# Patient Record
Sex: MALE | Race: WHITE | NOT HISPANIC OR LATINO | Employment: FULL TIME | URBAN - METROPOLITAN AREA
[De-identification: names, ages, dates, MRNs, and addresses within clinical notes are randomized per-mention and may not be internally consistent; named-entity substitution may affect disease eponyms.]

---

## 2017-01-15 ENCOUNTER — TELEPHONE (OUTPATIENT)
Dept: FAMILY MEDICINE | Facility: CLINIC | Age: 27
End: 2017-01-15

## 2017-01-15 DIAGNOSIS — F90.2 ATTENTION DEFICIT HYPERACTIVITY DISORDER (ADHD), COMBINED TYPE: Primary | ICD-10-CM

## 2017-01-15 NOTE — TELEPHONE ENCOUNTER
Patient called today.    Patient needs medication Adderall.    Patient would like to pickup medication at [facility] in [place] on Monday.    Please contact patient.    Thank you.    Central Scheduling  Kaley MOORE

## 2017-01-16 NOTE — TELEPHONE ENCOUNTER
Patient is followed by MICHAEL OROPEZA for ongoing prescription of stimulants.  All refills should be approved by this provider, or covering partner.  Last filled 12/14/16  Last office visit 9/16/16    Medication(s): adderall 20 mg.    Maximum quantity per month: 90  Clinic visit frequency required: Q 6  months     Controlled substance agreement on file: No  Neuropsych evaluation for ADD completed:  Yes, completed 7-2008 at [facility], on file and diagnosis confirmed    Last [facility] website verification:  10/14/16, no concerns    Controlled Substance Refill Request for Xanax  Problem List Complete:  Yes   checked in past 6 months?  Yes 1/16/17, no concerns

## 2017-01-17 NOTE — TELEPHONE ENCOUNTER
Please have another provider approve and sign this prescription in my absence from clinic or I can sign in the morning.  Jamison Lora MD

## 2017-01-18 RX ORDER — DEXTROAMPHETAMINE SACCHARATE, AMPHETAMINE ASPARTATE, DEXTROAMPHETAMINE SULFATE AND AMPHETAMINE SULFATE 5; 5; 5; 5 MG/1; MG/1; MG/1; MG/1
20 TABLET ORAL 3 TIMES DAILY
Qty: 90 TABLET | Refills: 0 | Status: SHIPPED | OUTPATIENT
Start: 2017-01-18 | End: 2017-02-27

## 2017-01-18 NOTE — TELEPHONE ENCOUNTER
The requested prescription(s) has/have been approved and has/have been printed and signed. This note was forwarded to the patient care pool to contact the patient to arrange for the patient to obtain the signed prescription(s).  Jamison Lora

## 2017-01-25 ENCOUNTER — TELEPHONE (OUTPATIENT)
Dept: FAMILY MEDICINE | Facility: CLINIC | Age: 27
End: 2017-01-25

## 2017-01-25 NOTE — TELEPHONE ENCOUNTER
D-Amphetamine Salt Combo 20 mg needs a prior auth.  Ph: [phone] I.D. [phone]  Thank you.  Odalis Gabriel M.A.

## 2017-02-03 ENCOUNTER — TELEPHONE (OUTPATIENT)
Dept: FAMILY MEDICINE | Facility: CLINIC | Age: 27
End: 2017-02-03

## 2017-02-03 DIAGNOSIS — F41.9 ANXIETY: Primary | ICD-10-CM

## 2017-02-03 NOTE — TELEPHONE ENCOUNTER
Patient is followed by MICHAEL OROPEZA for ongoing prescription of benzodiazepines.  All refills should be approved by this provider, or covering partner.  Last office visit 11/7/16  Last refill 12/21/16 #30  Medication(s): alprazolam 2 mg .    Controlled substance agreement on file: No  Benzodiazepine use reviewed by psychiatry:  No  Last [facility] website verification:  10/14/16  CARLOS Cuenca RN

## 2017-02-03 NOTE — TELEPHONE ENCOUNTER
Patient is calling for refill request RX: ALPRAZolam (XANAX) 2 MG tablet, please call when ready to be picked up at . Thank you.

## 2017-02-07 RX ORDER — ALPRAZOLAM 2 MG
2 TABLET ORAL 3 TIMES DAILY PRN
Qty: 30 TABLET | Refills: 0 | Status: SHIPPED | OUTPATIENT
Start: 2017-02-07 | End: 2017-03-08

## 2017-02-07 NOTE — TELEPHONE ENCOUNTER
Left message for patient to call me back. Can fax this Rx to pharmacy or can leave at  for ./Kassie Covarrubias,

## 2017-02-09 NOTE — TELEPHONE ENCOUNTER
Per patient faxed Rx to [facility] [place]./Kassie Covarrubias,

## 2017-02-27 DIAGNOSIS — F90.2 ATTENTION DEFICIT HYPERACTIVITY DISORDER (ADHD), COMBINED TYPE: ICD-10-CM

## 2017-02-27 RX ORDER — DEXTROAMPHETAMINE SACCHARATE, AMPHETAMINE ASPARTATE, DEXTROAMPHETAMINE SULFATE AND AMPHETAMINE SULFATE 5; 5; 5; 5 MG/1; MG/1; MG/1; MG/1
20 TABLET ORAL 3 TIMES DAILY
Qty: 90 TABLET | Refills: 0 | Status: SHIPPED | OUTPATIENT
Start: 2017-02-27 | End: 2017-03-30

## 2017-02-27 NOTE — TELEPHONE ENCOUNTER
Patient is followed by MICHAEL OROPEZA for ongoing prescription of stimulants.  All refills should be approved by this provider, or covering partner.  Last filled 1/18/17  Last office visit 11/7/16     Medication(s): adderall 20 mg.    Maximum quantity per month: 90  Clinic visit frequency required: Q 6  months      Controlled substance agreement on file: No  Neuropsych evaluation for ADD completed:  Yes, completed 7-2008 at [facility], on file and diagnosis confirmed     Last [facility] website verification:  1/16/17, no concerns     Jasmin Lauren RN

## 2017-02-27 NOTE — TELEPHONE ENCOUNTER
Patient is calling for refill RX: amphetamine-dextroamphetamine (ADDERALL) 20 MG per tablet, would like a call back when script is ready to be picked up at . Thank you.

## 2017-03-08 DIAGNOSIS — F41.9 ANXIETY: ICD-10-CM

## 2017-03-08 RX ORDER — ALPRAZOLAM 2 MG
2 TABLET ORAL 3 TIMES DAILY PRN
Qty: 30 TABLET | Refills: 0 | Status: SHIPPED | OUTPATIENT
Start: 2017-03-08 | End: 2017-04-07

## 2017-03-08 NOTE — TELEPHONE ENCOUNTER
Patient is calling to request refill RX: ALPRAZolam (XANAX) 2 MG tablet, state last time RX was faxed to pharmacy instead of having him  wondering if he can request to have that done every time. Please call to advise. Thank you.

## 2017-03-08 NOTE — TELEPHONE ENCOUNTER
Patient is followed by MICHAEL OROPEZA for ongoing prescription of benzodiazepines.  All refills should be approved by this provider, or covering partner.  Last office visit 11/7/16  Last refill 2/9/17 #30  Medication(s): alprazolam 2 mg .    Controlled substance agreement on file: No  Benzodiazepine use reviewed by psychiatry:  No  Last [facility] website verification:  3/8/17  Jasmin Lauren RN

## 2017-03-30 DIAGNOSIS — F90.2 ATTENTION DEFICIT HYPERACTIVITY DISORDER (ADHD), COMBINED TYPE: ICD-10-CM

## 2017-03-30 RX ORDER — DEXTROAMPHETAMINE SACCHARATE, AMPHETAMINE ASPARTATE, DEXTROAMPHETAMINE SULFATE AND AMPHETAMINE SULFATE 5; 5; 5; 5 MG/1; MG/1; MG/1; MG/1
20 TABLET ORAL 3 TIMES DAILY
Qty: 90 TABLET | Refills: 0 | Status: SHIPPED | OUTPATIENT
Start: 2017-03-30 | End: 2017-05-01

## 2017-03-30 NOTE — TELEPHONE ENCOUNTER
Patient is followed by MICHAEL OROPEZA for ongoing prescription of stimulants.  All refills should be approved by this provider, or covering partner.  Last filled 2/27/17  Last office visit 11/7/16      Medication(s): adderall 20 mg.    Maximum quantity per month: 90  Clinic visit frequency required: Q 6  months       Controlled substance agreement on file: No  Neuropsych evaluation for ADD completed:  Yes, completed 7-2008 at [facility], on file and diagnosis confirmed      Last [facility] website verification:  1/16/17, no concerns

## 2017-03-30 NOTE — TELEPHONE ENCOUNTER
Patient is calling would like refill RX: amphetamine-dextroamphetamine (ADDERALL) 20 MG per tablet, please call when script is ready to be picked up at . Thank you.

## 2017-04-07 DIAGNOSIS — F41.9 ANXIETY: ICD-10-CM

## 2017-04-07 RX ORDER — ALPRAZOLAM 2 MG
2 TABLET ORAL 3 TIMES DAILY PRN
Qty: 30 TABLET | Refills: 0 | Status: SHIPPED | OUTPATIENT
Start: 2017-04-07 | End: 2017-05-16

## 2017-04-07 NOTE — TELEPHONE ENCOUNTER
Patient is followed by MICHAEL OROPEZA for ongoing prescription of benzodiazepines.  All refills should be approved by this provider, or covering partner.    Last office visit 11/7/16  Last refill  3/8/17 #30    Medication(s): alprazolam 2 mg .    Controlled substance agreement on file: No  Benzodiazepine use reviewed by psychiatry:  No  Last [facility] website verification:  3/8/17  Jasmin Lauren RN

## 2017-04-11 ENCOUNTER — TRANSFERRED RECORDS (OUTPATIENT)
Dept: HEALTH INFORMATION MANAGEMENT | Facility: CLINIC | Age: 27
End: 2017-04-11

## 2017-04-14 ENCOUNTER — TELEPHONE (OUTPATIENT)
Dept: FAMILY MEDICINE | Facility: CLINIC | Age: 27
End: 2017-04-14

## 2017-04-14 NOTE — TELEPHONE ENCOUNTER
RN contacted [facility] listed below regarding the 4/7/17 alprazolam Rx. Per pharmacy staff, the Rx is ready for  at the [facility] location. Pt notified and states he will go  the medication.    Controlled Substance Refill Request for   ALPRAZolam (XANAX) 2 MG tablet 30 tablet 0 4/7/2017  No   Sig: Take 1 tablet (2 mg) by mouth 3 times daily as needed for anxiety     Pharmacy Contact   Telephone Fax   [phone]   Pharmacy Address and Hours   Address Hours   [address] [id number]      Gisela Cullen RN

## 2017-04-18 ENCOUNTER — TRANSFERRED RECORDS (OUTPATIENT)
Dept: HEALTH INFORMATION MANAGEMENT | Facility: CLINIC | Age: 27
End: 2017-04-18

## 2017-05-01 DIAGNOSIS — F90.2 ATTENTION DEFICIT HYPERACTIVITY DISORDER (ADHD), COMBINED TYPE: ICD-10-CM

## 2017-05-01 RX ORDER — DEXTROAMPHETAMINE SACCHARATE, AMPHETAMINE ASPARTATE, DEXTROAMPHETAMINE SULFATE AND AMPHETAMINE SULFATE 5; 5; 5; 5 MG/1; MG/1; MG/1; MG/1
20 TABLET ORAL 3 TIMES DAILY
Qty: 90 TABLET | Refills: 0 | Status: SHIPPED | OUTPATIENT
Start: 2017-05-01 | End: 2017-06-02

## 2017-05-01 NOTE — TELEPHONE ENCOUNTER
Patient states he would like a refill on his Adderall.  Please call when ready for .    Thank you.

## 2017-05-01 NOTE — TELEPHONE ENCOUNTER
Notified patient through [employer].   Rx request is at the  for .  Victoria Rice, [facility]

## 2017-05-16 DIAGNOSIS — F41.9 ANXIETY: ICD-10-CM

## 2017-05-16 NOTE — TELEPHONE ENCOUNTER
Patient is followed by MICHAEL OROPEZA for ongoing prescription of benzodiazepines.  All refills should be approved by this provider, or covering partner.     Last office visit 11/7/16  Last refill 4/7/17 #30     Medication(s): alprazolam 2 mg .    Controlled substance agreement on file: No  Benzodiazepine use reviewed by psychiatry:  No  Last [facility] website verification:  3/8/17

## 2017-05-17 RX ORDER — ALPRAZOLAM 2 MG
2 TABLET ORAL 3 TIMES DAILY PRN
Qty: 30 TABLET | Refills: 0 | Status: SHIPPED | OUTPATIENT
Start: 2017-05-17 | End: 2017-06-27

## 2017-05-19 ENCOUNTER — TRANSFERRED RECORDS (OUTPATIENT)
Dept: HEALTH INFORMATION MANAGEMENT | Facility: CLINIC | Age: 27
End: 2017-05-19

## 2017-05-23 ENCOUNTER — TRANSFERRED RECORDS (OUTPATIENT)
Dept: HEALTH INFORMATION MANAGEMENT | Facility: CLINIC | Age: 27
End: 2017-05-23

## 2017-06-02 ENCOUNTER — TELEPHONE (OUTPATIENT)
Dept: FAMILY MEDICINE | Facility: CLINIC | Age: 27
End: 2017-06-02

## 2017-06-02 DIAGNOSIS — F90.2 ATTENTION DEFICIT HYPERACTIVITY DISORDER (ADHD), COMBINED TYPE: ICD-10-CM

## 2017-06-02 RX ORDER — DEXTROAMPHETAMINE SACCHARATE, AMPHETAMINE ASPARTATE, DEXTROAMPHETAMINE SULFATE AND AMPHETAMINE SULFATE 5; 5; 5; 5 MG/1; MG/1; MG/1; MG/1
20 TABLET ORAL 3 TIMES DAILY
Qty: 90 TABLET | Refills: 0 | Status: SHIPPED | OUTPATIENT
Start: 2017-06-02 | End: 2017-07-12

## 2017-06-02 NOTE — TELEPHONE ENCOUNTER
Controlled Substance Refill Request for Adderall  Problem List Complete:  Yes    Medication(s): adderall 20 mg.   Maximum quantity per month: 90  Clinic visit frequency required: Q 6  months     Controlled substance agreement on file: No  Neuropsych evaluation for ADD completed:  Yes, completed 7[facility], on file and diagnosis confirmed       checked in past 6 months?  Yes 6/2/17       Poonam RIDERN, RN, CPN

## 2017-06-02 NOTE — TELEPHONE ENCOUNTER
Patient is calling to have amphetamine-dextroamphetamine (ADDERALL) 20 MG per tablet refilled  Please call when this is ready at the   Thank you

## 2017-06-05 NOTE — TELEPHONE ENCOUNTER
Brought RX to the  for . Left message on patient's voicemail that this was done.Rosa Cifuentes MA/TC

## 2017-06-07 ENCOUNTER — TELEPHONE (OUTPATIENT)
Dept: FAMILY MEDICINE | Facility: CLINIC | Age: 27
End: 2017-06-07

## 2017-06-07 ENCOUNTER — TRANSFERRED RECORDS (OUTPATIENT)
Dept: HEALTH INFORMATION MANAGEMENT | Facility: CLINIC | Age: 27
End: 2017-06-07

## 2017-06-07 DIAGNOSIS — N52.2 DRUG-INDUCED ERECTILE DYSFUNCTION: ICD-10-CM

## 2017-06-07 NOTE — TELEPHONE ENCOUNTER
Reason for call:  Medication   If this is a refill request, has the caller requested the refill from the pharmacy already? No  Will the patient be using a [facility]? No  Name of the pharmacy and phone number for the current request: [facility]     Name of the medication requested: sildenafil (REVATIO/VIAGRA) 20 MG tablet    Other request:     Phone number to reach patient:  Home number on file [phone] (home)    Best Time:      Can we leave a detailed message on this number?  YES

## 2017-06-08 NOTE — TELEPHONE ENCOUNTER
Medication was prescribed for 2 mos in November.  ? Follow up needed?   Please advise on refill.  Jasmin Lauren RN

## 2017-06-09 RX ORDER — SILDENAFIL CITRATE 20 MG/1
20 TABLET ORAL DAILY PRN
Qty: 30 TABLET | Refills: 1 | Status: SHIPPED | OUTPATIENT
Start: 2017-06-09 | End: 2018-08-14

## 2017-06-09 NOTE — TELEPHONE ENCOUNTER
Spoke to patient and scheduled appointment. Please send refill to pharmacy./Kassie Covarrubias,

## 2017-06-26 ENCOUNTER — TELEPHONE (OUTPATIENT)
Dept: FAMILY MEDICINE | Facility: CLINIC | Age: 27
End: 2017-06-26

## 2017-06-26 DIAGNOSIS — F41.9 ANXIETY: ICD-10-CM

## 2017-06-26 NOTE — TELEPHONE ENCOUNTER
Please call the patient and make an appointment(s) and then you can refill the medication one time.  Jamison Lora

## 2017-06-27 ENCOUNTER — TRANSFERRED RECORDS (OUTPATIENT)
Dept: HEALTH INFORMATION MANAGEMENT | Facility: CLINIC | Age: 27
End: 2017-06-27

## 2017-06-27 RX ORDER — ALPRAZOLAM 2 MG
2 TABLET ORAL 3 TIMES DAILY PRN
Qty: 30 TABLET | Refills: 0 | Status: SHIPPED | OUTPATIENT
Start: 2017-06-27 | End: 2017-07-31

## 2017-06-27 NOTE — TELEPHONE ENCOUNTER
Notified patient Rx faxed to [facility] [place] and will need to keep 7/12/17 appointment for further refills./Kassie Covarrubias,

## 2017-07-12 ENCOUNTER — TELEPHONE (OUTPATIENT)
Dept: FAMILY MEDICINE | Facility: CLINIC | Age: 27
End: 2017-07-12

## 2017-07-12 DIAGNOSIS — F90.2 ATTENTION DEFICIT HYPERACTIVITY DISORDER (ADHD), COMBINED TYPE: ICD-10-CM

## 2017-07-12 NOTE — TELEPHONE ENCOUNTER
Pending appointment to see PCP 7/31/17.    Heather Lin, RN       Controlled Substance Refill Request for Adderall  Problem List Complete:  Yes     Medication(s): adderall 20 mg.   Maximum quantity per month: 90  Clinic visit frequency required: Q 6  months      Controlled substance agreement on file: No  Neuropsych evaluation for ADD completed:  Yes, completed 7-2008 at [facility], on file and diagnosis confirmed         checked in past 6 months?  Yes 6/2/17

## 2017-07-12 NOTE — TELEPHONE ENCOUNTER
Patient ran late for his appointment today was told he needed to reschedule. He did re-set up an appointment for 07/31 but stated he is out of his adderall and is wondering if he can get a refill prior to his appointment. He is requesting a call back on [phone]. Thank you!

## 2017-07-13 RX ORDER — DEXTROAMPHETAMINE SACCHARATE, AMPHETAMINE ASPARTATE, DEXTROAMPHETAMINE SULFATE AND AMPHETAMINE SULFATE 5; 5; 5; 5 MG/1; MG/1; MG/1; MG/1
20 TABLET ORAL 3 TIMES DAILY
Qty: 90 TABLET | Refills: 0 | Status: SHIPPED | OUTPATIENT
Start: 2017-07-13 | End: 2017-07-31

## 2017-07-25 NOTE — PROGRESS NOTES
SUBJECTIVE:                                                    Abimael Martinez is a 27 year old male who presents to clinic today for the following health issues:      ADHD follow up      Problem list and histories reviewed & adjusted, as indicated.      Reviewed and updated as needed this visit by clinical staff       Reviewed and updated as needed this visit by Provider       --------------------------------------------------------------------------------------------------------------------------------------  SUBJECTIVE: Abimael is a 27 year old male who is here for follow up of ATTENTION DEFICIT HYPERACTIVITY DISORDER.    He has been on he adderall for a few years   He has been on his dose for a while.       Current medication:  Current Outpatient Prescriptions   Medication Sig Dispense Refill     amphetamine-dextroamphetamine (ADDERALL) 20 MG per tablet Take 1 tablet (20 mg) by mouth 3 times daily 90 tablet 0     ALPRAZolam (XANAX) 2 MG tablet Take 1 tablet (2 mg) by mouth 3 times daily as needed for anxiety 30 tablet 0     sildenafil (REVATIO/VIAGRA) 20 MG tablet Take 1 tablet (20 mg) by mouth daily as needed As needed for prevention of erectile dysfunction 30 tablet 1     famotidine (PEPCID) 40 MG tablet Take 1 tablet (40 mg) by mouth At Bedtime 90 tablet 3     Hyoscyamine Sulfate 0.375 MG TBCR Take 1 capful by mouth 3 times daily as needed 90 tablet 1     albuterol (PROAIR HFA, PROVENTIL HFA, VENTOLIN HFA) 108 (90 BASE) MCG/ACT inhaler Inhale 2 puffs into the lungs every 4 hours as needed 1 Inhaler 1     venlafaxine (EFFEXOR-ER) 75 MG TB24 Take 1 tablet (75 mg) by mouth daily 90 tablet 1     The patient did not take the medication this morning.   He only takes it for work or school.     The patient was kept on the same medications at the last visit.. At the current time the patient feels that the medication is sufficiently controlling his symptoms of ADD/ADHD. The symptoms that are not well controlled  include: None.    The patient reports that he is not having any side effects from his current medication.  The patient reports that he is having the following side effects from his current medication: sometimes at first he had side effects with insomnia. He is able to fall asleep in 30 minute(s) or less.       The patient reports recent life changes or stresses including: he has moved to [facility] Active Problem List    Diagnosis Date Noted     Drug-induced erectile dysfunction 11/07/2016     Priority: Medium     OCD (obsessive compulsive disorder) 09/16/2016     Priority: Medium     AYALA (generalized anxiety disorder) 12/04/2015     Priority: Medium     Patient is followed by MICHAEL OROPEZA for ongoing prescription of benzodiazepines.  All refills should be approved by this provider, or covering partner.    Medication(s): alprazolam 2 mg .   Maximum quantity per month: 36  Clinic visit frequency required: Q 6  months     Controlled substance agreement on file: No  Benzodiazepine use reviewed by psychiatry:  No    Last [facility] website verification:  none   [email or web address]           Attention deficit hyperactivity disorder (ADHD), combined type 10/14/2015     Priority: Medium     Patient is followed by MICHAEL OROPEZA for ongoing prescription of stimulants.  All refills should be approved by this provider, or covering partner.    Medication(s): adderall 20 mg.   Maximum quantity per month: 90  Clinic visit frequency required: Q 6  months     Controlled substance agreement on file: No  Neuropsych evaluation for ADD completed:  Yes, completed 7-2008 at [facility], on file and diagnosis confirmed    Last [facility] website verification:  3/14/16, no concerns   [email or web address]           Obesity, Class I, BMI 30-34.9 02/20/2015     Priority: Medium     Internal hemorrhoids which bleed 10/02/2014     Priority: Medium     Hypertension goal BP (blood pressure) < 140/90 08/17/2014     Priority:  "Medium     Low back pain 04/07/2013     Priority: Medium     Tobacco use disorder 03/19/2012     Priority: Medium     High risk sexual behavior 11/06/2011     Priority: Medium     CARDIOVASCULAR SCREENING; LDL GOAL LESS THAN 160 05/24/2011     Priority: Medium     Intermittent asthma 05/08/2011     Priority: Medium     Anxiety      Priority: Medium       Social History:  Social History   Substance Use Topics     Smoking status: Current Some Day Smoker     Packs/day: 0.50     Years: 6.00     Types: Cigarettes     Last attempt to quit: 3/25/2013     Smokeless tobacco: Never Used      Comment: second hand smoke exposure     Alcohol use Yes      Comment: once a week       OBJECTIVE:  /86  Pulse 79  Temp 97.3  F (36.3  C) (Oral)  Ht 6' 1\" (1.854 m)  Wt 263 lb (119.3 kg)  SpO2 98%  BMI 34.7 kg/m2  General:Alert, active, cooperative. Distractibility was not noticed.  Eyes:Conjunctivae are clear. No discharge.  Face:No tics seen.  HEENT: Normal   CV: Regular rate and rhythm. No murmur appreciated.  Neuro: Appropriate for age. No noted tics or tremors.    Lab:   Results for orders placed or performed in visit on 11/28/16   NEISSERIA GONORRHOEA PCR   Result Value Ref Range    Specimen Descrip Urine     N Gonorrhea PCR  NEG     Negative   Negative for N. gonorrhoeae rRNA by transcription mediated amplification.   A negative result by transcription mediated amplification does not preclude the   presence of N. gonorrhoeae infection because results are dependent on proper   and adequate collection, absence of inhibitors, and sufficient rRNA to be   detected.     CHLAMYDIA TRACHOMATIS PCR   Result Value Ref Range    Specimen Description Urine     Chlamydia Trachomatis PCR  NEG     Negative   Negative for C. trachomatis rRNA by transcription mediated amplification.   A negative result by transcription mediated amplification does not preclude the   presence of C. trachomatis infection because results are dependent on " "proper   and adequate collection, absence of inhibitors, and sufficient rRNA to be   detected.         ASSESSMENT:  ATTENTION DEFICIT HYPERACTIVITY DISORDER    PLAN:  CSA signed  We plan to continue the current doses of medication.  Recheck in 6 month(s).  A prescription was printed through [employer] and given to the patient for a 90 day supply. He or the parent will call back when they need a refill.    The problem list was reviewed and has ADD or ADHD and controlledsubstanceproblemlistoverview:[id number] was added to the overview    --------------------------------------------------------------------------------------------------------------------------------------  SUBJECTIVE:   Abimael is a 27 year old male who presents today for follow up asthma.      The patient's current symptoms include: wheezing and chest congestion primarily when he lays down to go to sleep.   The patient's current medications include:  The patient reports that he  is compliant with medication.     Current Outpatient Prescriptions   Medication     fluticasone (FLOVENT HFA) 110 MCG/ACT Inhaler     [START ON 9/30/2017] amphetamine-dextroamphetamine (ADDERALL) 20 MG per tablet     ALPRAZolam (XANAX) 2 MG tablet     sildenafil (REVATIO/VIAGRA) 20 MG tablet     famotidine (PEPCID) 40 MG tablet     Hyoscyamine Sulfate 0.375 MG TBCR     albuterol (PROAIR HFA, PROVENTIL HFA, VENTOLIN HFA) 108 (90 BASE) MCG/ACT inhaler     venlafaxine (EFFEXOR-ER) 75 MG TB24     [DISCONTINUED] ALPRAZolam (XANAX) 2 MG tablet     No current facility-administered medications for this visit.        The patient reports that on average he uses his rescue inhaler 1-2 times a day.  He feels that his asthma usually does not limit his ability to perform daily activities or physical activity.    ACT: score is 15     OBJECTIVE:   /86  Pulse 79  Temp 97.3  F (36.3  C) (Oral)  Ht 6' 1\" (1.854 m)  Wt 263 lb (119.3 kg)  SpO2 98%  BMI 34.7 kg/m2    Exam:  GENERAL APPEARANCE: " healthy, alert and no distress  EYES: EOMI,  PERRL  HENT: ear canals and TM's normal and nose and mouth without ulcers or lesions  RESP: lungs clear to auscultation - no rales, rhonchi or wheezes  CV: regular rates and rhythm, normal S1 S2, no S3 or S4 and no murmur, click or rub -  ABDOMEN:  soft, nontender, no HSM or masses and bowel sounds normal      ASSESSMENT:   Asthma: mild persistent    PLAN:  Steroid inhaler started     Follow up in 4-6 weeks

## 2017-07-31 ENCOUNTER — OFFICE VISIT (OUTPATIENT)
Dept: FAMILY MEDICINE | Facility: CLINIC | Age: 27
End: 2017-07-31
Payer: MEDICAID

## 2017-07-31 VITALS
HEART RATE: 79 BPM | OXYGEN SATURATION: 98 % | SYSTOLIC BLOOD PRESSURE: 131 MMHG | TEMPERATURE: 97.3 F | DIASTOLIC BLOOD PRESSURE: 86 MMHG | WEIGHT: 263 LBS | HEIGHT: 73 IN | BODY MASS INDEX: 34.85 KG/M2

## 2017-07-31 DIAGNOSIS — J45.30 MILD PERSISTENT ASTHMA WITHOUT COMPLICATION: Primary | ICD-10-CM

## 2017-07-31 DIAGNOSIS — F41.9 ANXIETY: ICD-10-CM

## 2017-07-31 DIAGNOSIS — F90.2 ATTENTION DEFICIT HYPERACTIVITY DISORDER (ADHD), COMBINED TYPE: ICD-10-CM

## 2017-07-31 PROCEDURE — 99214 OFFICE O/P EST MOD 30 MIN: CPT | Performed by: FAMILY MEDICINE

## 2017-07-31 RX ORDER — FLUTICASONE PROPIONATE 110 UG/1
2 AEROSOL, METERED RESPIRATORY (INHALATION) 2 TIMES DAILY
Qty: 1 INHALER | Refills: 1 | Status: SHIPPED | OUTPATIENT
Start: 2017-07-31 | End: 2018-05-18

## 2017-07-31 RX ORDER — ALPRAZOLAM 2 MG
2 TABLET ORAL 3 TIMES DAILY PRN
Qty: 30 TABLET | Refills: 0 | Status: SHIPPED | OUTPATIENT
Start: 2017-07-31 | End: 2017-09-11

## 2017-07-31 RX ORDER — DEXTROAMPHETAMINE SACCHARATE, AMPHETAMINE ASPARTATE, DEXTROAMPHETAMINE SULFATE AND AMPHETAMINE SULFATE 5; 5; 5; 5 MG/1; MG/1; MG/1; MG/1
20 TABLET ORAL 3 TIMES DAILY
Qty: 90 TABLET | Refills: 0 | Status: SHIPPED | OUTPATIENT
Start: 2017-08-31 | End: 2017-07-31

## 2017-07-31 RX ORDER — DEXTROAMPHETAMINE SACCHARATE, AMPHETAMINE ASPARTATE, DEXTROAMPHETAMINE SULFATE AND AMPHETAMINE SULFATE 5; 5; 5; 5 MG/1; MG/1; MG/1; MG/1
20 TABLET ORAL 3 TIMES DAILY
Qty: 90 TABLET | Refills: 0 | Status: SHIPPED | OUTPATIENT
Start: 2017-09-30 | End: 2017-10-30

## 2017-07-31 RX ORDER — DEXTROAMPHETAMINE SACCHARATE, AMPHETAMINE ASPARTATE, DEXTROAMPHETAMINE SULFATE AND AMPHETAMINE SULFATE 5; 5; 5; 5 MG/1; MG/1; MG/1; MG/1
20 TABLET ORAL 3 TIMES DAILY
Qty: 90 TABLET | Refills: 0 | Status: SHIPPED | OUTPATIENT
Start: 2017-07-31 | End: 2017-07-31

## 2017-07-31 NOTE — NURSING NOTE
"Chief Complaint   Patient presents with     Recheck Medication       Initial /86  Pulse 79  Temp 97.3  F (36.3  C) (Oral)  Ht 6' 1\" (1.854 m)  Wt 263 lb (119.3 kg)  SpO2 98%  BMI 34.7 kg/m2 Estimated body mass index is 34.7 kg/(m^2) as calculated from the following:    Height as of this encounter: 6' 1\" (1.854 m).    Weight as of this encounter: 263 lb (119.3 kg).  Medication Reconciliation: complete     Victoria Rice, julienne      "

## 2017-07-31 NOTE — MR AVS SNAPSHOT
After Visit Summary   7/31/2017    Abimael Martinez    MRN: [id number]           Patient Information     Date Of Birth          1990        Visit Information        Provider Department      7/31/2017 11:00 AM Jamison Lora MD [facility]        Today's Diagnoses     Mild persistent asthma without complication    -  1    Attention deficit hyperactivity disorder (ADHD), combined type           Follow-ups after your visit        Follow-up notes from your care team     Return in about 6 weeks (around 9/11/2017) for asthma recheck.      Who to contact     If you have questions or need follow up information about today's clinic visit or your schedule please contact [facility] [place] directly at [phone].  Normal or non-critical lab and imaging results will be communicated to you by MyChart, letter or phone within 4 business days after the clinic has received the results. If you do not hear from us within 7 days, please contact the clinic through [employer] or phone. If you have a critical or abnormal lab result, we will notify you by phone as soon as possible.  Submit refill requests through [employer] or call your pharmacy and they will forward the refill request to us. Please allow 3 business days for your refill to be completed.          Additional Information About Your Visit        MyChart Information     [employer] gives you secure access to your electronic health record. If you see a primary care provider, you can also send messages to your care team and make appointments. If you have questions, please call your primary care clinic.  If you do not have a primary care provider, please call [phone] and they will assist you.        Care EveryWhere ID     This is your Care EveryWhere ID. This could be used by other organizations to access your [place] medical records  [id number]        Your Vitals Were     Pulse Temperature Height Pulse Oximetry BMI (Body Mass Index)     "   79 97.3  F (36.3  C) (Oral) 6' 1\" (1.854 m) 98% 34.7 kg/m2        Blood Pressure from Last 3 Encounters:   07/31/17 131/86   11/28/16 132/82   11/07/16 131/89    Weight from Last 3 Encounters:   07/31/17 263 lb (119.3 kg)   11/28/16 253 lb (114.8 kg)   11/07/16 249 lb 9.6 oz (113.2 kg)              Today, you had the following     No orders found for display         Today's Medication Changes          These changes are accurate as of: 7/31/17 12:01 PM.  If you have any questions, ask your nurse or doctor.               Start taking these medicines.        Dose/Directions    amphetamine-dextroamphetamine 20 MG per tablet   Commonly known as:  ADDERALL   Used for:  Attention deficit hyperactivity disorder (ADHD), combined type   Started by:  Jamison Lora MD        Dose:  20 mg   Start taking on:  9/30/2017   Take 1 tablet (20 mg) by mouth 3 times daily   Quantity:  90 tablet   Refills:  0       fluticasone 110 MCG/ACT Inhaler   Commonly known as:  FLOVENT HFA   Used for:  Mild persistent asthma without complication   Started by:  Jamison Lora MD        Dose:  2 puff   Inhale 2 puffs into the lungs 2 times daily   Quantity:  1 Inhaler   Refills:  1         Stop taking these medicines if you haven't already. Please contact your care team if you have questions.     doxycycline 100 MG capsule   Commonly known as:  VIBRAMYCIN   Stopped by:  Jamison Lora MD                Where to get your medicines      Some of these will need a paper prescription and others can be bought over the counter.  Ask your nurse if you have questions.     Bring a paper prescription for each of these medications     amphetamine-dextroamphetamine 20 MG per tablet    fluticasone 110 MCG/ACT Inhaler                Primary Care Provider Office Phone # Fax #    Jamison Lora [phone]       [facility] [address] [facility] [address]        Equal Access to Services     KELSI HULL AH: Hadii " gretchen Wall, wadeanada gianlucaadaha, qaybta kalang shell, waxclarissa idiin haylmmaris armijokbjassi luna stanley. So [facility] [phone].    ATENCIÓN: Si habla karoline, tiene a salazar disposición servicios gratuitos de asistencia lingüística. Nathalieame al [phone].    We comply with applicable federal civil rights laws and [place] laws. We do not discriminate on the basis of race, color, national origin, age, disability sex, sexual orientation or gender identity.            Thank you!     [facility] you for [facility]  [facility]. Our goal is always to provide you with excellent care. Hearing back from our patients is one way we can continue to improve our services. Please take a few minutes to complete the written survey that you may receive in the mail after your visit with us. Thank you!             Your Updated Medication List - Protect others around you: Learn how to safely use, store and throw away your medicines at www.disposemymeds.org.          This list is accurate as of: 7/31/17 12:01 PM.  Always use your most recent med list.                   Brand Name Dispense Instructions for use Diagnosis    albuterol 108 (90 BASE) MCG/ACT Inhaler    PROAIR HFA/PROVENTIL HFA/VENTOLIN HFA    1 Inhaler    Inhale 2 puffs into the lungs every 4 hours as needed    Intermittent asthma, uncomplicated       ALPRAZolam 2 MG tablet    XANAX    30 tablet    Take 1 tablet (2 mg) by mouth 3 times daily as needed for anxiety    Anxiety       amphetamine-dextroamphetamine 20 MG per tablet   Start taking on:  9/30/2017    ADDERALL    90 tablet    Take 1 tablet (20 mg) by mouth 3 times daily    Attention deficit hyperactivity disorder (ADHD), combined type       famotidine 40 MG tablet    PEPCID    90 tablet    Take 1 tablet (40 mg) by mouth At Bedtime    Upper abdominal pain       fluticasone 110 MCG/ACT Inhaler    FLOVENT HFA    1 Inhaler    Inhale 2 puffs into the lungs 2 times daily    Mild persistent asthma  without complication       Hyoscyamine Sulfate 0.375 MG Tbcr     90 tablet    Take 1 capful by mouth 3 times daily as needed    Upper abdominal pain       sildenafil 20 MG tablet    REVATIO/VIAGRA    30 tablet    Take 1 tablet (20 mg) by mouth daily as needed As needed for prevention of erectile dysfunction    Drug-induced erectile dysfunction       venlafaxine 75 MG Tb24 24 hr tablet    EFFEXOR-ER    90 tablet    Take 1 tablet (75 mg) by mouth daily    Anxiety

## 2017-07-31 NOTE — LETTER
[facility]    07/31/17    Patient: Abimael Martinez  YOB: 1990  Medical Record Number: [id number]                                                                  Controlled Substance Agreement  I understand that my care provider has prescribed controlled substances (narcotics, tranquilizers, and/or stimulants) to help manage my condition(s).  I am taking this medicine to help me function or work.  I know that this is strong medicine.  It could have serious side effects and even cause a dependency on the drug.  If I stop these medicines suddenly, I could have severe withdrawal symptoms.    The risks, benefits, and side effects of these medication(s) were explained to me.  I agree that:  1. I will take part in other treatments as advised by my provider.  This may be psychiatry or counseling, physical therapy, behavioral therapy, group treatment, or a referral to a pain clinic.  I will reduce or stop my medicine when my provider tells me to do so.   2. I will take my medicines as prescribed.  I will not change the dose or schedule unless my provider tells me to.  There will be no refills if I  run out early.   I may be contacted at any time without warning and asked to complete a drug test or pill count.   3. I will keep all my appointments at the clinic.  If I miss appointments or fail to follow instructions, my provider may stop my medicine.  4. I will not ask other providers to prescribe controlled substances. And I will not accept controlled substances from other people. If I need another prescribed controlled substance for a new reason, I will notify my provider within one business day.  5. If I enroll in the [place] Medical Marijuana program, I will tell my provider.  I will also sign an agreement to share my medical records with my provider.  6. I will use one pharmacy to fill all of my controlled substance prescriptions.  If my prescription is mailed to my pharmacy, it may take  5 to 7 days for my medicine to be ready.  7. I understand that my provider, clinic care team, and pharmacy can track controlled substance prescriptions from other providers through a central database (prescription monitoring program).  8. I will bring in my list of medications (or my medicine bottles) each time I come to the clinic.  REV- 04/2016                                                                                                                                            Page 1 of 2      [facility]    07/31/17    Patient: Abimael Martinez  YOB: 1990  Medical Record Number: [id number]    9. Refills of controlled substances will be made only during office hours.  It is up to me to make sure that I do not run out of my medicines on weekends or holidays.    10. I am responsible for my prescriptions.  If the medicine is lost or stolen, it will not be replaced.   I also agree not to share these medicines with anyone.  11. I agree to not use ANY illegal or recreational drugs.  This includes marijuana, cocaine, bath salts or other drugs.  I agree not to use alcohol unless my provider says I may.  I agree to give urine samples whenever asked.  If I fail to give a urine sample, the provider may stop my medicine.     12. I will tell my nurse or provider right away if I become pregnant or have a new medical problem treated outside of [facility].  13. I understand that this medicine can affect my thinking and judgment.  It may be unsafe for me to drive, use machinery and do dangerous tasks.  I will not do any of these things until I know how the medicine affects me.  If my dose changes, I will wait to see how it affects me.  I will contact my provider if I have concerns about medicine side effects.  I understand that if I do not follow any of the conditions above, my prescriptions or treatment may be stopped.    I agree that my provider, clinic care team, and pharmacy may work with  any city, state or federal law enforcement agency that investigates the misuse, sale, or other diversion of my controlled medicine. I will allow my provider to discuss my care with or share a copy of this agreement with any other treating provider, pharmacy or emergency room where I receive care.  I agree to give up (waive) any right of privacy or confidentiality with respect to these authorizations.   I have read this agreement and have asked questions about anything I did not understand.   ___________________________________    ___________________________  Patient Signature                                                           Date and Time  ___________________________________     ____________________________  Witness                                                                            Date and Time  ___________________________________  Jamison Lora MD  REV-  04/2016                                                                                                                                                                 Page 2 of 2

## 2017-08-01 ASSESSMENT — ASTHMA QUESTIONNAIRES: ACT_TOTALSCORE: 15

## 2017-08-07 ENCOUNTER — TELEPHONE (OUTPATIENT)
Dept: FAMILY MEDICINE | Facility: CLINIC | Age: 27
End: 2017-08-07

## 2017-08-07 NOTE — LETTER
[facility]  [address] [facility] [address]  [phone]    August 28, 2017      Abimael Martinez  [address]  [facility] [place] [address]      Dear Abimael,     Your clinic record indicates that you are due for an asthma update. We have a survey tool called an ACT (or Asthma Control Test) we use to measure the level of control of your asthma. Please complete the enclosed questionnaire and mail it back to us in the self-addressed stamped envelope.     If you have questions about this letter please contact your provider.     Sincerely,       Your [facility] Team

## 2017-08-07 NOTE — TELEPHONE ENCOUNTER
Patient was in to see Dr. Lora on 07-31-17 and failed their ACT by scoring 15. Please put in the failed ACT workflow for follow-up. Thank you.

## 2017-09-11 ENCOUNTER — TELEPHONE (OUTPATIENT)
Dept: FAMILY MEDICINE | Facility: CLINIC | Age: 27
End: 2017-09-11

## 2017-09-11 DIAGNOSIS — F41.9 ANXIETY: ICD-10-CM

## 2017-09-11 RX ORDER — ALPRAZOLAM 2 MG
2 TABLET ORAL 3 TIMES DAILY PRN
Qty: 30 TABLET | Refills: 0 | Status: SHIPPED | OUTPATIENT
Start: 2017-09-11 | End: 2017-10-11

## 2017-09-11 NOTE — TELEPHONE ENCOUNTER
Patient is calling for refill RX: ALPRAZolam (XANAX) 2 MG tablet, would like RX sent to his pharmacy if possible [facility]. Thank you.

## 2017-09-14 DIAGNOSIS — F41.9 ANXIETY: ICD-10-CM

## 2017-09-14 RX ORDER — VENLAFAXINE HYDROCHLORIDE 75 MG/1
75 TABLET, EXTENDED RELEASE ORAL DAILY
Qty: 90 TABLET | Refills: 1 | Status: SHIPPED | OUTPATIENT
Start: 2017-09-14 | End: 2018-03-09

## 2017-09-18 ENCOUNTER — TELEPHONE (OUTPATIENT)
Dept: FAMILY MEDICINE | Facility: CLINIC | Age: 27
End: 2017-09-18

## 2017-09-18 NOTE — TELEPHONE ENCOUNTER
Panel Management Review      Patient has the following on his problem list:     Asthma review     ACT Total Scores 7/31/2017   ACT TOTAL SCORE -   ASTHMA ER VISITS -   ASTHMA HOSPITALIZATIONS -   ACT TOTAL SCORE (Goal Greater than or Equal to 20) 15   In the past 12 months, how many times did you visit the emergency room for your asthma without being admitted to the hospital? 0   In the past 12 months, how many times were you hospitalized overnight because of your asthma? 0      1. Is Asthma diagnosis on the Problem List? Yes    2. Is Asthma listed on Health Maintenance? Yes    3. Patient is due for:  ACT and AAP        Composite cancer screening  Chart review shows that this patient is due/due soon for the following None  Summary:    Patient is due/failing the following:   AAP and ACT    Action needed:   Patient needs to do ACT.    Type of outreach:    Copy of ACT mailed to patient, will reach out in 5 days.    Questions for provider review:    Please complete the patients AAP. Route back to the team so they can reach out in 2 weeks if not returned                                                                                                                                    Victoria Rice cma       Chart routed to Provider .

## 2017-09-18 NOTE — LETTER
My Asthma Action Plan  Name: Abimael Martinez   YOB: 1990  Date: 9/19/2017   My doctor: Jamison Lora MD   My clinic: [facility]        My Control Medicine: Fluticasone propionate (Flovent) -   mcg twice a day  My Rescue Medicine: Albuterol (Proair/Ventolin/Proventil) inhaler every 6 hours as needed   My Asthma Severity: mild persistent  Avoid your asthma triggers: Patient is unaware of triggers               GREEN ZONE   Good Control    I feel good    No cough or wheeze    Can work, sleep and play without asthma symptoms       Take your asthma control medicine every day.     1. If exercise triggers your asthma, take your rescue medication    15 minutes before exercise or sports, and    During exercise if you have asthma symptoms  2. Spacer to use with inhaler: If you have a spacer, make sure to use it with your inhaler             YELLOW ZONE Getting Worse  I have ANY of these:    I do not feel good    Cough or wheeze    Chest feels tight    Wake up at night   1. Keep taking your Green Zone medications  2. Start taking your rescue medicine:    every 20 minutes for up to 1 hour. Then every 4 hours for 24-48 hours.  3. If you stay in the Yellow Zone for more than 12-24 hours, contact your doctor.  4. If you do not return to the Green Zone in 12-24 hours or you get worse, start taking your oral steroid medicine if prescribed by your provider.           RED ZONE Medical Alert - Get Help  I have ANY of these:    I feel awful    Medicine is not helping    Breathing getting harder    Trouble walking or talking    Nose opens wide to breathe       1. Take your rescue medicine NOW  2. If your provider has prescribed an oral steroid medicine, start taking it NOW  3. Call your doctor NOW  4. If you are still in the Red Zone after 20 minutes and you have not reached your doctor:    Take your rescue medicine again and    Call 911 or go to the emergency room right away    See your  regular doctor within 2 weeks of an Emergency Room or Urgent Care visit for follow-up treatment.        Electronically signed by: Jamison Lora, September 19, 2017    Annual Reminders:  Meet with Asthma Educator,  Flu Shot in the Fall, consider Pneumonia Vaccination for patients with asthma (aged 19 and older).    Pharmacy:    [id number] - [place], [place] - [address] [facility] [address] [facility]  WRITTEN PRESCRIPTION REQUESTED  [facility] - [place], [place] - [address] AT [facility] OF [address] [place] [address] - [address]  [facility] [address] [place], [place] - [address] AT [address] [place] [address] & [facility]  [facility] [address] - [place], [place] - [address] AT [facility] & [facility]                    Asthma Triggers  How To Control Things That Make Your Asthma Worse    Triggers are things that make your asthma worse.  Look at the list below to help you find your triggers and what you can do about them.  You can help prevent asthma flare-ups by staying away from your triggers.      Trigger                                                          What you can do   Cigarette Smoke  Tobacco smoke can make asthma worse. Do not allow smoking in your home, car or around you.  Be sure no one smokes at a child s day care or school.  If you smoke, ask your health care provider for ways to help you quit.  Ask family members to quit too.  Ask your health care provider for a referral to Quit Plan to help you quit smoking, or call 1-626-288-PLAN.     Colds, Flu, Bronchitis  These are common triggers of asthma. Wash your hands often.  Don t touch your eyes, nose or mouth.  Get a flu shot every year.     Dust Mites  These are tiny bugs that live in cloth or carpet. They are too small to see. Wash sheets and blankets in hot water every week.   Encase pillows and mattress in dust mite proof covers.  Avoid  having carpet if you can. If you have carpet, vacuum weekly.   Use a dust mask and HEPA vacuum.   Pollen and Outdoor Mold  Some people are allergic to trees, grass, or weed pollen, or molds. Try to keep your windows closed.  Limit time out doors when pollen count is high.   Ask you health care provider about taking medicine during allergy season.     Animal Dander  Some people are allergic to skin flakes, urine or saliva from pets with fur or feathers. Keep pets with fur or feathers out of your home.    If you can t keep the pet outdoors, then keep the pet out of your bedroom.  Keep the bedroom door closed.  Keep pets off cloth furniture and away from stuffed toys.     Mice, Rats, and Cockroaches  Some people are allergic to the waste from these pests.   Cover food and garbage.  Clean up spills and food crumbs.  Store grease in the refrigerator.   Keep food out of the bedroom.   Indoor Mold  This can be a trigger if your home has high moisture. Fix leaking faucets, pipes, or other sources of water.   Clean moldy surfaces.  Dehumidify basement if it is damp and smelly.   Smoke, Strong Odors, and Sprays  These can reduce air quality. Stay away from strong odors and sprays, such as perfume, powder, hair spray, paints, smoke incense, paint, cleaning products, candles and new carpet.   Exercise or Sports  Some people with asthma have this trigger. Be active!  Ask your doctor about taking medicine before sports or exercise to prevent symptoms.    Warm up for 5-10 minutes before and after sports or exercise.     Other Triggers of Asthma  Cold air:  Cover your nose and mouth with a scarf.  Sometimes laughing or crying can be a trigger.  Some medicines and food can trigger asthma.

## 2017-09-18 NOTE — LETTER
[facility]  [address] [facility] [id number] [phone]        September 18, 2017      Abimael Martinez  [address] [facility] [address]          Dear Abimael,     Your clinic record indicates that you are due for an asthma update. We have a survey tool called an ACT (or Asthma Control Test) we use to measure the level of control of your asthma. Please complete the enclosed questionnaire and mail it back to us in the self-addressed stamped envelope.     If you have questions about this letter please contact your provider.     Sincerely,       Your [facility] Team

## 2017-09-25 NOTE — TELEPHONE ENCOUNTER
Patient returned ACT with a score of 20. Per protocol will close encounter./Kassie Covarrubias,

## 2017-09-26 ASSESSMENT — ASTHMA QUESTIONNAIRES: ACT_TOTALSCORE: 20

## 2017-09-28 ENCOUNTER — TELEPHONE (OUTPATIENT)
Dept: FAMILY MEDICINE | Facility: CLINIC | Age: 27
End: 2017-09-28

## 2017-09-28 NOTE — TELEPHONE ENCOUNTER
I am not understanding why he needs that as there is a script previously printed with a start date of 9-30-17. He should have taken that to his pharmacy

## 2017-09-28 NOTE — TELEPHONE ENCOUNTER
Pharmacy calling patient is currently at pharmacy waiting is leaving to go out of town and wants an early refill on his adderall, need verbal consent please call to advise.

## 2017-09-29 NOTE — TELEPHONE ENCOUNTER
Information below is reviewed with  Dr Lora, Verbal Orders okay to fill 1 day early.  Verbalized good understanding.     Per protocol, will route encounter to be cosigned by provider for Verbal Orders.  Jasmin Lauren RN

## 2017-09-29 NOTE — TELEPHONE ENCOUNTER
I have discussed this patient's issue with the RN involved and we have come up with this plan.  Jamison Lora MD

## 2017-10-11 ENCOUNTER — TELEPHONE (OUTPATIENT)
Dept: FAMILY MEDICINE | Facility: CLINIC | Age: 27
End: 2017-10-11

## 2017-10-11 DIAGNOSIS — F41.9 ANXIETY: ICD-10-CM

## 2017-10-11 RX ORDER — ALPRAZOLAM 2 MG
2 TABLET ORAL 3 TIMES DAILY PRN
Qty: 30 TABLET | Refills: 0 | Status: SHIPPED | OUTPATIENT
Start: 2017-10-11 | End: 2017-11-13

## 2017-10-11 NOTE — TELEPHONE ENCOUNTER
Controlled Substance Refill Request for Alprazolam  Problem List Complete:  Yes      checked in past 6 months?  Yes 9/11/17 , recommend review, printed and given to Dr Lora.      Last filled 9/11/17  Last office visit 7/31/17     Patient is followed by MICHAEL LORA for ongoing prescription of benzodiazepines.  All refills should be approved by this provider, or covering partner.     Medication(s): alprazolam 2 mg .   Maximum quantity per month: 36  Clinic visit frequency required: Q 6  months      Controlled substance agreement on file: No  Benzodiazepine use reviewed by psychiatry:  No     Last [facility] website verification:  none

## 2017-10-30 ENCOUNTER — TELEPHONE (OUTPATIENT)
Dept: FAMILY MEDICINE | Facility: CLINIC | Age: 27
End: 2017-10-30

## 2017-10-30 DIAGNOSIS — F90.2 ATTENTION DEFICIT HYPERACTIVITY DISORDER (ADHD), COMBINED TYPE: ICD-10-CM

## 2017-10-30 RX ORDER — DEXTROAMPHETAMINE SACCHARATE, AMPHETAMINE ASPARTATE, DEXTROAMPHETAMINE SULFATE AND AMPHETAMINE SULFATE 5; 5; 5; 5 MG/1; MG/1; MG/1; MG/1
20 TABLET ORAL 3 TIMES DAILY
Qty: 90 TABLET | Refills: 0 | Status: SHIPPED | OUTPATIENT
Start: 2017-10-30 | End: 2017-11-29

## 2017-10-30 NOTE — TELEPHONE ENCOUNTER
Patient is running low on his adderall, has 1 pill left. Wondering if he can get multiple scripts like he has in the past so he doesn't need to request each month and as he doesn't live as close as he used to.

## 2017-10-30 NOTE — TELEPHONE ENCOUNTER
Last refill 9/30/17, # 90  Last office visit 9/30/17    Controlled Substance Refill Request for Adderall  Problem List Complete:  Yes      Medication(s): adderall 20 mg.   Maximum quantity per month: 90  Clinic visit frequency required: Q 6  months       Controlled substance agreement on file: No  Neuropsych evaluation for ADD completed:  Yes, completed 7-2008 at [facility], on file and diagnosis confirmed           checked in past 6 months?  Yes 6/2/17

## 2017-10-31 NOTE — TELEPHONE ENCOUNTER
Brought 1 RX to the  for . Left message on patient's voicemail that this was done.Rosa Cifuentes MA/VITO

## 2017-11-11 ENCOUNTER — TELEPHONE (OUTPATIENT)
Dept: FAMILY MEDICINE | Facility: CLINIC | Age: 27
End: 2017-11-11

## 2017-11-11 DIAGNOSIS — F41.9 ANXIETY: ICD-10-CM

## 2017-11-11 NOTE — TELEPHONE ENCOUNTER
Patient is calling for a refill request for ALPRAZolam (XANAX) 2 MG tablet, patient stated he is low on his medication.

## 2017-11-13 RX ORDER — ALPRAZOLAM 2 MG
2 TABLET ORAL 3 TIMES DAILY PRN
Qty: 30 TABLET | Refills: 0 | Status: SHIPPED | OUTPATIENT
Start: 2017-11-13 | End: 2017-12-12

## 2017-11-13 NOTE — TELEPHONE ENCOUNTER
Controlled Substance Refill Request for Alprazolam  Problem List Complete:  Yes       checked in past 6 months?  Yes 9/11/17, printed and given to Dr Lora.       Patient is followed by MICHAEL LORA for ongoing prescription of benzodiazepines.  All refills should be approved by this provider, or covering partner.      Medication(s): alprazolam 2 mg .   Maximum quantity per month: 36  Clinic visit frequency required: Q 6  months       Controlled substance agreement on file: No  Benzodiazepine use reviewed by psychiatry:  No    Heather Lin RN

## 2017-11-13 NOTE — TELEPHONE ENCOUNTER
The requested prescription(s) has/have been approved and has/have been printed and signed. This note was forwarded to the patient care pool to contact the patient to arrange for the patient to obtain the signed prescription(s).  Jamison Lora\

## 2017-11-14 NOTE — TELEPHONE ENCOUNTER
Left message to call me back directly. Which pharmacy would he like this faxed too?/Kassie Covarrubias,

## 2017-11-15 NOTE — TELEPHONE ENCOUNTER
Patient called back and left V/M message. Faxed Rx to [facility] [place]./Kassie Covarrubias,

## 2017-11-21 ENCOUNTER — OFFICE VISIT (OUTPATIENT)
Dept: FAMILY MEDICINE | Facility: CLINIC | Age: 27
End: 2017-11-21
Payer: COMMERCIAL

## 2017-11-21 VITALS
HEART RATE: 91 BPM | WEIGHT: 254 LBS | TEMPERATURE: 97.4 F | OXYGEN SATURATION: 98 % | BODY MASS INDEX: 33.51 KG/M2 | SYSTOLIC BLOOD PRESSURE: 132 MMHG | DIASTOLIC BLOOD PRESSURE: 93 MMHG

## 2017-11-21 DIAGNOSIS — F41.9 ANXIETY: ICD-10-CM

## 2017-11-21 PROCEDURE — 99214 OFFICE O/P EST MOD 30 MIN: CPT | Performed by: FAMILY MEDICINE

## 2017-11-21 RX ORDER — VENLAFAXINE HYDROCHLORIDE 37.5 MG/1
CAPSULE, EXTENDED RELEASE ORAL
Qty: 7 CAPSULE | Refills: 0 | Status: SHIPPED | OUTPATIENT
Start: 2017-11-21 | End: 2018-03-09

## 2017-11-21 ASSESSMENT — ANXIETY QUESTIONNAIRES
5. BEING SO RESTLESS THAT IT IS HARD TO SIT STILL: NEARLY EVERY DAY
GAD7 TOTAL SCORE: 19
1. FEELING NERVOUS, ANXIOUS, OR ON EDGE: NEARLY EVERY DAY
IF YOU CHECKED OFF ANY PROBLEMS ON THIS QUESTIONNAIRE, HOW DIFFICULT HAVE THESE PROBLEMS MADE IT FOR YOU TO DO YOUR WORK, TAKE CARE OF THINGS AT HOME, OR GET ALONG WITH OTHER PEOPLE: EXTREMELY DIFFICULT
7. FEELING AFRAID AS IF SOMETHING AWFUL MIGHT HAPPEN: SEVERAL DAYS
6. BECOMING EASILY ANNOYED OR IRRITABLE: NEARLY EVERY DAY
3. WORRYING TOO MUCH ABOUT DIFFERENT THINGS: NEARLY EVERY DAY
2. NOT BEING ABLE TO STOP OR CONTROL WORRYING: NEARLY EVERY DAY

## 2017-11-21 ASSESSMENT — PATIENT HEALTH QUESTIONNAIRE - PHQ9
5. POOR APPETITE OR OVEREATING: NEARLY EVERY DAY
SUM OF ALL RESPONSES TO PHQ QUESTIONS 1-9: 17

## 2017-11-21 NOTE — LETTER
[facility]  [address] [place] [address]  Phone: [phone]    November 21, 2017        Abimael Martinez  [address]  [facility] [address]          To whom it may concern:    RE: Abimael Martinez    Royer is under my professional care for his anxiety.  Patient was seen and treated today at our clinic and missed work.    Please contact me for questions or concerns.      Sincerely,        Jamison Lora MD

## 2017-11-21 NOTE — PROGRESS NOTES
"SUBJECTIVE:  Abimael Martinez is a 27 year old male who presents for a follow up evaluation of anxiety. The patient has been on effexor 75 mg daily.  He reports that he has beren under a lot of stress recently. His friend overdosed and . His grandfather recently  and his other grandfather is in a coma after surgery.    The patient reports that his persistant symptoms of anxiety include Excessive worry, Tension, edginess, and irritability and Panic attacks.     The patient reports that he has experienced side effects from this medication.  The patient reports the side effects include: ERECTILE DYSFUNCTION       He has been on celexa, paxil and cymbalta in the past.                  AYALA-7    Over the last 2 weeks, how often have you been bothered by the following problems?  (Use an \"x\" to indicate your answer) Not at all                (0) Several days                (1) More than half the days        (2) Nearly every day          (3)   1. Feeling nervous, anxious or on edge    x   2. Not being able to stop or control worrying    x   3. Worrying too much about different things    x   4. Trouble relaxing    x   5. Being so restless that it is hard to sit still    x   6. Becoming easily annoyed or irritable    x   7. Feeling afraid as if something awful might happen  x       Total__21_____    Cut points for:   Mild Anxiety =  5  Moderate= 10  Severe=  15    AYALA-7 SCORE 2015   Total Score - - -   Total Score 11 8 7           Last PHQ-9 score on record= 17    The patient reports that he has attended mental health counseling for the current anxiety disorder.      OBJECTIVE:  General: the patient had a calm but pensive affect during the visit today.    ASSESSMENT: Generalized Anxiety Disorder (AYALA)  Panic Disorder which has worsened recently         PLAN:  We will taper off the current medication by having the patient take 37.5 mg for the next week and hen discontinue completely. " Concomitantly, we will have the patient start on Zoloft ( sertraline) 25 mg daily for a week and then 50mg continuously after that. I asked the patient to return to clinic for appointment in 4 weeks for reevaluation.    The patient was recommended to seek individual counseling through his insurance company as an adjunct treatment to the prescribed medications.

## 2017-11-21 NOTE — NURSING NOTE
"Chief Complaint   Patient presents with     Anxiety     would like to increase medication, effexor 75 would like to go up 150       Initial BP (!) 157/94  Pulse 91  Temp 97.4  F (36.3  C) (Oral)  Wt 254 lb (115.2 kg)  SpO2 98%  BMI 33.51 kg/m2 Estimated body mass index is 33.51 kg/(m^2) as calculated from the following:    Height as of 7/31/17: 6' 1\" (1.854 m).    Weight as of this encounter: 254 lb (115.2 kg).  Medication Reconciliation: complete     Victoria Rice, juleinne    "

## 2017-11-22 ASSESSMENT — ANXIETY QUESTIONNAIRES: GAD7 TOTAL SCORE: 19

## 2017-11-27 DIAGNOSIS — R10.10 UPPER ABDOMINAL PAIN: ICD-10-CM

## 2017-11-28 ENCOUNTER — TELEPHONE (OUTPATIENT)
Dept: FAMILY MEDICINE | Facility: CLINIC | Age: 27
End: 2017-11-28

## 2017-11-28 ENCOUNTER — OFFICE VISIT (OUTPATIENT)
Dept: URGENT CARE | Facility: URGENT CARE | Age: 27
End: 2017-11-28
Payer: COMMERCIAL

## 2017-11-28 VITALS
DIASTOLIC BLOOD PRESSURE: 87 MMHG | HEART RATE: 93 BPM | OXYGEN SATURATION: 98 % | SYSTOLIC BLOOD PRESSURE: 143 MMHG | TEMPERATURE: 97.7 F

## 2017-11-28 DIAGNOSIS — M54.50 ACUTE BILATERAL LOW BACK PAIN WITHOUT SCIATICA: Primary | ICD-10-CM

## 2017-11-28 DIAGNOSIS — F90.2 ATTENTION DEFICIT HYPERACTIVITY DISORDER (ADHD), COMBINED TYPE: ICD-10-CM

## 2017-11-28 PROCEDURE — 99214 OFFICE O/P EST MOD 30 MIN: CPT | Mod: 25 | Performed by: NURSE PRACTITIONER

## 2017-11-28 PROCEDURE — 96372 THER/PROPH/DIAG INJ SC/IM: CPT | Performed by: NURSE PRACTITIONER

## 2017-11-28 RX ORDER — KETOROLAC TROMETHAMINE 30 MG/ML
60 INJECTION, SOLUTION INTRAMUSCULAR; INTRAVENOUS ONCE
Qty: 2 ML | Refills: 0 | OUTPATIENT
Start: 2017-11-28 | End: 2017-11-28

## 2017-11-28 RX ORDER — KETOROLAC TROMETHAMINE 10 MG/1
10 TABLET, FILM COATED ORAL EVERY 6 HOURS PRN
Qty: 20 TABLET | Refills: 0 | Status: SHIPPED | OUTPATIENT
Start: 2017-11-28 | End: 2017-12-03

## 2017-11-28 RX ORDER — CYCLOBENZAPRINE HCL 10 MG
5-10 TABLET ORAL 3 TIMES DAILY PRN
Qty: 30 TABLET | Refills: 0 | Status: SHIPPED | OUTPATIENT
Start: 2017-11-28 | End: 2017-12-05

## 2017-11-28 ASSESSMENT — PAIN SCALES - GENERAL: PAINLEVEL: WORST PAIN (10)

## 2017-11-28 NOTE — TELEPHONE ENCOUNTER
Patient is feeling very tired and  feels foggy since he stated the new medication pcp just put him on wondering if this will go away in time or should he be seen again. Patient has an appointment for Friday if needed  Please call to discuss  Thank you

## 2017-11-28 NOTE — MR AVS SNAPSHOT
After Visit Summary   11/28/2017    Abimael Martinez    MRN: [id number]           Patient Information     Date Of Birth          1990        Visit Information        Provider Department      11/28/2017 8:35 PM Vanna Wells NP [facility]        Today's Diagnoses     Acute bilateral low back pain without sciatica    -  1      Care Instructions      Back Care Tips    Caring for your back  These are things you can do to prevent a recurrence of acute back pain and to reduce symptoms from chronic back pain:    Maintain a healthy weight. If you are overweight, losing weight will help most types of back pain.    Exercise is an important part of recovery from most types of back pain. The muscles behind and in front of the spine support the back. This means strengthening both the back muscles and the abdominal muscles will provide better support for your spine.     Swimming and brisk walking are good overall exercises to improve your fitness level.    Practice safe lifting methods (below).    Practice good posture when sitting, standing and walking. Avoid prolonged sitting. This puts more stress on the lower back than standing or walking.    Wear quality shoes with sufficient arch support. Foot and ankle alignment can affect back symptoms. Women should avoid wearing high heels.    Therapeutic massage can help relax the back muscles without stretching them.    During the first 24 to 72 hours after an acute injury or flare-up of chronic back pain, apply an ice pack to the painful area for 20 minutes and then remove it for 20 minutes, over a period of 60 to 90 minutes, or several times a day. As a safety precaution, do not use a heating pad at bedtime. Sleeping on a heating pad can lead to skin burns or tissue damage.    You can alternate ice and heat therapies.  Medications  Talk to your healthcare provider before using medicines, especially if you have other medical problems or are  taking other medicines.    You may use acetaminophen or ibuprofen to control pain, unless your healthcare provider prescribed other pain medicine. If you have chronic conditions like diabetes, liver or kidney disease, stomach ulcers, or gastrointestinal bleeding, or are taking blood thinners, talk with your healthcare provider before taking any medicines.    Be careful if you are given prescription pain medicines, narcotics, or medicine for muscle spasm. They can cause drowsiness, affect your coordination, reflexes, and judgment. Do not drive or operate heavy machinery while taking these types of medicines. Take prescription pain medicine only as prescribed by your healthcare provider.  Lumbar stretch  Here is a simple stretching exercise that will help relax muscle spasm and keep your back more limber. If exercise makes your back pain worse, don t do it.    Lie on your back with your knees bent and both feet on the ground.    Slowly raise your left knee to your chest as you flatten your lower back against the floor. Hold for 5 seconds.    Relax and repeat the exercise with your right knee.    Do 10 of these exercises for each leg.  Safe lifting method    Don t bend over at the waist to lift an object off the floor.  Instead, bend your knees and hips in a squat.     Keep your back and head upright    Hold the object close to your body, directly in front of you.    Straighten your legs to lift the object.     Lower the object to the floor in the reverse fashion.    If you must slide something across the floor, push it.  Posture tips  Sitting  Sit in chairs with straight backs or low-back support. Keep your knees lower than your hips, with your feet flat on the floor.  When driving, sit up straight. Adjust the seat forward so you are not leaning toward the steering wheel.  A small pillow or rolled towel behind your lower back may help if you are driving long distances.   Standing  When standing for long periods, shift  most of your weight to one leg at a time. Alternate legs every few minutes.   Sleeping  The best way to sleep is on your side with your knees bent. Put a low pillow under your head to support your neck in a neutral spine position. Avoid thick pillows that bend your neck to one side. Put a pillow between your legs to further relax your lower back. If you sleep on your back, put pillows under your knees to support your legs in a slightly flexed position. Use a firm mattress. If your mattress sags, replace it, or use a 1/2-inch plywood board under the mattress to add support.  Follow-up care  Follow up with your healthcare provider, or as advised.  If X-rays, a CT scan or an MRI scan were taken, they will be reviewed by a radiologist. You will be notified of any new findings that may affect your care.  Call 911  Seek emergency medical care if any of the following occur:    Trouble breathing    Confusion    Very drowsy    Fainting or loss of consciousness    Rapid or very slow heart rate    Loss of  bowel or bladder control  When to seek medical care  Call your healthcare provider if any of the following occur:    Pain becomes worse or spreads to your arms or legs    Weakness or numbness in one or both arms or legs    Numbness in the groin area  Date Last Reviewed: 6/1/2016 2000-2017 [employer]. [address] [place] [address]. All rights reserved. This information is not intended as a substitute for professional medical care. Always follow your healthcare professional's instructions.                Follow-ups after your visit        Your next 10 appointments already scheduled     Dec 01, 2017 10:30 AM CST   [facility] with Jamison [facility] MD   [facility]    [address] [facility] [address]   [phone]            Dec 20, 2017 12:00 PM CST   SHORT with Jamison Lora MD   [facility]    [address] [facility] [address] [phone]              Who to contact     If you have questions or need follow up information about today's clinic visit or your schedule please contact [facility] directly at [phone].  Normal or non-critical lab and imaging results will be communicated to you by [employer], letter or phone within 4 business days after the clinic has received the results. If you do not hear from us within 7 days, please contact the clinic through [employer] or phone. If you have a critical or abnormal lab result, we will notify you by phone as soon as possible.  Submit refill requests through [employer] or call your pharmacy and they will forward the refill request to us. Please allow 3 business days for your refill to be completed.          Additional Information About Your Visit        [employer] Information     [employer] gives you secure access to your electronic health record. If you see a primary care provider, you can also send messages to your care team and make appointments. If you have questions, please call your primary care clinic.  If you do not have a primary care provider, please call [phone] and they will assist you.        Care EveryWhere ID     This is your Care EveryWhere ID. This could be used by other organizations to access your [facility] medical records  [id number]        Your Vitals Were     Pulse Temperature Pulse Oximetry             93 97.7  F (36.5  C) (Oral) 98%          Blood Pressure from Last 3 Encounters:   11/28/17 143/87   11/21/17 (!) 132/93   07/31/17 131/86    Weight from Last 3 Encounters:   11/21/17 254 lb (115.2 kg)   07/31/17 263 lb (119.3 kg)   11/28/16 253 lb (114.8 kg)              Today, you had the following     No orders found for display         Today's Medication Changes          These changes are accurate as of: 11/28/17  9:12 PM.  If you have any questions, ask your nurse or doctor.               Start taking these medicines.         Dose/Directions    cyclobenzaprine 10 MG tablet   Commonly known as:  FLEXERIL   Used for:  Acute bilateral low back pain without sciatica   Started by:  Vanna Wells NP        Dose:  5-10 mg   Take 0.5-1 tablets (5-10 mg) by mouth 3 times daily as needed for muscle spasms   Quantity:  30 tablet   Refills:  0       * ketorolac 60 MG/2ML Soln injection   Commonly known as:  TORADOL   Used for:  Acute bilateral low back pain without sciatica   Started by:  Vanna Wells NP        Dose:  60 mg   Inject 2 mLs (60 mg) into the muscle once for 1 dose   Quantity:  2 mL   Refills:  0       * ketorolac 10 MG tablet   Commonly known as:  TORADOL   Used for:  Acute bilateral low back pain without sciatica   Started by:  Vanna Wells NP        Dose:  10 mg   Take 1 tablet (10 mg) by mouth every 6 hours as needed for moderate pain   Quantity:  20 tablet   Refills:  0       * Notice:  This list has 2 medication(s) that are the same as other medications prescribed for you. Read the directions carefully, and ask your doctor or other care provider to review them with you.         Where to get your medicines      [facility] [address]  [address] [facility] [address]    Hours:  24-hours Phone:  [phone]     cyclobenzaprine 10 MG tablet    ketorolac 10 MG tablet         Some of these will need a paper prescription and others can be bought over the counter.  Ask your nurse if you have questions.     You don't need a prescription for these medications     ketorolac 60 MG/2ML Soln injection                Primary Care Provider Office Phone # Fax #    Jamison Lora [phone] [address] [facility] [address]        Equal Access to Services     MIC HULL AH: Kaylin barkleyo Soomaali, waaxda luqadaha, qaybta kaalmada adeegyada, evelyn angel. So  [facility] [phone].    ATENCIÓN: Si raj ramey, tiene a salazar disposición servicios gratuitos de asistencia lingüística. Tiffanie oliveira [phone].    We comply with applicable federal civil rights laws and [place] laws. We do not discriminate on the basis of race, color, national origin, age, disability, sex, sexual orientation, or gender identity.            Thank you!     Thank you for choosing [facility]  for your care. Our goal is always to provide you with excellent care. Hearing back from our patients is one way we can continue to improve our services. Please take a few minutes to complete the written survey that you may receive in the mail after your visit with us. Thank you!             Your Updated Medication List - Protect others around you: Learn how to safely use, store and throw away your medicines at www.disposemymeds.org.          This list is accurate as of: 11/28/17  9:12 PM.  Always use your most recent med list.                   Brand Name Dispense Instructions for use Diagnosis    albuterol 108 (90 BASE) MCG/ACT Inhaler    PROAIR HFA/PROVENTIL HFA/VENTOLIN HFA    1 Inhaler    Inhale 2 puffs into the lungs every 4 hours as needed    Intermittent asthma, uncomplicated       ALPRAZolam 2 MG tablet    XANAX    30 tablet    Take 1 tablet (2 mg) by mouth 3 times daily as needed for anxiety    Anxiety       amphetamine-dextroamphetamine 20 MG per tablet    ADDERALL    90 tablet    Take 1 tablet (20 mg) by mouth 3 times daily    Attention deficit hyperactivity disorder (ADHD), combined type       cyclobenzaprine 10 MG tablet    FLEXERIL    30 tablet    Take 0.5-1 tablets (5-10 mg) by mouth 3 times daily as needed for muscle spasms    Acute bilateral low back pain without sciatica       famotidine 40 MG tablet    PEPCID    90 tablet    Take 1 tablet (40 mg) by mouth At Bedtime    Upper abdominal pain       fluticasone 110 MCG/ACT Inhaler    FLOVENT HFA    1 Inhaler    Inhale 2 puffs into  the lungs 2 times daily    Mild persistent asthma without complication       Hyoscyamine Sulfate 0.375 MG Tbcr     90 tablet    Take 1 capful by mouth 3 times daily as needed    Upper abdominal pain       * ketorolac 60 MG/2ML Soln injection    TORADOL    2 mL    Inject 2 mLs (60 mg) into the muscle once for 1 dose    Acute bilateral low back pain without sciatica       * ketorolac 10 MG tablet    TORADOL    20 tablet    Take 1 tablet (10 mg) by mouth every 6 hours as needed for moderate pain    Acute bilateral low back pain without sciatica       sertraline 50 MG tablet    ZOLOFT    30 tablet    Take one half of a tablet/capsule daily in the in the morning  for 7 days and then a whole  tablets/capsules daily in the morning  there after.    Anxiety       sildenafil 20 MG tablet    REVATIO    30 tablet    Take 1 tablet (20 mg) by mouth daily as needed As needed for prevention of erectile dysfunction    Drug-induced erectile dysfunction       * venlafaxine 75 MG Tb24 24 hr tablet    EFFEXOR-ER    90 tablet    Take 1 tablet (75 mg) by mouth daily    Anxiety       * venlafaxine 37.5 MG 24 hr capsule    EFFEXOR-XR    7 capsule    Take 1 capsule daily for 7 days, then discontinue    Anxiety       * Notice:  This list has 4 medication(s) that are the same as other medications prescribed for you. Read the directions carefully, and ask your doctor or other care provider to review them with you.

## 2017-11-28 NOTE — TELEPHONE ENCOUNTER
OV plan from 11/21/17:   We will taper off the current medication by having the patient take 37.5 mg for the next week and hen discontinue completely. Concomitantly, we will have the patient start on Zoloft ( sertraline) 25 mg daily for a week and then 50mg continuously after that. I asked the patient to return to clinic for appointment in 4 weeks for reevaluation.     Patient reports he is still taking the Effexor of 37.5mg and the Zoloft 25 mg daily. When he takes the Zoloft he feels very foggy and tired, like he cant think clearly.    Patient wondering if he should continue with the plan noted above?     Routing to provider to advise.   Paola Lee RN

## 2017-11-29 RX ORDER — DEXTROAMPHETAMINE SACCHARATE, AMPHETAMINE ASPARTATE, DEXTROAMPHETAMINE SULFATE AND AMPHETAMINE SULFATE 5; 5; 5; 5 MG/1; MG/1; MG/1; MG/1
20 TABLET ORAL 3 TIMES DAILY
Qty: 90 TABLET | Refills: 0 | Status: SHIPPED | OUTPATIENT
Start: 2017-11-29 | End: 2017-12-28

## 2017-11-29 RX ORDER — FAMOTIDINE 40 MG/1
TABLET, FILM COATED ORAL
Qty: 90 TABLET | Refills: 0 | Status: SHIPPED | OUTPATIENT
Start: 2017-11-29 | End: 2018-11-08

## 2017-11-29 NOTE — TELEPHONE ENCOUNTER
That is a great plan/    The requested prescription(s) has/have been approved and has/have been printed and signed. This note was forwarded to the patient care pool to contact the patient to arrange for the patient to obtain the signed prescription(s).  Jamison Lora

## 2017-11-29 NOTE — TELEPHONE ENCOUNTER
I would like to see if he can continue(s) to take the medication and see if these symptom(s) resolve with taking the medication regularly.   Jamison Lora MD s

## 2017-11-29 NOTE — PATIENT INSTRUCTIONS
Back Care Tips    Caring for your back  These are things you can do to prevent a recurrence of acute back pain and to reduce symptoms from chronic back pain:    Maintain a healthy weight. If you are overweight, losing weight will help most types of back pain.    Exercise is an important part of recovery from most types of back pain. The muscles behind and in front of the spine support the back. This means strengthening both the back muscles and the abdominal muscles will provide better support for your spine.     Swimming and brisk walking are good overall exercises to improve your fitness level.    Practice safe lifting methods (below).    Practice good posture when sitting, standing and walking. Avoid prolonged sitting. This puts more stress on the lower back than standing or walking.    Wear quality shoes with sufficient arch support. Foot and ankle alignment can affect back symptoms. Women should avoid wearing high heels.    Therapeutic massage can help relax the back muscles without stretching them.    During the first 24 to 72 hours after an acute injury or flare-up of chronic back pain, apply an ice pack to the painful area for 20 minutes and then remove it for 20 minutes, over a period of 60 to 90 minutes, or several times a day. As a safety precaution, do not use a heating pad at bedtime. Sleeping on a heating pad can lead to skin burns or tissue damage.    You can alternate ice and heat therapies.  Medications  Talk to your healthcare provider before using medicines, especially if you have other medical problems or are taking other medicines.    You may use acetaminophen or ibuprofen to control pain, unless your healthcare provider prescribed other pain medicine. If you have chronic conditions like diabetes, liver or kidney disease, stomach ulcers, or gastrointestinal bleeding, or are taking blood thinners, talk with your healthcare provider before taking any medicines.    Be careful if you are given  prescription pain medicines, narcotics, or medicine for muscle spasm. They can cause drowsiness, affect your coordination, reflexes, and judgment. Do not drive or operate heavy machinery while taking these types of medicines. Take prescription pain medicine only as prescribed by your healthcare provider.  Lumbar stretch  Here is a simple stretching exercise that will help relax muscle spasm and keep your back more limber. If exercise makes your back pain worse, don t do it.    Lie on your back with your knees bent and both feet on the ground.    Slowly raise your left knee to your chest as you flatten your lower back against the floor. Hold for 5 seconds.    Relax and repeat the exercise with your right knee.    Do 10 of these exercises for each leg.  Safe lifting method    Don t bend over at the waist to lift an object off the floor.  Instead, bend your knees and hips in a squat.     Keep your back and head upright    Hold the object close to your body, directly in front of you.    Straighten your legs to lift the object.     Lower the object to the floor in the reverse fashion.    If you must slide something across the floor, push it.  Posture tips  Sitting  Sit in chairs with straight backs or low-back support. Keep your knees lower than your hips, with your feet flat on the floor.  When driving, sit up straight. Adjust the seat forward so you are not leaning toward the steering wheel.  A small pillow or rolled towel behind your lower back may help if you are driving long distances.   Standing  When standing for long periods, shift most of your weight to one leg at a time. Alternate legs every few minutes.   Sleeping  The best way to sleep is on your side with your knees bent. Put a low pillow under your head to support your neck in a neutral spine position. Avoid thick pillows that bend your neck to one side. Put a pillow between your legs to further relax your lower back. If you sleep on your back, put pillows  under your knees to support your legs in a slightly flexed position. Use a firm mattress. If your mattress sags, replace it, or use a 1/2-inch plywood board under the mattress to add support.  Follow-up care  Follow up with your healthcare provider, or as advised.  If X-rays, a CT scan or an MRI scan were taken, they will be reviewed by a radiologist. You will be notified of any new findings that may affect your care.  Call 911  Seek emergency medical care if any of the following occur:    Trouble breathing    Confusion    Very drowsy    Fainting or loss of consciousness    Rapid or very slow heart rate    Loss of  bowel or bladder control  When to seek medical care  Call your healthcare provider if any of the following occur:    Pain becomes worse or spreads to your arms or legs    Weakness or numbness in one or both arms or legs    Numbness in the groin area  Date Last Reviewed: 6/1/2016 2000-2017 [employer]. [address] [place] [address]. All rights reserved. This information is not intended as a substitute for professional medical care. Always follow your healthcare professional's instructions.

## 2017-11-29 NOTE — PROGRESS NOTES
SUBJECTIVE:   Abimael Martinez is a 27 year old male who presents to clinic today for the following health issues:      Back Pain       Duration: today        Specific cause: unknown    Description:   Location of pain: low back bilateral and middle of back bilateral  Character of pain: sharp, stabbing and constant  Pain radiation:radiates into both sides  New numbness or weakness in legs, not attributed to pain:  no     Intensity: Currently 10/10    History:   Pain interferes with job: No,   History of back problems: recurrent self limited episodes of low back pain in the past  Any previous MRI or X-rays: None  Sees a specialist for back pain:  No  Therapies tried without relief: NSAIDs    Alleviating factors:   Improved by: none      Precipitating factors:  Worsened by: Lifting, Bending, Standing and Sitting    Functional and Psychosocial Screen (Claudia STarT Back):            Allergies   Allergen Reactions     Cymbalta      Weight gain and fatigue     Paxil [Paroxetine Mesylate]      Weight gain and fatigue       Past Medical History:   Diagnosis Date     Acute right otitis media 1/8/2013     Anxiety      Depression      Drug abuse      Urethritis 5/20/2013     Problem list name updated by automated process. Provider to review         Current Outpatient Prescriptions on File Prior to Visit:  sertraline (ZOLOFT) 50 MG tablet Take one half of a tablet/capsule daily in the in the morning  for 7 days and then a whole  tablets/capsules daily in the morning  there after.   ALPRAZolam (XANAX) 2 MG tablet Take 1 tablet (2 mg) by mouth 3 times daily as needed for anxiety   amphetamine-dextroamphetamine (ADDERALL) 20 MG per tablet Take 1 tablet (20 mg) by mouth 3 times daily   fluticasone (FLOVENT HFA) 110 MCG/ACT Inhaler Inhale 2 puffs into the lungs 2 times daily   sildenafil (REVATIO/VIAGRA) 20 MG tablet Take 1 tablet (20 mg) by mouth daily as needed As needed for prevention of erectile dysfunction   famotidine  (PEPCID) 40 MG tablet Take 1 tablet (40 mg) by mouth At Bedtime   Hyoscyamine Sulfate 0.375 MG TBCR Take 1 capful by mouth 3 times daily as needed   albuterol (PROAIR HFA, PROVENTIL HFA, VENTOLIN HFA) 108 (90 BASE) MCG/ACT inhaler Inhale 2 puffs into the lungs every 4 hours as needed   venlafaxine (EFFEXOR-XR) 37.5 MG 24 hr capsule Take 1 capsule daily for 7 days, then discontinue (Patient not taking: Reported on 11/28/2017)   venlafaxine (EFFEXOR-ER) 75 MG TB24 24 hr tablet Take 1 tablet (75 mg) by mouth daily (Patient not taking: Reported on 11/28/2017)     No current facility-administered medications on file prior to visit.     Past Surgical History:   Procedure Laterality Date     NO HISTORY OF SURGERY         Social History     Social History     Marital status: Single     Spouse name: N/A     Number of children: N/A     Years of education: N/A     Occupational History     Not on file.     Social History Main Topics     Smoking status: Current Some Day Smoker     Packs/day: 0.50     Years: 6.00     Types: Cigarettes     Last attempt to quit: 3/25/2013     Smokeless tobacco: Never Used      Comment: second hand smoke exposure     Alcohol use Yes      Comment: once a week     Drug use: Yes      Comment: pot once a month, ectasy  As of 11/7/2016 he only smokes pot occasionally     Sexual activity: Yes     Partners: Female     Birth control/ protection: Condom     Other Topics Concern     Not on file     Social History Narrative       REVIEW OF SYSTEMS  General: negative for fever  Resp: negative for chest pain   CV: negative for chest pain  : negative for dysuria , incontinence, frequency  Musculoskeletal: as above  Neurologic: negative for ataxia, saddle anesthesia, fecal incontinence, one sided weakness,  paresthesias    Physical Exam:  Vitals: /87 (BP Location: Left arm, Patient Position: Chair, Cuff Size: Adult Large)  Pulse 93  Temp 97.7  F (36.5  C) (Oral)  SpO2 98%  BMI= There is no height or  weight on file to calculate BMI.  Constitutional: healthy, alert and no acute distress   CARDIO: RRR, no MRG  RESP: lungs CTA, NAD  NEURO: Patellar reflexes intact and equal b/l  BACK:  Straight leg raise are positive. Tender lumbar paraspinal muscle TTP, strength intact and equal b/l lower extremities  GAIT: intact  Psychiatric: mentation appears normal and affect normal/bright      Impression: Back pain, uncomplicated.     ICD-10-CM    1. Acute bilateral low back pain without sciatica M54.5 ketorolac (TORADOL) 60 MG/2ML SOLN injection     ketorolac (TORADOL) 10 MG tablet     cyclobenzaprine (FLEXERIL) 10 MG tablet       Plan:  Rest.  Apply ice for 15-20 minutes intermittently as needed and especially after any offending activity. Daily stretching.  As pain recedes, begin normal activities slowly as tolerated.  Consider Physical Therapy if symptoms not better with symptomatic care.  Vanna Wells  FNP-BC  Family Nurse Practitoner

## 2017-11-29 NOTE — NURSING NOTE
The following medication was given:     MEDICATION: Ketorolac Tromethamine 60MG/2ML (30 mg/mL) (Toradol)  ROUTE: IM  SITE: Ventrogluteal - Right  DOSE: 60 ml  LOT #: [id number]  :  [employer]  EXPIRATION DATE:  08/19  NDC#: 49981-679-27    Prior to injection verified patient identity using patient's name and date of birth.  Per orders of MARGARETTE Alfaro, injection of torodal given by Maira Lira CMA. Patient instructed to remain in clinic for 15 minutes afterwards, and to report any adverse reaction to me immediately.      Maira Lira CMA

## 2017-11-29 NOTE — TELEPHONE ENCOUNTER
Patient is currently doing finals and med is making him very cloudy.   Starting full 50mg dose on Friday.   When Zoloft starts to wear off, can notice feels more clear headed.  Patient will be done with finals in 2 weeks. Then will be on break for 4 weeks  Patient has noticed on low dose of the Effexor stomach issues have resolved.   Wondering if should just continue the 37.5mg of the Effexor and restart Zoloft after finals.  Please advise if ok.    Patient also asking for refill of Adderall.    Poonam RIDERN, RN, CPN

## 2017-11-29 NOTE — TELEPHONE ENCOUNTER
Patient informed of provider note as below and to follow plan as below     - please take prescription to  for patient to     Poonam RIDERN, RN, CPN

## 2017-11-29 NOTE — NURSING NOTE
"Chief Complaint   Patient presents with     Back Pain     today       Initial /87 (BP Location: Left arm, Patient Position: Chair, Cuff Size: Adult Large)  Pulse 93  Temp 97.7  F (36.5  C) (Oral)  SpO2 98% Estimated body mass index is 33.51 kg/(m^2) as calculated from the following:    Height as of 7/31/17: 6' 1\" (1.854 m).    Weight as of 11/21/17: 254 lb (115.2 kg).  Medication Reconciliation: complete   Maira Lira CMA          "

## 2017-12-12 ENCOUNTER — TELEPHONE (OUTPATIENT)
Dept: FAMILY MEDICINE | Facility: CLINIC | Age: 27
End: 2017-12-12

## 2017-12-12 DIAGNOSIS — F41.9 ANXIETY: ICD-10-CM

## 2017-12-12 NOTE — TELEPHONE ENCOUNTER
Controlled Substance Refill Request for Alprazolam  Problem List Complete:  Yes    Last filled 11/13/17  Last office visit 11/21/17       checked in past 6 months?  Yes 9/11/17, printed and given to Dr Lora.       Patient is followed by MICHAEL LORA for ongoing prescription of benzodiazepines.  All refills should be approved by this provider, or covering partner.      Medication(s): alprazolam 2 mg .   Maximum quantity per month: 36  Clinic visit frequency required: Q 6  months       Controlled substance agreement on file: No  Benzodiazepine use reviewed by psychiatry:  No

## 2017-12-13 RX ORDER — ALPRAZOLAM 2 MG
2 TABLET ORAL 3 TIMES DAILY PRN
Qty: 30 TABLET | Refills: 0 | Status: SHIPPED | OUTPATIENT
Start: 2017-12-13 | End: 2018-01-12

## 2017-12-15 ENCOUNTER — TRANSFERRED RECORDS (OUTPATIENT)
Dept: HEALTH INFORMATION MANAGEMENT | Facility: CLINIC | Age: 27
End: 2017-12-15

## 2017-12-20 ENCOUNTER — OFFICE VISIT (OUTPATIENT)
Dept: FAMILY MEDICINE | Facility: CLINIC | Age: 27
End: 2017-12-20
Payer: COMMERCIAL

## 2017-12-20 VITALS
WEIGHT: 258 LBS | SYSTOLIC BLOOD PRESSURE: 145 MMHG | HEART RATE: 82 BPM | BODY MASS INDEX: 34.04 KG/M2 | DIASTOLIC BLOOD PRESSURE: 93 MMHG | OXYGEN SATURATION: 98 % | TEMPERATURE: 97.5 F

## 2017-12-20 DIAGNOSIS — M54.50 ACUTE LEFT-SIDED LOW BACK PAIN WITHOUT SCIATICA: Primary | ICD-10-CM

## 2017-12-20 PROCEDURE — 99214 OFFICE O/P EST MOD 30 MIN: CPT | Performed by: FAMILY MEDICINE

## 2017-12-20 RX ORDER — DOXYCYCLINE 100 MG/1
100 TABLET ORAL 2 TIMES DAILY
Refills: 1 | COMMUNITY
Start: 2017-12-15 | End: 2018-03-19

## 2017-12-20 RX ORDER — CYCLOBENZAPRINE HCL 10 MG
10 TABLET ORAL 3 TIMES DAILY PRN
Qty: 90 TABLET | Refills: 1 | Status: SHIPPED | OUTPATIENT
Start: 2017-12-20 | End: 2020-02-06

## 2017-12-20 NOTE — NURSING NOTE
"Chief Complaint   Patient presents with     Recheck Medication     has not started zoloft. having back issues. and the different medications are messing with is stomach       Initial BP (!) 145/93  Pulse 82  Temp 97.5  F (36.4  C) (Oral)  Wt 258 lb (117 kg)  SpO2 98%  BMI 34.04 kg/m2 Estimated body mass index is 34.04 kg/(m^2) as calculated from the following:    Height as of 7/31/17: 6' 1\" (1.854 m).    Weight as of this encounter: 258 lb (117 kg).  Medication Reconciliation: complete     Victoria Rice, julienne    "

## 2017-12-20 NOTE — PROGRESS NOTES
"SUBJECTIVE:  Abimael Martinez is a 27 year old male who presents for a follow up evaluation of anxiety.     He reports that he started to take the zoft for 4-5 day(s)   He felt like he was in a cloud and felt like a zombie.  He stopped taking it after that because of the side effects.     He reports that he is now on doxycycline for a prostate infection and will be for this for a total of 6 month(s) . He reports that his stomach gurgles and he has heartburn.  He reports he has \"gut rot\".   He has to be on doxycycline for 5 and a half more months      He has been off of the effexor as well.   He feels that he is doing okay.    He has gone to therapy and really feels that it is helping        AYALA-7    Over the last 2 weeks, how often have you been bothered by the following problems?  (Use an \"x\" to indicate your answer) Not at all                (0) Several days                (1) More than half the days        (2) Nearly every day          (3)   1. Feeling nervous, anxious or on edge  x     2. Not being able to stop or control worrying  x     3. Worrying too much about different things  x     4. Trouble relaxing x      5. Being so restless that it is hard to sit still x      6. Becoming easily annoyed or irritable x      7. Feeling afraid as if something awful might happen x        Total__3_____    Cut points for:   Mild Anxiety =  5  Moderate= 10  Severe=  15    AYALA-7 SCORE 9/16/2016 11/7/2016 11/21/2017   Total Score - - -   Total Score 8 7 19           Last PHQ-9 score on record= 6    The patient reports that he has attended mental health counseling for the current anxiety disorder.      OBJECTIVE:  General: the patient had a calm affect during the visit today.    ASSESSMENT: anxiety  which has improved        PLAN:  We will continue the patient on the same dose of his antidepressant and have the patient return to clinic for appointment in 3 month(s). He was instructed to return earlier if the anxiety symptoms " return.  The patient was recommended to continue(s)  individual counseling through his insurance company as an adjunct treatment to the prescribed medications.     --------------------------------------------------------------------------------------------------------------------------------------    SUBJECTIVE:  Abimael Martinez is a 27 year old male who is seen for left back pain that started  1 month(s) ago. The onset of the pain was sudden.    The pain was first noticed while the patient was standing up after he was eating. He did go to the emergency department. He was given muscle relaxants  The patient reports that these symptoms have been waxing and waning since that time but not ever totally gone. It has been worse for a couple of day(s).     The patient reports that the pain radiates into the left foot.  It radiates to the lateral thigh.  It radiates to the anterior knee.  It radiates to the anterior shin.  It radiates to the medial foot.    The patient denies incontinence of urine or stool.    The patient relates a prior history of problems with his back. These problems were  recurrent self limited episodes of low back pain in the past.    The patients past medical, surgical, social and family histories were reviewed.  Social History     Social History     Marital status: Single     Spouse name: N/A     Number of children: N/A     Years of education: N/A     Social History Main Topics     Smoking status: Current Some Day Smoker     Packs/day: 0.50     Years: 6.00     Types: Cigarettes     Last attempt to quit: 3/25/2013     Smokeless tobacco: Never Used      Comment: second hand smoke exposure     Alcohol use Yes      Comment: once a week     Drug use: Yes      Comment: pot once a month, ectasy  As of 11/7/2016 he only smokes pot occasionally     Sexual activity: Yes     Partners: Female     Birth control/ protection: Condom     Other Topics Concern     None     Social History Narrative     Past Medical  History:   Diagnosis Date     Acute right otitis media 1/8/2013     Anxiety      Depression      Drug abuse      Urethritis 5/20/2013     Problem list name updated by automated process. Provider to review     Current Outpatient Prescriptions   Medication Sig Dispense Refill     doxycycline Monohydrate 100 MG TABS 100 mg 2 times daily  1     ALPRAZolam (XANAX) 2 MG tablet Take 1 tablet (2 mg) by mouth 3 times daily as needed for anxiety 30 tablet 0     famotidine (PEPCID) 40 MG tablet TAKE 1 TABLET(40 MG) BY MOUTH AT BEDTIME 90 tablet 0     amphetamine-dextroamphetamine (ADDERALL) 20 MG per tablet Take 1 tablet (20 mg) by mouth 3 times daily 90 tablet 0     venlafaxine (EFFEXOR-XR) 37.5 MG 24 hr capsule Take 1 capsule daily for 7 days, then discontinue 7 capsule 0     venlafaxine (EFFEXOR-ER) 75 MG TB24 24 hr tablet Take 1 tablet (75 mg) by mouth daily 90 tablet 1     fluticasone (FLOVENT HFA) 110 MCG/ACT Inhaler Inhale 2 puffs into the lungs 2 times daily 1 Inhaler 1     sildenafil (REVATIO/VIAGRA) 20 MG tablet Take 1 tablet (20 mg) by mouth daily as needed As needed for prevention of erectile dysfunction 30 tablet 1     Hyoscyamine Sulfate 0.375 MG TBCR Take 1 capful by mouth 3 times daily as needed 90 tablet 1     albuterol (PROAIR HFA, PROVENTIL HFA, VENTOLIN HFA) 108 (90 BASE) MCG/ACT inhaler Inhale 2 puffs into the lungs every 4 hours as needed 1 Inhaler 1     sertraline (ZOLOFT) 50 MG tablet Take one half of a tablet/capsule daily in the in the morning  for 7 days and then a whole  tablets/capsules daily in the morning  there after. (Patient not taking: Reported on 12/20/2017) 30 tablet 1             Allergies as of 12/20/2017 - Jamie as Reviewed 12/20/2017   Allergen Reaction Noted     Cymbalta  10/25/2012     Paxil [paroxetine mesylate]  12/04/2012     Current Outpatient Prescriptions   Medication     doxycycline Monohydrate 100 MG TABS     ALPRAZolam (XANAX) 2 MG tablet     famotidine (PEPCID) 40 MG tablet      amphetamine-dextroamphetamine (ADDERALL) 20 MG per tablet     venlafaxine (EFFEXOR-XR) 37.5 MG 24 hr capsule     venlafaxine (EFFEXOR-ER) 75 MG TB24 24 hr tablet     fluticasone (FLOVENT HFA) 110 MCG/ACT Inhaler     sildenafil (REVATIO/VIAGRA) 20 MG tablet     Hyoscyamine Sulfate 0.375 MG TBCR     albuterol (PROAIR HFA, PROVENTIL HFA, VENTOLIN HFA) 108 (90 BASE) MCG/ACT inhaler     sertraline (ZOLOFT) 50 MG tablet     No current facility-administered medications for this visit.          Examination:  BP (!) 145/93  Pulse 82  Temp 97.5  F (36.4  C) (Oral)  Wt 258 lb (117 kg)  SpO2 98%  BMI 34.04 kg/m2  General: healthy, alert and no distress.  Neuro exam of the lower extremities.   DTR's  Right knee 2+  Right ankle 1+  Left knee 2+  Left ankle1+  Strength was +5 globally in the lower extremities.  Back examination: There was tenderness to palpation of the left paraspinal muscles in the lower lumbar region and left sacroliliac joint.  There was CVA tenderness bilaterally.  Straight leg raise test was negative bilaterally.  There was decreased active range of motion in hip flexion bilaterally      [place] test: positive  (a positive test suggests piriformis syndrome, it is performed by placing the hip in extension and internal rotation, and then resisting external rotation. Pain or sciatic symptoms denotes a positive test [26].   Pace sign: positive  (Having the seated patient resist abduction and external rotation. Pain and reproduction of symptoms marks a positive test)         ASSESSMENT/IMPRESSION:  low back pain with L4 radicular symptoms  Possible(ji) piriformis syndrome    PLAN:    The patient and I discussed the absolute importance of continuing to do range of motion exercises and strengthening exercises despite the immediate discomfort that it may cause.We discussed the use of non steroidal anti-inflammatory drugs and muscle relaxants to help them in this goal. Heat therapy in the morning to  improve range of motion was emphasized. The patient was advised only to use ice at the end of the day for pain relief if needed.  The patient was referred to physical therapy for evaluation and treatment.  We will start the patient on relafen and flexeril.   We will get a lumbar MRI to better evaluate the patient's spine.  We will order a epidural steroid injection and have the patient follow up 1 month after the injection.

## 2017-12-20 NOTE — MR AVS SNAPSHOT
"              After Visit Summary   12/20/2017    Abimael Martinez    MRN: [id number]           Patient Information     Date Of Birth          1990        Visit Information        Provider Department      12/20/2017 12:00 PM Jamison Lora MD [facility]        Today's Diagnoses     Acute left-sided low back pain without sciatica    -  1       Follow-ups after your visit        Additional Services     PHYSICAL THERAPY REFERRAL       *This therapy referral is for [facility] physical therapy in [place]   Treatment: Evaluation & Treatment of low back pain and possible(ji) pyriformis syndrome  Special Instructions/Modalities: none  Special Programs: None    Please be aware that coverage of these services is subject to the terms and limitations of your health insurance plan.  Call member services at your health plan with any benefit or coverage questions.      **Note to Provider:  If you are referring outside of [place] for the therapy appointment, please list the name of the location in the \"special instructions\" above, print the referral and give to the patient to schedule the appointment.                  Who to contact     If you have questions or need follow up information about today's clinic visit or your schedule please contact [facility] directly at [phone].  Normal or non-critical lab and imaging results will be communicated to you by MyChart, letter or phone within 4 business days after the clinic has received the results. If you do not hear from us within 7 days, please contact the clinic through MyChart or phone. If you have a critical or abnormal lab result, we will notify you by phone as soon as possible.  Submit refill requests through [employer] or call your pharmacy and they will forward the refill request to us. Please allow 3 business days for your refill to be completed.          Additional Information About Your Visit        MyChart Information     MyChart " gives you secure access to your electronic health record. If you see a primary care provider, you can also send messages to your care team and make appointments. If you have questions, please call your primary care clinic.  If you do not have a primary care provider, please call [phone] and they will assist you.        Care EveryWhere ID     This is your Care EveryWhere ID. This could be used by other organizations to access your [facility] medical records  [id number]        Your Vitals Were     Pulse Temperature Pulse Oximetry BMI (Body Mass Index)          82 97.5  F (36.4  C) (Oral) 98% 34.04 kg/m2         Blood Pressure from Last 3 Encounters:   12/20/17 (!) 145/93   11/28/17 143/87   11/21/17 (!) 132/93    Weight from Last 3 Encounters:   12/20/17 258 lb (117 kg)   11/21/17 254 lb (115.2 kg)   07/31/17 263 lb (119.3 kg)              We Performed the Following     PHYSICAL THERAPY REFERRAL          Today's Medication Changes          These changes are accurate as of: 12/20/17 12:55 PM.  If you have any questions, ask your nurse or doctor.               Start taking these medicines.        Dose/Directions    cyclobenzaprine 10 MG tablet   Commonly known as:  FLEXERIL   Used for:  Acute left-sided low back pain without sciatica   Started by:  Jamison Lora MD        Dose:  10 mg   Take 1 tablet (10 mg) by mouth 3 times daily as needed for muscle spasms   Quantity:  90 tablet   Refills:  1       nabumetone 750 MG tablet   Commonly known as:  RELAFEN   Used for:  Acute left-sided low back pain without sciatica   Started by:  Jamison Lora MD        1 tablet twice a day for 10 days and then as needed   Quantity:  60 tablet   Refills:  1            Where to get your medicines      These medications were sent to [id number] - [place], [place] [address] AT [facility] & [place] [address] [place] [address]     Phone:  [phone]     cyclobenzaprine 10 MG  tablet    nabumetone 750 MG tablet                Primary Care Provider Office Phone # Fax #    Jamison Lora [phone] [address] [facility] [address]        Equal Access to Services     MIC HULL : Kaylin gretchen basurto filippoo Sonetta, waaxda luqadaha, qaybta kaalmada adeegyada, evelyn silvan anthony dickey jeremy angel. So [facility] [phone].    ATENCIÓN: Si habla español, tiene a salazar disposición servicios gratuitos de asistencia lingüística. Llame al [phone].    We comply with applicable federal civil rights laws and [place] laws. We do not discriminate on the basis of race, color, national origin, age, disability, sex, sexual orientation, or gender identity.            Thank you!     Thank you for choosing [facility]  for your care. Our goal is always to provide you with excellent care. Hearing back from our patients is one way we can continue to improve our services. Please take a few minutes to complete the written survey that you may receive in the mail after your visit with us. Thank you!             Your Updated Medication List - Protect others around you: Learn how to safely use, store and throw away your medicines at www.disposemymeds.org.          This list is accurate as of: 12/20/17 12:55 PM.  Always use your most recent med list.                   Brand Name Dispense Instructions for use Diagnosis    albuterol 108 (90 BASE) MCG/ACT Inhaler    PROAIR HFA/PROVENTIL HFA/VENTOLIN HFA    1 Inhaler    Inhale 2 puffs into the lungs every 4 hours as needed    Intermittent asthma, uncomplicated       ALPRAZolam 2 MG tablet    XANAX    30 tablet    Take 1 tablet (2 mg) by mouth 3 times daily as needed for anxiety    Anxiety       amphetamine-dextroamphetamine 20 MG per tablet    ADDERALL    90 tablet    Take 1 tablet (20 mg) by mouth 3 times daily    Attention deficit hyperactivity disorder (ADHD), combined type       cyclobenzaprine 10 MG tablet    FLEXERIL     90 tablet    Take 1 tablet (10 mg) by mouth 3 times daily as needed for muscle spasms    Acute left-sided low back pain without sciatica       doxycycline Monohydrate 100 MG Tabs      100 mg 2 times daily    Acute left-sided low back pain without sciatica       famotidine 40 MG tablet    PEPCID    90 tablet    TAKE 1 TABLET(40 MG) BY MOUTH AT BEDTIME    Upper abdominal pain       fluticasone 110 MCG/ACT Inhaler    FLOVENT HFA    1 Inhaler    Inhale 2 puffs into the lungs 2 times daily    Mild persistent asthma without complication       Hyoscyamine Sulfate 0.375 MG Tbcr     90 tablet    Take 1 capful by mouth 3 times daily as needed    Upper abdominal pain       nabumetone 750 MG tablet    RELAFEN    60 tablet    1 tablet twice a day for 10 days and then as needed    Acute left-sided low back pain without sciatica       sertraline 50 MG tablet    ZOLOFT    30 tablet    Take one half of a tablet/capsule daily in the in the morning  for 7 days and then a whole  tablets/capsules daily in the morning  there after.    Anxiety       sildenafil 20 MG tablet    REVATIO    30 tablet    Take 1 tablet (20 mg) by mouth daily as needed As needed for prevention of erectile dysfunction    Drug-induced erectile dysfunction       * venlafaxine 75 MG Tb24 24 hr tablet    EFFEXOR-ER    90 tablet    Take 1 tablet (75 mg) by mouth daily    Anxiety       * venlafaxine 37.5 MG 24 hr capsule    EFFEXOR-XR    7 capsule    Take 1 capsule daily for 7 days, then discontinue    Anxiety       * Notice:  This list has 2 medication(s) that are the same as other medications prescribed for you. Read the directions carefully, and ask your doctor or other care provider to review them with you.

## 2017-12-28 ENCOUNTER — TELEPHONE (OUTPATIENT)
Dept: FAMILY MEDICINE | Facility: CLINIC | Age: 27
End: 2017-12-28

## 2017-12-28 DIAGNOSIS — F90.2 ATTENTION DEFICIT HYPERACTIVITY DISORDER (ADHD), COMBINED TYPE: ICD-10-CM

## 2017-12-28 RX ORDER — DEXTROAMPHETAMINE SACCHARATE, AMPHETAMINE ASPARTATE, DEXTROAMPHETAMINE SULFATE AND AMPHETAMINE SULFATE 5; 5; 5; 5 MG/1; MG/1; MG/1; MG/1
20 TABLET ORAL 3 TIMES DAILY
Qty: 90 TABLET | Refills: 0 | Status: SHIPPED | OUTPATIENT
Start: 2017-12-28 | End: 2017-12-29

## 2017-12-28 NOTE — TELEPHONE ENCOUNTER
Refill request for adderall.  Need to pickup script tomorrow (early) because will be leaving town.  Call when ready.

## 2017-12-28 NOTE — TELEPHONE ENCOUNTER
Controlled Substance Refill Request for adderall  Problem List Complete:  Yes     checked in past 6 months?  Yes 12/28/17, no concerns     Patient is followed by MICHAEL OROPEZA for ongoing prescription of stimulants.  All refills should be approved by this provider, or covering partner.    Medication(s): adderall 20 mg.   Maximum quantity per month: 90  Clinic visit frequency required: Q 6  months     Controlled substance agreement on file: 07/31/17    Neuropsych evaluation for ADD completed:  Yes, completed 7-2008 at [facility], on file and diagnosis confirmed    Heather Lin RN

## 2017-12-29 RX ORDER — DEXTROAMPHETAMINE SACCHARATE, AMPHETAMINE ASPARTATE, DEXTROAMPHETAMINE SULFATE AND AMPHETAMINE SULFATE 5; 5; 5; 5 MG/1; MG/1; MG/1; MG/1
20 TABLET ORAL 3 TIMES DAILY
Qty: 90 TABLET | Refills: 0 | Status: SHIPPED | OUTPATIENT
Start: 2017-12-29 | End: 2018-01-29

## 2018-01-12 ENCOUNTER — TELEPHONE (OUTPATIENT)
Dept: FAMILY MEDICINE | Facility: CLINIC | Age: 28
End: 2018-01-12

## 2018-01-12 DIAGNOSIS — F41.9 ANXIETY: ICD-10-CM

## 2018-01-12 RX ORDER — ALPRAZOLAM 2 MG
2 TABLET ORAL 3 TIMES DAILY PRN
Qty: 30 TABLET | Refills: 0 | Status: SHIPPED | OUTPATIENT
Start: 2018-01-12 | End: 2018-02-16

## 2018-01-18 ENCOUNTER — TELEPHONE (OUTPATIENT)
Dept: FAMILY MEDICINE | Facility: CLINIC | Age: 28
End: 2018-01-18

## 2018-01-18 NOTE — TELEPHONE ENCOUNTER
Panel Management Review      Patient has the following on his problem list:     Hypertension   Last three blood pressure readings:  BP Readings from Last 3 Encounters:   12/20/17 (!) 145/93   11/28/17 143/87   11/21/17 (!) 132/93     Blood pressure: FAILED    HTN Guidelines:  Age 18-59 BP range:  Less than 140/90  Age 60-85 with Diabetes:  Less than 140/90  Age 60-85 without Diabetes:  less than 150/90        Composite cancer screening  Chart review shows that this patient is due/due soon for the following None  Summary:    Patient is due/failing the following:   BP CHECK    Action needed:   Routed to provider for review.    Type of outreach:    None, routed to provider for review.    Questions for provider review:    Patients Blood pressure was high at last office visit. Unsure of your plan. Please advise.                                                                                                                                    Victoria Rice [facility]       Chart routed to Provider .

## 2018-01-25 DIAGNOSIS — J45.20 INTERMITTENT ASTHMA, UNCOMPLICATED: ICD-10-CM

## 2018-01-25 NOTE — LETTER
January 26, 2018    Abimael Martinez  [address]  [place] [address]    Dear Abimael,       We recently received a refill request for albuterol (PROAIR HFA/PROVENTIL HFA/VENTOLIN HFA) 108 (90 BASE) MCG/ACT Inhaler.  We have refilled this for one time only because you are due for a:    Asthma office visit      Please call at your earliest convenience so that there will not be a delay with your future refills.          Thank you,   Your [facility] Team/  [phone]

## 2018-01-26 RX ORDER — ALBUTEROL SULFATE 90 UG/1
2 AEROSOL, METERED RESPIRATORY (INHALATION) EVERY 4 HOURS PRN
Qty: 1 INHALER | Refills: 0 | Status: SHIPPED | OUTPATIENT
Start: 2018-01-26 | End: 2018-03-09

## 2018-01-26 NOTE — TELEPHONE ENCOUNTER
Medication is being filled for 1 time refill only due to:  Patient needs to be seen because due for asthma recheck. .    patient is overdue for office visit for recheck on asthma. Raisa Eid RN

## 2018-01-29 ENCOUNTER — TELEPHONE (OUTPATIENT)
Dept: FAMILY MEDICINE | Facility: CLINIC | Age: 28
End: 2018-01-29

## 2018-01-29 DIAGNOSIS — F90.2 ATTENTION DEFICIT HYPERACTIVITY DISORDER (ADHD), COMBINED TYPE: ICD-10-CM

## 2018-01-29 RX ORDER — DEXTROAMPHETAMINE SACCHARATE, AMPHETAMINE ASPARTATE, DEXTROAMPHETAMINE SULFATE AND AMPHETAMINE SULFATE 5; 5; 5; 5 MG/1; MG/1; MG/1; MG/1
20 TABLET ORAL 3 TIMES DAILY
Qty: 90 TABLET | Refills: 0 | Status: SHIPPED | OUTPATIENT
Start: 2018-01-29 | End: 2018-03-01

## 2018-01-29 NOTE — TELEPHONE ENCOUNTER
Controlled Substance Refill Request for adderall  Problem List Complete:  Yes    Last refill 12/28/17  Last office visit 12/20/17 for acute need.  ADHD last addressed 7/31/17      checked in past 6 months?  Yes 12/28/17, no concerns      Patient is followed by MICHAEL OROPEZA for ongoing prescription of stimulants.  All refills should be approved by this provider, or covering partner.     Medication(s): adderall 20 mg.   Maximum quantity per month: 90  Clinic visit frequency required: Q 6  months      Controlled substance agreement on file: 07/31/17     Neuropsych evaluation for ADD completed:  Yes, completed 7-2008 at [facility], on file and diagnosis confirmed

## 2018-01-29 NOTE — TELEPHONE ENCOUNTER
Patient calling is running very low on his adderall has 1 day left. Please call to advise when script is ready to be picked up at .

## 2018-01-31 ENCOUNTER — OFFICE VISIT (OUTPATIENT)
Dept: FAMILY MEDICINE | Facility: CLINIC | Age: 28
End: 2018-01-31
Payer: COMMERCIAL

## 2018-01-31 ENCOUNTER — MEDICAL CORRESPONDENCE (OUTPATIENT)
Dept: HEALTH INFORMATION MANAGEMENT | Facility: CLINIC | Age: 28
End: 2018-01-31

## 2018-01-31 VITALS
HEART RATE: 105 BPM | BODY MASS INDEX: 32.46 KG/M2 | TEMPERATURE: 97.7 F | WEIGHT: 246 LBS | DIASTOLIC BLOOD PRESSURE: 95 MMHG | SYSTOLIC BLOOD PRESSURE: 153 MMHG | OXYGEN SATURATION: 97 %

## 2018-01-31 DIAGNOSIS — M54.42 ACUTE LEFT-SIDED LOW BACK PAIN WITH LEFT-SIDED SCIATICA: Primary | ICD-10-CM

## 2018-01-31 PROCEDURE — 99214 OFFICE O/P EST MOD 30 MIN: CPT | Performed by: INTERNAL MEDICINE

## 2018-01-31 RX ORDER — OXYCODONE AND ACETAMINOPHEN 5; 325 MG/1; MG/1
1 TABLET ORAL EVERY 6 HOURS PRN
Qty: 18 TABLET | Refills: 0 | Status: SHIPPED | OUTPATIENT
Start: 2018-01-31 | End: 2018-05-18

## 2018-01-31 NOTE — LETTER
[facility]  [address] [address] [phone]        January 31, 2018    Abimael Martinez  [address]  [place] [address]            To whom it may concern,       Moraviantere Martinez was seen and treated in clinic today, please excuse his absence.  Please call the clinic with questions and concerns.      Sincerely,         Janeth Jacques MD

## 2018-01-31 NOTE — PROGRESS NOTES
SUBJECTIVE:  Abimael Martinez is an 28 year old male who presents for back pain for a couple months.  Was seen by pcp and went to PT and told has some sciatica.  Certain positions he lies in are more comfortable than others.  Sometimes the PT flares up the pain the next day, but overall helping some.  Has been ot the ER for panic attacks from the pain.  Has been taking flexeril which helps some at night.  Also high dose ibuprofen which doesn't help much.  He's worried about pain when he has to sit on an airplane tomorrow for a work trip to [place].  Pain goes from left buttock down leg, usually to just below knee, occasionally feels it to big toe.  No bladder or bowel incontinence.       has a past medical history of Acute right otitis media (1/8/2013); Anxiety; Depression; Drug abuse; and Urethritis (5/20/2013).  Social History     Social History     Marital status: Single     Spouse name: N/A     Number of children: N/A     Years of education: N/A     Social History Main Topics     Smoking status: Current Some Day Smoker     Packs/day: 0.50     Years: 6.00     Types: Cigarettes     Last attempt to quit: 3/25/2013     Smokeless tobacco: Never Used      Comment: second hand smoke exposure     Alcohol use Yes      Comment: once a week     Drug use: Yes      Comment: pot once a month, ectasy  As of 11/7/2016 he only smokes pot occasionally     Sexual activity: Yes     Partners: Female     Birth control/ protection: Condom     Other Topics Concern     None     Social History Narrative     Family History   Problem Relation Age of Onset     Unknown/Adopted Mother      Unknown/Adopted Father      Unknown/Adopted Maternal Grandmother      Unknown/Adopted Maternal Grandfather      Unknown/Adopted Paternal Grandmother      Unknown/Adopted Paternal Grandfather      Unknown/Adopted Brother        ALLERGIES:  Cymbalta; Paxil [paroxetine mesylate]; and Vicodin [hydrocodone-acetaminophen]    Current Outpatient Prescriptions    Medication     oxyCODONE-acetaminophen (PERCOCET) 5-325 MG per tablet     amphetamine-dextroamphetamine (ADDERALL) 20 MG per tablet     albuterol (PROAIR HFA/PROVENTIL HFA/VENTOLIN HFA) 108 (90 BASE) MCG/ACT Inhaler     ALPRAZolam (XANAX) 2 MG tablet     doxycycline Monohydrate 100 MG TABS     cyclobenzaprine (FLEXERIL) 10 MG tablet     nabumetone (RELAFEN) 750 MG tablet     famotidine (PEPCID) 40 MG tablet     fluticasone (FLOVENT HFA) 110 MCG/ACT Inhaler     sildenafil (REVATIO/VIAGRA) 20 MG tablet     Hyoscyamine Sulfate 0.375 MG TBCR     sertraline (ZOLOFT) 50 MG tablet     venlafaxine (EFFEXOR-XR) 37.5 MG 24 hr capsule     venlafaxine (EFFEXOR-ER) 75 MG TB24 24 hr tablet     No current facility-administered medications for this visit.          ROS:  ROS is done and is negative for general, constitutional, eye, ENT, Respiratory, cardiovascular, GI, , Skin, musculoskeletal except as noted elsewhere.      OBJECTIVE:  BP (!) 153/95  Pulse 105  Temp 97.7  F (36.5  C) (Oral)  Wt 246 lb (111.6 kg)  SpO2 97%  BMI 32.46 kg/m2  GENERAL APPEARANCE: Alert, in no acute distress, appears to have pain with changing position  EYES: normal  NECK:No adenopathy,masses or thyromegaly  RESP: normal and clear to auscultation  CV:regular rate and rhythm and no murmurs, clicks, or gallops  ABDOMEN: Abdomen soft, non-tender. BS normal. No masses, organomegaly  SKIN: no ulcers, lesions or rash  MUSCULOSKELETAL:BACK - moderate tenderness of left low back, mild tenderness of right low back.  Tenderness into buttock on left.  Limited rom in all directions due to low back pain.  SLR positive on left, negative on right.  Strength 5/5 and symmetric in bilateral LEs, DTRs 2+ and symmetric in bilateral LEs.  Sensation to light touch grossly intact in bilateral LEs.      RESULTS  .  No results found for this or any previous visit (from the past 48 hour(s)).    ASSESSMENT/PLAN:    ASSESSMENT / PLAN:  (M54.42) Acute left-sided low back  pain with left-sided sciatica  (primary encounter diagnosis)  Comment: has had pain for at least two month.  Is not improving with PT and in some ways sxs are worsening.  Sciatica is worse and has pain into toe at times.  Has work travel tomorrow and will be sitting on a plane, so pain medication prescribed for prn use.  He has h/o itching with vicodin but has tolerated percocet in past without itching or reaction.  Also, as sxs worsening, including worsened sciatica, will order MRI as well.  sxs and exam c/w sciatica, no evidence of infection or h/o trauma at this time.    Plan: oxyCODONE-acetaminophen (PERCOCET) 5-325 MG per        tablet, MR Lumbar Spine w/o Contrast        Reviewed medication instructions and side effects. Follow up if experiences side effects..  Advised that percocet can make drowsy or altered and is not to drive, operate machinery or consume alcohol or street drugs when taking; he is not to take it with xanax.  Schedule MRI to be done right after his work trip and to f/u with pcp the day after MRI complete.        See [facility] for orders, medications, letters, patient instructions    Janeth Jacques M.D.

## 2018-01-31 NOTE — MR AVS SNAPSHOT
After Visit Summary   1/31/2018    Abimael Martinez    MRN: [id number]           Patient Information     Date Of Birth          1990        Visit Information        Provider Department      1/31/2018 11:20 AM Janeth Jacques MD [facility]        Today's Diagnoses     Acute left-sided low back pain with left-sided sciatica    -  1       Follow-ups after your visit        Follow-up notes from your care team     Return in about 1 week (around 2/7/2018) for follow up with primary doctor.      Future tests that were ordered for you today     Open Future Orders        Priority Expected Expires Ordered    MR Lumbar Spine w/o Contrast Routine  1/31/2019 1/31/2018            Who to contact     If you have questions or need follow up information about today's clinic visit or your schedule please contact [facility] directly at [phone].  Normal or non-critical lab and imaging results will be communicated to you by MyChart, letter or phone within 4 business days after the clinic has received the results. If you do not hear from us within 7 days, please contact the clinic through [employer] or phone. If you have a critical or abnormal lab result, we will notify you by phone as soon as possible.  Submit refill requests through [employer] or call your pharmacy and they will forward the refill request to us. Please allow 3 business days for your refill to be completed.          Additional Information About Your Visit        MyChart Information     [facility] gives you secure access to your electronic health record. If you see a primary care provider, you can also send messages to your care team and make appointments. If you have questions, please call your primary care clinic.  If you do not have a primary care provider, please call [phone] and they will assist you.        Care EveryWhere ID     This is your Care EveryWhere ID. This could be used by other organizations to access  your [place] medical records  [id number]         Blood Pressure from Last 3 Encounters:   12/20/17 (!) 145/93   11/28/17 143/87   11/21/17 (!) 132/93    Weight from Last 3 Encounters:   12/20/17 258 lb (117 kg)   11/21/17 254 lb (115.2 kg)   07/31/17 263 lb (119.3 kg)                 Today's Medication Changes          These changes are accurate as of 1/31/18 11:59 AM.  If you have any questions, ask your nurse or doctor.               Start taking these medicines.        Dose/Directions    oxyCODONE-acetaminophen 5-325 MG per tablet   Commonly known as:  PERCOCET   Used for:  Acute left-sided low back pain with left-sided sciatica   Started by:  Janeth Jacques MD        Dose:  1 tablet   Take 1 tablet by mouth every 6 hours as needed for pain   Quantity:  18 tablet   Refills:  0            Where to get your medicines      Some of these will need a paper prescription and others can be bought over the counter.  Ask your nurse if you have questions.     Bring a paper prescription for each of these medications     oxyCODONE-acetaminophen 5-325 MG per tablet                Primary Care Provider Office Phone # Fax #    Jamison Lora [phone] [address] [address]        Equal Access to Services     MIC HULL AH: Kaylin basurto hadasho Soomaali, waaxda luqadaha, qaybta kaalmada adeegyada, evelyn angel. So [facility] [phone].    ATENCIÓN: Si habla español, tiene a salazar disposición servicios gratuitos de asistencia lingüística. Llame al [phone].    We comply with applicable federal civil rights laws and [place] laws. We do not discriminate on the basis of race, color, national origin, age, disability, sex, sexual orientation, or gender identity.            Thank you!     Thank you for choosing [facility]  for your care. Our goal is always to provide you with excellent care. Hearing back from our patients is one way we can  continue to improve our services. Please take a few minutes to complete the written survey that you may receive in the mail after your visit with us. Thank you!             Your Updated Medication List - Protect others around you: Learn how to safely use, store and throw away your medicines at www.disposemymeds.org.          This list is accurate as of 1/31/18 11:59 AM.  Always use your most recent med list.                   Brand Name Dispense Instructions for use Diagnosis    albuterol 108 (90 BASE) MCG/ACT Inhaler    PROAIR HFA/PROVENTIL HFA/VENTOLIN HFA    1 Inhaler    Inhale 2 puffs into the lungs every 4 hours as needed    Intermittent asthma, uncomplicated       ALPRAZolam 2 MG tablet    XANAX    30 tablet    Take 1 tablet (2 mg) by mouth 3 times daily as needed for anxiety    Anxiety       amphetamine-dextroamphetamine 20 MG per tablet    ADDERALL    90 tablet    Take 1 tablet (20 mg) by mouth 3 times daily    Attention deficit hyperactivity disorder (ADHD), combined type       cyclobenzaprine 10 MG tablet    FLEXERIL    90 tablet    Take 1 tablet (10 mg) by mouth 3 times daily as needed for muscle spasms    Acute left-sided low back pain without sciatica       doxycycline Monohydrate 100 MG Tabs      100 mg 2 times daily    Acute left-sided low back pain without sciatica       famotidine 40 MG tablet    PEPCID    90 tablet    TAKE 1 TABLET(40 MG) BY MOUTH AT BEDTIME    Upper abdominal pain       fluticasone 110 MCG/ACT Inhaler    FLOVENT HFA    1 Inhaler    Inhale 2 puffs into the lungs 2 times daily    Mild persistent asthma without complication       Hyoscyamine Sulfate 0.375 MG Tbcr     90 tablet    Take 1 capful by mouth 3 times daily as needed    Upper abdominal pain       nabumetone 750 MG tablet    RELAFEN    60 tablet    1 tablet twice a day for 10 days and then as needed    Acute left-sided low back pain without sciatica       oxyCODONE-acetaminophen 5-325 MG per tablet    PERCOCET    18 tablet     Take 1 tablet by mouth every 6 hours as needed for pain    Acute left-sided low back pain with left-sided sciatica       sertraline 50 MG tablet    ZOLOFT    30 tablet    Take one half of a tablet/capsule daily in the in the morning  for 7 days and then a whole  tablets/capsules daily in the morning  there after.    Anxiety       sildenafil 20 MG tablet    REVATIO    30 tablet    Take 1 tablet (20 mg) by mouth daily as needed As needed for prevention of erectile dysfunction    Drug-induced erectile dysfunction       * venlafaxine 75 MG Tb24 24 hr tablet    EFFEXOR-ER    90 tablet    Take 1 tablet (75 mg) by mouth daily    Anxiety       * venlafaxine 37.5 MG 24 hr capsule    EFFEXOR-XR    7 capsule    Take 1 capsule daily for 7 days, then discontinue    Anxiety       * Notice:  This list has 2 medication(s) that are the same as other medications prescribed for you. Read the directions carefully, and ask your doctor or other care provider to review them with you.

## 2018-02-06 ENCOUNTER — RADIANT APPOINTMENT (OUTPATIENT)
Dept: MRI IMAGING | Facility: CLINIC | Age: 28
End: 2018-02-06
Attending: INTERNAL MEDICINE
Payer: COMMERCIAL

## 2018-02-06 DIAGNOSIS — M51.27 PROTRUSION OF INTERVERTEBRAL DISC OF LUMBOSACRAL REGION: Primary | ICD-10-CM

## 2018-02-06 DIAGNOSIS — M54.42 ACUTE LEFT-SIDED LOW BACK PAIN WITH LEFT-SIDED SCIATICA: ICD-10-CM

## 2018-02-06 PROCEDURE — 72148 MRI LUMBAR SPINE W/O DYE: CPT | Mod: TC

## 2018-02-07 ENCOUNTER — TELEPHONE (OUTPATIENT)
Dept: FAMILY MEDICINE | Facility: CLINIC | Age: 28
End: 2018-02-07

## 2018-02-07 NOTE — TELEPHONE ENCOUNTER
Called patient and gave providers message/ instructions.  Patient understands this.   Gave patient phone number to call spine specialists to make appointment.     Heather Lin RN

## 2018-02-07 NOTE — TELEPHONE ENCOUNTER
Left message on voicemail for patient to call back.   Direct number given to call back: [phone].     Heather Lin RN

## 2018-02-07 NOTE — TELEPHONE ENCOUNTER
Janeth Jacques MD [facility]                   Call pt with results please.  The MRI shows there are two discs in the spine that are bulging some and pressing on the nerves, which is what is causing the back pain and radiation of pain down the leg.  referral placed to see medical spine specialist using the ortho  referral.  He can call to schedule at [phone] and should be seen within the next week.  Be seen sooner if lose control of bladder or bowels, or symptoms worsen.

## 2018-02-16 ENCOUNTER — TELEPHONE (OUTPATIENT)
Dept: FAMILY MEDICINE | Facility: CLINIC | Age: 28
End: 2018-02-16

## 2018-02-16 DIAGNOSIS — F41.9 ANXIETY: ICD-10-CM

## 2018-02-16 RX ORDER — ALPRAZOLAM 2 MG
2 TABLET ORAL 3 TIMES DAILY PRN
Qty: 30 TABLET | Refills: 0 | Status: SHIPPED | OUTPATIENT
Start: 2018-02-16 | End: 2018-03-09

## 2018-02-16 NOTE — TELEPHONE ENCOUNTER
Controlled Substance Refill Request for Xanax  Problem List Complete:  Yes  Medication(s): alprazolam 2 mg .   Maximum quantity per month: 36  Clinic visit frequency required: Q 6  months     Controlled substance agreement on file: No  Benzodiazepine use reviewed by psychiatry:  No     checked in past 6 months?  Yes last filled on 1/15/18     Poonam GONZALEZ, RN, CPN

## 2018-03-01 ENCOUNTER — TELEPHONE (OUTPATIENT)
Dept: FAMILY MEDICINE | Facility: CLINIC | Age: 28
End: 2018-03-01

## 2018-03-01 DIAGNOSIS — F90.2 ATTENTION DEFICIT HYPERACTIVITY DISORDER (ADHD), COMBINED TYPE: ICD-10-CM

## 2018-03-01 NOTE — TELEPHONE ENCOUNTER
Adderall medication requested for refill. Patient states he is on his last pill. Please call to let him know when its ready for . Thank you

## 2018-03-01 NOTE — TELEPHONE ENCOUNTER
Controlled Substance Refill Request for adderall  Problem List Complete:  Yes     Last refill 1/29/18  Last office visit 12/20/17 for acute need.  ADHD last addressed 7/31/17       checked in past 6 months?  Yes 12/28/17, no concerns       Patient is followed by MICHAEL OROPEZA for ongoing prescription of stimulants.  All refills should be approved by this provider, or covering partner.      Medication(s): adderall 20 mg.   Maximum quantity per month: 90  Clinic visit frequency required: Q 6  months       Controlled substance agreement on file: 07/31/17      Neuropsych evaluation for ADD completed:  Yes, completed 7-2008 at [facility], on file and diagnosis confirmed

## 2018-03-02 RX ORDER — DEXTROAMPHETAMINE SACCHARATE, AMPHETAMINE ASPARTATE, DEXTROAMPHETAMINE SULFATE AND AMPHETAMINE SULFATE 5; 5; 5; 5 MG/1; MG/1; MG/1; MG/1
20 TABLET ORAL 3 TIMES DAILY
Qty: 90 TABLET | Refills: 0 | Status: SHIPPED | OUTPATIENT
Start: 2018-03-02 | End: 2018-03-09

## 2018-03-02 NOTE — TELEPHONE ENCOUNTER
RN please refill medication, patient has an appointment with provider on 03/09/18,   VITO Eldridge

## 2018-03-02 NOTE — TELEPHONE ENCOUNTER
The patient has not been seen for ADHD for about 7 month(s).   Please call the patient and make an appointment(s) and then you can send the chart back to me to  refill the medication one time.  Jamison Lora

## 2018-03-06 NOTE — PROGRESS NOTES
SUBJECTIVE:                                                    Abimael Martinez is a 28 year old male who presents to clinic today for the following health issues:    Medication Followup of Adderall     Taking Medication as prescribed: yes    Side Effects:  None    Medication Helping Symptoms:  yes   Also would like refill of albuterol. Is working on cutting back on cigs and down to 5 a day has noticed little increase cough since doing that.   Uses flovent as needed.     Problem list and histories reviewed & adjusted, as indicated.  Additional history: as documented    Patient Active Problem List   Diagnosis     Anxiety     Intermittent asthma     CARDIOVASCULAR SCREENING; LDL GOAL LESS THAN 160     High risk sexual behavior     Tobacco use disorder     Low back pain     Hypertension goal BP (blood pressure) < 140/90     Internal hemorrhoids which bleed     Obesity, Class I, BMI 30-34.9     Attention deficit hyperactivity disorder (ADHD), combined type     AYALA (generalized anxiety disorder)     OCD (obsessive compulsive disorder)     Drug-induced erectile dysfunction     Mild persistent asthma without complication     Past Surgical History:   Procedure Laterality Date     NO HISTORY OF SURGERY         Social History   Substance Use Topics     Smoking status: Current Some Day Smoker     Packs/day: 0.50     Years: 6.00     Types: Cigarettes     Last attempt to quit: 3/25/2013     Smokeless tobacco: Never Used      Comment: second hand smoke exposure     Alcohol use Yes      Comment: once a week     Family History   Problem Relation Age of Onset     Unknown/Adopted Mother      Unknown/Adopted Father      Unknown/Adopted Maternal Grandmother      Unknown/Adopted Maternal Grandfather      Unknown/Adopted Paternal Grandmother      Unknown/Adopted Paternal Grandfather      Unknown/Adopted Brother          Current Outpatient Prescriptions   Medication Sig Dispense Refill     [START ON 5/9/2018]  "amphetamine-dextroamphetamine (ADDERALL) 20 MG per tablet Take 1 tablet (20 mg) by mouth 3 times daily 90 tablet 0     albuterol (PROAIR HFA/PROVENTIL HFA/VENTOLIN HFA) 108 (90 BASE) MCG/ACT Inhaler Inhale 2 puffs into the lungs every 4 hours as needed 1 Inhaler 0     oxyCODONE-acetaminophen (PERCOCET) 5-325 MG per tablet Take 1 tablet by mouth every 6 hours as needed for pain 18 tablet 0     doxycycline Monohydrate 100 MG TABS 100 mg 2 times daily  1     cyclobenzaprine (FLEXERIL) 10 MG tablet Take 1 tablet (10 mg) by mouth 3 times daily as needed for muscle spasms 90 tablet 1     famotidine (PEPCID) 40 MG tablet TAKE 1 TABLET(40 MG) BY MOUTH AT BEDTIME 90 tablet 0     fluticasone (FLOVENT HFA) 110 MCG/ACT Inhaler Inhale 2 puffs into the lungs 2 times daily 1 Inhaler 1     sildenafil (REVATIO/VIAGRA) 20 MG tablet Take 1 tablet (20 mg) by mouth daily as needed As needed for prevention of erectile dysfunction 30 tablet 1     Hyoscyamine Sulfate 0.375 MG TBCR Take 1 capful by mouth 3 times daily as needed 90 tablet 1     [DISCONTINUED] albuterol (PROAIR HFA/PROVENTIL HFA/VENTOLIN HFA) 108 (90 BASE) MCG/ACT Inhaler Inhale 2 puffs into the lungs every 4 hours as needed 1 Inhaler 0     Allergies   Allergen Reactions     Cymbalta      Weight gain and fatigue     Paxil [Paroxetine Mesylate]      Weight gain and fatigue     Vicodin [Hydrocodone-Acetaminophen]      OBJECTIVE:     /83  Pulse 86  Temp 98.4  F (36.9  C) (Oral)  Resp 20  Ht 6' 1\" (1.854 m)  Wt 244 lb (110.7 kg)  SpO2 99%  BMI 32.19 kg/m2  Body mass index is 32.19 kg/(m^2).  GENERAL: healthy, alert and no distress  PSYCH: mentation appears normal, affect normal/bright    Diagnostic Test Results:  none     ASSESSMENT/PLAN:         ICD-10-CM    1. Attention deficit hyperactivity disorder (ADHD), combined type F90.2 amphetamine-dextroamphetamine (ADDERALL) 20 MG per tablet     DISCONTINUED: amphetamine-dextroamphetamine (ADDERALL) 20 MG per tablet     " DISCONTINUED: amphetamine-dextroamphetamine (ADDERALL) 20 MG per tablet   2. Intermittent asthma, uncomplicated J45.20 albuterol (PROAIR HFA/PROVENTIL HFA/VENTOLIN HFA) 108 (90 BASE) MCG/ACT Inhaler   1. Ok for refill but needs to see PCP in 3 months for additional refills.  2. Ok for refill. Discussed quitting smoking. Ok to increase us of flovent.     Anup Sevilla PA-C  [facility]     No

## 2018-03-09 ENCOUNTER — OFFICE VISIT (OUTPATIENT)
Dept: FAMILY MEDICINE | Facility: CLINIC | Age: 28
End: 2018-03-09
Payer: COMMERCIAL

## 2018-03-09 VITALS
HEART RATE: 86 BPM | TEMPERATURE: 98.4 F | BODY MASS INDEX: 32.34 KG/M2 | SYSTOLIC BLOOD PRESSURE: 130 MMHG | WEIGHT: 244 LBS | RESPIRATION RATE: 20 BRPM | OXYGEN SATURATION: 99 % | DIASTOLIC BLOOD PRESSURE: 83 MMHG | HEIGHT: 73 IN

## 2018-03-09 DIAGNOSIS — J45.20 INTERMITTENT ASTHMA, UNCOMPLICATED: ICD-10-CM

## 2018-03-09 DIAGNOSIS — F90.2 ATTENTION DEFICIT HYPERACTIVITY DISORDER (ADHD), COMBINED TYPE: ICD-10-CM

## 2018-03-09 PROCEDURE — 99213 OFFICE O/P EST LOW 20 MIN: CPT | Performed by: PHYSICIAN ASSISTANT

## 2018-03-09 RX ORDER — DEXTROAMPHETAMINE SACCHARATE, AMPHETAMINE ASPARTATE, DEXTROAMPHETAMINE SULFATE AND AMPHETAMINE SULFATE 5; 5; 5; 5 MG/1; MG/1; MG/1; MG/1
20 TABLET ORAL 3 TIMES DAILY
Qty: 90 TABLET | Refills: 0 | Status: SHIPPED | OUTPATIENT
Start: 2018-04-09 | End: 2018-03-09

## 2018-03-09 RX ORDER — DEXTROAMPHETAMINE SACCHARATE, AMPHETAMINE ASPARTATE, DEXTROAMPHETAMINE SULFATE AND AMPHETAMINE SULFATE 5; 5; 5; 5 MG/1; MG/1; MG/1; MG/1
20 TABLET ORAL 3 TIMES DAILY
Qty: 90 TABLET | Refills: 0 | Status: SHIPPED | OUTPATIENT
Start: 2018-05-09 | End: 2018-06-29

## 2018-03-09 RX ORDER — ALBUTEROL SULFATE 90 UG/1
2 AEROSOL, METERED RESPIRATORY (INHALATION) EVERY 4 HOURS PRN
Qty: 1 INHALER | Refills: 0 | Status: SHIPPED | OUTPATIENT
Start: 2018-03-09 | End: 2019-02-25

## 2018-03-09 RX ORDER — DEXTROAMPHETAMINE SACCHARATE, AMPHETAMINE ASPARTATE, DEXTROAMPHETAMINE SULFATE AND AMPHETAMINE SULFATE 5; 5; 5; 5 MG/1; MG/1; MG/1; MG/1
20 TABLET ORAL 3 TIMES DAILY
Qty: 90 TABLET | Refills: 0 | Status: SHIPPED | OUTPATIENT
Start: 2018-03-09 | End: 2018-03-09

## 2018-03-09 ASSESSMENT — PAIN SCALES - GENERAL: PAINLEVEL: MODERATE PAIN (5)

## 2018-03-09 NOTE — PROGRESS NOTES
[facility]  [address] [facility] [address] [phone]  Dept: [phone]    PRE-OP EVALUATION:  Today's date: 3/12/2018    Abimael Martinez (: 1990) presents for pre-operative evaluation assessment as requested by Dr. Johnson.  He requires evaluation and anesthesia risk assessment prior to undergoing surgery/procedure for treatment of left side low back pain w/ sciatica .      Primary Physician: Jamison Lora  Type of Anesthesia Anticipated: to be determined    Patient has a Health Care Directive or Living Will:  NO    Preop Questions 3/12/2018   Who is doing your surgery? suad johnson   What are you having done? microdisectomy\bilateral decompression   Date of Surgery/Procedure: 3-15-18   Facility or Hospital where procedure/surgery will be performed: [facility]   1.  Do you have a history of Heart attack, stroke, stent, coronary bypass surgery, or other heart surgery? No   2.  Do you ever have any pain or discomfort in your chest? YES - from asthma   3.  Do you have a history of  Heart Failure? No   4.   Are you troubled by shortness of breath when:  walking on a level surface, or up a slight hill, or at night? YES - from asthma   5.  Do you currently have a cold, bronchitis or other respiratory infection? No   6.  Do you have a cough, shortness of breath, or wheezing? YES - from asthma   7.  Do you sometimes get pains in the calves of your legs when you walk? No   8. Do you or anyone in your family have previous history of blood clots? UNKNOWN - adopted   9.  Do you or does anyone in your family have a serious bleeding problem such as prolonged bleeding following surgeries or cuts? UNKNOWN - adopted   10. Have you ever had problems with anemia or been told to take iron pills? No   11. Have you had any abnormal blood loss such as black, tarry or bloody stools? No   12. Have you ever had a blood transfusion? No   13. Have you or any of your relatives ever  had problems with anesthesia? UNKNOWN - adopted   14. Do you have sleep apnea, excessive snoring or daytime drowsiness? No   15. Do you have any prosthetic heart valves? No   16. Do you have prosthetic joints? No       HPI:     Lumbosacral radiculopathy - he has had longstanding low back pain with radiation to the legs.   Evaluation and treatment:    He is scheduled for lumbar microdiscectomy and bilateral decompression on 3/15/18.   His asthma is not controlled - see below. I have advised rescheduling this elective surgery.    Asthma - he has wheezing daily which he attributes to smoking (he is trying to cut down - currently at 6 cigarettes per day). He has shortness of breath that requires Albuterol inhaler about 2-3 times per month. Triggers for asthma are cold air, smoking, URI's and allergies. He has not been to ED or Hospitalized in past 12 months due to asthma.  Evaluation and treatment:   Based on the below he would be categorized as at least mild persistent.   ACT as below.   Spirometry today showed FEV1 85% and FEV1/FVC 77 % predicted. He wanted to repeat the test with slightly worse results.   I strongly advised stopping smoking all together.   Start Singulair 10 mg qd and Flovent 220 with spacer 2 puffs bid - gargle after each use.   Return to clinic in 1-2 weeks    ACT Total Scores 3/9/2018   ACT TOTAL SCORE -   ASTHMA ER VISITS -   ASTHMA HOSPITALIZATIONS -   ACT TOTAL SCORE (Goal Greater than or Equal to 20) 16   In the past 12 months, how many times did you visit the emergency room for your asthma without being admitted to the hospital? 0   In the past 12 months, how many times were you hospitalized overnight because of your asthma? 0     [facility]     Health Care Guidelines          Step 2: Mild Persistent    symptoms > 2 times a week but < 1 time a day    exacerbations may affect activity    nighttime symptoms > 2 times a month    FEV1 or PEF > 80 percent  predicted and PEF variability 20-30 percent      AYALA and OCD -    Evaluation and treatment:    He stopped taking Zoloft and Xanax due to side effects - he feels fine without them.    Dyslipidemia - we did not discuss today except to check fasting lipids.    Elevated creatinine - creat was 1.3 in 2014 and 1.41 today.   Evaluation and treatment:    This can be followed after surgery.   In the perioperative period NSAIDs and other nephrotoxic drugs should be used with caution.      MEDICAL HISTORY:     Patient Active Problem List    Diagnosis Date Noted     Mild persistent asthma without complication 07/31/2017     Priority: Medium     Drug-induced erectile dysfunction 11/07/2016     Priority: Medium     OCD (obsessive compulsive disorder) 09/16/2016     Priority: Medium     AYALA (generalized anxiety disorder) 12/04/2015     Priority: Medium     Patient is followed by MICHAEL OROPEZA for ongoing prescription of benzodiazepines.  All refills should be approved by this provider, or covering partner.    Medication(s): alprazolam 2 mg .   Maximum quantity per month: 36  Clinic visit frequency required: Q 6  months     Controlled substance agreement on file: No  Benzodiazepine use reviewed by psychiatry:  No    Last [employer] website verification:  none   [email or web address]           Attention deficit hyperactivity disorder (ADHD), combined type 10/14/2015     Priority: Medium     Patient is followed by MICHAEL OROPEZA for ongoing prescription of stimulants.  All refills should be approved by this provider, or covering partner.    Medication(s): adderall 20 mg.   Maximum quantity per month: 90  Clinic visit frequency required: Q 6  months     Controlled substance agreement on file: 07/31/17    Neuropsych evaluation for ADD completed:  Yes, completed 7-2008 at [facility], on file and diagnosis confirmed    Last [facility] website verification:  3/14/16, no concerns   [email or web address]           Obesity, Class I,  BMI 30-34.9 02/20/2015     Priority: Medium     Internal hemorrhoids which bleed 10/02/2014     Priority: Medium     Hypertension goal BP (blood pressure) < 140/90 08/17/2014     Priority: Medium     Low back pain 04/07/2013     Priority: Medium     Tobacco use disorder 03/19/2012     Priority: Medium     High risk sexual behavior 11/06/2011     Priority: Medium     CARDIOVASCULAR SCREENING; LDL GOAL LESS THAN 160 05/24/2011     Priority: Medium     Intermittent asthma 05/08/2011     Priority: Medium     Anxiety      Priority: Medium      Past Medical History:   Diagnosis Date     Acute right otitis media 1/8/2013     Anxiety      Depression      Drug abuse      Urethritis 5/20/2013     Problem list name updated by automated process. Provider to review     Past Surgical History:   Procedure Laterality Date     NO HISTORY OF SURGERY       Current Outpatient Prescriptions   Medication Sig Dispense Refill     [START ON 5/9/2018] amphetamine-dextroamphetamine (ADDERALL) 20 MG per tablet Take 1 tablet (20 mg) by mouth 3 times daily 90 tablet 0     albuterol (PROAIR HFA/PROVENTIL HFA/VENTOLIN HFA) 108 (90 BASE) MCG/ACT Inhaler Inhale 2 puffs into the lungs every 4 hours as needed 1 Inhaler 0     oxyCODONE-acetaminophen (PERCOCET) 5-325 MG per tablet Take 1 tablet by mouth every 6 hours as needed for pain 18 tablet 0     doxycycline Monohydrate 100 MG TABS 100 mg 2 times daily  1     cyclobenzaprine (FLEXERIL) 10 MG tablet Take 1 tablet (10 mg) by mouth 3 times daily as needed for muscle spasms 90 tablet 1     famotidine (PEPCID) 40 MG tablet TAKE 1 TABLET(40 MG) BY MOUTH AT BEDTIME 90 tablet 0     fluticasone (FLOVENT HFA) 110 MCG/ACT Inhaler Inhale 2 puffs into the lungs 2 times daily 1 Inhaler 1     Hyoscyamine Sulfate 0.375 MG TBCR Take 1 capful by mouth 3 times daily as needed 90 tablet 1     sildenafil (REVATIO/VIAGRA) 20 MG tablet Take 1 tablet (20 mg) by mouth daily as needed As needed for prevention of erectile  dysfunction (Patient not taking: Reported on 3/12/2018) 30 tablet 1     OTC products: None, except as noted above    Allergies   Allergen Reactions     Cymbalta      Weight gain and fatigue     Paxil [Paroxetine Mesylate]      Weight gain and fatigue     Vicodin [Hydrocodone-Acetaminophen]       Latex Allergy: NO    Social History   Substance Use Topics     Smoking status: Current Some Day Smoker     Packs/day: 0.50     Years: 6.00     Types: Cigarettes     Last attempt to quit: 3/25/2013     Smokeless tobacco: Never Used      Comment: second hand smoke exposure     Alcohol use Yes      Comment: once a week     History   Drug Use     Yes     Comment: pot once a month, ectasy  As of 11/7/2016 he only smokes pot occasionally       REVIEW OF SYSTEMS:   CONSTITUTIONAL: NEGATIVE for fever, chills, change in weight  INTEGUMENTARY/SKIN: NEGATIVE for worrisome rashes, moles or lesions  EYES: NEGATIVE for vision changes or irritation  ENT/MOUTH: NEGATIVE for ear, mouth and throat problems  RESP: per HPI  BREAST: NEGATIVE for masses, tenderness or discharge  CV: NEGATIVE for chest pain, palpitations or peripheral edema  GI: NEGATIVE for nausea, abdominal pain, heartburn, or change in bowel habits  : NEGATIVE for frequency, dysuria, or hematuria  MUSCULOSKELETAL: NEGATIVE for significant arthralgias or myalgia  NEURO: NEGATIVE for weakness, dizziness or paresthesias  ENDOCRINE: NEGATIVE for temperature intolerance, skin/hair changes  HEME: NEGATIVE for bleeding problems  PSYCHIATRIC: NEGATIVE for changes in mood or affect    EXAM:   /89  Pulse 79  Temp 96.6  F (35.9  C) (Oral)  Wt 238 lb (108 kg)  SpO2 97%  BMI 31.4 kg/m2    GENERAL APPEARANCE: healthy, alert and no distress     EYES: EOMI,  PERRL     HENT: ear canals and TM's normal and nose and mouth without ulcers or lesions     NECK: no adenopathy, no asymmetry, masses, or scars and thyroid normal to palpation     RESP: reduced air movement with both  inspiratory/expiratory wheezing mild. No crackles. Some rhonchi. O2 sat noted. No tachypnea.      CV: regular rates and rhythm, normal S1 S2, no S3 or S4 and no murmur, click or rub     ABDOMEN:  soft, nontender, no HSM or masses and bowel sounds normal     MS: extremities normal- no gross deformities noted, no evidence of inflammation in joints, FROM in all extremities.     SKIN: no suspicious lesions or rashes     NEURO: Normal strength and tone, sensory exam grossly normal, mentation intact and speech normal     PSYCH: mentation appears normal. and affect normal/bright     LYMPHATICS: No cervical adenopathy    DIAGNOSTICS:         Results for orders placed or performed in visit on 03/12/18   Spirometry, Breathing Capacity   Result Value Ref Range    FEV-1      FVC      FEV1/FVC      FEF 25/75     Spirometry, Breathing Capacity   Result Value Ref Range    FEV-1      FVC      FEV1/FVC      FEF 25/75     CBC with platelets differential   Result Value Ref Range    WBC 6.0 4.0 - 11.0 10e9/L    RBC Count 5.28 4.4 - 5.9 10e12/L    Hemoglobin 15.2 13.3 - 17.7 g/dL    Hematocrit 43.6 40.0 - 53.0 %    MCV 83 78 - 100 fl    MCH 28.8 26.5 - 33.0 pg    MCHC 34.9 31.5 - 36.5 g/dL    RDW 12.7 10.0 - 15.0 %    Platelet Count 237 150 - 450 10e9/L    Diff Method Automated Method     % Neutrophils 42.2 %    % Lymphocytes 45.2 %    % Monocytes 5.4 %    % Eosinophils 6.7 %    % Basophils 0.5 %    Absolute Neutrophil 2.5 1.6 - 8.3 10e9/L    Absolute Lymphocytes 2.7 0.8 - 5.3 10e9/L    Absolute Monocytes 0.3 0.0 - 1.3 10e9/L    Absolute Eosinophils 0.4 0.0 - 0.7 10e9/L    Absolute Basophils 0.0 0.0 - 0.2 10e9/L   Basic metabolic panel   Result Value Ref Range    Sodium 138 133 - 144 mmol/L    Potassium 4.0 3.4 - 5.3 mmol/L    Chloride 106 94 - 109 mmol/L    Carbon Dioxide 25 20 - 32 mmol/L    Anion Gap 7 3 - 14 mmol/L    Glucose 93 70 - 99 mg/dL    Urea Nitrogen 15 7 - 30 mg/dL    Creatinine 1.41 (H) 0.66 - 1.25 mg/dL    GFR Estimate  60 (L) >60 mL/min/1.7m2    GFR Estimate If Black 73 >60 mL/min/1.7m2    Calcium 9.5 8.5 - 10.1 mg/dL   Lipid panel reflex to direct LDL Fasting   Result Value Ref Range    Cholesterol 200 (H) <200 mg/dL    Triglycerides 230 (H) <150 mg/dL    HDL Cholesterol 29 (L) >39 mg/dL    LDL Cholesterol Calculated 125 (H) <100 mg/dL    Non HDL Cholesterol 171 (H) <130 mg/dL            IMPRESSION:   Reason for surgery/procedure: lumbosacral radiculopathy    The proposed surgical procedure is considered INTERMEDIATE risk.    REVISED CARDIAC RISK INDEX  The patient has the following serious cardiovascular risks for perioperative complications such as (MI, PE, VFib and 3  AV Block):  No serious cardiac risks  INTERPRETATION: 0 risks: Class I (very low risk - 0.4% complication rate)    The patient has the following additional risks for perioperative complications:    Per [facility]    RECOMMENDATIONS:     Assessment and Plan - Decision Making    1. Preop general physical exam  Per HPI  - CBC with platelets differential  - Basic metabolic panel    2. Lumbosacral radiculopathy  Per HPI  - CBC with platelets differential  - Basic metabolic panel    3. Mild persistent asthma without complication  Per HPI  - Spirometry, Breathing Capacity  - fluticasone (FLOVENT HFA) 220 MCG/ACT Inhaler; Inhale 2 puffs into the lungs 2 times daily Please give patient spacer  Dispense: 1 Inhaler; Refill: 3  - Spirometry, Breathing Capacity  - montelukast (SINGULAIR) 10 MG tablet; Take 1 tablet (10 mg) by mouth At Bedtime  Dispense: 90 tablet; Refill: 0    4. Dyslipidemia  Per HPI  - Lipid panel reflex to direct LDL Fasting    5. Elevated serum creatinine  Per HPI      Written instructions given as follows:    Patient Instructions   1. Your breathing test was abnormal. We should get your lungs better before surgery.    2. Therefore, you should contact you surgeon's office and reschedule the surgery.    3. In the meantime continue your efforts in quitting smoking  and start Singulair 10 mg daily and Flovent 2 puffs twice per day with a spacer - gargle after use.    4. Let me see you back in 1-2 weeks.        Signed Electronically by: GIUSEPPE BENITEZ MD    Copy of this evaluation report is provided to requesting physician.    [place] Preop Guidelines

## 2018-03-09 NOTE — NURSING NOTE
"Chief Complaint   Patient presents with     A.D.H.D       Initial /83  Pulse 86  Temp 98.4  F (36.9  C) (Oral)  Resp 20  Ht 6' 1\" (1.854 m)  Wt 244 lb (110.7 kg)  SpO2 99%  BMI 32.19 kg/m2 Estimated body mass index is 32.19 kg/(m^2) as calculated from the following:    Height as of this encounter: 6' 1\" (1.854 m).    Weight as of this encounter: 244 lb (110.7 kg).  Medication Reconciliation: complete  Melissa Huggins CMA    "

## 2018-03-09 NOTE — MR AVS SNAPSHOT
After Visit Summary   3/9/2018    Abimael Martinez    MRN: [id number]           Patient Information     Date Of Birth          1990        Visit Information        Provider Department      3/9/2018 2:50 PM Fiorpel, Anup Ramos PA-C [facility]        Today's Diagnoses     Attention deficit hyperactivity disorder (ADHD), combined type           Follow-ups after your visit        Your next 10 appointments already scheduled     Mar 12, 2018 11:10 AM CDT   Pre-Op physical with Rodríguez Doyle MD   [facility] ([facility])    [address] [facility] [address] [phone]              Who to contact     If you have questions or need follow up information about today's clinic visit or your schedule please contact [facility] directly at [phone].  Normal or non-critical lab and imaging results will be communicated to you by MyChart, letter or phone within 4 business days after the clinic has received the results. If you do not hear from us within 7 days, please contact the clinic through MyChart or phone. If you have a critical or abnormal lab result, we will notify you by phone as soon as possible.  Submit refill requests through [employer] or call your pharmacy and they will forward the refill request to us. Please allow 3 business days for your refill to be completed.          Additional Information About Your Visit        MyChart Information     [employer] gives you secure access to your electronic health record. If you see a primary care provider, you can also send messages to your care team and make appointments. If you have questions, please call your primary care clinic.  If you do not have a primary care provider, please call [phone] and they will assist you.        Care EveryWhere ID     This is your Care EveryWhere ID. This could be used by other organizations to access your [facility] medical records  [id number]       "  Your Vitals Were     Pulse Temperature Respirations Height Pulse Oximetry BMI (Body Mass Index)    86 98.4  F (36.9  C) (Oral) 20 6' 1\" (1.854 m) 99% 32.19 kg/m2       Blood Pressure from Last 3 Encounters:   03/09/18 130/83   01/31/18 (!) 153/95   12/20/17 (!) 145/93    Weight from Last 3 Encounters:   03/09/18 244 lb (110.7 kg)   01/31/18 246 lb (111.6 kg)   12/20/17 258 lb (117 kg)              Today, you had the following     No orders found for display         Today's Medication Changes          These changes are accurate as of 3/9/18  3:26 PM.  If you have any questions, ask your nurse or doctor.               Start taking these medicines.        Dose/Directions    amphetamine-dextroamphetamine 20 MG per tablet   Commonly known as:  ADDERALL   Used for:  Attention deficit hyperactivity disorder (ADHD), combined type   Started by:  Anup Sevilla PA-C        Dose:  20 mg   Start taking on:  5/9/2018   Take 1 tablet (20 mg) by mouth 3 times daily   Quantity:  90 tablet   Refills:  0         Stop taking these medicines if you haven't already. Please contact your care team if you have questions.     venlafaxine 37.5 MG 24 hr capsule   Commonly known as:  EFFEXOR-XR   Stopped by:  Anup Sevilla PA-C                Where to get your medicines      Some of these will need a paper prescription and others can be bought over the counter.  Ask your nurse if you have questions.     Bring a paper prescription for each of these medications     amphetamine-dextroamphetamine 20 MG per tablet                Primary Care Provider Office Phone # Fax #    Jamison Lora [phone] [address] [facility] [address]        Equal Access to Services     MIC HULL AH: Kaylin sanchez Sonetta, wadeanada luqadaha, qaybta kaalmada adeegyada, evelyn angel. So [facility] [phone].    ATENCIÓN: Si habla español, tiene a salazar disposición servicios gratuitos de " asistencia lingüística. Tiffanie al [phone].    We comply with applicable federal civil rights laws and [place] laws. We do not discriminate on the basis of race, color, national origin, age, disability, sex, sexual orientation, or gender identity.            Thank you!     Thank you for choosing [facility]  [facility]. Our goal is always to provide you with excellent care. Hearing back from our patients is one way we can continue to improve our services. Please take a few minutes to complete the written survey that you may receive in the mail after your visit with us. Thank you!             Your Updated Medication List - Protect others around you: Learn how to safely use, store and throw away your medicines at www.disposemymeds.org.          This list is accurate as of 3/9/18  3:26 PM.  Always use your most recent med list.                   Brand Name Dispense Instructions for use Diagnosis    albuterol 108 (90 BASE) MCG/ACT Inhaler    PROAIR HFA/PROVENTIL HFA/VENTOLIN HFA    1 Inhaler    Inhale 2 puffs into the lungs every 4 hours as needed    Intermittent asthma, uncomplicated       amphetamine-dextroamphetamine 20 MG per tablet   Start taking on:  5/9/2018    ADDERALL    90 tablet    Take 1 tablet (20 mg) by mouth 3 times daily    Attention deficit hyperactivity disorder (ADHD), combined type       cyclobenzaprine 10 MG tablet    FLEXERIL    90 tablet    Take 1 tablet (10 mg) by mouth 3 times daily as needed for muscle spasms    Acute left-sided low back pain without sciatica       doxycycline Monohydrate 100 MG Tabs      100 mg 2 times daily    Acute left-sided low back pain without sciatica       famotidine 40 MG tablet    PEPCID    90 tablet    TAKE 1 TABLET(40 MG) BY MOUTH AT BEDTIME    Upper abdominal pain       fluticasone 110 MCG/ACT Inhaler    FLOVENT HFA    1 Inhaler    Inhale 2 puffs into the lungs 2 times daily    Mild persistent asthma without complication       Hyoscyamine  Sulfate 0.375 MG Tbcr     90 tablet    Take 1 capful by mouth 3 times daily as needed    Upper abdominal pain       oxyCODONE-acetaminophen 5-325 MG per tablet    PERCOCET    18 tablet    Take 1 tablet by mouth every 6 hours as needed for pain    Acute left-sided low back pain with left-sided sciatica       sildenafil 20 MG tablet    REVATIO    30 tablet    Take 1 tablet (20 mg) by mouth daily as needed As needed for prevention of erectile dysfunction    Drug-induced erectile dysfunction

## 2018-03-09 NOTE — PATIENT INSTRUCTIONS
1. Your breathing test was abnormal. We should get your lungs better before surgery.    2. Therefore, you should contact you surgeon's office and reschedule the surgery.    3. In the meantime continue your efforts in quitting smoking and start Singulair 10 mg daily and Flovent 2 puffs twice per day with a spacer - gargle after use.    4. Let me see you back in 1-2 weeks.

## 2018-03-10 ASSESSMENT — ASTHMA QUESTIONNAIRES: ACT_TOTALSCORE: 16

## 2018-03-11 ENCOUNTER — TRANSFERRED RECORDS (OUTPATIENT)
Dept: HEALTH INFORMATION MANAGEMENT | Facility: CLINIC | Age: 28
End: 2018-03-11

## 2018-03-12 ENCOUNTER — OFFICE VISIT (OUTPATIENT)
Dept: FAMILY MEDICINE | Facility: CLINIC | Age: 28
End: 2018-03-12
Payer: COMMERCIAL

## 2018-03-12 VITALS
DIASTOLIC BLOOD PRESSURE: 89 MMHG | WEIGHT: 238 LBS | OXYGEN SATURATION: 97 % | BODY MASS INDEX: 31.4 KG/M2 | SYSTOLIC BLOOD PRESSURE: 134 MMHG | HEART RATE: 79 BPM | TEMPERATURE: 96.6 F

## 2018-03-12 DIAGNOSIS — Z01.818 PREOP GENERAL PHYSICAL EXAM: Primary | ICD-10-CM

## 2018-03-12 DIAGNOSIS — J45.30 MILD PERSISTENT ASTHMA WITHOUT COMPLICATION: ICD-10-CM

## 2018-03-12 DIAGNOSIS — R79.89 ELEVATED SERUM CREATININE: ICD-10-CM

## 2018-03-12 DIAGNOSIS — M54.17 LUMBOSACRAL RADICULOPATHY: ICD-10-CM

## 2018-03-12 DIAGNOSIS — E78.5 DYSLIPIDEMIA: ICD-10-CM

## 2018-03-12 LAB
ANION GAP SERPL CALCULATED.3IONS-SCNC: 7 MMOL/L (ref 3–14)
BASOPHILS # BLD AUTO: 0 10E9/L (ref 0–0.2)
BASOPHILS NFR BLD AUTO: 0.5 %
BUN SERPL-MCNC: 15 MG/DL (ref 7–30)
CALCIUM SERPL-MCNC: 9.5 MG/DL (ref 8.5–10.1)
CHLORIDE SERPL-SCNC: 106 MMOL/L (ref 94–109)
CHOLEST SERPL-MCNC: 200 MG/DL
CO2 SERPL-SCNC: 25 MMOL/L (ref 20–32)
CREAT SERPL-MCNC: 1.41 MG/DL (ref 0.66–1.25)
DIFFERENTIAL METHOD BLD: NORMAL
EOSINOPHIL # BLD AUTO: 0.4 10E9/L (ref 0–0.7)
EOSINOPHIL NFR BLD AUTO: 6.7 %
ERYTHROCYTE [DISTWIDTH] IN BLOOD BY AUTOMATED COUNT: 12.7 % (ref 10–15)
FEF 25/75: NORMAL
FEF 25/75: NORMAL
FEV-1: NORMAL
FEV-1: NORMAL
FEV1/FVC: NORMAL
FEV1/FVC: NORMAL
FVC: NORMAL
FVC: NORMAL
GFR SERPL CREATININE-BSD FRML MDRD: 60 ML/MIN/1.7M2
GLUCOSE SERPL-MCNC: 93 MG/DL (ref 70–99)
HCT VFR BLD AUTO: 43.6 % (ref 40–53)
HDLC SERPL-MCNC: 29 MG/DL
HGB BLD-MCNC: 15.2 G/DL (ref 13.3–17.7)
LDLC SERPL CALC-MCNC: 125 MG/DL
LYMPHOCYTES # BLD AUTO: 2.7 10E9/L (ref 0.8–5.3)
LYMPHOCYTES NFR BLD AUTO: 45.2 %
MCH RBC QN AUTO: 28.8 PG (ref 26.5–33)
MCHC RBC AUTO-ENTMCNC: 34.9 G/DL (ref 31.5–36.5)
MCV RBC AUTO: 83 FL (ref 78–100)
MONOCYTES # BLD AUTO: 0.3 10E9/L (ref 0–1.3)
MONOCYTES NFR BLD AUTO: 5.4 %
NEUTROPHILS # BLD AUTO: 2.5 10E9/L (ref 1.6–8.3)
NEUTROPHILS NFR BLD AUTO: 42.2 %
NONHDLC SERPL-MCNC: 171 MG/DL
PLATELET # BLD AUTO: 237 10E9/L (ref 150–450)
POTASSIUM SERPL-SCNC: 4 MMOL/L (ref 3.4–5.3)
RBC # BLD AUTO: 5.28 10E12/L (ref 4.4–5.9)
SODIUM SERPL-SCNC: 138 MMOL/L (ref 133–144)
TRIGL SERPL-MCNC: 230 MG/DL
WBC # BLD AUTO: 6 10E9/L (ref 4–11)

## 2018-03-12 PROCEDURE — 85025 COMPLETE CBC W/AUTO DIFF WBC: CPT | Performed by: FAMILY MEDICINE

## 2018-03-12 PROCEDURE — 80048 BASIC METABOLIC PNL TOTAL CA: CPT | Performed by: FAMILY MEDICINE

## 2018-03-12 PROCEDURE — 80061 LIPID PANEL: CPT | Performed by: FAMILY MEDICINE

## 2018-03-12 PROCEDURE — 36415 COLL VENOUS BLD VENIPUNCTURE: CPT | Performed by: FAMILY MEDICINE

## 2018-03-12 PROCEDURE — 94010 BREATHING CAPACITY TEST: CPT | Performed by: FAMILY MEDICINE

## 2018-03-12 PROCEDURE — 99215 OFFICE O/P EST HI 40 MIN: CPT | Mod: 25 | Performed by: FAMILY MEDICINE

## 2018-03-12 RX ORDER — FLUTICASONE PROPIONATE 220 UG/1
2 AEROSOL, METERED RESPIRATORY (INHALATION) 2 TIMES DAILY
Qty: 1 INHALER | Refills: 3 | Status: SHIPPED | OUTPATIENT
Start: 2018-03-12 | End: 2023-10-05

## 2018-03-12 RX ORDER — MONTELUKAST SODIUM 10 MG/1
10 TABLET ORAL AT BEDTIME
Qty: 90 TABLET | Refills: 0 | Status: SHIPPED | OUTPATIENT
Start: 2018-03-12 | End: 2024-09-20

## 2018-03-12 NOTE — MR AVS SNAPSHOT
After Visit Summary   3/12/2018    Abimael Martinez    MRN: [id number]           Patient Information     Date Of Birth          1990        Visit Information        Provider Department      3/12/2018 11:10 AM Rodríguez Doyle MD [facility]        Today's Diagnoses     Preop general physical exam    -  1    Lumbosacral radiculopathy        Mild persistent asthma without complication          Care Instructions    1. Your breathing test was abnormal. We should get your lungs better before surgery.    2. Therefore, you should contact you surgeon's office and reschedule the surgery.    3. In the meantime continue your efforts in quitting smoking and start Singulair 10 mg daily and Flovent 2 puffs twice per day with a spacer - gargle after use.    4. Let me see you back in 1-2 weeks.          Follow-ups after your visit        Who to contact     If you have questions or need follow up information about today's clinic visit or your schedule please contact [facility] directly at [phone].  Normal or non-critical lab and imaging results will be communicated to you by [employer], letter or phone within 4 business days after the clinic has received the results. If you do not hear from us within 7 days, please contact the clinic through [employer] or phone. If you have a critical or abnormal lab result, we will notify you by phone as soon as possible.  Submit refill requests through [employer] or call your pharmacy and they will forward the refill request to us. Please allow 3 business days for your refill to be completed.          Additional Information About Your Visit        [employer] Information     [employer] gives you secure access to your electronic health record. If you see a primary care provider, you can also send messages to your care team and make appointments. If you have questions, please call your primary care clinic.  If you do not have a primary care provider, please call  [phone] and they will assist you.        Care EveryWhere ID     This is your Care EveryWhere ID. This could be used by other organizations to access your [place] medical records  [id number]        Your Vitals Were     Pulse Temperature Pulse Oximetry BMI (Body Mass Index)          79 96.6  F (35.9  C) (Oral) 97% 31.4 kg/m2         Blood Pressure from Last 3 Encounters:   03/12/18 134/89   03/09/18 130/83   01/31/18 (!) 153/95    Weight from Last 3 Encounters:   03/12/18 238 lb (108 kg)   03/09/18 244 lb (110.7 kg)   01/31/18 246 lb (111.6 kg)              We Performed the Following     Basic metabolic panel     CBC with platelets differential     Spirometry, Breathing Capacity     Spirometry, Breathing Capacity          Today's Medication Changes          These changes are accurate as of 3/12/18 12:25 PM.  If you have any questions, ask your nurse or doctor.               Start taking these medicines.        Dose/Directions    montelukast 10 MG tablet   Commonly known as:  SINGULAIR   Used for:  Mild persistent asthma without complication   Started by:  Rodríguez Doyle MD        Dose:  10 mg   Take 1 tablet (10 mg) by mouth At Bedtime   Quantity:  90 tablet   Refills:  0         These medicines have changed or have updated prescriptions.        Dose/Directions    * fluticasone 110 MCG/ACT Inhaler   Commonly known as:  FLOVENT HFA   This may have changed:  Another medication with the same name was added. Make sure you understand how and when to take each.   Used for:  Mild persistent asthma without complication   Changed by:  Rodríguez Doyle MD        Dose:  2 puff   Inhale 2 puffs into the lungs 2 times daily   Quantity:  1 Inhaler   Refills:  1       * fluticasone 220 MCG/ACT Inhaler   Commonly known as:  FLOVENT HFA   This may have changed:  You were already taking a medication with the same name, and this prescription was added. Make sure you understand how and when to take each.   Used for:  Mild  persistent asthma without complication   Changed by:  Rodríguez Doyle MD        Dose:  2 puff   Inhale 2 puffs into the lungs 2 times daily Please give patient spacer   Quantity:  1 Inhaler   Refills:  3       * Notice:  This list has 2 medication(s) that are the same as other medications prescribed for you. Read the directions carefully, and ask your doctor or other care provider to review them with you.         Where to get your medicines      These medications were sent to [facility] [address] [place] AT [facility]  [id number] [place] [address] , [facility] [address]     Phone:  [phone]     fluticasone 220 MCG/ACT Inhaler    montelukast 10 MG tablet                Primary Care Provider Office Phone # Fax #    Jamison Lora [phone] [address] [facility] [address]        Equal Access to Services     [facility]: Hadii aad ku hadasho Soomaali, waaxda luqadaha, qaybta kaalmada adeegyada, waxay sandipin hayaan anthony luna . So [facility] [phone].    ATENCIÓN: Si habla español, tiene a salazar disposición servicios gratuitos de asistencia lingüística. Tiffanie al [phone].    We comply with applicable federal civil rights laws and [place] laws. We do not discriminate on the basis of race, color, national origin, age, disability, sex, sexual orientation, or gender identity.            Thank you!     Thank you for choosing [facility]  for your care. Our goal is always to provide you with excellent care. Hearing back from our patients is one way we can continue to improve our services. Please take a few minutes to complete the written survey that you may receive in the mail after your visit with us. Thank you!             Your Updated Medication List - Protect others around you: Learn how to safely use, store and throw away your medicines at www.disposemymeds.org.          This list is accurate as of 3/12/18 12:25  PM.  Always use your most recent med list.                   Brand Name Dispense Instructions for use Diagnosis    albuterol 108 (90 BASE) MCG/ACT Inhaler    PROAIR HFA/PROVENTIL HFA/VENTOLIN HFA    1 Inhaler    Inhale 2 puffs into the lungs every 4 hours as needed    Intermittent asthma, uncomplicated       amphetamine-dextroamphetamine 20 MG per tablet   Start taking on:  5/9/2018    ADDERALL    90 tablet    Take 1 tablet (20 mg) by mouth 3 times daily    Attention deficit hyperactivity disorder (ADHD), combined type       cyclobenzaprine 10 MG tablet    FLEXERIL    90 tablet    Take 1 tablet (10 mg) by mouth 3 times daily as needed for muscle spasms    Acute left-sided low back pain without sciatica       doxycycline Monohydrate 100 MG Tabs      100 mg 2 times daily    Acute left-sided low back pain without sciatica       famotidine 40 MG tablet    PEPCID    90 tablet    TAKE 1 TABLET(40 MG) BY MOUTH AT BEDTIME    Upper abdominal pain       * fluticasone 110 MCG/ACT Inhaler    FLOVENT HFA    1 Inhaler    Inhale 2 puffs into the lungs 2 times daily    Mild persistent asthma without complication       * fluticasone 220 MCG/ACT Inhaler    FLOVENT HFA    1 Inhaler    Inhale 2 puffs into the lungs 2 times daily Please give patient spacer    Mild persistent asthma without complication       Hyoscyamine Sulfate 0.375 MG Tbcr     90 tablet    Take 1 capful by mouth 3 times daily as needed    Upper abdominal pain       montelukast 10 MG tablet    SINGULAIR    90 tablet    Take 1 tablet (10 mg) by mouth At Bedtime    Mild persistent asthma without complication       oxyCODONE-acetaminophen 5-325 MG per tablet    PERCOCET    18 tablet    Take 1 tablet by mouth every 6 hours as needed for pain    Acute left-sided low back pain with left-sided sciatica       sildenafil 20 MG tablet    REVATIO    30 tablet    Take 1 tablet (20 mg) by mouth daily as needed As needed for prevention of erectile dysfunction    Drug-induced  erectile dysfunction       * Notice:  This list has 2 medication(s) that are the same as other medications prescribed for you. Read the directions carefully, and ask your doctor or other care provider to review them with you.

## 2018-03-14 ENCOUNTER — MYC MEDICAL ADVICE (OUTPATIENT)
Dept: FAMILY MEDICINE | Facility: CLINIC | Age: 28
End: 2018-03-14

## 2018-03-14 NOTE — PROGRESS NOTES
Jonas Varghese,    Your cholesterol was a bit high but you don't need medications. Just diet and exercise and recheck in one year.    Your kidney function is reduced which is a bit worse than 4 years ago. I don't know yet what the problem is but I would ask that you stop using any NSAIDs like Ibuprofen, Naproxen etc as these can affect your kidney function.    When you come in to see me for your asthma follow-up we can recheck the kidney function.    Regards,    Rodríguez Doyle M.D.

## 2018-03-14 NOTE — PROGRESS NOTES
HPI:    Abimael is a 28 year old male here to discuss:    This is an addendum to a pre-op that I did on 3/12/18.    Lumbosacral radiculopathy - he has had longstanding low back pain with radiation to the legs.   Evaluation and treatment:    He was scheduled for lumbar microdiscectomy and bilateral decompression on 3/15/18.   Due to uncontrolled asthma we cancelled it.   Today his asthma is fine (see below).   I asked him to schedule his surgery again.     Asthma - he had wheezing daily but not since he quit on 3/12/18. He had shortness of breath that requires Albuterol inhaler about 2-3 times per month but not since he quit smoking. Triggers for asthma are cold air, smoking, URI's and allergies. He has not been to ED or Hospitalized in past 12 months due to asthma.  Evaluation and treatment:   Based on the below he would be categorized as at least mild persistent.   ACT now normal as below.   Spirometry today is normal.    I commended him for quitting smoking.    I asked him to continue Singulair 10 mg qd.   Flovent 220 with spacer 2 puffs bid - gargle after each use.   He can probably stop the Flovent a few weeks after his surgery.    ACT Total Scores 3/19/2018   ACT TOTAL SCORE -   ASTHMA ER VISITS -   ASTHMA HOSPITALIZATIONS -   ACT TOTAL SCORE (Goal Greater than or Equal to 20) 23   In the past 12 months, how many times did you visit the emergency room for your asthma without being admitted to the hospital? 0   In the past 12 months, how many times were you hospitalized overnight because of your asthma? 0     [facility]     Health Care Guidelines          Step 2: Mild Persistent    symptoms > 2 times a week but < 1 time a day    exacerbations may affect activity    nighttime symptoms > 2 times a month    FEV1 or PEF > 80 percent predicted and PEF variability 20-30 percent      AYALA and OCD -    Evaluation and treatment:    He stopped taking Zoloft and Xanax due to side  effects - he feels fine without them.    Dyslipidemia - No history of CAD, CVA, PAD or diabetes.   Evaluation and treatment:    No need for statins.   Diet and exercise discussed.    Recent Labs   Lab Test  03/12/18   1145  03/06/14   1218  11/19/10   0951   CHOL  200*  228*  210*   HDL  29*  42  52   LDL  125*  143*  131*   TRIG  230*  215*  135   CHOLHDLRATIO   --   5.5*  4.0     Elevated creatinine -   Evaluation and treatment:    Creat was 1.3 in 2014 and 1.41 3/12/18.    Creat today 1.15, normal u/a and urine albumin.   No further evaluation is needed.    Creatinine   Result Value Ref Range    Creatinine 1.15 0.66 - 1.25 mg/dL    GFR Estimate 76 >60 mL/min/1.7m2    GFR Estimate If Black >90 >60 mL/min/1.7m2   Albumin Random Urine Quantitative with Creat Ratio   Result Value Ref Range    Creatinine Urine 4 mg/dL    Albumin Urine mg/L <5 mg/L    Albumin Urine mg/g Cr Unable to calculate due to low value 0 - 17 mg/g Cr   UA reflex to Microscopic and Culture   Result Value Ref Range    Color Urine Yellow     Appearance Urine Clear     Glucose Urine Negative NEG^Negative mg/dL    Bilirubin Urine Negative NEG^Negative    Ketones Urine Trace (A) NEG^Negative mg/dL    Specific Gravity Urine 1.015 1.003 - 1.035    Blood Urine Negative NEG^Negative    pH Urine 6.0 5.0 - 7.0 pH    Protein Albumin Urine Negative NEG^Negative mg/dL    Urobilinogen Urine 0.2 0.2 - 1.0 EU/dL    Nitrite Urine Negative NEG^Negative    Leukocyte Esterase Urine Negative NEG^Negative    Source Midstream Urine        MEDICAL HISTORY:     Patient Active Problem List    Diagnosis Date Noted     Lumbosacral radiculopathy 03/12/2018     Priority: Medium     Dyslipidemia 03/12/2018     Priority: Medium     Elevated serum creatinine 03/12/2018     Priority: Medium     Mild persistent asthma without complication 07/31/2017     Priority: Medium     Drug-induced erectile dysfunction 11/07/2016     Priority: Medium     OCD (obsessive compulsive disorder)  09/16/2016     Priority: Medium     AYALA (generalized anxiety disorder) 12/04/2015     Priority: Medium     Patient is followed by MICHAEL OROPEZA for ongoing prescription of benzodiazepines.  All refills should be approved by this provider, or covering partner.    Medication(s): alprazolam 2 mg .   Maximum quantity per month: 36  Clinic visit frequency required: Q 6  months     Controlled substance agreement on file: No  Benzodiazepine use reviewed by psychiatry:  No    Last [facility] website verification:  none   [email or web address]           Attention deficit hyperactivity disorder (ADHD), combined type 10/14/2015     Priority: Medium     Patient is followed by MICHAEL OROPEZA for ongoing prescription of stimulants.  All refills should be approved by this provider, or covering partner.    Medication(s): adderall 20 mg.   Maximum quantity per month: 90  Clinic visit frequency required: Q 6  months     Controlled substance agreement on file: 07/31/17    Neuropsych evaluation for ADD completed:  Yes, completed 7-2008 at [facility], on file and diagnosis confirmed    Last [facility] website verification:  3/14/16, no concerns   [email or web address]           Obesity, Class I, BMI 30-34.9 02/20/2015     Priority: Medium     Internal hemorrhoids which bleed 10/02/2014     Priority: Medium     Hypertension goal BP (blood pressure) < 140/90 08/17/2014     Priority: Medium     Low back pain 04/07/2013     Priority: Medium     Tobacco use disorder 03/19/2012     Priority: Medium     High risk sexual behavior 11/06/2011     Priority: Medium     CARDIOVASCULAR SCREENING; LDL GOAL LESS THAN 160 05/24/2011     Priority: Medium     Anxiety      Priority: Medium      Past Medical History:   Diagnosis Date     Acute right otitis media 1/8/2013     Anxiety      Depression      Drug abuse      Urethritis 5/20/2013     Problem list name updated by automated process. Provider to review     Past Surgical History:   Procedure  Laterality Date     NO HISTORY OF SURGERY       Current Outpatient Prescriptions   Medication Sig Dispense Refill     ALPRAZolam (XANAX) 2 MG tablet Take 1 tablet (2 mg) by mouth 3 times daily as needed for anxiety 30 tablet 0     fluticasone (FLOVENT HFA) 220 MCG/ACT Inhaler Inhale 2 puffs into the lungs 2 times daily Please give patient spacer 1 Inhaler 3     montelukast (SINGULAIR) 10 MG tablet Take 1 tablet (10 mg) by mouth At Bedtime 90 tablet 0     [START ON 5/9/2018] amphetamine-dextroamphetamine (ADDERALL) 20 MG per tablet Take 1 tablet (20 mg) by mouth 3 times daily 90 tablet 0     albuterol (PROAIR HFA/PROVENTIL HFA/VENTOLIN HFA) 108 (90 BASE) MCG/ACT Inhaler Inhale 2 puffs into the lungs every 4 hours as needed 1 Inhaler 0     cyclobenzaprine (FLEXERIL) 10 MG tablet Take 1 tablet (10 mg) by mouth 3 times daily as needed for muscle spasms 90 tablet 1     famotidine (PEPCID) 40 MG tablet TAKE 1 TABLET(40 MG) BY MOUTH AT BEDTIME 90 tablet 0     fluticasone (FLOVENT HFA) 110 MCG/ACT Inhaler Inhale 2 puffs into the lungs 2 times daily 1 Inhaler 1     sildenafil (REVATIO/VIAGRA) 20 MG tablet Take 1 tablet (20 mg) by mouth daily as needed As needed for prevention of erectile dysfunction 30 tablet 1     Hyoscyamine Sulfate 0.375 MG TBCR Take 1 capful by mouth 3 times daily as needed 90 tablet 1     oxyCODONE-acetaminophen (PERCOCET) 5-325 MG per tablet Take 1 tablet by mouth every 6 hours as needed for pain (Patient not taking: Reported on 3/19/2018) 18 tablet 0     OTC products: None, except as noted above    Allergies   Allergen Reactions     Cymbalta      Weight gain and fatigue     Paxil [Paroxetine Mesylate]      Weight gain and fatigue     Vicodin [Hydrocodone-Acetaminophen]       Latex Allergy: NO    Social History   Substance Use Topics     Smoking status: Current Some Day Smoker     Packs/day: 0.50     Years: 6.00     Types: Cigarettes     Last attempt to quit: 3/25/2013     Smokeless tobacco: Never Used       Comment: second hand smoke exposure     Alcohol use Yes      Comment: once a week     History   Drug Use     Yes     Comment: pot once a month, ectasy  As of 11/7/2016 he only smokes pot occasionally       REVIEW OF SYSTEMS:   CONSTITUTIONAL: NEGATIVE for fever, chills, change in weight  INTEGUMENTARY/SKIN: NEGATIVE for worrisome rashes, moles or lesions  EYES: NEGATIVE for vision changes or irritation  ENT/MOUTH: NEGATIVE for ear, mouth and throat problems  RESP: per HPI  BREAST: NEGATIVE for masses, tenderness or discharge  CV: NEGATIVE for chest pain, palpitations or peripheral edema  GI: NEGATIVE for nausea, abdominal pain, heartburn, or change in bowel habits  : NEGATIVE for frequency, dysuria, or hematuria  MUSCULOSKELETAL: NEGATIVE for significant arthralgias or myalgia  NEURO: NEGATIVE for weakness, dizziness or paresthesias  ENDOCRINE: NEGATIVE for temperature intolerance, skin/hair changes  HEME: NEGATIVE for bleeding problems  PSYCHIATRIC: NEGATIVE for changes in mood or affect    EXAM:   /82  Pulse 79  Temp 96.6  F (35.9  C) (Oral)  Wt 238 lb (108 kg)  SpO2 99%  BMI 31.4 kg/m2    GENERAL APPEARANCE: healthy, alert and no distress     EYES: EOMI,  PERRL     HENT: ear canals and TM's normal and nose and mouth without ulcers or lesions     NECK: no adenopathy, no asymmetry, masses, or scars and thyroid normal to palpation     RESP: good air movement. Wheezing and rhonchi resolved. No crackles. O2 sat noted. No tachypnea.      CV: regular rates and rhythm, normal S1 S2, no S3 or S4 and no murmur, click or rub     ABDOMEN:  soft, nontender, no HSM or masses and bowel sounds normal     MS: extremities normal- no gross deformities noted, no evidence of inflammation in joints, FROM in all extremities.     SKIN: no suspicious lesions or rashes     NEURO: Normal strength and tone, sensory exam grossly normal, mentation intact and speech normal     PSYCH: mentation appears normal. and affect  normal/bright     LYMPHATICS: No cervical adenopathy    DIAGNOSTICS:         Results for orders placed or performed in visit on 03/19/18   Spirometry, Breathing Capacity   Result Value Ref Range    FEV-1      FVC      FEV1/FVC      FEF 25/75     Creatinine   Result Value Ref Range    Creatinine 1.15 0.66 - 1.25 mg/dL    GFR Estimate 76 >60 mL/min/1.7m2    GFR Estimate If Black >90 >60 mL/min/1.7m2   Albumin Random Urine Quantitative with Creat Ratio   Result Value Ref Range    Creatinine Urine 4 mg/dL    Albumin Urine mg/L <5 mg/L    Albumin Urine mg/g Cr Unable to calculate due to low value 0 - 17 mg/g Cr   UA reflex to Microscopic and Culture   Result Value Ref Range    Color Urine Yellow     Appearance Urine Clear     Glucose Urine Negative NEG^Negative mg/dL    Bilirubin Urine Negative NEG^Negative    Ketones Urine Trace (A) NEG^Negative mg/dL    Specific Gravity Urine 1.015 1.003 - 1.035    Blood Urine Negative NEG^Negative    pH Urine 6.0 5.0 - 7.0 pH    Protein Albumin Urine Negative NEG^Negative mg/dL    Urobilinogen Urine 0.2 0.2 - 1.0 EU/dL    Nitrite Urine Negative NEG^Negative    Leukocyte Esterase Urine Negative NEG^Negative    Source Midstream Urine        Last Basic Metabolic Panel:  Lab Results   Component Value Date     03/12/2018      Lab Results   Component Value Date    POTASSIUM 4.0 03/12/2018     Lab Results   Component Value Date    CHLORIDE 106 03/12/2018     Lab Results   Component Value Date    LAUREN 9.5 03/12/2018     Lab Results   Component Value Date    CO2 25 03/12/2018     Lab Results   Component Value Date    BUN 15 03/12/2018     Lab Results   Component Value Date    CR 1.15 03/19/2018     Lab Results   Component Value Date    GLC 93 03/12/2018     CBC RESULTS:   Recent Labs   Lab Test  03/12/18   1145   WBC  6.0   RBC  5.28   HGB  15.2   HCT  43.6   MCV  83   MCH  28.8   MCHC  34.9   RDW  12.7   PLT  237       IMPRESSION:   Reason for surgery/procedure: lumbosacral  radiculopathy    The proposed surgical procedure is considered INTERMEDIATE risk.    REVISED CARDIAC RISK INDEX  The patient has the following serious cardiovascular risks for perioperative complications such as (MI, PE, VFib and 3  AV Block):  No serious cardiac risks  INTERPRETATION: 0 risks: Class I (very low risk - 0.4% complication rate)    The patient has the following additional risks for perioperative complications:    Per [facility]    RECOMMENDATIONS:       Assessment and Plan - Decision Making    1. Mild persistent asthma without complication  Per HPI  - Spirometry, Breathing Capacity    2. Elevated serum creatinine  Per HPI  - Creatinine  - Albumin Random Urine Quantitative with Creat Ratio  - UA reflex to Microscopic and Culture      Written instructions given as follows:    Patient Instructions   1. Your breathing test was normal. I am proud of you for quitting smoking! Don't go back to it!    2. Don't take Ibuprofen, Naproxen, Aspirin and Fish Oil one week before surgery.    3. Don't take any non-essential medications on the day of surgery.    4. Keep taking your other medications.    5. I will contact you with the results of your tests.    6. Otherwise follow-up with Dr. Lora for your routine refills.

## 2018-03-15 ENCOUNTER — TELEPHONE (OUTPATIENT)
Dept: FAMILY MEDICINE | Facility: CLINIC | Age: 28
End: 2018-03-15

## 2018-03-15 DIAGNOSIS — F41.9 ANXIETY: ICD-10-CM

## 2018-03-15 NOTE — TELEPHONE ENCOUNTER
Controlled Substance Refill Request for Alprazolam  Problem List Complete:  Yes  Medication(s): alprazolam 2 mg .   Maximum quantity per month: 36  Clinic visit frequency required: Q 6  months     Controlled substance agreement on file: No  Benzodiazepine use reviewed by psychiatry:  No     checked in past 6 months?  Yes last dispensed 2/19/18     Poonam GONZALEZ, RN, CPN

## 2018-03-16 RX ORDER — ALPRAZOLAM 2 MG
2 TABLET ORAL 3 TIMES DAILY PRN
Qty: 30 TABLET | Refills: 0 | Status: SHIPPED | OUTPATIENT
Start: 2018-03-16 | End: 2018-04-16

## 2018-03-19 ENCOUNTER — TRANSFERRED RECORDS (OUTPATIENT)
Dept: HEALTH INFORMATION MANAGEMENT | Facility: CLINIC | Age: 28
End: 2018-03-19

## 2018-03-19 ENCOUNTER — OFFICE VISIT (OUTPATIENT)
Dept: FAMILY MEDICINE | Facility: CLINIC | Age: 28
End: 2018-03-19
Payer: COMMERCIAL

## 2018-03-19 DIAGNOSIS — J45.30 MILD PERSISTENT ASTHMA WITHOUT COMPLICATION: Primary | ICD-10-CM

## 2018-03-19 DIAGNOSIS — R79.89 ELEVATED SERUM CREATININE: ICD-10-CM

## 2018-03-19 LAB
ALBUMIN UR-MCNC: NEGATIVE MG/DL
APPEARANCE UR: CLEAR
BILIRUB UR QL STRIP: NEGATIVE
COLOR UR AUTO: YELLOW
CREAT SERPL-MCNC: 1.15 MG/DL (ref 0.66–1.25)
CREAT UR-MCNC: 4 MG/DL
FEF 25/75: NORMAL
FEV-1: NORMAL
FEV1/FVC: NORMAL
FVC: NORMAL
GFR SERPL CREATININE-BSD FRML MDRD: 76 ML/MIN/1.7M2
GLUCOSE UR STRIP-MCNC: NEGATIVE MG/DL
HGB UR QL STRIP: NEGATIVE
KETONES UR STRIP-MCNC: ABNORMAL MG/DL
LEUKOCYTE ESTERASE UR QL STRIP: NEGATIVE
MICROALBUMIN UR-MCNC: <5 MG/L
MICROALBUMIN/CREAT UR: NORMAL MG/G CR (ref 0–17)
NITRATE UR QL: NEGATIVE
PH UR STRIP: 6 PH (ref 5–7)
SOURCE: ABNORMAL
SP GR UR STRIP: 1.01 (ref 1–1.03)
UROBILINOGEN UR STRIP-ACNC: 0.2 EU/DL (ref 0.2–1)

## 2018-03-19 PROCEDURE — 81003 URINALYSIS AUTO W/O SCOPE: CPT | Performed by: FAMILY MEDICINE

## 2018-03-19 PROCEDURE — 82565 ASSAY OF CREATININE: CPT | Performed by: FAMILY MEDICINE

## 2018-03-19 PROCEDURE — 99214 OFFICE O/P EST MOD 30 MIN: CPT | Mod: 25 | Performed by: FAMILY MEDICINE

## 2018-03-19 PROCEDURE — 36415 COLL VENOUS BLD VENIPUNCTURE: CPT | Performed by: FAMILY MEDICINE

## 2018-03-19 PROCEDURE — 82043 UR ALBUMIN QUANTITATIVE: CPT | Performed by: FAMILY MEDICINE

## 2018-03-19 PROCEDURE — 94010 BREATHING CAPACITY TEST: CPT | Performed by: FAMILY MEDICINE

## 2018-03-19 ASSESSMENT — PAIN SCALES - GENERAL: PAINLEVEL: EXTREME PAIN (8)

## 2018-03-19 NOTE — PATIENT INSTRUCTIONS
1. Your breathing test was normal. I am proud of you for quitting smoking! Don't go back to it!    2. Don't take Ibuprofen, Naproxen, Aspirin and Fish Oil one week before surgery.    3. Don't take any non-essential medications on the day of surgery.    4. Keep taking your other medications.    5. I will contact you with the results of your tests.    6. Otherwise follow-up with Dr. Lora for your routine refills.

## 2018-03-19 NOTE — MR AVS SNAPSHOT
After Visit Summary   3/19/2018    Abimael Martinez    MRN: [id number]           Patient Information     Date Of Birth          1990        Visit Information        Provider Department      3/19/2018 2:50 PM Rodríguez Doyle MD [facility]        Today's Diagnoses     Mild persistent asthma without complication    -  1    Elevated serum creatinine          Care Instructions    1. Your breathing test was normal. I am proud of you for quitting smoking! Don't go back to it!    2. Don't take Ibuprofen, Naproxen, Aspirin and Fish Oil one week before surgery.    3. Don't take any non-essential medications on the day of surgery.    4. Keep taking your other medications.    5. I will contact you with the results of your tests.    6. Otherwise follow-up with Dr. Lora for your routine refills.          Follow-ups after your visit        Who to contact     If you have questions or need follow up information about today's clinic visit or your schedule please contact [facility] directly at [phone].  Normal or non-critical lab and imaging results will be communicated to you by DropThoughthart, letter or phone within 4 business days after the clinic has received the results. If you do not hear from us within 7 days, please contact the clinic through kWhOURSt or phone. If you have a critical or abnormal lab result, we will notify you by phone as soon as possible.  Submit refill requests through GPMESS or call your pharmacy and they will forward the refill request to us. Please allow 3 business days for your refill to be completed.          Additional Information About Your Visit        DropThoughthart Information     GPMESS gives you secure access to your electronic health record. If you see a primary care provider, you can also send messages to your care team and make appointments. If you have questions, please call your primary care clinic.  If you do not have a primary care provider,  please call [phone] and they will assist you.        Care EveryWhere ID     This is your Care EveryWhere ID. This could be used by other organizations to access your [facility] medical records  [id number]        Your Vitals Were     Pulse Temperature Pulse Oximetry BMI (Body Mass Index)          79 96.6  F (35.9  C) (Oral) 99% 31.4 kg/m2         Blood Pressure from Last 3 Encounters:   03/19/18 (!) 140/91   03/12/18 134/89   03/09/18 130/83    Weight from Last 3 Encounters:   03/19/18 238 lb (108 kg)   03/12/18 238 lb (108 kg)   03/09/18 244 lb (110.7 kg)              We Performed the Following     Albumin Random Urine Quantitative with Creat Ratio     Creatinine     Spirometry, Breathing Capacity     UA reflex to Microscopic and Culture        Primary Care Provider Office Phone # Fax #    Jamison Lora [phone] [address] [facility] [address]        Equal Access to Services     MIC HULL : Hadii aad ku hadasho Soomaali, waaxda luqadaha, qaybta kaalmada adeegyada, waxay idiin hayaan garyeg aleksanderarajassi luna . So [facility] [phone].    ATENCIÓN: Si habla español, tiene a salazar disposición servicios gratuitos de asistencia lingüística. Llame al [phone].    We comply with applicable federal civil rights laws and [place] laws. We do not discriminate on the basis of race, color, national origin, age, disability, sex, sexual orientation, or gender identity.            Thank you!     Thank you for choosing [facility]  for your care. Our goal is always to provide you with excellent care. Hearing back from our patients is one way we can continue to improve our services. Please take a few minutes to complete the written survey that you may receive in the mail after your visit with us. Thank you!             Your Updated Medication List - Protect others around you: Learn how to safely use, store and throw away your medicines at www.disposemymeds.org.          This list  is accurate as of 3/19/18  3:32 PM.  Always use your most recent med list.                   Brand Name Dispense Instructions for use Diagnosis    albuterol 108 (90 BASE) MCG/ACT Inhaler    PROAIR HFA/PROVENTIL HFA/VENTOLIN HFA    1 Inhaler    Inhale 2 puffs into the lungs every 4 hours as needed    Intermittent asthma, uncomplicated       ALPRAZolam 2 MG tablet    XANAX    30 tablet    Take 1 tablet (2 mg) by mouth 3 times daily as needed for anxiety    Anxiety       amphetamine-dextroamphetamine 20 MG per tablet   Start taking on:  5/9/2018    ADDERALL    90 tablet    Take 1 tablet (20 mg) by mouth 3 times daily    Attention deficit hyperactivity disorder (ADHD), combined type       cyclobenzaprine 10 MG tablet    FLEXERIL    90 tablet    Take 1 tablet (10 mg) by mouth 3 times daily as needed for muscle spasms    Acute left-sided low back pain without sciatica       famotidine 40 MG tablet    PEPCID    90 tablet    TAKE 1 TABLET(40 MG) BY MOUTH AT BEDTIME    Upper abdominal pain       * fluticasone 110 MCG/ACT Inhaler    FLOVENT HFA    1 Inhaler    Inhale 2 puffs into the lungs 2 times daily    Mild persistent asthma without complication       * fluticasone 220 MCG/ACT Inhaler    FLOVENT HFA    1 Inhaler    Inhale 2 puffs into the lungs 2 times daily Please give patient spacer    Mild persistent asthma without complication       Hyoscyamine Sulfate 0.375 MG Tbcr     90 tablet    Take 1 capful by mouth 3 times daily as needed    Upper abdominal pain       montelukast 10 MG tablet    SINGULAIR    90 tablet    Take 1 tablet (10 mg) by mouth At Bedtime    Mild persistent asthma without complication       oxyCODONE-acetaminophen 5-325 MG per tablet    PERCOCET    18 tablet    Take 1 tablet by mouth every 6 hours as needed for pain    Acute left-sided low back pain with left-sided sciatica       sildenafil 20 MG tablet    REVATIO    30 tablet    Take 1 tablet (20 mg) by mouth daily as needed As needed for prevention of  erectile dysfunction    Drug-induced erectile dysfunction       * Notice:  This list has 2 medication(s) that are the same as other medications prescribed for you. Read the directions carefully, and ask your doctor or other care provider to review them with you.

## 2018-03-19 NOTE — NURSING NOTE
"Chief Complaint   Patient presents with     Asthma     Other     recheck kidney function       Initial BP (!) 140/91  Pulse 79  Temp 96.6  F (35.9  C) (Oral)  Wt 238 lb (108 kg)  SpO2 99%  BMI 31.4 kg/m2 Estimated body mass index is 31.4 kg/(m^2) as calculated from the following:    Height as of 3/9/18: 6' 1\" (1.854 m).    Weight as of this encounter: 238 lb (108 kg).  Medication Reconciliation: complete  Maya Kramer M.A.    "

## 2018-03-20 VITALS
SYSTOLIC BLOOD PRESSURE: 134 MMHG | WEIGHT: 238 LBS | OXYGEN SATURATION: 99 % | DIASTOLIC BLOOD PRESSURE: 82 MMHG | HEART RATE: 79 BPM | BODY MASS INDEX: 31.4 KG/M2 | TEMPERATURE: 96.6 F

## 2018-03-20 ASSESSMENT — ASTHMA QUESTIONNAIRES: ACT_TOTALSCORE: 23

## 2018-04-02 ENCOUNTER — TRANSFERRED RECORDS (OUTPATIENT)
Dept: HEALTH INFORMATION MANAGEMENT | Facility: CLINIC | Age: 28
End: 2018-04-02

## 2018-04-05 ENCOUNTER — TRANSFERRED RECORDS (OUTPATIENT)
Dept: HEALTH INFORMATION MANAGEMENT | Facility: CLINIC | Age: 28
End: 2018-04-05

## 2018-04-16 ENCOUNTER — TELEPHONE (OUTPATIENT)
Dept: FAMILY MEDICINE | Facility: CLINIC | Age: 28
End: 2018-04-16

## 2018-04-16 DIAGNOSIS — F41.9 ANXIETY: ICD-10-CM

## 2018-04-16 RX ORDER — ALPRAZOLAM 2 MG
2 TABLET ORAL 3 TIMES DAILY PRN
Qty: 30 TABLET | Refills: 0 | Status: SHIPPED | OUTPATIENT
Start: 2018-04-16 | End: 2018-05-18

## 2018-04-16 NOTE — TELEPHONE ENCOUNTER
Patient is requesting a refill for amphetamine-dextroamphetamine   Please call when this is ready at the   Thank you

## 2018-04-16 NOTE — TELEPHONE ENCOUNTER
Last office visit 3/9/18 with Anup Sevilla PA-C  Last refilled 3/16/18    Controlled Substance Refill Request for Alprazolam  Problem List Complete:  Yes  Medication(s): alprazolam 2 mg .   Maximum quantity per month: 36  Clinic visit frequency required: Q 6  months      Controlled substance agreement on file: No  Benzodiazepine use reviewed by psychiatry:  No      checked in past 6 months?  Yes last dispensed 2/19/18   Jasmin Lauren RN

## 2018-04-19 ENCOUNTER — TRANSFERRED RECORDS (OUTPATIENT)
Dept: HEALTH INFORMATION MANAGEMENT | Facility: CLINIC | Age: 28
End: 2018-04-19

## 2018-05-18 ENCOUNTER — OFFICE VISIT (OUTPATIENT)
Dept: FAMILY MEDICINE | Facility: CLINIC | Age: 28
End: 2018-05-18
Payer: COMMERCIAL

## 2018-05-18 VITALS
OXYGEN SATURATION: 96 % | SYSTOLIC BLOOD PRESSURE: 138 MMHG | DIASTOLIC BLOOD PRESSURE: 92 MMHG | WEIGHT: 223 LBS | RESPIRATION RATE: 18 BRPM | HEART RATE: 81 BPM | TEMPERATURE: 97.4 F | BODY MASS INDEX: 29.42 KG/M2

## 2018-05-18 DIAGNOSIS — F41.0 PANIC DISORDER WITHOUT AGORAPHOBIA: ICD-10-CM

## 2018-05-18 DIAGNOSIS — F41.9 ANXIETY: Primary | ICD-10-CM

## 2018-05-18 PROCEDURE — 99213 OFFICE O/P EST LOW 20 MIN: CPT | Performed by: FAMILY MEDICINE

## 2018-05-18 RX ORDER — ALPRAZOLAM 2 MG
2 TABLET ORAL 3 TIMES DAILY PRN
Qty: 30 TABLET | Refills: 0 | Status: SHIPPED | OUTPATIENT
Start: 2018-05-18 | End: 2018-06-13

## 2018-05-18 RX ORDER — VENLAFAXINE HYDROCHLORIDE 37.5 MG/1
CAPSULE, EXTENDED RELEASE ORAL
Qty: 7 CAPSULE | Refills: 0 | Status: SHIPPED | OUTPATIENT
Start: 2018-05-18 | End: 2018-05-18

## 2018-05-18 RX ORDER — VENLAFAXINE HYDROCHLORIDE 37.5 MG/1
CAPSULE, EXTENDED RELEASE ORAL
Qty: 7 CAPSULE | Refills: 0 | Status: SHIPPED | OUTPATIENT
Start: 2018-05-18 | End: 2018-06-13 | Stop reason: ALTCHOICE

## 2018-05-18 ASSESSMENT — ANXIETY QUESTIONNAIRES
GAD7 TOTAL SCORE: 17
7. FEELING AFRAID AS IF SOMETHING AWFUL MIGHT HAPPEN: MORE THAN HALF THE DAYS
3. WORRYING TOO MUCH ABOUT DIFFERENT THINGS: NEARLY EVERY DAY
IF YOU CHECKED OFF ANY PROBLEMS ON THIS QUESTIONNAIRE, HOW DIFFICULT HAVE THESE PROBLEMS MADE IT FOR YOU TO DO YOUR WORK, TAKE CARE OF THINGS AT HOME, OR GET ALONG WITH OTHER PEOPLE: EXTREMELY DIFFICULT
2. NOT BEING ABLE TO STOP OR CONTROL WORRYING: NEARLY EVERY DAY
6. BECOMING EASILY ANNOYED OR IRRITABLE: MORE THAN HALF THE DAYS
5. BEING SO RESTLESS THAT IT IS HARD TO SIT STILL: SEVERAL DAYS
1. FEELING NERVOUS, ANXIOUS, OR ON EDGE: NEARLY EVERY DAY

## 2018-05-18 ASSESSMENT — PATIENT HEALTH QUESTIONNAIRE - PHQ9: 5. POOR APPETITE OR OVEREATING: NEARLY EVERY DAY

## 2018-05-18 ASSESSMENT — PAIN SCALES - GENERAL: PAINLEVEL: MODERATE PAIN (5)

## 2018-05-18 NOTE — PROGRESS NOTES
Patient requested medication be sent to alternate pharmacy.  Medication refilled per RN protocol.  Jasmin Lauren RN

## 2018-05-18 NOTE — PROGRESS NOTES
"SUBJECTIVE:  Abimael Martinez is a 28 year old male who presents for a follow up evaluation of anxiety.   He had a n L4-L5 microdicectomy on April 5th,   He has been miserable since.  He also has had a lot of pain after surgery.  He has been having a lot of panic attcks.   He gets symptom(s) shortness of breath, pressure in his neck and chest and he feels tense all over.  He has had some tingling in both hands.  At first they happened 3-5 times a day(s).   They are less now and occur 1-2 times a day(s).     He has a follow up appointment(s) with his surgeon in 2-3 weeks   He is in physical therapy now.   He is getting better slowly.       At the last appointment(s) we planned to switch to setraline    Unfortunately he stopped taking it and he was off of everything for a few months.  He restarted the effexor on his own 2 weeks ago.     He has had side effects from the effexor in the past including acid reflux and had elevated blood pressure.    He was on percocet from the surgeon but that caused anxiety as it was wearing off. He has not been taking that any longer since he ran out.         AYALA-7    Over the last 2 weeks, how often have you been bothered by the following problems?  (Use an \"x\" to indicate your answer) Not at all                (0) Several days                (1) More than half the days        (2) Nearly every day          (3)   1. Feeling nervous, anxious or on edge    x   2. Not being able to stop or control worrying    x   3. Worrying too much about different things    x   4. Trouble relaxing    x   5. Being so restless that it is hard to sit still  x     6. Becoming easily annoyed or irritable   x    7. Feeling afraid as if something awful might happen   x      Total__17`1_____    Cut points for:   Mild Anxiety =  5  Moderate= 10  Severe=  15    AAYLA-7 SCORE 9/16/2016 11/7/2016 11/21/2017   Total Score - - -   Total Score 8 7 19           Last PHQ-9 score on record= " 17          OBJECTIVE:  General: the patient had a tearful affect during the visit today.    ASSESSMENT: Generalized Anxiety Disorder (AYALA)  Panic Disorder which has worsened      PLAN:  We will taper off the current medication by having the patient take 37.5mg for the next week and then discontinue completely. Concurrently, we will have the patient start on Zoloft ( sertraline) 25 mg daily for a week and then 50mg continuously after that. I asked the patient to return to clinic for appointment in 4 weeks for reevaluation.        He was given a refill on the xanax  for a total of 30 pills. We discussed the importance of using this medication only on a as needed basis as tolerance and dependency can develop with regular use.        The patient was recommended to seek individual counseling through his insurance company as an adjunct treatment to the prescribed medications.

## 2018-05-18 NOTE — NURSING NOTE
"Chief Complaint   Patient presents with     Anxiety     horrible anxiety after back surgery     Health Maintenance     up to date       Initial BP (!) 138/92  Pulse 81  Temp 97.4  F (36.3  C) (Oral)  Resp 18  Wt 223 lb (101.2 kg)  SpO2 96%  BMI 29.42 kg/m2 Estimated body mass index is 29.42 kg/(m^2) as calculated from the following:    Height as of 3/9/18: 6' 1\" (1.854 m).    Weight as of this encounter: 223 lb (101.2 kg).  Medication Reconciliation: complete  Victoria Rice CMA  "

## 2018-05-18 NOTE — MR AVS SNAPSHOT
After Visit Summary   5/18/2018    Abimael Martinez    MRN: [id number]           Patient Information     Date Of Birth          1990        Visit Information        Provider Department      5/18/2018 11:45 AM Jamison Lora MD [facility]        Today's Diagnoses     Anxiety    -  1    Panic disorder without agoraphobia           Follow-ups after your visit        Follow-up notes from your care team     Return in about 4 weeks (around 6/15/2018).      Who to contact     If you have questions or need follow up information about today's clinic visit or your schedule please contact [facility] directly at [phone].  Normal or non-critical lab and imaging results will be communicated to you by MyChart, letter or phone within 4 business days after the clinic has received the results. If you do not hear from us within 7 days, please contact the clinic through BrightNesthart or phone. If you have a critical or abnormal lab result, we will notify you by phone as soon as possible.  Submit refill requests through MetaStat or call your pharmacy and they will forward the refill request to us. Please allow 3 business days for your refill to be completed.          Additional Information About Your Visit        MyChart Information     MetaStat gives you secure access to your electronic health record. If you see a primary care provider, you can also send messages to your care team and make appointments. If you have questions, please call your primary care clinic.  If you do not have a primary care provider, please call [phone] and they will assist you.        Care EveryWhere ID     This is your Care EveryWhere ID. This could be used by other organizations to access your [place] medical records  [id number]        Your Vitals Were     Pulse Temperature Respirations Pulse Oximetry BMI (Body Mass Index)       81 97.4  F (36.3  C) (Oral) 18 96% 29.42 kg/m2        Blood Pressure from  Last 3 Encounters:   05/18/18 (!) 138/92   03/19/18 134/82   03/12/18 134/89    Weight from Last 3 Encounters:   05/18/18 223 lb (101.2 kg)   03/19/18 238 lb (108 kg)   03/12/18 238 lb (108 kg)              Today, you had the following     No orders found for display         Today's Medication Changes          These changes are accurate as of 5/18/18 12:25 PM.  If you have any questions, ask your nurse or doctor.               Start taking these medicines.        Dose/Directions    sertraline 50 MG tablet   Commonly known as:  ZOLOFT   Used for:  Anxiety, Panic disorder without agoraphobia   Started by:  Jamison Lora MD        Take one half of a tablet/capsule daily in the in the morning  for 7 days and then a whole  tablets/capsules daily in the morning  there after.   Quantity:  30 tablet   Refills:  1       venlafaxine 37.5 MG 24 hr capsule   Commonly known as:  EFFEXOR-XR   Used for:  Anxiety, Panic disorder without agoraphobia   Started by:  Jamison Lora MD        Take 1 capsule daily for 7 days then discontinue   Quantity:  7 capsule   Refills:  0         These medicines have changed or have updated prescriptions.        Dose/Directions    fluticasone 220 MCG/ACT Inhaler   Commonly known as:  FLOVENT HFA   This may have changed:  Another medication with the same name was removed. Continue taking this medication, and follow the directions you see here.   Used for:  Mild persistent asthma without complication   Changed by:  Jamison Lora MD        Dose:  2 puff   Inhale 2 puffs into the lungs 2 times daily Please give patient spacer   Quantity:  1 Inhaler   Refills:  3         Stop taking these medicines if you haven't already. Please contact your care team if you have questions.     oxyCODONE-acetaminophen 5-325 MG per tablet   Commonly known as:  PERCOCET   Stopped by:  Jamison Lora MD                Where to get your medicines      These medications were sent to [facility]  [place], [place] - [address] [facility], [address]  [address], [address], [facility] [address]     Phone:  [phone]     venlafaxine 37.5 MG 24 hr capsule         Some of these will need a paper prescription and others can be bought over the counter.  Ask your nurse if you have questions.     Bring a paper prescription for each of these medications     ALPRAZolam 2 MG tablet    sertraline 50 MG tablet                Primary Care Provider Office Phone # Fax #    Jamison Lora [phone]       [id number] [place] [address]        Equal Access to Services     [facility]: Hadii aad ku hadasho Soomaali, waaxda luqadaha, qaybta kaalmada adeegyada, evelyn luna . So [facility] [phone].    ATENCIÓN: Si habla español, tiene a salazar disposición servicios gratuitos de asistencia lingüística. Llame al [phone].    We comply with applicable federal civil rights laws and [place] laws. We do not discriminate on the basis of race, color, national origin, age, disability, sex, sexual orientation, or gender identity.            Thank you!     Thank you for choosing [facility]  for your care. Our goal is always to provide you with excellent care. Hearing back from our patients is one way we can continue to improve our services. Please take a few minutes to complete the written survey that you may receive in the mail after your visit with us. Thank you!             Your Updated Medication List - Protect others around you: Learn how to safely use, store and throw away your medicines at www.disposemymeds.org.          This list is accurate as of 5/18/18 12:25 PM.  Always use your most recent med list.                   Brand Name Dispense Instructions for use Diagnosis    albuterol 108 (90 Base) MCG/ACT Inhaler    PROAIR HFA/PROVENTIL HFA/VENTOLIN HFA    1 Inhaler    Inhale 2 puffs into the lungs every 4 hours as needed    Intermittent asthma,  uncomplicated       ALPRAZolam 2 MG tablet    XANAX    30 tablet    Take 1 tablet (2 mg) by mouth 3 times daily as needed for anxiety    Anxiety       amphetamine-dextroamphetamine 20 MG per tablet    ADDERALL    90 tablet    Take 1 tablet (20 mg) by mouth 3 times daily    Attention deficit hyperactivity disorder (ADHD), combined type       cyclobenzaprine 10 MG tablet    FLEXERIL    90 tablet    Take 1 tablet (10 mg) by mouth 3 times daily as needed for muscle spasms    Acute left-sided low back pain without sciatica       famotidine 40 MG tablet    PEPCID    90 tablet    TAKE 1 TABLET(40 MG) BY MOUTH AT BEDTIME    Upper abdominal pain       fluticasone 220 MCG/ACT Inhaler    FLOVENT HFA    1 Inhaler    Inhale 2 puffs into the lungs 2 times daily Please give patient spacer    Mild persistent asthma without complication       Hyoscyamine Sulfate 0.375 MG Tbcr     90 tablet    Take 1 capful by mouth 3 times daily as needed    Upper abdominal pain       montelukast 10 MG tablet    SINGULAIR    90 tablet    Take 1 tablet (10 mg) by mouth At Bedtime    Mild persistent asthma without complication       sertraline 50 MG tablet    ZOLOFT    30 tablet    Take one half of a tablet/capsule daily in the in the morning  for 7 days and then a whole  tablets/capsules daily in the morning  there after.    Anxiety, Panic disorder without agoraphobia       sildenafil 20 MG tablet    REVATIO    30 tablet    Take 1 tablet (20 mg) by mouth daily as needed As needed for prevention of erectile dysfunction    Drug-induced erectile dysfunction       venlafaxine 37.5 MG 24 hr capsule    EFFEXOR-XR    7 capsule    Take 1 capsule daily for 7 days then discontinue    Anxiety, Panic disorder without agoraphobia

## 2018-05-19 ASSESSMENT — ANXIETY QUESTIONNAIRES: GAD7 TOTAL SCORE: 17

## 2018-05-19 ASSESSMENT — PATIENT HEALTH QUESTIONNAIRE - PHQ9: SUM OF ALL RESPONSES TO PHQ QUESTIONS 1-9: 17

## 2018-06-04 ENCOUNTER — OFFICE VISIT (OUTPATIENT)
Dept: FAMILY MEDICINE | Facility: CLINIC | Age: 28
End: 2018-06-04
Payer: COMMERCIAL

## 2018-06-04 VITALS
WEIGHT: 232.6 LBS | OXYGEN SATURATION: 98 % | TEMPERATURE: 97.6 F | HEIGHT: 73 IN | BODY MASS INDEX: 30.83 KG/M2 | HEART RATE: 73 BPM | DIASTOLIC BLOOD PRESSURE: 88 MMHG | SYSTOLIC BLOOD PRESSURE: 138 MMHG | RESPIRATION RATE: 16 BRPM

## 2018-06-04 DIAGNOSIS — K64.4 EXTERNAL HEMORRHOIDS: Primary | ICD-10-CM

## 2018-06-04 PROCEDURE — 99213 OFFICE O/P EST LOW 20 MIN: CPT | Performed by: PHYSICIAN ASSISTANT

## 2018-06-04 ASSESSMENT — PAIN SCALES - GENERAL: PAINLEVEL: MODERATE PAIN (5)

## 2018-06-04 NOTE — MR AVS SNAPSHOT
After Visit Summary   6/4/2018    Abimael Martinez    MRN: [id number]           Patient Information     Date Of Birth          1990        Visit Information        Provider Department      6/4/2018 2:00 PM Carly Sam PA-C [facility]        Today's Diagnoses     External hemorrhoids    -  1      Care Instructions      Hemorrhoids    Hemorrhoids are swollen and inflamed veins inside the rectum and near the anus. The rectum is the last several inches of the colon. The anus is the passage between the rectum and the outside of the body.  Causes  The veins can become swollen due to increased pressure in them. This is most often caused by:    Chronic constipation or diarrhea    Straining when having a bowel movement    Sitting too long on the toilet    A low-fiber diet    Pregnancy  Symptoms    Bleeding from the rectum (this may be noticeable after bowel movements)    Lump near the anus    Itching around the anus    Pain around the anus  There are different types of hemorrhoids. Depending on the type you have and the severity, you may be able to treat yourself at home. In some cases, a procedure may be the best treatment option. Your healthcare provider can tell you more about this, if needed.  Home care  General care    To get relief from pain or itching, try:  ? Medicines. Your healthcare provider may recommend stool softeners, suppositories, or laxatives to help manage constipation. Use these exactly as directed.  ? Sitz baths. A sitz bath involves sitting in a few inches of warm bath water. Be careful not to make the water so hot that you burn yourself--test it before sitting in it. Soak for about 10 to 15 minutes a few times a day. This may help relieve pain.  ? Topical products. Your healthcare provider may prescribe or recommend creams, ointments, or pads that can be applied to the hemorrhoid. Use these exactly as directed.  Tips to help prevent  hemorrhoids    Eat more fiber. Fiber adds bulk to stool and absorbs water as it moves through your colon. This makes stool softer and easier to pass.  ? Increase the fiber in your diet with more fiber-rich foods. These include fresh fruit, vegetables, and whole grains.  ? Take a fiber supplement or bulking agent, if advised by your healthcare provider. These include products such as psyllium or methylcellulose.    Drink more water. Your healthcare provider may direct you to drink plenty of water. This can help keep stool soft.    Be more active. Frequent exercise aids digestion and helps prevent constipation. It may also help make bowel movements more regular.    Don t strain during bowel movements. This can make hemorrhoids more likely. Also, don t sit on the toilet for long periods of time.  Follow-up care  Follow up with your healthcare provider as advised. If a culture or imaging tests were done, someone will let you know the results when they are ready. This may take a few days or longer. If your healthcare provider recommends a procedure for your hemorrhoids, these options can be discussed. Options may include surgery and outpatient office treatments.  When to seek medical advice  Call your healthcare provider right away if any of these occur:    Increased bleeding from the rectum    Increased pain around the rectum or anus    Weakness or dizziness  Call 911  Call 911 if any of these occur:    Trouble breathing or swallowing    Fainting or loss of consciousness    Unusually fast heart rate    Vomiting blood    Large amounts of blood in stool or black, tarry stools  Date Last Reviewed: 9/1/2017 2000-2017 [employer]. [address] [place] [address], [place] [address]. All rights reserved. This information is not intended as a substitute for professional medical care. Always follow your healthcare professional's instructions.                Follow-ups after your visit        Who to contact     If you  "have questions or need follow up information about today's clinic visit or your schedule please contact [facility] directly at [phone].  Normal or non-critical lab and imaging results will be communicated to you by [employer], letter or phone within 4 business days after the clinic has received the results. If you do not hear from us within 7 days, please contact the clinic through [employer] or phone. If you have a critical or abnormal lab result, we will notify you by phone as soon as possible.  Submit refill requests through [employer] or call your pharmacy and they will forward the refill request to us. Please allow 3 business days for your refill to be completed.          Additional Information About Your Visit        [employer] Information     [employer] gives you secure access to your electronic health record. If you see a primary care provider, you can also send messages to your care team and make appointments. If you have questions, please call your primary care clinic.  If you do not have a primary care provider, please call [phone] and they will assist you.        Care EveryWhere ID     This is your Care EveryWhere ID. This could be used by other organizations to access your [place] medical records  [id number]        Your Vitals Were     Pulse Temperature Respirations Height Pulse Oximetry BMI (Body Mass Index)    73 97.6  F (36.4  C) (Oral) 16 6' 1\" (1.854 m) 98% 30.69 kg/m2       Blood Pressure from Last 3 Encounters:   06/04/18 138/88   05/18/18 (!) 138/92   03/19/18 134/82    Weight from Last 3 Encounters:   06/04/18 232 lb 9.6 oz (105.5 kg)   05/18/18 223 lb (101.2 kg)   03/19/18 238 lb (108 kg)              Today, you had the following     No orders found for display         Today's Medication Changes          These changes are accurate as of 6/4/18  2:23 PM.  If you have any questions, ask your nurse or doctor.               Start taking these medicines.        Dose/Directions    " hydrocortisone 2.5 % cream   Commonly known as:  ANUSOL-HC   Used for:  External hemorrhoids   Started by:  Carly Sam PA-C        Place rectally 2 times daily as needed for hemorrhoids external   Quantity:  30 g   Refills:  1            Where to get your medicines      These medications were [place] [facility] [place] - [address]  [address], [place] [address]     Phone:  [phone]     hydrocortisone 2.5 % cream                Primary Care Provider Office Phone # Fax #    Jamison Lora [phone]       [address] [facility] [address]        Equal Access to Services     MIC HULL : Hadii gretchen basurto hadasho Sozaydaali, waaxda luqadaha, qaybta kaalmada adeegyada, evelyn luna . So [facility] [phone].    ATENCIÓN: Si habla español, tiene a salazar disposición servicios gratuitos de asistencia lingüística. [facility] [phone].    We comply with applicable federal civil rights laws and [place] laws. We do not discriminate on the basis of race, color, national origin, age, disability, sex, sexual orientation, or gender identity.            Thank you!     Thank you for choosing [facility]  for your care. Our goal is always to provide you with excellent care. Hearing back from our patients is one way we can continue to improve our services. Please take a few minutes to complete the written survey that you may receive in the mail after your visit with us. Thank you!             Your Updated Medication List - Protect others around you: Learn how to safely use, store and throw away your medicines at www.disposemymeds.org.          This list is accurate as of 6/4/18  2:23 PM.  Always use your most recent med list.                   Brand Name Dispense Instructions for use Diagnosis    albuterol 108 (90 Base) MCG/ACT Inhaler    PROAIR HFA/PROVENTIL HFA/VENTOLIN HFA    1 Inhaler     Inhale 2 puffs into the lungs every 4 hours as needed    Intermittent asthma, uncomplicated       ALPRAZolam 2 MG tablet    XANAX    30 tablet    Take 1 tablet (2 mg) by mouth 3 times daily as needed for anxiety    Anxiety       amphetamine-dextroamphetamine 20 MG per tablet    ADDERALL    90 tablet    Take 1 tablet (20 mg) by mouth 3 times daily    Attention deficit hyperactivity disorder (ADHD), combined type       cyclobenzaprine 10 MG tablet    FLEXERIL    90 tablet    Take 1 tablet (10 mg) by mouth 3 times daily as needed for muscle spasms    Acute left-sided low back pain without sciatica       famotidine 40 MG tablet    PEPCID    90 tablet    TAKE 1 TABLET(40 MG) BY MOUTH AT BEDTIME    Upper abdominal pain       fluticasone 220 MCG/ACT Inhaler    FLOVENT HFA    1 Inhaler    Inhale 2 puffs into the lungs 2 times daily Please give patient spacer    Mild persistent asthma without complication       hydrocortisone 2.5 % cream    ANUSOL-HC    30 g    Place rectally 2 times daily as needed for hemorrhoids external    External hemorrhoids       Hyoscyamine Sulfate 0.375 MG Tbcr     90 tablet    Take 1 capful by mouth 3 times daily as needed    Upper abdominal pain       montelukast 10 MG tablet    SINGULAIR    90 tablet    Take 1 tablet (10 mg) by mouth At Bedtime    Mild persistent asthma without complication       sertraline 50 MG tablet    ZOLOFT    30 tablet    Take one half of a tablet/capsule daily in the in the morning  for 7 days and then a whole  tablets/capsules daily in the morning  there after.    Anxiety, Panic disorder without agoraphobia       sildenafil 20 MG tablet    REVATIO    30 tablet    Take 1 tablet (20 mg) by mouth daily as needed As needed for prevention of erectile dysfunction    Drug-induced erectile dysfunction       venlafaxine 37.5 MG 24 hr capsule    EFFEXOR-XR    7 capsule    Take 1 capsule daily for 7 days then discontinue    Anxiety, Panic disorder without agoraphobia

## 2018-06-04 NOTE — PROGRESS NOTES
SUBJECTIVE:   Abimael Martinez is a 28 year old male who presents to clinic today for the following health issues:    Concern - Hemorrhoids    Onset: 4 weeks since surgery. Pt got constipated while being on opioids and that exacerbated existing hemorrhoids    Description:   Painful while passing BM pain radiating to the trunk, Patient can feel 2-3 hemorrhoids     Intensity: severe    Progression of Symptoms:  worsening    Accompanying Signs & Symptoms:    Surgery 9 weeks ago painful BMs     Previous history of similar problem:   Yes internal hemorrhoids that were banded in the past     Therapies Tried and outcome: OTC creams and baths; Some relief But always there         Problem list and histories reviewed & adjusted, as indicated.  Additional history: as documented    Patient Active Problem List   Diagnosis     Anxiety     CARDIOVASCULAR SCREENING; LDL GOAL LESS THAN 160     High risk sexual behavior     Tobacco use disorder     Low back pain     Hypertension goal BP (blood pressure) < 140/90     Internal hemorrhoids which bleed     Obesity, Class I, BMI 30-34.9     Attention deficit hyperactivity disorder (ADHD), combined type     AYALA (generalized anxiety disorder)     OCD (obsessive compulsive disorder)     Drug-induced erectile dysfunction     Mild persistent asthma without complication     Lumbosacral radiculopathy     Dyslipidemia     Elevated serum creatinine     Past Surgical History:   Procedure Laterality Date     NO HISTORY OF SURGERY         Social History   Substance Use Topics     Smoking status: Former Smoker     Packs/day: 0.50     Years: 6.00     Types: Cigarettes     Quit date: 3/12/2018     Smokeless tobacco: Never Used      Comment: second hand smoke exposure     Alcohol use Yes      Comment: once a week     Family History   Problem Relation Age of Onset     Unknown/Adopted Mother      Unknown/Adopted Father      Unknown/Adopted Maternal Grandmother      Unknown/Adopted Maternal Grandfather       Unknown/Adopted Paternal Grandmother      Unknown/Adopted Paternal Grandfather      Unknown/Adopted Brother          Current Outpatient Prescriptions   Medication Sig Dispense Refill     albuterol (PROAIR HFA/PROVENTIL HFA/VENTOLIN HFA) 108 (90 BASE) MCG/ACT Inhaler Inhale 2 puffs into the lungs every 4 hours as needed 1 Inhaler 0     ALPRAZolam (XANAX) 2 MG tablet Take 1 tablet (2 mg) by mouth 3 times daily as needed for anxiety 30 tablet 0     amphetamine-dextroamphetamine (ADDERALL) 20 MG per tablet Take 1 tablet (20 mg) by mouth 3 times daily 90 tablet 0     cyclobenzaprine (FLEXERIL) 10 MG tablet Take 1 tablet (10 mg) by mouth 3 times daily as needed for muscle spasms 90 tablet 1     famotidine (PEPCID) 40 MG tablet TAKE 1 TABLET(40 MG) BY MOUTH AT BEDTIME 90 tablet 0     fluticasone (FLOVENT HFA) 220 MCG/ACT Inhaler Inhale 2 puffs into the lungs 2 times daily Please give patient spacer 1 Inhaler 3     hydrocortisone (ANUSOL-HC) 2.5 % cream Place rectally 2 times daily as needed for hemorrhoids external 30 g 1     Hyoscyamine Sulfate 0.375 MG TBCR Take 1 capful by mouth 3 times daily as needed 90 tablet 1     montelukast (SINGULAIR) 10 MG tablet Take 1 tablet (10 mg) by mouth At Bedtime 90 tablet 0     sertraline (ZOLOFT) 50 MG tablet Take one half of a tablet/capsule daily in the in the morning  for 7 days and then a whole  tablets/capsules daily in the morning  there after. 30 tablet 1     sildenafil (REVATIO/VIAGRA) 20 MG tablet Take 1 tablet (20 mg) by mouth daily as needed As needed for prevention of erectile dysfunction 30 tablet 1     venlafaxine (EFFEXOR-XR) 37.5 MG 24 hr capsule Take 1 capsule daily for 7 days then discontinue 7 capsule 0     Allergies   Allergen Reactions     Cymbalta      Weight gain and fatigue     Duloxetine Other (See Comments)     Weight gain, fatigue  Enlarged spleen and weight gain     No Clinical Screening - See Comments GI Disturbance     Weight gain and fatigue      "Paroxetine Other (See Comments), GI Disturbance and Unknown     Weight gain, fatigue  Weight gain  Spleen issues  Weight gain  Spleen issues     Paxil [Paroxetine Mesylate]      Weight gain and fatigue     Vicodin [Hydrocodone-Acetaminophen]        Reviewed and updated as needed this visit by clinical staff  Tobacco  Allergies  Meds  Problems  Med Hx  Surg Hx  Fam Hx  Soc Hx        Reviewed and updated as needed this visit by Provider  Allergies  Meds  Problems         ROS:  Constitutional, HEENT, cardiovascular, pulmonary, GI, , musculoskeletal, neuro, skin, endocrine and psych systems are negative, except as otherwise noted.    OBJECTIVE:     /88 (BP Location: Right arm, Patient Position: Sitting, Cuff Size: Adult Large)  Pulse 73  Temp 97.6  F (36.4  C) (Oral)  Resp 16  Ht 6' 1\" (1.854 m)  Wt 232 lb 9.6 oz (105.5 kg)  SpO2 98%  BMI 30.69 kg/m2  Body mass index is 30.69 kg/(m^2).  GENERAL: healthy, alert and no distress  RECTAL (male): 3 small mildly inflammed hemorrhoids    Diagnostic Test Results:  none     ASSESSMENT/PLAN:       ICD-10-CM    1. External hemorrhoids K64.4 hydrocortisone (ANUSOL-HC) 2.5 % cream     Keep area clean  Sitz baths  HC 2.5 % cream apply bid until better   F/U prn         Carly Sam PA-C  [facility]  "

## 2018-06-04 NOTE — PATIENT INSTRUCTIONS
Hemorrhoids     Hemorrhoids are swollen and inflamed veins inside the rectum and near the anus. The rectum is the last several inches of the colon. The anus is the passage between the rectum and the outside of the body.  Causes  The veins can become swollen due to increased pressure in them. This is most often caused by:    Chronic constipation or diarrhea    Straining when having a bowel movement    Sitting too long on the toilet    A low-fiber diet    Pregnancy  Symptoms    Bleeding from the rectum (this may be noticeable after bowel movements)    Lump near the anus    Itching around the anus    Pain around the anus  There are different types of hemorrhoids. Depending on the type you have and the severity, you may be able to treat yourself at home. In some cases, a procedure may be the best treatment option. Your healthcare provider can tell you more about this, if needed.  Home care  General care    To get relief from pain or itching, try:  ? Medicines. Your healthcare provider may recommend stool softeners, suppositories, or laxatives to help manage constipation. Use these exactly as directed.  ? Sitz baths. A sitz bath involves sitting in a few inches of warm bath water. Be careful not to make the water so hot that you burn yourself--test it before sitting in it. Soak for about 10 to 15 minutes a few times a day. This may help relieve pain.  ? Topical products. Your healthcare provider may prescribe or recommend creams, ointments, or pads that can be applied to the hemorrhoid. Use these exactly as directed.  Tips to help prevent hemorrhoids    Eat more fiber. Fiber adds bulk to stool and absorbs water as it moves through your colon. This makes stool softer and easier to pass.  ? Increase the fiber in your diet with more fiber-rich foods. These include fresh fruit, vegetables, and whole grains.  ? Take a fiber supplement or bulking agent, if advised by your healthcare provider. These include products such as  psyllium or methylcellulose.    Drink more water. Your healthcare provider may direct you to drink plenty of water. This can help keep stool soft.    Be more active. Frequent exercise aids digestion and helps prevent constipation. It may also help make bowel movements more regular.    Don t strain during bowel movements. This can make hemorrhoids more likely. Also, don t sit on the toilet for long periods of time.  Follow-up care  Follow up with your healthcare provider as advised. If a culture or imaging tests were done, someone will let you know the results when they are ready. This may take a few days or longer. If your healthcare provider recommends a procedure for your hemorrhoids, these options can be discussed. Options may include surgery and outpatient office treatments.  When to seek medical advice  Call your healthcare provider right away if any of these occur:    Increased bleeding from the rectum    Increased pain around the rectum or anus    Weakness or dizziness  Call 911  Call 911 if any of these occur:    Trouble breathing or swallowing    Fainting or loss of consciousness    Unusually fast heart rate    Vomiting blood    Large amounts of blood in stool or black, tarry stools  Date Last Reviewed: 9/1/2017 2000-2017 [employer]. [address] [place] [address], [place] [address]. All rights reserved. This information is not intended as a substitute for professional medical care. Always follow your healthcare professional's instructions.

## 2018-06-05 ENCOUNTER — TELEPHONE (OUTPATIENT)
Dept: FAMILY MEDICINE | Facility: CLINIC | Age: 28
End: 2018-06-05

## 2018-06-05 DIAGNOSIS — K64.4 EXTERNAL HEMORRHOIDS: Primary | ICD-10-CM

## 2018-06-05 NOTE — TELEPHONE ENCOUNTER
Pt Procto-med 2.5 cream is not cover by insurance. Pt call insurance and was told that proctozone hc 2.5 is cover by insurance. Pt has try other medication over the couter previous and it didn't work. Please send new rx for proctozone 2.5 its cover by insurance.                       Thank You,  Leighton Damon, [facility]  On behalf of [facility]

## 2018-06-12 ENCOUNTER — TRANSFERRED RECORDS (OUTPATIENT)
Dept: HEALTH INFORMATION MANAGEMENT | Facility: CLINIC | Age: 28
End: 2018-06-12

## 2018-06-13 ENCOUNTER — OFFICE VISIT (OUTPATIENT)
Dept: FAMILY MEDICINE | Facility: CLINIC | Age: 28
End: 2018-06-13
Payer: COMMERCIAL

## 2018-06-13 VITALS
RESPIRATION RATE: 16 BRPM | OXYGEN SATURATION: 96 % | WEIGHT: 230.6 LBS | TEMPERATURE: 96.2 F | DIASTOLIC BLOOD PRESSURE: 85 MMHG | SYSTOLIC BLOOD PRESSURE: 136 MMHG | HEART RATE: 69 BPM | BODY MASS INDEX: 30.42 KG/M2

## 2018-06-13 DIAGNOSIS — F41.9 ANXIETY: ICD-10-CM

## 2018-06-13 DIAGNOSIS — F41.0 PANIC DISORDER WITHOUT AGORAPHOBIA: ICD-10-CM

## 2018-06-13 PROCEDURE — 99213 OFFICE O/P EST LOW 20 MIN: CPT | Performed by: FAMILY MEDICINE

## 2018-06-13 RX ORDER — ALPRAZOLAM 2 MG
2 TABLET ORAL 3 TIMES DAILY PRN
Qty: 30 TABLET | Refills: 0 | Status: SHIPPED | OUTPATIENT
Start: 2018-06-13 | End: 2018-07-10

## 2018-06-13 RX ORDER — SERTRALINE HYDROCHLORIDE 100 MG/1
100 TABLET, FILM COATED ORAL DAILY
Qty: 30 TABLET | Refills: 1 | Status: SHIPPED | OUTPATIENT
Start: 2018-06-13 | End: 2018-08-14

## 2018-06-13 ASSESSMENT — ANXIETY QUESTIONNAIRES
2. NOT BEING ABLE TO STOP OR CONTROL WORRYING: MORE THAN HALF THE DAYS
6. BECOMING EASILY ANNOYED OR IRRITABLE: SEVERAL DAYS
7. FEELING AFRAID AS IF SOMETHING AWFUL MIGHT HAPPEN: SEVERAL DAYS
5. BEING SO RESTLESS THAT IT IS HARD TO SIT STILL: SEVERAL DAYS
GAD7 TOTAL SCORE: 12
3. WORRYING TOO MUCH ABOUT DIFFERENT THINGS: NEARLY EVERY DAY
1. FEELING NERVOUS, ANXIOUS, OR ON EDGE: NEARLY EVERY DAY
IF YOU CHECKED OFF ANY PROBLEMS ON THIS QUESTIONNAIRE, HOW DIFFICULT HAVE THESE PROBLEMS MADE IT FOR YOU TO DO YOUR WORK, TAKE CARE OF THINGS AT HOME, OR GET ALONG WITH OTHER PEOPLE: SOMEWHAT DIFFICULT

## 2018-06-13 ASSESSMENT — PATIENT HEALTH QUESTIONNAIRE - PHQ9: 5. POOR APPETITE OR OVEREATING: SEVERAL DAYS

## 2018-06-13 NOTE — PROGRESS NOTES
"SUBJECTIVE:  Abimael Martinez is a 28 year old male who presents for a follow up evaluation of anxiety. The patient was started on Zoloft ( sertraline) 50 mg daily at the last visit which was 3.5  weeks ago. The patient reports that his persistant symptoms of anxiety include Excessive worry. He still gets some chest symptom(s) that he reports is unusual but can not better define them.   The panic attacks are gone.     He has started physical therapy about 4-5 weeks ago for his back. He still has some pain but it is getting better.       The patient reports the side effects include: low libido.     AYALA-7    Over the last 2 weeks, how often have you been bothered by the following problems?  (Use an \"x\" to indicate your answer) Not at all                (0) Several days                (1) More than half the days        (2) Nearly every day          (3)   1. Feeling nervous, anxious or on edge    x   2. Not being able to stop or control worrying   x    3. Worrying too much about different things    x   4. Trouble relaxing  x     5. Being so restless that it is hard to sit still  x     6. Becoming easily annoyed or irritable  x     7. Feeling afraid as if something awful might happen  x       Total_12______    Cut points for:   Mild Anxiety =  5  Moderate= 10  Severe=  15    AYALA-7 SCORE 11/7/2016 11/21/2017 5/18/2018   Total Score - - -   Total Score 7 19 17           Last PHQ-9 score on record= 4    PHQ-9 SCORE 11/7/2016 11/21/2017 5/18/2018   Total Score - - -   Total Score 5 17 17         The patient reports that he has not attended mental health counseling for the current anxiety disorder.      OBJECTIVE:  General: the patient had a calm affect during the visit today.    ASSESSMENT: AYALA and Panic Disorder which has improved         PLAN:  We will increase the dose of his medication to 100 mg . I instructed the patient return to clinic for appointment in 1 month(s).     He was given a refill on the xanax . We " discussed the importance of using this medication only on a as needed basis as tolerance and dependency can develop with regular use.    The patient was recommended to seek individual counseling through his insurance company as an adjunct treatment to the prescribed medications.       Patient Instructions   I would definitely recommend(ed) that you continue(s)  individual therapy so please make contact with the clinic that you were recently seen at.     Call me if there are persistant or significant(ly) side effects from the higher dose zoloft otherwise we will see you in 4 weeks

## 2018-06-13 NOTE — PATIENT INSTRUCTIONS
I would definitely recommend(ed) that you continue(s)  individual therapy so please make contact with the clinic that you were recently seen at.     Call me if there are persistant or significant(ly) side effects from the higher dose zoloft otherwise we will see you in 4 weeks

## 2018-06-13 NOTE — MR AVS SNAPSHOT
After Visit Summary   6/13/2018    Abimael Martinez    MRN: [id number]           Patient Information     Date Of Birth          1990        Visit Information        Provider Department      6/13/2018 11:15 AM Jamison Lora MD [facility]        Today's Diagnoses     Anxiety        Panic disorder without agoraphobia          Care Instructions    I would definitely recommend(ed) that you continue(s)  individual therapy so please make contact with the clinic that you were recently seen at.     Call me if there are persistant or significant(ly) side effects from the higher dose zoloft otherwise we will see you in 4 weeks           Follow-ups after your visit        Follow-up notes from your care team     Return in about 4 weeks (around 7/11/2018) for recheck on anxiety.      Who to contact     If you have questions or need follow up information about today's clinic visit or your schedule please contact [facility] directly at [phone].  Normal or non-critical lab and imaging results will be communicated to you by [employer], letter or phone within 4 business days after the clinic has received the results. If you do not hear from us within 7 days, please contact the clinic through [employer] or phone. If you have a critical or abnormal lab result, we will notify you by phone as soon as possible.  Submit refill requests through [employer] or call your pharmacy and they will forward the refill request to us. Please allow 3 business days for your refill to be completed.          Additional Information About Your Visit        [employer] Information     [employer] gives you secure access to your electronic health record. If you see a primary care provider, you can also send messages to your care team and make appointments. If you have questions, please call your primary care clinic.  If you do not have a primary care provider, please call [phone] and they will assist you.         Care EveryWhere ID     This is your Care EveryWhere ID. This could be used by other organizations to access your [place] medical records  [id number]        Your Vitals Were     Pulse Temperature Respirations Pulse Oximetry BMI (Body Mass Index)       69 96.2  F (35.7  C) (Oral) 16 96% 30.42 kg/m2        Blood Pressure from Last 3 Encounters:   06/13/18 136/85   06/04/18 138/88   05/18/18 (!) 138/92    Weight from Last 3 Encounters:   06/13/18 230 lb 9.6 oz (104.6 kg)   06/04/18 232 lb 9.6 oz (105.5 kg)   05/18/18 223 lb (101.2 kg)              Today, you had the following     No orders found for display         Today's Medication Changes          These changes are accurate as of 6/13/18 12:07 PM.  If you have any questions, ask your nurse or doctor.               These medicines have changed or have updated prescriptions.        Dose/Directions    sertraline 100 MG tablet   Commonly known as:  ZOLOFT   This may have changed:    - medication strength  - how much to take  - how to take this  - when to take this  - additional instructions   Used for:  Anxiety, Panic disorder without agoraphobia   Changed by:  Jamison Lora MD        Dose:  100 mg   Take 1 tablet (100 mg) by mouth daily   Quantity:  30 tablet   Refills:  1         Stop taking these medicines if you haven't already. Please contact your care team if you have questions.     venlafaxine 37.5 MG 24 hr capsule   Commonly known as:  EFFEXOR-XR   Stopped by:  Jamison Lora MD                Where to get your medicines      These medications were sent to [facility] [address] [place] [address] [address] AT [facility] [place] [address], [facility] [address]     Phone:  [phone]     sertraline 100 MG tablet         Some of these will need a paper prescription and others can be bought over the counter.  Ask your nurse if you have questions.     Bring a paper prescription for each of these medications      ALPRAZolam 2 MG tablet                Primary Care Provider Office Phone # Fax #    Jamison Lora [phone] [address] [facility] [address]        Equal Access to Services     MIC HULL : Hadcamille gretchen basurto filippoo Sozaydaali, waaxda luqadaha, qaybta kaalmada adeegyada, evelyn dickey jeremy angel. So [facility] [phone].    ATENCIÓN: Si habla español, tiene a salazar disposición servicios gratuitos de asistencia lingüística. [facility] [phone].    We comply with applicable federal civil rights laws and [place] laws. We do not discriminate on the basis of race, color, national origin, age, disability, sex, sexual orientation, or gender identity.            Thank you!     Thank you for choosing [facility]  for your care. Our goal is always to provide you with excellent care. Hearing back from our patients is one way we can continue to improve our services. Please take a few minutes to complete the written survey that you may receive in the mail after your visit with us. Thank you!             Your Updated Medication List - Protect others around you: Learn how to safely use, store and throw away your medicines at www.disposemymeds.org.          This list is accurate as of 6/13/18 12:07 PM.  Always use your most recent med list.                   Brand Name Dispense Instructions for use Diagnosis    albuterol 108 (90 Base) MCG/ACT Inhaler    PROAIR HFA/PROVENTIL HFA/VENTOLIN HFA    1 Inhaler    Inhale 2 puffs into the lungs every 4 hours as needed    Intermittent asthma, uncomplicated       ALPRAZolam 2 MG tablet    XANAX    30 tablet    Take 1 tablet (2 mg) by mouth 3 times daily as needed for anxiety    Anxiety       amphetamine-dextroamphetamine 20 MG per tablet    ADDERALL    90 tablet    Take 1 tablet (20 mg) by mouth 3 times daily    Attention deficit hyperactivity disorder (ADHD), combined type       cyclobenzaprine 10 MG tablet    FLEXERIL    90 tablet     Take 1 tablet (10 mg) by mouth 3 times daily as needed for muscle spasms    Acute left-sided low back pain without sciatica       famotidine 40 MG tablet    PEPCID    90 tablet    TAKE 1 TABLET(40 MG) BY MOUTH AT BEDTIME    Upper abdominal pain       fluticasone 220 MCG/ACT Inhaler    FLOVENT HFA    1 Inhaler    Inhale 2 puffs into the lungs 2 times daily Please give patient spacer    Mild persistent asthma without complication       * hydrocortisone 2.5 % cream    ANUSOL-HC    30 g    Place rectally 2 times daily as needed for hemorrhoids external    External hemorrhoids       * hydrocortisone 2.5 % cream    ANUSOL-HC    30 g    Place rectally 2 times daily as needed for hemorrhoids    External hemorrhoids       Hyoscyamine Sulfate 0.375 MG Tbcr     90 tablet    Take 1 capful by mouth 3 times daily as needed    Upper abdominal pain       montelukast 10 MG tablet    SINGULAIR    90 tablet    Take 1 tablet (10 mg) by mouth At Bedtime    Mild persistent asthma without complication       sertraline 100 MG tablet    ZOLOFT    30 tablet    Take 1 tablet (100 mg) by mouth daily    Anxiety, Panic disorder without agoraphobia       sildenafil 20 MG tablet    REVATIO    30 tablet    Take 1 tablet (20 mg) by mouth daily as needed As needed for prevention of erectile dysfunction    Drug-induced erectile dysfunction       * Notice:  This list has 2 medication(s) that are the same as other medications prescribed for you. Read the directions carefully, and ask your doctor or other care provider to review them with you.

## 2018-06-14 ASSESSMENT — PATIENT HEALTH QUESTIONNAIRE - PHQ9: SUM OF ALL RESPONSES TO PHQ QUESTIONS 1-9: 4

## 2018-06-14 ASSESSMENT — ANXIETY QUESTIONNAIRES: GAD7 TOTAL SCORE: 12

## 2018-06-29 ENCOUNTER — TELEPHONE (OUTPATIENT)
Dept: FAMILY MEDICINE | Facility: CLINIC | Age: 28
End: 2018-06-29

## 2018-06-29 DIAGNOSIS — F90.2 ATTENTION DEFICIT HYPERACTIVITY DISORDER (ADHD), COMBINED TYPE: ICD-10-CM

## 2018-06-29 RX ORDER — DEXTROAMPHETAMINE SACCHARATE, AMPHETAMINE ASPARTATE, DEXTROAMPHETAMINE SULFATE AND AMPHETAMINE SULFATE 5; 5; 5; 5 MG/1; MG/1; MG/1; MG/1
20 TABLET ORAL 3 TIMES DAILY
Qty: 90 TABLET | Refills: 0 | Status: SHIPPED | OUTPATIENT
Start: 2018-06-29 | End: 2018-07-30

## 2018-06-29 NOTE — TELEPHONE ENCOUNTER
Last office visit 6/13/18 with Anup Sevilla PA-C  Last refilled 5/9/18     Controlled Substance Refill Request for Alprazolam  Problem List Complete:  Yes  Medication(s): alprazolam 2 mg .   Maximum quantity per month: 36  Clinic visit frequency required: Q 6  months       Controlled substance agreement on file: No  Benzodiazepine use reviewed by psychiatry:  No       checked in past 6 months?  Yes, 6/29/18, printed and given to Dr Lora.  Patient had surgery 4/5/18.   Jasmin Lauren RN

## 2018-07-10 ENCOUNTER — TELEPHONE (OUTPATIENT)
Dept: FAMILY MEDICINE | Facility: CLINIC | Age: 28
End: 2018-07-10

## 2018-07-10 DIAGNOSIS — F41.9 ANXIETY: ICD-10-CM

## 2018-07-10 RX ORDER — ALPRAZOLAM 2 MG
2 TABLET ORAL 3 TIMES DAILY PRN
Qty: 30 TABLET | Refills: 0 | Status: SHIPPED | OUTPATIENT
Start: 2018-07-10 | End: 2018-08-14

## 2018-07-10 NOTE — TELEPHONE ENCOUNTER
Reason for Call:  Medication or medication refill:    Do you use a Only Pharmacy?  Name of the pharmacy and phone number for the current request:  written prescription    Name of the medication requested: Alprazolam    Other request: NA    Can we leave a detailed message on this number? YES    Phone number patient can be reached at: Home number on file [phone] (home)    Best Time: any     Call taken on 7/10/2018 at 2:31 PM by Gemma Eid

## 2018-07-10 NOTE — TELEPHONE ENCOUNTER
Controlled Substance Refill Request for Alprazolam  Problem List Complete:  Yes  Medication(s): alprazolam 2 mg .   Maximum quantity per month: 36  Clinic visit frequency required: Q 6  months      Controlled substance agreement on file: No  Benzodiazepine use reviewed by psychiatry:  No     checked in past 3 months?  Yes 7/10/18, last filled 6/13/18       Poonam RIDERN, RN, CPN

## 2018-07-30 ENCOUNTER — TELEPHONE (OUTPATIENT)
Dept: FAMILY MEDICINE | Facility: CLINIC | Age: 28
End: 2018-07-30

## 2018-07-30 DIAGNOSIS — F90.2 ATTENTION DEFICIT HYPERACTIVITY DISORDER (ADHD), COMBINED TYPE: ICD-10-CM

## 2018-07-30 RX ORDER — DEXTROAMPHETAMINE SACCHARATE, AMPHETAMINE ASPARTATE, DEXTROAMPHETAMINE SULFATE AND AMPHETAMINE SULFATE 5; 5; 5; 5 MG/1; MG/1; MG/1; MG/1
20 TABLET ORAL 3 TIMES DAILY
Qty: 90 TABLET | Refills: 0 | Status: SHIPPED | OUTPATIENT
Start: 2018-07-30 | End: 2018-09-04

## 2018-07-30 NOTE — TELEPHONE ENCOUNTER
Please have another provider approve and sign this prescription in my absence from clinic.  Jamison Lora MD

## 2018-07-30 NOTE — TELEPHONE ENCOUNTER
Patient requesting 90 day supply.     Last office visit 6/13/18 with Anup Sevilla PA-C  Last refilled 7/2/18      Controlled Substance Refill Request for Alprazolam  Problem List Complete:  Yes  Medication(s): alprazolam 2 mg .   Maximum quantity per month: 36  Clinic visit frequency required: Q 6  months       Controlled substance agreement on file: No  Benzodiazepine use reviewed by psychiatry:  No       checked in past 6 months?  Yes, 6/29/18, printed and given to Dr Lora.  Patient had surgery 4/5/18.

## 2018-07-30 NOTE — TELEPHONE ENCOUNTER
Reason for Call:  Medication or medication refill:    Do you use a [facility]?  Name of the pharmacy and phone number for the current request:  written prescription    Name of the medication requested: Adderall    Other request: He is wondering if he can get 3 months of prescriptions printed out. He only has a couple days of medication left.    Can we leave a detailed message on this number? YES    Phone number patient can be reached at: Home number on file [phone] (home)    Best Time: any    Call taken on 7/30/2018 at 12:05 PM by Gemma Eid

## 2018-08-14 ENCOUNTER — OFFICE VISIT (OUTPATIENT)
Dept: FAMILY MEDICINE | Facility: CLINIC | Age: 28
End: 2018-08-14
Payer: COMMERCIAL

## 2018-08-14 VITALS
TEMPERATURE: 97.4 F | WEIGHT: 242 LBS | OXYGEN SATURATION: 98 % | SYSTOLIC BLOOD PRESSURE: 128 MMHG | HEART RATE: 55 BPM | RESPIRATION RATE: 20 BRPM | BODY MASS INDEX: 31.93 KG/M2 | DIASTOLIC BLOOD PRESSURE: 83 MMHG

## 2018-08-14 DIAGNOSIS — F41.9 ANXIETY: ICD-10-CM

## 2018-08-14 DIAGNOSIS — F41.0 PANIC DISORDER WITHOUT AGORAPHOBIA: ICD-10-CM

## 2018-08-14 DIAGNOSIS — N52.2 DRUG-INDUCED ERECTILE DYSFUNCTION: ICD-10-CM

## 2018-08-14 PROCEDURE — 99214 OFFICE O/P EST MOD 30 MIN: CPT | Performed by: FAMILY MEDICINE

## 2018-08-14 RX ORDER — ALPRAZOLAM 2 MG
2 TABLET ORAL 3 TIMES DAILY PRN
Qty: 30 TABLET | Refills: 0 | Status: SHIPPED | OUTPATIENT
Start: 2018-08-14 | End: 2018-09-13

## 2018-08-14 RX ORDER — SILDENAFIL CITRATE 20 MG/1
20-40 TABLET ORAL DAILY PRN
Qty: 30 TABLET | Refills: 2 | Status: SHIPPED | OUTPATIENT
Start: 2018-08-14 | End: 2020-08-20

## 2018-08-14 RX ORDER — SERTRALINE HYDROCHLORIDE 100 MG/1
100 TABLET, FILM COATED ORAL DAILY
Qty: 90 TABLET | Refills: 0 | Status: SHIPPED | OUTPATIENT
Start: 2018-08-14 | End: 2018-12-17

## 2018-08-14 RX ORDER — SILDENAFIL CITRATE 20 MG/1
20-40 TABLET ORAL DAILY PRN
Qty: 30 TABLET | Refills: 2 | Status: SHIPPED | OUTPATIENT
Start: 2018-08-14 | End: 2018-08-14

## 2018-08-14 ASSESSMENT — ANXIETY QUESTIONNAIRES
1. FEELING NERVOUS, ANXIOUS, OR ON EDGE: MORE THAN HALF THE DAYS
IF YOU CHECKED OFF ANY PROBLEMS ON THIS QUESTIONNAIRE, HOW DIFFICULT HAVE THESE PROBLEMS MADE IT FOR YOU TO DO YOUR WORK, TAKE CARE OF THINGS AT HOME, OR GET ALONG WITH OTHER PEOPLE: SOMEWHAT DIFFICULT
7. FEELING AFRAID AS IF SOMETHING AWFUL MIGHT HAPPEN: SEVERAL DAYS
GAD7 TOTAL SCORE: 12
5. BEING SO RESTLESS THAT IT IS HARD TO SIT STILL: MORE THAN HALF THE DAYS
3. WORRYING TOO MUCH ABOUT DIFFERENT THINGS: MORE THAN HALF THE DAYS
2. NOT BEING ABLE TO STOP OR CONTROL WORRYING: MORE THAN HALF THE DAYS
6. BECOMING EASILY ANNOYED OR IRRITABLE: SEVERAL DAYS

## 2018-08-14 ASSESSMENT — PAIN SCALES - GENERAL: PAINLEVEL: MODERATE PAIN (5)

## 2018-08-14 ASSESSMENT — PATIENT HEALTH QUESTIONNAIRE - PHQ9: 5. POOR APPETITE OR OVEREATING: MORE THAN HALF THE DAYS

## 2018-08-14 NOTE — NURSING NOTE
"Chief Complaint   Patient presents with     Anxiety     Health Maintenance     up to date       Initial /83  Pulse 55  Temp 97.4  F (36.3  C) (Oral)  Resp 20  Wt 242 lb (109.8 kg)  SpO2 98%  BMI 31.93 kg/m2 Estimated body mass index is 31.93 kg/(m^2) as calculated from the following:    Height as of 6/4/18: 6' 1\" (1.854 m).    Weight as of this encounter: 242 lb (109.8 kg).  Medication Reconciliation: complete  Victoria Rice CMA  "

## 2018-08-14 NOTE — MR AVS SNAPSHOT
After Visit Summary   8/14/2018    Abimael Martinez    MRN: [id number]           Patient Information     Date Of Birth          1990        Visit Information        Provider Department      8/14/2018 11:45 AM Jamison Lora MD [facility]        Today's Diagnoses     Drug-induced erectile dysfunction        Anxiety        Panic disorder without agoraphobia           Follow-ups after your visit        Follow-up notes from your care team     Return in about 3 months (around 11/14/2018) for anxiety recheck.      Who to contact     If you have questions or need follow up information about today's clinic visit or your schedule please contact [facility] directly at [phone].  Normal or non-critical lab and imaging results will be communicated to you by MyChart, letter or phone within 4 business days after the clinic has received the results. If you do not hear from us within 7 days, please contact the clinic through [employer] or phone. If you have a critical or abnormal lab result, we will notify you by phone as soon as possible.  Submit refill requests through [employer] or call your pharmacy and they will forward the refill request to us. Please allow 3 business days for your refill to be completed.          Additional Information About Your Visit        MyChart Information     [employer] gives you secure access to your electronic health record. If you see a primary care provider, you can also send messages to your care team and make appointments. If you have questions, please call your primary care clinic.  If you do not have a primary care provider, please call [phone] and they will assist you.        Care EveryWhere ID     This is your Care EveryWhere ID. This could be used by other organizations to access your [facility] medical records  [id number]        Your Vitals Were     Pulse Temperature Respirations Pulse Oximetry BMI (Body Mass Index)       55 97.4  F  (36.3  C) (Oral) 20 98% 31.93 kg/m2        Blood Pressure from Last 3 Encounters:   08/14/18 128/83   06/13/18 136/85   06/04/18 138/88    Weight from Last 3 Encounters:   08/14/18 242 lb (109.8 kg)   06/13/18 230 lb 9.6 oz (104.6 kg)   06/04/18 232 lb 9.6 oz (105.5 kg)              Today, you had the following     No orders found for display         Today's Medication Changes          These changes are accurate as of 8/14/18 12:33 PM.  If you have any questions, ask your nurse or doctor.               These medicines have changed or have updated prescriptions.        Dose/Directions    hydrocortisone 2.5 % cream   Commonly known as:  ANUSOL-HC   This may have changed:  Another medication with the same name was removed. Continue taking this medication, and follow the directions you see here.   Used for:  External hemorrhoids   Changed by:  Jamison Lora MD        Place rectally 2 times daily as needed for hemorrhoids external   Quantity:  30 g   Refills:  1       sildenafil 20 MG tablet   Commonly known as:  REVATIO   This may have changed:  how much to take   Used for:  Drug-induced erectile dysfunction   Changed by:  Jamison Lora MD        Dose:  20-40 mg   Take 1-2 tablets (20-40 mg) by mouth daily as needed As needed for prevention of erectile dysfunction   Quantity:  30 tablet   Refills:  2            Where to get your medicines      These medications were sent to [facility] [address] [place] [address] [address] AT [facility] & [place] [address] [address], [facility] [address]     Phone:  [phone]     sertraline 100 MG tablet         Some of these will need a paper prescription and others can be bought over the counter.  Ask your nurse if you have questions.     Bring a paper prescription for each of these medications     ALPRAZolam 2 MG tablet         Call your pharmacy to confirm that your medication is ready for pickup. It may take up to 24 hours for them to receive the  prescription. If the prescription is not ready within 3 business days, please contact your clinic or your provider.     We will let you know when these medications are ready. If you don't hear back within 3 business days, please contact us.     sildenafil 20 MG tablet                Primary Care Provider Office Phone # Fax #    Jamison Lora [phone] [address] [facility] [address]        Equal Access to Services     KELSI HULL : Hadii aad ku hadasho Soomaali, waaxda luqadaha, qaybta kaalmada adeegyada, waxay idiin hayaan adeeg khkbsh lashiloh . So [facility] [phone].    ATENCIÓN: Si jorgela espfrantz, tiene a salazar disposición servicios gratuitos de asistencia lingüística. Llame al [phone].    We comply with applicable federal civil rights laws and [place] laws. We do not discriminate on the basis of race, color, national origin, age, disability, sex, sexual orientation, or gender identity.            Thank you!     Thank you for choosing [facility] [place]  for your care. Our goal is always to provide you with excellent care. Hearing back from our patients is one way we can continue to improve our services. Please take a few minutes to complete the written survey that you may receive in the mail after your visit with us. Thank you!             Your Updated Medication List - Protect others around you: Learn how to safely use, store and throw away your medicines at www.disposemymeds.org.          This list is accurate as of 8/14/18 12:33 PM.  Always use your most recent med list.                   Brand Name Dispense Instructions for use Diagnosis    albuterol 108 (90 Base) MCG/ACT inhaler    PROAIR HFA/PROVENTIL HFA/VENTOLIN HFA    1 Inhaler    Inhale 2 puffs into the lungs every 4 hours as needed    Intermittent asthma, uncomplicated       ALPRAZolam 2 MG tablet    XANAX    30 tablet    Take 1 tablet (2 mg) by mouth 3 times daily as needed for anxiety    Anxiety        amphetamine-dextroamphetamine 20 MG per tablet    ADDERALL    90 tablet    Take 1 tablet (20 mg) by mouth 3 times daily    Attention deficit hyperactivity disorder (ADHD), combined type       cyclobenzaprine 10 MG tablet    FLEXERIL    90 tablet    Take 1 tablet (10 mg) by mouth 3 times daily as needed for muscle spasms    Acute left-sided low back pain without sciatica       famotidine 40 MG tablet    PEPCID    90 tablet    TAKE 1 TABLET(40 MG) BY MOUTH AT BEDTIME    Upper abdominal pain       fluticasone 220 MCG/ACT Inhaler    FLOVENT HFA    1 Inhaler    Inhale 2 puffs into the lungs 2 times daily Please give patient spacer    Mild persistent asthma without complication       hydrocortisone 2.5 % cream    ANUSOL-HC    30 g    Place rectally 2 times daily as needed for hemorrhoids external    External hemorrhoids       Hyoscyamine Sulfate 0.375 MG Tbcr     90 tablet    Take 1 capful by mouth 3 times daily as needed    Upper abdominal pain       montelukast 10 MG tablet    SINGULAIR    90 tablet    Take 1 tablet (10 mg) by mouth At Bedtime    Mild persistent asthma without complication       sertraline 100 MG tablet    ZOLOFT    90 tablet    Take 1 tablet (100 mg) by mouth daily    Anxiety, Panic disorder without agoraphobia       sildenafil 20 MG tablet    REVATIO    30 tablet    Take 1-2 tablets (20-40 mg) by mouth daily as needed As needed for prevention of erectile dysfunction    Drug-induced erectile dysfunction

## 2018-08-14 NOTE — PROGRESS NOTES
"SUBJECTIVE:  Abimael Martinez is a 28 year old male who presents for a follow up evaluation of anxiety. The patient was started on Zoloft ( sertraline) 100 mg daily at the last visit which was 8 weeks ago. The patient reports that his persistant symptoms of anxiety include Excessive worry.   The patient reports that his symptoms have improved since starting this medication. .     The patient reports that he has experienced side effects from this medication.  The patient reports the side effects include: low libido, an upset stomach and some rectal irritation. Occasionally he has to strain to pass his bowel movements and is having them a little less often.    He has not been having the panic attacks.   He has low level anxiety daily    He is going to his couselor ever other week. He is getting more active.   He still has some back pain but it is getting better.          AYALA-7    Over the last 2 weeks, how often have you been bothered by the following problems?  (Use an \"x\" to indicate your answer) Not at all                (0) Several days                (1) More than half the days        (2) Nearly every day          (3)   1. Feeling nervous, anxious or on edge   x    2. Not being able to stop or control worrying   x    3. Worrying too much about different things   x    4. Trouble relaxing   x    5. Being so restless that it is hard to sit still   x    6. Becoming easily annoyed or irritable  x     7. Feeling afraid as if something awful might happen  x       Total_12______    Cut points for:   Mild Anxiety =  5  Moderate= 10  Severe=  15    AYALA-7 SCORE 11/21/2017 5/18/2018 6/13/2018   Total Score - - -   Total Score 19 17 12           Last PHQ-9 score on record= 10    The patient reports that he has attended mental health counseling for the current anxiety disorder.      OBJECTIVE:  General: the patient had a calm affect during the visit today.    ASSESSMENT: Generalized Anxiety Disorder (AYALA)  Panic Disorder " which has improved        PLAN:  We will continue the patient on the same dose of his antidepressant and have the patient return to clinic for appointment in 3 month(s). He was instructed to return earlier if the anxiety symptoms return.    The patient was recommended to seek individual counseling through his insurance company as an adjunct treatment to the prescribed medications.    Start taking a daily fiber supplement  Like metamucil

## 2018-08-15 ASSESSMENT — ANXIETY QUESTIONNAIRES: GAD7 TOTAL SCORE: 12

## 2018-08-15 ASSESSMENT — PATIENT HEALTH QUESTIONNAIRE - PHQ9: SUM OF ALL RESPONSES TO PHQ QUESTIONS 1-9: 10

## 2018-08-29 ENCOUNTER — OFFICE VISIT (OUTPATIENT)
Dept: FAMILY MEDICINE | Facility: CLINIC | Age: 28
End: 2018-08-29
Payer: COMMERCIAL

## 2018-08-29 VITALS
OXYGEN SATURATION: 99 % | HEART RATE: 99 BPM | TEMPERATURE: 97.6 F | WEIGHT: 243.4 LBS | BODY MASS INDEX: 32.26 KG/M2 | HEIGHT: 73 IN | SYSTOLIC BLOOD PRESSURE: 130 MMHG | RESPIRATION RATE: 16 BRPM | DIASTOLIC BLOOD PRESSURE: 88 MMHG

## 2018-08-29 DIAGNOSIS — M54.17 LUMBOSACRAL RADICULOPATHY: Primary | ICD-10-CM

## 2018-08-29 DIAGNOSIS — M54.42 ACUTE LEFT-SIDED LOW BACK PAIN WITH LEFT-SIDED SCIATICA: ICD-10-CM

## 2018-08-29 PROCEDURE — 99213 OFFICE O/P EST LOW 20 MIN: CPT | Performed by: FAMILY MEDICINE

## 2018-08-29 RX ORDER — OXYCODONE AND ACETAMINOPHEN 5; 325 MG/1; MG/1
1 TABLET ORAL EVERY 6 HOURS PRN
Qty: 12 TABLET | Refills: 0 | Status: SHIPPED | OUTPATIENT
Start: 2018-08-29 | End: 2021-03-12

## 2018-08-29 ASSESSMENT — PAIN SCALES - GENERAL: PAINLEVEL: WORST PAIN (10)

## 2018-08-29 NOTE — MR AVS SNAPSHOT
After Visit Summary   8/29/2018    Abimael Martinez    MRN: [id number]           Patient Information     Date Of Birth          1990        Visit Information        Provider Department      8/29/2018 3:00 PM Berta Veliz MD [facility]        Today's Diagnoses     Lumbosacral radiculopathy    -  1    Acute left-sided low back pain with left-sided sciatica          Care Instructions    At [facility], we strive to deliver an exceptional experience to you, every time we see you.  If you receive a survey in the mail, please send us back your thoughts. We really do value your feedback.    Based on your medical history, these are the current health maintenance/preventive care services that you are due for (some may have been done at this visit.)  Health Maintenance Due   Topic Date Due     ASTHMA ACTION PLAN Q1 YR  09/18/2018     ASTHMA CONTROL TEST Q6 MOS  09/19/2018       Suggested websites for health information:  Www.Mark Forged : Up to date and easily searchable information on multiple topics.  Www.medlineplus.gov : medication info, interactive tutorials, watch real surgeries online  Www.familydoctor.org : good info from the Academy of Family Physicians  Www.cdc.gov : public health info, travel advisories, epidemics (H1N1)  Www.aap.org : children's health info, normal development, vaccinations  Www.health.[place] : [place] dept of health, public health issues in [place], N1N1    Your care team:                            Family Medicine Internal Medicine   MD Sanjay García MD Shantel Branch-Fleming, MD Katya Georgiev PA-C Megan Hill, APRN CNP Nam Ho, MD Pediatrics   MIGUEL Herrera, MD Susie Ye APRN CNP   MD Ninoska Stevens MD Deborah Mielke, MD Kim Thein, APRN LUZ MARIA      Clinic hours: Monday - Thursday 7 am-7 pm; Fridays 7 am-5 pm.   Urgent care: Monday -  "Friday 11 am-9 pm; Saturday and Sunday 9 am-5 pm.  Pharmacy : Monday -Thursday 8 am-8 pm; Friday 8 am-6 pm; Saturday and Sunday 9 am-5 pm.     Clinic: [phone]   Pharmacy: [phone]             Follow-ups after your visit        Follow-up notes from your care team     Return in about 2 weeks (around 9/12/2018), or if symptoms worsen or fail to improve.      Who to contact     If you have questions or need follow up information about today's clinic visit or your schedule please contact [facility] directly at [phone].  Normal or non-critical lab and imaging results will be communicated to you by [employer], letter or phone within 4 business days after the clinic has received the results. If you do not hear from us within 7 days, please contact the clinic through [employer] or phone. If you have a critical or abnormal lab result, we will notify you by phone as soon as possible.  Submit refill requests through [employer] or call your pharmacy and they will forward the refill request to us. Please allow 3 business days for your refill to be completed.          Additional Information About Your Visit        [employer] Information     [employer] gives you secure access to your electronic health record. If you see a primary care provider, you can also send messages to your care team and make appointments. If you have questions, please call your primary care clinic.  If you do not have a primary care provider, please call [phone] and they will assist you.        Care EveryWhere ID     This is your Care EveryWhere ID. This could be used by other organizations to access your [facility] medical records  [id number]        Your Vitals Were     Pulse Temperature Respirations Height Pulse Oximetry BMI (Body Mass Index)    99 97.6  F (36.4  C) (Oral) 16 6' 1\" (1.854 m) 99% 32.11 kg/m2       Blood Pressure from Last 3 Encounters:   08/29/18 130/88   08/14/18 128/83   06/13/18 136/85    Weight from Last 3 " Encounters:   08/29/18 243 lb 6.4 oz (110.4 kg)   08/14/18 242 lb (109.8 kg)   06/13/18 230 lb 9.6 oz (104.6 kg)              Today, you had the following     No orders found for display         Today's Medication Changes          These changes are accurate as of 8/29/18  3:28 PM.  If you have any questions, ask your nurse or doctor.               Start taking these medicines.        Dose/Directions    oxyCODONE-acetaminophen 5-325 MG per tablet   Commonly known as:  PERCOCET   Used for:  Lumbosacral radiculopathy, Acute left-sided low back pain with left-sided sciatica   Started by:  Berta Veliz MD        Dose:  1 tablet   Take 1 tablet by mouth every 6 hours as needed for pain   Quantity:  12 tablet   Refills:  0            Where to get your medicines      Some of these will need a paper prescription and others can be bought over the counter.  Ask your nurse if you have questions.     Bring a paper prescription for each of these medications     oxyCODONE-acetaminophen 5-325 MG per tablet               Information about OPIOIDS     PRESCRIPTION OPIOIDS: WHAT YOU NEED TO KNOW   We gave you an opioid (narcotic) pain medicine. It is important to manage your pain, but opioids are not always the best choice. You should first try all the other options your care team gave you. Take this medicine for as short a time (and as few doses) as possible.    Some activities can increase your pain, such as bandage changes or therapy sessions. It may help to take your pain medicine 30 to 60 minutes before these activities. Reduce your stress by getting enough sleep, working on hobbies you enjoy and practicing relaxation or meditation. Talk to your care team about ways to manage your pain beyond prescription opioids.    These medicines have risks:    DO NOT drive when on new or higher doses of pain medicine. These medicines can affect your alertness and reaction times, and you could be arrested for driving under  the influence (DUI). If you need to use opioids long-term, talk to your care team about driving.    DO NOT operate heavy machinery    DO NOT do any other dangerous activities while taking these medicines.    DO NOT drink any alcohol while taking these medicines.     If the opioid prescribed includes acetaminophen, DO NOT take with any other medicines that contain acetaminophen. Read all labels carefully. Look for the word  acetaminophen  or  Tylenol.  Ask your pharmacist if you have questions or are unsure.    You can get addicted to pain medicines, especially if you have a history of addiction (chemical, alcohol or substance dependence). Talk to your care team about ways to reduce this risk.    All opioids tend to cause constipation. Drink plenty of water and eat foods that have a lot of fiber, such as fruits, vegetables, prune juice, apple juice and high-fiber cereal. Take a laxative (Miralax, milk of magnesia, Colace, Senna) if you don t move your bowels at least every other day. Other side effects include upset stomach, sleepiness, dizziness, throwing up, tolerance (needing more of the medicine to have the same effect), physical dependence and slowed breathing.    Store your pills in a secure place, locked if possible. We will not replace any lost or stolen medicine. If you don t finish your medicine, please throw away (dispose) as directed by your pharmacist. The [place] Pollution Control Agency has more information about safe disposal: https://www.pca.state.[place]/living-green/managing-unwanted-medications         Primary Care Provider Office Phone # Fax #    Jamison Lora [phone]       [address] [facility] [address]        Equal Access to Services     [facility] : Hadii gretchen sanchez Sonetta, waaxda luqadaha, qaybta kaalmaevelyn colorado . So [facility] [phone].    ATENCIÓN: Si habla español, tiene a salazar disposición servicios gratuitos  de asistencia lingüística. Tiffanie oliveira [phone].    We comply with applicable federal civil rights laws and [place] laws. We do not discriminate on the basis of race, color, national origin, age, disability, sex, sexual orientation, or gender identity.            Thank you!     Thank you for choosing [facility]  for your care. Our goal is always to provide you with excellent care. Hearing back from our patients is one way we can continue to improve our services. Please take a few minutes to complete the written survey that you may receive in the mail after your visit with us. Thank you!             Your Updated Medication List - Protect others around you: Learn how to safely use, store and throw away your medicines at www.disposemymeds.org.          This list is accurate as of 8/29/18  3:28 PM.  Always use your most recent med list.                   Brand Name Dispense Instructions for use Diagnosis    albuterol 108 (90 Base) MCG/ACT inhaler    PROAIR HFA/PROVENTIL HFA/VENTOLIN HFA    1 Inhaler    Inhale 2 puffs into the lungs every 4 hours as needed    Intermittent asthma, uncomplicated       ALPRAZolam 2 MG tablet    XANAX    30 tablet    Take 1 tablet (2 mg) by mouth 3 times daily as needed for anxiety    Anxiety       amphetamine-dextroamphetamine 20 MG per tablet    ADDERALL    90 tablet    Take 1 tablet (20 mg) by mouth 3 times daily    Attention deficit hyperactivity disorder (ADHD), combined type       cyclobenzaprine 10 MG tablet    FLEXERIL    90 tablet    Take 1 tablet (10 mg) by mouth 3 times daily as needed for muscle spasms    Acute left-sided low back pain without sciatica       famotidine 40 MG tablet    PEPCID    90 tablet    TAKE 1 TABLET(40 MG) BY MOUTH AT BEDTIME    Upper abdominal pain       fluticasone 220 MCG/ACT Inhaler    FLOVENT HFA    1 Inhaler    Inhale 2 puffs into the lungs 2 times daily Please give patient spacer    Mild persistent asthma without complication        hydrocortisone 2.5 % cream    ANUSOL-HC    30 g    Place rectally 2 times daily as needed for hemorrhoids external    External hemorrhoids       Hyoscyamine Sulfate 0.375 MG Tbcr     90 tablet    Take 1 capful by mouth 3 times daily as needed    Upper abdominal pain       montelukast 10 MG tablet    SINGULAIR    90 tablet    Take 1 tablet (10 mg) by mouth At Bedtime    Mild persistent asthma without complication       oxyCODONE-acetaminophen 5-325 MG per tablet    PERCOCET    12 tablet    Take 1 tablet by mouth every 6 hours as needed for pain    Lumbosacral radiculopathy, Acute left-sided low back pain with left-sided sciatica       sertraline 100 MG tablet    ZOLOFT    90 tablet    Take 1 tablet (100 mg) by mouth daily    Anxiety, Panic disorder without agoraphobia       sildenafil 20 MG tablet    REVATIO    30 tablet    Take 1-2 tablets (20-40 mg) by mouth daily as needed As needed for prevention of erectile dysfunction    Drug-induced erectile dysfunction

## 2018-08-29 NOTE — PROGRESS NOTES
SUBJECTIVE:   Abimael Martinez is a 28 year old male who presents to clinic today for the following health issues:      Back Pain       Duration: 3x days, had surgery 4 months ago and pain restarted 3 months ago.  Seen by a new specialist and found to have reherniation and due to have injections 10 days from now.        Specific cause: on the boat bouncing    Description:   Location of pain: low back mid with left low back and left buttock pain and radiation of pain for later left hip to great toe  Character of pain: dull, sharp, stabbing and constant  Pain radiation:radiates into the left leg  New numbness or weakness in legs, not attributed to pain:  no     Intensity: Currently 10/10    History:   Pain interferes with job: No  History of back problems: previous herniated disc and previous spinal surgery - 4/15/18 roughly  Any previous MRI or X-rays: None  Sees a specialist for back pain:  See [facility] [place] [facility]  Therapies tried without relief: ibuprofen and tramdol     Alleviating factors:    Improved by: moving will just help temporarily       Precipitating factors:  Worsened by: Lifting, Bending, Standing, Sitting, Lying Flat, Walking and Coughing    Functional and Psychosocial Screen (Claudia STarT Back):      Not performed today        Accompanying Signs & Symptoms:  Risk of Fracture:  None  Risk of Cauda Equina:  None  Risk of Infection:  None  Risk of Cancer:  None  Risk of Ankylosing Spondylitis:  Onset at age <35, male, AND morning back stiffness. no     Requesting pain medication.  Tried ibuprofen 800 and old tramadol's without improvement.  Denies other recent pain medications. Due to see [facility] soon.     Problem list and histories reviewed & adjusted, as indicated.  Additional history: as documented    Patient Active Problem List   Diagnosis     Anxiety     CARDIOVASCULAR SCREENING; LDL GOAL LESS THAN 160     High risk sexual behavior     Tobacco use disorder     Low back pain  "    Hypertension goal BP (blood pressure) < 140/90     Internal hemorrhoids which bleed     Obesity, Class I, BMI 30-34.9     Attention deficit hyperactivity disorder (ADHD), combined type     AYALA (generalized anxiety disorder)     OCD (obsessive compulsive disorder)     Drug-induced erectile dysfunction     Mild persistent asthma without complication     Lumbosacral radiculopathy     Dyslipidemia     Elevated serum creatinine     Past Surgical History:   Procedure Laterality Date     BACK SURGERY  04/05/2018       Social History   Substance Use Topics     Smoking status: Former Smoker     Packs/day: 0.50     Years: 6.00     Types: Cigarettes     Quit date: 3/12/2018     Smokeless tobacco: Never Used      Comment: second hand smoke exposure     Alcohol use Yes      Comment: once a week     Family History   Problem Relation Age of Onset     Unknown/Adopted Mother      Unknown/Adopted Father      Unknown/Adopted Maternal Grandmother      Unknown/Adopted Maternal Grandfather      Unknown/Adopted Paternal Grandmother      Unknown/Adopted Paternal Grandfather      Unknown/Adopted Brother            Reviewed and updated as needed this visit by clinical staff  Tobacco  Allergies  Meds  Problems  Surg Hx       Reviewed and updated as needed this visit by Provider  Allergies  Meds  Problems  Surg Hx         ROS:  Constitutional, HEENT, cardiovascular, pulmonary, gi and gu systems are negative, except as otherwise noted.    OBJECTIVE:     /88  Pulse 99  Temp 97.6  F (36.4  C) (Oral)  Resp 16  Ht 6' 1\" (1.854 m)  Wt 243 lb 6.4 oz (110.4 kg)  SpO2 99%  BMI 32.11 kg/m2  Body mass index is 32.11 kg/(m^2).  GENERAL: healthy, alert and no distress  NECK: no adenopathy, no asymmetry, masses, or scars and thyroid normal to palpation  RESP: lungs clear to auscultation - no rales, rhonchi or wheezes  CV: regular rate and rhythm, normal S1 S2, no S3 or S4, no murmur, click or rub, no peripheral edema and peripheral " pulses strong  ABDOMEN: soft, nontender, no hepatosplenomegaly, no masses and bowel sounds normal  MS: no gross musculoskeletal defects noted, no edema  Comprehensive back pain exam:  Tenderness of left lumbar paraspinal area, left SIJ, Pain limits the following motions: extension, Lower extremity strength functional and equal on both sides and Lower extremity sensation normal and equal on both sides    Diagnostic Test Results:  none     ASSESSMENT/PLAN:     1. Lumbosacral radiculopathy   checked and no narcotics since April 2018.  Rx for short supply percocet given for now  - oxyCODONE-acetaminophen (PERCOCET) 5-325 MG per tablet; Take 1 tablet by mouth every 6 hours as needed for pain  Dispense: 12 tablet; Refill: 0    2. Acute left-sided low back pain with left-sided sciatica  As above  - oxyCODONE-acetaminophen (PERCOCET) 5-325 MG per tablet; Take 1 tablet by mouth every 6 hours as needed for pain  Dispense: 12 tablet; Refill: 0    The uses and side effects, including black box warnings as appropriate, were discussed in detail.  All patient questions were answered.  The patient was instructed to call immediately if any side effects developed.     FUTURE APPOINTMENTS:       - Follow-up visit in 2 weeks if not improved.    Berta Chan MD  [facility]

## 2018-08-29 NOTE — PATIENT INSTRUCTIONS
At [facility], we strive to deliver an exceptional experience to you, every time we see you.  If you receive a survey in the mail, please send us back your thoughts. We really do value your feedback.    Based on your medical history, these are the current health maintenance/preventive care services that you are due for (some may have been done at this visit.)  Health Maintenance Due   Topic Date Due     ASTHMA ACTION PLAN Q1 YR  09/18/2018     ASTHMA CONTROL TEST Q6 MOS  09/19/2018       Suggested websites for health information:  Www.Mural.ly.Pradama : Up to date and easily searchable information on multiple topics.  Www.GadgetATM.gov : medication info, interactive tutorials, watch real surgeries online  Www.familydoctor.org : good info from the Academy of Family Physicians  Www.cdc.gov : public health info, travel advisories, epidemics (H1N1)  Www.aap.org : children's health info, normal development, vaccinations  Www.health.[facility].[place].us : [place] dept of health, public health issues in [place], N1N1    Your care team:                            Family Medicine Internal Medicine   MD Sanjay García MD Shantel Branch-Fleming, MD Katya Georgiev PA-C Megan Hill, APRN CNP    Jorge Zhong MD Pediatrics   Royer Eubanks, PASRINATH Doty, MD Susie Ye APRN CNP   MD Ninoska Stevens MD Deborah Mielke, MD Kim Thein, APRN [facility]      Clinic hours: Monday - Thursday 7 am-7 pm; Fridays 7 am-5 pm.   Urgent care: Monday - Friday 11 am-9 pm; Saturday and Sunday 9 am-5 pm.  Pharmacy : Monday -Thursday 8 am-8 pm; Friday 8 am-6 pm; Saturday and Sunday 9 am-5 pm.     Clinic: [phone]   Pharmacy: [phone]

## 2018-08-30 ASSESSMENT — ASTHMA QUESTIONNAIRES: ACT_TOTALSCORE: 20

## 2018-08-31 ENCOUNTER — TRANSFERRED RECORDS (OUTPATIENT)
Dept: HEALTH INFORMATION MANAGEMENT | Facility: CLINIC | Age: 28
End: 2018-08-31

## 2018-09-04 ENCOUNTER — TELEPHONE (OUTPATIENT)
Dept: FAMILY MEDICINE | Facility: CLINIC | Age: 28
End: 2018-09-04

## 2018-09-04 DIAGNOSIS — R10.10 UPPER ABDOMINAL PAIN: ICD-10-CM

## 2018-09-04 DIAGNOSIS — F90.2 ATTENTION DEFICIT HYPERACTIVITY DISORDER (ADHD), COMBINED TYPE: ICD-10-CM

## 2018-09-04 RX ORDER — DEXTROAMPHETAMINE SACCHARATE, AMPHETAMINE ASPARTATE, DEXTROAMPHETAMINE SULFATE AND AMPHETAMINE SULFATE 5; 5; 5; 5 MG/1; MG/1; MG/1; MG/1
20 TABLET ORAL 3 TIMES DAILY
Qty: 90 TABLET | Refills: 0 | Status: SHIPPED | OUTPATIENT
Start: 2018-10-04 | End: 2018-09-04

## 2018-09-04 RX ORDER — DEXTROAMPHETAMINE SACCHARATE, AMPHETAMINE ASPARTATE, DEXTROAMPHETAMINE SULFATE AND AMPHETAMINE SULFATE 5; 5; 5; 5 MG/1; MG/1; MG/1; MG/1
20 TABLET ORAL 3 TIMES DAILY
Qty: 90 TABLET | Refills: 0 | Status: SHIPPED | OUTPATIENT
Start: 2018-09-04 | End: 2018-09-04

## 2018-09-04 RX ORDER — DEXTROAMPHETAMINE SACCHARATE, AMPHETAMINE ASPARTATE, DEXTROAMPHETAMINE SULFATE AND AMPHETAMINE SULFATE 5; 5; 5; 5 MG/1; MG/1; MG/1; MG/1
20 TABLET ORAL 3 TIMES DAILY
Qty: 90 TABLET | Refills: 0 | Status: SHIPPED | OUTPATIENT
Start: 2018-11-04 | End: 2018-09-04

## 2018-09-04 RX ORDER — DEXTROAMPHETAMINE SACCHARATE, AMPHETAMINE ASPARTATE, DEXTROAMPHETAMINE SULFATE AND AMPHETAMINE SULFATE 5; 5; 5; 5 MG/1; MG/1; MG/1; MG/1
20 TABLET ORAL 3 TIMES DAILY
Qty: 90 TABLET | Refills: 0 | Status: SHIPPED | OUTPATIENT
Start: 2018-11-04 | End: 2018-10-31

## 2018-09-04 RX ORDER — DEXTROAMPHETAMINE SACCHARATE, AMPHETAMINE ASPARTATE, DEXTROAMPHETAMINE SULFATE AND AMPHETAMINE SULFATE 5; 5; 5; 5 MG/1; MG/1; MG/1; MG/1
20 TABLET ORAL 3 TIMES DAILY
Qty: 90 TABLET | Refills: 0 | Status: SHIPPED | OUTPATIENT
Start: 2018-10-31 | End: 2018-09-04

## 2018-09-04 NOTE — TELEPHONE ENCOUNTER
Per patient, has been taking Hyoscyamine, patient takes for random sharp pain for his stomach,  Patient has had multiple refills from previous medication.   Patient notes has internal hemorrhoids, inflammatory bowel disease.  Patient states pain randomly began again about 4 weeks ago, ? Related to post surgical medications.  Has been to emergency department for pain that radiated to testicles.  Patient notes had bm today and the toilet full of blood.  Patient states in past has had rectal suppository for rectal bleeding prescribed by GI.  Patient notes had work up through gastroenterologist.  Notes went to emergency department 2 days ago due to pain see notes..  Discussed may require an appointment, prefers to have Dr Lora review.   Please advise on refill.  Jasmin Lauren RN

## 2018-09-04 NOTE — TELEPHONE ENCOUNTER
Hyoscyamine last prescribe 11/7/16.  Left message on answering machine for patient/parent to call back.   [phone].  Jasmin Lauren RN

## 2018-09-04 NOTE — PROGRESS NOTES
The dates on his 3 prescription(s) were no correct so I created 3 new prescription(s) with consecutive monthly start dates.     The requested prescription(s) has/have been approved and has/have been printed and signed. This note was forwarded to the patient care pool to contact the patient to arrange for the patient to obtain the signed prescription(s).  Jamison Lora

## 2018-09-05 NOTE — TELEPHONE ENCOUNTER
This is a common and safe as needed  treatment for IRRITABLE BOWEL SYNDROME or spastic colon. I will refill(s) it

## 2018-09-07 ENCOUNTER — OFFICE VISIT (OUTPATIENT)
Dept: SURGERY | Facility: CLINIC | Age: 28
End: 2018-09-07
Payer: COMMERCIAL

## 2018-09-07 ENCOUNTER — TELEPHONE (OUTPATIENT)
Dept: SURGERY | Facility: CLINIC | Age: 28
End: 2018-09-07

## 2018-09-07 ENCOUNTER — TELEPHONE (OUTPATIENT)
Dept: GASTROENTEROLOGY | Facility: CLINIC | Age: 28
End: 2018-09-07

## 2018-09-07 VITALS
BODY MASS INDEX: 32.47 KG/M2 | TEMPERATURE: 97.5 F | DIASTOLIC BLOOD PRESSURE: 74 MMHG | OXYGEN SATURATION: 99 % | HEIGHT: 73 IN | HEART RATE: 67 BPM | WEIGHT: 245 LBS | SYSTOLIC BLOOD PRESSURE: 116 MMHG

## 2018-09-07 DIAGNOSIS — N50.819 TESTICULAR PAIN: ICD-10-CM

## 2018-09-07 DIAGNOSIS — K62.5 HEMORRHAGE OF RECTUM AND ANUS: Primary | ICD-10-CM

## 2018-09-07 DIAGNOSIS — N50.9 TENDER SCROTUM: ICD-10-CM

## 2018-09-07 ASSESSMENT — PAIN SCALES - GENERAL: PAINLEVEL: SEVERE PAIN (6)

## 2018-09-07 NOTE — TELEPHONE ENCOUNTER
Health Call Center    Phone Message    May a detailed message be left on voicemail: yes    Reason for Call: Other: New Patient // Reports Internal Hemorrhoids causing severe pain.  He has had banding in the past.  He is looking to be seen today as there is also a lot of bleeding.  Please call asap.      Action Taken: Message routed to:  [facility]

## 2018-09-07 NOTE — LETTER
"2018      RE: Abimael Martinez  [address]  [facility] [address]       Colon and Rectal Surgery Consult Clinic Note    Date: 2018     Referring provider:  Jamison Lora MD  [address] [place] [address]     RE: Abimael Martinez  : 1990  CHRISTIAN: 2018    Abimael Martinez is a very pleasant 28 year old male without a significant past medical history with a recent diagnosis of rectal pain and bleeding.  Given these findings they were subsequently sent to the [facility] for an opinion on this and a new patient consultation.     Abimael reports \"not feeling right\" for over a week.  He has had some nausea and lightheadedness.  He additionally has had a low abdominal and testicular pain.  He gets sharp pains in his testicles a few times a day.  He was seen in the emergency room at [facility] with negative STD testing, negative UA, negative blood work, and negative testicular ultrasound.  He denies any pain with urination but does \"feel a little constricted with voiding\".  He additionally has had bright red blood and increased pain with passing stools.  The blood is enough to drip into the toilet.  He had some constipation after his back surgery a few months ago but no further constipation.  Bowel movements have been soft recently.  No diarrhea.  He does state that his stools are more skinny than normal.  He denies any tissue that comes in and out with bowel movements.  He has had hemorrhoid banding in the past for rectal bleeding but this was over 3 years ago.  His hemoglobin on 9/3/2018 was 14.9.  Normal colonoscopy in .    Assessment/Plan: 28 year old male with rectal pain, testicular pain, prostate tenderness, and rectal bleeding.  On exam he does have grade 2-3 internal hemorrhoids.  I discussed that these could be the source of his rectal bleeding.  However, given his report of narrow stools as well as increased pain along with bleeding, " "would recommend a flexible sigmoidoscopy to rule out any other sources of rectal bleeding.  We discussed managing hemorrhoids with hemorrhoid banding as he has tolerated this well in the past.  Discussed the risks of bleeding today and when the band falls off in 1-2 weeks.  He states an understanding of these risks and wished to proceed with banding today.  One band was placed in the right anterior and one on the left lateral positions.  He tolerated this well.  He did have some tenderness with palpation of his prostate today and is complaining of testicular pain and feeling \"constricted\" when voiding.  I advised follow-up with urology.  He denies any anal receptive intercourse and has had negative STD testing but without a rectal swab.  Advised full STD testing with rectal swab.  We will have him follow-up in clinic for a flexible sigmoidoscopy anytime after 3-4 weeks if symptoms persist. Patient's questions were answered to his stated satisfaction and he is in agreement with this plan.      Medical history:  Past Medical History:   Diagnosis Date     Acute right otitis media 1/8/2013     Anxiety      Depression      Drug abuse      Urethritis 5/20/2013     Problem list name updated by automated process. Provider to review       Surgical history:  Past Surgical History:   Procedure Laterality Date     BACK SURGERY  04/05/2018       Problem list:    Patient Active Problem List    Diagnosis Date Noted     Lumbosacral radiculopathy 03/12/2018     Priority: Medium     Dyslipidemia 03/12/2018     Priority: Medium     Elevated serum creatinine 03/12/2018     Priority: Medium     Mild persistent asthma without complication 07/31/2017     Priority: Medium     Drug-induced erectile dysfunction 11/07/2016     Priority: Medium     OCD (obsessive compulsive disorder) 09/16/2016     Priority: Medium     AYALA (generalized anxiety disorder) 12/04/2015     Priority: Medium     Patient is followed by MICHAEL OROPEZA for ongoing " prescription of benzodiazepines.  All refills should be approved by this provider, or covering partner.    Medication(s): alprazolam 2 mg .   Maximum quantity per month: 36  Clinic visit frequency required: Q 6  months     Controlled substance agreement on file: No  Benzodiazepine use reviewed by psychiatry:  No    Last [facility] website verification:  none   [email or web address]           Attention deficit hyperactivity disorder (ADHD), combined type 10/14/2015     Priority: Medium     Patient is followed by MICHAEL OROPEZA for ongoing prescription of stimulants.  All refills should be approved by this provider, or covering partner.    Medication(s): adderall 20 mg.   Maximum quantity per month: 90  Clinic visit frequency required: Q 6  months     Controlled substance agreement on file: 07/31/17    Neuropsych evaluation for ADD completed:  Yes, completed 7-2008 at [facility], on file and diagnosis confirmed    Last [facility] website verification:  3/14/16, no concerns   [email or web address]           Obesity, Class I, BMI 30-34.9 02/20/2015     Priority: Medium     Internal hemorrhoids which bleed 10/02/2014     Priority: Medium     Hypertension goal BP (blood pressure) < 140/90 08/17/2014     Priority: Medium     Low back pain 04/07/2013     Priority: Medium     Tobacco use disorder 03/19/2012     Priority: Medium     High risk sexual behavior 11/06/2011     Priority: Medium     CARDIOVASCULAR SCREENING; LDL GOAL LESS THAN 160 05/24/2011     Priority: Medium     Anxiety      Priority: Medium       Medications:  Current Outpatient Prescriptions   Medication Sig Dispense Refill     albuterol (PROAIR HFA/PROVENTIL HFA/VENTOLIN HFA) 108 (90 BASE) MCG/ACT Inhaler Inhale 2 puffs into the lungs every 4 hours as needed 1 Inhaler 0     ALPRAZolam (XANAX) 2 MG tablet Take 1 tablet (2 mg) by mouth 3 times daily as needed for anxiety 30 tablet 0     [START ON 11/4/2018] amphetamine-dextroamphetamine (ADDERALL) 20 MG  per tablet Take 1 tablet (20 mg) by mouth 3 times daily 90 tablet 0     famotidine (PEPCID) 40 MG tablet TAKE 1 TABLET(40 MG) BY MOUTH AT BEDTIME 90 tablet 0     fluticasone (FLOVENT HFA) 220 MCG/ACT Inhaler Inhale 2 puffs into the lungs 2 times daily Please give patient spacer 1 Inhaler 3     hydrocortisone (ANUSOL-HC) 2.5 % cream Place rectally 2 times daily as needed for hemorrhoids external 30 g 1     montelukast (SINGULAIR) 10 MG tablet Take 1 tablet (10 mg) by mouth At Bedtime 90 tablet 0     sertraline (ZOLOFT) 100 MG tablet Take 1 tablet (100 mg) by mouth daily 90 tablet 0     cyclobenzaprine (FLEXERIL) 10 MG tablet Take 1 tablet (10 mg) by mouth 3 times daily as needed for muscle spasms (Patient not taking: Reported on 9/7/2018) 90 tablet 1     Hyoscyamine Sulfate 0.375 MG TBCR Take 1 capful by mouth 3 times daily as needed (Patient not taking: Reported on 9/7/2018) 90 tablet 1     oxyCODONE-acetaminophen (PERCOCET) 5-325 MG per tablet Take 1 tablet by mouth every 6 hours as needed for pain (Patient not taking: Reported on 9/7/2018) 12 tablet 0     sildenafil (REVATIO) 20 MG tablet Take 1-2 tablets (20-40 mg) by mouth daily as needed As needed for prevention of erectile dysfunction (Patient not taking: Reported on 9/7/2018) 30 tablet 2       Allergies:  Allergies   Allergen Reactions     Cymbalta      Weight gain and fatigue     Duloxetine Other (See Comments)     Weight gain, fatigue  Enlarged spleen and weight gain     No Clinical Screening - See Comments GI Disturbance     Weight gain and fatigue     Paroxetine Other (See Comments), GI Disturbance and Unknown     Weight gain, fatigue  Weight gain  Spleen issues  Weight gain  Spleen issues     Paxil [Paroxetine Mesylate]      Weight gain and fatigue     Vicodin [Hydrocodone-Acetaminophen]        Family history:  Family History   Problem Relation Age of Onset     Unknown/Adopted Mother      Unknown/Adopted Father      Unknown/Adopted Maternal Grandmother   "    Unknown/Adopted Maternal Grandfather      Unknown/Adopted Paternal Grandmother      Unknown/Adopted Paternal Grandfather      Unknown/Adopted Brother        Social history:  Social History   Substance Use Topics     Smoking status: Former Smoker     Packs/day: 0.50     Years: 6.00     Types: Cigarettes     Quit date: 3/12/2018     Smokeless tobacco: Never Used      Comment: second hand smoke exposure     Alcohol use Yes      Comment: once a week    Marital status: single.  Occupation: finishing school.    Nursing Notes:   Dave Estrada CMA  9/7/2018  1:39 PM  Signed  Chief Complaint   Patient presents with     Rectal Problem     Rectal bleeding and pain.       Vitals:    09/07/18 1337   BP: 116/74   Pulse: 67   Temp: 97.5  F (36.4  C)   SpO2: 99%   Weight: 245 lb   Height: 6' 1\"       Body mass index is 32.32 kg/(m^2).      BRANDY Domingo                         Physical Examination: Exam was chaperoned by BRANDY Domingo   /74  Pulse 67  Temp 97.5  F (36.4  C)  Ht 6' 1\"  Wt 245 lb  SpO2 99%  BMI 32.32 kg/m2  General: alert, oriented, in no acute distress, sitting comfortably  HEENT: mucous membranes moist  Perianal external examination:  Perianal skin: Intact with no excoriation or lichenification.  Lesions: No evidence of an external lesion, nodularity, or induration in the perianal region.  Eversion of buttocks: There was not evidence of an anal fissure. Details: N/A.  Skin tags or external hemorrhoids: None.  Digital rectal examination: Was performed.   Sphincter tone: Good.  Palpable lesions: No.  Prostate: Abnormal: tenderness on palpation.    Anoscopy: Was performed.   Hemorrhoids: Yes. Grade 2-3 internal hemorrhoids without active bleeding  Lesions: No    Procedures:  After discussing the risks and benefits, the patient agreed to proceed with internal hemorrhoidal banding.    Prior to the start of the procedure and with procedural staff participation, I verbally confirmed " the patient s identity using two indicators, relevant allergies, that the procedure was appropriate and matched the consent or emergent situation, and that the correct equipment/implants were available. Immediately prior to starting the procedure I conducted the Time Out with the procedural staff and re-confirmed the patient s name, procedure, and site/side. (The Joint Commission universal protocol was followed.)  Yes    Sedation (Moderate or Deep): None    A suction hemorrhoidal  was used to place a total of 2 band(s) in the right anterior and left lateral position(s).    There was no significant bleeding. The patient tolerated the procedure well.    This procedure was performed under a collaborative privileging agreement with Dr. Toledo, Chief of Colon and Rectal Surgery.    Total face to face time was 30 minutes, outside the procedure time, >50% counseling.    STEFFEN Veras, NP-C  Colon and Rectal Surgery   [facility]    This note was created using speech recognition software and may contain unintended word substitutions.      STEFFEN Veras CNP

## 2018-09-07 NOTE — PROGRESS NOTES
"Colon and Rectal Surgery Consult Clinic Note    Date: 2018     Referring provider:  Jamison Lora MD  [address] [place] [address]     RE: Abimael Martinez  : 1990  CHRISTIAN: 2018    Abimael Martinez is a very pleasant 28 year old male without a significant past medical history with a recent diagnosis of rectal pain and bleeding.  Given these findings they were subsequently sent to the Colon and Rectal Surgery Clinic for an opinion on this and a new patient consultation.     Abimael reports \"not feeling right\" for over a week.  He has had some nausea and lightheadedness.  He additionally has had a low abdominal and testicular pain.  He gets sharp pains in his testicles a few times a day.  He was seen in the emergency room at [facility] with negative STD testing, negative UA, negative blood work, and negative testicular ultrasound.  He denies any pain with urination but does \"feel a little constricted with voiding\".  He additionally has had bright red blood and increased pain with passing stools.  The blood is enough to drip into the toilet.  He had some constipation after his back surgery a few months ago but no further constipation.  Bowel movements have been soft recently.  No diarrhea.  He does state that his stools are more skinny than normal.  He denies any tissue that comes in and out with bowel movements.  He has had hemorrhoid banding in the past for rectal bleeding but this was over 3 years ago.  His hemoglobin on 9/3/2018 was 14.9.  Normal colonoscopy in .    Assessment/Plan: 28 year old male with rectal pain, testicular pain, prostate tenderness, and rectal bleeding.  On exam he does have grade 2-3 internal hemorrhoids.  I discussed that these could be the source of his rectal bleeding.  However, given his report of narrow stools as well as increased pain along with bleeding, would recommend a flexible sigmoidoscopy to rule out any other sources of rectal bleeding.  We " "discussed managing hemorrhoids with hemorrhoid banding as he has tolerated this well in the past.  Discussed the risks of bleeding today and when the band falls off in 1-2 weeks.  He states an understanding of these risks and wished to proceed with banding today.  One band was placed in the right anterior and one on the left lateral positions.  He tolerated this well.  He did have some tenderness with palpation of his prostate today and is complaining of testicular pain and feeling \"constricted\" when voiding.  I advised follow-up with urology.  He denies any anal receptive intercourse and has had negative STD testing but without a rectal swab.  Advised full STD testing with rectal swab.  We will have him follow-up in clinic for a flexible sigmoidoscopy anytime after 3-4 weeks if symptoms persist. Patient's questions were answered to his stated satisfaction and he is in agreement with this plan.      Medical history:  Past Medical History:   Diagnosis Date     Acute right otitis media 1/8/2013     Anxiety      Depression      Drug abuse      Urethritis 5/20/2013     Problem list name updated by automated process. Provider to review       Surgical history:  Past Surgical History:   Procedure Laterality Date     BACK SURGERY  04/05/2018       Problem list:    Patient Active Problem List    Diagnosis Date Noted     Lumbosacral radiculopathy 03/12/2018     Priority: Medium     Dyslipidemia 03/12/2018     Priority: Medium     Elevated serum creatinine 03/12/2018     Priority: Medium     Mild persistent asthma without complication 07/31/2017     Priority: Medium     Drug-induced erectile dysfunction 11/07/2016     Priority: Medium     OCD (obsessive compulsive disorder) 09/16/2016     Priority: Medium     AYALA (generalized anxiety disorder) 12/04/2015     Priority: Medium     Patient is followed by MICHAEL OROPEZA for ongoing prescription of benzodiazepines.  All refills should be approved by this provider, or covering " partner.    Medication(s): alprazolam 2 mg .   Maximum quantity per month: 36  Clinic visit frequency required: Q 6  months     Controlled substance agreement on file: No  Benzodiazepine use reviewed by psychiatry:  No    Last [facility] website verification:  none   [email or web address]           Attention deficit hyperactivity disorder (ADHD), combined type 10/14/2015     Priority: Medium     Patient is followed by MICHAEL OROPEZA for ongoing prescription of stimulants.  All refills should be approved by this provider, or covering partner.    Medication(s): adderall 20 mg.   Maximum quantity per month: 90  Clinic visit frequency required: Q 6  months     Controlled substance agreement on file: 07/31/17    Neuropsych evaluation for ADD completed:  Yes, completed 7-2008 at [facility], on file and diagnosis confirmed    Last [facility] website verification:  3/14/16, no concerns   [email or web address]           Obesity, Class I, BMI 30-34.9 02/20/2015     Priority: Medium     Internal hemorrhoids which bleed 10/02/2014     Priority: Medium     Hypertension goal BP (blood pressure) < 140/90 08/17/2014     Priority: Medium     Low back pain 04/07/2013     Priority: Medium     Tobacco use disorder 03/19/2012     Priority: Medium     High risk sexual behavior 11/06/2011     Priority: Medium     CARDIOVASCULAR SCREENING; LDL GOAL LESS THAN 160 05/24/2011     Priority: Medium     Anxiety      Priority: Medium       Medications:  Current Outpatient Prescriptions   Medication Sig Dispense Refill     albuterol (PROAIR HFA/PROVENTIL HFA/VENTOLIN HFA) 108 (90 BASE) MCG/ACT Inhaler Inhale 2 puffs into the lungs every 4 hours as needed 1 Inhaler 0     ALPRAZolam (XANAX) 2 MG tablet Take 1 tablet (2 mg) by mouth 3 times daily as needed for anxiety 30 tablet 0     [START ON 11/4/2018] amphetamine-dextroamphetamine (ADDERALL) 20 MG per tablet Take 1 tablet (20 mg) by mouth 3 times daily 90 tablet 0     famotidine (PEPCID) 40  MG tablet TAKE 1 TABLET(40 MG) BY MOUTH AT BEDTIME 90 tablet 0     fluticasone (FLOVENT HFA) 220 MCG/ACT Inhaler Inhale 2 puffs into the lungs 2 times daily Please give patient spacer 1 Inhaler 3     hydrocortisone (ANUSOL-HC) 2.5 % cream Place rectally 2 times daily as needed for hemorrhoids external 30 g 1     montelukast (SINGULAIR) 10 MG tablet Take 1 tablet (10 mg) by mouth At Bedtime 90 tablet 0     sertraline (ZOLOFT) 100 MG tablet Take 1 tablet (100 mg) by mouth daily 90 tablet 0     cyclobenzaprine (FLEXERIL) 10 MG tablet Take 1 tablet (10 mg) by mouth 3 times daily as needed for muscle spasms (Patient not taking: Reported on 9/7/2018) 90 tablet 1     Hyoscyamine Sulfate 0.375 MG TBCR Take 1 capful by mouth 3 times daily as needed (Patient not taking: Reported on 9/7/2018) 90 tablet 1     oxyCODONE-acetaminophen (PERCOCET) 5-325 MG per tablet Take 1 tablet by mouth every 6 hours as needed for pain (Patient not taking: Reported on 9/7/2018) 12 tablet 0     sildenafil (REVATIO) 20 MG tablet Take 1-2 tablets (20-40 mg) by mouth daily as needed As needed for prevention of erectile dysfunction (Patient not taking: Reported on 9/7/2018) 30 tablet 2       Allergies:  Allergies   Allergen Reactions     Cymbalta      Weight gain and fatigue     Duloxetine Other (See Comments)     Weight gain, fatigue  Enlarged spleen and weight gain     No Clinical Screening - See Comments GI Disturbance     Weight gain and fatigue     Paroxetine Other (See Comments), GI Disturbance and Unknown     Weight gain, fatigue  Weight gain  Spleen issues  Weight gain  Spleen issues     Paxil [Paroxetine Mesylate]      Weight gain and fatigue     Vicodin [Hydrocodone-Acetaminophen]        Family history:  Family History   Problem Relation Age of Onset     Unknown/Adopted Mother      Unknown/Adopted Father      Unknown/Adopted Maternal Grandmother      Unknown/Adopted Maternal Grandfather      Unknown/Adopted Paternal Grandmother       "Unknown/Adopted Paternal Grandfather      Unknown/Adopted Brother        Social history:  Social History   Substance Use Topics     Smoking status: Former Smoker     Packs/day: 0.50     Years: 6.00     Types: Cigarettes     Quit date: 3/12/2018     Smokeless tobacco: Never Used      Comment: second hand smoke exposure     Alcohol use Yes      Comment: once a week    Marital status: single.  Occupation: finishing school.    Nursing Notes:   Dave Estrada CMA  9/7/2018  1:39 PM  Signed  Chief Complaint   Patient presents with     Rectal Problem     Rectal bleeding and pain.       Vitals:    09/07/18 1337   BP: 116/74   Pulse: 67   Temp: 97.5  F (36.4  C)   SpO2: 99%   Weight: 245 lb   Height: 6' 1\"       Body mass index is 32.32 kg/(m^2).      BRANDY Domingo                         Physical Examination: Exam was chaperoned by BRANDY Domingo   /74  Pulse 67  Temp 97.5  F (36.4  C)  Ht 6' 1\"  Wt 245 lb  SpO2 99%  BMI 32.32 kg/m2  General: alert, oriented, in no acute distress, sitting comfortably  HEENT: mucous membranes moist  Perianal external examination:  Perianal skin: Intact with no excoriation or lichenification.  Lesions: No evidence of an external lesion, nodularity, or induration in the perianal region.  Eversion of buttocks: There was not evidence of an anal fissure. Details: N/A.  Skin tags or external hemorrhoids: None.  Digital rectal examination: Was performed.   Sphincter tone: Good.  Palpable lesions: No.  Prostate: Abnormal: tenderness on palpation.    Anoscopy: Was performed.   Hemorrhoids: Yes. Grade 2-3 internal hemorrhoids without active bleeding  Lesions: No    Procedures:  After discussing the risks and benefits, the patient agreed to proceed with internal hemorrhoidal banding.    Prior to the start of the procedure and with procedural staff participation, I verbally confirmed the patient s identity using two indicators, relevant allergies, that the procedure was " appropriate and matched the consent or emergent situation, and that the correct equipment/implants were available. Immediately prior to starting the procedure I conducted the Time Out with the procedural staff and re-confirmed the patient s name, procedure, and site/side. (The Joint Commission universal protocol was followed.)  Yes    Sedation (Moderate or Deep): None    A suction hemorrhoidal  was used to place a total of 2 band(s) in the right anterior and left lateral position(s).    There was no significant bleeding. The patient tolerated the procedure well.    This procedure was performed under a collaborative privileging agreement with Dr. Toledo, Chief of Colon and Rectal Surgery.    Total face to face time was 30 minutes, outside the procedure time, >50% counseling.    STEFFEN Veras, NP-C  Colon and Rectal Surgery   [facility]    This note was created using speech recognition software and may contain unintended word substitutions.

## 2018-09-07 NOTE — NURSING NOTE
"Chief Complaint   Patient presents with     Rectal Problem     Rectal bleeding and pain.       Vitals:    09/07/18 1337   BP: 116/74   Pulse: 67   Temp: 97.5  F (36.4  C)   SpO2: 99%   Weight: 245 lb   Height: 6' 1\"       Body mass index is 32.32 kg/(m^2).      Dave Estrada, EMT                      "

## 2018-09-07 NOTE — TELEPHONE ENCOUNTER
Patient states every time he has a bowel movement he has horrible stabbing pain when he has a bowel movement. This pain shoots into his testicles and to his back. He also has bleeding with bowel movements with enough blood making it difficult to see to the bottom of the toilet. Patient also endorses pain for an hour prior to bowel movements and for an hour after bowel movements. Patient states this started Friday and has continued since. Patient was seen in the emergency department on 9/3 for this but the provider gave him percocet and sent him on his way and patient states he wants to figure out what is causing this not just mask his pain. Patient was given an appointment to be seen by Marilu cordon at 1:20 pm today. Patient stated understanding of these instructions and is in agreement with this plan of care.

## 2018-09-07 NOTE — MR AVS SNAPSHOT
"              After Visit Summary   9/7/2018    Abimael Martinez    MRN: [id number]           Patient Information     Date Of Birth          1990        Visit Information        Provider Department      9/7/2018 1:30 PM Marilu Garcia APRN [facility]         Follow-ups after your visit        Who to contact     Please call your clinic at [phone] to:    Ask questions about your health    Make or cancel appointments    Discuss your medicines    Learn about your test results    Speak to your doctor            Additional Information About Your Visit        MyChart Information     [employer] gives you secure access to your electronic health record. If you see a primary care provider, you can also send messages to your care team and make appointments. If you have questions, please call your primary care clinic.  If you do not have a primary care provider, please call [phone] and they will assist you.      [employer] is an electronic gateway that provides easy, online access to your medical records. With [employer], you can request a clinic appointment, read your test results, renew a prescription or communicate with your care team.     To access your existing account, please contact your [facility] or call [phone] for assistance.        Care EveryWhere ID     This is your Care EveryWhere ID. This could be used by other organizations to access your [facility] medical records  [id number]        Your Vitals Were     Pulse Temperature Height Pulse Oximetry BMI (Body Mass Index)       67 97.5  F (36.4  C) 6' 1\" 99% 32.32 kg/m2        Blood Pressure from Last 3 Encounters:   09/07/18 116/74   08/29/18 130/88   08/14/18 128/83    Weight from Last 3 Encounters:   09/07/18 245 lb   08/29/18 243 lb 6.4 oz   08/14/18 242 lb              Today, you had the following     No orders found for display       Primary Care Provider Office Phone # Fax # "    Jamison Lora [phone]       [address] [facility] [address]        Equal Access to Services     MIC HULL : Hadii aad ku hadbo Wall, esauda gianlucaelianeha, mehnazta kalang shell, evelyn dickey laángelmaris angel. So [facility] [phone].    ATENCIÓN: Si habla español, tiene a salazar disposición servicios gratuitos de asistencia lingüística. Llame al [phone].    We comply with applicable federal civil rights laws and [place] laws. We do not discriminate on the basis of race, color, national origin, age, disability, sex, sexual orientation, or gender identity.            Thank you!     Thank you for choosing [facility] COLON AND RECTAL SURGERY  for your care. Our goal is always to provide you with excellent care. Hearing back from our patients is one way we can continue to improve our services. Please take a few minutes to complete the written survey that you may receive in the mail after your visit with us. Thank you!             Your Updated Medication List - Protect others around you: Learn how to safely use, store and throw away your medicines at www.disposemymeds.org.          This list is accurate as of 9/7/18  2:05 PM.  Always use your most recent med list.                   Brand Name Dispense Instructions for use Diagnosis    albuterol 108 (90 Base) MCG/ACT inhaler    PROAIR HFA/PROVENTIL HFA/VENTOLIN HFA    1 Inhaler    Inhale 2 puffs into the lungs every 4 hours as needed    Intermittent asthma, uncomplicated       ALPRAZolam 2 MG tablet    XANAX    30 tablet    Take 1 tablet (2 mg) by mouth 3 times daily as needed for anxiety    Anxiety       amphetamine-dextroamphetamine 20 MG per tablet   Start taking on:  11/4/2018    ADDERALL    90 tablet    Take 1 tablet (20 mg) by mouth 3 times daily    Attention deficit hyperactivity disorder (ADHD), combined type       cyclobenzaprine 10 MG tablet    FLEXERIL    90 tablet    Take 1 tablet (10 mg) by mouth 3 times  daily as needed for muscle spasms    Acute left-sided low back pain without sciatica       famotidine 40 MG tablet    PEPCID    90 tablet    TAKE 1 TABLET(40 MG) BY MOUTH AT BEDTIME    Upper abdominal pain       fluticasone 220 MCG/ACT Inhaler    FLOVENT HFA    1 Inhaler    Inhale 2 puffs into the lungs 2 times daily Please give patient spacer    Mild persistent asthma without complication       hydrocortisone 2.5 % cream    ANUSOL-HC    30 g    Place rectally 2 times daily as needed for hemorrhoids external    External hemorrhoids       Hyoscyamine Sulfate 0.375 MG Tbcr     90 tablet    Take 1 capful by mouth 3 times daily as needed    Upper abdominal pain       montelukast 10 MG tablet    SINGULAIR    90 tablet    Take 1 tablet (10 mg) by mouth At Bedtime    Mild persistent asthma without complication       oxyCODONE-acetaminophen 5-325 MG per tablet    PERCOCET    12 tablet    Take 1 tablet by mouth every 6 hours as needed for pain    Lumbosacral radiculopathy, Acute left-sided low back pain with left-sided sciatica       sertraline 100 MG tablet    ZOLOFT    90 tablet    Take 1 tablet (100 mg) by mouth daily    Anxiety, Panic disorder without agoraphobia       sildenafil 20 MG tablet    REVATIO    30 tablet    Take 1-2 tablets (20-40 mg) by mouth daily as needed As needed for prevention of erectile dysfunction    Drug-induced erectile dysfunction

## 2018-09-10 ENCOUNTER — TELEPHONE (OUTPATIENT)
Dept: SURGERY | Facility: CLINIC | Age: 28
End: 2018-09-10

## 2018-09-10 ENCOUNTER — TELEPHONE (OUTPATIENT)
Dept: GASTROENTEROLOGY | Facility: CLINIC | Age: 28
End: 2018-09-10

## 2018-09-10 DIAGNOSIS — K62.89 RECTAL PAIN: Primary | ICD-10-CM

## 2018-09-10 RX ORDER — LIDOCAINE 50 MG/G
OINTMENT TOPICAL PRN
Qty: 240 G | Refills: 2 | Status: SHIPPED | OUTPATIENT
Start: 2018-09-10 | End: 2019-10-18

## 2018-09-10 NOTE — TELEPHONE ENCOUNTER
Patient states after his hemorrhoid banding on Friday he has had extremely severe anal pain. Patient states it is difficult to walk, sit, or stand. Patient states the pain is more severe when he is sitting or having a bowel movement. Patient states he went into the ED over the weekend due to the pain and was prescribed percocet and lidocaine cream with slight decrease to pain. Patient is now out of the percocet he was prescribed. Patient was instructed per Prudencio's recommendations she wants the patient to have a flexible sigmoidoscopy and she would not want to prescribe more pain medications until it is determined what is causing this pain. Patient is also to follow up with urology in regard to his prostate and testicular pain.

## 2018-09-11 ENCOUNTER — TELEPHONE (OUTPATIENT)
Dept: GASTROENTEROLOGY | Facility: CLINIC | Age: 28
End: 2018-09-11

## 2018-09-17 ENCOUNTER — TELEPHONE (OUTPATIENT)
Dept: FAMILY MEDICINE | Facility: CLINIC | Age: 28
End: 2018-09-17

## 2018-09-21 ENCOUNTER — TELEPHONE (OUTPATIENT)
Dept: GASTROENTEROLOGY | Facility: OUTPATIENT CENTER | Age: 28
End: 2018-09-21

## 2018-09-25 ENCOUNTER — TELEPHONE (OUTPATIENT)
Dept: GASTROENTEROLOGY | Facility: OUTPATIENT CENTER | Age: 28
End: 2018-09-25

## 2018-09-27 ENCOUNTER — TELEPHONE (OUTPATIENT)
Dept: GASTROENTEROLOGY | Facility: OUTPATIENT CENTER | Age: 28
End: 2018-09-27

## 2018-09-27 NOTE — TELEPHONE ENCOUNTER
Patient taking any blood thinners ? No     Heart disease ? denies    Lung disease ? Asthma. Advised patient to bring inhaler      Sleep apnea ? Denies     Diabetic ? Denies     Kidney disease ? Denies     Dialysis ? n/a    Electronic implanted medical devices ? Denies     Are you taking any narcotic pain medication ? Percocet prn  What is your daily dosage ?    PTSD ? n/a    Prep instructions reviewed with patient ? Patient declined review.  policy, MAC sedation plan reviewed. Advised patient to have someone stay with him post exam    Pharmacy : n/a    Indication for procedure : Rectal pain and bleeding    Referring provider : Marilu Garcia     Arrival Time : 1:30 PM

## 2018-09-28 ENCOUNTER — TRANSFERRED RECORDS (OUTPATIENT)
Dept: HEALTH INFORMATION MANAGEMENT | Facility: CLINIC | Age: 28
End: 2018-09-28

## 2018-09-28 ENCOUNTER — DOCUMENTATION ONLY (OUTPATIENT)
Dept: GASTROENTEROLOGY | Facility: OUTPATIENT CENTER | Age: 28
End: 2018-09-28
Payer: COMMERCIAL

## 2018-10-03 ENCOUNTER — TELEPHONE (OUTPATIENT)
Dept: FAMILY MEDICINE | Facility: CLINIC | Age: 28
End: 2018-10-03

## 2018-10-03 NOTE — TELEPHONE ENCOUNTER
Per verbal order from dannie Crowe to refill adderall Rx 1 day early.   This RN called pharmacy and informed them of this.     Heather Lin, BSN RN

## 2018-10-03 NOTE — TELEPHONE ENCOUNTER
Pharmacy is calling stating patient is request early fill (today) for RX: amphetamine-dextroamphetamine (ADDERALL) 20 MG per tablet, please call to advise if OK. Thank you.

## 2018-10-03 NOTE — TELEPHONE ENCOUNTER
Rx due to be refilled tomorrow.   Patient wanting to refill it today.    Routing to provider to advise.  ADRY NeumannN RN

## 2018-10-08 ENCOUNTER — TRANSFERRED RECORDS (OUTPATIENT)
Dept: HEALTH INFORMATION MANAGEMENT | Facility: CLINIC | Age: 28
End: 2018-10-08

## 2018-10-10 ENCOUNTER — TRANSFERRED RECORDS (OUTPATIENT)
Dept: HEALTH INFORMATION MANAGEMENT | Facility: CLINIC | Age: 28
End: 2018-10-10

## 2018-10-11 ENCOUNTER — TELEPHONE (OUTPATIENT)
Dept: FAMILY MEDICINE | Facility: CLINIC | Age: 28
End: 2018-10-11

## 2018-10-11 DIAGNOSIS — F41.9 ANXIETY: ICD-10-CM

## 2018-10-11 RX ORDER — ALPRAZOLAM 2 MG
2 TABLET ORAL 3 TIMES DAILY PRN
Qty: 30 TABLET | Refills: 0 | Status: SHIPPED | OUTPATIENT
Start: 2018-10-11 | End: 2018-11-12

## 2018-10-11 NOTE — TELEPHONE ENCOUNTER
Patient is requesting a refill on his xanax, running low please call to advise when script is ready to be picked up at .

## 2018-10-11 NOTE — TELEPHONE ENCOUNTER
Controlled Substance Refill Request for   ALPRAZolam (XANAX) 2 MG tablet 30 tablet 0 8/14/2018   No   Sig - Route: Take 1 tablet (2 mg) by mouth 3 times daily as needed for anxiety - Oral      Problem List Complete:  Yes       Overview Signed 4/6/2016  8:02 AM by Michael Lora MD        Patient is followed by MICHAEL LORA for ongoing prescription of benzodiazepines.  All refills should be approved by this provider, or covering partner.      Medication(s): alprazolam 2 mg .   Maximum quantity per month: 36  Clinic visit frequency required: Q 6  months       Controlled substance agreement on file: No  Benzodiazepine use reviewed by psychiatry:  No      Last [facility] website verification: 9/13/18       checked in past 3 months?  Yes 9/13/18

## 2018-10-12 NOTE — TELEPHONE ENCOUNTER
I faxed the Rx to the [facility] pharmacy in [place].  I spoke to the patient and I let him know.  Farideh Gale,

## 2018-10-18 ENCOUNTER — TRANSFERRED RECORDS (OUTPATIENT)
Dept: HEALTH INFORMATION MANAGEMENT | Facility: CLINIC | Age: 28
End: 2018-10-18

## 2018-10-22 ENCOUNTER — TRANSFERRED RECORDS (OUTPATIENT)
Dept: HEALTH INFORMATION MANAGEMENT | Facility: CLINIC | Age: 28
End: 2018-10-22

## 2018-10-31 ENCOUNTER — TELEPHONE (OUTPATIENT)
Dept: FAMILY MEDICINE | Facility: CLINIC | Age: 28
End: 2018-10-31

## 2018-10-31 DIAGNOSIS — F90.2 ATTENTION DEFICIT HYPERACTIVITY DISORDER (ADHD), COMBINED TYPE: ICD-10-CM

## 2018-10-31 RX ORDER — DEXTROAMPHETAMINE SACCHARATE, AMPHETAMINE ASPARTATE, DEXTROAMPHETAMINE SULFATE AND AMPHETAMINE SULFATE 5; 5; 5; 5 MG/1; MG/1; MG/1; MG/1
20 TABLET ORAL 3 TIMES DAILY
Qty: 90 TABLET | Refills: 0 | Status: SHIPPED | OUTPATIENT
Start: 2018-11-04 | End: 2018-11-01

## 2018-10-31 NOTE — TELEPHONE ENCOUNTER
Patient requesting 3 Rx's for 3 months.    Controlled Substance Refill Request for adderall  Problem List Complete:  Yes   checked in past 3 months?  Yes 10/31/18.  Printed and placed on PCP's desk for review. Other providers ordering controlled substances.      Controlled Substance Refill Request for Alprazolam  Problem List Complete:  Yes  Medication(s): alprazolam 2 mg .   Maximum quantity per month: 36  Clinic visit frequency required: Q 6  months       Controlled substance agreement on file: No  Benzodiazepine use reviewed by psychiatry:  No    ADRY NeumannN RN

## 2018-10-31 NOTE — TELEPHONE ENCOUNTER
Reason for Call:  Medication or medication refill:    Do you use a [facility]?  Name of the pharmacy and phone number for the current request:  Written    Name of the medication requested: amphetamine-dextroamphetamine (ADDERALL) 20 MG per tablet    Other request:   He is requesting 3 RX's, so he has 3 months at a time    Can we leave a detailed message on this number? YES    Phone number patient can be reached at: Home number on file [phone] (home)    Best Time: any     Call taken on 10/31/2018 at 1:15 PM by Jana Howe

## 2018-11-01 RX ORDER — DEXTROAMPHETAMINE SACCHARATE, AMPHETAMINE ASPARTATE, DEXTROAMPHETAMINE SULFATE AND AMPHETAMINE SULFATE 5; 5; 5; 5 MG/1; MG/1; MG/1; MG/1
20 TABLET ORAL 3 TIMES DAILY
Qty: 90 TABLET | Refills: 0 | Status: SHIPPED | OUTPATIENT
Start: 2018-12-30 | End: 2019-01-28

## 2018-11-01 RX ORDER — DEXTROAMPHETAMINE SACCHARATE, AMPHETAMINE ASPARTATE, DEXTROAMPHETAMINE SULFATE AND AMPHETAMINE SULFATE 5; 5; 5; 5 MG/1; MG/1; MG/1; MG/1
20 TABLET ORAL 3 TIMES DAILY
Qty: 90 TABLET | Refills: 0 | Status: SHIPPED | OUTPATIENT
Start: 2018-11-30 | End: 2018-11-01

## 2018-11-01 NOTE — TELEPHONE ENCOUNTER
Patient is requesting 3 Rx's for 3 months.  You wrote 1 month can you write 2 more months?  Please advise.    Farideh Gale,

## 2018-11-05 ENCOUNTER — OFFICE VISIT (OUTPATIENT)
Dept: FAMILY MEDICINE | Facility: CLINIC | Age: 28
End: 2018-11-05
Payer: COMMERCIAL

## 2018-11-05 VITALS
DIASTOLIC BLOOD PRESSURE: 90 MMHG | RESPIRATION RATE: 20 BRPM | BODY MASS INDEX: 31.91 KG/M2 | HEART RATE: 84 BPM | TEMPERATURE: 98.3 F | SYSTOLIC BLOOD PRESSURE: 116 MMHG | OXYGEN SATURATION: 98 % | HEIGHT: 73 IN | WEIGHT: 240.8 LBS

## 2018-11-05 DIAGNOSIS — R23.8 PAPULE: Primary | ICD-10-CM

## 2018-11-05 DIAGNOSIS — F41.9 ANXIETY: ICD-10-CM

## 2018-11-05 PROCEDURE — 99214 OFFICE O/P EST MOD 30 MIN: CPT | Performed by: PHYSICIAN ASSISTANT

## 2018-11-05 RX ORDER — MUPIROCIN CALCIUM 20 MG/G
CREAM TOPICAL 3 TIMES DAILY
Qty: 30 G | Refills: 1 | Status: SHIPPED | OUTPATIENT
Start: 2018-11-05 | End: 2018-11-12

## 2018-11-05 ASSESSMENT — PAIN SCALES - GENERAL: PAINLEVEL: MILD PAIN (2)

## 2018-11-05 NOTE — MR AVS SNAPSHOT
"              After Visit Summary   11/5/2018    Abimael Martinez    MRN: [id number]           Patient Information     Date Of Birth          1990        Visit Information        Provider Department      11/5/2018 1:20 PM Randolph Eubanks PA [facility]        Today's Diagnoses     Papule    -  1    Anxiety           Follow-ups after your visit        Follow-up notes from your care team     Return in about 6 months (around 5/5/2019) for PCP Follow-up.      Who to contact     If you have questions or need follow up information about today's clinic visit or your schedule please contact [facility] directly at [phone].  Normal or non-critical lab and imaging results will be communicated to you by MyChart, letter or phone within 4 business days after the clinic has received the results. If you do not hear from us within 7 days, please contact the clinic through [employer] or phone. If you have a critical or abnormal lab result, we will notify you by phone as soon as possible.  Submit refill requests through [employer] or call your pharmacy and they will forward the refill request to us. Please allow 3 business days for your refill to be completed.          Additional Information About Your Visit        MyChart Information     [employer] gives you secure access to your electronic health record. If you see a primary care provider, you can also send messages to your care team and make appointments. If you have questions, please call your primary care clinic.  If you do not have a primary care provider, please call [phone] and they will assist you.        Care EveryWhere ID     This is your Care EveryWhere ID. This could be used by other organizations to access your [facility] medical records  [id number]        Your Vitals Were     Pulse Temperature Respirations Height Pulse Oximetry BMI (Body Mass Index)    84 98.3  F (36.8  C) (Oral) 20 6' 1\" (1.854 m) 98% 31.77 " kg/m2       Blood Pressure from Last 3 Encounters:   11/05/18 116/90   09/07/18 116/74   08/29/18 130/88    Weight from Last 3 Encounters:   11/05/18 240 lb 12.8 oz (109.2 kg)   09/07/18 245 lb (111.1 kg)   08/29/18 243 lb 6.4 oz (110.4 kg)              Today, you had the following     No orders found for display         Today's Medication Changes          These changes are accurate as of 11/5/18  2:24 PM.  If you have any questions, ask your nurse or doctor.               Start taking these medicines.        Dose/Directions    mupirocin 2 % cream   Commonly known as:  BACTROBAN   Used for:  Papule   Started by:  Randolph Eubanks PA        Apply topically 3 times daily for 7 days May change to ointment if cheaper.   Quantity:  30 g   Refills:  1            Where to get your medicines      [facility] [address] [place] [address] [address] AT [facility] & [address] , [facility] [address]     Phone:  [phone]     mupirocin 2 % cream                Primary Care Provider Office Phone # Fax #    Jamison Lora [phone] [address] [address] [facility] [address]        Equal Access to Services     MIC HULL : Hadii aad ku hadasho Soomaali, waaxda luqadaha, qaybta kaalmada adeegyada, waxclarissa oropezain hayaan anthony luna . So [facility] [phone].    ATENCIÓN: Si habla español, tiene a salazar disposición servicios gratuitos de asistencia lingüística. Llame al [phone].    We comply with applicable federal civil rights laws and [place] laws. We do not discriminate on the basis of race, color, national origin, age, disability, sex, sexual orientation, or gender identity.            Thank you!     [facility] Our goal is always to provide you with excellent care. Hearing back from our patients is one way we can continue to improve our services. Please take  a few minutes to complete the written survey that you may receive in the mail after your visit with us. Thank you!             Your Updated Medication List - Protect others around you: Learn how to safely use, store and throw away your medicines at www.disposemymeds.org.          This list is accurate as of 11/5/18  2:24 PM.  Always use your most recent med list.                   Brand Name Dispense Instructions for use Diagnosis    albuterol 108 (90 Base) MCG/ACT inhaler    PROAIR HFA/PROVENTIL HFA/VENTOLIN HFA    1 Inhaler    Inhale 2 puffs into the lungs every 4 hours as needed    Intermittent asthma, uncomplicated       ALPRAZolam 2 MG tablet    XANAX    30 tablet    Take 1 tablet (2 mg) by mouth 3 times daily as needed for anxiety    Anxiety       amphetamine-dextroamphetamine 20 MG per tablet   Start taking on:  12/30/2018    ADDERALL    90 tablet    Take 1 tablet (20 mg) by mouth 3 times daily    Attention deficit hyperactivity disorder (ADHD), combined type       cyclobenzaprine 10 MG tablet    FLEXERIL    90 tablet    Take 1 tablet (10 mg) by mouth 3 times daily as needed for muscle spasms    Acute left-sided low back pain without sciatica       famotidine 40 MG tablet    PEPCID    90 tablet    TAKE 1 TABLET(40 MG) BY MOUTH AT BEDTIME    Upper abdominal pain       fluticasone 220 MCG/ACT Inhaler    FLOVENT HFA    1 Inhaler    Inhale 2 puffs into the lungs 2 times daily Please give patient spacer    Mild persistent asthma without complication       hydrocortisone 2.5 % cream    ANUSOL-HC    30 g    Place rectally 2 times daily as needed for hemorrhoids external    External hemorrhoids       Hyoscyamine Sulfate 0.375 MG Tbcr     90 tablet    Take 1 capful by mouth 3 times daily as needed    Upper abdominal pain       lidocaine 5 % ointment    XYLOCAINE    240 g    Apply topically as needed for moderate pain    Rectal pain       montelukast 10 MG tablet    SINGULAIR    90 tablet    Take 1 tablet (10 mg) by  mouth At Bedtime    Mild persistent asthma without complication       mupirocin 2 % cream    BACTROBAN    30 g    Apply topically 3 times daily for 7 days May change to ointment if cheaper.    Papule       oxyCODONE-acetaminophen 5-325 MG per tablet    PERCOCET    12 tablet    Take 1 tablet by mouth every 6 hours as needed for pain    Lumbosacral radiculopathy, Acute left-sided low back pain with left-sided sciatica       sertraline 100 MG tablet    ZOLOFT    90 tablet    Take 1 tablet (100 mg) by mouth daily    Anxiety, Panic disorder without agoraphobia       sildenafil 20 MG tablet    REVATIO    30 tablet    Take 1-2 tablets (20-40 mg) by mouth daily as needed As needed for prevention of erectile dysfunction    Drug-induced erectile dysfunction

## 2018-11-05 NOTE — PROGRESS NOTES
SUBJECTIVE:   Abimael Martinez is a 28 year old male who presents to clinic today for the following health issues:        Rash  Onset: about 5 days- looked like pimples.  Not painful initialy, tried to pop one and now that one hurts.      Description:   Location: genitals  Character: bumps, red  Itching (Pruritis): no     Progression of Symptoms:  irriated    Accompanying Signs & Symptoms:  Fever: no   Body aches or joint pain: NA  Sore throat symptoms: no   Recent cold symptoms: YES    History:   Previous similar rash: no     Precipitating factors:   Exposure to similar rash: no   New exposures: None   Recent travel: no       Therapies Tried and outcome: topical, no relief    Has a a couple recent sexual pimples.    Has been on sertraline for 5 months since back surgery.   His anxiety has been somewha t controlle o n the sertraline, He owul annaike to discuss optoins.                       Allergies   Allergen Reactions     Cymbalta      Weight gain and fatigue     Duloxetine Other (See Comments)     Weight gain, fatigue  Enlarged spleen and weight gain     No Clinical Screening - See Comments GI Disturbance     Weight gain and fatigue     Paroxetine Other (See Comments), GI Disturbance and Unknown     Weight gain, fatigue  Weight gain  Spleen issues  Weight gain  Spleen issues     Paxil [Paroxetine Mesylate]      Weight gain and fatigue     Vicodin [Hydrocodone-Acetaminophen]        Past Medical History:   Diagnosis Date     Acute right otitis media 1/8/2013     Anxiety      Depression      Drug abuse (H)      Urethritis 5/20/2013     Problem list name updated by automated process. Provider to review         Current Outpatient Prescriptions on File Prior to Visit:  albuterol (PROAIR HFA/PROVENTIL HFA/VENTOLIN HFA) 108 (90 BASE) MCG/ACT Inhaler Inhale 2 puffs into the lungs every 4 hours as needed   ALPRAZolam (XANAX) 2 MG tablet Take 1 tablet (2 mg) by mouth 3 times daily as needed for anxiety   [START ON  "12/30/2018] amphetamine-dextroamphetamine (ADDERALL) 20 MG per tablet Take 1 tablet (20 mg) by mouth 3 times daily   cyclobenzaprine (FLEXERIL) 10 MG tablet Take 1 tablet (10 mg) by mouth 3 times daily as needed for muscle spasms   famotidine (PEPCID) 40 MG tablet TAKE 1 TABLET(40 MG) BY MOUTH AT BEDTIME   fluticasone (FLOVENT HFA) 220 MCG/ACT Inhaler Inhale 2 puffs into the lungs 2 times daily Please give patient spacer   hydrocortisone (ANUSOL-HC) 2.5 % cream Place rectally 2 times daily as needed for hemorrhoids external   Hyoscyamine Sulfate 0.375 MG TBCR Take 1 capful by mouth 3 times daily as needed   lidocaine (XYLOCAINE) 5 % ointment Apply topically as needed for moderate pain   montelukast (SINGULAIR) 10 MG tablet Take 1 tablet (10 mg) by mouth At Bedtime   oxyCODONE-acetaminophen (PERCOCET) 5-325 MG per tablet Take 1 tablet by mouth every 6 hours as needed for pain   sertraline (ZOLOFT) 100 MG tablet Take 1 tablet (100 mg) by mouth daily   sildenafil (REVATIO) 20 MG tablet Take 1-2 tablets (20-40 mg) by mouth daily as needed As needed for prevention of erectile dysfunction     No current facility-administered medications on file prior to visit.     Social History   Substance Use Topics     Smoking status: Former Smoker     Packs/day: 0.50     Years: 6.00     Types: Cigarettes     Quit date: 3/12/2018     Smokeless tobacco: Never Used      Comment: second hand smoke exposure     Alcohol use Yes      Comment: once a week       ROS:  General: negative for fever  SKIN: + as above   no dysuria    Physcial Exam:  /90  Pulse 84  Temp 98.3  F (36.8  C) (Oral)  Resp 20  Ht 6' 1\" (1.854 m)  Wt 240 lb 12.8 oz (109.2 kg)  SpO2 98%  BMI 31.77 kg/m2    GENERAL: alert, no acute distress  EYES: conjunctival clear  RESP: Regular breathing rate  NEURO: awake .  SKIN: there is a single 1- 2mm round papule vs pustule, there is a slight amount of redness a the base c/w patient hx of trying to pop it.   "     ASSESSMENT:we discussed anxiety med options for some time, he will continue sertraline for now.      ICD-10-CM    1. Papule R23.8 mupirocin (BACTROBAN) 2 % cream   2. Anxiety F41.9      I spent a total of 25 minutes in face to face time with > 50% of the visit related to  counseling/care coordination.      PLAN:warm soaks,   See today's orders.  Follow-up with primary clinic if not improving.  Advised about symptoms which might herald more serious problems.

## 2018-11-05 NOTE — LETTER
My Asthma Action Plan  Name: Abimael Martinez   YOB: 1990  Date: 11/5/2018   My doctor: JESSIE Hartmann   My clinic: [facility]        My Control Medicine: { :989743}  My Rescue Medicine: { :495390}  {AAP include Oral Steroid:469028} My Asthma Severity: { :042480}  Avoid your asthma triggers: { :451413}        {Is patient a child or adult?:883347}       GREEN ZONE   Good Control    I feel good    No cough or wheeze    Can work, sleep and play without asthma symptoms       Take your asthma control medicine every day.     1. If exercise triggers your asthma, take your rescue medication    15 minutes before exercise or sports, and    During exercise if you have asthma symptoms  2. Spacer to use with inhaler: If you have a spacer, make sure to use it with your inhaler             YELLOW ZONE Getting Worse  I have ANY of these:    I do not feel good    Cough or wheeze    Chest feels tight    Wake up at night   1. Keep taking your Green Zone medications  2. Start taking your rescue medicine:    every 20 minutes for up to 1 hour. Then every 4 hours for 24-48 hours.  3. If you stay in the Yellow Zone for more than 12-24 hours, contact your doctor.  4. If you do not return to the Green Zone in 12-24 hours or you get worse, start taking your oral steroid medicine if prescribed by your provider.           RED ZONE Medical Alert - Get Help  I have ANY of these:    I feel awful    Medicine is not helping    Breathing getting harder    Trouble walking or talking    Nose opens wide to breathe       1. Take your rescue medicine NOW  2. If your provider has prescribed an oral steroid medicine, start taking it NOW  3. Call your doctor NOW  4. If you are still in the Red Zone after 20 minutes and you have not reached your doctor:    Take your rescue medicine again and    Call 911 or go to the emergency room right away    See your regular doctor within 2 weeks of an Emergency Room or  Urgent Care visit for follow-up treatment.          Annual Reminders:  Meet with Asthma Educator,  Flu Shot in the Fall, consider Pneumonia Vaccination for patients with asthma (aged 19 and older).    [facility] [address] [place] [address] [address] AT [facility]                      Asthma Triggers  How To Control Things That Make Your Asthma Worse    Triggers are things that make your asthma worse.  Look at the list below to help you find your triggers and what you can do about them.  You can help prevent asthma flare-ups by staying away from your triggers.      Trigger                                                          What you can do   Cigarette Smoke  Tobacco smoke can make asthma worse. Do not allow smoking in your home, car or around you.  Be sure no one smokes at a child s day care or school.  If you smoke, ask your health care provider for ways to help you quit.  Ask family members to quit too.  Ask your health care provider for a referral to Quit Plan to help you quit smoking, or call 0-586-966-PLAN.     Colds, Flu, Bronchitis  These are common triggers of asthma. Wash your hands often.  Don t touch your eyes, nose or mouth.  Get a flu shot every year.     Dust Mites  These are tiny bugs that live in cloth or carpet. They are too small to see. Wash sheets and blankets in hot water every week.   Encase pillows and mattress in dust mite proof covers.  Avoid having carpet if you can. If you have carpet, vacuum weekly.   Use a dust mask and HEPA vacuum.   Pollen and Outdoor Mold  Some people are allergic to trees, grass, or weed pollen, or molds. Try to keep your windows closed.  Limit time out doors when pollen count is high.   Ask you health care provider about taking medicine during allergy season.     Animal Dander  Some people are allergic to skin flakes, urine or saliva from pets with fur or feathers. Keep pets with fur or feathers out of your home.    If  you can t keep the pet outdoors, then keep the pet out of your bedroom.  Keep the bedroom door closed.  Keep pets off cloth furniture and away from stuffed toys.     Mice, Rats, and Cockroaches  Some people are allergic to the waste from these pests.   Cover food and garbage.  Clean up spills and food crumbs.  Store grease in the refrigerator.   Keep food out of the bedroom.   Indoor Mold  This can be a trigger if your home has high moisture. Fix leaking faucets, pipes, or other sources of water.   Clean moldy surfaces.  Dehumidify basement if it is damp and smelly.   Smoke, Strong Odors, and Sprays  These can reduce air quality. Stay away from strong odors and sprays, such as perfume, powder, hair spray, paints, smoke incense, paint, cleaning products, candles and new carpet.   Exercise or Sports  Some people with asthma have this trigger. Be active!  Ask your doctor about taking medicine before sports or exercise to prevent symptoms.    Warm up for 5-10 minutes before and after sports or exercise.     Other Triggers of Asthma  Cold air:  Cover your nose and mouth with a scarf.  Sometimes laughing or crying can be a trigger.  Some medicines and food can trigger asthma.

## 2018-11-08 DIAGNOSIS — R10.10 UPPER ABDOMINAL PAIN: ICD-10-CM

## 2018-11-08 RX ORDER — FAMOTIDINE 40 MG/1
TABLET, FILM COATED ORAL
Qty: 90 TABLET | Refills: 1 | Status: SHIPPED | OUTPATIENT
Start: 2018-11-08 | End: 2020-03-26

## 2018-11-12 ENCOUNTER — TELEPHONE (OUTPATIENT)
Dept: FAMILY MEDICINE | Facility: CLINIC | Age: 28
End: 2018-11-12

## 2018-11-12 DIAGNOSIS — F41.9 ANXIETY: ICD-10-CM

## 2018-11-12 RX ORDER — ALPRAZOLAM 2 MG
2 TABLET ORAL 3 TIMES DAILY PRN
Qty: 30 TABLET | Refills: 0 | Status: SHIPPED | OUTPATIENT
Start: 2018-11-12 | End: 2018-12-11

## 2018-11-12 NOTE — TELEPHONE ENCOUNTER
Controlled Substance Refill Request for aprazolam  Problem List Complete:  Yes              Overview Signed 4/6/2016  8:02 AM by Michael Lora MD         Patient is followed by MICHAEL LORA for ongoing prescription of benzodiazepines.  All refills should be approved by this provider, or covering partner.      Medication(s): alprazolam 2 mg .   Maximum quantity per month: 36  Clinic visit frequency required: Q 6  months       Controlled substance agreement on file: No  Benzodiazepine use reviewed by psychiatry:  No      Last [facility] website verification: 9/13/18     CARLOS Neumann RN

## 2018-11-12 NOTE — TELEPHONE ENCOUNTER
Reason for Call:  Medication or medication refill:    Do you use a [facility]?  Name of the pharmacy and phone number for the current request:  See above    Name of the medication requested:Alprazolam    Other request: NA    Can we leave a detailed message on this number? YES    Phone number patient can be reached at: Home number on file [phone] (home)    Best Time: any    Call taken on 11/12/2018 at 11:18 AM by Gemma Eid

## 2018-11-13 ENCOUNTER — TRANSFERRED RECORDS (OUTPATIENT)
Dept: HEALTH INFORMATION MANAGEMENT | Facility: CLINIC | Age: 28
End: 2018-11-13

## 2018-11-13 ENCOUNTER — MEDICAL CORRESPONDENCE (OUTPATIENT)
Dept: HEALTH INFORMATION MANAGEMENT | Facility: CLINIC | Age: 28
End: 2018-11-13

## 2018-11-16 ENCOUNTER — TRANSFERRED RECORDS (OUTPATIENT)
Dept: HEALTH INFORMATION MANAGEMENT | Facility: CLINIC | Age: 28
End: 2018-11-16

## 2018-11-30 ENCOUNTER — OFFICE VISIT (OUTPATIENT)
Dept: FAMILY MEDICINE | Facility: CLINIC | Age: 28
End: 2018-11-30
Payer: COMMERCIAL

## 2018-11-30 VITALS
DIASTOLIC BLOOD PRESSURE: 82 MMHG | OXYGEN SATURATION: 94 % | RESPIRATION RATE: 18 BRPM | TEMPERATURE: 98.1 F | WEIGHT: 238 LBS | HEART RATE: 78 BPM | BODY MASS INDEX: 31.54 KG/M2 | HEIGHT: 73 IN | SYSTOLIC BLOOD PRESSURE: 120 MMHG

## 2018-11-30 DIAGNOSIS — J06.9 URI WITH COUGH AND CONGESTION: Primary | ICD-10-CM

## 2018-11-30 DIAGNOSIS — R07.0 THROAT PAIN: ICD-10-CM

## 2018-11-30 LAB
DEPRECATED S PYO AG THROAT QL EIA: NORMAL
SPECIMEN SOURCE: NORMAL

## 2018-11-30 PROCEDURE — 87081 CULTURE SCREEN ONLY: CPT | Performed by: FAMILY MEDICINE

## 2018-11-30 PROCEDURE — 87880 STREP A ASSAY W/OPTIC: CPT | Performed by: FAMILY MEDICINE

## 2018-11-30 PROCEDURE — 99213 OFFICE O/P EST LOW 20 MIN: CPT | Performed by: FAMILY MEDICINE

## 2018-11-30 ASSESSMENT — PAIN SCALES - GENERAL: PAINLEVEL: SEVERE PAIN (6)

## 2018-11-30 NOTE — PATIENT INSTRUCTIONS
At [facility], we strive to deliver an exceptional experience to you, every time we see you.  If you receive a survey in the mail, please send us back your thoughts. We really do value your feedback.    Based on your medical history, these are the current health maintenance/preventive care services that you are due for (some may have been done at this visit.)  Health Maintenance Due   Topic Date Due     INFLUENZA VACCINE (1) 09/01/2018     ASTHMA ACTION PLAN Q1 YR  09/18/2018         Suggested websites for health information:  Www.Recovers.SurfAir : Up to date and easily searchable information on multiple topics.  Www.FiTeq.gov : medication info, interactive tutorials, watch real surgeries online  Www.familydoctor.org : good info from the Academy of Family Physicians  Www.cdc.gov : public health info, travel advisories, epidemics (H1N1)  Www.aap.org : children's health info, normal development, vaccinations  Www.health.[facility].[place].us : [place] dept of health, public health issues in [place], N1N1    Your care team:                            Family Medicine Internal Medicine   MD Sanjay García MD Shantel Branch-Fleming, MD Katya Georgiev PA-C Nam Ho, MD Pediatrics   MIGUEL Herrera, LUZ MARIA BOURNE CNP   MD Ninoska Stevens MD Deborah Mielke, MD Kim Thein, APRN [facility]      Clinic hours: Monday - Thursday 7 am-7 pm; Fridays 7 am-5 pm.   Urgent care: Monday - Friday 11 am-9 pm; Saturday and Sunday 9 am-5 pm.  Pharmacy : Monday -Thursday 8 am-8 pm; Friday 8 am-6 pm; Saturday and Sunday 9 am-5 pm.     Clinic: [phone]   Pharmacy: [phone]    Viral Upper Respiratory Illness (Adult)  You have a viral upper respiratory illness (URI), which is another term for the common cold. This illness is contagious during the first few days. It is spread through the air by coughing and sneezing. It may also be spread by direct contact (touching  the sick person and then touching your own eyes, nose, or mouth). Frequent handwashing will decrease risk of spread. Most viral illnesses go away within 7 to 10 days with rest and simple home remedies. Sometimes the illness may last for several weeks. Antibiotics will not kill a virus, and they are generally not prescribed for this condition.    Home care    If symptoms are severe, rest at home for the first 2 to 3 days. When you resume activity, don't let yourself get too tired.    Don't smoke. If you need help stopping, talk with your healthcare provider.    Avoid being exposed to cigarette smoke (yours or others ).    You may use acetaminophen or ibuprofen to control pain and fever, unless another medicine was prescribed. If you have chronic liver or kidney disease, have ever had a stomach ulcer or gastrointestinal bleeding, or are taking blood-thinning medicines, talk with your healthcare provider before using these medicines. Aspirin should never be given to anyone under 18 years of age who is ill with a viral infection or fever. It may cause severe liver or brain damage.    Your appetite may be poor, so a light diet is fine. Stay well hydrated by drinking 6 to 8 glasses of fluids per day (water, soft drinks, juices, tea, or soup). Extra fluids will help loosen secretions in the nose and lungs.    Over-the-counter cold medicines will not shorten the length of time you re sick, but they may be helpful for the following symptoms: cough, sore throat, and nasal and sinus congestion. If you take prescription medicines, ask your healthcare provider or pharmacist which over-the-counter medicines are safe to use. (Note: Don't use decongestants if you have high blood pressure.)  Follow-up care  Follow up with your healthcare provider, or as advised.  When to seek medical advice  Call your healthcare provider right away if any of these occur:    Cough with lots of colored sputum (mucus)    Severe headache; face, neck, or  ear pain    Difficulty swallowing due to throat pain    Fever of 100.4 F (38 C) or higher, or as directed by your healthcare provider  Call 911  Call 911 if any of these occur:    Chest pain, shortness of breath, wheezing, or difficulty breathing    Coughing up blood    Very severe pain with swallowing, especially if it goes along with a muffled voice   Date Last Reviewed: 6/1/2018 2000-2018 [employer]. [address] [place] [address], [place] [address]. All rights reserved. This information is not intended as a substitute for professional medical care. Always follow your healthcare professional's instructions.

## 2018-11-30 NOTE — MR AVS SNAPSHOT
After Visit Summary   11/30/2018    Abimael Martinez    MRN: [id number]           Patient Information     Date Of Birth          1990        Visit Information        Provider Department      11/30/2018 3:40 PM Adilene Hooks MD [facility]        Today's Diagnoses     URI with cough and congestion    -  1    Throat pain          Care Instructions    At [facility], we strive to deliver an exceptional experience to you, every time we see you.  If you receive a survey in the mail, please send us back your thoughts. We really do value your feedback.    Based on your medical history, these are the current health maintenance/preventive care services that you are due for (some may have been done at this visit.)  Health Maintenance Due   Topic Date Due     INFLUENZA VACCINE (1) 09/01/2018     ASTHMA ACTION PLAN Q1 YR  09/18/2018         Suggested websites for health information:  WwwiTherX : Up to date and easily searchable information on multiple topics.  Www.Vyatta.gov : medication info, interactive tutorials, watch real surgeries online  Www.familydoctor.org : good info from the Academy of Family Physicians  Www.cdc.gov : public health info, travel advisories, epidemics (H1N1)  Www.aap.org : children's health info, normal development, vaccinations  Www.health.state.[place]us : [place] dept of health, public health issues in [place], N1N1    Your care team:                            Family Medicine Internal Medicine   MD Sanjay García MD Shantel Branch-Fleming, MD Katya Georgiev PA-C Nam Ho, MD Pediatrics   MIGUEL Herrera, LUZ MARIA Fry APRN MD Ninoska Bolaños MD Deborah Mielke, MD Kim Thein, APRASHLEY CNP      Clinic hours: Monday - Thursday 7 am-7 pm; Fridays 7 am-5 pm.   Urgent care: Monday - Friday 11 am-9 pm; Saturday and Sunday 9 am-5 pm.  Pharmacy : Monday -Thursday 8 am-8 pm;  Friday 8 am-6 pm; Saturday and Sunday 9 am-5 pm.     Clinic: [phone]   Pharmacy: [phone]    Viral Upper Respiratory Illness (Adult)  You have a viral upper respiratory illness (URI), which is another term for the common cold. This illness is contagious during the first few days. It is spread through the air by coughing and sneezing. It may also be spread by direct contact (touching the sick person and then touching your own eyes, nose, or mouth). Frequent handwashing will decrease risk of spread. Most viral illnesses go away within 7 to 10 days with rest and simple home remedies. Sometimes the illness may last for several weeks. Antibiotics will not kill a virus, and they are generally not prescribed for this condition.    Home care    If symptoms are severe, rest at home for the first 2 to 3 days. When you resume activity, don't let yourself get too tired.    Don't smoke. If you need help stopping, talk with your healthcare provider.    Avoid being exposed to cigarette smoke (yours or others ).    You may use acetaminophen or ibuprofen to control pain and fever, unless another medicine was prescribed. If you have chronic liver or kidney disease, have ever had a stomach ulcer or gastrointestinal bleeding, or are taking blood-thinning medicines, talk with your healthcare provider before using these medicines. Aspirin should never be given to anyone under 18 years of age who is ill with a viral infection or fever. It may cause severe liver or brain damage.    Your appetite may be poor, so a light diet is fine. Stay well hydrated by drinking 6 to 8 glasses of fluids per day (water, soft drinks, juices, tea, or soup). Extra fluids will help loosen secretions in the nose and lungs.    Over-the-counter cold medicines will not shorten the length of time you re sick, but they may be helpful for the following symptoms: cough, sore throat, and nasal and sinus congestion. If you take prescription medicines, ask  your healthcare provider or pharmacist which over-the-counter medicines are safe to use. (Note: Don't use decongestants if you have high blood pressure.)  Follow-up care  Follow up with your healthcare provider, or as advised.  When to seek medical advice  Call your healthcare provider right away if any of these occur:    Cough with lots of colored sputum (mucus)    Severe headache; face, neck, or ear pain    Difficulty swallowing due to throat pain    Fever of 100.4 F (38 C) or higher, or as directed by your healthcare provider  Call 911  Call 911 if any of these occur:    Chest pain, shortness of breath, wheezing, or difficulty breathing    Coughing up blood    Very severe pain with swallowing, especially if it goes along with a muffled voice   Date Last Reviewed: 6/1/2018 2000-2018 [employer]. [address] [place] [address]. All rights reserved. This information is not intended as a substitute for professional medical care. Always follow your healthcare professional's instructions.                Follow-ups after your visit        Follow-up notes from your care team     Return in about 1 week (around 12/7/2018), or if symptoms worsen or fail to improve.      Who to contact     If you have questions or need follow up information about today's clinic visit or your schedule please contact [facility] directly at [phone].  Normal or non-critical lab and imaging results will be communicated to you by MyChart, letter or phone within 4 business days after the clinic has received the results. If you do not hear from us within 7 days, please contact the clinic through MyChart or phone. If you have a critical or abnormal lab result, we will notify you by phone as soon as possible.  Submit refill requests through [employer] or call your pharmacy and they will forward the refill request to us. Please allow 3 business days for your refill to be completed.          Additional  "Information About Your Visit        MyChart Information     [facility] gives you secure access to your electronic health record. If you see a primary care provider, you can also send messages to your care team and make appointments. If you have questions, please call your primary care clinic.  If you do not have a primary care provider, please call [phone] and they will assist you.        Care EveryWhere ID     This is your Care EveryWhere ID. This could be used by other organizations to access your [place] medical records  [id number]        Your Vitals Were     Pulse Temperature Respirations Height Pulse Oximetry BMI (Body Mass Index)    78 98.1  F (36.7  C) (Oral) 18 6' 1\" (1.854 m) 94% 31.4 kg/m2       Blood Pressure from Last 3 Encounters:   11/30/18 120/82   11/05/18 116/90   09/07/18 116/74    Weight from Last 3 Encounters:   11/30/18 238 lb (108 kg)   11/05/18 240 lb 12.8 oz (109.2 kg)   09/07/18 245 lb (111.1 kg)              We Performed the Following     Beta strep group A culture     Rapid strep screen        Primary Care Provider Office Phone # Fax #    Jamison Lora [phone] [address] [address] [facility] [address]        Equal Access to Services     MIC HULL : Hadii gretchen ku hadasho Soomaali, waaxda luqadaha, qaybta kaalmada adeegyada, waxay idiin haylmn anthony luna . So [facility] [phone].    ATENCIÓN: Si habla español, tiene a salazar disposición servicios gratuitos de asistencia lingüística. Llame al [phone].    We comply with applicable federal civil rights laws and [place] laws. We do not discriminate on the basis of race, color, national origin, age, disability, sex, sexual orientation, or gender identity.            Thank you!     Thank you for choosing [facility]  for your care. Our goal is always to provide you with excellent care. Hearing back from our patients is one way we can continue to improve our services. Please take a " few minutes to complete the written survey that you may receive in the mail after your visit with us. Thank you!             Your Updated Medication List - Protect others around you: Learn how to safely use, store and throw away your medicines at www.disposemymeds.org.          This list is accurate as of 11/30/18  7:32 PM.  Always use your most recent med list.                   Brand Name Dispense Instructions for use Diagnosis    albuterol 108 (90 Base) MCG/ACT inhaler    PROAIR HFA/PROVENTIL HFA/VENTOLIN HFA    1 Inhaler    Inhale 2 puffs into the lungs every 4 hours as needed    Intermittent asthma, uncomplicated       ALPRAZolam 2 MG tablet    XANAX    30 tablet    Take 1 tablet (2 mg) by mouth 3 times daily as needed for anxiety    Anxiety       amphetamine-dextroamphetamine 20 MG tablet   Start taking on:  12/30/2018    ADDERALL    90 tablet    Take 1 tablet (20 mg) by mouth 3 times daily    Attention deficit hyperactivity disorder (ADHD), combined type       cyclobenzaprine 10 MG tablet    FLEXERIL    90 tablet    Take 1 tablet (10 mg) by mouth 3 times daily as needed for muscle spasms    Acute left-sided low back pain without sciatica       famotidine 40 MG tablet    PEPCID    90 tablet    TAKE 1 TABLET(40 MG) BY MOUTH AT BEDTIME    Upper abdominal pain       fluticasone 220 MCG/ACT inhaler    FLOVENT HFA    1 Inhaler    Inhale 2 puffs into the lungs 2 times daily Please give patient spacer    Mild persistent asthma without complication       hydrocortisone 2.5 % cream    ANUSOL-HC    30 g    Place rectally 2 times daily as needed for hemorrhoids external    External hemorrhoids       Hyoscyamine Sulfate 0.375 MG Tbcr     90 tablet    Take 1 capful by mouth 3 times daily as needed    Upper abdominal pain       lidocaine 5 % external ointment    XYLOCAINE    240 g    Apply topically as needed for moderate pain    Rectal pain       montelukast 10 MG tablet    SINGULAIR    90 tablet    Take 1 tablet (10 mg)  by mouth At Bedtime    Mild persistent asthma without complication       oxyCODONE-acetaminophen 5-325 MG tablet    PERCOCET    12 tablet    Take 1 tablet by mouth every 6 hours as needed for pain    Lumbosacral radiculopathy, Acute left-sided low back pain with left-sided sciatica       sertraline 100 MG tablet    ZOLOFT    90 tablet    Take 1 tablet (100 mg) by mouth daily    Anxiety, Panic disorder without agoraphobia       sildenafil 20 MG tablet    REVATIO    30 tablet    Take 1-2 tablets (20-40 mg) by mouth daily as needed As needed for prevention of erectile dysfunction    Drug-induced erectile dysfunction

## 2018-11-30 NOTE — LETTER
My Asthma Action Plan  Name: Abimael Martinez   YOB: 1990  Date: 11/30/2018   My doctor: Adilene Hooks MD   My clinic: [facility]        My Control Medicine: { :618585}  My Rescue Medicine: { :382895}  {AAP include Oral Steroid:349766} My Asthma Severity: { :540650}  Avoid your asthma triggers: { :208959}        {Is patient a child or adult?:206767}       GREEN ZONE   Good Control    I feel good    No cough or wheeze    Can work, sleep and play without asthma symptoms       Take your asthma control medicine every day.     1. If exercise triggers your asthma, take your rescue medication    15 minutes before exercise or sports, and    During exercise if you have asthma symptoms  2. Spacer to use with inhaler: If you have a spacer, make sure to use it with your inhaler             YELLOW ZONE Getting Worse  I have ANY of these:    I do not feel good    Cough or wheeze    Chest feels tight    Wake up at night   1. Keep taking your Green Zone medications  2. Start taking your rescue medicine:    every 20 minutes for up to 1 hour. Then every 4 hours for 24-48 hours.  3. If you stay in the Yellow Zone for more than 12-24 hours, contact your doctor.  4. If you do not return to the Green Zone in 12-24 hours or you get worse, start taking your oral steroid medicine if prescribed by your provider.           RED ZONE Medical Alert - Get Help  I have ANY of these:    I feel awful    Medicine is not helping    Breathing getting harder    Trouble walking or talking    Nose opens wide to breathe       1. Take your rescue medicine NOW  2. If your provider has prescribed an oral steroid medicine, start taking it NOW  3. Call your doctor NOW  4. If you are still in the Red Zone after 20 minutes and you have not reached your doctor:    Take your rescue medicine again and    Call 911 or go to the emergency room right away    See your regular doctor within 2 weeks of an Emergency Room or Urgent  Care visit for follow-up treatment.          Annual Reminders:  Meet with Asthma Educator,  Flu Shot in the Fall, consider Pneumonia Vaccination for patients with asthma (aged 19 and older).    Pharm[facility]: [facility] [address] [place] [address] [address] AT [facility]                      Asthma Triggers  How To Control Things That Make Your Asthma Worse    Triggers are things that make your asthma worse.  Look at the list below to help you find your triggers and what you can do about them.  You can help prevent asthma flare-ups by staying away from your triggers.      Trigger                                                          What you can do   Cigarette Smoke  Tobacco smoke can make asthma worse. Do not allow smoking in your home, car or around you.  Be sure no one smokes at a child s day care or school.  If you smoke, ask your health care provider for ways to help you quit.  Ask family members to quit too.  Ask your health care provider for a referral to Quit Plan to help you quit smoking, or call 6-135-297-PLAN.     Colds, Flu, Bronchitis  These are common triggers of asthma. Wash your hands often.  Don t touch your eyes, nose or mouth.  Get a flu shot every year.     Dust Mites  These are tiny bugs that live in cloth or carpet. They are too small to see. Wash sheets and blankets in hot water every week.   Encase pillows and mattress in dust mite proof covers.  Avoid having carpet if you can. If you have carpet, vacuum weekly.   Use a dust mask and HEPA vacuum.   Pollen and Outdoor Mold  Some people are allergic to trees, grass, or weed pollen, or molds. Try to keep your windows closed.  Limit time out doors when pollen count is high.   Ask you health care provider about taking medicine during allergy season.     Animal Dander  Some people are allergic to skin flakes, urine or saliva from pets with fur or feathers. Keep pets with fur or feathers out of your home.    If you  can t keep the pet outdoors, then keep the pet out of your bedroom.  Keep the bedroom door closed.  Keep pets off cloth furniture and away from stuffed toys.     Mice, Rats, and Cockroaches  Some people are allergic to the waste from these pests.   Cover food and garbage.  Clean up spills and food crumbs.  Store grease in the refrigerator.   Keep food out of the bedroom.   Indoor Mold  This can be a trigger if your home has high moisture. Fix leaking faucets, pipes, or other sources of water.   Clean moldy surfaces.  Dehumidify basement if it is damp and smelly.   Smoke, Strong Odors, and Sprays  These can reduce air quality. Stay away from strong odors and sprays, such as perfume, powder, hair spray, paints, smoke incense, paint, cleaning products, candles and new carpet.   Exercise or Sports  Some people with asthma have this trigger. Be active!  Ask your doctor about taking medicine before sports or exercise to prevent symptoms.    Warm up for 5-10 minutes before and after sports or exercise.     Other Triggers of Asthma  Cold air:  Cover your nose and mouth with a scarf.  Sometimes laughing or crying can be a trigger.  Some medicines and food can trigger asthma.

## 2018-11-30 NOTE — PROGRESS NOTES
SUBJECTIVE:   Abimael Martinez is a 28 year old male who presents to clinic today for the following health issues:    Acute Illness   Acute illness concerns: Sore throat with upper respiratory infection symptoms   Onset: 3-4 days    Fever: YES    Chills/Sweats: YES    Headache (location?): YES    Sinus Pressure:YES    Conjunctivitis:  no    Ear Pain: no    Rhinorrhea: YES    Congestion: YES    Sore Throat: YES and into chest     Cough: YES    Wheeze: YES    Decreased Appetite: YES    Nausea: no     Vomiting: no     Diarrhea:  no     Dysuria/Freq.: no     Fatigue/Achiness: YES    Sick/Strep Exposure: YES- school and friends     Therapies Tried and outcome: humidifier, tea, soups, liquids not helping, tylenol and ibuprofen          Problem list and histories reviewed & adjusted, as indicated.  Additional history: as documented    Patient Active Problem List   Diagnosis     Anxiety     CARDIOVASCULAR SCREENING; LDL GOAL LESS THAN 160     High risk sexual behavior     Tobacco use disorder     Low back pain     Hypertension goal BP (blood pressure) < 140/90     Internal hemorrhoids which bleed     Obesity, Class I, BMI 30-34.9     Attention deficit hyperactivity disorder (ADHD), combined type     AYALA (generalized anxiety disorder)     OCD (obsessive compulsive disorder)     Drug-induced erectile dysfunction     Mild persistent asthma without complication     Lumbosacral radiculopathy     Dyslipidemia     Elevated serum creatinine     Past Surgical History:   Procedure Laterality Date     BACK SURGERY  04/05/2018       Social History   Substance Use Topics     Smoking status: Former Smoker     Packs/day: 0.50     Years: 6.00     Types: Cigarettes     Quit date: 3/12/2018     Smokeless tobacco: Never Used      Comment: second hand smoke exposure     Alcohol use Yes      Comment: once a week     Family History   Problem Relation Age of Onset     Unknown/Adopted Mother      Unknown/Adopted Father      Unknown/Adopted  Maternal Grandmother      Unknown/Adopted Maternal Grandfather      Unknown/Adopted Paternal Grandmother      Unknown/Adopted Paternal Grandfather      Unknown/Adopted Brother          Current Outpatient Prescriptions   Medication Sig Dispense Refill     albuterol (PROAIR HFA/PROVENTIL HFA/VENTOLIN HFA) 108 (90 BASE) MCG/ACT Inhaler Inhale 2 puffs into the lungs every 4 hours as needed 1 Inhaler 0     ALPRAZolam (XANAX) 2 MG tablet Take 1 tablet (2 mg) by mouth 3 times daily as needed for anxiety 30 tablet 0     [START ON 12/30/2018] amphetamine-dextroamphetamine (ADDERALL) 20 MG per tablet Take 1 tablet (20 mg) by mouth 3 times daily 90 tablet 0     cyclobenzaprine (FLEXERIL) 10 MG tablet Take 1 tablet (10 mg) by mouth 3 times daily as needed for muscle spasms 90 tablet 1     famotidine (PEPCID) 40 MG tablet TAKE 1 TABLET(40 MG) BY MOUTH AT BEDTIME 90 tablet 1     fluticasone (FLOVENT HFA) 220 MCG/ACT Inhaler Inhale 2 puffs into the lungs 2 times daily Please give patient spacer 1 Inhaler 3     hydrocortisone (ANUSOL-HC) 2.5 % cream Place rectally 2 times daily as needed for hemorrhoids external 30 g 1     Hyoscyamine Sulfate 0.375 MG TBCR Take 1 capful by mouth 3 times daily as needed 90 tablet 1     lidocaine (XYLOCAINE) 5 % ointment Apply topically as needed for moderate pain 240 g 2     montelukast (SINGULAIR) 10 MG tablet Take 1 tablet (10 mg) by mouth At Bedtime 90 tablet 0     oxyCODONE-acetaminophen (PERCOCET) 5-325 MG per tablet Take 1 tablet by mouth every 6 hours as needed for pain 12 tablet 0     sertraline (ZOLOFT) 100 MG tablet Take 1 tablet (100 mg) by mouth daily 90 tablet 0     sildenafil (REVATIO) 20 MG tablet Take 1-2 tablets (20-40 mg) by mouth daily as needed As needed for prevention of erectile dysfunction 30 tablet 2     Allergies   Allergen Reactions     Cymbalta      Weight gain and fatigue     Duloxetine Other (See Comments)     Weight gain, fatigue  Enlarged spleen and weight gain     No  "Clinical Screening - See Comments GI Disturbance     Weight gain and fatigue     Paroxetine Other (See Comments), GI Disturbance and Unknown     Weight gain, fatigue  Weight gain  Spleen issues  Weight gain  Spleen issues     Paxil [Paroxetine Mesylate]      Weight gain and fatigue     Vicodin [Hydrocodone-Acetaminophen]      Recent Labs   Lab Test  03/19/18   1545  03/12/18   1145 04/09/14 03/13/14   1500  03/06/14   1218   11/19/10   0951   LDL   --   125*   --    --   143*   --   131*   HDL   --   29*   --    --   42   --   52   TRIG   --   230*   --    --   215*   --   135   ALT   --    --   36  37   --    --    --    CR  1.15  1.41*   --   1.30*  1.08   < >   --    GFRESTIMATED  76  60*   --   68  85   < >   --    GFRESTBLACK  >90  73   --   83  >90   < >   --    POTASSIUM   --   4.0   --   4.6  4.3   < >   --    TSH   --    --    --    --   1.51   --    --     < > = values in this interval not displayed.      BP Readings from Last 3 Encounters:   11/30/18 120/82   11/05/18 116/90   09/07/18 116/74    Wt Readings from Last 3 Encounters:   11/30/18 238 lb (108 kg)   11/05/18 240 lb 12.8 oz (109.2 kg)   09/07/18 245 lb (111.1 kg)                  Labs reviewed in EPIC    Reviewed and updated as needed this visit by clinical staff       Reviewed and updated as needed this visit by Provider         ROS:  Constitutional, HEENT, cardiovascular, pulmonary, gi and gu systems are negative, except as otherwise noted.    OBJECTIVE:     /82 (BP Location: Right arm, Patient Position: Chair, Cuff Size: Adult Large)  Pulse 78  Temp 98.1  F (36.7  C) (Oral)  Resp 18  Ht 6' 1\" (1.854 m)  Wt 238 lb (108 kg)  SpO2 94%  BMI 31.4 kg/m2  Body mass index is 31.4 kg/(m^2).  GENERAL: acutely ill, alert, well nourished, well hydrated, no distress, with cough  HENT: ear canals- normal; TMs- normal; Nose- rhinorrhea ; Mouth- no ulcers, no lesions, throat is erythematous without exudate. Sinuses without tenderness to " percussion.   NECK: no tenderness, negative anterior cervical adenopathy, no asymmetry, no masses, no stiffness; thyroid- normal to palpation  RESP: lungs clear to auscultation - no rales, no rhonchi, some expiratory wheezes  CV: regular rates and rhythm, normal S1 S2, no S3 or S4 and no murmur, no click or rub -  ABDOMEN: soft, no tenderness, no  hepatosplenomegaly, no masses, normal bowel sounds  MS: extremities- no gross deformities noted, no edema  SKIN: no suspicious lesions, no rashes  PSYCH: Alert and oriented times 3; affect- normal     Diagnostic Test Results:  Results for orders placed or performed in visit on 11/30/18 (from the past 24 hour(s))   Rapid strep screen   Result Value Ref Range    Specimen Description Throat     Rapid Strep A Screen       NEGATIVE: No Group A streptococcal antigen detected by immunoassay, await culture report.       ASSESSMENT/PLAN:               ICD-10-CM    1. URI with cough and congestion J06.9 No signs of bacterial infection. Symptomatic treatment with rest, fluids, tylenol and OTC cold medications as needed. Call if symptoms worse or do not improve for further evaluation and treatment.    2. Throat pain R07.0 Rapid strep screen     Beta strep group A culture       FUTURE APPOINTMENTS:       - Follow-up for annual visit or as needed  See Patient Instructions    Adilene Hooks MD  [facility]

## 2018-12-01 LAB
BACTERIA SPEC CULT: NORMAL
SPECIMEN SOURCE: NORMAL

## 2018-12-01 NOTE — PROGRESS NOTES
Dear Abimael    The strep culture was negative also.     Please contact me by MyChart if you have any questions about your labs or management.    Adilene Hooks MD

## 2018-12-01 NOTE — PROGRESS NOTES
Dear Abimael    Your test results are attached.     The strep screen is negative. We will still send the swab for culture.     Please contact me by [place] if you have any questions about your labs or management.    Adilene Hooks MD

## 2018-12-04 ENCOUNTER — PRE VISIT (OUTPATIENT)
Dept: NEUROSURGERY | Facility: CLINIC | Age: 28
End: 2018-12-04

## 2018-12-10 ENCOUNTER — TELEPHONE (OUTPATIENT)
Dept: FAMILY MEDICINE | Facility: CLINIC | Age: 28
End: 2018-12-10

## 2018-12-10 DIAGNOSIS — J45.20 INTERMITTENT ASTHMA, UNCOMPLICATED: ICD-10-CM

## 2018-12-10 DIAGNOSIS — F41.9 ANXIETY: ICD-10-CM

## 2018-12-10 NOTE — TELEPHONE ENCOUNTER
Controlled Substance Refill Request for aprazolam  Problem List Complete:  Yes                Overview Signed 4/6/2016  8:02 AM by Michael Lora MD         Patient is followed by MICHAEL LORA for ongoing prescription of benzodiazepines.  All refills should be approved by this provider, or covering partner.      Medication(s): alprazolam 2 mg .   Maximum quantity per month: 36  Clinic visit frequency required: Q 6  months       Controlled substance agreement on file: No  Benzodiazepine use reviewed by psychiatry:  No      Last [facility] website verification: 9/13/18      CARLOS Neumann RN

## 2018-12-11 RX ORDER — ALPRAZOLAM 2 MG
2 TABLET ORAL 3 TIMES DAILY PRN
Qty: 30 TABLET | Refills: 0 | Status: SHIPPED | OUTPATIENT
Start: 2018-12-11 | End: 2019-01-11

## 2018-12-17 DIAGNOSIS — F41.9 ANXIETY: ICD-10-CM

## 2018-12-17 DIAGNOSIS — F41.0 PANIC DISORDER WITHOUT AGORAPHOBIA: ICD-10-CM

## 2018-12-19 RX ORDER — SERTRALINE HYDROCHLORIDE 100 MG/1
TABLET, FILM COATED ORAL
Qty: 90 TABLET | Refills: 1 | Status: SHIPPED | OUTPATIENT
Start: 2018-12-19 | End: 2019-12-18

## 2018-12-27 ENCOUNTER — TRANSFERRED RECORDS (OUTPATIENT)
Dept: HEALTH INFORMATION MANAGEMENT | Facility: CLINIC | Age: 28
End: 2018-12-27

## 2019-01-08 ENCOUNTER — PRE VISIT (OUTPATIENT)
Dept: NEUROSURGERY | Facility: CLINIC | Age: 29
End: 2019-01-08

## 2019-01-11 ENCOUNTER — TELEPHONE (OUTPATIENT)
Dept: FAMILY MEDICINE | Facility: CLINIC | Age: 29
End: 2019-01-11

## 2019-01-11 DIAGNOSIS — F41.9 ANXIETY: ICD-10-CM

## 2019-01-11 RX ORDER — ALPRAZOLAM 2 MG
2 TABLET ORAL 3 TIMES DAILY PRN
Qty: 30 TABLET | Refills: 0 | Status: SHIPPED | OUTPATIENT
Start: 2019-01-11 | End: 2019-02-06

## 2019-01-11 NOTE — TELEPHONE ENCOUNTER
Controlled Substance Refill Request for aprazolam  Problem List Complete:  Yes                Overview Signed 4/6/2016  8:02 AM by Michael Lora MD         Patient is followed by MICHAEL LORA for ongoing prescription of benzodiazepines.  All refills should be approved by this provider, or covering partner.      Medication(s): alprazolam 2 mg .   Maximum quantity per month: 36  Clinic visit frequency required: Q 6  months       Controlled substance agreement on file: No  Benzodiazepine use reviewed by psychiatry:  No      Last [facility] website verification: 1/11/19      CARLOS Neumann RN

## 2019-01-28 ENCOUNTER — TELEPHONE (OUTPATIENT)
Dept: FAMILY MEDICINE | Facility: CLINIC | Age: 29
End: 2019-01-28

## 2019-01-28 DIAGNOSIS — F90.2 ATTENTION DEFICIT HYPERACTIVITY DISORDER (ADHD), COMBINED TYPE: ICD-10-CM

## 2019-01-28 RX ORDER — DEXTROAMPHETAMINE SACCHARATE, AMPHETAMINE ASPARTATE, DEXTROAMPHETAMINE SULFATE AND AMPHETAMINE SULFATE 5; 5; 5; 5 MG/1; MG/1; MG/1; MG/1
20 TABLET ORAL 3 TIMES DAILY
Qty: 90 TABLET | Refills: 0 | Status: SHIPPED | OUTPATIENT
Start: 2019-02-28 | End: 2019-01-28

## 2019-01-28 RX ORDER — DEXTROAMPHETAMINE SACCHARATE, AMPHETAMINE ASPARTATE, DEXTROAMPHETAMINE SULFATE AND AMPHETAMINE SULFATE 5; 5; 5; 5 MG/1; MG/1; MG/1; MG/1
20 TABLET ORAL 3 TIMES DAILY
Qty: 90 TABLET | Refills: 0 | Status: SHIPPED | OUTPATIENT
Start: 2019-03-28 | End: 2019-04-25

## 2019-01-28 RX ORDER — DEXTROAMPHETAMINE SACCHARATE, AMPHETAMINE ASPARTATE, DEXTROAMPHETAMINE SULFATE AND AMPHETAMINE SULFATE 5; 5; 5; 5 MG/1; MG/1; MG/1; MG/1
20 TABLET ORAL 3 TIMES DAILY
Qty: 90 TABLET | Refills: 0 | Status: SHIPPED | OUTPATIENT
Start: 2019-01-28 | End: 2019-01-28

## 2019-01-28 NOTE — TELEPHONE ENCOUNTER
Patient requesting 3 Rx's for 3 months.     Controlled Substance Refill Request for adderall  Problem List Complete:  Yes     checked in past 3 months?  Yes 1/28/19.  Printed and placed on PCP's desk for review. Other providers ordering controlled substances.       Patient is followed by MICHAEL OROPEZA for ongoing prescription of stimulants.  All refills should be approved by this provider, or covering partner.    Medication(s): adderall 20 mg.   Maximum quantity per month: 90  Clinic visit frequency required: Q 6  months     Controlled substance agreement on file: 07/31/17    Neuropsych evaluation for ADD completed:  Yes, completed 7-2008 at [facility], on file and diagnosis confirmed    CARLOS Neumann RN

## 2019-01-28 NOTE — TELEPHONE ENCOUNTER
Patient requesting 3 prescription refills for Adderall to be left at the . Ok to leave a detailed message.

## 2019-01-29 NOTE — TELEPHONE ENCOUNTER
I brought 3 Rx's up to the  and they are ready for .  I spoke to the patient and I let him know.  Farideh Gale,

## 2019-02-06 ENCOUNTER — TELEPHONE (OUTPATIENT)
Dept: FAMILY MEDICINE | Facility: CLINIC | Age: 29
End: 2019-02-06

## 2019-02-06 DIAGNOSIS — F41.9 ANXIETY: ICD-10-CM

## 2019-02-06 RX ORDER — ALPRAZOLAM 2 MG
2 TABLET ORAL 3 TIMES DAILY PRN
Qty: 30 TABLET | Refills: 0 | Status: SHIPPED | OUTPATIENT
Start: 2019-02-06 | End: 2019-03-12

## 2019-02-06 NOTE — TELEPHONE ENCOUNTER
Controlled Substance Refill Request for aprazolam  Problem List Complete:  Yes                Overview Signed 4/6/2016  8:02 AM by Michael Lora MD         Patient is followed by MICHAEL LORA for ongoing prescription of benzodiazepines.  All refills should be approved by this provider, or covering partner.      Medication(s): alprazolam 2 mg .   Maximum quantity per month: 36  Clinic visit frequency required: Q 6  months       Controlled substance agreement on file: No  Benzodiazepine use reviewed by psychiatry:  No      Last [facility] website verification: 1/11/19      CARLOS Neumann RN

## 2019-02-06 NOTE — TELEPHONE ENCOUNTER
Patient calling is leaving to go out of town on Friday am will be gone 2 -3 weeks. Would like an early refill on his xanax. Please call to advise when ready to be picked up at .

## 2019-02-07 NOTE — TELEPHONE ENCOUNTER
Called and spoke to patient. He would like RX faxed to [facility] in [place]. Faxed to them @ [phone].Rosa Cifuentes MA/VITO

## 2019-02-25 ENCOUNTER — OFFICE VISIT (OUTPATIENT)
Dept: URGENT CARE | Facility: URGENT CARE | Age: 29
End: 2019-02-25
Payer: COMMERCIAL

## 2019-02-25 VITALS
SYSTOLIC BLOOD PRESSURE: 124 MMHG | WEIGHT: 228 LBS | RESPIRATION RATE: 16 BRPM | DIASTOLIC BLOOD PRESSURE: 87 MMHG | TEMPERATURE: 98.2 F | HEART RATE: 78 BPM | BODY MASS INDEX: 30.08 KG/M2 | OXYGEN SATURATION: 97 %

## 2019-02-25 DIAGNOSIS — J20.9 ACUTE BRONCHITIS, UNSPECIFIED ORGANISM: ICD-10-CM

## 2019-02-25 DIAGNOSIS — R50.9 FEVER, UNSPECIFIED FEVER CAUSE: ICD-10-CM

## 2019-02-25 DIAGNOSIS — R06.2 WHEEZING: ICD-10-CM

## 2019-02-25 DIAGNOSIS — J45.30 MILD PERSISTENT ASTHMA WITHOUT COMPLICATION: ICD-10-CM

## 2019-02-25 DIAGNOSIS — J45.20 INTERMITTENT ASTHMA, UNCOMPLICATED: ICD-10-CM

## 2019-02-25 DIAGNOSIS — J11.1 INFLUENZA-LIKE ILLNESS: Primary | ICD-10-CM

## 2019-02-25 LAB
DEPRECATED S PYO AG THROAT QL EIA: NORMAL
FLUAV+FLUBV AG SPEC QL: NEGATIVE
FLUAV+FLUBV AG SPEC QL: NEGATIVE
SPECIMEN SOURCE: NORMAL
SPECIMEN SOURCE: NORMAL

## 2019-02-25 PROCEDURE — 87804 INFLUENZA ASSAY W/OPTIC: CPT | Performed by: PHYSICIAN ASSISTANT

## 2019-02-25 PROCEDURE — 87081 CULTURE SCREEN ONLY: CPT | Performed by: PHYSICIAN ASSISTANT

## 2019-02-25 PROCEDURE — 99214 OFFICE O/P EST MOD 30 MIN: CPT | Performed by: PHYSICIAN ASSISTANT

## 2019-02-25 PROCEDURE — 87880 STREP A ASSAY W/OPTIC: CPT | Performed by: PHYSICIAN ASSISTANT

## 2019-02-25 RX ORDER — PREDNISONE 20 MG/1
40 TABLET ORAL DAILY
Qty: 10 TABLET | Refills: 0 | Status: SHIPPED | OUTPATIENT
Start: 2019-02-25 | End: 2019-06-28

## 2019-02-25 RX ORDER — ALBUTEROL SULFATE 90 UG/1
2 AEROSOL, METERED RESPIRATORY (INHALATION) EVERY 4 HOURS PRN
Qty: 1 INHALER | Refills: 0 | Status: SHIPPED | OUTPATIENT
Start: 2019-02-25 | End: 2021-05-10

## 2019-02-25 ASSESSMENT — ENCOUNTER SYMPTOMS
HEADACHES: 1
VOMITING: 0
MYALGIAS: 1
EYES NEGATIVE: 1
DIAPHORESIS: 0
FREQUENCY: 0
SHORTNESS OF BREATH: 0
DIZZINESS: 0
GASTROINTESTINAL NEGATIVE: 1
WEAKNESS: 0
ENDOCRINE NEGATIVE: 1
DIARRHEA: 0
CHILLS: 0
POLYDIPSIA: 0
HEMATURIA: 0
EYE REDNESS: 0
CARDIOVASCULAR NEGATIVE: 1
COUGH: 1
DYSURIA: 0
WHEEZING: 0
NAUSEA: 0
PALPITATIONS: 0
LIGHT-HEADEDNESS: 0
ADENOPATHY: 0
EYE ITCHING: 0
EYE DISCHARGE: 0
SORE THROAT: 1
ABDOMINAL PAIN: 0
CHEST TIGHTNESS: 0
FEVER: 1
RHINORRHEA: 0

## 2019-02-25 NOTE — PATIENT INSTRUCTIONS
Patient Education     Influenza (Adult)    Influenza is also called the flu. It is a viral illness that affects the air passages of your lungs. It is different from the common cold. The flu can easily be passed from one to person to another. It may be spread through the air by coughing and sneezing. Or it can be spread by touching the sick person and then touching your own eyes, nose, or mouth.  The flu starts 1 to 3 days after you are exposed to the flu virus. It may last for 1 to 2 weeks but many people feel tired or fatigued for many weeks afterward. You usually don t need to take antibiotics unless you have a complication. This might be an ear or sinus infection or pneumonia.  Symptoms of the flu may be mild or severe. They can include extreme tiredness (wanting to stay in bed all day), chills, fevers, muscle aches, soreness with eye movement, headache, and a dry, hacking cough.  Home care  Follow these guidelines when caring for yourself at home:    Avoid being around cigarette smoke, whether yours or other people s.    Acetaminophen or ibuprofen will help ease your fever, muscle aches, and headache. Don t give aspirin to anyone younger than 18 who has the flu. Aspirin can harm the liver.    Nausea and loss of appetite are common with the flu. Eat light meals. Drink 6 to 8 glasses of liquids every day. Good choices are water, sport drinks, soft drinks without caffeine, juices, tea, and soup. Extra fluids will also help loosen secretions in your nose and lungs.    Over-the-counter cold medicines will not make the flu go away faster. But the medicines may help with coughing, sore throat, and congestion in your nose and sinuses. Don t use a decongestant if you have high blood pressure.    Stay home until your fever has been gone for at least 24 hours without using medicine to reduce fever.  Follow-up care  Follow up with your healthcare provider, or as advised, if you are not getting better over the next  week.  If you are age 65 or older, talk with your provider about getting a pneumococcal vaccine every 5 years. You should also get this vaccine if you have chronic asthma or COPD. All adults should get a flu vaccine every fall. Ask your provider about this.  When to seek medical advice  Call your healthcare provider right away if any of these occur:    Cough with lots of colored mucus (sputum) or blood in your mucus    Chest pain, shortness of breath, wheezing, or trouble breathing    Severe headache, or face, neck, or ear pain    New rash with fever    Fever of 100.4 F (38 C) or higher, or as directed by your healthcare provider    Confusion, behavior change, or seizure    Severe weakness or dizziness    You get a new fever or cough after getting better for a few days  Date Last Reviewed: 1/1/2017 2000-2018 [employer]. [address] [place] [address], [place] [address]. All rights reserved. This information is not intended as a substitute for professional medical care. Always follow your healthcare professional's instructions.

## 2019-02-25 NOTE — PROGRESS NOTES
Chief Complaint:     Chief Complaint   Patient presents with     URI     flu like symptoms, headache, breathing issues, been sick for over a week        HPI: Abimael Martinez is an 29 year old male who presents with dry cough, nasal congestion, sore throat and body aches. It began  2 week(s) ago and has slowly improved.  Cough is nonproductive, occasional There is no shortness of breath, wheezing and chest pain.      Recent travel?  returned from [place] last week.    He did not get a flu shot this year.      ROS:     Review of Systems   Constitutional: Positive for fever. Negative for chills and diaphoresis.   HENT: Positive for congestion and sore throat. Negative for ear pain and rhinorrhea.    Eyes: Negative.  Negative for discharge, redness and itching.   Respiratory: Positive for cough. Negative for chest tightness, shortness of breath and wheezing.    Cardiovascular: Negative.  Negative for chest pain and palpitations.   Gastrointestinal: Negative.  Negative for abdominal pain, diarrhea, nausea and vomiting.   Endocrine: Negative.  Negative for polydipsia and polyuria.   Genitourinary: Negative for dysuria, frequency, hematuria and urgency.   Musculoskeletal: Positive for myalgias.   Skin: Negative for rash.   Allergic/Immunologic: Negative for immunocompromised state.   Neurological: Positive for headaches. Negative for dizziness, weakness and light-headedness.   Hematological: Negative for adenopathy.        Respiratory History  occasional episodes of bronchitis       Family History   Family History   Problem Relation Age of Onset     Unknown/Adopted Mother      Unknown/Adopted Father      Unknown/Adopted Maternal Grandmother      Unknown/Adopted Maternal Grandfather      Unknown/Adopted Paternal Grandmother      Unknown/Adopted Paternal Grandfather      Unknown/Adopted Brother         Problem history  Patient Active Problem List   Diagnosis     Anxiety     CARDIOVASCULAR SCREENING; LDL GOAL LESS THAN 160      High risk sexual behavior     Tobacco use disorder     Low back pain     Hypertension goal BP (blood pressure) < 140/90     Internal hemorrhoids which bleed     Obesity, Class I, BMI 30-34.9     Attention deficit hyperactivity disorder (ADHD), combined type     AYALA (generalized anxiety disorder)     OCD (obsessive compulsive disorder)     Drug-induced erectile dysfunction     Mild persistent asthma without complication     Lumbosacral radiculopathy     Dyslipidemia     Elevated serum creatinine        Allergies  Allergies   Allergen Reactions     Cymbalta      Weight gain and fatigue     Duloxetine Other (See Comments)     Weight gain, fatigue  Enlarged spleen and weight gain     No Clinical Screening - See Comments GI Disturbance     Weight gain and fatigue     Paroxetine Other (See Comments), GI Disturbance and Unknown     Weight gain, fatigue  Weight gain  Spleen issues  Weight gain  Spleen issues     Paxil [Paroxetine Mesylate]      Weight gain and fatigue     Vicodin [Hydrocodone-Acetaminophen]         Social History  Social History     Socioeconomic History     Marital status: Single     Spouse name: Not on file     Number of children: Not on file     Years of education: Not on file     Highest education level: Not on file   Occupational History     Not on file   Social Needs     Financial resource strain: Not on file     Food insecurity:     Worry: Not on file     Inability: Not on file     Transportation needs:     Medical: Not on file     Non-medical: Not on file   Tobacco Use     Smoking status: Former Smoker     Packs/day: 0.50     Years: 6.00     Pack years: 3.00     Types: Cigarettes     Last attempt to quit: 3/12/2018     Years since quittin.9     Smokeless tobacco: Never Used     Tobacco comment: second hand smoke exposure   Substance and Sexual Activity     Alcohol use: Yes     Comment: once a week     Drug use: Yes     Types: Marijuana     Comment: pot once a month, ectasy  As of 2016  he only smokes pot occasionally     Sexual activity: Yes     Partners: Female     Birth control/protection: Condom   Lifestyle     Physical activity:     Days per week: Not on file     Minutes per session: Not on file     Stress: Not on file   Relationships     Social connections:     Talks on phone: Not on file     Gets together: Not on file     Attends Sikh service: Not on file     Active member of club or organization: Not on file     Attends meetings of clubs or organizations: Not on file     Relationship status: Not on file     Intimate partner violence:     Fear of current or ex partner: Not on file     Emotionally abused: Not on file     Physically abused: Not on file     Forced sexual activity: Not on file   Other Topics Concern     Parent/sibling w/ CABG, MI or angioplasty before 65F 55M? No   Social History Narrative     Not on file        Current Meds    Current Outpatient Medications:      albuterol (PROAIR HFA/PROVENTIL HFA/VENTOLIN HFA) 108 (90 Base) MCG/ACT inhaler, Inhale 2 puffs into the lungs every 4 hours as needed for wheezing, Disp: 1 Inhaler, Rfl: 0     ALPRAZolam (XANAX) 2 MG tablet, Take 1 tablet (2 mg) by mouth 3 times daily as needed for anxiety, Disp: 30 tablet, Rfl: 0     [START ON 3/28/2019] amphetamine-dextroamphetamine (ADDERALL) 20 MG tablet, Take 1 tablet (20 mg) by mouth 3 times daily, Disp: 90 tablet, Rfl: 0     cyclobenzaprine (FLEXERIL) 10 MG tablet, Take 1 tablet (10 mg) by mouth 3 times daily as needed for muscle spasms, Disp: 90 tablet, Rfl: 1     famotidine (PEPCID) 40 MG tablet, TAKE 1 TABLET(40 MG) BY MOUTH AT BEDTIME, Disp: 90 tablet, Rfl: 1     fluticasone (FLOVENT HFA) 220 MCG/ACT Inhaler, Inhale 2 puffs into the lungs 2 times daily Please give patient spacer, Disp: 1 Inhaler, Rfl: 3     hydrocortisone (ANUSOL-HC) 2.5 % cream, Place rectally 2 times daily as needed for hemorrhoids external, Disp: 30 g, Rfl: 1     Hyoscyamine Sulfate 0.375 MG TBCR, Take 1 capful by  mouth 3 times daily as needed, Disp: 90 tablet, Rfl: 1     lidocaine (XYLOCAINE) 5 % ointment, Apply topically as needed for moderate pain, Disp: 240 g, Rfl: 2     montelukast (SINGULAIR) 10 MG tablet, Take 1 tablet (10 mg) by mouth At Bedtime, Disp: 90 tablet, Rfl: 0     oxyCODONE-acetaminophen (PERCOCET) 5-325 MG per tablet, Take 1 tablet by mouth every 6 hours as needed for pain, Disp: 12 tablet, Rfl: 0     predniSONE (DELTASONE) 20 MG tablet, Take 40 mg by mouth daily for 5 days., Disp: 10 tablet, Rfl: 0     sertraline (ZOLOFT) 100 MG tablet, TAKE 1 TABLET(100 MG) BY MOUTH DAILY, Disp: 90 tablet, Rfl: 1     sildenafil (REVATIO) 20 MG tablet, Take 1-2 tablets (20-40 mg) by mouth daily as needed As needed for prevention of erectile dysfunction, Disp: 30 tablet, Rfl: 2        OBJECTIVE     Vital signs reviewed by Kevin Morrow  /87 (BP Location: Left arm, Patient Position: Sitting, Cuff Size: Adult Large)   Pulse 78   Temp 98.2  F (36.8  C) (Oral)   Resp 16   Wt 103.4 kg (228 lb)   SpO2 97%   BMI 30.08 kg/m       Physical Exam   Constitutional: He is oriented to person, place, and time. He appears well-developed and well-nourished. He is cooperative.  Non-toxic appearance. He does not have a sickly appearance. He does not appear ill. No distress.   HENT:   Head: Normocephalic and atraumatic.   Right Ear: Hearing, tympanic membrane, external ear and ear canal normal. Tympanic membrane is not perforated, not erythematous, not retracted and not bulging.   Left Ear: Hearing, tympanic membrane, external ear and ear canal normal. Tympanic membrane is not perforated, not erythematous, not retracted and not bulging.   Nose: Mucosal edema and rhinorrhea present. Right sinus exhibits no maxillary sinus tenderness and no frontal sinus tenderness. Left sinus exhibits no maxillary sinus tenderness and no frontal sinus tenderness.   Mouth/Throat: Mucous membranes are normal. Posterior oropharyngeal erythema  present. No oropharyngeal exudate or posterior oropharyngeal edema. Tonsils are 0 on the right. Tonsils are 0 on the left. No tonsillar exudate.   Eyes: Conjunctivae, EOM and lids are normal. Pupils are equal, round, and reactive to light. Right eye exhibits no discharge and no exudate. Left eye exhibits no discharge and no exudate. Right conjunctiva is not injected. Left conjunctiva is not injected.   Neck: Normal range of motion. Neck supple.   Cardiovascular: Normal rate, regular rhythm, normal heart sounds and intact distal pulses. Exam reveals no gallop and no friction rub.   No murmur heard.  Pulmonary/Chest: Effort normal and breath sounds normal. No stridor. No respiratory distress. He has no decreased breath sounds. He has no wheezes. He has no rhonchi. He has no rales. He exhibits no tenderness.   Abdominal: Soft. Bowel sounds are normal. He exhibits no distension and no mass. There is no tenderness. There is no rigidity, no rebound, no guarding and no CVA tenderness.   Musculoskeletal: Normal range of motion.   Neurological: He is alert and oriented to person, place, and time. He has normal strength. He displays no atrophy and normal reflexes. No cranial nerve deficit or sensory deficit. He exhibits normal muscle tone. Coordination and gait normal.   Reflex Scores:       Tricep reflexes are 2+ on the right side and 2+ on the left side.       Bicep reflexes are 2+ on the right side and 2+ on the left side.       Brachioradialis reflexes are 2+ on the right side and 2+ on the left side.       Patellar reflexes are 2+ on the right side and 2+ on the left side.       Achilles reflexes are 2+ on the right side and 2+ on the left side.  Skin: Skin is warm. Capillary refill takes less than 2 seconds. He is not diaphoretic.   Psychiatric: He has a normal mood and affect. His behavior is normal. Judgment and thought content normal.         Labs:       Medical Decision Making:    Differential Diagnosis:  URI  Adult/Peds:  Bronchitis-viral, Influenza, Pneumonia, Strep pharyngitis, Tonsilitis, Viral pharyngitis, Viral syndrome and Viral upper respiratory illness        ASSESSMENT    1. Influenza-like illness    2. Mild persistent asthma without complication    3. Acute bronchitis, unspecified organism    4. Fever, unspecified fever cause    5. Wheezing    6. Intermittent asthma, uncomplicated        PLAN  Patient presents with 2 weeks days of cough.  Patient is in no acute distress and is running a temp of 98.2 in clinic today.  Lung sounds were clear and O2 sats at 97% on RA.  Imaging to rule out pneumonia is not indicated at this time.  RST was negative.  We will call with culture results only if positive.  Influenza was negative.  Rx for Prednisone, and refill of albuterol inhaler.  Rest, Push fluids, vaporizer, elevation of head of bed.  Ibuprofen and or Tylenol for any fever or body aches.  Over the counter cough suppressant- PRN- as discussed.   If symptoms worsen, recheck immediately otherwise follow up with your PCP in 1 week if symptoms are not improving.  Worrisome symptoms discussed with instructions to go to the ED.  Patient verbalized understanding and agreed with this plan.         Kevin Morrow  2/25/2019, 11:09 AM

## 2019-02-26 LAB
BACTERIA SPEC CULT: NORMAL
SPECIMEN SOURCE: NORMAL

## 2019-02-27 ENCOUNTER — OFFICE VISIT (OUTPATIENT)
Dept: FAMILY MEDICINE | Facility: CLINIC | Age: 29
End: 2019-02-27
Payer: COMMERCIAL

## 2019-02-27 VITALS
DIASTOLIC BLOOD PRESSURE: 84 MMHG | BODY MASS INDEX: 30.48 KG/M2 | RESPIRATION RATE: 14 BRPM | SYSTOLIC BLOOD PRESSURE: 124 MMHG | TEMPERATURE: 97.5 F | HEIGHT: 73 IN | OXYGEN SATURATION: 98 % | WEIGHT: 230 LBS | HEART RATE: 72 BPM

## 2019-02-27 DIAGNOSIS — Z11.3 SCREEN FOR STD (SEXUALLY TRANSMITTED DISEASE): ICD-10-CM

## 2019-02-27 DIAGNOSIS — J45.30 MILD PERSISTENT ASTHMA WITHOUT COMPLICATION: ICD-10-CM

## 2019-02-27 DIAGNOSIS — J01.90 ACUTE SINUSITIS WITH SYMPTOMS > 10 DAYS: Primary | ICD-10-CM

## 2019-02-27 PROCEDURE — 87591 N.GONORRHOEAE DNA AMP PROB: CPT | Performed by: PHYSICIAN ASSISTANT

## 2019-02-27 PROCEDURE — 87389 HIV-1 AG W/HIV-1&-2 AB AG IA: CPT | Performed by: PHYSICIAN ASSISTANT

## 2019-02-27 PROCEDURE — 99214 OFFICE O/P EST MOD 30 MIN: CPT | Performed by: PHYSICIAN ASSISTANT

## 2019-02-27 PROCEDURE — 87491 CHLMYD TRACH DNA AMP PROBE: CPT | Performed by: PHYSICIAN ASSISTANT

## 2019-02-27 PROCEDURE — 86803 HEPATITIS C AB TEST: CPT | Performed by: PHYSICIAN ASSISTANT

## 2019-02-27 PROCEDURE — 36415 COLL VENOUS BLD VENIPUNCTURE: CPT | Performed by: PHYSICIAN ASSISTANT

## 2019-02-27 PROCEDURE — 86780 TREPONEMA PALLIDUM: CPT | Performed by: PHYSICIAN ASSISTANT

## 2019-02-27 PROCEDURE — 87340 HEPATITIS B SURFACE AG IA: CPT | Performed by: PHYSICIAN ASSISTANT

## 2019-02-27 RX ORDER — MONTELUKAST SODIUM 10 MG/1
10 TABLET ORAL AT BEDTIME
Qty: 30 TABLET | Refills: 1 | Status: SHIPPED | OUTPATIENT
Start: 2019-02-27 | End: 2019-06-28

## 2019-02-27 ASSESSMENT — MIFFLIN-ST. JEOR: SCORE: 2067.15

## 2019-02-27 NOTE — PATIENT INSTRUCTIONS
Sinusitis [Abx Tx]    The sinuses are air-filled spaces within the bones of the face. They connect to the inside of the nose. Sinusitis is an inflammation of the tissue lining the sinus cavity. Sinus inflammation can occur during a cold or hay-fever (allergies to pollens and other particles in the air) and cause symptoms of sinus congestion and fullness. A sinus infection causes fever, headache and facial pain. There is usually green or yellow drainage from the nose or into the back of the throat (post-nasal drip). Antibiotics are prescribed to treat this condition.  Home Care:  Drink plenty of water, hot tea, and other liquids to stay well hydrated. This thins the mucus and promotes sinus drainage.  Apply heat to the painful areas of the face. Use a towel soaked in hot water. Or,  the shower and direct the hot spray onto your face. This is a good way to inhale warm water vapor and get heat on your face at the same time. (Cover your mouth and nose with your hands so you can still breathe as you do this.)  Use a vaporizer with products such as Vicks VapoRub (contains menthol) at night. Suck on peppermint, menthol or eucalyptus hard candies during the day.  An expectorant containing guaifenesin (such as Robitussin), helps to thin the mucus and promote drainage from the sinuses.  Over-the-counter decongestants may be used unless a similar medicine was prescribed. Nasal sprays work the fastest. Use one that contains phenylephrine (Romario-synephrine, Sinex and others) or oxymetazoline (Afrin). First blow the nose gently to remove mucus, then apply the drops. Do not use these medicines more often than directed on the label or for more than three days or symptoms may worsen. You may also use tablets containing pseudoephedrine (Sudafed). Many sinus remedies combine ingredients, which may increase side effects. Read the labels or ask the pharmacist for help. NOTE: Persons with high blood pressure should not use  decongestants. They can raise blood pressure.  Antihistamines are useful if allergies are a cause of your sinusitis. The mildest one is chlorpheniramine (available without a prescription). The dose for adults is 8-12mg three times a day. [NOTE: Do not use chlorpheniramine if you have glaucoma or if you are a man with trouble urinating due to an enlarged prostate.] Claritin (loratidine) is an antihistamine that causes less drowsiness and is a good alternative for daytime use.  Do not use nasal rinses or irrigation during an acute sinus infection, unless advised by your doctor. Rinsing may spread the infection to other sinuses.  You may use acetaminophen (Tylenol) or ibuprofen (Motrin, Advil) to control pain, unless another pain medicine was prescribed. [ NOTE: If you have chronic liver or kidney disease or ever had a stomach ulcer, talk with your doctor before using these medicines.] (Aspirin should never be used in anyone under 18 years of age who is ill with a fever. It may cause severe liver damage.)  Finish the full course, even if you are feeling better after a few days.  Follow Up  with your doctor or this facility in one week or as instructed by our staff if not improving.  Get Prompt Medical Attention  if any of the following occur:  Facial pain or headache becomes more severe  Stiff neck  Unusual drowsiness or confusion, or not acting like your normal self  Swelling of the forehead or eyelids  Vision problems including blurred or double vision  Fever of 100.4 F (38 C) or higher, or as directed by your healthcare provider  Seizure    [phone] [facility], [address] [place] [address], [place] [address]. All rights reserved. This information is not intended as a substitute for professional medical care. Always follow your healthcare professional's instructions.

## 2019-02-27 NOTE — LETTER
February 28, 2019      Abimael Martinez  [address]  [address]  [place] [address]        Dear ,    We are writing to inform you of your test results. All of your labs were normal. If you have any questions or concerns, please call the clinic at the number listed above.       Sincerely,    JESSIE Hartmann/dane        Resulted Orders   Hepatitis C Screen Reflex to HCV RNA Quant and Genotype   Result Value Ref Range    Hepatitis C Antibody Nonreactive NR^Nonreactive      Comment:      Assay performance characteristics have not been established for newborns,   infants, and children     Chlamydia trachomatis PCR   Result Value Ref Range    Specimen Description Urine     Chlamydia Trachomatis PCR Negative NEG^Negative      Comment:      Negative for C. trachomatis rRNA by transcription mediated amplification.  A negative result by transcription mediated amplification does not preclude   the presence of C. trachomatis infection because results are dependent on   proper and adequate collection, absence of inhibitors, and sufficient rRNA to   be detected.     Hepatitis B surface antigen   Result Value Ref Range    Hep B Surface Agn Nonreactive NR^Nonreactive   HIV Antigen Antibody Combo   Result Value Ref Range    HIV Antigen Antibody Combo Nonreactive NR^Nonreactive          Comment:      HIV-1 p24 Ag & HIV-1/HIV-2 Ab Not Detected   Neisseria gonorrhoeae PCR   Result Value Ref Range    Specimen Descrip Urine     N Gonorrhea PCR Negative NEG^Negative      Comment:      Negative for N. gonorrhoeae rRNA by transcription mediated amplification.  A negative result by transcription mediated amplification does not preclude   the presence of N. gonorrhoeae infection because results are dependent on   proper and adequate collection, absence of inhibitors, and sufficient rRNA to   be detected.     Treponema Abs w Reflex to RPR and Titer   Result Value Ref Range    Treponema Antibodies Nonreactive  NR^Nonreactive

## 2019-02-28 LAB
C TRACH DNA SPEC QL NAA+PROBE: NEGATIVE
HBV SURFACE AG SERPL QL IA: NONREACTIVE
HCV AB SERPL QL IA: NONREACTIVE
HIV 1+2 AB+HIV1 P24 AG SERPL QL IA: NONREACTIVE
N GONORRHOEA DNA SPEC QL NAA+PROBE: NEGATIVE
SPECIMEN SOURCE: NORMAL
SPECIMEN SOURCE: NORMAL
T PALLIDUM AB SER QL: NONREACTIVE

## 2019-02-28 ASSESSMENT — ASTHMA QUESTIONNAIRES: ACT_TOTALSCORE: 16

## 2019-02-28 NOTE — RESULT ENCOUNTER NOTE
Please send letter if doesn't check mychart.  Royer Eubanks PA-C    Mr. Martinez,    All of your labs were normal.    Please contact the clinic if you have additional questions or if symptoms persist.  Thank you.    Sincerely,    Randolph Eubanks

## 2019-03-05 NOTE — TELEPHONE ENCOUNTER
Controlled Substance Refill Request for Alprazolam  Problem List Complete:  Yes     Last filled 12/12/17  Last office visit 11/21/17       checked in past 6 months?  Yes 9/11/17, printed and given to Dr Lora.       Patient is followed by MICHAEL LORA for ongoing prescription of benzodiazepines.  All refills should be approved by this provider, or covering partner.      Medication(s): alprazolam 2 mg .   Maximum quantity per month: 36  Clinic visit frequency required: Q 6  months       Controlled substance agreement on file: No  Benzodiazepine use reviewed by psychiatry:  No   multiple injuries

## 2019-03-07 ENCOUNTER — TRANSFERRED RECORDS (OUTPATIENT)
Dept: HEALTH INFORMATION MANAGEMENT | Facility: CLINIC | Age: 29
End: 2019-03-07

## 2019-03-12 ENCOUNTER — TELEPHONE (OUTPATIENT)
Dept: FAMILY MEDICINE | Facility: CLINIC | Age: 29
End: 2019-03-12

## 2019-03-12 DIAGNOSIS — F41.9 ANXIETY: ICD-10-CM

## 2019-03-12 RX ORDER — ALPRAZOLAM 2 MG
2 TABLET ORAL 3 TIMES DAILY PRN
Qty: 30 TABLET | Refills: 0 | Status: SHIPPED | OUTPATIENT
Start: 2019-03-12 | End: 2019-04-17

## 2019-03-12 NOTE — TELEPHONE ENCOUNTER
Controlled Substance Refill Request for aprazolam  Problem List Complete:  Yes                Overview Signed 4/6/2016  8:02 AM by Michael Lora MD         Patient is followed by MICHAEL LORA for ongoing prescription of benzodiazepines.  All refills should be approved by this provider, or covering partner.      Medication(s): alprazolam 2 mg .   Maximum quantity per month: 36  Clinic visit frequency required: Q 6  months       Controlled substance agreement on file: No  Benzodiazepine use reviewed by psychiatry:  No      Last [facility] website verification: 3/12/19      CARLOS Neumann RN

## 2019-03-12 NOTE — TELEPHONE ENCOUNTER
Reason for Call:  Medication or medication refill:    Do you use a [facility]?  Name of the pharmacy and phone number for the current request:  [facility]    Name of the medication requested: ALPRAZolam (XANAX) 2 MG tablet    Other request:     Can we leave a detailed message on this number? YES    Phone number patient can be reached at: Home number on file [phone] (home)    Best Time: ANY    Call taken on 3/12/2019 at 1:41 PM by Jana Howe

## 2019-03-13 ENCOUNTER — MYC MEDICAL ADVICE (OUTPATIENT)
Dept: FAMILY MEDICINE | Facility: CLINIC | Age: 29
End: 2019-03-13

## 2019-03-13 NOTE — LETTER
My Asthma Action Plan  Name: Abimael Martinez   YOB: 1990  Date: 3/25/2019   My doctor: Farideh Gale   My clinic: [facility]        My Control Medicine: Fluticasone propionate (Flovent) -   mcg 2 puffs twice a day  My Rescue Medicine: Albuterol (Proair/Ventolin/Proventil) inhaler 2 puffs every 6 hours as needed   My Asthma Severity: mild persistent  Avoid your asthma triggers: Patient is unaware of triggers  upper respiratory infections            GREEN ZONE   Good Control    I feel good    No cough or wheeze    Can work, sleep and play without asthma symptoms       Take your asthma control medicine every day.     1. If exercise triggers your asthma, take your rescue medication    15 minutes before exercise or sports, and    During exercise if you have asthma symptoms  2. Spacer to use with inhaler: If you have a spacer, make sure to use it with your inhaler             YELLOW ZONE Getting Worse  I have ANY of these:    I do not feel good    Cough or wheeze    Chest feels tight    Wake up at night   1. Keep taking your Green Zone medications  2. Start taking your rescue medicine:    every 20 minutes for up to 1 hour. Then every 4 hours for 24-48 hours.  3. If you stay in the Yellow Zone for more than 12-24 hours, contact your doctor.  4. If you do not return to the Green Zone in 12-24 hours or you get worse, start taking your oral steroid medicine if prescribed by your provider.           RED ZONE Medical Alert - Get Help  I have ANY of these:    I feel awful    Medicine is not helping    Breathing getting harder    Trouble walking or talking    Nose opens wide to breathe       1. Take your rescue medicine NOW  2. If your provider has prescribed an oral steroid medicine, start taking it NOW  3. Call your doctor NOW  4. If you are still in the Red Zone after 20 minutes and you have not reached your doctor:    Take your rescue medicine again and    Call 911 or go to the  emergency room right away    See your regular doctor within 2 weeks of an Emergency Room or Urgent Care visit for follow-up treatment.          Annual Reminders:  Meet with Asthma Educator,  Flu Shot in the Fall, consider Pneumonia Vaccination for patients with asthma (aged 19 and older).    Pharmacy: [facility] [address] [place] [address] [address] AT [facility] & [facility]                      Asthma Triggers  How To Control Things That Make Your Asthma Worse    Triggers are things that make your asthma worse.  Look at the list below to help you find your triggers and what you can do about them.  You can help prevent asthma flare-ups by staying away from your triggers.      Trigger                                                          What you can do   Cigarette Smoke  Tobacco smoke can make asthma worse. Do not allow smoking in your home, car or around you.  Be sure no one smokes at a child s day care or school.  If you smoke, ask your health care provider for ways to help you quit.  Ask family members to quit too.  Ask your health care provider for a referral to Quit Plan to help you quit smoking, or call 8-176-862-PLAN.     Colds, Flu, Bronchitis  These are common triggers of asthma. Wash your hands often.  Don t touch your eyes, nose or mouth.  Get a flu shot every year.     Dust Mites  These are tiny bugs that live in cloth or carpet. They are too small to see. Wash sheets and blankets in hot water every week.   Encase pillows and mattress in dust mite proof covers.  Avoid having carpet if you can. If you have carpet, vacuum weekly.   Use a dust mask and HEPA vacuum.   Pollen and Outdoor Mold  Some people are allergic to trees, grass, or weed pollen, or molds. Try to keep your windows closed.  Limit time out doors when pollen count is high.   Ask you health care provider about taking medicine during allergy season.     Animal Dander  Some people are allergic to skin flakes, urine or  saliva from pets with fur or feathers. Keep pets with fur or feathers out of your home.    If you can t keep the pet outdoors, then keep the pet out of your bedroom.  Keep the bedroom door closed.  Keep pets off cloth furniture and away from stuffed toys.     Mice, Rats, and Cockroaches  Some people are allergic to the waste from these pests.   Cover food and garbage.  Clean up spills and food crumbs.  Store grease in the refrigerator.   Keep food out of the bedroom.   Indoor Mold  This can be a trigger if your home has high moisture. Fix leaking faucets, pipes, or other sources of water.   Clean moldy surfaces.  Dehumidify basement if it is damp and smelly.   Smoke, Strong Odors, and Sprays  These can reduce air quality. Stay away from strong odors and sprays, such as perfume, powder, hair spray, paints, smoke incense, paint, cleaning products, candles and new carpet.   Exercise or Sports  Some people with asthma have this trigger. Be active!  Ask your doctor about taking medicine before sports or exercise to prevent symptoms.    Warm up for 5-10 minutes before and after sports or exercise.     Other Triggers of Asthma  Cold air:  Cover your nose and mouth with a scarf.  Sometimes laughing or crying can be a trigger.  Some medicines and food can trigger asthma.

## 2019-03-13 NOTE — LETTER
March 25, 2019      Abimael Martinez  [address]  [id number]  [place] [address]      Dear Abimael,     Your clinic record indicates that you are due for an asthma update. We have a survey tool called an ACT (or Asthma Control Test) we use to measure the level of control of your asthma. Please complete the enclosed questionnaire and mail it back to us in the self-addressed stamped envelope.     If you have questions about this letter please contact your provider.     Sincerely,       Your [facility] Team

## 2019-03-16 ENCOUNTER — OFFICE VISIT (OUTPATIENT)
Dept: URGENT CARE | Facility: URGENT CARE | Age: 29
End: 2019-03-16
Payer: COMMERCIAL

## 2019-03-16 VITALS
RESPIRATION RATE: 16 BRPM | BODY MASS INDEX: 31.4 KG/M2 | DIASTOLIC BLOOD PRESSURE: 90 MMHG | OXYGEN SATURATION: 96 % | WEIGHT: 238 LBS | SYSTOLIC BLOOD PRESSURE: 132 MMHG | HEART RATE: 83 BPM | TEMPERATURE: 98.3 F

## 2019-03-16 DIAGNOSIS — J02.9 SORE THROAT: Primary | ICD-10-CM

## 2019-03-16 LAB
DEPRECATED S PYO AG THROAT QL EIA: NORMAL
SPECIMEN SOURCE: NORMAL

## 2019-03-16 PROCEDURE — 87081 CULTURE SCREEN ONLY: CPT | Performed by: NURSE PRACTITIONER

## 2019-03-16 PROCEDURE — 99213 OFFICE O/P EST LOW 20 MIN: CPT | Performed by: NURSE PRACTITIONER

## 2019-03-16 PROCEDURE — 87880 STREP A ASSAY W/OPTIC: CPT | Performed by: NURSE PRACTITIONER

## 2019-03-16 ASSESSMENT — ENCOUNTER SYMPTOMS
RHINORRHEA: 1
DIARRHEA: 0
SHORTNESS OF BREATH: 0
COUGH: 0
FEVER: 0
VOMITING: 0
NAUSEA: 0
SORE THROAT: 1
DIAPHORESIS: 0
CHILLS: 0

## 2019-03-16 NOTE — PROGRESS NOTES
SUBJECTIVE:   Abimael Martinez is a 29 year old male presenting with a chief complaint of   Chief Complaint   Patient presents with     Pharyngitis       He is an established patient of [facility].    Sore throat    Onset of symptoms was 3 day(s) ago.  Course of illness is worsening.    Severity moderate  Current and Associated symptoms: sore throat, running nose  Treatment measures tried include None tried.  Predisposing factors include None.      Review of Systems   Constitutional: Negative for chills, diaphoresis and fever.   HENT: Positive for rhinorrhea and sore throat. Negative for congestion and ear pain.    Respiratory: Negative for cough and shortness of breath.    Gastrointestinal: Negative for diarrhea, nausea and vomiting.   All other systems reviewed and are negative.      Past Medical History:   Diagnosis Date     Acute right otitis media 1/8/2013     Anxiety      Depression      Drug abuse (H)      Urethritis 5/20/2013     Problem list name updated by automated process. Provider to review     Family History   Problem Relation Age of Onset     Unknown/Adopted Mother      Unknown/Adopted Father      Unknown/Adopted Maternal Grandmother      Unknown/Adopted Maternal Grandfather      Unknown/Adopted Paternal Grandmother      Unknown/Adopted Paternal Grandfather      Unknown/Adopted Brother      Current Outpatient Medications   Medication Sig Dispense Refill     albuterol (PROAIR HFA/PROVENTIL HFA/VENTOLIN HFA) 108 (90 Base) MCG/ACT inhaler Inhale 2 puffs into the lungs every 4 hours as needed for wheezing 1 Inhaler 0     ALPRAZolam (XANAX) 2 MG tablet Take 1 tablet (2 mg) by mouth 3 times daily as needed for anxiety 30 tablet 0     amoxicillin-clavulanate (AUGMENTIN) 875-125 MG tablet Take 1 tablet by mouth 2 times daily 20 tablet 0     [START ON 3/28/2019] amphetamine-dextroamphetamine (ADDERALL) 20 MG tablet Take 1 tablet (20 mg) by mouth 3 times daily 90 tablet 0     cyclobenzaprine (FLEXERIL) 10 MG  tablet Take 1 tablet (10 mg) by mouth 3 times daily as needed for muscle spasms (Patient not taking: Reported on 2019) 90 tablet 1     famotidine (PEPCID) 40 MG tablet TAKE 1 TABLET(40 MG) BY MOUTH AT BEDTIME 90 tablet 1     fluticasone (FLOVENT HFA) 220 MCG/ACT Inhaler Inhale 2 puffs into the lungs 2 times daily Please give patient spacer 1 Inhaler 3     hydrocortisone (ANUSOL-HC) 2.5 % cream Place rectally 2 times daily as needed for hemorrhoids external (Patient not taking: Reported on 2019) 30 g 1     Hyoscyamine Sulfate 0.375 MG TBCR Take 1 capful by mouth 3 times daily as needed 90 tablet 1     lidocaine (XYLOCAINE) 5 % ointment Apply topically as needed for moderate pain (Patient not taking: Reported on 2019) 240 g 2     montelukast (SINGULAIR) 10 MG tablet Take 1 tablet (10 mg) by mouth At Bedtime 30 tablet 1     montelukast (SINGULAIR) 10 MG tablet Take 1 tablet (10 mg) by mouth At Bedtime (Patient not taking: Reported on 2019) 90 tablet 0     oxyCODONE-acetaminophen (PERCOCET) 5-325 MG per tablet Take 1 tablet by mouth every 6 hours as needed for pain 12 tablet 0     sertraline (ZOLOFT) 100 MG tablet TAKE 1 TABLET(100 MG) BY MOUTH DAILY 90 tablet 1     sildenafil (REVATIO) 20 MG tablet Take 1-2 tablets (20-40 mg) by mouth daily as needed As needed for prevention of erectile dysfunction 30 tablet 2     Social History     Tobacco Use     Smoking status: Former Smoker     Packs/day: 0.50     Years: 6.00     Pack years: 3.00     Types: Cigarettes     Last attempt to quit: 3/12/2018     Years since quittin.0     Smokeless tobacco: Never Used     Tobacco comment: second hand smoke exposure   Substance Use Topics     Alcohol use: Yes     Comment: once a week       OBJECTIVE  /90 (BP Location: Right arm, Cuff Size: Adult Large)   Pulse 83   Temp 98.3  F (36.8  C) (Oral)   Resp 16   Wt 108 kg (238 lb)   SpO2 96%   BMI 31.40 kg/m      Physical Exam   Constitutional: No distress.    HENT:   Head: Normocephalic and atraumatic.   Right Ear: Tympanic membrane and external ear normal.   Left Ear: Tympanic membrane and external ear normal.   Mouth/Throat: Uvula is midline, oropharynx is clear and moist and mucous membranes are normal.   Eyes: EOM are normal. Pupils are equal, round, and reactive to light.   Neck: Normal range of motion. Neck supple.   Cardiovascular: Normal rate and normal heart sounds.   Pulmonary/Chest: Effort normal and breath sounds normal. No respiratory distress.   Lymphadenopathy:     He has no cervical adenopathy.   Neurological: He is alert. No cranial nerve deficit.   Skin: Skin is warm and dry. He is not diaphoretic.   Psychiatric: He has a normal mood and affect.   Nursing note and vitals reviewed.      Labs:  Results for orders placed or performed in visit on 03/16/19 (from the past 24 hour(s))   Strep, Rapid Screen   Result Value Ref Range    Specimen Description Throat     Rapid Strep A Screen       NEGATIVE: No Group A streptococcal antigen detected by immunoassay, await culture report.         ASSESSMENT:      ICD-10-CM    1. Sore throat J02.9 Strep, Rapid Screen     Beta strep group A culture            Differential Diagnosis:  URI Adult/Peds:  Asthma, Influenza, Strep pharyngitis, Viral pharyngitis and Viral upper respiratory illness    Serious Comorbid Conditions:  Adult:  Asthma    PLAN:  Discussed URI symptoms and causes  Increase hydration, rest  Follow up with PCP if symptoms are persisting in 3 days or sooner if getting worse.  All questions are answered and patient is in agreement with plan

## 2019-03-16 NOTE — PATIENT INSTRUCTIONS
Patient Education     When You Have a Sore Throat    A sore throat can be painful. There are many reasons why you may have a sore throat. Your healthcare provider will work with you to find the cause of your sore throat. He or she will also find the best treatment for you.  What causes a sore throat?  Sore throats can be caused or worsened by:    Cold or flu viruses    Bacteria    Irritants such as tobacco smoke or air pollution    Acid reflux  A healthy throat  The tonsils are on the sides of the throat near the base of the tongue. They collect viruses and bacteria and help fight infection. The throat (pharynx) is the passage for air. Mucus from the nasal cavity also moves down the passage.  An inflamed throat  The tonsils and pharynx can become inflamed due to a cold or flu virus. Postnasal drip (excess mucus draining from the nasal cavity) can irritate the throat. It can also make the throat or tonsils more likely to be infected by bacteria. Severe, untreated tonsillitis in children or adults can cause a pocket of pus (abscess) to form near the tonsil.  Your evaluation  A medical evaluation can help find the cause of your sore throat. It can also help your healthcare provider choose the best treatment for you. The evaluation may include a health history, physical exam, and diagnostic tests.  Health history  Your healthcare provider may ask you the following:    How long has the sore throat lasted and how have you been treating it?    Do you have any other symptoms, such as body aches, fever, or cough?    Does your sore throat recur? If so, how often? How many days of school or work have you missed because of a sore throat?    Do you have trouble eating or swallowing?    Have you been told that you snore or have other sleep problems?    Do you have bad breath?    Do you cough up bad-tasting mucus?  Physical exam  During the exam, your healthcare provider checks your ears, nose, and throat for problems. He or she  "also checks for swelling in the neck, and may listen to your chest.  Possible tests  Other tests your healthcare provider may perform include:    A throat swab to check for bacteria such as streptococcus (the bacteria that causes strep throat)    A blood test to check for mononucleosis (a viral infection)    A chest X-ray to rule out pneumonia, especially if you have a cough  Treating a sore throat  Treatment depends on many factors. What is the likely cause? Is the problem recent? Does it keep coming back? In many cases, the best thing to do is to treat the symptoms, rest, and let the problem heal itself. Antibiotics may help clear up some bacterial infections. For cases of severe or recurring tonsillitis, the tonsils may need to be removed.  Relieving your symptoms    Don t smoke, and avoid secondhand smoke.    For children, try throat sprays or Popsicles. Adults and older children may try lozenges.    Drink warm liquids to soothe the throat and help thin mucus. Avoid alcohol, spicy foods, and acidic drinks such as orange juice. These can irritate the throat.    Gargle with warm saltwater (1 teaspoon of salt to 8 ounces of warm water).    Use a humidifier to keep air moist and relieve throat dryness.    Try over-the-counter pain relievers such as acetaminophen or ibuprofen. Use as directed, and don t exceed the recommended dose. Don t give aspirin to children.   Are antibiotics needed?  If your sore throat is due to a bacterial infection, antibiotics may speed healing and prevent complications. Although group A streptococcus (\"strep throat\" or GAS) is the major treatable infection for a sore throat, GAS causes only 5% to 15% of sore throats in adults who seek medical care. Most sore throats are caused by cold or flu viruses. And antibiotics don t treat viral illness. In fact, using antibiotics when they re not needed may produce bacteria that are harder to kill. Your healthcare provider will prescribe antibiotics " only if he or she thinks they are likely to help.  If antibiotics are prescribed  Take the medicine exactly as directed. Be sure to finish your prescription even if you re feeling better. And be sure to ask your healthcare provider or pharmacist what side effects are common and what to do about them.  Is surgery needed?  In some cases, tonsils need to be removed. This is often done as outpatient (same-day) surgery. Your healthcare provider may advise removing the tonsils in cases of:    Several severe bouts of tonsillitis in a year.  Severe  episodes include those that lead to missed days of school or work, or that need to be treated with antibiotics.    Tonsillitis that causes breathing problems during sleep    Tonsillitis caused by food particles collecting in pouches in the tonsils (cryptic tonsillitis)  Call your healthcare provider if any of the following occur:    Symptoms worsen, or new symptoms develop.    Swollen tonsils make breathing difficult.    The pain is severe enough to keep you from drinking liquids.    A skin rash, hives, or wheezing develops. Any of these could signal an allergic reaction to antibiotics.    Symptoms don t improve within a week.    Symptoms don t improve within 2 to 3 days of starting antibiotics.   Date Last Reviewed: 10/1/2016    3371-6638 [employer]. [address] [place] [address], [place] [address]. All rights reserved. This information is not intended as a substitute for professional medical care. Always follow your healthcare professional's instructions.

## 2019-03-17 LAB
BACTERIA SPEC CULT: NORMAL
SPECIMEN SOURCE: NORMAL

## 2019-03-25 NOTE — TELEPHONE ENCOUNTER
Panel Management Review      Patient has the following on his problem list:     Asthma review     ACT Total Scores 2/27/2019   ACT TOTAL SCORE -   ASTHMA ER VISITS -   ASTHMA HOSPITALIZATIONS -   ACT TOTAL SCORE (Goal Greater than or Equal to 20) 16   In the past 12 months, how many times did you visit the emergency room for your asthma without being admitted to the hospital? 0   In the past 12 months, how many times were you hospitalized overnight because of your asthma? 0      1. Is Asthma diagnosis on the Problem List? Yes    2. Is Asthma listed on Health Maintenance? Yes    3. Patient is due for:  ACT and AAP      Composite cancer screening  Chart review shows that this patient is due/due soon for the following None  Summary:    Patient is due/failing the following:   AAP and ACT    Action needed:   Routed to provider for review.    Type of outreach:    Copy of ACT mailed to patient, will reach out in 5 days.    Questions for provider review:    Please complete the AAP and route back to the team so they can reach out in 2 weeks if ACT is not returned                                                                                                                                    Victoria Rice [facility]       Chart routed to Provider .

## 2019-03-28 ENCOUNTER — TRANSFERRED RECORDS (OUTPATIENT)
Dept: HEALTH INFORMATION MANAGEMENT | Facility: CLINIC | Age: 29
End: 2019-03-28

## 2019-04-12 ENCOUNTER — TELEPHONE (OUTPATIENT)
Dept: FAMILY MEDICINE | Facility: CLINIC | Age: 29
End: 2019-04-12

## 2019-04-12 DIAGNOSIS — F41.9 ANXIETY: ICD-10-CM

## 2019-04-12 NOTE — TELEPHONE ENCOUNTER
Panel Management Review      Patient has the following on his problem list:     Asthma review     ACT Total Scores 2/27/2019   ACT TOTAL SCORE -   ASTHMA ER VISITS -   ASTHMA HOSPITALIZATIONS -   ACT TOTAL SCORE (Goal Greater than or Equal to 20) 16   In the past 12 months, how many times did you visit the emergency room for your asthma without being admitted to the hospital? 0   In the past 12 months, how many times were you hospitalized overnight because of your asthma? 0      1. Is Asthma diagnosis on the Problem List? Yes    2. Is Asthma listed on Health Maintenance? Yes    3. Patient is due for:  ACT      Composite cancer screening  Chart review shows that this patient is due/due soon for the following None  Summary:    Patient is due/failing the following:   ACT, out reach made 3/25/19. Will await a response    Action needed:   Patient needs to do ACT.    Type of outreach:    none    Questions for provider review:    None                                                                                                                                    Victoria Rice cma       Chart routed to closed .

## 2019-04-15 NOTE — TELEPHONE ENCOUNTER
I spoke to the patient and he has been away on a trip for over 3 weeks and hasn't gone through his mail yet.  He will fill out the questionnaire and mail it back to us or call us if he needs another one sent.  Postpone 1 week.  Farideh Gale,

## 2019-04-17 RX ORDER — ALPRAZOLAM 2 MG
2 TABLET ORAL 3 TIMES DAILY PRN
Qty: 30 TABLET | Refills: 0 | Status: SHIPPED | OUTPATIENT
Start: 2019-04-17 | End: 2019-05-14

## 2019-04-17 NOTE — TELEPHONE ENCOUNTER
Patient calling to check on status on refill request he is out of this medication now.     Please call to advice  Thank you

## 2019-04-17 NOTE — TELEPHONE ENCOUNTER
This message was taken 5 days ago but it was never routed anywhere.  This is the first time seeing this request.     Controlled Substance Refill Request for aprazolam  Problem List Complete:  Yes                Overview Signed 4/6/2016  8:02 AM by Michael Lora MD         Patient is followed by MICHAEL LORA for ongoing prescription of benzodiazepines.  All refills should be approved by this provider, or covering partner.      Medication(s): alprazolam 2 mg .   Maximum quantity per month: 36  Clinic visit frequency required: Q 6  months       Controlled substance agreement on file: No  Benzodiazepine use reviewed by psychiatry:  No      Last [facility] website verification: 3/12/19      Heather Lin, ADRYN RN

## 2019-04-25 ENCOUNTER — TELEPHONE (OUTPATIENT)
Dept: FAMILY MEDICINE | Facility: CLINIC | Age: 29
End: 2019-04-25

## 2019-04-25 DIAGNOSIS — F90.2 ATTENTION DEFICIT HYPERACTIVITY DISORDER (ADHD), COMBINED TYPE: ICD-10-CM

## 2019-04-25 RX ORDER — DEXTROAMPHETAMINE SACCHARATE, AMPHETAMINE ASPARTATE, DEXTROAMPHETAMINE SULFATE AND AMPHETAMINE SULFATE 5; 5; 5; 5 MG/1; MG/1; MG/1; MG/1
20 TABLET ORAL 3 TIMES DAILY
Qty: 90 TABLET | Refills: 0 | Status: SHIPPED | OUTPATIENT
Start: 2019-05-25 | End: 2019-04-25

## 2019-04-25 RX ORDER — DEXTROAMPHETAMINE SACCHARATE, AMPHETAMINE ASPARTATE, DEXTROAMPHETAMINE SULFATE AND AMPHETAMINE SULFATE 5; 5; 5; 5 MG/1; MG/1; MG/1; MG/1
20 TABLET ORAL 3 TIMES DAILY
Qty: 90 TABLET | Refills: 0 | Status: SHIPPED | OUTPATIENT
Start: 2019-06-25 | End: 2019-07-25

## 2019-04-25 RX ORDER — DEXTROAMPHETAMINE SACCHARATE, AMPHETAMINE ASPARTATE, DEXTROAMPHETAMINE SULFATE AND AMPHETAMINE SULFATE 5; 5; 5; 5 MG/1; MG/1; MG/1; MG/1
20 TABLET ORAL 3 TIMES DAILY
Qty: 90 TABLET | Refills: 0 | Status: SHIPPED | OUTPATIENT
Start: 2019-04-25 | End: 2019-04-25

## 2019-04-25 NOTE — TELEPHONE ENCOUNTER
The patient requested 3 months of Adderall Rx's but only 1 month was written.  Do you want to give him 2 more months?  Please advise.  Thank you.  Farideh Gale,

## 2019-04-25 NOTE — TELEPHONE ENCOUNTER
Patient requesting 3 mths of Adderall refills be left at  for pickup. Please call to notify. Ok to leave a detailed message.

## 2019-04-30 ENCOUNTER — TRANSFERRED RECORDS (OUTPATIENT)
Dept: HEALTH INFORMATION MANAGEMENT | Facility: CLINIC | Age: 29
End: 2019-04-30

## 2019-05-06 ENCOUNTER — TRANSFERRED RECORDS (OUTPATIENT)
Dept: HEALTH INFORMATION MANAGEMENT | Facility: CLINIC | Age: 29
End: 2019-05-06

## 2019-05-07 NOTE — TELEPHONE ENCOUNTER
Controlled Substance Refill Request for adderall  Problem List Complete:  Yes    Patient is followed by MICHAEL OROPEZA for ongoing prescription of stimulants.  All refills should be approved by this provider, or covering partner.     Medication(s): adderall 20 mg.   Maximum quantity per month: 90  Clinic visit frequency required: Q 6  months      Controlled substance agreement on file: 07/31/17     Neuropsych evaluation for ADD completed:  Yes, completed 7-2008 at [facility], on file and diagnosis confirmed     done:  Yes, 4/25/19.  Printed and placed on PCP's desk for review.  Other providers ordering controlled substances.        Heather Lin, ADRYN RN           [facility] Medicine  History & Physical    Patient Name: Agus Ramos Jr.  MRN: [id number]  Admission Date: 5/6/2019  Attending Physician: Eileen Kinsey MD   Primary Care Provider: Keaton Hardy MD         Patient information was obtained from patient.     Subjective:     Principal Problem:Acute on chronic combined systolic and diastolic heart failure    Chief Complaint:  Shortness of breath for one day.    HPI: Mr. Agus Ramos Jr. is a 81 y.o. male known to me with essential hypertension, type 2 diabetes mellitus (HbA1c 5.0% Mar 2019), CAD s/p CABG, chronic combined systolic and diastolic heart failure (LVEF 50% Mar 2019), CKD Stage 3, peripheral artery disease, chronic atrial fibrillation ([id number] score 5) not on chronic anticoagulation, COPD, chronic respiratory failure with hypoxia, hypothyroidism (TSH 5.525 Mar 2019), chronic indwelling urinary catheter, and anemia of chronic disease who presents to [facility] ED with complaints of dyspnea today.  He was recently discharged from [facility] two days ago and was feeling well.  Today however he felt dyspneic and fatigued.  EMS was activated and upon their arrival found that he was with an oxygen saturation of 92%.  He also noted that he was using a 30 ft oxygen tubing and felt that that could be contributing to his hypoxia.  He denies any fevers, chills, nausea, vomiting, chest pain, palpitation, diaphoresis, hemoptysis, coughing, nor any wheezing.  He does have chronic edema to his legs which are not any worse than usual.  He denies any orthopnea nor any PND.  He otherwise has been in his usual state of health.    Chart Review:  Previous Hospitalizations  Date Hospital Diagnosis   Mar 23, 2019 [facility] Hypoglycemia   [place] 14, 2019 [facility], acute renal failure, anemia s/p pRBC transfusion    Dec 2018 [facility] [place]    Nov 2018 [facility] [place]    Jul 2018 [facility] [place]     May 27, 2018 [facility] Acute heart failure, anemia s/p negative EGD   May 3, 2018 [facility] Symptomatic anemia s/p pRBC transfusion 2 units    Sept 2016 [facility] UTI, hyperkalemia    Sept 2013 [facility] AFib with RVR, ANIA/DCCV      Outpatient Follow-Up  Date of Visit Physician Service   Dec 2018 Millie Bell MD Hematology    Jun 2018 Vane Dove MD Pulmonology    Jun 2018 Neil Wiggins MD Urology      Past Medical History:   Diagnosis Date    Anemia 05/03/2018    pending blood transfusion    Anticoagulant long-term use     apixaban    Atrial fibrillation     CKD (chronic kidney disease)     Congestive heart failure     COPD (chronic obstructive pulmonary disease)     Coronary artery disease     Diabetes mellitus     Encounter for blood transfusion     HLD (hyperlipidemia)     Hypertension     MI (myocardial infarction)     On home oxygen therapy     Pacemaker     left chest    Peripheral vascular disease     Requires assistance with activities of daily living (ADL)     S/P CABG x 3 10     S/P femoral-popliteal bypass surgery left    Stented coronary artery     Tobacco use     Unsteady gait        Past Surgical History:   Procedure Laterality Date    CARDIAC CATHETERIZATION      CARDIAC PACEMAKER PLACEMENT      CARDIAC SURGERY      3 vessel CABG    CARDIOVERSION N/A 9/19/2013    Performed by [facility]ann [facility] at [facility] MARYANN    cardioverted      CORONARY ANGIOPLASTY WITH STENT PLACEMENT      EGD (ESOPHAGOGASTRODUODENOSCOPY) N/A 6/1/2018    Performed by [facility]man [facility] at [facility] ENDO    HEMORRHOID SURGERY      TRANSESOPHAGEAL ECHOCARDIOGRAM (ANIA) N/A 9/19/2013    [facility]    VASCULAR SURGERY      femoral artery popliteal bypass       Review of patient's allergies indicates:  No Known Allergies    No current facility-administered medications on file prior to encounter.      Current Outpatient Medications on File Prior to Encounter   Medication Sig    albuterol-ipratropium  2.5mg-0.5mg/3mL (DUO-NEB) 0.5 mg-3 mg(2.5 mg base)/3 mL nebulizer solution Take 3 mLs by nebulization every 6 (six) hours. Rescue    aspirin (ECOTRIN) 81 MG EC tablet Take 1 tablet (81 mg total) by mouth once daily.    atorvastatin (LIPITOR) 20 MG tablet Take 20 mg by mouth every evening.    bumetanide (BUMEX) 2 MG tablet Take 1 tablet (2 mg total) by mouth once daily.    cinacalcet (SENSIPAR) 30 MG Tab Take 1 tablet (30 mg total) by mouth daily with breakfast.    diltiaZEM (CARDIZEM) 30 MG tablet Take 1 tablet (30 mg total) by mouth every 12 (twelve) hours.    ferrous gluconate (FERGON) 324 MG tablet Take 324 mg by mouth daily with breakfast.    fish oil-omega-3 fatty acids 300-1,000 mg capsule Take 2 g by mouth 2 (two) times daily.     insulin aspart U-100 (NOVOLOG) 100 unit/mL injection Inject into the skin as needed for High Blood Sugar (Blood glucose >200).    levothyroxine (SYNTHROID) 25 MCG tablet Take 1 tablet (25 mcg total) by mouth before breakfast.    losartan (COZAAR) 25 MG tablet Take 25 mg by mouth once daily.     metOLazone (ZAROXOLYN) 10 MG tablet Take 1 tablet (10 mg total) by mouth once daily.    spironolactone (ALDACTONE) 25 MG tablet Take 1 tablet (25 mg total) by mouth once daily.    carvedilol (COREG) 25 MG tablet Take 1 tablet (25 mg total) by mouth 2 (two) times daily.    nitroGLYCERIN (NITROSTAT) 0.4 MG SL tablet Place 0.4 mg under the tongue every 5 (five) minutes as needed.    pantoprazole (PROTONIX) 40 MG tablet Take 1 tablet (40 mg total) by mouth once daily.    polyethylene glycol (GLYCOLAX) 17 gram PwPk Take by mouth as needed.     tamsulosin (FLOMAX) 0.4 mg Cap Take 1 capsule (0.4 mg total) by mouth once daily.     Family History     Problem Relation (Age of Onset)    Diabetes Father, Mother        Tobacco Use    Smoking status: Former Smoker     Packs/day: 1.00     Years: 60.00     Pack years: 60.00     Types: Cigarettes     Last attempt to quit: 5/8/2018     Years  since quittin.9    Smokeless tobacco: Never Used   Substance and Sexual Activity    Alcohol use: No    Drug use: No    Sexual activity: Not on file     Review of Systems   Constitutional: Positive for fatigue. Negative for activity change, appetite change, chills, diaphoresis, fever and unexpected weight change.   HENT: Negative.    Eyes: Negative.    Respiratory: Positive for shortness of breath. Negative for cough, chest tightness and wheezing.    Cardiovascular: Positive for leg swelling. Negative for chest pain and palpitations.   Gastrointestinal: Negative for abdominal distention, abdominal pain, blood in stool, constipation, diarrhea, nausea and vomiting.   Genitourinary: Negative for dysuria and hematuria.   Musculoskeletal: Negative.    Skin: Negative.    Neurological: Negative for dizziness, seizures, syncope, weakness and light-headedness.   Psychiatric/Behavioral: Negative.      Objective:     Vital Signs (Most Recent):  Temp: 98.7 °F (37.1 °C) (19)  Pulse: 78 (19)  Resp: 17 (19)  BP: (!) 107/53 (19)  SpO2: 100 % (19) Vital Signs (24h Range):  Temp:  [98.2 °F (36.8 °C)-98.7 °F (37.1 °C)] 98.7 °F (37.1 °C)  Pulse:  [75-82] 78  Resp:  [16-20] 17  SpO2:  [95 %-100 %] 100 %  BP: (103-117)/(52-82) 107/53     Weight: 84 kg (185 lb 3 oz)  Body mass index is 27.35 kg/m².    Physical Exam   Constitutional: He is oriented to person, place, and time. He appears well-developed and well-nourished. No distress.   HENT:   Head: Normocephalic and atraumatic.   Right Ear: External ear normal.   Left Ear: External ear normal.   Nose: Nose normal.   Eyes: Right eye exhibits no discharge. Left eye exhibits no discharge.   Neck: Normal range of motion.   Cardiovascular:   Regular rate and rhythm with a Grade II/VI holosystolic murmur, no gallop   Pulmonary/Chest:   Mildly increased respiratory effort with bibasilar crackles   Abdominal: Soft. Bowel sounds are  normal. He exhibits no distension. There is no tenderness. There is no rebound and no guarding.   Musculoskeletal: Normal range of motion. He exhibits edema (There is significant 2+ pitting edema up to the hips bilaterally and symmetrically).   Neurological: He is alert and oriented to person, place, and time.   Skin: Skin is warm and dry. He is not diaphoretic. No erythema.   Psychiatric: He has a normal mood and affect. His behavior is normal. Judgment and thought content normal.   Nursing note and vitals reviewed.          Significant Labs: All pertinent labs within the past 24 hours have been reviewed.    Significant Imaging: I have reviewed and interpreted all pertinent imaging results/findings within the past 24 hours.    Assessment/Plan:     * Acute on chronic combined systolic and diastolic heart failure  Patient does appear volume overloaded and has an elevated BNP 1443.  His troponin level is 0.076 which is consistent with previous measurements.  I have personally reviewed his plain chest radiograph in is significant for mild pulmonary edema. I have also reviewed his EKG which was significant for normal sinus rhythm with T-wave inversions in the inferolateral leads which all appear to be chronic.  Will admit for intravenous diuresis with furosemide and careful monitoring of his intake/output.  He recently underwent TTE in March 2019, will defer repeat.    Hypomagnesemia  Will replete intravenously and recheck his magnesium level in the morning.    Essential hypertension  Patient's blood pressure is well-controlled; will continue home regimen of bumetanide, carvedilol, diltiazem losartan, metolazone, spironolactone, and tamsulosin, provide as-needed , and clonidine.    Type 2 diabetes mellitus, controlled, with renal complications  Well controlled on prandial insulin therapy; provide prandial insulin therapy along with insulin sliding scale.    CAD (coronary artery disease)  Stable; will continue his home  regimen of aspirin, atorvastatin, carvedilol, and losartan.    Chronic combined systolic and diastolic heart failure  As addressed above.    CKD Stage 3  His renal function appears to be near his baseline; will continue monitor his urine output.    Peripheral artery disease  Stable; will continue his home regimen of aspirin and atorvastatin.    Chronic atrial fibrillation  Patient is actually in sinus rhythm at this time.  Will continue his home regimen of aspirin.    Centrilobular emphysema  Clinically-stable without wheezing.  Will provide as-needed JENAE/LAMA available.    Chronic respiratory failure with hypoxia  Will continue his home supplemental oxygen therapy.    Hypothyroidism  Poorly controlled; will continue his home regimen of levothyroxine and defer dosage adjustments to his PCP.    Chronic indwelling urinary catheter  Stable; there are no acute issues.    Anemia of chronic disease  The patient's H/H is stable and consistent with previous laboratory measurements, and the patient exhibits no signs or symptoms of acute bleeding; there is no indication for transfusion.  Will continue to monitor.    VTE Risk Mitigation (From admission, onward)        Ordered     heparin (porcine) injection 5,000 Units  Every 12 hours      05/07/19 0501     IP VTE HIGH RISK PATIENT  Once      05/07/19 0402             Megan Yip M.D.  Staff Nocturnist  Department of Hospital Medicine  [facility]  Pager: [phone]          N.B.: Portions of this note was dictated using M*Modal Fluency Direct--there may be voice recognition errors occasionally missed on review.

## 2019-05-09 ENCOUNTER — TRANSFERRED RECORDS (OUTPATIENT)
Dept: HEALTH INFORMATION MANAGEMENT | Facility: CLINIC | Age: 29
End: 2019-05-09

## 2019-05-13 ENCOUNTER — TELEPHONE (OUTPATIENT)
Dept: FAMILY MEDICINE | Facility: CLINIC | Age: 29
End: 2019-05-13

## 2019-05-13 DIAGNOSIS — F41.9 ANXIETY: ICD-10-CM

## 2019-05-14 RX ORDER — ALPRAZOLAM 2 MG
2 TABLET ORAL 3 TIMES DAILY PRN
Qty: 30 TABLET | Refills: 0 | Status: SHIPPED | OUTPATIENT
Start: 2019-05-14 | End: 2019-06-12

## 2019-06-11 ENCOUNTER — TRANSFERRED RECORDS (OUTPATIENT)
Dept: HEALTH INFORMATION MANAGEMENT | Facility: CLINIC | Age: 29
End: 2019-06-11

## 2019-06-12 ENCOUNTER — TELEPHONE (OUTPATIENT)
Dept: FAMILY MEDICINE | Facility: CLINIC | Age: 29
End: 2019-06-12

## 2019-06-12 DIAGNOSIS — F41.9 ANXIETY: ICD-10-CM

## 2019-06-12 RX ORDER — ALPRAZOLAM 2 MG
2 TABLET ORAL 3 TIMES DAILY PRN
Qty: 30 TABLET | Refills: 0 | Status: SHIPPED | OUTPATIENT
Start: 2019-06-12 | End: 2019-07-09

## 2019-06-12 NOTE — TELEPHONE ENCOUNTER
Controlled Substance Refill Request for Xanax  Problem List Complete:  Yes    Patient is followed by MICHAEL OROPEZA for ongoing prescription of benzodiazepines.  All refills should be approved by this provider, or covering partner.     Medication(s): alprazolam 2 mg .   Maximum quantity per month: 36  Clinic visit frequency required: Q 6  months      Controlled substance agreement on file: No  Benzodiazepine use reviewed by psychiatry:  No     checked in past 3 months?  Yes 6/12/19, no concerns     Poonam RIDERN, RN, CPN

## 2019-06-26 NOTE — TELEPHONE ENCOUNTER
Controlled Substance Refill Request for aprazolam  Problem List Complete:  Yes                Overview Signed 4/6/2016  8:02 AM by Michael Lora MD         Patient is followed by MICHAEL LORA for ongoing prescription of benzodiazepines.  All refills should be approved by this provider, or covering partner.      Medication(s): alprazolam 2 mg .   Maximum quantity per month: 36  Clinic visit frequency required: Q 6  months       Controlled substance agreement on file: No  Benzodiazepine use reviewed by psychiatry:  No      Last [facility] website verification: 3/12/19      CARLOS Neumann RN                      
I faxed the Rx to the pharmacy.  I spoke to the patient and let him know.  Farideh Gale,     
Please have another provider sign for this if possible(ji).   
Reason for Call:  Medication or medication refill:    Do you use a [facility]?  Name of the pharmacy and phone number for the current request:  [facility]    Name of the medication requested: ALPRAZolam (XANAX) 2 MG tablet    Other request:     Can we leave a detailed message on this number? YES    Phone number patient can be reached at: Home number on file [phone] (home)    Best Time: any    Call taken on 5/13/2019 at 11:35 AM by Jana Howe      
The requested prescription(s) has/have been approved and has/have been printed and signed. This note was forwarded to the patient care pool to contact the patient to arrange for the patient to obtain the signed prescription(s).  Jamison Lora    
no

## 2019-06-28 ENCOUNTER — OFFICE VISIT (OUTPATIENT)
Dept: FAMILY MEDICINE | Facility: CLINIC | Age: 29
End: 2019-06-28
Payer: COMMERCIAL

## 2019-06-28 VITALS
BODY MASS INDEX: 32.87 KG/M2 | TEMPERATURE: 98.2 F | OXYGEN SATURATION: 97 % | WEIGHT: 248 LBS | HEART RATE: 69 BPM | SYSTOLIC BLOOD PRESSURE: 132 MMHG | DIASTOLIC BLOOD PRESSURE: 88 MMHG | HEIGHT: 73 IN

## 2019-06-28 DIAGNOSIS — J01.90 ACUTE SINUSITIS, RECURRENCE NOT SPECIFIED, UNSPECIFIED LOCATION: Primary | ICD-10-CM

## 2019-06-28 LAB
DEPRECATED S PYO AG THROAT QL EIA: NORMAL
SPECIMEN SOURCE: NORMAL

## 2019-06-28 PROCEDURE — 87081 CULTURE SCREEN ONLY: CPT | Performed by: NURSE PRACTITIONER

## 2019-06-28 PROCEDURE — 87880 STREP A ASSAY W/OPTIC: CPT | Performed by: NURSE PRACTITIONER

## 2019-06-28 PROCEDURE — 99213 OFFICE O/P EST LOW 20 MIN: CPT | Performed by: NURSE PRACTITIONER

## 2019-06-28 RX ORDER — FLUTICASONE PROPIONATE 50 MCG
2 SPRAY, SUSPENSION (ML) NASAL DAILY
Qty: 16 ML | Refills: 1 | Status: SHIPPED | OUTPATIENT
Start: 2019-06-28 | End: 2019-09-30

## 2019-06-28 ASSESSMENT — MIFFLIN-ST. JEOR: SCORE: 2148.8

## 2019-06-28 ASSESSMENT — PAIN SCALES - GENERAL: PAINLEVEL: SEVERE PAIN (7)

## 2019-06-28 NOTE — PROGRESS NOTES
Subjective     Abimael Martinez is a 29 year old male who presents to clinic today for the following health issues:    HPI   ENT Symptoms             Symptoms: cc Present Absent Comment   Fever/Chills  x     Fatigue  x     Muscle Aches  x     Eye Irritation  x     Sneezing  x     Nasal Yassine/Drg  x     Sinus Pressure/Pain  x     Loss of smell  x     Dental pain  x     Sore Throat  x     Swollen Glands  x     Ear Pain/Fullness  x     Cough  x     Wheeze  x     Chest Pain  x     Shortness of breath  x     Rash   x    Other         Symptom duration:  about 7 days   Symptom severity:  moderate   Treatments tried:  Dayquil, Nyquil, Tea & Honey    Contacts:  none       Throat worsening  Ear pain/pressure  Head pressure  Had sinus infection couple months ago and feels like things sort of stuck around      Patient Active Problem List   Diagnosis     Anxiety     CARDIOVASCULAR SCREENING; LDL GOAL LESS THAN 160     High risk sexual behavior     Tobacco use disorder     Low back pain     Hypertension goal BP (blood pressure) < 140/90     Internal hemorrhoids which bleed     Obesity, Class I, BMI 30-34.9     Attention deficit hyperactivity disorder (ADHD), combined type     AYALA (generalized anxiety disorder)     OCD (obsessive compulsive disorder)     Drug-induced erectile dysfunction     Mild persistent asthma without complication     Lumbosacral radiculopathy     Dyslipidemia     Elevated serum creatinine     Past Surgical History:   Procedure Laterality Date     BACK SURGERY  2018       Social History     Tobacco Use     Smoking status: Former Smoker     Packs/day: 0.50     Years: 6.00     Pack years: 3.00     Types: Cigarettes     Last attempt to quit: 3/12/2018     Years since quittin.2     Smokeless tobacco: Never Used     Tobacco comment: second hand smoke exposure   Substance Use Topics     Alcohol use: Yes     Comment: once a week     Family History   Problem Relation Age of Onset     Unknown/Adopted Mother       Unknown/Adopted Father      Unknown/Adopted Maternal Grandmother      Unknown/Adopted Maternal Grandfather      Unknown/Adopted Paternal Grandmother      Unknown/Adopted Paternal Grandfather      Unknown/Adopted Brother          Current Outpatient Medications   Medication Sig Dispense Refill     albuterol (PROAIR HFA/PROVENTIL HFA/VENTOLIN HFA) 108 (90 Base) MCG/ACT inhaler Inhale 2 puffs into the lungs every 4 hours as needed for wheezing 1 Inhaler 0     ALPRAZolam (XANAX) 2 MG tablet Take 1 tablet (2 mg) by mouth 3 times daily as needed for anxiety 30 tablet 0     amoxicillin-clavulanate (AUGMENTIN) 875-125 MG tablet Take 1 tablet by mouth 2 times daily for 10 days 20 tablet 0     amphetamine-dextroamphetamine (ADDERALL) 20 MG tablet Take 1 tablet (20 mg) by mouth 3 times daily 90 tablet 0     cyclobenzaprine (FLEXERIL) 10 MG tablet Take 1 tablet (10 mg) by mouth 3 times daily as needed for muscle spasms 90 tablet 1     famotidine (PEPCID) 40 MG tablet TAKE 1 TABLET(40 MG) BY MOUTH AT BEDTIME 90 tablet 1     fluticasone (FLONASE) 50 MCG/ACT nasal spray Spray 2 sprays into both nostrils daily for 21 days 16 mL 1     fluticasone (FLOVENT HFA) 220 MCG/ACT Inhaler Inhale 2 puffs into the lungs 2 times daily Please give patient spacer 1 Inhaler 3     hydrocortisone (ANUSOL-HC) 2.5 % cream Place rectally 2 times daily as needed for hemorrhoids external 30 g 1     Hyoscyamine Sulfate 0.375 MG TBCR Take 1 capful by mouth 3 times daily as needed 90 tablet 1     lidocaine (XYLOCAINE) 5 % ointment Apply topically as needed for moderate pain 240 g 2     montelukast (SINGULAIR) 10 MG tablet Take 1 tablet (10 mg) by mouth At Bedtime 90 tablet 0     oxyCODONE-acetaminophen (PERCOCET) 5-325 MG per tablet Take 1 tablet by mouth every 6 hours as needed for pain 12 tablet 0     sertraline (ZOLOFT) 100 MG tablet TAKE 1 TABLET(100 MG) BY MOUTH DAILY 90 tablet 1     sildenafil (REVATIO) 20 MG tablet Take 1-2 tablets (20-40 mg) by  "mouth daily as needed As needed for prevention of erectile dysfunction 30 tablet 2     Allergies   Allergen Reactions     Cymbalta      Weight gain and fatigue     Duloxetine Other (See Comments)     Weight gain, fatigue  Enlarged spleen and weight gain     No Clinical Screening - See Comments GI Disturbance     Weight gain and fatigue     Paroxetine Other (See Comments), GI Disturbance and Unknown     Weight gain, fatigue  Weight gain  Spleen issues  Weight gain  Spleen issues     Paxil [Paroxetine Mesylate]      Weight gain and fatigue     Vicodin [Hydrocodone-Acetaminophen]          Reviewed and updated as needed this visit by Provider  Tobacco  Allergies  Meds  Problems  Med Hx  Surg Hx  Fam Hx         Review of Systems   ROS COMP: Constitutional, HEENT, cardiovascular, pulmonary, gi and gu systems are negative, except as otherwise noted.      Objective    /88 (BP Location: Right arm, Patient Position: Chair, Cuff Size: Adult Large)   Pulse 69   Temp 98.2  F (36.8  C) (Oral)   Ht 1.854 m (6' 1\")   Wt 112.5 kg (248 lb)   SpO2 97%   BMI 32.72 kg/m    Body mass index is 32.72 kg/m .  Physical Exam   GENERAL: healthy, alert and no distress  EYES: Eyes grossly normal to inspection, PERRL and conjunctivae and sclerae normal  HENT: ear canals and TM's normal, nose and mouth without ulcers or lesions  NECK: no adenopathy, no asymmetry, masses, or scars and thyroid normal to palpation  RESP: lungs clear to auscultation - no rales, rhonchi or wheezes  CV: regular rate and rhythm, normal S1 S2, no S3 or S4, no murmur, click or rub, no peripheral edema and peripheral pulses strong  MS: no gross musculoskeletal defects noted, no edema  PSYCH: mentation appears normal, affect normal/bright    Diagnostic Test Results:  Results for orders placed or performed in visit on 06/28/19 (from the past 24 hour(s))   Strep, Rapid Screen   Result Value Ref Range    Specimen Description Throat     Rapid Strep A Screen   " "    NEGATIVE: No Group A streptococcal antigen detected by immunoassay, await culture report.           Assessment & Plan     1. Acute sinusitis, recurrence not specified, unspecified location  Push fluids, rest  Start Flonase 2 sprays each nostril daily x2-3 weeks   FIll antibiotic if not improving in 3-5 days  Tylenol/ibuprofen as needed for pain/fever  If worsening or not improving, follow up with primary care provider in 5 days, sooner if needed  - Strep, Rapid Screen  - Beta strep group A culture  - fluticasone (FLONASE) 50 MCG/ACT nasal spray; Spray 2 sprays into both nostrils daily for 21 days  Dispense: 16 mL; Refill: 1  - amoxicillin-clavulanate (AUGMENTIN) 875-125 MG tablet; Take 1 tablet by mouth 2 times daily for 10 days  Dispense: 20 tablet; Refill: 0     BMI:   Estimated body mass index is 32.72 kg/m  as calculated from the following:    Height as of this encounter: 1.854 m (6' 1\").    Weight as of this encounter: 112.5 kg (248 lb).           See Patient Instructions    Return in about 5 days (around 7/3/2019), or if symptoms worsen or fail to improve.     The benefits, risks and potential side effects were discussed in detail. Black box warnings discussed as relevant. All patient questions were answered. The patient was instructed to follow up immediately if any adverse reactions develop.    Return precautions discussed, including when to seek urgent/emergent care.    Patient verbalizes understanding and agrees with plan of care. Patient stable for discharge.      STEFFEN Jimenez [facility]      "

## 2019-06-28 NOTE — PATIENT INSTRUCTIONS
Push fluids, rest  Start Flonase 2 sprays each nostril daily x2-3 weeks   FIll antibiotic if not improving in 3-5 days  Tylenol/ibuprofen as needed for pain/fever  If worsening or not improving, follow up with primary care provider in 5 days, sooner if needed    Patient Education     Acute Sinusitis    Acute sinusitis is irritation and swelling of the sinuses. It is usually caused by a viral infection after a common cold. Your doctor can help you find relief.  What is acute sinusitis?  Sinuses are air-filled spaces in the skull behind the face. They are kept moist and clean by a lining of mucosa. Things such as pollen, smoke, and chemical fumes can irritate the mucosa. It can then swell up. As a response to irritation, the mucosa makes more mucus and other fluids. Tiny hairlike cilia cover the mucosa. Cilia help carry mucus toward the opening of the sinus. Too much mucus may cause the cilia to stop working. This blocks the sinus opening. A buildup of fluid in the sinuses then causes pain and pressure. It can also encourage bacteria to grow in the sinuses.  Common symptoms of acute sinusitis  You may have:    Facial soreness pain    Headache    Fever    Fluid draining in the back of the throat (postnasal drip)    Congestion    Drainage that is thick and colored, instead of clear    Cough  Diagnosing acute sinusitis  Your doctor will ask about your symptoms and health history. He or she will look at your ear, nose, and throat. You usually won't need to have X-rays taken.    The doctor may take a sample of mucus to check for bacteria. If you have sinusitis that keeps coming back, you may need imaging tests such as X-rays or CAT scans. This will help your doctor check for a structural problem that may be causing the infection.  Treating acute sinusitis  Treatment is aimed at unblocking the sinus opening and helping the cilia work again. You may need to take antihistamine and decongestant medicine. These can reduce  inflammation and decrease the amount of fluid your sinuses make. If you have a bacterial infection, you will need to take antibiotic medicine for 10 to 14 days. Take this medicine until it is gone, even if you feel better.  Date Last Reviewed: 10/1/2016    1633-4279 [employer] [address] [place] [address]. All rights reserved. This information is not intended as a substitute for professional medical care. Always follow your healthcare professional's instructions.

## 2019-06-29 LAB
BACTERIA SPEC CULT: NORMAL
SPECIMEN SOURCE: NORMAL

## 2019-06-29 ASSESSMENT — ASTHMA QUESTIONNAIRES: ACT_TOTALSCORE: 13

## 2019-07-09 ENCOUNTER — TELEPHONE (OUTPATIENT)
Dept: FAMILY MEDICINE | Facility: CLINIC | Age: 29
End: 2019-07-09

## 2019-07-09 DIAGNOSIS — F41.9 ANXIETY: ICD-10-CM

## 2019-07-09 RX ORDER — ALPRAZOLAM 2 MG
2 TABLET ORAL 3 TIMES DAILY PRN
Qty: 30 TABLET | Refills: 0 | Status: SHIPPED | OUTPATIENT
Start: 2019-07-09 | End: 2019-08-11

## 2019-07-09 NOTE — TELEPHONE ENCOUNTER
Controlled Substance Refill Request for alprazolam  Problem List Complete:  Yes  Patient is followed by MICHAEL OROPEZA for ongoing prescription of benzodiazepines.  All refills should be approved by this provider, or covering partner.     Medication(s): alprazolam 2 mg .   Maximum quantity per month: 36  Clinic visit frequency required: Q 6  months      Controlled substance agreement on file: No  Benzodiazepine use reviewed by psychiatry:  No   checked in past 3 months?  Yes 6/12/19 no concerns    ADRY ArnoldN, RN

## 2019-07-15 ENCOUNTER — TELEPHONE (OUTPATIENT)
Dept: FAMILY MEDICINE | Facility: CLINIC | Age: 29
End: 2019-07-15

## 2019-07-15 NOTE — TELEPHONE ENCOUNTER
Panel Management Review   One phone call and send letter if unable to reach them or MyChart message and send letter if not read after 2 weeks (You will get a message to your inbasket)      BP Readings from Last 1 Encounters:   06/28/19 132/88    , No results found for: A1C, 6/28/2019    Health Maintenance Due   Topic Date Due     PREVENTIVE CARE VISIT  11/19/2011     PHQ-9  02/14/2019        Fail List measure: depression         Patient is due/failing the following:   PHQ9    Action needed:   Patient needs to do PHQ9.    Type of outreach:    Sent [employer] message.    Questions for provider review:    None                                                                                       Chart routed to n/a Shweta Worthington

## 2019-07-17 ENCOUNTER — OFFICE VISIT (OUTPATIENT)
Dept: FAMILY MEDICINE | Facility: CLINIC | Age: 29
End: 2019-07-17
Payer: MEDICAID

## 2019-07-17 VITALS
BODY MASS INDEX: 32.76 KG/M2 | SYSTOLIC BLOOD PRESSURE: 120 MMHG | RESPIRATION RATE: 18 BRPM | HEIGHT: 73 IN | OXYGEN SATURATION: 94 % | HEART RATE: 94 BPM | WEIGHT: 247.2 LBS | DIASTOLIC BLOOD PRESSURE: 84 MMHG

## 2019-07-17 DIAGNOSIS — E66.811 OBESITY, CLASS I, BMI 30-34.9: ICD-10-CM

## 2019-07-17 DIAGNOSIS — Z11.4 SCREENING FOR HIV (HUMAN IMMUNODEFICIENCY VIRUS): ICD-10-CM

## 2019-07-17 DIAGNOSIS — H66.006 RECURRENT ACUTE SUPPURATIVE OTITIS MEDIA WITHOUT SPONTANEOUS RUPTURE OF TYMPANIC MEMBRANE OF BOTH SIDES: Primary | ICD-10-CM

## 2019-07-17 DIAGNOSIS — F42.9 OBSESSIVE-COMPULSIVE DISORDER, UNSPECIFIED TYPE: ICD-10-CM

## 2019-07-17 PROCEDURE — 99213 OFFICE O/P EST LOW 20 MIN: CPT | Performed by: NURSE PRACTITIONER

## 2019-07-17 PROCEDURE — 87389 HIV-1 AG W/HIV-1&-2 AB AG IA: CPT | Performed by: NURSE PRACTITIONER

## 2019-07-17 PROCEDURE — 36415 COLL VENOUS BLD VENIPUNCTURE: CPT | Performed by: NURSE PRACTITIONER

## 2019-07-17 RX ORDER — CEFDINIR 300 MG/1
300 CAPSULE ORAL 2 TIMES DAILY
Qty: 10 CAPSULE | Refills: 0 | Status: SHIPPED | OUTPATIENT
Start: 2019-07-17 | End: 2019-07-23

## 2019-07-17 ASSESSMENT — PATIENT HEALTH QUESTIONNAIRE - PHQ9: SUM OF ALL RESPONSES TO PHQ QUESTIONS 1-9: 9

## 2019-07-17 ASSESSMENT — PAIN SCALES - GENERAL: PAINLEVEL: NO PAIN (0)

## 2019-07-17 ASSESSMENT — MIFFLIN-ST. JEOR: SCORE: 2145.17

## 2019-07-17 NOTE — PATIENT INSTRUCTIONS
At [facility], we strive to deliver an exceptional experience to you, every time we see you.  If you receive a survey in the mail, please send us back your thoughts. We really do value your feedback.    Based on your medical history, these are the current health maintenance/preventive care services that you are due for (some may have been done at this visit.)  Health Maintenance Due   Topic Date Due     PREVENTIVE CARE VISIT  11/19/2011     PHQ-9  02/14/2019         Suggested websites for health information:  Www.[employer].org : Up to date and easily searchable information on multiple topics.  Www.[employer].gov : medication info, interactive tutorials, watch real surgeries online  Www.familydoctor.org : good info from the Academy of Family Physicians  Www.cdc.gov : public health info, travel advisories, epidemics (H1N1)  Www.aap.org : children's health info, normal development, vaccinations  Www.health.[facility].[place].us : [place] dept of health, public health issues in [place], N1N1    Your care team:                            Family Medicine Internal Medicine   MD Sanjay García MD Shantel Branch-Fleming, MD Katya Georgiev PA-C Nam Ho, MD Pediatrics   Royer Eubanks, PASRINATH Doty, CNP Susie BOURNE CNP   MD Ninoska Stevens MD Deborah Mielke, MD Kim Thein, APRN [facility]      Clinic hours: Monday - Thursday 7 am-7 pm; Fridays 7 am-5 pm.   Urgent care: Monday - Friday 11 am-9 pm; Saturday and Sunday 9 am-5 pm.  Pharmacy : Monday -Thursday 8 am-8 pm; Friday 8 am-6 pm; Saturday and Sunday 9 am-5 pm.     Clinic: [phone]   Pharmacy: [phone]    Patient Education     Middle Ear Infection (Adult)  You have an infection of the middle ear, the space behind the eardrum. This is also called acute otitis media (AOM). Sometimes it is caused by the common cold. This is because congestion can block the internal passage (eustachian tube) that drains fluid from  the middle ear. When the middle ear fills with fluid, bacteria can grow there and cause an infection. Oral antibiotics are used to treat this illness, not ear drops. Symptoms usually start to improve within 1 to 2 days of treatment.    Home care  The following are general care guidelines:    Finish all of the antibiotic medicine given, even though you may feel better after the first few days.    You may use over-the-counter medicine, such as acetaminophen or ibuprofen, to control pain and fever, unless something else was prescribed. If you have chronic liver or kidney disease or have ever had a stomach ulcer or gastrointestinal bleeding, talk with your healthcare provider before using these medicines. Do not give aspirin to anyone under 18 years of age who has a fever. It may cause severe illness or death.  Follow-up care  Follow up with your healthcare provider, or as advised, in 2 weeks if all symptoms have not gotten better, or if hearing doesn't go back to normal within 1 month.  When to seek medical advice  Call your healthcare provider right away if any of these occur:    Ear pain gets worse or does not improve after 3 days of treatment    Unusual drowsiness or confusion    Neck pain, stiff neck, or headache    Fluid or blood draining from the ear canal    Fever of 100.4 F (38 C) or as advised     Seizure  Date Last Reviewed: 6/1/2016 2000-2018 [employer]. [address] [place] [address], [place] [address]. All rights reserved. This information is not intended as a substitute for professional medical care. Always follow your healthcare professional's instructions.

## 2019-07-17 NOTE — PROGRESS NOTES
Subjective     Abimael Martinez is a 29 year old male who presents to clinic today for the following health issues:    HPI   Acute Illness   Acute illness concerns: Ear pain  Onset: 6 days and ongoing for a few months     Fever: no    Chills/Sweats: YES    Headache (location?): YES- Front of head     Sinus Pressure:YES    Conjunctivitis:  no    Ear Pain: YES: both    Rhinorrhea: YES    Congestion: YES    Sore Throat: no     Cough: no    Wheeze: no    Decreased Appetite: YES    Nausea: YES    Vomiting: no    Diarrhea:  YES-from the Augmentin- stools are firming up now.    Dysuria/Freq.: no    Fatigue/Achiness: YES    Sick/Strep Exposure: no     Therapies Tried and outcome: Amoxicillin for previous ear infection but as soon as ended antibiotic on Friday (Augmentin), the ear pain came back. He complains of pain with swallowing, continues to have clear rhinorrhea, frontal sinus pressure.    OCD- well controlled per patient.  He is off medications for OCD, declines counseling as well. No SI/HI, sleeping well except for the last week or so when he's had difficulty due to URI symptoms and AOM.  Patient Active Problem List   Diagnosis     Anxiety     CARDIOVASCULAR SCREENING; LDL GOAL LESS THAN 160     High risk sexual behavior     Tobacco use disorder     Low back pain     Hypertension goal BP (blood pressure) < 140/90     Internal hemorrhoids which bleed     Obesity, Class I, BMI 30-34.9     Attention deficit hyperactivity disorder (ADHD), combined type     AYALA (generalized anxiety disorder)     OCD (obsessive compulsive disorder)     Drug-induced erectile dysfunction     Mild persistent asthma without complication     Lumbosacral radiculopathy     Dyslipidemia     Elevated serum creatinine     Past Surgical History:   Procedure Laterality Date     BACK SURGERY  04/05/2018       Social History     Tobacco Use     Smoking status: Former Smoker     Packs/day: 0.50     Years: 6.00     Pack years: 3.00     Types:  Cigarettes     Last attempt to quit: 3/12/2018     Years since quittin.3     Smokeless tobacco: Never Used     Tobacco comment: second hand smoke exposure   Substance Use Topics     Alcohol use: Yes     Comment: once a week     Family History   Problem Relation Age of Onset     Unknown/Adopted Mother      Unknown/Adopted Father      Unknown/Adopted Maternal Grandmother      Unknown/Adopted Maternal Grandfather      Unknown/Adopted Paternal Grandmother      Unknown/Adopted Paternal Grandfather      Unknown/Adopted Brother          Current Outpatient Medications   Medication Sig Dispense Refill     albuterol (PROAIR HFA/PROVENTIL HFA/VENTOLIN HFA) 108 (90 Base) MCG/ACT inhaler Inhale 2 puffs into the lungs every 4 hours as needed for wheezing 1 Inhaler 0     ALPRAZolam (XANAX) 2 MG tablet Take 1 tablet (2 mg) by mouth 3 times daily as needed for anxiety 30 tablet 0     amphetamine-dextroamphetamine (ADDERALL) 20 MG tablet Take 1 tablet (20 mg) by mouth 3 times daily 90 tablet 0     cefdinir (OMNICEF) 300 MG capsule Take 1 capsule (300 mg) by mouth 2 times daily 10 capsule 0     cyclobenzaprine (FLEXERIL) 10 MG tablet Take 1 tablet (10 mg) by mouth 3 times daily as needed for muscle spasms 90 tablet 1     famotidine (PEPCID) 40 MG tablet TAKE 1 TABLET(40 MG) BY MOUTH AT BEDTIME 90 tablet 1     fluticasone (FLONASE) 50 MCG/ACT nasal spray Spray 2 sprays into both nostrils daily for 21 days 16 mL 1     fluticasone (FLOVENT HFA) 220 MCG/ACT Inhaler Inhale 2 puffs into the lungs 2 times daily Please give patient spacer 1 Inhaler 3     hydrocortisone (ANUSOL-HC) 2.5 % cream Place rectally 2 times daily as needed for hemorrhoids external 30 g 1     Hyoscyamine Sulfate 0.375 MG TBCR Take 1 capful by mouth 3 times daily as needed 90 tablet 1     lidocaine (XYLOCAINE) 5 % ointment Apply topically as needed for moderate pain 240 g 2     montelukast (SINGULAIR) 10 MG tablet Take 1 tablet (10 mg) by mouth At Bedtime 90 tablet  "0     oxyCODONE-acetaminophen (PERCOCET) 5-325 MG per tablet Take 1 tablet by mouth every 6 hours as needed for pain 12 tablet 0     sertraline (ZOLOFT) 100 MG tablet TAKE 1 TABLET(100 MG) BY MOUTH DAILY 90 tablet 1     sildenafil (REVATIO) 20 MG tablet Take 1-2 tablets (20-40 mg) by mouth daily as needed As needed for prevention of erectile dysfunction 30 tablet 2     BP Readings from Last 3 Encounters:   07/17/19 120/84   06/28/19 132/88   03/16/19 132/90    Wt Readings from Last 3 Encounters:   07/17/19 112.1 kg (247 lb 3.2 oz)   06/28/19 112.5 kg (248 lb)   03/16/19 108 kg (238 lb)            He also requests HIV screen.  He was screened in January after having high risk sexual encounters while on vacation in [place].  He is now using condoms and getting a sexual history on his partners.    Reviewed and updated as needed this visit by Provider  Tobacco  Allergies  Meds  Problems  Med Hx  Surg Hx  Fam Hx         Review of Systems   ROS COMP: Constitutional, HEENT, cardiovascular, pulmonary, gi and gu systems are negative, except as otherwise noted.      Objective    /84 (BP Location: Left arm, Patient Position: Sitting, Cuff Size: Adult Large)   Pulse 94   Resp 18   Ht 1.854 m (6' 1\")   Wt 112.1 kg (247 lb 3.2 oz)   SpO2 94%   BMI 32.61 kg/m    Body mass index is 32.61 kg/m .  Physical Exam   GENERAL: healthy, alert and no distress  EYES: Eyes grossly normal to inspection, PERRL and conjunctivae and sclerae normal  HENT: normal cephalic/atraumatic, both ears: bulging membrane and mucopurulent effusion, nose and mouth without ulcers or lesions, oropharynx clear and oral mucous membranes moist  NECK: no adenopathy, no asymmetry, masses, or scars and thyroid normal to palpation  RESP: lungs clear to auscultation - no rales, rhonchi or wheezes  CV: regular rate and rhythm, normal S1 S2, no S3 or S4, no murmur, click or rub, no peripheral edema and peripheral pulses strong  MS: no gross " "musculoskeletal defects noted, no edema  PSYCH: mentation appears normal, affect normal/bright  LYMPH: normal ant/post cervical, supraclavicular nodes    Diagnostic Test Results:  Labs reviewed in Epic  No results found for this or any previous visit (from the past 24 hour(s)).        Assessment & Plan     1. Recurrent acute suppurative otitis media without spontaneous rupture of tympanic membrane of both sides  Changing to Omnicef and he is to return to clinic 4-5 weeks for ear recheck. He should continue with Flonase, frequent fluids, and can take Tylenol or Ibuprofen as needed for fever/pain.   If still symptomatic, will send to ENT for evaluation.    - cefdinir (OMNICEF) 300 MG capsule; Take 1 capsule (300 mg) by mouth 2 times daily  Dispense: 10 capsule; Refill: 0    2. Screening for HIV (human immunodeficiency virus)  Reviewed high risk behaviors, continue to get sexual history on partner(s) and always use condoms.  - HIV Antigen Antibody Combo     3.  Obesity class 1 BMI 30-34.9  Benefits of weight loss reviewed in detail, encouraged him to cut back on the carbohydrates in the diet, consume more fruits and vegetables, drink plenty of water, avoid fruit juices, sodas, get 150 min moderate exercise/week.  Recheck weight in 6 months.    4.  OCD (obsessive compulsive disorder)  PHQ-9=9, and patient declined to complete AYALA-7.  He is not in counseling, declines referral, feels his symptoms are well controlled off medication at this time.    BMI:   Estimated body mass index is 32.61 kg/m  as calculated from the following:    Height as of this encounter: 1.854 m (6' 1\").    Weight as of this encounter: 112.1 kg (247 lb 3.2 oz).   Weight management plan: Discussed healthy diet and exercise guidelines        Work on weight loss  Regular exercise  See Patient Instructions    Return in about 5 weeks (around 8/21/2019), or if symptoms worsen or fail to improve, for Routine Visit.    STEFFEN Kelly [facility]  CHERYL " [facility]

## 2019-07-18 ENCOUNTER — MYC MEDICAL ADVICE (OUTPATIENT)
Dept: FAMILY MEDICINE | Facility: CLINIC | Age: 29
End: 2019-07-18

## 2019-07-18 LAB — HIV 1+2 AB+HIV1 P24 AG SERPL QL IA: NONREACTIVE

## 2019-07-18 NOTE — TELEPHONE ENCOUNTER
.Reason for Call:  NOTE / LETTER     Detailed comments: pt is not feeling better /  Cancelled flight for tomorrow due to ear pain / irritable symptoms - note needed to provide to airline. Note is NEEDED BEFORE 3PM   7-.      Phone Number Patient can be reached at: Cell number on file:    Telephone Information:   Mobile [phone]       Best Time:     Can we leave a detailed message on this number? YES    Call taken on 7/18/2019 at 6:15 PM by Arie Vargas

## 2019-07-18 NOTE — LETTER
[address] [address]  Phone: [phone]    July 19, 2019        Abimael Martinez  [id number]  [place] [address]          To whom it may concern:    RE: Abimael Martinez    Patient was seen and treated at our clinic on 7/17/19.  He was diagnosed with bilateral ear infection.  He continues to be symptomatic despite taking pain medication and antibiotic and I recommend that he avoid air travel at this time until his infection has cleared.  He was scheduled to fly today.    Please contact me for questions or concerns.      Sincerely,        STEFFEN Kelly CNP

## 2019-07-19 NOTE — TELEPHONE ENCOUNTER
Bringing letter to the  by 1:00 pm today, 7/19/19. Called and left a voicemail message regarding letter  and if he has a fax # he would like this faxed to, to return our call with that # to Fax to.  Mary Clinton MA/  For Teams Sushil

## 2019-07-19 NOTE — TELEPHONE ENCOUNTER
Letter written.  Pls contact patient and find out if we need to fax it to the airline or if he plans on picking it up at the clinic this morning.    Thanks,    Stephani BOURNE, CNP

## 2019-07-22 DIAGNOSIS — H66.006 RECURRENT ACUTE SUPPURATIVE OTITIS MEDIA WITHOUT SPONTANEOUS RUPTURE OF TYMPANIC MEMBRANE OF BOTH SIDES: ICD-10-CM

## 2019-07-22 NOTE — TELEPHONE ENCOUNTER
Requested Prescriptions   Pending Prescriptions Disp Refills     cefdinir (OMNICEF) 300 MG capsule [Pharmacy Med Name: CEFDINIR 300MG CAPSULES]        Last Written Prescription Date:  07/17/19  Last Fill Quantity: 10,   # refills: 0  Last Office Visit: 07/17/19-[facility]  Future Office visit:       Routing refill request to provider for review/approval because:  Drug not on the G, P or [facility] refill protocol or controlled substance 10 capsule 0     Sig: TAKE 1 CAPSULE(300 MG) BY MOUTH TWICE DAILY       There is no refill protocol information for this order

## 2019-07-23 RX ORDER — CEFDINIR 300 MG/1
CAPSULE ORAL
Qty: 10 CAPSULE | Refills: 0 | OUTPATIENT
Start: 2019-07-23

## 2019-07-25 ENCOUNTER — TELEPHONE (OUTPATIENT)
Dept: FAMILY MEDICINE | Facility: CLINIC | Age: 29
End: 2019-07-25

## 2019-07-25 DIAGNOSIS — F90.2 ATTENTION DEFICIT HYPERACTIVITY DISORDER (ADHD), COMBINED TYPE: ICD-10-CM

## 2019-07-25 RX ORDER — DEXTROAMPHETAMINE SACCHARATE, AMPHETAMINE ASPARTATE, DEXTROAMPHETAMINE SULFATE AND AMPHETAMINE SULFATE 5; 5; 5; 5 MG/1; MG/1; MG/1; MG/1
20 TABLET ORAL 3 TIMES DAILY
Qty: 90 TABLET | Refills: 0 | Status: SHIPPED | OUTPATIENT
Start: 2019-07-25 | End: 2019-07-25

## 2019-07-25 RX ORDER — DEXTROAMPHETAMINE SACCHARATE, AMPHETAMINE ASPARTATE, DEXTROAMPHETAMINE SULFATE AND AMPHETAMINE SULFATE 5; 5; 5; 5 MG/1; MG/1; MG/1; MG/1
20 TABLET ORAL 3 TIMES DAILY
Qty: 90 TABLET | Refills: 0 | Status: SHIPPED | OUTPATIENT
Start: 2019-09-25 | End: 2019-10-28

## 2019-07-25 RX ORDER — DEXTROAMPHETAMINE SACCHARATE, AMPHETAMINE ASPARTATE, DEXTROAMPHETAMINE SULFATE AND AMPHETAMINE SULFATE 5; 5; 5; 5 MG/1; MG/1; MG/1; MG/1
20 TABLET ORAL 3 TIMES DAILY
Qty: 90 TABLET | Refills: 0 | Status: SHIPPED | OUTPATIENT
Start: 2019-08-25 | End: 2019-07-25

## 2019-07-25 NOTE — TELEPHONE ENCOUNTER
Patient requesting a written script for his adderall, running low wondering if he can  multiple scripts for next few months. States has done this in the past. Please call to advise.

## 2019-07-25 NOTE — TELEPHONE ENCOUNTER
New [place] law doesn't allow PCP to write out future Rx for this medication.     Controlled Substance Refill Request for adderall  Problem List Complete:  Yes     Patient is followed by MICHAEL OROPEZA for ongoing prescription of stimulants.  All refills should be approved by this provider, or covering partner.     Medication(s): adderall 20 mg.   Maximum quantity per month: 90  Clinic visit frequency required: Q 6  months      Controlled substance agreement on file: 07/31/17     Neuropsych evaluation for ADD completed:  Yes, completed 7-2008 at [facility], on file and diagnosis confirmed      done:  Yes, 4/25/19.      Heather Lin BSN, RN

## 2019-07-26 NOTE — TELEPHONE ENCOUNTER
I believe the new recommendations only apply to opiates.       The requested prescription(s) has/have been approved and has/have been printed and signed. This note was forwarded to the patient care pool to contact the patient to arrange for the patient to obtain the signed prescription(s).  Jamison Lora MD

## 2019-07-26 NOTE — TELEPHONE ENCOUNTER
I bought 3 Rx's up to the  and they are ready for .  I spoke to the patient and let him know.  Farideh Gale,

## 2019-07-30 ENCOUNTER — OFFICE VISIT (OUTPATIENT)
Dept: URGENT CARE | Facility: URGENT CARE | Age: 29
End: 2019-07-30
Payer: MEDICAID

## 2019-07-30 VITALS
TEMPERATURE: 97.7 F | OXYGEN SATURATION: 99 % | HEART RATE: 83 BPM | BODY MASS INDEX: 33.09 KG/M2 | DIASTOLIC BLOOD PRESSURE: 105 MMHG | SYSTOLIC BLOOD PRESSURE: 157 MMHG | WEIGHT: 250.8 LBS

## 2019-07-30 DIAGNOSIS — H66.006 RECURRENT ACUTE SUPPURATIVE OTITIS MEDIA WITHOUT SPONTANEOUS RUPTURE OF TYMPANIC MEMBRANE OF BOTH SIDES: Primary | ICD-10-CM

## 2019-07-30 PROCEDURE — 99214 OFFICE O/P EST MOD 30 MIN: CPT | Performed by: NURSE PRACTITIONER

## 2019-07-30 RX ORDER — AZITHROMYCIN 250 MG/1
TABLET, FILM COATED ORAL
Qty: 6 TABLET | Refills: 0 | Status: SHIPPED | OUTPATIENT
Start: 2019-07-30 | End: 2019-10-18

## 2019-07-30 ASSESSMENT — ENCOUNTER SYMPTOMS
SORE THROAT: 0
DIAPHORESIS: 0
CHILLS: 1
VOMITING: 0
FEVER: 0
DIARRHEA: 0
RHINORRHEA: 0
COUGH: 0
SHORTNESS OF BREATH: 0
NAUSEA: 0

## 2019-07-30 NOTE — PROGRESS NOTES
SUBJECTIVE:   Abimael Martinez is a 29 year old male presenting with a chief complaint of   Chief Complaint   Patient presents with     Ear Problem     Bilateral ear infection not improving with antibiotics       He is an established patient of [facility].    Both ears have pain    Onset of symptoms was 3 month(s) ago on and off.  Course of illness is worsening.    Severity moderate  Current and Associated symptoms: ear pain bilateral, chills  Treatment measures tried include None tried.  Predisposing factors include : recurrent ear infections    Review of Systems   Constitutional: Positive for chills. Negative for diaphoresis and fever.   HENT: Positive for ear pain. Negative for congestion, rhinorrhea and sore throat.    Respiratory: Negative for cough and shortness of breath.    Gastrointestinal: Negative for diarrhea, nausea and vomiting.   All other systems reviewed and are negative.      Past Medical History:   Diagnosis Date     Acute right otitis media 1/8/2013     Anxiety      Depression      Drug abuse (H)      Urethritis 5/20/2013     Problem list name updated by automated process. Provider to review     Family History   Problem Relation Age of Onset     Unknown/Adopted Mother      Unknown/Adopted Father      Unknown/Adopted Maternal Grandmother      Unknown/Adopted Maternal Grandfather      Unknown/Adopted Paternal Grandmother      Unknown/Adopted Paternal Grandfather      Unknown/Adopted Brother      Current Outpatient Medications   Medication Sig Dispense Refill     albuterol (PROAIR HFA/PROVENTIL HFA/VENTOLIN HFA) 108 (90 Base) MCG/ACT inhaler Inhale 2 puffs into the lungs every 4 hours as needed for wheezing 1 Inhaler 0     ALPRAZolam (XANAX) 2 MG tablet Take 1 tablet (2 mg) by mouth 3 times daily as needed for anxiety 30 tablet 0     [START ON 9/25/2019] amphetamine-dextroamphetamine (ADDERALL) 20 MG tablet Take 1 tablet (20 mg) by mouth 3 times daily 90 tablet 0     azithromycin (ZITHROMAX) 250 MG  tablet Two tablets first day, then one tablet daily for four days. 6 tablet 0     cyclobenzaprine (FLEXERIL) 10 MG tablet Take 1 tablet (10 mg) by mouth 3 times daily as needed for muscle spasms 90 tablet 1     famotidine (PEPCID) 40 MG tablet TAKE 1 TABLET(40 MG) BY MOUTH AT BEDTIME 90 tablet 1     fluticasone (FLOVENT HFA) 220 MCG/ACT Inhaler Inhale 2 puffs into the lungs 2 times daily Please give patient spacer 1 Inhaler 3     hydrocortisone (ANUSOL-HC) 2.5 % cream Place rectally 2 times daily as needed for hemorrhoids external 30 g 1     Hyoscyamine Sulfate 0.375 MG TBCR Take 1 capful by mouth 3 times daily as needed 90 tablet 1     lidocaine (XYLOCAINE) 5 % ointment Apply topically as needed for moderate pain 240 g 2     montelukast (SINGULAIR) 10 MG tablet Take 1 tablet (10 mg) by mouth At Bedtime 90 tablet 0     oxyCODONE-acetaminophen (PERCOCET) 5-325 MG per tablet Take 1 tablet by mouth every 6 hours as needed for pain 12 tablet 0     sertraline (ZOLOFT) 100 MG tablet TAKE 1 TABLET(100 MG) BY MOUTH DAILY 90 tablet 1     sildenafil (REVATIO) 20 MG tablet Take 1-2 tablets (20-40 mg) by mouth daily as needed As needed for prevention of erectile dysfunction 30 tablet 2     Social History     Tobacco Use     Smoking status: Former Smoker     Packs/day: 0.50     Years: 6.00     Pack years: 3.00     Types: Cigarettes     Last attempt to quit: 3/12/2018     Years since quittin.3     Smokeless tobacco: Never Used     Tobacco comment: second hand smoke exposure   Substance Use Topics     Alcohol use: Yes     Comment: once a week       OBJECTIVE  BP (!) 157/105   Pulse 83   Temp 97.7  F (36.5  C) (Oral)   Wt 113.8 kg (250 lb 12.8 oz)   SpO2 99%   BMI 33.09 kg/m      Physical Exam   Constitutional: No distress.   HENT:   Head: Normocephalic and atraumatic.   Right Ear: External ear normal. Tympanic membrane is erythematous (suppurative) and bulging.   Left Ear: External ear normal. Tympanic membrane is  erythematous and bulging.   Mouth/Throat: Oropharynx is clear and moist.   Eyes: Pupils are equal, round, and reactive to light. EOM are normal.   Neck: Normal range of motion. Neck supple.   Pulmonary/Chest: Effort normal and breath sounds normal. No respiratory distress.   Lymphadenopathy:     He has no cervical adenopathy.   Neurological: He is alert. No cranial nerve deficit.   Skin: Skin is warm and dry. He is not diaphoretic.   Psychiatric: He has a normal mood and affect.   Nursing note and vitals reviewed.          ASSESSMENT:      ICD-10-CM    1. Recurrent acute suppurative otitis media without spontaneous rupture of tympanic membrane of both sides H66.006 azithromycin (ZITHROMAX) 250 MG tablet     OTOLARYNGOLOGY REFERRAL        PLAN:  Referral to ENT due to recurrent ear infections  I have discussed clinical findings with patient.  Side effects of medications discussed.  I have discussed the possibility of  worsening symptoms and to RTC or ER if they occur.  All questions are answered and patient is in agreement with plan.   Patient care instructions are given to at the end of visit.        Patient Instructions     Patient Education     Middle Ear Infection (Adult)  You have an infection of the middle ear, the space behind the eardrum. This is also called acute otitis media (AOM). Sometimes it is caused by the common cold. This is because congestion can block the internal passage (eustachian tube) that drains fluid from the middle ear. When the middle ear fills with fluid, bacteria can grow there and cause an infection. Oral antibiotics are used to treat this illness, not ear drops. Symptoms usually start to improve within 1 to 2 days of treatment.    Home care  The following are general care guidelines:    Finish all of the antibiotic medicine given, even though you may feel better after the first few days.    You may use over-the-counter medicine, such as acetaminophen or ibuprofen, to control pain and  fever, unless something else was prescribed. If you have chronic liver or kidney disease or have ever had a stomach ulcer or gastrointestinal bleeding, talk with your healthcare provider before using these medicines. Do not give aspirin to anyone under 18 years of age who has a fever. It may cause severe illness or death.  Follow-up care  Follow up with your healthcare provider, or as advised, in 2 weeks if all symptoms have not gotten better, or if hearing doesn't go back to normal within 1 month.  When to seek medical advice  Call your healthcare provider right away if any of these occur:    Ear pain gets worse or does not improve after 3 days of treatment    Unusual drowsiness or confusion    Neck pain, stiff neck, or headache    Fluid or blood draining from the ear canal    Fever of 100.4 F (38 C) or as advised     Seizure  Date Last Reviewed: 6/1/2016 2000-2018 [employer]. [address] [place] [id number]. All rights reserved. This information is not intended as a substitute for professional medical care. Always follow your healthcare professional's instructions.

## 2019-07-30 NOTE — TELEPHONE ENCOUNTER
FUTURE VISIT INFORMATION      FUTURE VISIT INFORMATION:    Date: 8/1/19    Time: 2PM (audio)/ 3:30PM (ENT)    Location: [facility]  REFERRAL INFORMATION:    Referring provider:  Vanna Wells NP    Referring providers clinic:  [facility]     Reason for visit/diagnosis : Recurrent acute suppurative otitis media without spontaneous rupture of tympanic membrane of both sides     RECORDS REQUESTED FROM:       Clinic name Comments Records Status Imaging Status   [facility]  7/30/19 notes with Dr Vanna Wells   7/17/19 notes with Stephani Elkins NP [facility]

## 2019-07-30 NOTE — PATIENT INSTRUCTIONS
Patient Education     Middle Ear Infection (Adult)  You have an infection of the middle ear, the space behind the eardrum. This is also called acute otitis media (AOM). Sometimes it is caused by the common cold. This is because congestion can block the internal passage (eustachian tube) that drains fluid from the middle ear. When the middle ear fills with fluid, bacteria can grow there and cause an infection. Oral antibiotics are used to treat this illness, not ear drops. Symptoms usually start to improve within 1 to 2 days of treatment.    Home care  The following are general care guidelines:    Finish all of the antibiotic medicine given, even though you may feel better after the first few days.    You may use over-the-counter medicine, such as acetaminophen or ibuprofen, to control pain and fever, unless something else was prescribed. If you have chronic liver or kidney disease or have ever had a stomach ulcer or gastrointestinal bleeding, talk with your healthcare provider before using these medicines. Do not give aspirin to anyone under 18 years of age who has a fever. It may cause severe illness or death.  Follow-up care  Follow up with your healthcare provider, or as advised, in 2 weeks if all symptoms have not gotten better, or if hearing doesn't go back to normal within 1 month.  When to seek medical advice  Call your healthcare provider right away if any of these occur:    Ear pain gets worse or does not improve after 3 days of treatment    Unusual drowsiness or confusion    Neck pain, stiff neck, or headache    Fluid or blood draining from the ear canal    Fever of 100.4 F (38 C) or as advised     Seizure  Date Last Reviewed: 6/1/2016 2000-2018 [employer]. [address] [place] [address], [place] [address]. All rights reserved. This information is not intended as a substitute for professional medical care. Always follow your healthcare professional's instructions.

## 2019-08-01 ENCOUNTER — OFFICE VISIT (OUTPATIENT)
Dept: AUDIOLOGY | Facility: CLINIC | Age: 29
End: 2019-08-01
Payer: COMMERCIAL

## 2019-08-01 ENCOUNTER — OFFICE VISIT (OUTPATIENT)
Dept: OTOLARYNGOLOGY | Facility: CLINIC | Age: 29
End: 2019-08-01
Payer: COMMERCIAL

## 2019-08-01 ENCOUNTER — PRE VISIT (OUTPATIENT)
Dept: OTOLARYNGOLOGY | Facility: CLINIC | Age: 29
End: 2019-08-01

## 2019-08-01 VITALS
DIASTOLIC BLOOD PRESSURE: 84 MMHG | BODY MASS INDEX: 32.59 KG/M2 | HEART RATE: 70 BPM | SYSTOLIC BLOOD PRESSURE: 139 MMHG | WEIGHT: 247 LBS

## 2019-08-01 DIAGNOSIS — H66.43 RECURRENT SUPPURATIVE OTITIS MEDIA WITHOUT SPONTANEOUS RUPTURE OF TYMPANIC MEMBRANE, BILATERAL: Primary | ICD-10-CM

## 2019-08-01 DIAGNOSIS — H69.93 DYSFUNCTION OF BOTH EUSTACHIAN TUBES: ICD-10-CM

## 2019-08-01 DIAGNOSIS — J32.0 CHRONIC MAXILLARY SINUSITIS: Primary | ICD-10-CM

## 2019-08-01 DIAGNOSIS — H93.13 TINNITUS OF BOTH EARS: Primary | ICD-10-CM

## 2019-08-01 RX ORDER — DOXYCYCLINE HYCLATE 100 MG
100 TABLET ORAL 2 TIMES DAILY
Qty: 42 TABLET | Refills: 0 | Status: SHIPPED | OUTPATIENT
Start: 2019-08-01 | End: 2019-08-27

## 2019-08-01 RX ORDER — FLUTICASONE PROPIONATE 50 MCG
2 SPRAY, SUSPENSION (ML) NASAL DAILY
Qty: 15.8 ML | Refills: 3 | Status: SHIPPED | OUTPATIENT
Start: 2019-08-01 | End: 2019-09-30

## 2019-08-01 RX ORDER — LORATADINE 10 MG/1
10 TABLET ORAL DAILY
Qty: 30 TABLET | Refills: 4 | Status: SHIPPED | OUTPATIENT
Start: 2019-08-01 | End: 2020-07-22

## 2019-08-01 NOTE — PROGRESS NOTES
AUDIOLOGY REPORT    SUMMARY: Audiology visit completed. See audiogram for results.      RECOMMENDATIONS: Follow-up with ENT.    Marta Escalante, CCC-A  Licensed Audiologist  [place] #[id number]

## 2019-08-01 NOTE — PATIENT INSTRUCTIONS
Abimael Martinez,    It was a pleasure to see you today.    1. You were seen in the ENT Clinic today by Sadia Valles PA-C  If you have any questions or concerns after your appointment, please call   - Option 1: ENT Clinic: [phone]    2. Please stop the ZPak.    Start Doxycycline 100 mg, 1 tablet by mouth twice a day for 21 days.    Start either taking a probiotic or eating yogurt with live cultures twice a day. Kombucha or Keiffer are also options    3.  Use the fluticasone nasal spray twice a day.  Start loratadine 10 mg daily.    4.  Return to see me in 4 weeks, after you finish the antibiotic.        Thank you,  Sadia Valles PA-C  Otolaryngology  Head & Neck Surgery  [phone]

## 2019-08-01 NOTE — LETTER
8/1/2019       RE: Abimael Martinez  [address]  [facility] [address]     Dear Colleague,    Thank you for referring your patient, Abimael Martinez, to the [facility] at [facility]. Please see a copy of my visit note below.    [facility] New Patient Visit Note        Otolaryngology        August 1, 2019    Referring Provider:  Vanna Wells NP         HPI:  Abimael Martinez is a 29 year old male who presents for who was referred to us for further evaluation for the possibility of recurrent, bilateral suprative otitis media.  His history dates back to end of March 2019.  When he was in [place], he developed a sinus infection and was put on amoxicillin.  After he returned home, he had bilateral ear infections and was also treated with amoxicillin.  This occurred in April.  He felt that both times, things cleared for several weeks but then returned.  He was seen at his primary care clinic on 6/28/2019 and diagnosed with acute sinusitis.  He was treated with Augmentin and fluticasone nasal spray.  His strep was negative.  He thought that symptoms cleared while he was on the antibiotic but within 2 to 3 days, the nasal congestion, facial pressure, teeth pain and ear pain came back.  He then went in again on 7/17/2019 and was diagnosed with recurrent separative otitis media bilaterally.  He was placed on Omnicef and asked to continue the fluticasone.  While he was on these antibiotics, again cleared but within 3 days after going off, his symptoms were back.  Those symptoms included the facial pressure, nasal congestion and bilateral ear pain, along with some muffled hearing.  On 7/30/2019 he was put on a Z-Candelario.  Symptoms are slightly better.    He denies any headaches, sharp ear pain, sore throat, trouble swallowing, pain in his mouth, weight loss.  No facial numbness.  He does state he has intermittent ear pressure,  popping of his ears and muffled hearing.  There is been no drainage of his ears.    He was a pack per day smoker for about 3 years but then quit in 2018.  No secondhand exposure.  He does not drink alcohol.    He does deny any history of environmental allergy testing but reports that he gets symptoms of itchy eyes, coughing sneezing every year.  He did intermittently use antihistamines and he reports that the fluticasone nasal spray has helped with this.    ROS:  10 point review of systems completed and is negative except for those listed above    PMH:   Past Medical History:   Diagnosis Date     Acute right otitis media 2013     Anxiety      Depression      Drug abuse (H)      Urethritis 2013     Problem list name updated by automated process. Provider to review       PSH:   Past Surgical History:   Procedure Laterality Date     BACK SURGERY  2018       FamH:   Family History   Problem Relation Age of Onset     Unknown/Adopted Mother      Unknown/Adopted Father      Unknown/Adopted Maternal Grandmother      Unknown/Adopted Maternal Grandfather      Unknown/Adopted Paternal Grandmother      Unknown/Adopted Paternal Grandfather      Unknown/Adopted Brother        SocH:   Social History     Tobacco Use     Smoking status: Former Smoker     Packs/day: 0.50     Years: 6.00     Pack years: 3.00     Types: Cigarettes     Last attempt to quit: 3/12/2018     Years since quittin.3     Smokeless tobacco: Never Used     Tobacco comment: second hand smoke exposure   Substance Use Topics     Alcohol use: Yes     Comment: once a week     Drug use: Yes     Types: Marijuana     Comment: pot once a month, ectasy  As of 2016 he only smokes pot occasionally       Medications:   Current Outpatient Medications   Medication     albuterol (PROAIR HFA/PROVENTIL HFA/VENTOLIN HFA) 108 (90 Base) MCG/ACT inhaler     ALPRAZolam (XANAX) 2 MG tablet     [START ON 2019] amphetamine-dextroamphetamine (ADDERALL) 20  MG tablet     azithromycin (ZITHROMAX) 250 MG tablet     cyclobenzaprine (FLEXERIL) 10 MG tablet     doxycycline hyclate (VIBRA-TABS) 100 MG tablet     famotidine (PEPCID) 40 MG tablet     fluticasone (FLONASE) 50 MCG/ACT nasal spray     fluticasone (FLOVENT HFA) 220 MCG/ACT Inhaler     Hyoscyamine Sulfate 0.375 MG TBCR     loratadine (CLARITIN) 10 MG tablet     montelukast (SINGULAIR) 10 MG tablet     oxyCODONE-acetaminophen (PERCOCET) 5-325 MG per tablet     sertraline (ZOLOFT) 100 MG tablet     sildenafil (REVATIO) 20 MG tablet     hydrocortisone (ANUSOL-HC) 2.5 % cream     lidocaine (XYLOCAINE) 5 % ointment     No current facility-administered medications for this visit.        Allergies:     Allergies   Allergen Reactions     Cymbalta      Weight gain and fatigue     Duloxetine Other (See Comments)     Weight gain, fatigue  Enlarged spleen and weight gain     No Clinical Screening - See Comments GI Disturbance     Weight gain and fatigue     Paroxetine Other (See Comments), GI Disturbance and Unknown     Weight gain, fatigue  Weight gain  Spleen issues  Weight gain  Spleen issues     Paxil [Paroxetine Mesylate]      Weight gain and fatigue     Seasonal Allergies      Vicodin [Hydrocodone-Acetaminophen]          Physical Exam:   /84 (BP Location: Right arm, Patient Position: Chair, Cuff Size: Adult Large)   Pulse 70   Wt 112 kg (247 lb)   BMI 32.59 kg/m       Constitutional: The patient was unaccompanied, well-groomed, and in no acute distress.    Head: Normocephalic and atraumatic.   Eyes: Pupils were equal and reactive.  Extraocular movement intact.    Ears: Pinnae and tragus non-tender.  EACs were clear under microscopic exam. TMs thickened bilaterally but no evidence of effusions behind either TM  Nose: Sinuses were non-tender.  Anterior rhinoscopy revealed midline septum and absence of purulence or polyps.  Nasal mucosa was edematous and erythematous/boggy.  No exudates.  Mouth: Mucosa pink and  moist, tonsils non-erythematous, no exudates, uvula midline  Neck: No lymphadenopathy in the neck.  No palpable thyroid.  Normal range of motion  Respiratory: Breathing comfortably without stridor or exertion of accessory muscles.   Skin: Normal:  warm and pink without rash  Neurologic: Alert and oriented x 3.  CN's III-XII within normal limits.  Voice normal.   Psychiatric: The patient's affect was calm, cooperative, and appropriate.    Communication: Normal; communicates verbally, normal voice quality.        Fiberoptic Endoscopy:  Deferred at this time due to patient request    Audiogram August 1, 2019: Normal hearing bilaterally.  Normal tympanograms bilaterally.        Assessment/Plan:     Chronic maxillary sinusitis  Patient with symptoms most likely consistent with a chronic maxillary sinusitis, specifically the facial pressure, teeth pain, the congestion and recurrent symptoms.  Because he has had episodes now over a nearly 5-month timeframe, I will treat him with doxycycline 100 mg twice daily x21 days.  I also am concerned there is a component of environmental allergies so I will start him on loratadine 10 mg daily and have him continue the fluticasone nasal spray.  I have done a sinus culture of which I will follow as well.    We did discuss obtaining a CT of the sinuses at this point but he wanted to defer to see if the more conservative approach would be helpful.  He had the same reasoning for deferring fiberoptic endoscopy at this time.  I will see him back in 3 to 4 weeks and if symptoms are still persisting, we will proceed with both of these issues.  He is aware that I will contact him with the bacterial culture results.      Dysfunction of both eustachian tubes  Patient's ear symptoms are likely related to the chronic sinusitis and bilateral eustachian tube dysfunction.  I am hoping that if we treat the chronic sinusitis, we can get rid of some of the edema causing eustachian tube dysfunction.  At  this point, I do not feel there is any benefit to placing PE tubes or doing a myringotomy as there is no fluid to drain.  We will treat the sinusitis as outlined above and follow-up with him in 3 to 4 weeks.  Patient comfortable with plan.      Sadia Valles PA-C  Otolaryngology  Head & Neck Surgery  [phone]      CC:  Vanna Wells NP   [facility]

## 2019-08-01 NOTE — NURSING NOTE
Chief Complaint   Patient presents with     Consult     Recurrent acute supurative otitis media, multiple courses of abx without relief     /84 (BP Location: Right arm, Patient Position: Chair, Cuff Size: Adult Large)   Pulse 70   Wt 112 kg (247 lb)   BMI 32.59 kg/m      Arina Armando, EMT

## 2019-08-01 NOTE — PROGRESS NOTES
[facility] New Patient Visit Note        Otolaryngology        August 1, 2019    Referring Provider:  Vanna Wells NP         HPI:  Abimael Martinez is a 29 year old male who presents for who was referred to us for further evaluation for the possibility of recurrent, bilateral suprative otitis media.  His history dates back to end of March 2019.  When he was in [place], he developed a sinus infection and was put on amoxicillin.  After he returned home, he had bilateral ear infections and was also treated with amoxicillin.  This occurred in April.  He felt that both times, things cleared for several weeks but then returned.  He was seen at his primary care clinic on 6/28/2019 and diagnosed with acute sinusitis.  He was treated with Augmentin and fluticasone nasal spray.  His strep was negative.  He thought that symptoms cleared while he was on the antibiotic but within 2 to 3 days, the nasal congestion, facial pressure, teeth pain and ear pain came back.  He then went in again on 7/17/2019 and was diagnosed with recurrent separative otitis media bilaterally.  He was placed on Omnicef and asked to continue the fluticasone.  While he was on these antibiotics, again cleared but within 3 days after going off, his symptoms were back.  Those symptoms included the facial pressure, nasal congestion and bilateral ear pain, along with some muffled hearing.  On 7/30/2019 he was put on a Z-Candelario.  Symptoms are slightly better.    He denies any headaches, sharp ear pain, sore throat, trouble swallowing, pain in his mouth, weight loss.  No facial numbness.  He does state he has intermittent ear pressure, popping of his ears and muffled hearing.  There is been no drainage of his ears.    He was a pack per day smoker for about 3 years but then quit in March 2018.  No secondhand exposure.  He does not drink alcohol.    He does deny any history of environmental allergy testing but reports that he gets symptoms  of itchy eyes, coughing sneezing every year.  He did intermittently use antihistamines and he reports that the fluticasone nasal spray has helped with this.    ROS:  10 point review of systems completed and is negative except for those listed above    PMH:   Past Medical History:   Diagnosis Date     Acute right otitis media 2013     Anxiety      Depression      Drug abuse (H)      Urethritis 2013     Problem list name updated by automated process. Provider to review       PSH:   Past Surgical History:   Procedure Laterality Date     BACK SURGERY  2018       FamH:   Family History   Problem Relation Age of Onset     Unknown/Adopted Mother      Unknown/Adopted Father      Unknown/Adopted Maternal Grandmother      Unknown/Adopted Maternal Grandfather      Unknown/Adopted Paternal Grandmother      Unknown/Adopted Paternal Grandfather      Unknown/Adopted Brother        SocH:   Social History     Tobacco Use     Smoking status: Former Smoker     Packs/day: 0.50     Years: 6.00     Pack years: 3.00     Types: Cigarettes     Last attempt to quit: 3/12/2018     Years since quittin.3     Smokeless tobacco: Never Used     Tobacco comment: second hand smoke exposure   Substance Use Topics     Alcohol use: Yes     Comment: once a week     Drug use: Yes     Types: Marijuana     Comment: pot once a month, ectasy  As of 2016 he only smokes pot occasionally       Medications:   Current Outpatient Medications   Medication     albuterol (PROAIR HFA/PROVENTIL HFA/VENTOLIN HFA) 108 (90 Base) MCG/ACT inhaler     ALPRAZolam (XANAX) 2 MG tablet     [START ON 2019] amphetamine-dextroamphetamine (ADDERALL) 20 MG tablet     azithromycin (ZITHROMAX) 250 MG tablet     cyclobenzaprine (FLEXERIL) 10 MG tablet     doxycycline hyclate (VIBRA-TABS) 100 MG tablet     famotidine (PEPCID) 40 MG tablet     fluticasone (FLONASE) 50 MCG/ACT nasal spray     fluticasone (FLOVENT HFA) 220 MCG/ACT Inhaler     Hyoscyamine Sulfate  0.375 MG TBCR     loratadine (CLARITIN) 10 MG tablet     montelukast (SINGULAIR) 10 MG tablet     oxyCODONE-acetaminophen (PERCOCET) 5-325 MG per tablet     sertraline (ZOLOFT) 100 MG tablet     sildenafil (REVATIO) 20 MG tablet     hydrocortisone (ANUSOL-HC) 2.5 % cream     lidocaine (XYLOCAINE) 5 % ointment     No current facility-administered medications for this visit.        Allergies:     Allergies   Allergen Reactions     Cymbalta      Weight gain and fatigue     Duloxetine Other (See Comments)     Weight gain, fatigue  Enlarged spleen and weight gain     No Clinical Screening - See Comments GI Disturbance     Weight gain and fatigue     Paroxetine Other (See Comments), GI Disturbance and Unknown     Weight gain, fatigue  Weight gain  Spleen issues  Weight gain  Spleen issues     Paxil [Paroxetine Mesylate]      Weight gain and fatigue     Seasonal Allergies      Vicodin [Hydrocodone-Acetaminophen]          Physical Exam:   /84 (BP Location: Right arm, Patient Position: Chair, Cuff Size: Adult Large)   Pulse 70   Wt 112 kg (247 lb)   BMI 32.59 kg/m      Constitutional: The patient was unaccompanied, well-groomed, and in no acute distress.    Head: Normocephalic and atraumatic.   Eyes: Pupils were equal and reactive.  Extraocular movement intact.    Ears: Pinnae and tragus non-tender.  EACs were clear under microscopic exam. TMs thickened bilaterally but no evidence of effusions behind either TM  Nose: Sinuses were non-tender.  Anterior rhinoscopy revealed midline septum and absence of purulence or polyps.  Nasal mucosa was edematous and erythematous/boggy.  No exudates.  Mouth: Mucosa pink and moist, tonsils non-erythematous, no exudates, uvula midline  Neck: No lymphadenopathy in the neck.  No palpable thyroid.  Normal range of motion  Respiratory: Breathing comfortably without stridor or exertion of accessory muscles.   Skin: Normal:  warm and pink without rash  Neurologic: Alert and oriented x 3.   CN's III-XII within normal limits.  Voice normal.   Psychiatric: The patient's affect was calm, cooperative, and appropriate.    Communication: Normal; communicates verbally, normal voice quality.        Fiberoptic Endoscopy:  Deferred at this time due to patient request    Audiogram August 1, 2019: Normal hearing bilaterally.  Normal tympanograms bilaterally.        Assessment/Plan:     Chronic maxillary sinusitis  Patient with symptoms most likely consistent with a chronic maxillary sinusitis, specifically the facial pressure, teeth pain, the congestion and recurrent symptoms.  Because he has had episodes now over a nearly 5-month timeframe, I will treat him with doxycycline 100 mg twice daily x21 days.  I also am concerned there is a component of environmental allergies so I will start him on loratadine 10 mg daily and have him continue the fluticasone nasal spray.  I have done a sinus culture of which I will follow as well.    We did discuss obtaining a CT of the sinuses at this point but he wanted to defer to see if the more conservative approach would be helpful.  He had the same reasoning for deferring fiberoptic endoscopy at this time.  I will see him back in 3 to 4 weeks and if symptoms are still persisting, we will proceed with both of these issues.  He is aware that I will contact him with the bacterial culture results.      Dysfunction of both eustachian tubes  Patient's ear symptoms are likely related to the chronic sinusitis and bilateral eustachian tube dysfunction.  I am hoping that if we treat the chronic sinusitis, we can get rid of some of the edema causing eustachian tube dysfunction.  At this point, I do not feel there is any benefit to placing PE tubes or doing a myringotomy as there is no fluid to drain.  We will treat the sinusitis as outlined above and follow-up with him in 3 to 4 weeks.  Patient comfortable with plan.      Sadia Valles PA-C  Otolaryngology  Head & Neck  Surgery  [phone]      CC:  Vanna Wells NP   [facility]

## 2019-08-03 LAB
BACTERIA SPEC CULT: ABNORMAL
BACTERIA SPEC CULT: ABNORMAL
Lab: ABNORMAL
SPECIMEN SOURCE: ABNORMAL

## 2019-08-09 ENCOUNTER — TELEPHONE (OUTPATIENT)
Dept: FAMILY MEDICINE | Facility: CLINIC | Age: 29
End: 2019-08-09

## 2019-08-09 DIAGNOSIS — F41.9 ANXIETY: ICD-10-CM

## 2019-08-09 NOTE — TELEPHONE ENCOUNTER
Controlled Substance Refill Request for alprazolam  Problem List Complete:  Yes  Overview Signed 4/6/2016  8:02 AM by Michael Lora MD   Patient is followed by MICHAEL LORA for ongoing prescription of benzodiazepines.  All refills should be approved by this provider, or covering partner.     Medication(s): alprazolam 2 mg .   Maximum quantity per month: 36  Clinic visit frequency required: Q 6  months      Controlled substance agreement on file: No  Benzodiazepine use reviewed by psychiatry:  No     Last [facility] website verification:       checked in past 3 months?  Yes no concerns on 6/12/19    ADRY ArnoldN, RN

## 2019-08-11 RX ORDER — ALPRAZOLAM 2 MG
2 TABLET ORAL 3 TIMES DAILY PRN
Qty: 30 TABLET | Refills: 0 | Status: SHIPPED | OUTPATIENT
Start: 2019-08-11 | End: 2019-09-09

## 2019-08-12 ENCOUNTER — TELEPHONE (OUTPATIENT)
Dept: FAMILY MEDICINE | Facility: CLINIC | Age: 29
End: 2019-08-12

## 2019-08-12 NOTE — TELEPHONE ENCOUNTER
Faxed Rx to [facility] in [place]. Left message on patient's voicemail that this was done.Rosa Cifuentes MA/TC

## 2019-08-12 NOTE — TELEPHONE ENCOUNTER
Panel Management Review   One phone call and send letter if unable to reach them or MyChart message and send letter if not read after 2 weeks (You will get a message to your inbasket)      BP Readings from Last 1 Encounters:   08/01/19 139/84    , No results found for: A1C, 7/17/2019    Health Maintenance Due   Topic Date Due     PREVENTIVE CARE VISIT  11/19/2011        Fail List measure: asthma         Patient is due/failing the following:   ACT    Action needed:   Patient needs to do ACT.    Type of outreach:    Sent [employer] message.    Questions for provider review:    None                                                                                       Chart routed to n/mary lou Worthington

## 2019-08-12 NOTE — LETTER
August 12, 2019      Abimael Martinez  [address]  [place] [address]        Dear Abmiael Martinez,      At [facility] we care about your health and are committed to providing quality patient care. It has come to our attention that you are due for an Asthma Control Test.     This screening tool helps us to assess how well your asthma is controlled. Good asthma control leads to less asthma symptoms and greater health. If your asthma is not in good control (score less than 20) it is recommended you be seen by your provider for medication and lifestyle adjustments    We have enclosed an  Asthma Control Test. Please complete the enclosed asthma control test even if you are feeling well. You can mail it back to our clinic with the provided stamped envelope or drop if off at our .     Thank you for your time and we hope this letter finds you well!      Sincerely,    Your Team at [facility]

## 2019-08-14 ENCOUNTER — OFFICE VISIT (OUTPATIENT)
Dept: OTOLARYNGOLOGY | Facility: CLINIC | Age: 29
End: 2019-08-14
Payer: COMMERCIAL

## 2019-08-14 VITALS
WEIGHT: 247 LBS | DIASTOLIC BLOOD PRESSURE: 94 MMHG | SYSTOLIC BLOOD PRESSURE: 131 MMHG | HEART RATE: 72 BPM | OXYGEN SATURATION: 98 % | BODY MASS INDEX: 32.59 KG/M2

## 2019-08-14 DIAGNOSIS — R11.0 NAUSEA: Primary | ICD-10-CM

## 2019-08-14 RX ORDER — PROCHLORPERAZINE MALEATE 10 MG
10 TABLET ORAL EVERY 6 HOURS PRN
Qty: 30 TABLET | Refills: 0 | Status: SHIPPED | OUTPATIENT
Start: 2019-08-14 | End: 2019-10-18

## 2019-08-14 ASSESSMENT — PAIN SCALES - GENERAL: PAINLEVEL: MODERATE PAIN (5)

## 2019-08-14 NOTE — PROGRESS NOTES
[facility] Follow Up Visit Note  Otolaryngology    August 14, 2019       HPI:  Abimael Martinez is a 29 year old male who presents for discussion of some nausea and GI upset from his antibiotics.    Abimael was seen on 8/1/2019 with bilateral ETD and chronic maxillary sinusitis.  I placed him on Doxycycline 100 mg BID x 21 days for the chronic sinusitis, in hopes that it would help both symptoms.  We had also discussed myringotomy/PE tubes for the ETD if symptoms do not improve.    The patient has about 7 days left of the doxycycline.  He has been reporting that he has had some constipation since starting the doxycycline and he gets his underlying nausea/gnawing/empty stomach feeling after taking it.  He does not ever throw up.  There is been no diarrhea or fluctuance.  He is wondering if this is just a normal side effect of the doxycycline or if he has to switch to another antibiotic because he is allergic to it.    He has not broken out in hives or had any lip/tongue swelling.    In regards to the symptoms for his sinuses, he does not feel that there is much difference since he started the medications.    REVIEW OF SYSTEMS:  10 point ROS was negative other than the symptoms noted above in the HPI.    Physical Exam:  BP (!) 131/94   Pulse 72   Wt 112 kg (247 lb)   SpO2 98%   BMI 32.59 kg/m      Constitutional: The patient was asked patient is unaccompanied, well-groomed, and in no acute distress.  Well  Neurologic: Alert and oriented x 3.  CN's III-XII within normal limits.  Voice normal.   Communication: Normal; communicates verbally, normal voice quality.          Assessment/Plan:   1. Nausea.  Secondary to the doxycycline.  We discussed changing his antibiotics for the last week versus continuing the doxycycline but adding in prochlorperazine as an antiemetic.  He preferred to try this and get through.  He will call if this does not work.  We will reassess him as scheduled in a  little over 2 weeks to see if the eustachian tube dysfunction has improved and the symptoms of sinusitis have improved.  Patient comfortable with this plan will call with questions or concerns.      Sadia Valles PA-C  Otolaryngology  Head & Neck Surgery  [phone]

## 2019-08-14 NOTE — LETTER
8/14/2019       RE: Abimael Martinez  [address]  [place] [address]     Dear Colleague,    Thank you for referring your patient, Abimael Martinez, to the [facility] at [facility]. Please see a copy of my visit note below.    [facility] Follow Up Visit Note  Otolaryngology    August 14, 2019       HPI:  Abimael Martinez is a 29 year old male who presents for discussion of some nausea and GI upset from his antibiotics.    Abimael was seen on 8/1/2019 with bilateral ETD and chronic maxillary sinusitis.  I placed him on Doxycycline 100 mg BID x 21 days for the chronic sinusitis, in hopes that it would help both symptoms.  We had also discussed myringotomy/PE tubes for the ETD if symptoms do not improve.    The patient has about 7 days left of the doxycycline.  He has been reporting that he has had some constipation since starting the doxycycline and he gets his underlying nausea/gnawing/empty stomach feeling after taking it.  He does not ever throw up.  There is been no diarrhea or fluctuance.  He is wondering if this is just a normal side effect of the doxycycline or if he has to switch to another antibiotic because he is allergic to it.    He has not broken out in hives or had any lip/tongue swelling.    In regards to the symptoms for his sinuses, he does not feel that there is much difference since he started the medications.    REVIEW OF SYSTEMS:  10 point ROS was negative other than the symptoms noted above in the HPI.    Physical Exam:  BP (!) 131/94   Pulse 72   Wt 112 kg (247 lb)   SpO2 98%   BMI 32.59 kg/m       Constitutional: The patient was asked patient is unaccompanied, well-groomed, and in no acute distress.  Well  Neurologic: Alert and oriented x 3.  CN's III-XII within normal limits.  Voice normal.   Communication: Normal; communicates verbally, normal voice  quality.          Assessment/Plan:   1. Nausea.  Secondary to the doxycycline.  We discussed changing his antibiotics for the last week versus continuing the doxycycline but adding in prochlorperazine as an antiemetic.  He preferred to try this and get through.  He will call if this does not work.  We will reassess him as scheduled in a little over 2 weeks to see if the eustachian tube dysfunction has improved and the symptoms of sinusitis have improved.  Patient comfortable with this plan will call with questions or concerns.      Sadia Valles PA-C  Otolaryngology  Head & Neck Surgery  [phone]

## 2019-08-14 NOTE — PATIENT INSTRUCTIONS
Abimael Martinez,    It was a pleasure to see you today.    1. You were seen in the ENT Clinic today by Sadia Valles PA-C    If you have any questions or concerns after your appointment, please call   - Option 1: ENT Clinic: [phone]      2. Continue the Doxycycline as prescribed.    Call if you decide you want to switch to Bactrim.    3.  For the nausea, please take prochlorperazine (aka Compazine) 10 mg, 1 tablet by mouth every 6 hours as needed.    4.  For the constipation, consider an over-the-counter stool softener.      Thank you,  Sadia Valles PA-C  Otolaryngology  Head & Neck Surgery  [phone]

## 2019-08-27 ENCOUNTER — ANCILLARY PROCEDURE (OUTPATIENT)
Dept: CT IMAGING | Facility: CLINIC | Age: 29
End: 2019-08-27
Attending: PHYSICIAN ASSISTANT
Payer: COMMERCIAL

## 2019-08-27 ENCOUNTER — OFFICE VISIT (OUTPATIENT)
Dept: OTOLARYNGOLOGY | Facility: CLINIC | Age: 29
End: 2019-08-27
Payer: COMMERCIAL

## 2019-08-27 VITALS
SYSTOLIC BLOOD PRESSURE: 138 MMHG | HEART RATE: 86 BPM | BODY MASS INDEX: 32.64 KG/M2 | RESPIRATION RATE: 12 BRPM | HEIGHT: 72 IN | WEIGHT: 241 LBS | DIASTOLIC BLOOD PRESSURE: 89 MMHG

## 2019-08-27 DIAGNOSIS — M26.609 TMJ (TEMPOROMANDIBULAR JOINT SYNDROME): ICD-10-CM

## 2019-08-27 DIAGNOSIS — H69.93 DYSFUNCTION OF BOTH EUSTACHIAN TUBES: ICD-10-CM

## 2019-08-27 DIAGNOSIS — Z91.09 ENVIRONMENTAL ALLERGIES: ICD-10-CM

## 2019-08-27 DIAGNOSIS — J32.0 CHRONIC MAXILLARY SINUSITIS: Primary | ICD-10-CM

## 2019-08-27 DIAGNOSIS — J32.0 CHRONIC MAXILLARY SINUSITIS: ICD-10-CM

## 2019-08-27 DIAGNOSIS — H66.43 RECURRENT SUPPURATIVE OTITIS MEDIA WITHOUT SPONTANEOUS RUPTURE OF TYMPANIC MEMBRANE, BILATERAL: ICD-10-CM

## 2019-08-27 PROCEDURE — 70486 CT MAXILLOFACIAL W/O DYE: CPT | Performed by: RADIOLOGY

## 2019-08-27 RX ORDER — LORATADINE 10 MG/1
10 TABLET ORAL DAILY
Qty: 30 TABLET | Refills: 4 | Status: SHIPPED | OUTPATIENT
Start: 2019-08-27 | End: 2019-09-30

## 2019-08-27 RX ORDER — FLUTICASONE PROPIONATE 50 MCG
2 SPRAY, SUSPENSION (ML) NASAL DAILY
Qty: 15.8 ML | Refills: 3 | Status: SHIPPED | OUTPATIENT
Start: 2019-08-27 | End: 2019-12-18

## 2019-08-27 ASSESSMENT — PAIN SCALES - GENERAL: PAINLEVEL: MODERATE PAIN (5)

## 2019-08-27 ASSESSMENT — MIFFLIN-ST. JEOR: SCORE: 2108.17

## 2019-08-27 NOTE — PROGRESS NOTES
"[facility] Follow Up Visit Note  Otolaryngology    August 27, 2019       HPI:  Abimael Martinez is a 29 year old male who presents for for a recheck of his chronic sinusitis and eustachian tube dysfunction.  He has been treated with 3 weeks of doxycycline, which he completed last week.  He also has been using some loratadine, however, he was using it very sporadically based on discussion today.  He only used it daily for 5 days in a row and then stopped it and used it on an as-needed basis.    Patient reports that while he was on the doxycycline, he felt like his symptoms of a sinusitis and ear pressure got significantly better.  In the last week, however, he feels like there are pressure is building up in his ears again.  He also reports that he has been dealing with a lot of nasal congestion, rhinorrhea, sneezing, as well as some discomfort inferior to his ears bilaterally.  He does not really note any pain or pressure inside the ears.  There is been no drainage.  He does report he has a history of grinding his teeth upon further discussion about   The pain below his ears.  He is told by a dentist he needs to have a bite block but he has not done that.  The patient has been doing some self insufflation at home and reports that sometimes it relieves some of the symptoms but it does not last for a long time.    He really is wanting to see if we can get rid of the eustachian tube dysfunction more than anything because that seems to be the most bothersome.    REVIEW OF SYSTEMS:  10 point ROS was negative other than the symptoms noted above in the HPI.    Physical Exam:  /89   Pulse 86   Resp 12   Ht 1.84 m (6' 0.44\")   Wt 109.3 kg (241 lb)   BMI 32.29 kg/m      Constitutional: The patient was unaccompanied, well-groomed, and in no acute distress.    Ears: Pinnae and tragus non-tender.  EACs clear under microscopic exam bilaterally.  Right TM:  Small serous effusion noted.  Left " TM:  Larger serous effusion noted   Nose: Sinuses were non-tender.  Anterior rhinoscopy revealed midline septum and absence of purulence or polyps.    Mouth: Mucosa pink and moist, tonsils non-erythematous, no exudates, uvula midline  Neck: No lymphadenopathy in the neck.  No palpable thyroid.  Normal range of motion.  Tenderness to palpation inferior to the ears along the region of the pterygoid musculature  Skin: Normal:  warm and pink without rash  Neurologic: Alert and oriented x 3.  CN's III-XII within normal limits.  Voice normal.   Psychiatric: The patient's affect was calm, cooperative, and appropriate.            Assessment/Plan:   1. Chronic maxillary sinusitis.  Patient recently treated with a 21-day course of doxycycline.  Symptoms of congestion, rhinorrhea, sneezing and some of the pressures sensation are coming back.  In the past visits, we discussed obtaining CT to rule out chronic sinusitis or other concerning findings, and having him see 1 of our rhinologist.  He is interested in doing both at this time.  We will refer him to see 1 of our rhinologist, Dr. Benitez, and to get the CT of the sinuses.  I will call him with the results.    2.  Recurrent suppurative OM, bilateral, L>R.  Patient with recurrent suppurative bilateral otitis media, worse in the left than the right.  No active infections at this time.  Discussed management options, including continued watchful waiting, trying to treat the likely inflammation from environmental allergies, myringotomy and PE tube.    We discussed that treatment of the possible underlying allergy symptoms would include loratadine and fluticasone nasal spray, along with some allergy testing.  He is interested in trying these options over a myringotomy or PE tube placement at this time.  He is aware that they do not help, but he wants to talk procedures over with his family wants to see if we can get it to resolve without a procedure first.  The procedures make him  anxious.    We did discuss the possibility of chronic TM perforations, drainage from a chronic perforation, recurrent infections and change in hearing with either myringotomy or PE tube.  This is what makes him a bit anxious.  We will continue to follow this on an as-needed basis.     3.  Bilateral ETD.  Chronic issue.  Possibly related to his environmental allergies.  Again, based on conversation is outlined above in 2, we are going to follow a conservative approach and treat with fluticasone and loratadine at this time.  He will follow-up on an as-needed basis.    4. TMJ syndrome.  Discussed with patient that the pain he is having inferior to his ears is likely related to TMJ as he is a known  and is supposed to be wearing a a bite block, but he never followed through.  Recommend that he go back to his dentist to discuss getting a bite block.  Discussed having him do some heat to the chewing muscles as well as some gentle massage.  Offered to refer him to a TMJ clinic, but he declined at this time.    5. Environmental allergies.  With the symptoms the patient is describing, and the trouble with, what I believe is chronic sinusitis, I think it would be best to refer him for allergy testing.  Because the patient is symptomatic, he wants to start the fluticasone nasal spray and the loratadine.  He is aware that he will have to go off of it for at least 2 weeks prior to allergy testing.  He will try to follow-up with Dr. Tien Kenny either here at the [facility] or at 1 of his private clinics.        Sadia Valles PA-C  Otolaryngology  Head & Neck Surgery  [phone]      CC:  Rivera Benitez MD  [facility]  Otolaryngology, Rhinology  [place] [address]

## 2019-08-27 NOTE — PATIENT INSTRUCTIONS
Abimael Martinez,    It was a pleasure to see you today.    1. You were seen in the ENT Clinic today by Sadia Valles PA-C    If you have any questions or concerns after your appointment, please call   - Option 1: ENT Clinic: [phone]      2. Please schedule CT scan.    3. Please schedule with Dr. Benitez for the sinuses and Dr. Kenny for the allergies.    If you cannot get in to see Dr. Kenny for a while, you can try is other clinics:  [facility]:  [phone].    4.  Start Flonase (fluticasone) nasal spray and loratadine (Claritin) as prescribed.    You will need to go off these for 2 weeks prior to the allergy testing.    5.  See your dentist about a bit guard for the clenching and grinding of the jaw and teeth.    6. Self insufflate at least 2 times a day.    7.  Return to see me on an as needed basis.    I will call with results of the scans.      Thank you,  Sadia Valles PA-C  Otolaryngology  Head & Neck Surgery  [phone]

## 2019-08-27 NOTE — LETTER
8/27/2019       RE: Abimael Martinez  [address]  [facility] [address]     Dear Colleague,    Thank you for referring your patient, Abimael Martinez, to the [facility] E[facility] NOSE [facility]. Please see a copy of my visit note below.    [facility] Ear, Nose [facility] Follow Up Visit Note  Otolaryngology    August 27, 2019       HPI:  Abimael Martinez is a 29 year old male who presents for for a recheck of his chronic sinusitis and eustachian tube dysfunction.  He has been treated with 3 weeks of doxycycline, which he completed last week.  He also has been using some loratadine, however, he was using it very sporadically based on discussion today.  He only used it daily for 5 days in a row and then stopped it and used it on an as-needed basis.    Patient reports that while he was on the doxycycline, he felt like his symptoms of a sinusitis and ear pressure got significantly better.  In the last week, however, he feels like there are pressure is building up in his ears again.  He also reports that he has been dealing with a lot of nasal congestion, rhinorrhea, sneezing, as well as some discomfort inferior to his ears bilaterally.  He does not really note any pain or pressure inside the ears.  There is been no drainage.  He does report he has a history of grinding his teeth upon further discussion about   The pain below his ears.  He is told by a dentist he needs to have a bite block but he has not done that.  The patient has been doing some self insufflation at home and reports that sometimes it relieves some of the symptoms but it does not last for a long time.    He really is wanting to see if we can get rid of the eustachian tube dysfunction more than anything because that seems to be the most bothersome.    REVIEW OF SYSTEMS:  10 point ROS was negative other than the symptoms noted above in the HPI.    Physical Exam:  /89   Pulse  "86   Resp 12   Ht 1.84 m (6' 0.44\")   Wt 109.3 kg (241 lb)   BMI 32.29 kg/m       Constitutional: The patient was unaccompanied, well-groomed, and in no acute distress.    Ears: Pinnae and tragus non-tender.  EACs clear under microscopic exam bilaterally.  Right TM:  Small serous effusion noted.  Left TM:  Larger serous effusion noted   Nose: Sinuses were non-tender.  Anterior rhinoscopy revealed midline septum and absence of purulence or polyps.    Mouth: Mucosa pink and moist, tonsils non-erythematous, no exudates, uvula midline  Neck: No lymphadenopathy in the neck.  No palpable thyroid.  Normal range of motion.  Tenderness to palpation inferior to the ears along the region of the pterygoid musculature  Skin: Normal:  warm and pink without rash  Neurologic: Alert and oriented x 3.  CN's III-XII within normal limits.  Voice normal.   Psychiatric: The patient's affect was calm, cooperative, and appropriate.            Assessment/Plan:   1. Chronic maxillary sinusitis.  Patient recently treated with a 21-day course of doxycycline.  Symptoms of congestion, rhinorrhea, sneezing and some of the pressures sensation are coming back.  In the past visits, we discussed obtaining CT to rule out chronic sinusitis or other concerning findings, and having him see 1 of our rhinologist.  He is interested in doing both at this time.  We will refer him to see 1 of our rhinologist, Dr. Benitez, and to get the CT of the sinuses.  I will call him with the results.    2.  Recurrent suppurative OM, bilateral, L>R.  Patient with recurrent suppurative bilateral otitis media, worse in the left than the right.  No active infections at this time.  Discussed management options, including continued watchful waiting, trying to treat the likely inflammation from environmental allergies, myringotomy and PE tube.    We discussed that treatment of the possible underlying allergy symptoms would include loratadine and fluticasone nasal spray, along " with some allergy testing.  He is interested in trying these options over a myringotomy or PE tube placement at this time.  He is aware that they do not help, but he wants to talk procedures over with his family wants to see if we can get it to resolve without a procedure first.  The procedures make him anxious.    We did discuss the possibility of chronic TM perforations, drainage from a chronic perforation, recurrent infections and change in hearing with either myringotomy or PE tube.  This is what makes him a bit anxious.  We will continue to follow this on an as-needed basis.     3.  Bilateral ETD.  Chronic issue.  Possibly related to his environmental allergies.  Again, based on conversation is outlined above in 2, we are going to follow a conservative approach and treat with fluticasone and loratadine at this time.  He will follow-up on an as-needed basis.    4. TMJ syndrome.  Discussed with patient that the pain he is having inferior to his ears is likely related to TMJ as he is a known  and is supposed to be wearing a a bite block, but he never followed through.  Recommend that he go back to his dentist to discuss getting a bite block.  Discussed having him do some heat to the chewing muscles as well as some gentle massage.  Offered to refer him to a TMJ clinic, but he declined at this time.    5. Environmental allergies.  With the symptoms the patient is describing, and the trouble with, what I believe is chronic sinusitis, I think it would be best to refer him for allergy testing.  Because the patient is symptomatic, he wants to start the fluticasone nasal spray and the loratadine.  He is aware that he will have to go off of it for at least 2 weeks prior to allergy testing.  He will try to follow-up with Dr. Tien Kenny either here at the [facility] or at 1 of his private clinics.        Sadia Valles PA-C  Otolaryngology  Head & Neck Surgery  [phone]      CC:  Rivera  MD Emmanuel  [facility]  Otolaryngology, Rhinology  [facility] 882

## 2019-08-27 NOTE — NURSING NOTE
"Chief Complaint   Patient presents with     RECHECK     follow up after antibiotics      Blood pressure 138/89, pulse 86, resp. rate 12, height 1.84 m (6' 0.44\"), weight 109.3 kg (241 lb).    Deniz Kirk LPN    "

## 2019-08-27 NOTE — TELEPHONE ENCOUNTER
FUTURE VISIT INFORMATION      FUTURE VISIT INFORMATION:    Date: 9/4/19    Time: 2:30 pm    Location: [facility]  REFERRAL INFORMATION:    Referring provider:  JESSIE Lynn    Referring providers clinic:  [facility]    Reason for visit/diagnosis  Chronic Maxillary Sinusitis    RECORDS REQUESTED FROM:       Clinic name Comments Records Status Imaging Status   [facility] Office Visit-8/1/19, 8/27/19-JESSIE Lynn [facility] CT Sinus-Scheduled for 8/27/19     [facility] Office Visit-2/27/19-JESSIE Estrella  Office Visit-6/28/19-RENITA Monroy

## 2019-08-29 ENCOUNTER — TELEPHONE (OUTPATIENT)
Dept: OTOLARYNGOLOGY | Facility: CLINIC | Age: 29
End: 2019-08-29

## 2019-08-29 ENCOUNTER — TRANSFERRED RECORDS (OUTPATIENT)
Dept: HEALTH INFORMATION MANAGEMENT | Facility: CLINIC | Age: 29
End: 2019-08-29

## 2019-08-29 NOTE — TELEPHONE ENCOUNTER
Abimael call back.  He does want to keep the appointment with Dr. Benietz and was encouraged by the news I  gave him when I left a phone message earlier today.  He also is getting into see Dr. Kenny today in the allergy clinic.    I will follow-up with him after he sees Dr. Benitez to see where plan of care is going and if there is anything else I need to be helping him with at that time.    As for further follow-up visits, he can see me on an as-needed basis at this time.    Patient very comfortable to plan.    Sadia Valles PA-C, 8/29/2019, 1:50 PM

## 2019-08-29 NOTE — TELEPHONE ENCOUNTER
Called Abimael to see if he got the [facility] messages about his CT scan and follow up.  I was speaking to one of my colleagues and found out that Dr. Benitez, our new Rhinologist, may do procedures for ETD.    I left a message for Abimael to call me back and discuss this.  I think it would be good for him to keep the appointment with Dr. Benitez due to his ongoing symptoms of ETD.    Sadia Valles PA-C, 8/29/2019, 8:27 AM

## 2019-09-04 ENCOUNTER — PRE VISIT (OUTPATIENT)
Dept: OTOLARYNGOLOGY | Facility: CLINIC | Age: 29
End: 2019-09-04

## 2019-09-04 ENCOUNTER — OFFICE VISIT (OUTPATIENT)
Dept: OTOLARYNGOLOGY | Facility: CLINIC | Age: 29
End: 2019-09-04
Attending: PHYSICIAN ASSISTANT
Payer: COMMERCIAL

## 2019-09-04 VITALS
SYSTOLIC BLOOD PRESSURE: 150 MMHG | WEIGHT: 250 LBS | DIASTOLIC BLOOD PRESSURE: 110 MMHG | HEART RATE: 83 BPM | BODY MASS INDEX: 33.49 KG/M2

## 2019-09-04 DIAGNOSIS — H69.93 DYSFUNCTION OF BOTH EUSTACHIAN TUBES: ICD-10-CM

## 2019-09-04 DIAGNOSIS — J32.0 CHRONIC MAXILLARY SINUSITIS: Primary | ICD-10-CM

## 2019-09-04 DIAGNOSIS — J30.2 SEASONAL ALLERGIC RHINITIS, UNSPECIFIED TRIGGER: ICD-10-CM

## 2019-09-04 RX ORDER — AZELASTINE 1 MG/ML
2 SPRAY, METERED NASAL 2 TIMES DAILY
Qty: 30 ML | Refills: 11 | Status: SHIPPED | OUTPATIENT
Start: 2019-09-04 | End: 2023-10-05

## 2019-09-04 RX ORDER — METHYLPREDNISOLONE 4 MG
TABLET, DOSE PACK ORAL
Qty: 21 TABLET | Refills: 0 | Status: SHIPPED | OUTPATIENT
Start: 2019-09-04 | End: 2019-10-18

## 2019-09-04 ASSESSMENT — PAIN SCALES - GENERAL: PAINLEVEL: MODERATE PAIN (4)

## 2019-09-04 NOTE — NURSING NOTE
Chief Complaint   Patient presents with     Consult     Chronic sinusitis     Blood pressure (!) 150/110, pulse 83, weight 113.4 kg (250 lb).    Arina Armando, EMT

## 2019-09-04 NOTE — PROGRESS NOTES
[facility]                   New Patient Visit      Encounter date: September 4, 2019    Referring Provider:   Sadia Valles PA-C  [address] [place] [id number]    Chief Complaint: right ear fullness    History of Present Illness: Abimael Martinez is a 21-year-old man who presents complaining of several months of persistent, moderate right-sided ear fullness.  Started several months ago after a trip to [place], where he contracted an upper respiratory infection that was protracted.  At that time, he had bilateral facial pain and pressure, nasal congestion and thick nasal discharge.  These symptoms resolved with antibiotic therapy.  Now, however, he has persistent right greater than left ear fullness as well as intermittent sharp stabbing ear pain that is bilateral.  He has not had any improvement with topical nasal steroid spray.  He had an audiogram that showed normal hearing normal tympanograms.  He admits to bruxism at night.  He admits to allergic rhinitis; it has been confirmed that he has several aeroallergen sensitivities.  He had a CT of the sinuses performed prior to this visit which shows no evidence of sinus disease.      Review of systems: A 14-point review of systems has been conducted and was negative for any notable symptoms, except as dictated in the history of present illness.     Past Medical History:   Diagnosis Date     Acute right otitis media 1/8/2013     Anxiety      Depression      Drug abuse (H)      Urethritis 5/20/2013     Problem list name updated by automated process. Provider to review        Past Surgical History:   Procedure Laterality Date     BACK SURGERY  04/05/2018        Family History   Problem Relation Age of Onset     Unknown/Adopted Mother      Unknown/Adopted Father      Unknown/Adopted Maternal Grandmother      Unknown/Adopted Maternal Grandfather      Unknown/Adopted Paternal Grandmother      Unknown/Adopted Paternal  Grandfather      Unknown/Adopted Brother         Social History     Socioeconomic History     Marital status: Single     Spouse name: Not on file     Number of children: Not on file     Years of education: Not on file     Highest education level: Not on file   Occupational History     Not on file   Social Needs     Financial resource strain: Not on file     Food insecurity:     Worry: Not on file     Inability: Not on file     Transportation needs:     Medical: Not on file     Non-medical: Not on file   Tobacco Use     Smoking status: Former Smoker     Packs/day: 0.50     Years: 6.00     Pack years: 3.00     Types: Cigarettes     Last attempt to quit: 3/12/2018     Years since quittin.4     Smokeless tobacco: Never Used     Tobacco comment: second hand smoke exposure   Substance and Sexual Activity     Alcohol use: Yes     Comment: once a week     Drug use: Yes     Types: Marijuana     Comment: pot once a month, ectasy  As of 2016 he only smokes pot occasionally     Sexual activity: Yes     Partners: Female     Birth control/protection: Condom   Lifestyle     Physical activity:     Days per week: Not on file     Minutes per session: Not on file     Stress: Not on file   Relationships     Social connections:     Talks on phone: Not on file     Gets together: Not on file     Attends Ep[employer]opalian service: Not on file     Active member of club or organization: Not on file     Attends meetings of clubs or organizations: Not on file     Relationship status: Not on file     Intimate partner violence:     Fear of current or ex partner: Not on file     Emotionally abused: Not on file     Physically abused: Not on file     Forced sexual activity: Not on file   Other Topics Concern     Parent/sibling w/ CABG, MI or angioplasty before 65F 55M? No   Social History Narrative     Not on file        Physical Exam:  Vital signs: AF, VSS, but HTN - 150/110;   General Appearance: No acute distress, appropriate demeanor,  conversant  Eyes: moist conjunctivae; EOMI; pupils symmetric; visual acuity grossly intact; no proptosis  Head: normocephalic; overall symmetric appearance without deformity  Face: overall symmetric without deformity; HB I-VI  Ears: Normal appearance of external ear; external meatus normal in appearance; TMs intact without perforation bilaterally;   Nose: No external deformity; septum deviated to left causing greater than 70% obstruction; inferior turbinates without significant hypertrophy  Oral Cavity/oropharynx: Normal appearance of mucosa; tongue midline; no mass or lesions; oropharynx without obvious mucosal abnormality  Neck: no palpable lymphadenopathy; thyroid without palpable nodules  Lungs: symmetric chest rise; no wheezing  CV: Good distal perfusion; normal hear rate  Extremities: No deformity  Neurologic Exam: Cranial nerves II-XII are grossly intact; no focal deficit      Procedure Note  Procedure performed: Rigid nasal endoscopy  Indication: To evaluate for sinonasal pathology not visualized on routine anterior rhinoscopy  Anesthesia: 4% topical lidocaine with 0.05% oxymetazoline  Description of procedure: A 30 degree, 3 mm rigid endoscope was inserted into bilateral nasal cavities and the nasal valves, nasal cavity, middle meatus, sphenoethmoid recess, and nasopharynx were thoroughly evaluated for evidence of obstruction, edema, purulence, polyps and/or mass/lesion.     Farrukh-Fabien Endoscopic Scoring System  Endoscopic observation Right Left   Polyps in middle meatus (0 = absent, 1 = restricted to middle meatus, 2 = Beyond middle meatus) 0 0   Discharge (0 = absent, 1 = thin and clear, 2 = thick, purulent) 0 0   Edema (0 = absent, 1 = mild-moderate, 2 = moderate-severe) 0 0   Crusting (0 = absent, 1 = mild-moderate, 2 = moderate-severe) 0 0   Scarring (0= absent, 1 = mild-moderate, 2 = moderate-severe) 0 0   Total 0 0     Findings    MM and SER clear bilat  ET normal in appearance bilat  Nasopharynx  normal    The patient tolerated the procedure well without complication.     Laboratory Review:  N/a  Reviewed audio from 8/1/19:  Normal hearing in both ears  Tympanograms normal bilaterally  Acoustic reflexes present at normal levels in all conditions    Imaging Review:  Reviewed CT sinus from 8/27 which shows no evid of mucosal thickening    Pathology Review:  n/a    Assessment/Medical Decision Making:  Abimael has 5-month history of chronic sinusitis which is resolved.  He has lingering bilateral eustachian tube dysfunction that he characterizes as bilateral ear fullness.  His audio is normal.  He has normal tympanograms.  Today there is no evidence of retraction on otoscopy.  I think this is likely allergic rhinitis which is acting as a main contributor to his symptoms.  He does however have sharp stabbing ear pain bilaterally that occurs randomly.  I think this pain is either neuropathic related to TMJ  pain, or myofascial pain.  He does admit to bruxism and is considering trying a bite guard.  I recommended maximal medical therapy for the treatment of his allergic rhinitis to assist with the resolution of his symptoms of ear fullness.      Plan:  1.  Start Astelin nasal spray 2 sprays twice daily.  2.  I offer the patient a Medrol Dosepak to be used to help clear his ear fullness if Astelin does not work.    3.  We discussed the possibility of immunotherapy as an option however this is a long-term commitment significant time investment required.  He will consider this  4.  Return to clinic to see me as needed    Rivera Benitez MD    [facility]  Rhinology  Endoscopic Skull Base Surgery  [facility]  Department of Otolaryngology - Head & Neck Surgery

## 2019-09-04 NOTE — LETTER
9/4/2019       RE: Abimael Martinez  [address]  [facility] [address]     Dear Colleague,    Thank you for referring your patient, Abimael Martinez, to the [facility] EAR NOSE AND THROAT at [facility]. Please see a copy of my visit note below.                       [facility]                   New Patient Visit      Encounter date: September 4, 2019    Referring Provider:   Sadia Valles PA-C  [address] [place] [address]    Chief Complaint: right ear fullness    History of Present Illness: Abimael Martinez is a 21-year-old man who presents complaining of several months of persistent, moderate right-sided ear fullness.  Started several months ago after a trip to [place], where he contracted an upper respiratory infection that was protracted.  At that time, he had bilateral facial pain and pressure, nasal congestion and thick nasal discharge.  These symptoms resolved with antibiotic therapy.  Now, however, he has persistent right greater than left ear fullness as well as intermittent sharp stabbing ear pain that is bilateral.  He has not had any improvement with topical nasal steroid spray.  He had an audiogram that showed normal hearing normal tympanograms.  He admits to bruxism at night.  He admits to allergic rhinitis; it has been confirmed that he has several aeroallergen sensitivities.  He had a CT of the sinuses performed prior to this visit which shows no evidence of sinus disease.      Review of systems: A 14-point review of systems has been conducted and was negative for any notable symptoms, except as dictated in the history of present illness.     Past Medical History:   Diagnosis Date     Acute right otitis media 1/8/2013     Anxiety      Depression      Drug abuse (H)      Urethritis 5/20/2013     Problem list name updated by automated process. Provider to review        Past Surgical History:   Procedure Laterality  Date     BACK SURGERY  2018        Family History   Problem Relation Age of Onset     Unknown/Adopted Mother      Unknown/Adopted Father      Unknown/Adopted Maternal Grandmother      Unknown/Adopted Maternal Grandfather      Unknown/Adopted Paternal Grandmother      Unknown/Adopted Paternal Grandfather      Unknown/Adopted Brother         Social History     Socioeconomic History     Marital status: Single     Spouse name: Not on file     Number of children: Not on file     Years of education: Not on file     Highest education level: Not on file   Occupational History     Not on file   Social Needs     Financial resource strain: Not on file     Food insecurity:     Worry: Not on file     Inability: Not on file     Transportation needs:     Medical: Not on file     Non-medical: Not on file   Tobacco Use     Smoking status: Former Smoker     Packs/day: 0.50     Years: 6.00     Pack years: 3.00     Types: Cigarettes     Last attempt to quit: 3/12/2018     Years since quittin.4     Smokeless tobacco: Never Used     Tobacco comment: second hand smoke exposure   Substance and Sexual Activity     Alcohol use: Yes     Comment: once a week     Drug use: Yes     Types: Marijuana     Comment: pot once a month, ectasy  As of 2016 he only smokes pot occasionally     Sexual activity: Yes     Partners: Female     Birth control/protection: Condom   Lifestyle     Physical activity:     Days per week: Not on file     Minutes per session: Not on file     Stress: Not on file   Relationships     Social connections:     Talks on phone: Not on file     Gets together: Not on file     Attends Evangelical service: Not on file     Active member of club or organization: Not on file     Attends meetings of clubs or organizations: Not on file     Relationship status: Not on file     Intimate partner violence:     Fear of current or ex partner: Not on file     Emotionally abused: Not on file     Physically abused: Not on file      Forced sexual activity: Not on file   Other Topics Concern     Parent/sibling w/ CABG, MI or angioplasty before 65F 55M? No   Social History Narrative     Not on file        Physical Exam:  Vital signs: AF, VSS, but HTN - 150/110;   General Appearance: No acute distress, appropriate demeanor, conversant  Eyes: moist conjunctivae; EOMI; pupils symmetric; visual acuity grossly intact; no proptosis  Head: normocephalic; overall symmetric appearance without deformity  Face: overall symmetric without deformity; HB I-VI  Ears: Normal appearance of external ear; external meatus normal in appearance; TMs intact without perforation bilaterally;   Nose: No external deformity; septum deviated to left causing greater than 70% obstruction; inferior turbinates without significant hypertrophy  Oral Cavity/oropharynx: Normal appearance of mucosa; tongue midline; no mass or lesions; oropharynx without obvious mucosal abnormality  Neck: no palpable lymphadenopathy; thyroid without palpable nodules  Lungs: symmetric chest rise; no wheezing  CV: Good distal perfusion; normal hear rate  Extremities: No deformity  Neurologic Exam: Cranial nerves II-XII are grossly intact; no focal deficit      Procedure Note  Procedure performed: Rigid nasal endoscopy  Indication: To evaluate for sinonasal pathology not visualized on routine anterior rhinoscopy  Anesthesia: 4% topical lidocaine with 0.05% oxymetazoline  Description of procedure: A 30 degree, 3 mm rigid endoscope was inserted into bilateral nasal cavities and the nasal valves, nasal cavity, middle meatus, sphenoethmoid recess, and nasopharynx were thoroughly evaluated for evidence of obstruction, edema, purulence, polyps and/or mass/lesion.     Trail-Fabien Endoscopic Scoring System  Endoscopic observation Right Left   Polyps in middle meatus (0 = absent, 1 = restricted to middle meatus, 2 = Beyond middle meatus) 0 0   Discharge (0 = absent, 1 = thin and clear, 2 = thick, purulent) 0 0    Edema (0 = absent, 1 = mild-moderate, 2 = moderate-severe) 0 0   Crusting (0 = absent, 1 = mild-moderate, 2 = moderate-severe) 0 0   Scarring (0= absent, 1 = mild-moderate, 2 = moderate-severe) 0 0   Total 0 0     Findings    MM and SER clear bilat  ET normal in appearance bilat  Nasopharynx normal    The patient tolerated the procedure well without complication.     Laboratory Review:  N/a  Reviewed audio from 8/1/19:  Normal hearing in both ears  Tympanograms normal bilaterally  Acoustic reflexes present at normal levels in all conditions    Imaging Review:  Reviewed CT sinus from 8/27 which shows no evid of mucosal thickening    Pathology Review:  n/a    Assessment/Medical Decision Making:  Abimael has 5-month history of chronic sinusitis which is resolved.  He has lingering bilateral eustachian tube dysfunction that he characterizes as bilateral ear fullness.  His audio is normal.  He has normal tympanograms.  Today there is no evidence of retraction on otoscopy.  I think this is likely allergic rhinitis which is acting as a main contributor to his symptoms.  He does however have sharp stabbing ear pain bilaterally that occurs randomly.  I think this pain is either neuropathic related to TMJ  pain, or myofascial pain.  He does admit to bruxism and is considering trying a bite guard.  I recommended maximal medical therapy for the treatment of his allergic rhinitis to assist with the resolution of his symptoms of ear fullness.      Plan:  1.  Start Astelin nasal spray 2 sprays twice daily.  2.  I offer the patient a Medrol Dosepak to be used to help clear his ear fullness if Astelin does not work.    3.  We discussed the possibility of immunotherapy as an option however this is a long-term commitment significant time investment required.  He will consider this  4.  Return to clinic to see me as needed    Rivera Benitez MD    [facility]  Rhinology  Endoscopic Skull Base  Surgery  [facility]  Department of Otolaryngology - Head & Neck Surgery

## 2019-09-04 NOTE — PATIENT INSTRUCTIONS
You were seen in the ENT clinic today with Dr. Benitez    Recommendations for you:    -Astelin Nasal Spray-Instill two sprays into both nostrils twice daily   -Medrol DosePak-Follow the instructions on the box   -Allergy shots-please follow up with your allergist if you are interested in this.     Please call our clinic for any questions, concerns, and/or worsening symptoms.      Clinic #[phone]       Option 1 for scheduling.    Thank you for allowing us to be apart of your care!    Marylin SINHG, JEROMECC    If you need to reach me my direct line is: [phone]

## 2019-09-05 NOTE — TELEPHONE ENCOUNTER
This writer attempted to contact Patient on 09/05/19      Reason for call ACT and left message.      If patient calls back:   1st floor [facility] Care Team (MA/TC) called. Inform patient that someone from the team will contact them, document that pt called and route to care team.         Bibi Worthington

## 2019-09-06 ASSESSMENT — ASTHMA QUESTIONNAIRES: ACT_TOTALSCORE: 20

## 2019-09-09 ENCOUNTER — TELEPHONE (OUTPATIENT)
Dept: FAMILY MEDICINE | Facility: CLINIC | Age: 29
End: 2019-09-09

## 2019-09-09 DIAGNOSIS — F41.9 ANXIETY: ICD-10-CM

## 2019-09-09 RX ORDER — ALPRAZOLAM 2 MG
2 TABLET ORAL 3 TIMES DAILY PRN
Qty: 30 TABLET | Refills: 0 | Status: SHIPPED | OUTPATIENT
Start: 2019-09-09 | End: 2019-10-09

## 2019-09-09 NOTE — TELEPHONE ENCOUNTER
The requested prescription(s) has/have been approved and has/have been sent to the patient's pharmacy electronically. This note was forwarded to the patient care pool to contact the patient and let them know that this has been taken care of.   Jamison Lora MD

## 2019-09-09 NOTE — TELEPHONE ENCOUNTER
Controlled Substance Refill Request for alprazolam  Problem List Complete:  Yes  Overview Signed 4/6/2016  8:02 AM by Michael Lora MD   Patient is followed by MICHAEL LORA for ongoing prescription of benzodiazepines.  All refills should be approved by this provider, or covering partner.     Medication(s): alprazolam 2 mg .   Maximum quantity per month: 36  Clinic visit frequency required: Q 6  months   Last OV Dr. Michael Lora: 8/14/18     Controlled substance agreement on file: No  Benzodiazepine use reviewed by psychiatry:  No     Last [facility] website verification:        checked in past 3 months?  Yes no concerns on 6/12/19     Gisela Cullen, ADRYN, RN

## 2019-09-09 NOTE — TELEPHONE ENCOUNTER
Reason for Call:  Medication or medication refill:    Do you use a [facility]?  Name of the pharmacy and phone number for the current request:  [facility] [place]    Name of the medication requested: ALPRAZolam (XANAX) 2 MG tablet    Other request:     Can we leave a detailed message on this number? YES    Phone number patient can be reached at: Other phone number:  [phone]     Best Time: any    Call taken on 9/9/2019 at 1:15 PM by Jana Howe

## 2019-09-10 NOTE — TELEPHONE ENCOUNTER
I called the patient and LM stating that Dr. Lora sent his Rx refill electronically to the patients pharmacy.  Clinic number given if any questions.  Farideh Gale,

## 2019-09-30 ENCOUNTER — OFFICE VISIT (OUTPATIENT)
Dept: FAMILY MEDICINE | Facility: CLINIC | Age: 29
End: 2019-09-30
Payer: COMMERCIAL

## 2019-09-30 VITALS
SYSTOLIC BLOOD PRESSURE: 125 MMHG | OXYGEN SATURATION: 95 % | HEART RATE: 78 BPM | DIASTOLIC BLOOD PRESSURE: 84 MMHG | WEIGHT: 247 LBS | TEMPERATURE: 98.1 F | HEIGHT: 72 IN | BODY MASS INDEX: 33.46 KG/M2 | RESPIRATION RATE: 18 BRPM

## 2019-09-30 DIAGNOSIS — N48.89 PENILE IRRITATION: ICD-10-CM

## 2019-09-30 DIAGNOSIS — R35.0 URINARY FREQUENCY: Primary | ICD-10-CM

## 2019-09-30 LAB
ALBUMIN UR-MCNC: NEGATIVE MG/DL
APPEARANCE UR: CLEAR
BILIRUB UR QL STRIP: NEGATIVE
COLOR UR AUTO: YELLOW
GLUCOSE UR STRIP-MCNC: NEGATIVE MG/DL
HGB UR QL STRIP: NEGATIVE
KETONES UR STRIP-MCNC: NEGATIVE MG/DL
LEUKOCYTE ESTERASE UR QL STRIP: NEGATIVE
NITRATE UR QL: NEGATIVE
PH UR STRIP: 6 PH (ref 5–7)
SOURCE: NORMAL
SP GR UR STRIP: 1.02 (ref 1–1.03)
UROBILINOGEN UR STRIP-ACNC: 0.2 EU/DL (ref 0.2–1)

## 2019-09-30 PROCEDURE — 99214 OFFICE O/P EST MOD 30 MIN: CPT | Performed by: PHYSICIAN ASSISTANT

## 2019-09-30 PROCEDURE — 81003 URINALYSIS AUTO W/O SCOPE: CPT | Performed by: PHYSICIAN ASSISTANT

## 2019-09-30 PROCEDURE — 87591 N.GONORRHOEAE DNA AMP PROB: CPT | Performed by: PHYSICIAN ASSISTANT

## 2019-09-30 PROCEDURE — 87491 CHLMYD TRACH DNA AMP PROBE: CPT | Performed by: PHYSICIAN ASSISTANT

## 2019-09-30 ASSESSMENT — PAIN SCALES - GENERAL: PAINLEVEL: MILD PAIN (2)

## 2019-09-30 ASSESSMENT — MIFFLIN-ST. JEOR: SCORE: 2128.38

## 2019-09-30 NOTE — LETTER
October 2, 2019      Abimael Martinez  [id number]  [facility] [address]        Dear Abimael Martinez    All of your labs were normal.     Please contact the clinic if you have additional questions or if symptoms persist.  Thank you.      Enclosed is a copy of the results.  It was a pleasure to see you at your last appointment.    If you have any questions or concerns, please call myself or my nurse at [phone].      Sincerely,      Royer Eubanks PA-C/N. BRANDY Mays      Results for orders placed or performed in visit on 09/30/19   UA reflex to Microscopic and Culture   Result Value Ref Range    Color Urine Yellow     Appearance Urine Clear     Glucose Urine Negative NEG^Negative mg/dL    Bilirubin Urine Negative NEG^Negative    Ketones Urine Negative NEG^Negative mg/dL    Specific Gravity Urine 1.020 1.003 - 1.035    Blood Urine Negative NEG^Negative    pH Urine 6.0 5.0 - 7.0 pH    Protein Albumin Urine Negative NEG^Negative mg/dL    Urobilinogen Urine 0.2 0.2 - 1.0 EU/dL    Nitrite Urine Negative NEG^Negative    Leukocyte Esterase Urine Negative NEG^Negative    Source Midstream Urine    Chlamydia trachomatis PCR   Result Value Ref Range    Specimen Description Urine     Chlamydia Trachomatis PCR Negative NEG^Negative   Neisseria gonorrhoeae PCR   Result Value Ref Range    Specimen Descrip Urine     N Gonorrhea PCR Negative NEG^Negative

## 2019-09-30 NOTE — PROGRESS NOTES
Subjective     Abimael Martinez is a 29 year old male who presents to clinic today for the following health issues:    HPI   Genitourinary - Male  Onset: 09/16/2019    Description:   Dysuria (painful urination): not quite but    Hematuria (blood in urine): no   Frequency: YES  Are you urinating at night : no   Hesitancy (delay in urine): no   Retention (unable to empty): no   Decrease in urinary flow: no   Incontinence: no     Progression of Symptoms:  same    Accompanying Signs & Symptoms:  Fever: no   Back/Flank pain: YES  Urethral discharge: no   Testicle lumps/masses/pain: YES  Nausea and/or vomiting: no   Abdominal pain: no     History:   History of frequent UTI's: YES  History of kidney stones: no   History of hernias: no   Personal or Family history of Prostate problems: YES-  Sexually active: YES, recent new paretners    Precipitating factors:   none    Alleviating factors:  none      Has had issues with lower back and back sx and not sure if discomfort may be assoc with that.  Hx radiculopathy[athy at baseline.  No penile blisters.  Is circumscribed.      Some time ago had irritiaiton to the skinof his penis from accidentally using the wrong lotion as a lubricant, since then occasionally gets flaking and sometimes angelina eirritaiton to the penid during sexual acitviity.                Allergies   Allergen Reactions     Cymbalta      Weight gain and fatigue     Duloxetine Other (See Comments)     Weight gain, fatigue  Enlarged spleen and weight gain     No Clinical Screening - See Comments GI Disturbance     Weight gain and fatigue     Paroxetine Other (See Comments), GI Disturbance and Unknown     Weight gain, fatigue  Weight gain  Spleen issues  Weight gain  Spleen issues     Paxil [Paroxetine Mesylate]      Weight gain and fatigue     Seasonal Allergies      Vicodin [Hydrocodone-Acetaminophen]        Patient Active Problem List   Diagnosis     Anxiety     CARDIOVASCULAR SCREENING; LDL GOAL LESS THAN 160      High risk sexual behavior     Tobacco use disorder     Low back pain     Hypertension goal BP (blood pressure) < 140/90     Internal hemorrhoids which bleed     Obesity, Class I, BMI 30-34.9     Attention deficit hyperactivity disorder (ADHD), combined type     AYALA (generalized anxiety disorder)     OCD (obsessive compulsive disorder)     Drug-induced erectile dysfunction     Mild persistent asthma without complication     Lumbosacral radiculopathy     Dyslipidemia     Elevated serum creatinine       Past Medical History:   Diagnosis Date     Acute right otitis media 1/8/2013     Anxiety      Depression      Drug abuse (H)      Urethritis 5/20/2013     Problem list name updated by automated process. Provider to review       albuterol (PROAIR HFA/PROVENTIL HFA/VENTOLIN HFA) 108 (90 Base) MCG/ACT inhaler, Inhale 2 puffs into the lungs every 4 hours as needed for wheezing  ALPRAZolam (XANAX) 2 MG tablet, Take 1 tablet (2 mg) by mouth 3 times daily as needed for anxiety  amphetamine-dextroamphetamine (ADDERALL) 20 MG tablet, Take 1 tablet (20 mg) by mouth 3 times daily  azelastine (ASTELIN) 0.1 % nasal spray, Spray 2 sprays into both nostrils 2 times daily  cyclobenzaprine (FLEXERIL) 10 MG tablet, Take 1 tablet (10 mg) by mouth 3 times daily as needed for muscle spasms  famotidine (PEPCID) 40 MG tablet, TAKE 1 TABLET(40 MG) BY MOUTH AT BEDTIME  fluticasone (FLONASE) 50 MCG/ACT nasal spray, Spray 2 sprays into both nostrils daily  fluticasone (FLOVENT HFA) 220 MCG/ACT Inhaler, Inhale 2 puffs into the lungs 2 times daily Please give patient spacer  hydrocortisone (ANUSOL-HC) 2.5 % cream, Place rectally 2 times daily as needed for hemorrhoids external  Hyoscyamine Sulfate 0.375 MG TBCR, Take 1 capful by mouth 3 times daily as needed  lidocaine (XYLOCAINE) 5 % ointment, Apply topically as needed for moderate pain  montelukast (SINGULAIR) 10 MG tablet, Take 1 tablet (10 mg) by mouth At Bedtime  oxyCODONE-acetaminophen  (PERCOCET) 5-325 MG per tablet, Take 1 tablet by mouth every 6 hours as needed for pain  sertraline (ZOLOFT) 100 MG tablet, TAKE 1 TABLET(100 MG) BY MOUTH DAILY  sildenafil (REVATIO) 20 MG tablet, Take 1-2 tablets (20-40 mg) by mouth daily as needed As needed for prevention of erectile dysfunction  azithromycin (ZITHROMAX) 250 MG tablet, Two tablets first day, then one tablet daily for four days. (Patient not taking: Reported on 2019)  [] loratadine (CLARITIN) 10 MG tablet, Take 1 tablet (10 mg) by mouth daily  methylPREDNISolone (MEDROL DOSEPAK) 4 MG tablet therapy pack, Follow Package Directions (Patient not taking: Reported on 2019)  prochlorperazine (COMPAZINE) 10 MG tablet, Take 1 tablet (10 mg) by mouth every 6 hours as needed for nausea or vomiting (Patient not taking: Reported on 2019)    No current facility-administered medications on file prior to visit.       Social History     Tobacco Use     Smoking status: Former Smoker     Packs/day: 0.50     Years: 6.00     Pack years: 3.00     Types: Cigarettes     Last attempt to quit: 3/12/2018     Years since quittin.5     Smokeless tobacco: Never Used     Tobacco comment: second hand smoke exposure   Substance Use Topics     Alcohol use: Yes     Comment: once a week     Drug use: Yes     Types: Marijuana     Comment: pot once a month, ectasy  As of 2016 he only smokes pot occasionally       ROS:   GEN: NO fevers   urinary sympstms as above  MUSC back as above    OBJECTIVE:  /84 (BP Location: Right arm, Patient Position: Sitting, Cuff Size: Adult Large)   Pulse 78   Temp 98.1  F (36.7  C) (Oral)   Resp 18   Ht 1.829 m (6')   Wt 112 kg (247 lb)   SpO2 95%   BMI 33.50 kg/m     General:   awake, alert, and cooperative.  NAD.   Head: Normocephalic, atraumatic.  Eyes: Conjunctiva clear,   Lungs: Regular rate  Neuro: Alert and oriented - normal speech.  BACk straight leg raising intact  GU_ no masses, swellingm lesions or  TTP    ASSESSMENT:well appearing, UA WNL, patient declined empirical GC/chlamydia tx today, will await cx    ICD-10-CM    1. Urinary frequency R35.0 UA reflex to Microscopic and Culture     Chlamydia trachomatis PCR     Neisseria gonorrhoeae PCR   2. Penile irritation N48.89 UROLOGY ADULT REFERRAL     I spent a total of 25 minutes in face to face time with > 50% of the visit related to  counseling/care coordination.    PLAN:   Follow up: will order further STD screenings pending GC/chlamydia ( and possibly routine HIV screening in 1-3 months given pts somewhat increased risk behaviors recently.   Advised about symptoms which might herald more serious problems.

## 2019-09-30 NOTE — PATIENT INSTRUCTIONS
At [facility], we strive to deliver an exceptional experience to you, every time we see you.  If you receive a survey in the mail, please send us back your thoughts. We really do value your feedback.    Your care team:                            Family Medicine Internal Medicine   MD Sanjay García MD Shantel Branch-Fleming, MD Katya Georgiev PA-C Megan Hill, APRN CNP    Jorge Zhong MD Pediatrics   Royer Eubanks, MIGUEL Doty, MD Susie Ye APRN CNP   MD Ninoska Stevens MD Deborah Mielke, MD Melva Elkins, APRN [facility]      Clinic hours: Monday - Thursday 7 am-7 pm; Fridays 7 am-5 pm.   Urgent care: Monday - Friday 11 am-9 pm; Saturday and Sunday 9 am-5 pm.  Pharmacy : Monday -Thursday 8 am-8 pm; Friday 8 am-6 pm; Saturday and Sunday 9 am-5 pm.     Clinic: [phone]   Pharmacy: [phone]        Patient Education     Urethritis in Men     An inflamed urethra can cause pain during urination.   The urethra is the tube that runs from the bladder through the penis. When the urethra is inflamed, it is called urethritis. The urethra becomes swollen and causes burning pain when you urinate. Other symptoms of urethritis may include itching or tingling of the penis or pus discharge from the penis. You may also have pain with sex and masturbation. Some men may not have symptoms.  What causes urethritis?  Urethritis can be caused by a bacterial or viral infection. Such an infection can lead to conditions such as a urinary tract infection (UTI) or sexually transmitted diseases (STD). Urethritis can also be caused by injury or sensitivity or allergy to chemicals in lotions and other products.  How is urethritis diagnosed?  Your healthcare provider will examine you and ask about your symptoms and health history. You may also have one or more of the following tests:    Urine test to take samples of urine and have them checked for  problems.    Blood test to take a sample of blood and have it checked for problems.    Urethral discharge to take a sample of fluid from inside the urethra. A cotton swab is inserted into the opening of the penis and into the urethra.    Cystoscopy to allow the healthcare provider to look for problems in the urinary tract. The test uses a thin, flexible telescope called a cystoscope with a light and camera attached. The scope is inserted into the urethra.  How is urethritis treated?  Treatment depends on the cause of urethritis. If it s due to a bacterial infection, antibiotics (medicines that fight infection) will be given. Your healthcare provider can tell you more about your treatment options. In the meantime, your symptoms can be treated. To relieve pain and swelling, anti-inflammatory medications, such as ibuprofen, may be given. Untreated, symptoms may get worse. Also, scar tissue can form in the urethra, causing it to narrow.  When to call your healthcare provider   Call your healthcare provider right away if you have any of the following:    Fever of 100.4 F (38.0 C) or higher     Blood in the urine or semen    Burning pain with urination    Increased urge to urinate    Discharge from the penis    Itching, tenderness, or swelling in the penis or groin    Pain during sex or masturbation   Preventing STDs  When it comes to sex, it s important to take care and be safe. Any sexual contact with the penis, vagina, anus, or mouth can spread an STD. The only sure way to prevent STDs is not having sex. But there are ways to make sex safer. Use a latex condom each time you have sex. And talk to your partner about STDs before you have sex.  Date Last Reviewed: 1/1/2017 2000-2018 [employer]. [address] [place] [address]. All rights reserved. This information is not intended as a substitute for professional medical care. Always follow your healthcare professional's instructions.

## 2019-10-01 LAB
C TRACH DNA SPEC QL NAA+PROBE: NEGATIVE
N GONORRHOEA DNA SPEC QL NAA+PROBE: NEGATIVE
SPECIMEN SOURCE: NORMAL
SPECIMEN SOURCE: NORMAL

## 2019-10-01 NOTE — RESULT ENCOUNTER NOTE
Please send letter if doesn't check mychart.  Royer Eubanks PA-C    Mr. Martinez,    All of your labs were normal.    Please contact the clinic if you have additional questions or if symptoms persist.  Thank you.    Sincerely,    JESSIE Hartmann

## 2019-10-08 ENCOUNTER — TRANSFERRED RECORDS (OUTPATIENT)
Dept: HEALTH INFORMATION MANAGEMENT | Facility: CLINIC | Age: 29
End: 2019-10-08

## 2019-10-08 LAB — PHQ9 SCORE: 4

## 2019-10-09 DIAGNOSIS — F41.9 ANXIETY: ICD-10-CM

## 2019-10-09 RX ORDER — ALPRAZOLAM 2 MG
2 TABLET ORAL 3 TIMES DAILY PRN
Qty: 30 TABLET | Refills: 0 | Status: SHIPPED | OUTPATIENT
Start: 2019-10-09 | End: 2019-11-10

## 2019-10-09 NOTE — TELEPHONE ENCOUNTER
Controlled Substance Refill Request for alprazolam  Problem List Complete:  Yes    Overview Signed 4/6/2016  8:02 AM by Michael Lora MD   Patient is followed by MICHAEL LORA for ongoing prescription of benzodiazepines.  All refills should be approved by this provider, or covering partner.     Medication(s): alprazolam 2 mg .   Maximum quantity per month: 36  Clinic visit frequency required: Q 6  months   Last OV Dr. Michael Lora: 8/14/18     Controlled substance agreement on file: No  Benzodiazepine use reviewed by psychiatry:  No     Last [facility] website verification:        checked in past 3 months?  Yes no concerns on 10/9/19    Heather Lin BSN, RN

## 2019-10-11 ENCOUNTER — OFFICE VISIT (OUTPATIENT)
Dept: UROLOGY | Facility: CLINIC | Age: 29
End: 2019-10-11
Attending: PHYSICIAN ASSISTANT
Payer: COMMERCIAL

## 2019-10-11 VITALS
OXYGEN SATURATION: 97 % | HEIGHT: 72 IN | SYSTOLIC BLOOD PRESSURE: 140 MMHG | DIASTOLIC BLOOD PRESSURE: 84 MMHG | BODY MASS INDEX: 33.5 KG/M2 | HEART RATE: 74 BPM

## 2019-10-11 DIAGNOSIS — N48.89 PENILE IRRITATION: Primary | ICD-10-CM

## 2019-10-11 PROCEDURE — 99203 OFFICE O/P NEW LOW 30 MIN: CPT | Performed by: UROLOGY

## 2019-10-11 ASSESSMENT — PAIN SCALES - GENERAL: PAINLEVEL: MILD PAIN (3)

## 2019-10-11 NOTE — PROGRESS NOTES
Urology Consult History and Physical  [facility]   Name: Abimael Martinez    MRN: [id number]   YOB: 1990       We were asked to see Abimael Martinez at the request of Randolph Eubanks PA-C for evaluation and treatment of penile skin irritation.        Chief Complaint:   Penile skin irritation    History is obtained from the patient            History of Present Illness:   Aibmael Martinez is a 29 year old male who is being seen for evaluation of penile skin irritation. He is circumcised. He notes that about 2 years ago he accidentally used a cleaning solution as a lubricant which caused severe penile skin irritation long the shaft, mucosal collar and corona. Since that time, the severe irritation has resolved, however following sexual activity or masturbation he has issues with dry and flaking skin on the distal shaft at his circumcision line. This is associated with some itching.     (4 or >)  Location: Penile skin  Quality: dry, itching  Severity: mildly bothersome   Duration: 2 years           Past Medical History:     Past Medical History:   Diagnosis Date     Acute right otitis media 2013     Anxiety      Depression      Drug abuse (H)      Urethritis 2013     Problem list name updated by automated process. Provider to review            Past Surgical History:     Past Surgical History:   Procedure Laterality Date     BACK SURGERY  2018            Social History:     Social History     Tobacco Use     Smoking status: Former Smoker     Packs/day: 0.50     Years: 6.00     Pack years: 3.00     Types: Cigarettes     Last attempt to quit: 3/12/2018     Years since quittin.5     Smokeless tobacco: Never Used     Tobacco comment: second hand smoke exposure   Substance Use Topics     Alcohol use: Yes     Comment: once a week       History   Smoking Status     Former Smoker     Packs/day: 0.50     Years: 6.00     Types: Cigarettes     Quit date: 3/12/2018   Smokeless Tobacco      Never Used     Comment: second hand smoke exposure            Family History:     Family History   Problem Relation Age of Onset     Unknown/Adopted Mother      Unknown/Adopted Father      Unknown/Adopted Maternal Grandmother      Unknown/Adopted Maternal Grandfather      Unknown/Adopted Paternal Grandmother      Unknown/Adopted Paternal Grandfather      Unknown/Adopted Brother               Allergies:     Allergies   Allergen Reactions     Cymbalta      Weight gain and fatigue     Duloxetine Other (See Comments)     Weight gain, fatigue  Enlarged spleen and weight gain     No Clinical Screening - See Comments GI Disturbance     Weight gain and fatigue     Paroxetine Other (See Comments), GI Disturbance and Unknown     Weight gain, fatigue  Weight gain  Spleen issues  Weight gain  Spleen issues     Paxil [Paroxetine Mesylate]      Weight gain and fatigue     Seasonal Allergies      Vicodin [Hydrocodone-Acetaminophen]             Medications:     Current Outpatient Medications   Medication Sig     albuterol (PROAIR HFA/PROVENTIL HFA/VENTOLIN HFA) 108 (90 Base) MCG/ACT inhaler Inhale 2 puffs into the lungs every 4 hours as needed for wheezing     ALPRAZolam (XANAX) 2 MG tablet Take 1 tablet (2 mg) by mouth 3 times daily as needed for anxiety     amphetamine-dextroamphetamine (ADDERALL) 20 MG tablet Take 1 tablet (20 mg) by mouth 3 times daily     azelastine (ASTELIN) 0.1 % nasal spray Spray 2 sprays into both nostrils 2 times daily     cyclobenzaprine (FLEXERIL) 10 MG tablet Take 1 tablet (10 mg) by mouth 3 times daily as needed for muscle spasms     famotidine (PEPCID) 40 MG tablet TAKE 1 TABLET(40 MG) BY MOUTH AT BEDTIME     fluticasone (FLONASE) 50 MCG/ACT nasal spray Spray 2 sprays into both nostrils daily     fluticasone (FLOVENT HFA) 220 MCG/ACT Inhaler Inhale 2 puffs into the lungs 2 times daily Please give patient spacer     Hyoscyamine Sulfate 0.375 MG TBCR Take 1 capful by mouth 3 times daily as needed      montelukast (SINGULAIR) 10 MG tablet Take 1 tablet (10 mg) by mouth At Bedtime     oxyCODONE-acetaminophen (PERCOCET) 5-325 MG per tablet Take 1 tablet by mouth every 6 hours as needed for pain     sildenafil (REVATIO) 20 MG tablet Take 1-2 tablets (20-40 mg) by mouth daily as needed As needed for prevention of erectile dysfunction     azithromycin (ZITHROMAX) 250 MG tablet Two tablets first day, then one tablet daily for four days. (Patient not taking: Reported on 9/30/2019)     hydrocortisone (ANUSOL-HC) 2.5 % cream Place rectally 2 times daily as needed for hemorrhoids external (Patient not taking: Reported on 10/11/2019)     lidocaine (XYLOCAINE) 5 % ointment Apply topically as needed for moderate pain (Patient not taking: Reported on 10/11/2019)     methylPREDNISolone (MEDROL DOSEPAK) 4 MG tablet therapy pack Follow Package Directions (Patient not taking: Reported on 9/30/2019)     prochlorperazine (COMPAZINE) 10 MG tablet Take 1 tablet (10 mg) by mouth every 6 hours as needed for nausea or vomiting (Patient not taking: Reported on 9/30/2019)     sertraline (ZOLOFT) 100 MG tablet TAKE 1 TABLET(100 MG) BY MOUTH DAILY (Patient not taking: Reported on 10/11/2019)     No current facility-administered medications for this visit.              Review of Systems:     Skin: negative  Eyes: negative  Ears/Nose/Throat: negative  Respiratory: No shortness of breath, dyspnea on exertion, cough, or hemoptysis  Cardiovascular: negative  Gastrointestinal: negative  Genitourinary: as above  Musculoskeletal: negative  Neurologic: negative  Psychiatric: anxiety  Hematologic/Lymphatic/Immunologic: negative  Endocrine: negative          Physical Exam:     Patient Vitals for the past 24 hrs:   BP Pulse SpO2 Height   10/11/19 1238 (!) 140/84 74 97 % 1.829 m (6')     Body mass index is 33.5 kg/m .     General: age-appropriate appearing male in NAD  HEENT: Head AT/NC, EOMI, CN Grossly intact  Lungs: no respiratory distress, or  pursed lip breathing  Heart: No obvious jugular venous distension present  Back: no bony midline tenderness, no CVAT bilaterally.  Abdomen: soft, non-distended, non-tender. No organomegaly  : normal circumcised phallus, normal glans, normal circumcision line and normal shaft skin with no abnormalities noted, normal testis bilaterally   Lymph: no palpable inguinal lymphadenopathy.  LE: no edema.   Musculoskeltal: extremities normal, no peripheral edema  Skin: no suspicious lesions or rashes  Neuro: Alert, oriented, speech and mentation normal;  moving all 4 extremities equally.  Psych: affect and mood normal          Data:   All laboratory data reviewed:    UA RESULTS:  Recent Labs   Lab Test 09/30/19  1341  10/27/16  1709   COLOR Yellow   < > Yellow   APPEARANCE Clear   < > Clear   URINEGLC Negative   < > Negative   URINEBILI Negative   < > Negative   URINEKETONE Negative   < > Negative   SG 1.020   < > 1.015   UBLD Negative   < > Trace*   URINEPH 6.0   < > 6.0   PROTEIN Negative   < > Negative   UROBILINOGEN 0.2   < > 0.2   NITRITE Negative   < > Negative   LEUKEST Negative   < > Negative   RBCU  --   --  O - 2   WBCU  --   --  O - 2    < > = values in this interval not displayed.     Lab Results   Component Value Date    CR 1.15 03/19/2018      STD:  GC/Chlamydia = negative  HIV = negative         Impression and Plan:   Impression:   29 year old man with history of chemical irritate to his penis with continued dry and itching skin      Plan:   Penile skin irritation  - We discussed that his penis appears normal on exam with no lesions or erythema noted  - We discussed that there is no evidence of a sexually transmitted infection and his urine is normal  - We discussed that at present, his circumcision line appears normal  - We discussed possible referral to dermatology for recommendation of topical lotion or treatment and he is interested in this  - Referral to Dermatology placed  - No voiding or sexual issues,  he may follow up with Urology PRN     Thank you for the kind consultation.    Time spent: 30 minutes of which >50% was spent counseling.    Aime Smith MD   Urology  [facility]  [facility] Phone: [phone]  [facility] Phone: [phone]

## 2019-10-18 ENCOUNTER — OFFICE VISIT (OUTPATIENT)
Dept: DERMATOLOGY | Facility: CLINIC | Age: 29
End: 2019-10-18
Attending: UROLOGY
Payer: COMMERCIAL

## 2019-10-18 DIAGNOSIS — L30.9 DERMATITIS: Primary | ICD-10-CM

## 2019-10-18 PROCEDURE — 99202 OFFICE O/P NEW SF 15 MIN: CPT | Performed by: DERMATOLOGY

## 2019-10-18 RX ORDER — TRIAMCINOLONE ACETONIDE 0.25 MG/G
OINTMENT TOPICAL 2 TIMES DAILY
Qty: 80 G | Refills: 1 | Status: SHIPPED | OUTPATIENT
Start: 2019-10-18 | End: 2021-04-23

## 2019-10-18 ASSESSMENT — PAIN SCALES - GENERAL: PAINLEVEL: NO PAIN (0)

## 2019-10-18 NOTE — PROGRESS NOTES
Visit Date:   10/18/2019      SUBJECTIVE:  This is the first visit for Abimael, who is a young gentleman who states that several months ago he and his girlfriend tried a lubricating product before having sex and subsequently both suffered some irritation changes on their genitals.  He states that since that time, despite using various lubricants, etc., he still gets scaliness on the penis and is concerned that this might be something serious.  He does admit to masturbating on a regular basis,and having sexual intercourse once every 7-10 days.      OBJECTIVE:  A healthy gentleman in no distress.  Present on his genitals are some definite eczematous changes especially of the scrotum.  The penis  is circumcised and looks healthy without any evidence of active dermatitis.  Another problem he is having is that the dorsum of his left hand shows a cluster of subcutaneous soft papules.  He states he had an insect bite near this area a few months or weeks ago and that has resulted and he wonders what it is. The lesions on his hand are a cluster of white- pink  subcutaneous papules.      ASSESSMENT:  Irritated phenomenon of the genitals, possibly related to a strong irritant exposure several months ago. Granuloma annulare - hand. ?     PLAN:  We  prescribed 0.025 triamcinolone ointment twice daily for several weeks, then once daily for further weeks thereafter.  This should suffice, though I said that he still might be getting some scaling with the use of other lubricants, etc.  I did not think he needs patch testing as the dermatitis does not extend beyond the genital area.  The lesion on the dorsum of his hand is interesting. Maybe GA - didn't suggest warts.   I photographed this and put it in his record.  He states that the insect bite has been getting better, so hopefully with the use of the triamcinolone same product on this, things will improve.  This is a subcutaneous issue or intradermal issue and might require  biopsy for identification or possible resolution, but overall I did not feel it was necessary to biopsy as it  looks completely benign.      MEDICATIONS AND ALLERGIES:  Reviewed.      Return pending still persisting genital issues and enlargement  of the dorsal hand lesion.         H EVY MEDINA MD             D: 10/18/2019   T: 10/18/2019   MT: JOSUE      Name:     JOSE ROBERTO ROBISON   MRN:      [id number]        Account:      [id number]   :      1990           Visit Date:   10/18/2019      Document: [id number]

## 2019-10-18 NOTE — LETTER
10/18/2019         RE: Abimael Martinez  [address]  [facility] [address]        Dear Colleague,    Thank you for referring your patient, Abimael Martinez, to the [facility]. Please see a copy of my visit note below.    Visit Date:   10/18/2019      SUBJECTIVE:  This is the first visit for Abimael, who is a young gentleman who states that several months ago he and his girlfriend tried a lubricating product before having sex and subsequently both suffered some irritation changes on their genitals.  He states that since that time, despite using various lubricants, etc., he still gets scaliness on the penis and is concerned that this might be something serious.  He does admit to masturbating on a regular basis,and having sexual intercourse once every 7-10 days.      OBJECTIVE:  A healthy gentleman in no distress.  Present on his genitals are some definite eczematous changes especially of the scrotum.  The penis  is circumcised and looks healthy without any evidence of active dermatitis.  Another problem he is having is that the dorsum of his left hand shows a cluster of subcutaneous soft papules.  He states he had an insect bite near this area a few months or weeks ago and that has resulted and he wonders what it is. The lesions on his hand are a cluster of white- pink  subcutaneous papules.      ASSESSMENT:  Irritated phenomenon of the genitals, possibly related to a strong irritant exposure several months ago. Granuloma annulare - hand. ?     PLAN:  We  prescribed 0.025 triamcinolone ointment twice daily for several weeks, then once daily for further weeks thereafter.  This should suffice, though I said that he still might be getting some scaling with the use of other lubricants, etc.  I did not think he needs patch testing as the dermatitis does not extend beyond the genital area.  The lesion on the dorsum of his hand is interesting. Maybe GA - didn't suggest warts.   I  photographed this and put it in his record.  He states that the insect bite has been getting better, so hopefully with the use of the triamcinolone same product on this, things will improve.  This is a subcutaneous issue or intradermal issue and might require biopsy for identification or possible resolution, but overall I did not feel it was necessary to biopsy as it  looks completely benign.      MEDICATIONS AND ALLERGIES:  Reviewed.      Return pending still persisting genital issues and enlargement  of the dorsal hand lesion.         DENNY MEDINA MD             D: 10/18/2019   T: 10/18/2019   MT: JOSUE      Name:     JOSE ROBERTO ROBISON   MRN:      [id number]        Account:      [id number]   :      1990           Visit Date:   10/18/2019      Document: [id number]        Again, thank you for allowing me to participate in the care of your patient.        Sincerely,        DENNY Medina MD

## 2019-10-18 NOTE — NURSING NOTE
Abimael Martinez's goals for this visit include:   Chief Complaint   Patient presents with     Skin Check     Abimael is visiting to disscuss area of concern on the genitals related to chemical exposure 1.5 years. Month ago mosquito bit that has spread on the right hand, and a mole,     He requests these members of his care team be copied on today's visit information:     PCP: Jamison Lora    Referring Provider:  Aime Smith MD  [address] [place] [id number]    There were no vitals taken for this visit.    Do you need any medication refills at today's visit? No    Verónica Alegre LPN

## 2019-10-24 ENCOUNTER — TRANSFERRED RECORDS (OUTPATIENT)
Dept: HEALTH INFORMATION MANAGEMENT | Facility: CLINIC | Age: 29
End: 2019-10-24

## 2019-10-25 ENCOUNTER — TELEPHONE (OUTPATIENT)
Dept: FAMILY MEDICINE | Facility: CLINIC | Age: 29
End: 2019-10-25

## 2019-10-25 DIAGNOSIS — F90.2 ATTENTION DEFICIT HYPERACTIVITY DISORDER (ADHD), COMBINED TYPE: ICD-10-CM

## 2019-10-25 NOTE — TELEPHONE ENCOUNTER
Reason for Call:  Medication or medication refill:    Do you use a [facility]?  Name of the pharmacy and phone number for the current request:  Written script request     Name of the medication requested: amphetamine-dextroamphetamine (ADDERALL) 20 MG tablet    Other request:     Can we leave a detailed message on this number? YES    Phone number patient can be reached at: Home number on file [phone] (home)    Best Time:     Call taken on 10/25/2019 at 3:15 PM by Anu Abraham

## 2019-10-25 NOTE — TELEPHONE ENCOUNTER
Controlled Substance Refill Request for adderall  Problem List Complete:  Yes    Patient is followed by MICHAEL OROPEZA for ongoing prescription of stimulants.  All refills should be approved by this provider, or covering partner.    Medication(s): adderall 20 mg.   Maximum quantity per month: 90  Clinic visit frequency required: Q 6  months     Controlled substance agreement on file: 07/31/17    Neuropsych evaluation for ADD completed:  Yes, completed 7-2008 at [facility], on file and diagnosis confirmed    Last [facility] website verification:  10/25/19 no concerns      Heather RIDERN, RN

## 2019-10-28 RX ORDER — DEXTROAMPHETAMINE SACCHARATE, AMPHETAMINE ASPARTATE, DEXTROAMPHETAMINE SULFATE AND AMPHETAMINE SULFATE 5; 5; 5; 5 MG/1; MG/1; MG/1; MG/1
20 TABLET ORAL 3 TIMES DAILY
Qty: 90 TABLET | Refills: 0 | Status: SHIPPED | OUTPATIENT
Start: 2019-10-28 | End: 2019-11-26

## 2019-10-28 NOTE — TELEPHONE ENCOUNTER
I attempted to send this electronically but his pharmacy is not set up for that. Please ask a provider to sign this in my absence.   Jamison Lora MD

## 2019-10-28 NOTE — TELEPHONE ENCOUNTER
I brought 1 Rx up to the  and it is ready for .  I called the patient and LEONORA.  Farideh Gale,

## 2019-10-29 NOTE — PROGRESS NOTES
10/29/19 Patient never picked up letter, placed in the shred box.  Mary Clinton MA  [facility]  2nd Floor  Primary Care

## 2019-11-07 ENCOUNTER — TRANSFERRED RECORDS (OUTPATIENT)
Dept: HEALTH INFORMATION MANAGEMENT | Facility: CLINIC | Age: 29
End: 2019-11-07

## 2019-11-07 LAB — PHQ9 SCORE: 4

## 2019-11-08 ENCOUNTER — TELEPHONE (OUTPATIENT)
Dept: FAMILY MEDICINE | Facility: CLINIC | Age: 29
End: 2019-11-08

## 2019-11-08 ENCOUNTER — HEALTH MAINTENANCE LETTER (OUTPATIENT)
Age: 29
End: 2019-11-08

## 2019-11-08 DIAGNOSIS — F41.9 ANXIETY: ICD-10-CM

## 2019-11-08 NOTE — TELEPHONE ENCOUNTER
Controlled Substance Refill Request for alprazolam  Problem List Complete:  Yes     Overview Signed 4/6/2016  8:02 AM by Michael Lora MD   Patient is followed by MICHAEL LORA for ongoing prescription of benzodiazepines.  All refills should be approved by this provider, or covering partner.     Medication(s): alprazolam 2 mg .   Maximum quantity per month: 36  Clinic visit frequency required: Q 6  months   Last OV Dr. Michael Lora: 8/14/18     Controlled substance agreement on file: No  Benzodiazepine use reviewed by psychiatry:  No     Last [facility] website verification:        checked in past 3 months?  Yes no concerns on 10/9/19     Heather Lin BSN, RN

## 2019-11-08 NOTE — TELEPHONE ENCOUNTER
Reason for Call:  Medication or medication refill:    Do you use a [facility]?  Name of the pharmacy and phone number for the current request:  [facility] - [place], [place] - [phone]    Name of the medication requested: Alprazolam    Other request:     Can we leave a detailed message on this number? YES    Phone number patient can be reached at: Home number on file [phone] (home)    Best Time:     Call taken on 11/8/2019 at 3:40 PM by Mary Jane Oneal

## 2019-11-10 RX ORDER — ALPRAZOLAM 2 MG
2 TABLET ORAL 3 TIMES DAILY PRN
Qty: 30 TABLET | Refills: 0 | Status: SHIPPED | OUTPATIENT
Start: 2019-11-10 | End: 2019-12-09

## 2019-11-19 ENCOUNTER — OFFICE VISIT (OUTPATIENT)
Dept: DERMATOLOGY | Facility: CLINIC | Age: 29
End: 2019-11-19
Payer: COMMERCIAL

## 2019-11-19 DIAGNOSIS — R21 RASH AND OTHER NONSPECIFIC SKIN ERUPTION: Primary | ICD-10-CM

## 2019-11-19 PROCEDURE — 99214 OFFICE O/P EST MOD 30 MIN: CPT | Mod: 25 | Performed by: DERMATOLOGY

## 2019-11-19 PROCEDURE — 88305 TISSUE EXAM BY PATHOLOGIST: CPT | Mod: TC | Performed by: DERMATOLOGY

## 2019-11-19 PROCEDURE — 11104 PUNCH BX SKIN SINGLE LESION: CPT | Performed by: DERMATOLOGY

## 2019-11-19 RX ORDER — HYDROXYZINE HYDROCHLORIDE 10 MG/1
TABLET, FILM COATED ORAL
Qty: 90 TABLET | Refills: 1 | Status: SHIPPED | OUTPATIENT
Start: 2019-11-19 | End: 2020-02-06

## 2019-11-19 RX ORDER — TRIAMCINOLONE ACETONIDE 1 MG/G
OINTMENT TOPICAL
Qty: 454 G | Refills: 1 | Status: SHIPPED | OUTPATIENT
Start: 2019-11-19 | End: 2020-02-06

## 2019-11-19 ASSESSMENT — PAIN SCALES - GENERAL: PAINLEVEL: NO PAIN (0)

## 2019-11-19 NOTE — LETTER
11/19/2019         RE: Abimael Martinez  [address]  [facility] [address]        Dear Colleague,    Thank you for referring your patient, Abimael Martinez, to the [facility]. Please see a copy of my visit note below.    [facility] Dermatology Note      Dermatology Problem List:  1. Dermatitis, groin area. Suspected irritant versus contact dermatitis. Resolved on 11/19/19.   - triam 0.025% ointment BID  2. Papular dermatitis, upper and lower extremities including in black tattoo ink on the left arm. Ddx papular eczema, versus less likely granulomatous dermatitis / sarcoid.   - s/p punch biopsy 11/19/19  - triam 0.1% ointment BID    CC:   Chief Complaint   Patient presents with     Derm Problem     Bumps all over body, very itchy. Using unscented lotions, body wash and deodorant. Taking zyrtec and found some relief with use. Not using topical medication currently.     Encounter Date: Nov 19, 2019    History of Present Illness:  Mr. Abimael Martinez is a 29 year old male who presents as a follow-up for genital dermatitis. The patient was last seen 10/18/19 when he was prescribed triamcinolone 0.025% ointment for a suspected irritant dermatitis to lubricating products.     Today, he returns for follow-up. He states the rash in his genital areas has resolved, but he reports a new eruption on his thighs, arms, and in particular, in his tattoos. He states this first start about 1-2 weeks ago. Lesions are very itchy. He noticed that the black areas of his tattoo on his left arm have become raised and very itchy. He reports no prior history of eczema or contact allergies. Does have a history of seasonal allergies. He has been taking cetirizine which is mildly helpful. Has not been using any topical steroids. He otherwise denies any new contact exposures - has been using unscented lotions, body wash, etc. Health otherwise stable. No other skin concerns.      Past Medical History:   Patient Active Problem List   Diagnosis     Anxiety     CARDIOVASCULAR SCREENING; LDL GOAL LESS THAN 160     High risk sexual behavior     Tobacco use disorder     Low back pain     Hypertension goal BP (blood pressure) < 140/90     Internal hemorrhoids which bleed     Obesity, Class I, BMI 30-34.9     Attention deficit hyperactivity disorder (ADHD), combined type     AYALA (generalized anxiety disorder)     OCD (obsessive compulsive disorder)     Drug-induced erectile dysfunction     Mild persistent asthma without complication     Lumbosacral radiculopathy     Dyslipidemia     Elevated serum creatinine     Past Medical History:   Diagnosis Date     Acute right otitis media 1/8/2013     Anxiety      Depression      Drug abuse (H)      Urethritis 5/20/2013     Problem list name updated by automated process. Provider to review     Past Surgical History:   Procedure Laterality Date     BACK SURGERY  04/05/2018       Social History:  Patient reports that he quit smoking about 20 months ago. His smoking use included cigarettes. He has a 3.00 pack-year smoking history. He has never used smokeless tobacco. He reports current alcohol use. He reports current drug use. Drug: Marijuana.    Family History:  Family History   Problem Relation Age of Onset     Unknown/Adopted Mother      Unknown/Adopted Father      Unknown/Adopted Maternal Grandmother      Unknown/Adopted Maternal Grandfather      Unknown/Adopted Paternal Grandmother      Unknown/Adopted Paternal Grandfather      Unknown/Adopted Brother        Medications:  Current Outpatient Medications   Medication Sig Dispense Refill     albuterol (PROAIR HFA/PROVENTIL HFA/VENTOLIN HFA) 108 (90 Base) MCG/ACT inhaler Inhale 2 puffs into the lungs every 4 hours as needed for wheezing 1 Inhaler 0     ALPRAZolam (XANAX) 2 MG tablet Take 1 tablet (2 mg) by mouth 3 times daily as needed for anxiety 30 tablet 0     amphetamine-dextroamphetamine (ADDERALL) 20  MG tablet Take 1 tablet (20 mg) by mouth 3 times daily 90 tablet 0     azelastine (ASTELIN) 0.1 % nasal spray Spray 2 sprays into both nostrils 2 times daily 30 mL 11     cyclobenzaprine (FLEXERIL) 10 MG tablet Take 1 tablet (10 mg) by mouth 3 times daily as needed for muscle spasms 90 tablet 1     famotidine (PEPCID) 40 MG tablet TAKE 1 TABLET(40 MG) BY MOUTH AT BEDTIME 90 tablet 1     fluticasone (FLONASE) 50 MCG/ACT nasal spray Spray 2 sprays into both nostrils daily 15.8 mL 3     fluticasone (FLOVENT HFA) 220 MCG/ACT Inhaler Inhale 2 puffs into the lungs 2 times daily Please give patient spacer 1 Inhaler 3     Hyoscyamine Sulfate 0.375 MG TBCR Take 1 capful by mouth 3 times daily as needed 90 tablet 1     montelukast (SINGULAIR) 10 MG tablet Take 1 tablet (10 mg) by mouth At Bedtime 90 tablet 0     oxyCODONE-acetaminophen (PERCOCET) 5-325 MG per tablet Take 1 tablet by mouth every 6 hours as needed for pain 12 tablet 0     sertraline (ZOLOFT) 100 MG tablet TAKE 1 TABLET(100 MG) BY MOUTH DAILY 90 tablet 1     sildenafil (REVATIO) 20 MG tablet Take 1-2 tablets (20-40 mg) by mouth daily as needed As needed for prevention of erectile dysfunction 30 tablet 2     triamcinolone (KENALOG) 0.025 % external ointment Apply topically 2 times daily Apply twice a day to sites of irritation 80 g 1     Allergies   Allergen Reactions     Cymbalta      Weight gain and fatigue     Duloxetine Other (See Comments)     Weight gain, fatigue  Enlarged spleen and weight gain     No Clinical Screening - See Comments GI Disturbance     Weight gain and fatigue     Paroxetine Other (See Comments), GI Disturbance and Unknown     Weight gain, fatigue  Weight gain  Spleen issues  Weight gain  Spleen issues     Paxil [Paroxetine Mesylate]      Weight gain and fatigue     Seasonal Allergies      Vicodin [Hydrocodone-Acetaminophen]          Review of Systems:  -Skin Establ Pt: The patient denies any new rash, pruritus, or lesions that are  symptomatic, changing or bleeding, except as per HPI.  -Constitutional: Otherwise feeling well today, in usual state of health.  -HEENT: Patient denies nonhealing oral sores.  -Skin: As above in HPI. No additional skin concerns.    Physical exam:  Vitals: There were no vitals taken for this visit.  GEN: This is a well developed, well-nourished male in no acute distress, in a pleasant mood.    SKIN: Focused examination of the face, neck, upper and lower extremities was performed.  - Pagan skin type: II  - Pink excoriated minimally scaly papules on the bilateral upper and lower extremities.  - Pink papules in black tattoo ink on the left upper arm.   - No other lesions of concern on areas examined.     Impression/Plan:    1. Papular dermatitis, upper and lower extremities including in black tattoo ink on the left arm. Ddx papular eczema, versus less likely granulomatous dermatitis / sarcoid. Recommended biopsy for further work-up. Otherwise, will start topical steroids and antihistamines as below.     Punch biopsy as below    Start triam 0.1% ointment BID    Gentle moisturizers only    Start hydroxyzine 10-30 mg PO at bedtime PRN pruritus or insomnia    PROCEDURE NOTE  Punch biopsy:  After discussion of benefits and risks including but not limited to bleeding/bruising, pain/swelling, infection, scar, incomplete removal, nerve damage/numbness, recurrence, and non-diagnostic biopsy, written consent, verbal consent and photographs were obtained. Time-out was performed. The area was cleaned with isopropyl alcohol. 0.5mL of 1% lidocaine with epinephrine was injected to obtain adequate anesthesia of the lesion on the right arm. A 4 mm punch biopsy was performed.  4-0 prolene sutures were utilized to approximate the epidermal edges.  White petroleum jelly/VaselineTM and a bandage was applied to the wound.  Explicit verbal and written wound care instructions were provided.  The patient left the Dermatology Clinic in  good condition. The patient was counseled to follow up for suture removal in approximately 14 days.    Follow-up in 2 weeks for results and suture removal of punch biopsy on right arm.     Staff Involved:  Staff Only    Rivera Lisa MD    Department of Dermatology  [facility]: Phone: [phone], Fax:[phone]  [facility]: Phone: [phone], Fax: [phone]          Again, thank you for allowing me to participate in the care of your patient.        Sincerely,        Rivera Lisa MD

## 2019-11-19 NOTE — PATIENT INSTRUCTIONS

## 2019-11-19 NOTE — PROGRESS NOTES
[facility] Dermatology Note      Dermatology Problem List:  1. Dermatitis, groin area. Suspected irritant versus contact dermatitis. Resolved on 11/19/19.   - triam 0.025% ointment BID  2. Papular dermatitis, upper and lower extremities including in black tattoo ink on the left arm. Ddx papular eczema, versus less likely granulomatous dermatitis / sarcoid.   - s/p punch biopsy 11/19/19  - triam 0.1% ointment BID    CC:   Chief Complaint   Patient presents with     Derm Problem     Bumps all over body, very itchy. Using unscented lotions, body wash and deodorant. Taking zyrtec and found some relief with use. Not using topical medication currently.     Encounter Date: Nov 19, 2019    History of Present Illness:  Mr. Abimael Martinez is a 29 year old male who presents as a follow-up for genital dermatitis. The patient was last seen 10/18/19 when he was prescribed triamcinolone 0.025% ointment for a suspected irritant dermatitis to lubricating products.     Today, he returns for follow-up. He states the rash in his genital areas has resolved, but he reports a new eruption on his thighs, arms, and in particular, in his tattoos. He states this first start about 1-2 weeks ago. Lesions are very itchy. He noticed that the black areas of his tattoo on his left arm have become raised and very itchy. He reports no prior history of eczema or contact allergies. Does have a history of seasonal allergies. He has been taking cetirizine which is mildly helpful. Has not been using any topical steroids. He otherwise denies any new contact exposures - has been using unscented lotions, body wash, etc. Health otherwise stable. No other skin concerns.     Past Medical History:   Patient Active Problem List   Diagnosis     Anxiety     CARDIOVASCULAR SCREENING; LDL GOAL LESS THAN 160     High risk sexual behavior     Tobacco use disorder     Low back pain     Hypertension goal BP (blood pressure) < 140/90     Internal  hemorrhoids which bleed     Obesity, Class I, BMI 30-34.9     Attention deficit hyperactivity disorder (ADHD), combined type     AYALA (generalized anxiety disorder)     OCD (obsessive compulsive disorder)     Drug-induced erectile dysfunction     Mild persistent asthma without complication     Lumbosacral radiculopathy     Dyslipidemia     Elevated serum creatinine     Past Medical History:   Diagnosis Date     Acute right otitis media 1/8/2013     Anxiety      Depression      Drug abuse (H)      Urethritis 5/20/2013     Problem list name updated by automated process. Provider to review     Past Surgical History:   Procedure Laterality Date     BACK SURGERY  04/05/2018       Social History:  Patient reports that he quit smoking about 20 months ago. His smoking use included cigarettes. He has a 3.00 pack-year smoking history. He has never used smokeless tobacco. He reports current alcohol use. He reports current drug use. Drug: Marijuana.    Family History:  Family History   Problem Relation Age of Onset     Unknown/Adopted Mother      Unknown/Adopted Father      Unknown/Adopted Maternal Grandmother      Unknown/Adopted Maternal Grandfather      Unknown/Adopted Paternal Grandmother      Unknown/Adopted Paternal Grandfather      Unknown/Adopted Brother        Medications:  Current Outpatient Medications   Medication Sig Dispense Refill     albuterol (PROAIR HFA/PROVENTIL HFA/VENTOLIN HFA) 108 (90 Base) MCG/ACT inhaler Inhale 2 puffs into the lungs every 4 hours as needed for wheezing 1 Inhaler 0     ALPRAZolam (XANAX) 2 MG tablet Take 1 tablet (2 mg) by mouth 3 times daily as needed for anxiety 30 tablet 0     amphetamine-dextroamphetamine (ADDERALL) 20 MG tablet Take 1 tablet (20 mg) by mouth 3 times daily 90 tablet 0     azelastine (ASTELIN) 0.1 % nasal spray Spray 2 sprays into both nostrils 2 times daily 30 mL 11     cyclobenzaprine (FLEXERIL) 10 MG tablet Take 1 tablet (10 mg) by mouth 3 times daily as needed for  muscle spasms 90 tablet 1     famotidine (PEPCID) 40 MG tablet TAKE 1 TABLET(40 MG) BY MOUTH AT BEDTIME 90 tablet 1     fluticasone (FLONASE) 50 MCG/ACT nasal spray Spray 2 sprays into both nostrils daily 15.8 mL 3     fluticasone (FLOVENT HFA) 220 MCG/ACT Inhaler Inhale 2 puffs into the lungs 2 times daily Please give patient spacer 1 Inhaler 3     Hyoscyamine Sulfate 0.375 MG TBCR Take 1 capful by mouth 3 times daily as needed 90 tablet 1     montelukast (SINGULAIR) 10 MG tablet Take 1 tablet (10 mg) by mouth At Bedtime 90 tablet 0     oxyCODONE-acetaminophen (PERCOCET) 5-325 MG per tablet Take 1 tablet by mouth every 6 hours as needed for pain 12 tablet 0     sertraline (ZOLOFT) 100 MG tablet TAKE 1 TABLET(100 MG) BY MOUTH DAILY 90 tablet 1     sildenafil (REVATIO) 20 MG tablet Take 1-2 tablets (20-40 mg) by mouth daily as needed As needed for prevention of erectile dysfunction 30 tablet 2     triamcinolone (KENALOG) 0.025 % external ointment Apply topically 2 times daily Apply twice a day to sites of irritation 80 g 1     Allergies   Allergen Reactions     Cymbalta      Weight gain and fatigue     Duloxetine Other (See Comments)     Weight gain, fatigue  Enlarged spleen and weight gain     No Clinical Screening - See Comments GI Disturbance     Weight gain and fatigue     Paroxetine Other (See Comments), GI Disturbance and Unknown     Weight gain, fatigue  Weight gain  Spleen issues  Weight gain  Spleen issues     Paxil [Paroxetine Mesylate]      Weight gain and fatigue     Seasonal Allergies      Vicodin [Hydrocodone-Acetaminophen]          Review of Systems:  -Skin Establ Pt: The patient denies any new rash, pruritus, or lesions that are symptomatic, changing or bleeding, except as per HPI.  -Constitutional: Otherwise feeling well today, in usual state of health.  -HEENT: Patient denies nonhealing oral sores.  -Skin: As above in HPI. No additional skin concerns.    Physical exam:  Vitals: There were no vitals  taken for this visit.  GEN: This is a well developed, well-nourished male in no acute distress, in a pleasant mood.    SKIN: Focused examination of the face, neck, upper and lower extremities was performed.  - Pagan skin type: II  - Pink excoriated minimally scaly papules on the bilateral upper and lower extremities.  - Pink papules in black tattoo ink on the left upper arm.   - No other lesions of concern on areas examined.     Impression/Plan:    1. Papular dermatitis, upper and lower extremities including in black tattoo ink on the left arm. Ddx papular eczema, versus less likely granulomatous dermatitis / sarcoid. Recommended biopsy for further work-up. Otherwise, will start topical steroids and antihistamines as below.     Punch biopsy as below    Start triam 0.1% ointment BID    Gentle moisturizers only    Start hydroxyzine 10-30 mg PO at bedtime PRN pruritus or insomnia    PROCEDURE NOTE  Punch biopsy:  After discussion of benefits and risks including but not limited to bleeding/bruising, pain/swelling, infection, scar, incomplete removal, nerve damage/numbness, recurrence, and non-diagnostic biopsy, written consent, verbal consent and photographs were obtained. Time-out was performed. The area was cleaned with isopropyl alcohol. 0.5mL of 1% lidocaine with epinephrine was injected to obtain adequate anesthesia of the lesion on the right arm. A 4 mm punch biopsy was performed.  4-0 prolene sutures were utilized to approximate the epidermal edges.  White petroleum jelly/VaselineTM and a bandage was applied to the wound.  Explicit verbal and written wound care instructions were provided.  The patient left the Dermatology Clinic in good condition. The patient was counseled to follow up for suture removal in approximately 14 days.    Follow-up in 2 weeks for results and suture removal of punch biopsy on right arm.     Staff Involved:  Staff Only    Rivera Lisa MD    Department of  Dermatology  [facility]: Phone: [phone], Fax:[phone]  [facility]: Phone: [phone], Fax: [phone]

## 2019-11-19 NOTE — NURSING NOTE
Abimael Martinez's goals for this visit include:   Chief Complaint   Patient presents with     Derm Problem     Bumps all over body, very itchy. Using unscented lotions, body wash and deodorant. Taking zyrtec and found some relief with use. Not using topical medication currently.       He requests these members of his care team be copied on today's visit information: no    PCP: Jamison Lora    Referring Provider:  No referring provider defined for this encounter.    There were no vitals taken for this visit.    Do you need any medication refills at today's visit? No  Anjana Bear LPN

## 2019-11-22 LAB — COPATH REPORT: NORMAL

## 2019-11-22 NOTE — RESULT ENCOUNTER NOTE
Likely allergic contact dermatitis. See Mychart message. May discuss referral to Dr. Harris for patch testing at follow-up, especially given involvement of his tattoo.     Rivera Lisa MD    Department of Dermatology  [facility]: Phone: [phone], Fax:[phone]  [facility]: Phone: [phone], Fax: [phone]

## 2019-11-26 ENCOUNTER — TELEPHONE (OUTPATIENT)
Dept: FAMILY MEDICINE | Facility: CLINIC | Age: 29
End: 2019-11-26

## 2019-11-26 DIAGNOSIS — F90.2 ATTENTION DEFICIT HYPERACTIVITY DISORDER (ADHD), COMBINED TYPE: ICD-10-CM

## 2019-11-26 RX ORDER — DEXTROAMPHETAMINE SACCHARATE, AMPHETAMINE ASPARTATE, DEXTROAMPHETAMINE SULFATE AND AMPHETAMINE SULFATE 5; 5; 5; 5 MG/1; MG/1; MG/1; MG/1
20 TABLET ORAL 3 TIMES DAILY
Qty: 90 TABLET | Refills: 0 | Status: SHIPPED | OUTPATIENT
Start: 2019-11-26 | End: 2019-12-26

## 2019-11-26 NOTE — TELEPHONE ENCOUNTER
Controlled Substance Refill Request for adderall  Problem List Complete:  Yes     Patient is followed by MICHAEL OROPEZA for ongoing prescription of stimulants.  All refills should be approved by this provider, or covering partner.     Medication(s): adderall 20 mg.   Maximum quantity per month: 90  Clinic visit frequency required: Q 6  months      Controlled substance agreement on file: 07/31/17     Neuropsych evaluation for ADD completed:  Yes, completed 7-2008 at [facility], on file and diagnosis confirmed     Last [facility] website verification:  10/25/19 no concerns     Heather RIDERN, RN

## 2019-11-26 NOTE — TELEPHONE ENCOUNTER
Reason for Call:  Medication or medication refill:    Do you use a [facility]?  Name of the pharmacy and phone number for the current request: [facility] - [place], [place] - [phone]    Name of the medication requested: Adderall    Other request: Would like 3 months worth    Can we leave a detailed message on this number? YES    Phone number patient can be reached at: Home number on file [phone] (home)    Best Time:     Call taken on 11/26/2019 at 2:45 PM by Mary Jane Oneal

## 2019-11-29 ENCOUNTER — OFFICE VISIT (OUTPATIENT)
Dept: NEUROLOGY | Facility: CLINIC | Age: 29
End: 2019-11-29
Payer: COMMERCIAL

## 2019-11-29 VITALS
BODY MASS INDEX: 35.65 KG/M2 | HEIGHT: 72 IN | DIASTOLIC BLOOD PRESSURE: 91 MMHG | SYSTOLIC BLOOD PRESSURE: 155 MMHG | OXYGEN SATURATION: 96 % | HEART RATE: 70 BPM | RESPIRATION RATE: 18 BRPM | WEIGHT: 263.2 LBS

## 2019-11-29 DIAGNOSIS — M54.42 ACUTE LEFT-SIDED LOW BACK PAIN WITH LEFT-SIDED SCIATICA: Primary | ICD-10-CM

## 2019-11-29 PROCEDURE — 99213 OFFICE O/P EST LOW 20 MIN: CPT | Performed by: NEUROLOGICAL SURGERY

## 2019-11-29 ASSESSMENT — MIFFLIN-ST. JEOR: SCORE: 2201.87

## 2019-11-29 ASSESSMENT — PAIN SCALES - GENERAL: PAINLEVEL: SEVERE PAIN (6)

## 2019-11-29 NOTE — LETTER
11/29/2019         RE: Abimael Martinez  [address]  [place] [address]        Dear Colleague,    Thank you for referring your patient, Abimael Martinez, to the [facility]. Please see a copy of my visit note below.    Mr. Martinez is a 29-year-old man seen at his request for second opinion regarding chronic back pain with intermittent symptoms into his left buttock and left leg as well as bilateral testicular pain and significant concern regarding prior lumbar surgery and recent lumbar imaging findings.  Prior to this office visit he submitted extensive medical records for review including records from [facility] orthopedics, [facility], Dr. Johnson's office regarding prior evaluation and surgery as well as continuing management at the [facility] spine center in [place].  I have reviewed these records although an operative report from lumbar discectomy and lateral recess decompression performed by Dr. Johnson in May 2018 was not available for direct review.  Briefly, Mr. Martinez has a long history of low back pain associated with bilateral lower extremity complaints with left side predominant over the past several years.  He is undergone multiple lumbar MRI scans which showed evidence of 2 level degenerative lumbar disc changes at L4-5 and L5-S1.  At the L4-5 level he had a focal disc bulge eccentric toward the left side and producing contact with the transiting left L5 nerve root as well as lateral recess stenosis.  He is also been noted to have degenerative disc changes L5-S1 with an annular tear eccentric toward the left side as well.  Because of failure of a prolonged course of conservative management to alleviate the symptoms, Mr. Martinez underwent a left L4-5 hemilaminectomy and microdiscectomy by Dr. Johnson which was reportedly uncomplicated.  Mr. Martinez was disappointed with the result of the surgery, underwent a postoperative lumbar MRI scan which showed effective  decompression at the left L4-5 level and was referred to a pain management group.  He reports that he was subsequently told that surgery which may have initially been planned for left L4-5 and left L5-S1 should have included a left L5-S1 discectomy.  Reportedly, surgical plan was changed on the morning of surgery as Dr. Johnson did not feel that surgical decompression or discectomy at left L5-S1 was warranted.    Mr. Martinez has undergone a prolonged course of conservative management postoperatively including repeated lumbar epidural steroid and, I believe, SI joint injections.  He reports minimal to no lasting improvement with these interventions.  Interestingly, he states that at the time of his follow-up MRI scan in May 2019 that he was having minimal back and left leg symptoms at that time.    On examination today in the office, he changes position from seated to standing without particular difficulty or marked pain behavior.  He has a well-healed midline surgical scar approximately 4 cm in length centered at the lumbosacral junction.  There is no evidence of underlying inflammation or fluid and no other evidence of perioperative complication.  Gait is normal without antalgic component.  Motor examination of his bilateral lower extremities shows 5/5 strength throughout including plantar and dorsiflexion at the ankles while weightbearing.  He is capable of tandem gait and stance.  Straight leg raising sign is negative for radiculopathy but does reproduce an element of mechanical back pain.  There is no evidence of dermatomal sensory disturbance.  Deep tendon reflexes are 1+ and equal at the knees and ankles bilaterally.  There are no long tract findings noted.  There is no evidence of bursitis or pain over the greater trochanteric region.  He localizes majority of his pain complaint today to the left paraspinal region consistent with probable soft tissue or muscle pain.    I have also reviewed his lumbar MRI scan  obtained in May 2019.  Some of the results are reported above.  There is no compelling evidence of focal neurologic impingement at either L4-5 L5-S1.  The small annular abnormality eccentric toward the left side contacts the transiting left S1 nerve root but does not appear to offer significant compression and no real element of lateral recess or spinal stenosis.  Spinal alignment is satisfactory.  There is no evidence of significant abnormality involving the vertebral bodies.  There is slight loss of lumbar lordosis noted on sagittal imaging.  There is no evidence of focal neurologic impingement at any level.    Assessment: Chronic low back pain with left lower extremity referred symptoms as well as bilateral testicular pain refractory to a prolonged course of both surgical and nonsurgical management directed at the lumbar spine.  I reassured the patient that I did not believe that the abnormality of the L5-S1 level was a likely source for his continuing, functionally limiting symptoms.  I told him that I would not recommend surgery at either the L4-5 or the L5-S1 level based upon clinical and radiographic findings.  I believe that long-term management of this problem will be nonsurgical and noninterventional.  I understand the patient's frustration with these continued symptoms and the lack of progress following surgery at the L4-5 level.  I believe however that additional surgery including lumbar decompression or placement of lumbar arthroplasties at 2 levels will not likely result in significant symptomatic relief or functional improvement based upon my review of this case.  Mr. Martinez appeared to understand the discussion and will follow up with [facility] as needed.    Again, thank you for allowing me to participate in the care of your patient.        Sincerely,        Troy Wharton MD

## 2019-11-29 NOTE — PROGRESS NOTES
Mr. Martinez is a 29-year-old man seen at his request for second opinion regarding chronic back pain with intermittent symptoms into his left buttock and left leg as well as bilateral testicular pain and significant concern regarding prior lumbar surgery and recent lumbar imaging findings.  Prior to this office visit he submitted extensive medical records for review including records from [facility] orthopedics, [facility], Dr. Johnson's office regarding prior evaluation and surgery as well as continuing management at the [facility] spine center in [place].  I have reviewed these records although an operative report from lumbar discectomy and lateral recess decompression performed by Dr. Johnson in May 2018 was not available for direct review.  Briefly, Mr. Martinez has a long history of low back pain associated with bilateral lower extremity complaints with left side predominant over the past several years.  He is undergone multiple lumbar MRI scans which showed evidence of 2 level degenerative lumbar disc changes at L4-5 and L5-S1.  At the L4-5 level he had a focal disc bulge eccentric toward the left side and producing contact with the transiting left L5 nerve root as well as lateral recess stenosis.  He is also been noted to have degenerative disc changes L5-S1 with an annular tear eccentric toward the left side as well.  Because of failure of a prolonged course of conservative management to alleviate the symptoms, Mr. Martinez underwent a left L4-5 hemilaminectomy and microdiscectomy by Dr. Johnson which was reportedly uncomplicated.  Mr. Martinez was disappointed with the result of the surgery, underwent a postoperative lumbar MRI scan which showed effective decompression at the left L4-5 level and was referred to a pain management group.  He reports that he was subsequently told that surgery which may have initially been planned for left L4-5 and left L5-S1 should have included a left L5-S1 discectomy.  Reportedly, surgical  plan was changed on the morning of surgery as Dr. Johnson did not feel that surgical decompression or discectomy at left L5-S1 was warranted.    Mr. Martinez has undergone a prolonged course of conservative management postoperatively including repeated lumbar epidural steroid and, I believe, SI joint injections.  He reports minimal to no lasting improvement with these interventions.  Interestingly, he states that at the time of his follow-up MRI scan in May 2019 that he was having minimal back and left leg symptoms at that time.    On examination today in the office, he changes position from seated to standing without particular difficulty or marked pain behavior.  He has a well-healed midline surgical scar approximately 4 cm in length centered at the lumbosacral junction.  There is no evidence of underlying inflammation or fluid and no other evidence of perioperative complication.  Gait is normal without antalgic component.  Motor examination of his bilateral lower extremities shows 5/5 strength throughout including plantar and dorsiflexion at the ankles while weightbearing.  He is capable of tandem gait and stance.  Straight leg raising sign is negative for radiculopathy but does reproduce an element of mechanical back pain.  There is no evidence of dermatomal sensory disturbance.  Deep tendon reflexes are 1+ and equal at the knees and ankles bilaterally.  There are no long tract findings noted.  There is no evidence of bursitis or pain over the greater trochanteric region.  He localizes majority of his pain complaint today to the left paraspinal region consistent with probable soft tissue or muscle pain.    I have also reviewed his lumbar MRI scan obtained in May 2019.  Some of the results are reported above.  There is no compelling evidence of focal neurologic impingement at either L4-5 L5-S1.  The small annular abnormality eccentric toward the left side contacts the transiting left S1 nerve root but does not  appear to offer significant compression and no real element of lateral recess or spinal stenosis.  Spinal alignment is satisfactory.  There is no evidence of significant abnormality involving the vertebral bodies.  There is slight loss of lumbar lordosis noted on sagittal imaging.  There is no evidence of focal neurologic impingement at any level.    Assessment: Chronic low back pain with left lower extremity referred symptoms as well as bilateral testicular pain refractory to a prolonged course of both surgical and nonsurgical management directed at the lumbar spine.  I reassured the patient that I did not believe that the abnormality of the L5-S1 level was a likely source for his continuing, functionally limiting symptoms.  I told him that I would not recommend surgery at either the L4-5 or the L5-S1 level based upon clinical and radiographic findings.  I believe that long-term management of this problem will be nonsurgical and noninterventional.  I understand the patient's frustration with these continued symptoms and the lack of progress following surgery at the L4-5 level.  I believe however that additional surgery including lumbar decompression or placement of lumbar arthroplasties at 2 levels will not likely result in significant symptomatic relief or functional improvement based upon my review of this case.  Mr. Martinez appeared to understand the discussion and will follow up with [facility] as needed.

## 2019-11-29 NOTE — NURSING NOTE
Abimael Martinez's goals for this visit include: Consult  He requests these members of his care team be copied on today's visit information: PCP    PCP: Jamison Lora    Referring Provider:  No referring provider defined for this encounter.    BP (!) 155/91   Pulse 70   Resp 18   Ht 1.829 m (6')   Wt 119.4 kg (263 lb 3.2 oz)   SpO2 96%   BMI 35.70 kg/m      Do you need any medication refills at today's visit? N

## 2019-12-02 ENCOUNTER — OFFICE VISIT (OUTPATIENT)
Dept: DERMATOLOGY | Facility: CLINIC | Age: 29
End: 2019-12-02
Payer: COMMERCIAL

## 2019-12-02 ENCOUNTER — TELEPHONE (OUTPATIENT)
Dept: ALLERGY | Facility: CLINIC | Age: 29
End: 2019-12-02

## 2019-12-02 DIAGNOSIS — L23.9 ALLERGIC CONTACT DERMATITIS, UNSPECIFIED TRIGGER: Primary | ICD-10-CM

## 2019-12-02 PROCEDURE — 99213 OFFICE O/P EST LOW 20 MIN: CPT | Performed by: DERMATOLOGY

## 2019-12-02 ASSESSMENT — PAIN SCALES - GENERAL: PAINLEVEL: NO PAIN (0)

## 2019-12-02 NOTE — NURSING NOTE
@Abimael Martinez's goals for this visit include:   Chief Complaint   Patient presents with     Derm Problem     discuss results       He requests these members of his care team be copied on today's visit information: NO    PCP: Jamison Lora    Referring Provider:  No referring provider defined for this encounter.    There were no vitals taken for this visit.    Do you need any medication refills at today's visit? NO    Barbara Weinstein CMA

## 2019-12-02 NOTE — LETTER
12/2/2019         RE: Abimael Martinez  [address]  [facility] [address]        Dear Colleague,    Thank you for referring your patient, Abimael Martinez, to the [facility]. Please see a copy of my visit note below.    [facility] Dermatology Note      Dermatology Problem List:  1. Dermatitis, groin area. Suspected irritant versus contact dermatitis. Resolved on 11/19/19.   - triam 0.025% ointment BID  2. Allergic contact dermatitis  - s/p punch biopsy 11/19/19  - referral for patch testing   - triam 0.1% ointment BID    CC:   No chief complaint on file.    Encounter Date: Dec 2, 2019    History of Present Illness:  Mr. Abimael Martinez is a 29 year old male who presents as a follow-up for dermatitis. The patient was last seen on 11/19/19 when a punch biopsy was taken from the right arm. Pathology results were most consistent with allergic contact dermatitis.     Today he reports that his rash has improved. He attributes this to medication given after an anaphylactic episode following allergy shots. He was given two epi pens and steroid pills. He thinks these helped calm his dermatitis. The raised area on his left arm tattoo has flattened. The patient is otherwise feeling well. There are no other skin concerns at this time.      Past Medical History:   Patient Active Problem List   Diagnosis     Anxiety     CARDIOVASCULAR SCREENING; LDL GOAL LESS THAN 160     High risk sexual behavior     Tobacco use disorder     Low back pain     Hypertension goal BP (blood pressure) < 140/90     Internal hemorrhoids which bleed     Obesity, Class I, BMI 30-34.9     Attention deficit hyperactivity disorder (ADHD), combined type     AYALA (generalized anxiety disorder)     OCD (obsessive compulsive disorder)     Drug-induced erectile dysfunction     Mild persistent asthma without complication     Lumbosacral radiculopathy     Dyslipidemia     Elevated serum creatinine      Past Medical History:   Diagnosis Date     Acute right otitis media 1/8/2013     Anxiety      Depression      Drug abuse (H)      Urethritis 5/20/2013     Problem list name updated by automated process. Provider to review     Past Surgical History:   Procedure Laterality Date     BACK SURGERY  04/05/2018       Social History:  Patient reports that he quit smoking about 20 months ago. His smoking use included cigarettes. He has a 3.00 pack-year smoking history. He has never used smokeless tobacco. He reports current alcohol use. He reports current drug use. Drug: Marijuana.    Family History:  Family History   Problem Relation Age of Onset     Unknown/Adopted Mother      Unknown/Adopted Father      Unknown/Adopted Maternal Grandmother      Unknown/Adopted Maternal Grandfather      Unknown/Adopted Paternal Grandmother      Unknown/Adopted Paternal Grandfather      Unknown/Adopted Brother        Medications:  Current Outpatient Medications   Medication Sig Dispense Refill     albuterol (PROAIR HFA/PROVENTIL HFA/VENTOLIN HFA) 108 (90 Base) MCG/ACT inhaler Inhale 2 puffs into the lungs every 4 hours as needed for wheezing 1 Inhaler 0     ALPRAZolam (XANAX) 2 MG tablet Take 1 tablet (2 mg) by mouth 3 times daily as needed for anxiety 30 tablet 0     amphetamine-dextroamphetamine (ADDERALL) 20 MG tablet Take 1 tablet (20 mg) by mouth 3 times daily 90 tablet 0     azelastine (ASTELIN) 0.1 % nasal spray Spray 2 sprays into both nostrils 2 times daily 30 mL 11     cyclobenzaprine (FLEXERIL) 10 MG tablet Take 1 tablet (10 mg) by mouth 3 times daily as needed for muscle spasms 90 tablet 1     famotidine (PEPCID) 40 MG tablet TAKE 1 TABLET(40 MG) BY MOUTH AT BEDTIME 90 tablet 1     fluticasone (FLONASE) 50 MCG/ACT nasal spray Spray 2 sprays into both nostrils daily 15.8 mL 3     fluticasone (FLOVENT HFA) 220 MCG/ACT Inhaler Inhale 2 puffs into the lungs 2 times daily Please give patient spacer 1 Inhaler 3     hydrOXYzine  (ATARAX) 10 MG tablet Take 10-30 mg (1-3 tablets) at nighttime before bed as needed for itch or insomnia. 90 tablet 1     Hyoscyamine Sulfate 0.375 MG TBCR Take 1 capful by mouth 3 times daily as needed 90 tablet 1     montelukast (SINGULAIR) 10 MG tablet Take 1 tablet (10 mg) by mouth At Bedtime 90 tablet 0     oxyCODONE-acetaminophen (PERCOCET) 5-325 MG per tablet Take 1 tablet by mouth every 6 hours as needed for pain 12 tablet 0     sertraline (ZOLOFT) 100 MG tablet TAKE 1 TABLET(100 MG) BY MOUTH DAILY 90 tablet 1     sildenafil (REVATIO) 20 MG tablet Take 1-2 tablets (20-40 mg) by mouth daily as needed As needed for prevention of erectile dysfunction 30 tablet 2     triamcinolone (KENALOG) 0.025 % external ointment Apply topically 2 times daily Apply twice a day to sites of irritation 80 g 1     triamcinolone (KENALOG) 0.1 % external ointment Apply twice daily as needed for rash on arms or legs. 454 g 1     Allergies   Allergen Reactions     Cymbalta      Weight gain and fatigue     Duloxetine Other (See Comments)     Weight gain, fatigue  Enlarged spleen and weight gain     No Clinical Screening - See Comments GI Disturbance     Weight gain and fatigue     Paroxetine Other (See Comments), GI Disturbance and Unknown     Weight gain, fatigue  Weight gain  Spleen issues  Weight gain  Spleen issues     Paxil [Paroxetine Mesylate]      Weight gain and fatigue     Seasonal Allergies      Vicodin [Hydrocodone-Acetaminophen]          Review of Systems:  -Skin Establ Pt: The patient denies any new rash, pruritus, or lesions that are symptomatic, changing or bleeding, except as per HPI.  -Constitutional: Otherwise feeling well today, in usual state of health.  -HEENT: Patient denies nonhealing oral sores.  -Skin: As above in HPI. No additional skin concerns.    Physical exam:  Vitals: There were no vitals taken for this visit.  GEN: This is a well developed, well-nourished male in no acute distress, in a pleasant mood.     SKIN: Focused examination of the face, neck, upper and lower extremities was performed.  - Pagan skin type: II  - Well healing biopsy site on the right inner arm. There is a peripheral rim of pink erythema and scale in band-aid distribution.   - Left upper extremity with very minimally raised papules along black tattoo ink, improved from prior.  - No other lesions of concern on areas examined.     Impression/Plan:    1. Allergic contact dermatitis, bilateral extremities with involvement of black tattoo ink on the left upper extremity. S/p punch biopsy 11/19/19 with spongiotic dermatitis with few Langerhan's cell collections c/w allergic contact dermatitis. Recommended referral to Dr. Harris for patch testing. Can use topical steroids PRN if rash were to return.       Sutures on the R arm removed in clinic today    Referral to Dr. Harris for patch testing. Scheduling pool at [facility] contacted to call patient to schedule.     Continue triam 0.1% ointment BID    Gentle moisturizers only    Continue hydroxyzine 10-30 mg PO at bedtime PRN pruritus or insomnia    Follow-up PRN after patch testing.     Staff Involved:    Scribe Disclosure  I, Pedro Langston, am serving as a scribe to document services personally performed by Dr. Rivera Lisa, based on data collection and the provider's statements to me.     Rivera Lisa MD    Department of Dermatology  [facility]: Phone: [phone], Fax:[phone]  [facility]: Phone: [phone], Fax: [phone]          Again, thank you for allowing me to participate in the care of your patient.        Sincerely,        Rivera Lisa MD

## 2019-12-02 NOTE — PROGRESS NOTES
[facility] Dermatology Note      Dermatology Problem List:  1. Dermatitis, groin area. Suspected irritant versus contact dermatitis. Resolved on 11/19/19.   - triam 0.025% ointment BID  2. Allergic contact dermatitis  - s/p punch biopsy 11/19/19  - referral for patch testing   - triam 0.1% ointment BID    CC:   No chief complaint on file.    Encounter Date: Dec 2, 2019    History of Present Illness:  Mr. Abimael Martinez is a 29 year old male who presents as a follow-up for dermatitis. The patient was last seen on 11/19/19 when a punch biopsy was taken from the right arm. Pathology results were most consistent with allergic contact dermatitis.     Today he reports that his rash has improved. He attributes this to medication given after an anaphylactic episode following allergy shots. He was given two epi pens and steroid pills. He thinks these helped calm his dermatitis. The raised area on his left arm tattoo has flattened. The patient is otherwise feeling well. There are no other skin concerns at this time.      Past Medical History:   Patient Active Problem List   Diagnosis     Anxiety     CARDIOVASCULAR SCREENING; LDL GOAL LESS THAN 160     High risk sexual behavior     Tobacco use disorder     Low back pain     Hypertension goal BP (blood pressure) < 140/90     Internal hemorrhoids which bleed     Obesity, Class I, BMI 30-34.9     Attention deficit hyperactivity disorder (ADHD), combined type     AYALA (generalized anxiety disorder)     OCD (obsessive compulsive disorder)     Drug-induced erectile dysfunction     Mild persistent asthma without complication     Lumbosacral radiculopathy     Dyslipidemia     Elevated serum creatinine     Past Medical History:   Diagnosis Date     Acute right otitis media 1/8/2013     Anxiety      Depression      Drug abuse (H)      Urethritis 5/20/2013     Problem list name updated by automated process. Provider to review     Past Surgical History:   Procedure  Laterality Date     BACK SURGERY  04/05/2018       Social History:  Patient reports that he quit smoking about 20 months ago. His smoking use included cigarettes. He has a 3.00 pack-year smoking history. He has never used smokeless tobacco. He reports current alcohol use. He reports current drug use. Drug: Marijuana.    Family History:  Family History   Problem Relation Age of Onset     Unknown/Adopted Mother      Unknown/Adopted Father      Unknown/Adopted Maternal Grandmother      Unknown/Adopted Maternal Grandfather      Unknown/Adopted Paternal Grandmother      Unknown/Adopted Paternal Grandfather      Unknown/Adopted Brother        Medications:  Current Outpatient Medications   Medication Sig Dispense Refill     albuterol (PROAIR HFA/PROVENTIL HFA/VENTOLIN HFA) 108 (90 Base) MCG/ACT inhaler Inhale 2 puffs into the lungs every 4 hours as needed for wheezing 1 Inhaler 0     ALPRAZolam (XANAX) 2 MG tablet Take 1 tablet (2 mg) by mouth 3 times daily as needed for anxiety 30 tablet 0     amphetamine-dextroamphetamine (ADDERALL) 20 MG tablet Take 1 tablet (20 mg) by mouth 3 times daily 90 tablet 0     azelastine (ASTELIN) 0.1 % nasal spray Spray 2 sprays into both nostrils 2 times daily 30 mL 11     cyclobenzaprine (FLEXERIL) 10 MG tablet Take 1 tablet (10 mg) by mouth 3 times daily as needed for muscle spasms 90 tablet 1     famotidine (PEPCID) 40 MG tablet TAKE 1 TABLET(40 MG) BY MOUTH AT BEDTIME 90 tablet 1     fluticasone (FLONASE) 50 MCG/ACT nasal spray Spray 2 sprays into both nostrils daily 15.8 mL 3     fluticasone (FLOVENT HFA) 220 MCG/ACT Inhaler Inhale 2 puffs into the lungs 2 times daily Please give patient spacer 1 Inhaler 3     hydrOXYzine (ATARAX) 10 MG tablet Take 10-30 mg (1-3 tablets) at nighttime before bed as needed for itch or insomnia. 90 tablet 1     Hyoscyamine Sulfate 0.375 MG TBCR Take 1 capful by mouth 3 times daily as needed 90 tablet 1     montelukast (SINGULAIR) 10 MG tablet Take 1  tablet (10 mg) by mouth At Bedtime 90 tablet 0     oxyCODONE-acetaminophen (PERCOCET) 5-325 MG per tablet Take 1 tablet by mouth every 6 hours as needed for pain 12 tablet 0     sertraline (ZOLOFT) 100 MG tablet TAKE 1 TABLET(100 MG) BY MOUTH DAILY 90 tablet 1     sildenafil (REVATIO) 20 MG tablet Take 1-2 tablets (20-40 mg) by mouth daily as needed As needed for prevention of erectile dysfunction 30 tablet 2     triamcinolone (KENALOG) 0.025 % external ointment Apply topically 2 times daily Apply twice a day to sites of irritation 80 g 1     triamcinolone (KENALOG) 0.1 % external ointment Apply twice daily as needed for rash on arms or legs. 454 g 1     Allergies   Allergen Reactions     Cymbalta      Weight gain and fatigue     Duloxetine Other (See Comments)     Weight gain, fatigue  Enlarged spleen and weight gain     No Clinical Screening - See Comments GI Disturbance     Weight gain and fatigue     Paroxetine Other (See Comments), GI Disturbance and Unknown     Weight gain, fatigue  Weight gain  Spleen issues  Weight gain  Spleen issues     Paxil [Paroxetine Mesylate]      Weight gain and fatigue     Seasonal Allergies      Vicodin [Hydrocodone-Acetaminophen]          Review of Systems:  -Skin Establ Pt: The patient denies any new rash, pruritus, or lesions that are symptomatic, changing or bleeding, except as per HPI.  -Constitutional: Otherwise feeling well today, in usual state of health.  -HEENT: Patient denies nonhealing oral sores.  -Skin: As above in HPI. No additional skin concerns.    Physical exam:  Vitals: There were no vitals taken for this visit.  GEN: This is a well developed, well-nourished male in no acute distress, in a pleasant mood.    SKIN: Focused examination of the face, neck, upper and lower extremities was performed.  - Pagan skin type: II  - Well healing biopsy site on the right inner arm. There is a peripheral rim of pink erythema and scale in band-aid distribution.   - Left  upper extremity with very minimally raised papules along black tattoo ink, improved from prior.  - No other lesions of concern on areas examined.     Impression/Plan:    1. Allergic contact dermatitis, bilateral extremities with involvement of black tattoo ink on the left upper extremity. S/p punch biopsy 11/19/19 with spongiotic dermatitis with few Langerhan's cell collections c/w allergic contact dermatitis. Recommended referral to Dr. Harris for patch testing. Can use topical steroids PRN if rash were to return.       Sutures on the R arm removed in clinic today    Referral to Dr. Harris for patch testing. Scheduling pool at [facility] contacted to call patient to schedule.     Continue triam 0.1% ointment BID    Gentle moisturizers only    Continue hydroxyzine 10-30 mg PO at bedtime PRN pruritus or insomnia    Follow-up PRN after patch testing.     Staff Involved:    Scribe Disclosure  I, Pedro Langston, am serving as a scribe to document services personally performed by Dr. Rivera Lisa, based on data collection and the provider's statements to me.     Rivera Lisa MD    Department of Dermatology  [facility]: Phone: [phone], Fax:[phone]  [facility]: Phone: [phone], Fax: [phone]

## 2019-12-02 NOTE — TELEPHONE ENCOUNTER
Patient needs an appointment with  per referral placed by . M for patien to call back to schedule.

## 2019-12-05 NOTE — TELEPHONE ENCOUNTER
FUTURE VISIT INFORMATION      FUTURE VISIT INFORMATION:    Date: 12.17.19    Time: 10:00    Location:  Allergy  REFERRAL INFORMATION:    Referring provider:  Dr. Rivera Lisa    Referring providers clinic:  [facility]    Reason for visit/diagnosis  consult patch test    RECORDS REQUESTED FROM:       Clinic name Comments Records Status Photos Status   [facility] 12.2.19, 11.19.19 Dr. Lisa  10.18.19 Dr. Cedillo [facility]   [facility]    [facility] 8.27.19 Sadia Vann [facility]          [facility] 8.27.19 [facility]

## 2019-12-09 ENCOUNTER — TELEPHONE (OUTPATIENT)
Dept: FAMILY MEDICINE | Facility: CLINIC | Age: 29
End: 2019-12-09

## 2019-12-09 DIAGNOSIS — F41.9 ANXIETY: ICD-10-CM

## 2019-12-09 NOTE — TELEPHONE ENCOUNTER
Please call the patient and make an appointment(s) and then you can refill the medication one time or send back to me to refill(s)   Jamison Lora

## 2019-12-09 NOTE — TELEPHONE ENCOUNTER
Reason for Call:  Other prescription    Detailed comments: would like refill of rx: zanax for anxiety.  [facility] [place] [facility] [email or web address]  Caller informed that calls received after 3pm may not be returned same day.      Phone Number Patient can be reached at: Home number on file [phone] (home)    Best Time: any    Can we leave a detailed message on this number? YES    Call taken on 12/9/2019 at 4:45 PM by Sarah Bailey

## 2019-12-09 NOTE — TELEPHONE ENCOUNTER
Controlled Substance Refill Request for alprazolam  Problem List Complete:  Yes    Last refill 11/10/19  Last office visit 8/14/18     Overview Signed 4/6/2016  8:02 AM by Michael Lora MD   Patient is followed by MICHAEL LORA for ongoing prescription of benzodiazepines.  All refills should be approved by this provider, or covering partner.     Medication(s): alprazolam 2 mg .   Maximum quantity per month: 36  Clinic visit frequency required: Q 6  months   Last OV Dr. Michael Lora: 8/14/18     Controlled substance agreement on file: No  Benzodiazepine use reviewed by psychiatry:  No     Last [facility] website verification:        checked in past 3 months?  Yes no concerns on 10/9/19  Jasmin Lauren RN

## 2019-12-10 ENCOUNTER — TRANSFERRED RECORDS (OUTPATIENT)
Dept: HEALTH INFORMATION MANAGEMENT | Facility: CLINIC | Age: 29
End: 2019-12-10

## 2019-12-11 RX ORDER — ALPRAZOLAM 2 MG
2 TABLET ORAL 3 TIMES DAILY PRN
Qty: 30 TABLET | Refills: 0 | Status: SHIPPED | OUTPATIENT
Start: 2019-12-11 | End: 2020-01-09

## 2019-12-17 ENCOUNTER — PRE VISIT (OUTPATIENT)
Dept: ALLERGY | Facility: CLINIC | Age: 29
End: 2019-12-17

## 2019-12-18 ENCOUNTER — OFFICE VISIT (OUTPATIENT)
Dept: FAMILY MEDICINE | Facility: CLINIC | Age: 29
End: 2019-12-18
Payer: COMMERCIAL

## 2019-12-18 VITALS
OXYGEN SATURATION: 96 % | DIASTOLIC BLOOD PRESSURE: 94 MMHG | WEIGHT: 261 LBS | RESPIRATION RATE: 16 BRPM | HEIGHT: 72 IN | BODY MASS INDEX: 35.35 KG/M2 | HEART RATE: 78 BPM | SYSTOLIC BLOOD PRESSURE: 142 MMHG | TEMPERATURE: 97.8 F

## 2019-12-18 DIAGNOSIS — F41.9 ANXIETY: ICD-10-CM

## 2019-12-18 DIAGNOSIS — H92.02 OTALGIA, LEFT EAR: Primary | ICD-10-CM

## 2019-12-18 DIAGNOSIS — I10 HYPERTENSION GOAL BP (BLOOD PRESSURE) < 140/90: ICD-10-CM

## 2019-12-18 DIAGNOSIS — H65.192 ACUTE EFFUSION OF LEFT EAR: ICD-10-CM

## 2019-12-18 PROCEDURE — 99214 OFFICE O/P EST MOD 30 MIN: CPT | Performed by: NURSE PRACTITIONER

## 2019-12-18 RX ORDER — FLUTICASONE PROPIONATE 50 MCG
2 SPRAY, SUSPENSION (ML) NASAL DAILY
Qty: 16 ML | Refills: 1 | Status: SHIPPED | OUTPATIENT
Start: 2019-12-18 | End: 2020-03-26

## 2019-12-18 ASSESSMENT — ANXIETY QUESTIONNAIRES
3. WORRYING TOO MUCH ABOUT DIFFERENT THINGS: NEARLY EVERY DAY
2. NOT BEING ABLE TO STOP OR CONTROL WORRYING: NEARLY EVERY DAY
5. BEING SO RESTLESS THAT IT IS HARD TO SIT STILL: NEARLY EVERY DAY
GAD7 TOTAL SCORE: 21
6. BECOMING EASILY ANNOYED OR IRRITABLE: NEARLY EVERY DAY
1. FEELING NERVOUS, ANXIOUS, OR ON EDGE: NEARLY EVERY DAY
7. FEELING AFRAID AS IF SOMETHING AWFUL MIGHT HAPPEN: NEARLY EVERY DAY
IF YOU CHECKED OFF ANY PROBLEMS ON THIS QUESTIONNAIRE, HOW DIFFICULT HAVE THESE PROBLEMS MADE IT FOR YOU TO DO YOUR WORK, TAKE CARE OF THINGS AT HOME, OR GET ALONG WITH OTHER PEOPLE: VERY DIFFICULT

## 2019-12-18 ASSESSMENT — PATIENT HEALTH QUESTIONNAIRE - PHQ9: 5. POOR APPETITE OR OVEREATING: NEARLY EVERY DAY

## 2019-12-18 ASSESSMENT — PAIN SCALES - GENERAL: PAINLEVEL: SEVERE PAIN (7)

## 2019-12-18 ASSESSMENT — MIFFLIN-ST. JEOR: SCORE: 2191.89

## 2019-12-18 NOTE — PROGRESS NOTES
Subjective     Abimael Martinez is a 29 year old male who presents to clinic today for the following health issues:    HPI   RESPIRATORY SYMPTOMS      Duration: 4-5 days    Description  nasal congestion, rhinorrhea both ear pain    Severity: moderate    Accompanying signs and symptoms: pain in both ears, worse in left ear    History (predisposing factors):  none    Precipitating or alleviating factors: None    Therapies tried and outcome:  none    Had ear infections a lot over the summer - they weren't sure if from allergies or fro clenching. Now has pain and decreased hearing in left. Feels like it's plugged.    BP high off and on. Feels like it's related to his anxiety. Feels like anxiety is worsening. Stopped sertraline few months ago because he felt like it dulled him too much. Wants to restart it. Has follow up with primary care provider in 1 month. No thoughts to harm self or others.     Patient Active Problem List   Diagnosis     Anxiety     CARDIOVASCULAR SCREENING; LDL GOAL LESS THAN 160     High risk sexual behavior     Tobacco use disorder     Low back pain     Hypertension goal BP (blood pressure) < 140/90     Internal hemorrhoids which bleed     Obesity, Class I, BMI 30-34.9     Attention deficit hyperactivity disorder (ADHD), combined type     AYALA (generalized anxiety disorder)     OCD (obsessive compulsive disorder)     Drug-induced erectile dysfunction     Mild persistent asthma without complication     Lumbosacral radiculopathy     Dyslipidemia     Elevated serum creatinine     Past Surgical History:   Procedure Laterality Date     BACK SURGERY  2018       Social History     Tobacco Use     Smoking status: Former Smoker     Packs/day: 0.50     Years: 6.00     Pack years: 3.00     Types: Cigarettes     Last attempt to quit: 3/12/2018     Years since quittin.7     Smokeless tobacco: Never Used     Tobacco comment: second hand smoke exposure   Substance Use Topics     Alcohol use: Yes      Comment: once a week     Family History   Problem Relation Age of Onset     Unknown/Adopted Mother      Unknown/Adopted Father      Unknown/Adopted Maternal Grandmother      Unknown/Adopted Maternal Grandfather      Unknown/Adopted Paternal Grandmother      Unknown/Adopted Paternal Grandfather      Unknown/Adopted Brother          Current Outpatient Medications   Medication Sig Dispense Refill     albuterol (PROAIR HFA/PROVENTIL HFA/VENTOLIN HFA) 108 (90 Base) MCG/ACT inhaler Inhale 2 puffs into the lungs every 4 hours as needed for wheezing 1 Inhaler 0     ALPRAZolam (XANAX) 2 MG tablet Take 1 tablet (2 mg) by mouth 3 times daily as needed for anxiety 30 tablet 0     amoxicillin-clavulanate (AUGMENTIN) 875-125 MG tablet Take 1 tablet by mouth 2 times daily for 7 days 14 tablet 0     amphetamine-dextroamphetamine (ADDERALL) 20 MG tablet Take 1 tablet (20 mg) by mouth 3 times daily 90 tablet 0     azelastine (ASTELIN) 0.1 % nasal spray Spray 2 sprays into both nostrils 2 times daily 30 mL 11     cyclobenzaprine (FLEXERIL) 10 MG tablet Take 1 tablet (10 mg) by mouth 3 times daily as needed for muscle spasms 90 tablet 1     famotidine (PEPCID) 40 MG tablet TAKE 1 TABLET(40 MG) BY MOUTH AT BEDTIME 90 tablet 1     fluticasone (FLONASE) 50 MCG/ACT nasal spray Spray 2 sprays into both nostrils daily for 21 days 16 mL 1     fluticasone (FLOVENT HFA) 220 MCG/ACT Inhaler Inhale 2 puffs into the lungs 2 times daily Please give patient spacer 1 Inhaler 3     hydrOXYzine (ATARAX) 10 MG tablet Take 10-30 mg (1-3 tablets) at nighttime before bed as needed for itch or insomnia. 90 tablet 1     Hyoscyamine Sulfate 0.375 MG TBCR Take 1 capful by mouth 3 times daily as needed 90 tablet 1     montelukast (SINGULAIR) 10 MG tablet Take 1 tablet (10 mg) by mouth At Bedtime 90 tablet 0     oxyCODONE-acetaminophen (PERCOCET) 5-325 MG per tablet Take 1 tablet by mouth every 6 hours as needed for pain 12 tablet 0     sertraline (ZOLOFT) 50 MG  tablet Take 0.5 tablets (25 mg) by mouth daily for 8 days, THEN 1 tablet (50 mg) daily for 22 days. 30 tablet 0     sildenafil (REVATIO) 20 MG tablet Take 1-2 tablets (20-40 mg) by mouth daily as needed As needed for prevention of erectile dysfunction 30 tablet 2     triamcinolone (KENALOG) 0.025 % external ointment Apply topically 2 times daily Apply twice a day to sites of irritation 80 g 1     triamcinolone (KENALOG) 0.1 % external ointment Apply twice daily as needed for rash on arms or legs. 454 g 1     Allergies   Allergen Reactions     Cymbalta      Weight gain and fatigue     Duloxetine Other (See Comments)     Weight gain, fatigue  Enlarged spleen and weight gain     No Clinical Screening - See Comments GI Disturbance     Weight gain and fatigue     Paroxetine Other (See Comments), GI Disturbance and Unknown     Weight gain, fatigue  Weight gain  Spleen issues  Weight gain  Spleen issues     Paxil [Paroxetine Mesylate]      Weight gain and fatigue     Seasonal Allergies      Vicodin [Hydrocodone-Acetaminophen]          Reviewed and updated as needed this visit by Provider         Review of Systems   ROS COMP: Constitutional, HEENT, cardiovascular, pulmonary, gi and gu systems are negative, except as otherwise noted.      Objective    BP (!) 142/94   Pulse 78   Temp 97.8  F (36.6  C) (Oral)   Resp 16   Ht 1.829 m (6')   Wt 118.4 kg (261 lb)   SpO2 96%   BMI 35.40 kg/m    Body mass index is 35.4 kg/m .  Physical Exam   GENERAL: healthy, alert and no distress  EYES: Eyes grossly normal to inspection, PERRL and conjunctivae and sclerae normal  HENT: right TM and ear canal normal, left with serous effusion, nose and mouth without ulcers or lesions  NECK: no adenopathy, no asymmetry, masses, or scars and thyroid normal to palpation  RESP: lungs clear to auscultation - no rales, rhonchi or wheezes  CV: regular rate and rhythm, normal S1 S2, no S3 or S4, no murmur, click or rub, no peripheral edema and  peripheral pulses strong  MS: no gross musculoskeletal defects noted, no edema  PSYCH: mentation appears normal, affect normal/bright    Diagnostic Test Results:  Labs reviewed in Epic        Assessment & Plan     1. Otalgia, left ear  Will start with fluticasone (Flonase) and supportive cares. May fill and start augmentin in a few days if not improving.   - fluticasone (FLONASE) 50 MCG/ACT nasal spray; Spray 2 sprays into both nostrils daily for 21 days  Dispense: 16 mL; Refill: 1  - amoxicillin-clavulanate (AUGMENTIN) 875-125 MG tablet; Take 1 tablet by mouth 2 times daily for 7 days  Dispense: 14 tablet; Refill: 0    2. Acute effusion of left ear  As above.  - fluticasone (FLONASE) 50 MCG/ACT nasal spray; Spray 2 sprays into both nostrils daily for 21 days  Dispense: 16 mL; Refill: 1  - amoxicillin-clavulanate (AUGMENTIN) 875-125 MG tablet; Take 1 tablet by mouth 2 times daily for 7 days  Dispense: 14 tablet; Refill: 0    3. Hypertension goal BP (blood pressure) < 140/90  Likely related to anxiety. He would like to restart his sertraline and will follow up with primary care provider in 1 month - appointment already scheduled    4. Anxiety  Previously on 100 mg but felt like dose too high. Will titrate up to 50 and he will follow up with primary care provider.  - sertraline (ZOLOFT) 50 MG tablet; Take 0.5 tablets (25 mg) by mouth daily for 8 days, THEN 1 tablet (50 mg) daily for 22 days.  Dispense: 30 tablet; Refill: 0       See Patient Instructions    Start fluticasone (Flonase) 2 sprays each nostril daily x3 weeks  If not improving, fill and start Augmentin  Restart sertraline - 25 mg daily x8 days and then increase to 50 mg  Continue to monitor you BP and follow up with your primary care provider     Return in about 4 weeks (around 1/15/2020).     The benefits, risks and potential side effects were discussed in detail. Black box warnings discussed as relevant. All patient questions were answered. The patient  was instructed to follow up immediately if any adverse reactions develop.    Return precautions discussed, including when to seek urgent/emergent care.    Patient verbalizes understanding and agrees with plan of care. Patient stable for discharge.      STEFFEN Jimenez [facility]

## 2019-12-18 NOTE — PATIENT INSTRUCTIONS
Start fluticasone (Flonase) 2 sprays each nostril daily x3 weeks  If not improving, fill and start Augmentin  Restart sertraline - 25 mg daily x8 days and then increase to 50 mg  Continue to monitor you BP and follow up with your primary care provider

## 2019-12-24 ENCOUNTER — TELEPHONE (OUTPATIENT)
Dept: FAMILY MEDICINE | Facility: CLINIC | Age: 29
End: 2019-12-24

## 2019-12-24 DIAGNOSIS — F90.2 ATTENTION DEFICIT HYPERACTIVITY DISORDER (ADHD), COMBINED TYPE: ICD-10-CM

## 2019-12-24 ASSESSMENT — PATIENT HEALTH QUESTIONNAIRE - PHQ9: SUM OF ALL RESPONSES TO PHQ QUESTIONS 1-9: 7

## 2019-12-24 NOTE — TELEPHONE ENCOUNTER
Controlled Substance Refill Request for adderall  Last refill: 11/26/19  #90 for 1 month   Last OV Dr. Michael Lora: 8/14/18  Has pending 1/15/20 appointment with Dr. Michael Lora.   Problem List Complete:  Yes     Patient is followed by MICHAEL LORA for ongoing prescription of stimulants.  All refills should be approved by this provider, or covering partner.     Medication(s): adderall 20 mg.   Maximum quantity per month: 90  Clinic visit frequency required: Q 6  months      Controlled substance agreement on file: 07/31/17     Neuropsych evaluation for ADD completed:  Yes, completed 7-2008 at [facility], on file and diagnosis confirmed     Last [facility] website verification:  10/25/19 no concerns

## 2019-12-24 NOTE — TELEPHONE ENCOUNTER
Reason for Call:  Medication or medication refill:    Do you use a [facility]?  Name of the pharmacy and phone number for the current request:  [facility][id number] [facility] [address] AT [facility]  [phone]    Name of the medication requested: amphetamine-dextroamphetamine (ADDERALL) 20 MG tablet    Other request:     Can we leave a detailed message on this number? YES    Phone number patient can be reached at: Home number on file [phone] (home)    Best Time:     Call taken on 12/24/2019 at 10:57 AM by Anu Abraham

## 2019-12-25 ASSESSMENT — ANXIETY QUESTIONNAIRES: GAD7 TOTAL SCORE: 21

## 2019-12-26 RX ORDER — DEXTROAMPHETAMINE SACCHARATE, AMPHETAMINE ASPARTATE, DEXTROAMPHETAMINE SULFATE AND AMPHETAMINE SULFATE 5; 5; 5; 5 MG/1; MG/1; MG/1; MG/1
20 TABLET ORAL 3 TIMES DAILY
Qty: 90 TABLET | Refills: 0 | Status: SHIPPED | OUTPATIENT
Start: 2019-12-26 | End: 2020-01-24

## 2020-01-07 ENCOUNTER — OFFICE VISIT (OUTPATIENT)
Dept: FAMILY MEDICINE | Facility: CLINIC | Age: 30
End: 2020-01-07
Payer: COMMERCIAL

## 2020-01-07 VITALS
RESPIRATION RATE: 16 BRPM | DIASTOLIC BLOOD PRESSURE: 104 MMHG | HEIGHT: 72 IN | TEMPERATURE: 98.1 F | HEART RATE: 83 BPM | SYSTOLIC BLOOD PRESSURE: 148 MMHG | WEIGHT: 256 LBS | BODY MASS INDEX: 34.67 KG/M2 | OXYGEN SATURATION: 94 %

## 2020-01-07 DIAGNOSIS — J45.21 MILD INTERMITTENT ASTHMA WITH ACUTE EXACERBATION: ICD-10-CM

## 2020-01-07 DIAGNOSIS — I10 ESSENTIAL HYPERTENSION: ICD-10-CM

## 2020-01-07 DIAGNOSIS — R68.89 FLU-LIKE SYMPTOMS: Primary | ICD-10-CM

## 2020-01-07 PROCEDURE — 99214 OFFICE O/P EST MOD 30 MIN: CPT | Performed by: PHYSICIAN ASSISTANT

## 2020-01-07 RX ORDER — PREDNISONE 20 MG/1
40 TABLET ORAL DAILY
Qty: 10 TABLET | Refills: 0 | Status: SHIPPED | OUTPATIENT
Start: 2020-01-07 | End: 2020-02-06

## 2020-01-07 RX ORDER — BENZONATATE 200 MG/1
200 CAPSULE ORAL 3 TIMES DAILY PRN
Qty: 30 CAPSULE | Refills: 0 | Status: SHIPPED | OUTPATIENT
Start: 2020-01-07 | End: 2020-02-06

## 2020-01-07 RX ORDER — AMLODIPINE BESYLATE 10 MG/1
10 TABLET ORAL DAILY
Qty: 30 TABLET | Refills: 1 | Status: SHIPPED | OUTPATIENT
Start: 2020-01-07 | End: 2020-12-23

## 2020-01-07 RX ORDER — GUAIFENESIN AND DEXTROMETHORPHAN HYDROBROMIDE 1200; 60 MG/1; MG/1
1 TABLET, EXTENDED RELEASE ORAL 2 TIMES DAILY
Qty: 28 TABLET | Refills: 0 | Status: SHIPPED | OUTPATIENT
Start: 2020-01-07 | End: 2020-02-06

## 2020-01-07 ASSESSMENT — MIFFLIN-ST. JEOR: SCORE: 2164.21

## 2020-01-07 ASSESSMENT — PAIN SCALES - GENERAL: PAINLEVEL: NO PAIN (0)

## 2020-01-07 NOTE — PROGRESS NOTES
Subjective     Abimael Martinez is a 30 year old male who presents to clinic today for the following health issues:    HPI   Acute Illness   Acute illness concerns: cough, fever, increase in anxiety since been ill  Onset: one week    Fever: YES, resolved 2 days ago    Chills/Sweats: YES    Headache (location?): YES    Sinus Pressure:YES    Conjunctivitis:  No, vision been blurry    Ear Pain: YES: bilateral    Rhinorrhea: YES    Congestion: YES    Sore Throat: no    Dizzy: YES     Cough: YES    Wheeze: YES    Decreased Appetite: YES    Nausea: YES    Vomiting: no    Diarrhea:  no    Dysuria/Freq.: no    Fatigue/Achiness: YES, no energy, napping during the day, patient states unusual.    Sick/Strep Exposure: YES- friends     Therapies Tried and outcome: nyquil, thera flu, increased fluids.    Hypertension Follow-up      Do you check your blood pressure regularly outside of the clinic? No     Are you following a low salt diet? No    Are your blood pressures ever more than 140 on the top number (systolic) OR more   than 90 on the bottom number (diastolic), for example 140/90? Yes    Asthma Follow-Up    Was ACT completed today?  No      Do you have a cough?  YES    Are you experiencing any wheezing in your chest?  YES    Do you have any shortness of breath?  YES     How often are you using a short-acting (rescue) inhaler or nebulizer, such as Albuterol?  A few times a week    How many days per week do you miss taking your asthma controller medication?  I do not have an asthma controller medication    Please describe any recent triggers for your asthma: upper respiratory infections    Have you had any Emergency Room Visits, Urgent Care Visits, or Hospital Admissions since your last office visit?  No      Patient Active Problem List   Diagnosis     Anxiety     CARDIOVASCULAR SCREENING; LDL GOAL LESS THAN 160     High risk sexual behavior     Tobacco use disorder     Low back pain     Essential hypertension     Internal  hemorrhoids which bleed     Obesity, Class I, BMI 30-34.9     Attention deficit hyperactivity disorder (ADHD), combined type     AYALA (generalized anxiety disorder)     OCD (obsessive compulsive disorder)     Drug-induced erectile dysfunction     Mild persistent asthma without complication     Lumbosacral radiculopathy     Dyslipidemia     Elevated serum creatinine     Past Surgical History:   Procedure Laterality Date     BACK SURGERY  2018       Social History     Tobacco Use     Smoking status: Former Smoker     Packs/day: 0.50     Years: 6.00     Pack years: 3.00     Types: Cigarettes     Last attempt to quit: 3/12/2018     Years since quittin.8     Smokeless tobacco: Never Used     Tobacco comment: second hand smoke exposure   Substance Use Topics     Alcohol use: Yes     Comment: once a week     Family History   Problem Relation Age of Onset     Unknown/Adopted Mother      Unknown/Adopted Father      Unknown/Adopted Maternal Grandmother      Unknown/Adopted Maternal Grandfather      Unknown/Adopted Paternal Grandmother      Unknown/Adopted Paternal Grandfather      Unknown/Adopted Brother          Current Outpatient Medications   Medication Sig Dispense Refill     albuterol (PROAIR HFA/PROVENTIL HFA/VENTOLIN HFA) 108 (90 Base) MCG/ACT inhaler Inhale 2 puffs into the lungs every 4 hours as needed for wheezing 1 Inhaler 0     ALPRAZolam (XANAX) 2 MG tablet Take 1 tablet (2 mg) by mouth 3 times daily as needed for anxiety 30 tablet 0     amLODIPine (NORVASC) 10 MG tablet Take 1 tablet (10 mg) by mouth daily 30 tablet 1     amphetamine-dextroamphetamine (ADDERALL) 20 MG tablet Take 1 tablet (20 mg) by mouth 3 times daily 90 tablet 0     azelastine (ASTELIN) 0.1 % nasal spray Spray 2 sprays into both nostrils 2 times daily 30 mL 11     benzonatate (TESSALON) 200 MG capsule Take 1 capsule (200 mg) by mouth 3 times daily as needed for cough 30 capsule 0     Dextromethorphan-Guaifenesin  MG TB12 Take 1  tablet by mouth 2 times daily 28 tablet 0     famotidine (PEPCID) 40 MG tablet TAKE 1 TABLET(40 MG) BY MOUTH AT BEDTIME 90 tablet 1     fluticasone (FLONASE) 50 MCG/ACT nasal spray Spray 2 sprays into both nostrils daily for 21 days 16 mL 1     fluticasone (FLOVENT HFA) 220 MCG/ACT Inhaler Inhale 2 puffs into the lungs 2 times daily Please give patient spacer 1 Inhaler 3     Hyoscyamine Sulfate 0.375 MG TBCR Take 1 capful by mouth 3 times daily as needed 90 tablet 1     montelukast (SINGULAIR) 10 MG tablet Take 1 tablet (10 mg) by mouth At Bedtime 90 tablet 0     oxyCODONE-acetaminophen (PERCOCET) 5-325 MG per tablet Take 1 tablet by mouth every 6 hours as needed for pain 12 tablet 0     predniSONE (DELTASONE) 20 MG tablet Take 2 tablets (40 mg) by mouth daily for 5 days 10 tablet 0     sertraline (ZOLOFT) 50 MG tablet Take 0.5 tablets (25 mg) by mouth daily for 8 days, THEN 1 tablet (50 mg) daily for 22 days. 30 tablet 0     sildenafil (REVATIO) 20 MG tablet Take 1-2 tablets (20-40 mg) by mouth daily as needed As needed for prevention of erectile dysfunction 30 tablet 2     triamcinolone (KENALOG) 0.025 % external ointment Apply topically 2 times daily Apply twice a day to sites of irritation 80 g 1     triamcinolone (KENALOG) 0.1 % external ointment Apply twice daily as needed for rash on arms or legs. 454 g 1     cyclobenzaprine (FLEXERIL) 10 MG tablet Take 1 tablet (10 mg) by mouth 3 times daily as needed for muscle spasms (Patient not taking: Reported on 1/7/2020) 90 tablet 1     hydrOXYzine (ATARAX) 10 MG tablet Take 10-30 mg (1-3 tablets) at nighttime before bed as needed for itch or insomnia. (Patient not taking: Reported on 1/7/2020) 90 tablet 1     Allergies   Allergen Reactions     Cymbalta      Weight gain and fatigue     Duloxetine Other (See Comments)     Weight gain, fatigue  Enlarged spleen and weight gain     No Clinical Screening - See Comments GI Disturbance     Weight gain and fatigue      Paroxetine Other (See Comments), GI Disturbance and Unknown     Weight gain, fatigue  Weight gain  Spleen issues  Weight gain  Spleen issues     Paxil [Paroxetine Mesylate]      Weight gain and fatigue     Seasonal Allergies      Vicodin [Hydrocodone-Acetaminophen]        Reviewed and updated as needed this visit by Provider  Tobacco  Allergies  Meds  Problems  Med Hx  Surg Hx  Fam Hx       Review of Systems   ROS COMP: Constitutional, HEENT, cardiovascular, pulmonary, gi and gu systems are negative, except as otherwise noted.      Objective    BP (!) 148/104   Pulse 83   Temp 98.1  F (36.7  C) (Oral)   Resp 16   Ht 1.829 m (6')   Wt 116.1 kg (256 lb)   SpO2 94%   BMI 34.72 kg/m    Body mass index is 34.72 kg/m .     Physical Exam     GENERAL: healthy, alert and no distress  EYES: Eyes grossly normal to inspection, PERRL and conjunctivae and sclerae normal  HENT: normal cephalic/atraumatic, ear canals and TM's normal, nose and mouth without ulcers or lesions, oropharynx clear and oral mucous membranes moist  NECK: no adenopathy, no asymmetry, masses, or scars and thyroid normal to palpation  RESP: lungs clear to auscultation - no rales, rhonchi or wheezes  CV: regular rate and rhythm, normal S1 S2, no S3 or S4, no murmur, click or rub, no peripheral edema and peripheral pulses strong  ABDOMEN: soft, nontender, no hepatosplenomegaly, no masses and bowel sounds normal  MS: no gross musculoskeletal defects noted, no edema    Diagnostic Test Results:  Labs reviewed in Epic        Assessment & Plan       ICD-10-CM    1. Flu-like symptoms R68.89 Dextromethorphan-Guaifenesin  MG TB12     benzonatate (TESSALON) 200 MG capsule   2. Mild intermittent asthma with acute exacerbation J45.21 predniSONE (DELTASONE) 20 MG tablet   3. Essential hypertension I10 amLODIPine (NORVASC) 10 MG tablet   1.Mucinex DM 1 tablet twice a day for 10 days   Tessalon 1 capsule three times a day as needed for  cough  2.Prednisone 2 tablets once daily for 5 days     3.Start Amlodipine 10 mg daily   Follow up with primary care provider in 1 week as was planned.      BMI:   Estimated body mass index is 34.72 kg/m  as calculated from the following:    Height as of this encounter: 1.829 m (6').    Weight as of this encounter: 116.1 kg (256 lb).   Weight management plan: Discussed healthy diet and exercise guidelines            Return in about 1 week (around 1/14/2020) for pcp, BP Recheck.    Carly Sam PA-C  [facility]

## 2020-01-07 NOTE — PATIENT INSTRUCTIONS
Mucinex DM 1 tablet twice a day for 10 days   Tessalon 1 capsule three times a day as needed for cough  Prednisone 2 tablets once daily for 5 days     Start Amlodipine 10 mg daily   Follow up with primary care provider in 1 week as was planned.

## 2020-01-08 ENCOUNTER — TELEPHONE (OUTPATIENT)
Dept: FAMILY MEDICINE | Facility: CLINIC | Age: 30
End: 2020-01-08

## 2020-01-08 NOTE — TELEPHONE ENCOUNTER
Plan does not cover benzonatate (TESSALON) 200 MG capsule.  Please call [phone] to initiate Prior Auth or change med.    Insurance type and ID number: ID# [id number]      Additional Information:     Anni Shahid

## 2020-01-09 ENCOUNTER — TELEPHONE (OUTPATIENT)
Dept: FAMILY MEDICINE | Facility: CLINIC | Age: 30
End: 2020-01-09

## 2020-01-09 DIAGNOSIS — F41.9 ANXIETY: ICD-10-CM

## 2020-01-09 RX ORDER — ALPRAZOLAM 2 MG
2 TABLET ORAL 3 TIMES DAILY PRN
Qty: 30 TABLET | Refills: 0 | Status: SHIPPED | OUTPATIENT
Start: 2020-01-09 | End: 2020-02-07

## 2020-01-09 NOTE — TELEPHONE ENCOUNTER
Controlled Substance Refill Request for alprazolam  Problem List Complete:  Yes    Overview Signed 4/6/2016  8:02 AM by Michael Lora MD   Patient is followed by MICHAEL LORA for ongoing prescription of benzodiazepines.  All refills should be approved by this provider, or covering partner.     Medication(s): alprazolam 2 mg .   Maximum quantity per month: 36  Clinic visit frequency required: Q 6  months   Last OV Dr. Michael Lora: 8/14/18     Controlled substance agreement on file: No  Benzodiazepine use reviewed by psychiatry:  No     Last [facility] website verification:        checked in past 3 months?  Yes no concerns on 10/9/19    Heather Lin BSN, RN

## 2020-01-09 NOTE — TELEPHONE ENCOUNTER
Reason for Call:  Medication or medication refill:    Do you use a [facility]?  Name of the pharmacy and phone number for the current request:  [facility] - [place], [place] - [phone]    Name of the medication requested: Alprazolam    Other request:     Can we leave a detailed message on this number? YES    Phone number patient can be reached at: Home number on file [phone] (home)    Best Time:     Call taken on 1/9/2020 at 4:26 PM by Mary Jane Oneal

## 2020-01-10 NOTE — TELEPHONE ENCOUNTER
This writer attempted to contact patient on 01/10/20      Reason for call informed message below and left detailed message.      If patient calls back:   Relay message, (read verbatim), document that pt called and close encounter        Yamilex Mcclendon MA

## 2020-01-21 ENCOUNTER — TRANSFERRED RECORDS (OUTPATIENT)
Dept: HEALTH INFORMATION MANAGEMENT | Facility: CLINIC | Age: 30
End: 2020-01-21

## 2020-01-21 LAB — PHQ9 SCORE: 19

## 2020-01-24 ENCOUNTER — TELEPHONE (OUTPATIENT)
Dept: FAMILY MEDICINE | Facility: CLINIC | Age: 30
End: 2020-01-24

## 2020-01-24 DIAGNOSIS — F90.2 ATTENTION DEFICIT HYPERACTIVITY DISORDER (ADHD), COMBINED TYPE: ICD-10-CM

## 2020-01-24 RX ORDER — DEXTROAMPHETAMINE SACCHARATE, AMPHETAMINE ASPARTATE, DEXTROAMPHETAMINE SULFATE AND AMPHETAMINE SULFATE 5; 5; 5; 5 MG/1; MG/1; MG/1; MG/1
20 TABLET ORAL 3 TIMES DAILY
Qty: 90 TABLET | Refills: 0 | Status: SHIPPED | OUTPATIENT
Start: 2020-01-24 | End: 2020-03-03

## 2020-01-24 NOTE — TELEPHONE ENCOUNTER
Reason for Call:  Medication or medication refill:    Do you use a [facility]?  Name of the pharmacy and phone number for the current request:  [facility] #[id number] [facility] [address] AT [facility]  [phone]    Name of the medication requested: Aderral    Other request:     Can we leave a detailed message on this number? YES    Phone number patient can be reached at: Home number on file [phone] (home)    Best Time:     Call taken on 1/24/2020 at 2:46 PM by Anu Abraham

## 2020-01-24 NOTE — TELEPHONE ENCOUNTER
Controlled Substance Refill Request for adderall  Problem List Complete:  Yes     Patient is followed by MICHAEL OROPEZA for ongoing prescription of stimulants.  All refills should be approved by this provider, or covering partner.     Medication(s): adderall 20 mg.   Maximum quantity per month: 90  Clinic visit frequency required: Q 6  months      Controlled substance agreement on file: 07/31/17     Neuropsych evaluation for ADD completed:  Yes, completed 7-2008 at [facility], on file and diagnosis confirmed     Last [facility] website verification:  10/25/19 no concerns    Heather RIDERN, RN

## 2020-02-06 ENCOUNTER — OFFICE VISIT (OUTPATIENT)
Dept: FAMILY MEDICINE | Facility: CLINIC | Age: 30
End: 2020-02-06
Payer: COMMERCIAL

## 2020-02-06 VITALS
WEIGHT: 267.2 LBS | RESPIRATION RATE: 16 BRPM | HEART RATE: 86 BPM | HEIGHT: 72 IN | TEMPERATURE: 97.7 F | SYSTOLIC BLOOD PRESSURE: 134 MMHG | DIASTOLIC BLOOD PRESSURE: 85 MMHG | OXYGEN SATURATION: 99 % | BODY MASS INDEX: 36.19 KG/M2

## 2020-02-06 DIAGNOSIS — Z11.3 SCREEN FOR STD (SEXUALLY TRANSMITTED DISEASE): Primary | ICD-10-CM

## 2020-02-06 PROCEDURE — 99213 OFFICE O/P EST LOW 20 MIN: CPT | Performed by: INTERNAL MEDICINE

## 2020-02-06 PROCEDURE — 86695 HERPES SIMPLEX TYPE 1 TEST: CPT | Performed by: INTERNAL MEDICINE

## 2020-02-06 PROCEDURE — 86696 HERPES SIMPLEX TYPE 2 TEST: CPT | Performed by: INTERNAL MEDICINE

## 2020-02-06 PROCEDURE — 87591 N.GONORRHOEAE DNA AMP PROB: CPT | Performed by: INTERNAL MEDICINE

## 2020-02-06 PROCEDURE — 36415 COLL VENOUS BLD VENIPUNCTURE: CPT | Performed by: INTERNAL MEDICINE

## 2020-02-06 PROCEDURE — 86780 TREPONEMA PALLIDUM: CPT | Performed by: INTERNAL MEDICINE

## 2020-02-06 PROCEDURE — 87389 HIV-1 AG W/HIV-1&-2 AB AG IA: CPT | Performed by: INTERNAL MEDICINE

## 2020-02-06 PROCEDURE — 87491 CHLMYD TRACH DNA AMP PROBE: CPT | Performed by: INTERNAL MEDICINE

## 2020-02-06 PROCEDURE — 87529 HSV DNA AMP PROBE: CPT | Performed by: INTERNAL MEDICINE

## 2020-02-06 ASSESSMENT — PAIN SCALES - GENERAL: PAINLEVEL: NO PAIN (0)

## 2020-02-06 ASSESSMENT — MIFFLIN-ST. JEOR: SCORE: 2215.01

## 2020-02-06 NOTE — PROGRESS NOTES
Subjective     Abimael Martinez is a 30 year old male who presents to clinic today for the following health issues:    HPI     1. New sexual partners -- no condom use   - intermittent burning feeling in penile area   - one spot at base of penis - painful - couple days   - no burning while urinating, no discharge        Patient Active Problem List   Diagnosis     Anxiety     CARDIOVASCULAR SCREENING; LDL GOAL LESS THAN 160     High risk sexual behavior     Tobacco use disorder     Low back pain     Essential hypertension     Internal hemorrhoids which bleed     Obesity, Class I, BMI 30-34.9     Attention deficit hyperactivity disorder (ADHD), combined type     AYALA (generalized anxiety disorder)     OCD (obsessive compulsive disorder)     Drug-induced erectile dysfunction     Mild persistent asthma without complication     Lumbosacral radiculopathy     Dyslipidemia     Elevated serum creatinine     Past Surgical History:   Procedure Laterality Date     BACK SURGERY  2018       Social History     Tobacco Use     Smoking status: Former Smoker     Packs/day: 0.50     Years: 6.00     Pack years: 3.00     Types: Cigarettes     Last attempt to quit: 3/12/2018     Years since quittin.9     Smokeless tobacco: Never Used     Tobacco comment: second hand smoke exposure   Substance Use Topics     Alcohol use: Yes     Comment: once a week     Family History   Problem Relation Age of Onset     Unknown/Adopted Mother      Unknown/Adopted Father      Unknown/Adopted Maternal Grandmother      Unknown/Adopted Maternal Grandfather      Unknown/Adopted Paternal Grandmother      Unknown/Adopted Paternal Grandfather      Unknown/Adopted Brother          Allergies   Allergen Reactions     Cymbalta      Weight gain and fatigue     Duloxetine Other (See Comments)     Weight gain, fatigue  Enlarged spleen and weight gain     No Clinical Screening - See Comments GI Disturbance     Weight gain and fatigue     Paroxetine Other (See  Comments), GI Disturbance and Unknown     Weight gain, fatigue  Weight gain  Spleen issues  Weight gain  Spleen issues     Paxil [Paroxetine Mesylate]      Weight gain and fatigue     Seasonal Allergies      Vicodin [Hydrocodone-Acetaminophen]      Recent Labs   Lab Test 03/19/18  1545 03/12/18  1145 04/09/14 03/13/14  1500 03/06/14  1218   LDL  --  125*  --   --  143*   HDL  --  29*  --   --  42   TRIG  --  230*  --   --  215*   ALT  --   --  36 37  --    CR 1.15 1.41*  --  1.30* 1.08   GFRESTIMATED 76 60*  --  68 85   GFRESTBLACK >90 73  --  83 >90   POTASSIUM  --  4.0  --  4.6 4.3   TSH  --   --   --   --  1.51      BP Readings from Last 3 Encounters:   02/11/20 118/85   02/06/20 134/85   01/07/20 (!) 148/104    Wt Readings from Last 3 Encounters:   02/11/20 120.2 kg (265 lb)   02/06/20 121.2 kg (267 lb 3.2 oz)   01/07/20 116.1 kg (256 lb)                      Reviewed and updated as needed this visit by Provider         Review of Systems   ROS COMP: CONSTITUTIONAL: NEGATIVE for fever, chills, change in weight  INTEGUMENTARY/SKIN: NEGATIVE for worrisome rashes, moles or lesions  EYES: NEGATIVE for vision changes or irritation  ENT/MOUTH: NEGATIVE for ear, mouth and throat problems  RESP: NEGATIVE for significant cough or SOB  CV: NEGATIVE for chest pain, palpitations or peripheral edema  GI: NEGATIVE for nausea, abdominal pain, heartburn, or change in bowel habits  : NEGATIVE for frequency, dysuria, or hematuria  MUSCULOSKELETAL: NEGATIVE for significant arthralgias or myalgia  NEURO: NEGATIVE for weakness, dizziness or paresthesias  ENDOCRINE: NEGATIVE for temperature intolerance, skin/hair changes  HEME: NEGATIVE for bleeding problems  PSYCHIATRIC: NEGATIVE for changes in mood or affect      Objective    There were no vitals taken for this visit.  There is no height or weight on file to calculate BMI.  Physical Exam   GENERAL: healthy, alert and no distress  EYES: Eyes grossly normal to inspection, PERRL and  conjunctivae and sclerae normal  HENT: ear canals and TM's normal, nose and mouth without ulcers or lesions  NECK: no adenopathy, no asymmetry, masses, or scars and thyroid normal to palpation  RESP: lungs clear to auscultation - no rales, rhonchi or wheezes  CV: regular rate and rhythm, normal S1 S2, no S3 or S4, no murmur, click or rub, no peripheral edema and peripheral pulses strong  ABDOMEN: soft, nontender, no hepatosplenomegaly, no masses and bowel sounds normal  MS: no gross musculoskeletal defects noted, no edema  SKIN: no suspicious lesions or rashes  NEURO: Normal strength and tone, mentation intact and speech normal  PSYCH: mentation appears normal, affect normal/bright    Diagnostic Test Results:  Results for orders placed or performed in visit on 02/06/20   Herpes Simplex Virus 1 and 2 IgG     Status: Abnormal   Result Value Ref Range    Herpes Simplex Virus Type 1 IgG 5.5 (H) 0.0 - 0.8 AI    Herpes Simplex Virus Type 2 IgG <0.2 0.0 - 0.8 AI   Treponema Abs w Reflex to RPR and Titer     Status: None   Result Value Ref Range    Treponema Antibodies Nonreactive NR^Nonreactive   HIV Antigen Antibody Combo     Status: None   Result Value Ref Range    HIV Antigen Antibody Combo Nonreactive NR^Nonreactive       NEISSERIA GONORRHOEA PCR     Status: None   Result Value Ref Range    Specimen Descrip Urine     N Gonorrhea PCR Negative NEG^Negative   CHLAMYDIA TRACHOMATIS PCR     Status: None   Result Value Ref Range    Specimen Description Urine     Chlamydia Trachomatis PCR Negative NEG^Negative   HSV 1 and 2 DNA by PCR     Status: None   Result Value Ref Range    HSV Specimen Type Plasma     HSV Type 1 PCR Negative NEG^Negative    HSV Type 2 PCR Negative NEG^Negative           Assessment & Plan     1. Screen for STD (sexually transmitted disease)    - NEISSERIA GONORRHOEA PCR  - CHLAMYDIA TRACHOMATIS PCR  - Herpes Simplex Virus 1 and 2 IgG  - HCL HSV TYPE 1/2 MANUELITO IGM  - Treponema Abs w Reflex to RPR and  Titer  - HIV Antigen Antibody Combo  - HSV 1 and 2 DNA by PCR       FUTURE APPOINTMENTS:       - Follow-up visit in 4 weeks.    Sanjay Ram MD  [facility]

## 2020-02-07 ENCOUNTER — TELEPHONE (OUTPATIENT)
Dept: FAMILY MEDICINE | Facility: CLINIC | Age: 30
End: 2020-02-07

## 2020-02-07 DIAGNOSIS — F41.9 ANXIETY: ICD-10-CM

## 2020-02-07 LAB
HIV 1+2 AB+HIV1 P24 AG SERPL QL IA: NONREACTIVE
HSV1 IGG SERPL QL IA: 5.5 AI (ref 0–0.8)
HSV2 IGG SERPL QL IA: <0.2 AI (ref 0–0.8)
T PALLIDUM AB SER QL: NONREACTIVE

## 2020-02-07 RX ORDER — ALPRAZOLAM 2 MG
2 TABLET ORAL 3 TIMES DAILY PRN
Qty: 30 TABLET | Refills: 0 | Status: SHIPPED | OUTPATIENT
Start: 2020-02-07 | End: 2020-03-09

## 2020-02-07 NOTE — TELEPHONE ENCOUNTER
Reason for Call:  Medication or medication refill:    Do you use a [facility]?  Name of the pharmacy and phone number for the current request:  [id number] [place] [address] AT [facility]  [phone]    Name of the medication requested: ALPRAZolam (XANAX) 2 MG tablet    Other request:     Can we leave a detailed message on this number? YES    Phone number patient can be reached at: Home number on file [phone] (home)    Best Time:     Call taken on 2/7/2020 at 2:31 PM by Anu Abraham

## 2020-02-07 NOTE — TELEPHONE ENCOUNTER
Controlled Substance Refill Request for alprazolam  Problem List Complete:  Yes     Overview Signed 4/6/2016  8:02 AM by Michael Lora MD   Patient is followed by MICHAEL LORA for ongoing prescription of benzodiazepines.  All refills should be approved by this provider, or covering partner.     Medication(s): alprazolam 2 mg .   Maximum quantity per month: 36  Clinic visit frequency required: Q 6  months   Last OV Dr. Michael Lora: 8/14/18     Controlled substance agreement on file: No  Benzodiazepine use reviewed by psychiatry:  No     Last [facility] website verification:        checked in past 3 months?  Yes no concerns on 2/7/2020     Heather Lin BSN, RN

## 2020-02-08 LAB
HSV1 DNA SPEC QL NAA+PROBE: NEGATIVE
HSV2 DNA SPEC QL NAA+PROBE: NEGATIVE
SPECIMEN SOURCE: NORMAL

## 2020-02-10 ENCOUNTER — MYC MEDICAL ADVICE (OUTPATIENT)
Dept: FAMILY MEDICINE | Facility: CLINIC | Age: 30
End: 2020-02-10

## 2020-02-10 ENCOUNTER — NURSE TRIAGE (OUTPATIENT)
Dept: NURSING | Facility: CLINIC | Age: 30
End: 2020-02-10

## 2020-02-10 NOTE — TELEPHONE ENCOUNTER
Answered patient's question via [employer] about HSV 1 and HSV 2 testing.      Phan Zuniga RN, BSN, PHN

## 2020-02-10 NOTE — TELEPHONE ENCOUNTER
Abimael called and is still confused. He said he'll send a MyChart message.  He wants to know how he had a positive and why the change.  I won't route my question to Dr Ram.  Pallavi TAVERAS RN [facility]

## 2020-02-11 ENCOUNTER — OFFICE VISIT (OUTPATIENT)
Dept: FAMILY MEDICINE | Facility: CLINIC | Age: 30
End: 2020-02-11
Payer: COMMERCIAL

## 2020-02-11 VITALS
TEMPERATURE: 97.7 F | DIASTOLIC BLOOD PRESSURE: 85 MMHG | HEART RATE: 85 BPM | WEIGHT: 265 LBS | RESPIRATION RATE: 18 BRPM | SYSTOLIC BLOOD PRESSURE: 118 MMHG | BODY MASS INDEX: 35.12 KG/M2 | OXYGEN SATURATION: 97 % | HEIGHT: 73 IN

## 2020-02-11 DIAGNOSIS — R03.0 ELEVATED BP WITHOUT DIAGNOSIS OF HYPERTENSION: ICD-10-CM

## 2020-02-11 DIAGNOSIS — E66.811 CLASS 1 OBESITY WITHOUT SERIOUS COMORBIDITY WITH BODY MASS INDEX (BMI) OF 34.0 TO 34.9 IN ADULT, UNSPECIFIED OBESITY TYPE: Primary | ICD-10-CM

## 2020-02-11 PROCEDURE — 99214 OFFICE O/P EST MOD 30 MIN: CPT | Performed by: FAMILY MEDICINE

## 2020-02-11 ASSESSMENT — ANXIETY QUESTIONNAIRES
7. FEELING AFRAID AS IF SOMETHING AWFUL MIGHT HAPPEN: SEVERAL DAYS
1. FEELING NERVOUS, ANXIOUS, OR ON EDGE: NEARLY EVERY DAY
GAD7 TOTAL SCORE: 16
2. NOT BEING ABLE TO STOP OR CONTROL WORRYING: NEARLY EVERY DAY
6. BECOMING EASILY ANNOYED OR IRRITABLE: MORE THAN HALF THE DAYS
3. WORRYING TOO MUCH ABOUT DIFFERENT THINGS: NEARLY EVERY DAY
5. BEING SO RESTLESS THAT IT IS HARD TO SIT STILL: MORE THAN HALF THE DAYS
IF YOU CHECKED OFF ANY PROBLEMS ON THIS QUESTIONNAIRE, HOW DIFFICULT HAVE THESE PROBLEMS MADE IT FOR YOU TO DO YOUR WORK, TAKE CARE OF THINGS AT HOME, OR GET ALONG WITH OTHER PEOPLE: SOMEWHAT DIFFICULT

## 2020-02-11 ASSESSMENT — MIFFLIN-ST. JEOR: SCORE: 2220.91

## 2020-02-11 ASSESSMENT — PAIN SCALES - GENERAL: PAINLEVEL: NO PAIN (0)

## 2020-02-11 ASSESSMENT — PATIENT HEALTH QUESTIONNAIRE - PHQ9
SUM OF ALL RESPONSES TO PHQ QUESTIONS 1-9: 9
5. POOR APPETITE OR OVEREATING: MORE THAN HALF THE DAYS

## 2020-02-11 NOTE — NURSING NOTE
"Chief Complaint   Patient presents with     Hypertension     Anxiety     Health Maintenance     physical, ACT due soon       Initial /85   Pulse 85   Temp 97.7  F (36.5  C) (Oral)   Resp 18   Ht 1.854 m (6' 1\")   Wt 120.2 kg (265 lb)   SpO2 97%   BMI 34.96 kg/m   Estimated body mass index is 34.96 kg/m  as calculated from the following:    Height as of this encounter: 1.854 m (6' 1\").    Weight as of this encounter: 120.2 kg (265 lb).  Medication Reconciliation: complete  Victoria Rice CMA  "

## 2020-02-11 NOTE — PROGRESS NOTES
SUBJECTIVE:  Abimael Martinez is an 30 year old male who presents for a follow up evaluation of his hypertension.The patient has not started taking the amlodipine that he was prescribed.         Patient Active Problem List   Diagnosis     Anxiety     CARDIOVASCULAR SCREENING; LDL GOAL LESS THAN 160     High risk sexual behavior     Tobacco use disorder     Low back pain     Essential hypertension     Internal hemorrhoids which bleed     Obesity, Class I, BMI 30-34.9     Attention deficit hyperactivity disorder (ADHD), combined type     AYALA (generalized anxiety disorder)     OCD (obsessive compulsive disorder)     Drug-induced erectile dysfunction     Mild persistent asthma without complication     Lumbosacral radiculopathy     Dyslipidemia     Elevated serum creatinine       Is the HYPERTENSION goal on the problem list? Yes      Use of agents associated with hypertension: amphetamines  Current Outpatient Medications   Medication     albuterol (PROAIR HFA/PROVENTIL HFA/VENTOLIN HFA) 108 (90 Base) MCG/ACT inhaler     ALPRAZolam (XANAX) 2 MG tablet     amLODIPine (NORVASC) 10 MG tablet     amphetamine-dextroamphetamine (ADDERALL) 20 MG tablet     azelastine (ASTELIN) 0.1 % nasal spray     famotidine (PEPCID) 40 MG tablet     fluticasone (FLOVENT HFA) 220 MCG/ACT Inhaler     Hyoscyamine Sulfate 0.375 MG TBCR     montelukast (SINGULAIR) 10 MG tablet     oxyCODONE-acetaminophen (PERCOCET) 5-325 MG per tablet     sildenafil (REVATIO) 20 MG tablet     triamcinolone (KENALOG) 0.025 % external ointment     sertraline (ZOLOFT) 50 MG tablet     No current facility-administered medications for this visit.          Allergies   Allergen Reactions     Cymbalta      Weight gain and fatigue     Duloxetine Other (See Comments)     Weight gain, fatigue  Enlarged spleen and weight gain     No Clinical Screening - See Comments GI Disturbance     Weight gain and fatigue     Paroxetine Other (See Comments), GI Disturbance and Unknown  "    Weight gain, fatigue  Weight gain  Spleen issues  Weight gain  Spleen issues     Paxil [Paroxetine Mesylate]      Weight gain and fatigue     Seasonal Allergies      Vicodin [Hydrocodone-Acetaminophen]        Social History     Tobacco Use     Smoking status: Former Smoker     Packs/day: 0.50     Years: 6.00     Pack years: 3.00     Types: Cigarettes     Last attempt to quit: 3/12/2018     Years since quittin.9     Smokeless tobacco: Never Used     Tobacco comment: second hand smoke exposure   Substance Use Topics     Alcohol use: Yes     Comment: once a week       OBJECTIVE:  /85   Pulse 85   Temp 97.7  F (36.5  C) (Oral)   Resp 18   Ht 1.854 m (6' 1\")   Wt 120.2 kg (265 lb)   SpO2 97%   BMI 34.96 kg/m          No results found for any visits on 20.    The ASCVD Risk score (Jamie YOON Jr., et al., 2013) failed to calculate for the following reasons:    The 2013 ASCVD risk score is only valid for ages 40 to 79    ASSESSMENT:  Essential hypertension which is very well controlled off of his medication       Plan:  - Medication:N/A    The patient was advised to do the following therapuetic life style changes  - Dietary sodium restriction and increase potassium and Calcium intake  - Regular aerobic exercise  - Weight loss  - Discontinue smoking if applicable  - Avoid regular NSAID use if applicable  - Avoid regular decongestant use if applicable  - Follow up in clinic in 6 months for a recheck  - Check a basic metabolic panel today    Patient Education: Reviewed risks of hypertension and principles of   Treatment.    --------------------------------------------------------------------------------------------------------------------------------------    SUBJECTIVE: Abimael is a 30 year old male who is here for follow up of ATTENTION DEFICIT HYPERACTIVITY DISORDER.      Current medication:  Current Outpatient Medications   Medication Sig Dispense Refill     albuterol (PROAIR HFA/PROVENTIL " HFA/VENTOLIN HFA) 108 (90 Base) MCG/ACT inhaler Inhale 2 puffs into the lungs every 4 hours as needed for wheezing 1 Inhaler 0     ALPRAZolam (XANAX) 2 MG tablet Take 1 tablet (2 mg) by mouth 3 times daily as needed for anxiety 30 tablet 0     amLODIPine (NORVASC) 10 MG tablet Take 1 tablet (10 mg) by mouth daily 30 tablet 1     amphetamine-dextroamphetamine (ADDERALL) 20 MG tablet Take 1 tablet (20 mg) by mouth 3 times daily 90 tablet 0     azelastine (ASTELIN) 0.1 % nasal spray Spray 2 sprays into both nostrils 2 times daily 30 mL 11     famotidine (PEPCID) 40 MG tablet TAKE 1 TABLET(40 MG) BY MOUTH AT BEDTIME 90 tablet 1     fluticasone (FLOVENT HFA) 220 MCG/ACT Inhaler Inhale 2 puffs into the lungs 2 times daily Please give patient spacer 1 Inhaler 3     Hyoscyamine Sulfate 0.375 MG TBCR Take 1 capful by mouth 3 times daily as needed 90 tablet 1     montelukast (SINGULAIR) 10 MG tablet Take 1 tablet (10 mg) by mouth At Bedtime 90 tablet 0     oxyCODONE-acetaminophen (PERCOCET) 5-325 MG per tablet Take 1 tablet by mouth every 6 hours as needed for pain 12 tablet 0     sildenafil (REVATIO) 20 MG tablet Take 1-2 tablets (20-40 mg) by mouth daily as needed As needed for prevention of erectile dysfunction 30 tablet 2     triamcinolone (KENALOG) 0.025 % external ointment Apply topically 2 times daily Apply twice a day to sites of irritation 80 g 1     sertraline (ZOLOFT) 50 MG tablet Take 0.5 tablets (25 mg) by mouth daily for 8 days, THEN 1 tablet (50 mg) daily for 22 days. (Patient not taking: Reported on 2/6/2020) 30 tablet 0     The patient did not take the medication this morning.  The patient was kept on the same medications at the last visit.. At the current time the patient feels that the medication is sufficiently controlling his symptoms of ADD/ADHD. The symptoms that are not well controlled include: Attention span, Distractability and Finishing tasks.    The patient reports that he is not having any side  effects from his current medication.      Patient Active Problem List    Diagnosis Date Noted     Lumbosacral radiculopathy 03/12/2018     Priority: Medium     Dyslipidemia 03/12/2018     Priority: Medium     Elevated serum creatinine 03/12/2018     Priority: Medium     Mild persistent asthma without complication 07/31/2017     Priority: Medium     Drug-induced erectile dysfunction 11/07/2016     Priority: Medium     OCD (obsessive compulsive disorder) 09/16/2016     Priority: Medium     AYALA (generalized anxiety disorder) 12/04/2015     Priority: Medium     Patient is followed by MICHAEL OROPEZA for ongoing prescription of benzodiazepines.  All refills should be approved by this provider, or covering partner.    Medication(s): alprazolam 2 mg .   Maximum quantity per month: 36  Clinic visit frequency required: Q 6  months     Controlled substance agreement on file: No  Benzodiazepine use reviewed by psychiatry:  No    Last [facility] website verification:  none   [email or web address]           Attention deficit hyperactivity disorder (ADHD), combined type 10/14/2015     Priority: Medium     Patient is followed by MICHAEL OROPEZA for ongoing prescription of stimulants.  All refills should be approved by this provider, or covering partner.    Medication(s): adderall 20 mg.   Maximum quantity per month: 90  Clinic visit frequency required: Q 6  months     Controlled substance agreement on file: 07/31/17    Neuropsych evaluation for ADD completed:  Yes, completed 7-2008 at [facility], on file and diagnosis confirmed    Last [facility] website verification:  3/14/16, no concerns   [email or web address]           Obesity, Class I, BMI 30-34.9 02/20/2015     Priority: Medium     Internal hemorrhoids which bleed 10/02/2014     Priority: Medium     Essential hypertension 08/17/2014     Priority: Medium     Low back pain 04/07/2013     Priority: Medium     Tobacco use disorder 03/19/2012     Priority: Medium     High  "risk sexual behavior 2011     Priority: Medium     CARDIOVASCULAR SCREENING; LDL GOAL LESS THAN 160 2011     Priority: Medium     Anxiety      Priority: Medium       Social History:  Social History     Tobacco Use     Smoking status: Former Smoker     Packs/day: 0.50     Years: 6.00     Pack years: 3.00     Types: Cigarettes     Last attempt to quit: 3/12/2018     Years since quittin.9     Smokeless tobacco: Never Used     Tobacco comment: second hand smoke exposure   Substance Use Topics     Alcohol use: Yes     Comment: once a week       OBJECTIVE:  /85   Pulse 85   Temp 97.7  F (36.5  C) (Oral)   Resp 18   Ht 1.854 m (6' 1\")   Wt 120.2 kg (265 lb)   SpO2 97%   BMI 34.96 kg/m    Wt Readings from Last 4 Encounters:   20 120.2 kg (265 lb)   20 121.2 kg (267 lb 3.2 oz)   20 116.1 kg (256 lb)   19 118.4 kg (261 lb)       General:Alert, active, cooperative. Distractibility was not noticed.  Eyes:Conjunctivae are clear. No discharge.  Face:No tics seen.  HEENT: Normal   CV: Regular rate and rhythm. No murmur appreciated.  Neuro: Appropriate for age. No noted tics or tremors.    Lab: No results found for any visits on 20.    ASSESSMENT:  ATTENTION DEFICIT HYPERACTIVITY DISORDER  Comorbidities: anxiety   The target symptoms of medication including: Attention span, Distractability and Finishing tasks: haveimproved    PLAN:  We plan to continue the current doses of medication.  Recheck in 6 month(s).    --------------------------------------------------------------------------------------------------------------------------------------  SUBJECTIVE:  Abimael Martinez is a 30 year old male who presents for a follow up evaluation of anxiety.       He was taking the zoloft up until 4-6 month(s) ago.   He was having side effects so it was discontinued  He reports that he is using the xanax a few times a weeks        AYALA-7 SCORE 2019   Total " Score - - -   Total Score 12 21 16               Last PHQ-9 score on record= 10    The patient reports that he has attended mental health counseling for the current anxiety disorder.    He is going to start therapy again in [place]         OBJECTIVE:  General: the patient had a calm affect during the visit today.    ASSESSMENT: Anxiety      PLAN:      He was not given a refill on the xanax as he just got one. We discussed the importance of using this medication only on a as needed basis as tolerance and dependency can develop with regular use.

## 2020-02-11 NOTE — PATIENT INSTRUCTIONS
Your provider has referred you to: [facility]: [facility] [phone]   [email or web address]

## 2020-02-12 ASSESSMENT — ANXIETY QUESTIONNAIRES: GAD7 TOTAL SCORE: 16

## 2020-02-18 ENCOUNTER — OFFICE VISIT (OUTPATIENT)
Dept: DERMATOLOGY | Facility: CLINIC | Age: 30
End: 2020-02-18
Payer: COMMERCIAL

## 2020-02-18 DIAGNOSIS — L24.89 IRRITANT CONTACT DERMATITIS DUE TO OTHER AGENTS: Primary | ICD-10-CM

## 2020-02-18 PROCEDURE — 99213 OFFICE O/P EST LOW 20 MIN: CPT | Performed by: DERMATOLOGY

## 2020-02-18 ASSESSMENT — PAIN SCALES - GENERAL: PAINLEVEL: NO PAIN (0)

## 2020-02-18 NOTE — PROGRESS NOTES
[facility] Dermatology Note    Dermatology Problem List:  1. Likely irritant contact dermatitis, penis. Suspected irritant versus allergic contact dermatitis. Resolved on 11/19/19, recurrent symptoms with minor if any skin changes. 2/18/20  - Current t/x: TAC 0.025% ointment BID for erosions on the skin  - Patient to consider patch testing to rule out ACD  2. Allergic contact dermatitis, right upper arm  - s/p punch biopsy 11/19/19. 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 Abimael Martinez is a 30 year old male who presents as a follow-up for dermatitis in the groin area. The patient was last seen on 12/2/19 by Dr. Lisa when skin was improved and he was told to treat with TAC 0.025% ointment BID as needed for symptoms. In addition to this, he was referred for patch testing given ACD on the right upper arm. Today he reports that the skin in his penile region is extremely sensitive, and feels like it is going to crack, but there is no apparent rash in the area. After sex or masturbation, the symptoms are worst and is sometimes so painful it feels that the skin will bleed. He reports that there are new glands in the area that seem to be what is hurting, these have only been present for 2-3 years. He was very worried symptoms could be related to an STI and did testing with PCP on 2/6/20. 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 He notes that he uses only soap on the groin and occasional various lubes. He reports that his sex life is diminished by his symptoms currently.     At his last visit a referral was sent for patch testing but he has yet to schedule a visit for this because he got the flu and  missed his appts. He is planning to schedule.     No other concerns addressed today.    Past Medical History:   Patient Active Problem List   Diagnosis     Anxiety     CARDIOVASCULAR SCREENING; LDL GOAL LESS THAN 160     High risk sexual behavior     Tobacco use disorder     Low back pain     Essential hypertension     Internal hemorrhoids which bleed     Obesity, Class I, BMI 30-34.9     Attention deficit hyperactivity disorder (ADHD), combined type     AYALA (generalized anxiety disorder)     OCD (obsessive compulsive disorder)     Drug-induced erectile dysfunction     Mild persistent asthma without complication     Lumbosacral radiculopathy     Dyslipidemia     Elevated serum creatinine     Past Medical History:   Diagnosis Date     Acute right otitis media 1/8/2013     Anxiety      Depression      Drug abuse (H)      Urethritis 5/20/2013     Problem list name updated by automated process. Provider to review     Past Surgical History:   Procedure Laterality Date     BACK SURGERY  04/05/2018     Social History:  Patient quit smoking cigarettes. Currently uses marijuana.   Reviewed and left in chart for clinician convenience.       Family History:  Unknown family history, adopted.   Reviewed and left in chart for clinician convenience.     Medications:  Current Outpatient Medications   Medication Sig Dispense Refill     albuterol (PROAIR HFA/PROVENTIL HFA/VENTOLIN HFA) 108 (90 Base) MCG/ACT inhaler Inhale 2 puffs into the lungs every 4 hours as needed for wheezing 1 Inhaler 0     ALPRAZolam (XANAX) 2 MG tablet Take 1 tablet (2 mg) by mouth 3 times daily as needed for anxiety 30 tablet 0     amLODIPine (NORVASC) 10 MG tablet Take 1 tablet (10 mg) by mouth daily 30 tablet 1     amphetamine-dextroamphetamine (ADDERALL) 20 MG tablet Take 1 tablet (20 mg) by mouth 3 times daily 90 tablet 0     azelastine (ASTELIN) 0.1 % nasal spray Spray 2 sprays into both nostrils 2 times daily 30 mL 11     famotidine (PEPCID) 40 MG  tablet TAKE 1 TABLET(40 MG) BY MOUTH AT BEDTIME 90 tablet 1     fluticasone (FLOVENT HFA) 220 MCG/ACT Inhaler Inhale 2 puffs into the lungs 2 times daily Please give patient spacer 1 Inhaler 3     Hyoscyamine Sulfate 0.375 MG TBCR Take 1 capful by mouth 3 times daily as needed 90 tablet 1     montelukast (SINGULAIR) 10 MG tablet Take 1 tablet (10 mg) by mouth At Bedtime 90 tablet 0     oxyCODONE-acetaminophen (PERCOCET) 5-325 MG per tablet Take 1 tablet by mouth every 6 hours as needed for pain 12 tablet 0     sertraline (ZOLOFT) 50 MG tablet Take 0.5 tablets (25 mg) by mouth daily for 8 days, THEN 1 tablet (50 mg) daily for 22 days. (Patient not taking: Reported on 2/6/2020) 30 tablet 0     sildenafil (REVATIO) 20 MG tablet Take 1-2 tablets (20-40 mg) by mouth daily as needed As needed for prevention of erectile dysfunction 30 tablet 2     triamcinolone (KENALOG) 0.025 % external ointment Apply topically 2 times daily Apply twice a day to sites of irritation 80 g 1     Allergies   Allergen Reactions     Cymbalta      Weight gain and fatigue     Duloxetine Other (See Comments)     Weight gain, fatigue  Enlarged spleen and weight gain     No Clinical Screening - See Comments GI Disturbance     Weight gain and fatigue     Paroxetine Other (See Comments), GI Disturbance and Unknown     Weight gain, fatigue  Weight gain  Spleen issues  Weight gain  Spleen issues     Paxil [Paroxetine Mesylate]      Weight gain and fatigue     Seasonal Allergies      Vicodin [Hydrocodone-Acetaminophen]      Review of Systems:  -Constitutional: Patient is otherwise feeling well, in usual state of health.   -Skin: As above in HPI. No additional skin concerns.    Physical exam:  Vitals: There were no vitals taken for this visit.  GEN: This is a well developed, well-nourished male in no acute distress, in a pleasant mood.    SKIN: Focused examination of the head/face, groin was performed.  - Pagan type II  - Subtle hypopigmentation  inferior to the urethral opening of the penis. Normal sebaceous glands, no skin breakdown. No erosions, no ulcerations  - No other lesions of concern on areas examined.     Impression/Plan:    1. Likely irritant contact dermatitis, penis. Suspected irritant over alelrgic contact dermatitis. Explained that this tends to create a cycle of skin breakdown then skin being more prone to irritation. Reassured patient that this is not an infection but rather skin irritation in the area. This does not clinically look like an allergic reaction, but advised patient to pursue patch testing to potentially rule out any allergens given history of biopsy proven ACD on the right upper arm. Provided patient with the following instructions:   - Avoid using soap to clean the area, use only water.   - Apply Vaseline over top of the affected skin every morning and night to serve as a protective layer.   - Use TAC 0.025% ointment as needed twice daily for skin breakdown.   - Avoid any other products  - Pursue patch testing to rule out any potential allergens    Follow-up PRN.    Staff Involved:  Staff/Scribe    Scribe Disclosure  I, Anne Armando, am serving as a scribe to document services personally performed by Dr. Lizzeth Curtis MD, based on data collection and the provider's statements to me.     Provider Disclosure:   The documentation recorded by the scribe accurately reflects the services I personally performed and the decisions made by me.    Lizzeth Curtis MD    Department of Dermatology  [facility]: Phone: [phone], Fax:[phone]  [facility]: Phone: [phone], Fax: [phone]

## 2020-02-18 NOTE — LETTER
2/18/2020         RE: Abimael Martinez  [address]  [place] [address]        Dear Colleague,    Thank you for referring your patient, Abimael Martinez, to the [facility]. Please see a copy of my visit note below.    [facility] Dermatology Note    Dermatology Problem List:  1. Likely irritant contact dermatitis, penis. Suspected irritant versus allergic contact dermatitis. Resolved on 11/19/19, recurrent symptoms with minor if any skin changes. 2/18/20  - Current t/x: TAC 0.025% ointment BID for erosions on the skin  - Patient to consider patch testing to rule out ACD  2. Allergic contact dermatitis, right upper arm  - s/p punch biopsy 11/19/19. 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 Abimael Martinez is a 30 year old male who presents as a follow-up for dermatitis in the groin area. The patient was last seen on 12/2/19 by Dr. Lisa when skin was improved and he was told to treat with TAC 0.025% ointment BID as needed for symptoms. In addition to this, he was referred for patch testing given ACD on the right upper arm. Today he reports that the skin in his penile region is extremely sensitive, and feels like it is going to crack, but there is no apparent rash in the area. After sex or masturbation, the symptoms are worst and is sometimes so painful it feels that the skin will bleed. He reports that there are new glands in the area that seem to be what is hurting, these have only been present for 2-3 years. He was very worried symptoms could be related to an STI and did testing with PCP on 2/6/20. 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 He notes that he  uses only soap on the groin and occasional various lubes. He reports that his sex life is diminished by his symptoms currently.     At his last visit a referral was sent for patch testing but he has yet to schedule a visit for this because he got the flu and missed his appts. He is planning to schedule.     No other concerns addressed today.    Past Medical History:   Patient Active Problem List   Diagnosis     Anxiety     CARDIOVASCULAR SCREENING; LDL GOAL LESS THAN 160     High risk sexual behavior     Tobacco use disorder     Low back pain     Essential hypertension     Internal hemorrhoids which bleed     Obesity, Class I, BMI 30-34.9     Attention deficit hyperactivity disorder (ADHD), combined type     AYALA (generalized anxiety disorder)     OCD (obsessive compulsive disorder)     Drug-induced erectile dysfunction     Mild persistent asthma without complication     Lumbosacral radiculopathy     Dyslipidemia     Elevated serum creatinine     Past Medical History:   Diagnosis Date     Acute right otitis media 1/8/2013     Anxiety      Depression      Drug abuse (H)      Urethritis 5/20/2013     Problem list name updated by automated process. Provider to review     Past Surgical History:   Procedure Laterality Date     BACK SURGERY  04/05/2018     Social History:  Patient quit smoking cigarettes. Currently uses marijuana.   Reviewed and left in chart for clinician convenience.       Family History:  Unknown family history, adopted.   Reviewed and left in chart for clinician convenience.     Medications:  Current Outpatient Medications   Medication Sig Dispense Refill     albuterol (PROAIR HFA/PROVENTIL HFA/VENTOLIN HFA) 108 (90 Base) MCG/ACT inhaler Inhale 2 puffs into the lungs every 4 hours as needed for wheezing 1 Inhaler 0     ALPRAZolam (XANAX) 2 MG tablet Take 1 tablet (2 mg) by mouth 3 times daily as needed for anxiety 30 tablet 0     amLODIPine (NORVASC) 10 MG tablet Take 1 tablet (10 mg) by mouth daily 30  tablet 1     amphetamine-dextroamphetamine (ADDERALL) 20 MG tablet Take 1 tablet (20 mg) by mouth 3 times daily 90 tablet 0     azelastine (ASTELIN) 0.1 % nasal spray Spray 2 sprays into both nostrils 2 times daily 30 mL 11     famotidine (PEPCID) 40 MG tablet TAKE 1 TABLET(40 MG) BY MOUTH AT BEDTIME 90 tablet 1     fluticasone (FLOVENT HFA) 220 MCG/ACT Inhaler Inhale 2 puffs into the lungs 2 times daily Please give patient spacer 1 Inhaler 3     Hyoscyamine Sulfate 0.375 MG TBCR Take 1 capful by mouth 3 times daily as needed 90 tablet 1     montelukast (SINGULAIR) 10 MG tablet Take 1 tablet (10 mg) by mouth At Bedtime 90 tablet 0     oxyCODONE-acetaminophen (PERCOCET) 5-325 MG per tablet Take 1 tablet by mouth every 6 hours as needed for pain 12 tablet 0     sertraline (ZOLOFT) 50 MG tablet Take 0.5 tablets (25 mg) by mouth daily for 8 days, THEN 1 tablet (50 mg) daily for 22 days. (Patient not taking: Reported on 2/6/2020) 30 tablet 0     sildenafil (REVATIO) 20 MG tablet Take 1-2 tablets (20-40 mg) by mouth daily as needed As needed for prevention of erectile dysfunction 30 tablet 2     triamcinolone (KENALOG) 0.025 % external ointment Apply topically 2 times daily Apply twice a day to sites of irritation 80 g 1     Allergies   Allergen Reactions     Cymbalta      Weight gain and fatigue     Duloxetine Other (See Comments)     Weight gain, fatigue  Enlarged spleen and weight gain     No Clinical Screening - See Comments GI Disturbance     Weight gain and fatigue     Paroxetine Other (See Comments), GI Disturbance and Unknown     Weight gain, fatigue  Weight gain  Spleen issues  Weight gain  Spleen issues     Paxil [Paroxetine Mesylate]      Weight gain and fatigue     Seasonal Allergies      Vicodin [Hydrocodone-Acetaminophen]      Review of Systems:  -Constitutional: Patient is otherwise feeling well, in usual state of health.   -Skin: As above in HPI. No additional skin concerns.    Physical exam:  Vitals:  There were no vitals taken for this visit.  GEN: This is a well developed, well-nourished male in no acute distress, in a pleasant mood.    SKIN: Focused examination of the head/face, groin was performed.  - Pagan type II  - Subtle hypopigmentation inferior to the urethral opening of the penis. Normal sebaceous glands, no skin breakdown. No erosions, no ulcerations  - No other lesions of concern on areas examined.     Impression/Plan:    1. Likely irritant contact dermatitis, penis. Suspected irritant over alelrgic contact dermatitis. Explained that this tends to create a cycle of skin breakdown then skin being more prone to irritation. Reassured patient that this is not an infection but rather skin irritation in the area. This does not clinically look like an allergic reaction, but advised patient to pursue patch testing to potentially rule out any allergens given history of biopsy proven ACD on the right upper arm. Provided patient with the following instructions:   - Avoid using soap to clean the area, use only water.   - Apply Vaseline over top of the affected skin every morning and night to serve as a protective layer.   - Use TAC 0.025% ointment as needed twice daily for skin breakdown.   - Avoid any other products  - Pursue patch testing to rule out any potential allergens    Follow-up PRN.    Staff Involved:  Staff/Scribe    Scribe Disclosure  I, Anne Armando, am serving as a scribe to document services personally performed by Dr. Lizzeth Curtis MD, based on data collection and the provider's statements to me.     Provider Disclosure:   The documentation recorded by the scribe accurately reflects the services I personally performed and the decisions made by me.    Lizzeth Curtis MD    Department of Dermatology  [facility]: Phone: [phone], Fax:[phone]  [facility]: Phone: [phone], Fax: [phone]              Again, thank you for allowing me to participate in the care of your patient.        Sincerely,        Lizzeth Curtis MD

## 2020-02-18 NOTE — NURSING NOTE
@Abimael Martinez's goals for this visit include:   Chief Complaint   Patient presents with     Derm Problem     penile irritation without rash     Patient declined to get undressed.    He requests these members of his care team be copied on today's visit information: NO    PCP: Jamison Lora    Referring Provider:  Aime Smith MD  [address] [place] [id number]    There were no vitals taken for this visit.    Do you need any medication refills at today's visit? NO    Babrara Weinstein [facility]

## 2020-02-18 NOTE — PATIENT INSTRUCTIONS
Avoid using soap to clean the area, use only water.     Apply Vaseline over top of the affected skin every morning and night to serve as a protective layer.     Pursue patch testing to rule out any potential allergens

## 2020-03-02 ENCOUNTER — TELEPHONE (OUTPATIENT)
Dept: FAMILY MEDICINE | Facility: CLINIC | Age: 30
End: 2020-03-02

## 2020-03-02 DIAGNOSIS — F90.2 ATTENTION DEFICIT HYPERACTIVITY DISORDER (ADHD), COMBINED TYPE: ICD-10-CM

## 2020-03-02 NOTE — TELEPHONE ENCOUNTER
Reason for call:  Other   Patient called regarding (reason for call): prescription  Additional comments: Patient is looking for a refill on his adderall prescription.    Phone number to reach patient:  Cell number on file:    Telephone Information:   Mobile [phone]       Best Time:  any    Can we leave a detailed message on this number?  YES    Travel screening: Not Applicable

## 2020-03-03 RX ORDER — DEXTROAMPHETAMINE SACCHARATE, AMPHETAMINE ASPARTATE, DEXTROAMPHETAMINE SULFATE AND AMPHETAMINE SULFATE 5; 5; 5; 5 MG/1; MG/1; MG/1; MG/1
20 TABLET ORAL 3 TIMES DAILY
Qty: 90 TABLET | Refills: 0 | Status: SHIPPED | OUTPATIENT
Start: 2020-03-03 | End: 2020-03-30

## 2020-03-03 NOTE — TELEPHONE ENCOUNTER
Controlled Substance Refill Request for adderall  Problem List Complete:  Yes     Patient is followed by MICHAEL OROPEZA for ongoing prescription of stimulants.  All refills should be approved by this provider, or covering partner.    Last refill 1/24/20  Last office visit 2/11/20     Medication(s): adderall 20 mg.   Maximum quantity per month: 90  Clinic visit frequency required: Q 6  months      Controlled substance agreement on file: 07/31/17     Neuropsych evaluation for ADD completed:  Yes, completed 7-2008 at [facility], on file and diagnosis confirmed     Last [facility] website verification:  3/2/20, printed and given to Dr Geno Lauren, RN

## 2020-03-09 DIAGNOSIS — F41.9 ANXIETY: ICD-10-CM

## 2020-03-09 RX ORDER — ALPRAZOLAM 2 MG
2 TABLET ORAL 3 TIMES DAILY PRN
Qty: 30 TABLET | Refills: 0 | Status: SHIPPED | OUTPATIENT
Start: 2020-03-09 | End: 2020-04-07

## 2020-03-09 NOTE — TELEPHONE ENCOUNTER
Reason for Call:  Medication or medication refill:    Do you use a [facility]?  Name of the pharmacy and phone number for the current request:  [email or web address]  Name of the medication requested: ALPRAZolam (XANAX) 2 MG tablet    Other request: n/a    Can we leave a detailed message on this number? YES    Phone number patient can be reached at: Home number on file [phone] (home)    Best Time: anytime     Call taken on 3/9/2020 at 11:16 AM by Kinga Holder

## 2020-03-09 NOTE — TELEPHONE ENCOUNTER
Controlled Substance Refill Request for alprazolam  Problem List Complete:  Yes     Overview Signed 4/6/2016  8:02 AM by Michael Lora MD   Patient is followed by MICHAEL LORA for ongoing prescription of benzodiazepines.  All refills should be approved by this provider, or covering partner.     Medication(s): alprazolam 2 mg .   Maximum quantity per month: 36  Clinic visit frequency required: Q 6  months   Last OV Dr. Michael Lora: 8/14/18     Controlled substance agreement on file: No  Benzodiazepine use reviewed by psychiatry:  No       checked in past 3 months?  Yes no concerns on 2/7/2020     Heather Lin BSN, RN

## 2020-03-12 ENCOUNTER — TELEPHONE (OUTPATIENT)
Dept: FAMILY MEDICINE | Facility: CLINIC | Age: 30
End: 2020-03-12

## 2020-03-12 NOTE — TELEPHONE ENCOUNTER
Reason for call:  Patient reporting a symptom    Symptom or request: cough, no fever    Duration (how long have symptoms been present): 5 days     Have you been treated for this before? No    Additional comments: patient has been in [place], [place] in the last month.    Patient has asthma has been using inhaler and humidifier.    Thank you        Phone Number patient can be reached at:  Home number on file [phone] (home)    Best Time:  any    Can we leave a detailed message on this number:  YES    Call taken on 3/12/2020 at 6:47 PM by Roula Talley   Normal for race

## 2020-03-13 NOTE — TELEPHONE ENCOUNTER
Patient returned call.  Left message on voicemail for RN.  RN returned call.  Patient states had no international travel.  Was in [place] and [place] this month.  [facility] had several flights.  Has history of asthma and allergies.  States no fever, chills, sweats.  No cold symptoms.  No cough.  No shortness of breath but his lungs feel dry.  He states that it might be a little harder than normal to breath in a deep breath.  Only coughs if deep breathing triggers it. No complaint of wheezing.  Has been using his inhaler and his singulair but doesn't feel helps much.  States symptoms have been ongoing for a few days.  I did advise that he can be seen in urgent care for his respiratory concerns.  Gave him urgent care hours.  He states is going to wait and see if improve over next couple of days; if worsens or not better will be seen.  He doesn't feel he needs to be seen right now for it.  Will continue with his inhaler and singulair.  Be seen if no improvement.

## 2020-03-26 ENCOUNTER — TRANSFERRED RECORDS (OUTPATIENT)
Dept: HEALTH INFORMATION MANAGEMENT | Facility: CLINIC | Age: 30
End: 2020-03-26

## 2020-03-26 ENCOUNTER — TELEPHONE (OUTPATIENT)
Dept: URGENT CARE | Facility: URGENT CARE | Age: 30
End: 2020-03-26

## 2020-03-26 DIAGNOSIS — R06.2 WHEEZING: ICD-10-CM

## 2020-03-26 DIAGNOSIS — R10.10 UPPER ABDOMINAL PAIN: ICD-10-CM

## 2020-03-26 DIAGNOSIS — J45.20 INTERMITTENT ASTHMA, UNCOMPLICATED: ICD-10-CM

## 2020-03-26 RX ORDER — FAMOTIDINE 40 MG/1
TABLET, FILM COATED ORAL
Qty: 90 TABLET | Refills: 1 | Status: SHIPPED | OUTPATIENT
Start: 2020-03-26 | End: 2020-05-31

## 2020-03-26 RX ORDER — ALBUTEROL SULFATE 90 UG/1
2 AEROSOL, METERED RESPIRATORY (INHALATION) EVERY 4 HOURS PRN
Qty: 1 INHALER | Refills: 0 | OUTPATIENT
Start: 2020-03-26

## 2020-03-26 NOTE — TELEPHONE ENCOUNTER
"Requested Prescriptions   Pending Prescriptions Disp Refills     albuterol (PROAIR HFA/PROVENTIL HFA/VENTOLIN HFA) 108 (90 Base) MCG/ACT inhaler  Last Written Prescription Date:  02/25/19  Last Fill Quantity: 1,  # refills: 0   Last Office Visit with [facility] or [facility] prescribing provider:  02/11/2020-Geno   Future Office Visit:    1 Inhaler 0     Sig: Inhale 2 puffs into the lungs every 4 hours as needed for wheezing       Asthma Maintenance Inhalers - Anticholinergics Failed - 3/26/2020  1:38 PM        Failed - Asthma control assessment score within normal limits in last 6 months     Please review ACT score.           Passed - Patient is age 12 years or older        Passed - Medication is active on med list        Passed - Recent (6 mo) or future (30 days) visit within the authorizing provider's specialty     Patient had office visit in the last 6 months or has a visit in the next 30 days with authorizing provider or within the authorizing provider's specialty.  See \"Patient Info\" tab in inbasket, or \"Choose Columns\" in Meds & Orders section of the refill encounter.           Short-Acting Beta Agonist Inhalers Protocol  Failed - 3/26/2020  1:38 PM        Failed - Asthma control assessment score within normal limits in last 6 months     Please review ACT score.           Passed - Patient is age 12 or older        Passed - Medication is active on med list        Passed - Recent (6 mo) or future (30 days) visit within the authorizing provider's specialty     Patient had office visit in the last 6 months or has a visit in the next 30 days with authorizing provider or within the authorizing provider's specialty.  See \"Patient Info\" tab in inbasket, or \"Choose Columns\" in Meds & Orders section of the refill encounter.               ACT Total Scores 2/27/2019 6/28/2019 9/5/2019   ACT TOTAL SCORE - - -   ASTHMA ER VISITS - - -   ASTHMA HOSPITALIZATIONS - - -   ACT TOTAL SCORE (Goal Greater than or Equal to 20) 16 13 " 20   In the past 12 months, how many times did you visit the emergency room for your asthma without being admitted to the hospital? 0 0 0   In the past 12 months, how many times were you hospitalized overnight because of your asthma? 0 0 0

## 2020-03-26 NOTE — TELEPHONE ENCOUNTER
Routing refill request to provider for review/approval because:  Failed ACT    Poonam Lu RN, [facility] Triage

## 2020-03-26 NOTE — TELEPHONE ENCOUNTER
"Requested Prescriptions   Signed Prescriptions Disp Refills    famotidine (PEPCID) 40 MG tablet 90 tablet 1     Sig: TAKE 1 TABLET(40 MG) BY MOUTH AT BEDTIME       H2 Blockers Protocol Passed - 3/26/2020 11:07 AM        Passed - Patient is age 12 or older        Passed - Recent (12 mo) or future (30 days) visit within the authorizing provider's specialty     Patient has had an office visit with the authorizing provider or a provider within the authorizing providers department within the previous 12 mos or has a future within next 30 days. See \"Patient Info\" tab in inbasket, or \"Choose Columns\" in Meds & Orders section of the refill encounter.              Passed - Medication is active on med list             "

## 2020-03-27 NOTE — TELEPHONE ENCOUNTER
This writer attempted to contact patient on 03/27/20      Reason for call schedule a visit with PCP and left message.      If patient calls back:   Registered Nurse called. Follow Triage Call workflow        Sally Gusman RN

## 2020-03-30 ENCOUNTER — TELEPHONE (OUTPATIENT)
Dept: FAMILY MEDICINE | Facility: CLINIC | Age: 30
End: 2020-03-30

## 2020-03-30 DIAGNOSIS — F90.2 ATTENTION DEFICIT HYPERACTIVITY DISORDER (ADHD), COMBINED TYPE: ICD-10-CM

## 2020-03-30 RX ORDER — DEXTROAMPHETAMINE SACCHARATE, AMPHETAMINE ASPARTATE, DEXTROAMPHETAMINE SULFATE AND AMPHETAMINE SULFATE 5; 5; 5; 5 MG/1; MG/1; MG/1; MG/1
20 TABLET ORAL 3 TIMES DAILY
Qty: 90 TABLET | Refills: 0 | Status: SHIPPED | OUTPATIENT
Start: 2020-03-30 | End: 2020-04-30

## 2020-03-30 NOTE — TELEPHONE ENCOUNTER
Controlled Substance Refill Request for adderall  Problem List Complete:  Yes     Patient is followed by MICHAEL OROPEZA for ongoing prescription of stimulants.  All refills should be approved by this provider, or covering partner.     Medication(s): adderall 20 mg.   Maximum quantity per month: 90  Clinic visit frequency required: Q 6  months      Controlled substance agreement on file: 07/31/17     Neuropsych evaluation for ADD completed:  Yes, completed 7-2008 at [facility], on file and diagnosis confirmed     Last [facility] website verification:  3/2/20, printed and given to Dr Geno Lin BSN, RN

## 2020-04-07 ENCOUNTER — TELEPHONE (OUTPATIENT)
Dept: FAMILY MEDICINE | Facility: CLINIC | Age: 30
End: 2020-04-07

## 2020-04-07 DIAGNOSIS — F41.9 ANXIETY: ICD-10-CM

## 2020-04-07 RX ORDER — ALPRAZOLAM 2 MG
2 TABLET ORAL 3 TIMES DAILY PRN
Qty: 30 TABLET | Refills: 0 | Status: SHIPPED | OUTPATIENT
Start: 2020-04-07 | End: 2020-05-08

## 2020-04-14 ENCOUNTER — TELEPHONE (OUTPATIENT)
Dept: DERMATOLOGY | Facility: CLINIC | Age: 30
End: 2020-04-14

## 2020-04-14 NOTE — TELEPHONE ENCOUNTER
[facility] Call Center    Phone Message    May a detailed message be left on voicemail: no     Reason for Call: pt wanted to be seen  asap for patch test consult due to flare up,  explained to pt due to covid 19 appts are being deferred. Please call pt if earlier appt is  avail. Thank you     Action Taken: Message routed to:  [facility] allergy    Travel Screening: Not Applicable

## 2020-04-15 NOTE — TELEPHONE ENCOUNTER
LVM for patient to call back. Patient can be scheduled for a video consultation visit if her would like. Currently any testing is being differed to July but a consult is needed prior to testing.

## 2020-04-15 NOTE — TELEPHONE ENCOUNTER
FUTURE VISIT INFORMATION      FUTURE VISIT INFORMATION:    Date: 6/16/20    Time: 12:15 PM    Location: [facility]-Allergy  REFERRAL INFORMATION:    Referring provider:  Dr. Rivera Lisa    Referring providers clinic:  [facility] - Dermatology    Reason for visit/diagnosis: Consult Patch Testing    RECORDS REQUESTED FROM:       Clinic name Comments Records Status Imaging Status   [facility] 2/18/20 - DERM OV with Dr. Curtis  12/2/19 - DERM OV with Dr. Lisa  10/18/19 - [facility] OV [facility] 8/27/19 - [facility] 8/27/19 - CT Sinus WO Epic PACs   [facility] 2/3/20 - ALLERGY OV with Dr. Bush [facility]    [facility] 8[facility] - OV with Dr. Kenny Scanned In

## 2020-04-22 ENCOUNTER — VIRTUAL VISIT (OUTPATIENT)
Dept: ALLERGY | Facility: CLINIC | Age: 30
End: 2020-04-22
Payer: COMMERCIAL

## 2020-04-22 DIAGNOSIS — H10.10 ALLERGIC RHINOCONJUNCTIVITIS: ICD-10-CM

## 2020-04-22 DIAGNOSIS — L23.89 ALLERGIC CONTACT DERMATITIS DUE TO OTHER AGENTS: Primary | ICD-10-CM

## 2020-04-22 DIAGNOSIS — J32.9 SINUSITIS, UNSPECIFIED CHRONICITY, UNSPECIFIED LOCATION: ICD-10-CM

## 2020-04-22 DIAGNOSIS — J30.9 ALLERGIC RHINOCONJUNCTIVITIS: ICD-10-CM

## 2020-04-22 DIAGNOSIS — J45.20 MILD INTERMITTENT ASTHMA WITHOUT COMPLICATION: ICD-10-CM

## 2020-04-22 RX ORDER — LORATADINE 10 MG/1
10 TABLET ORAL DAILY
Qty: 60 TABLET | Refills: 0 | Status: SHIPPED | OUTPATIENT
Start: 2020-04-22 | End: 2020-07-22

## 2020-04-22 RX ORDER — MOMETASONE FUROATE 1 MG/G
OINTMENT TOPICAL
Qty: 45 G | Refills: 2 | Status: SHIPPED | OUTPATIENT
Start: 2020-04-22 | End: 2024-09-20

## 2020-04-22 ASSESSMENT — PAIN SCALES - GENERAL: PAINLEVEL: NO PAIN (0)

## 2020-04-22 NOTE — PATIENT INSTRUCTIONS
[facility] Teledermatology Visit    Thank you for allowing us to participate in your care. Your findings, instructions and follow-up plan are as follows:    Order for PATCH TESTS    [] Outpatient  [] Inpatient: [facility]..../ Bed ....      Skin Atopy (atopic dermatitis) [x] Yes   [] No  Rhinitis/Sinusitis:   [x] Yes   [] No  Allergic Asthma:   [x] Yes   [] No  Food Allergy:   [] Yes   [x] No  Leg ulcers:   [] Yes   [x] No  Hand eczema:   [] Yes   [x] No   Leading hand:   [] R   [] L       [] Ambidextrous                        Reason for tests (suspected allergy): allergic contact dermatitis to tattoo and seasonal aggravation to pollens  Known previous allergies: see below    Standardized panels  [x] Standard panel (40 tests)  [x] Preservatives & Antimicrobials (31 tests)  [x] Emulsifiers & Additives (25 tests)   [] Perfumes/Flavours & Plants (25 tests)  [] Hairdresser panel (12 tests)  [] Rubber Chemicals (22 tests)  [] Plastics (26 tests)  [x] Colorants/Dyes/Food additives (20 tests)  [] Metals (implants/dental) (24 tests)  [] Local anaesthetics/NSAIDs (13 tests)  [] Antibiotics & Antimycotics (14 tests)   [] Corticosteroids (15 tests)   [] Photopatch test (62 tests)   [x] others: house dust mites, molds, tree and grass pollens and dog from prick tests      [] Patient's own products: ...    DO NOT test if chemical or biological identity is unknown!     always ask from patient the product information and safety sheets (MSDS)   [x] Atopy screen prick test (Atopic predisposition): and if possible IDT with delayed readings        Diagnosis:    >> delayed type reaction to black tattoo on certain areas and aggravation during pollen season   DDx allergic contact dermatitis to additive or color in tattoo and  aggravation by delayed type hypersensitivity to inhalant  allergens    >> perennial rhinoconjunctivitis and sinusitis/otitis with Asthma and seasonal aggravation (spring/summer)    Atopic  predisposition    Perennial = house dust mite and molds (unlikely dog) = immediate and delayed?    Seasonal = tree pollens, maybe grass pollens = immediate and delayed?    Recurrent dermatitis (nummular)       Plan/treatment:    - Triamcinolone does not work always. Try Mometasone ointment (if flare up use for 4 days in a row or about 2xweekly)  - Allegra 180mg prn or Loratadine 10mg 1-2 times daily if necessary      Patient counseling:  Explained patch tests      Follow-up: will see patient for the patch tests (stop antihistamines 5 days prior)    When should I call my doctor?    If you are worsening or not improving, please, contact us or seek urgent care as noted below.     Who should I call with questions (adults)?    [facility] (adult and pediatric): [phone]     [facility] (adult): [phone]    For urgent needs outside of business hours call the [facility] at [phone] and ask for the dermatology resident on call    If this is a medical emergency and you are unable to reach an ER, Call [phone]      Who should I call with questions (pediatric)?  [facility]- Pediatric Dermatology  Dr. Madison Sierra, Dr. Gertrudis Wild, Dr. Pari Lezama, JESSIE Olivera Dr., Dr. Daniela Faye & Dr. Davon Avendano  Non Urgent  Nurse Triage Line; [phone]- Barbara and Kenisha CANO Care Coordinators   [place] (/Complex ) [phone]    If you need a prescription refill, please contact your pharmacy. Refills are approved or denied by our Physicians during normal business hours, Monday through Fridays  Per office policy, refills will not be granted if you have not been seen within the past year (or sooner depending on your child's condition)    Scheduling Information:  Pediatric Appointment Scheduling and Call Center [phone]  Radiology Scheduling- [phone]  Sedation Unit  Scheduling- [phone]  [place] Scheduling- General [phone]; Pediatric Dermatology [phone]  Main  Services: [phone]  Kiswahili: [phone]  Greek: [phone]  Hmong/Adolph/Serbian: [phone]  Preadmission Nursing Department Fax Number: [phone] (Fax all pre-operative paperwork to this number)    For urgent matters arising during evenings, weekends, or holidays that cannot wait for normal business hours please call [phone] and ask for the Dermatology Resident On-Call to be paged.

## 2020-04-22 NOTE — NURSING NOTE
Chief Complaint   Patient presents with     Allergy Consult     area of concern: black ink around tattoo. States it black ink raises to the point where it seems like it is going to bleed and it itches alot. States it started getting worse after starting allergy shots about a year ago. Pateint is interested in patch tesing.       Janeth Rivera LPN

## 2020-04-22 NOTE — PROGRESS NOTES
[facility] Dermato-Allergy Record (Store and Forward and Video ( Invitation sent by:  Elvi and send to e-mail at: [email or web address] ))    Image quality and interpretability: acceptable    Physician has received verbal consent for a Video/Photos Visit from the patient? Yes    In-person dermato-allergy visit recommendation: yes - for physician visit      Allergy Problem List:    Specialty Problems        Allergy Diagnoses    Mild persistent asthma without complication              CC:   No chief complaint on file.        Encounter Date: Apr 22, 2020    History of Present Illness:  I have reviewed the teledermatology  information and the nursing intake corresponding to this issue. Abimael Martinez is a 30 year old male who presents as a teledermatology consult for the following information take directly from the nursing note (kept in this note for patient safety) and video call performed by myself:     Patient had a tattoo more than 10 years. Never problems until 2 years ago with raised outline of the tattoo. It is mostly itchy and inflamed during the allergy season. Big black tattoo on left upper arm. It scabs and is very itchy.    Has all over the body black tattoos, but only that on the arm makes troubles.    Patient has seasonal allergies. As a child less problems, but [place] patient gets stuffy, runny nose with postnasal drip and sinusitis and chronic ear infections --> got 4-6 months allergy shots and then problems with ordering of the allergens. After 3rd shot patient had generalized reactions. Afterwards patient had to dilute and no issues. Got few days after IT always some eczematous lesions on trunk.  Patient has year around allergies with aggravation in spring/summer, but not fall. Patient has pets with small dog and no issues.    Patient has Asthma (more cold induced and some wheezing at exhalation during season) --> treatment with prolair    Never eczema as child. In  the past year random eczematous spots on trunk and extremities      Past Medical History:   Patient Active Problem List   Diagnosis     Anxiety     CARDIOVASCULAR SCREENING; LDL GOAL LESS THAN 160     High risk sexual behavior     Tobacco use disorder     Low back pain     Essential hypertension     Internal hemorrhoids which bleed     Obesity, Class I, BMI 30-34.9     Attention deficit hyperactivity disorder (ADHD), combined type     AYALA (generalized anxiety disorder)     OCD (obsessive compulsive disorder)     Drug-induced erectile dysfunction     Mild persistent asthma without complication     Lumbosacral radiculopathy     Dyslipidemia     Elevated serum creatinine     Past Medical History:   Diagnosis Date     Acute right otitis media 1/8/2013     Anxiety      Depression      Drug abuse (H)      Urethritis 5/20/2013     Problem list name updated by automated process. Provider to review       Allergy History:     Allergies   Allergen Reactions     Cymbalta      Weight gain and fatigue     Duloxetine Other (See Comments)     Weight gain, fatigue  Enlarged spleen and weight gain     No Clinical Screening - See Comments GI Disturbance     Weight gain and fatigue     Paroxetine Other (See Comments), GI Disturbance and Unknown     Weight gain, fatigue  Weight gain  Spleen issues  Weight gain  Spleen issues     Paxil [Paroxetine Mesylate]      Weight gain and fatigue     Seasonal Allergies      Vicodin [Hydrocodone-Acetaminophen]        Social History:  The patient works as a . Patient has the following hobbies or non-occupational exposure: Urlist     reports that he quit smoking about 2 years ago. His smoking use included cigarettes. He has a 3.00 pack-year smoking history. He has never used smokeless tobacco. He reports current alcohol use. He reports current drug use. Drug: Marijuana.      Family History:  Family History   Problem Relation Age of Onset     Unknown/Adopted Mother       Unknown/Adopted Father      Unknown/Adopted Maternal Grandmother      Unknown/Adopted Maternal Grandfather      Unknown/Adopted Paternal Grandmother      Unknown/Adopted Paternal Grandfather      Unknown/Adopted Brother        Medications:  Current Outpatient Medications   Medication Sig Dispense Refill     albuterol (PROAIR HFA/PROVENTIL HFA/VENTOLIN HFA) 108 (90 Base) MCG/ACT inhaler Inhale 2 puffs into the lungs every 4 hours as needed for wheezing 1 Inhaler 0     ALPRAZolam (XANAX) 2 MG tablet Take 1 tablet (2 mg) by mouth 3 times daily as needed for anxiety 30 tablet 0     amLODIPine (NORVASC) 10 MG tablet Take 1 tablet (10 mg) by mouth daily 30 tablet 1     amphetamine-dextroamphetamine (ADDERALL) 20 MG tablet Take 1 tablet (20 mg) by mouth 3 times daily 90 tablet 0     azelastine (ASTELIN) 0.1 % nasal spray Spray 2 sprays into both nostrils 2 times daily 30 mL 11     famotidine (PEPCID) 40 MG tablet TAKE 1 TABLET(40 MG) BY MOUTH AT BEDTIME 90 tablet 1     fluticasone (FLOVENT HFA) 220 MCG/ACT Inhaler Inhale 2 puffs into the lungs 2 times daily Please give patient spacer 1 Inhaler 3     Hyoscyamine Sulfate 0.375 MG TBCR Take 1 capful by mouth 3 times daily as needed 90 tablet 1     montelukast (SINGULAIR) 10 MG tablet Take 1 tablet (10 mg) by mouth At Bedtime 90 tablet 0     oxyCODONE-acetaminophen (PERCOCET) 5-325 MG per tablet Take 1 tablet by mouth every 6 hours as needed for pain 12 tablet 0     sertraline (ZOLOFT) 50 MG tablet Take 0.5 tablets (25 mg) by mouth daily for 8 days, THEN 1 tablet (50 mg) daily for 22 days. (Patient not taking: Reported on 2/6/2020) 30 tablet 0     sildenafil (REVATIO) 20 MG tablet Take 1-2 tablets (20-40 mg) by mouth daily as needed As needed for prevention of erectile dysfunction 30 tablet 2     triamcinolone (KENALOG) 0.025 % external ointment Apply topically 2 times daily Apply twice a day to sites of irritation 80 g 1       Additional comments and observations from review  of the patient s chart including the following:    See above    ROS: Patient generally feeling well today   Physical Examination:  General: Well-appearing, appropriately-developed individual.  Skin: Focused examination of the upper body/arm within the teledermatology photograph(s)* was performed.   - left upper arm with large surface black/grey tattoo and eczematous lesions mostly on the distal part    Allergy Tests:    Past Allergy Test: prick tests positive to pollens, cockroach, dust mites, trees, ragweed, grass pollens    Future Allergy Test:     Order for PATCH TESTS    [] Outpatient  [] Inpatient: [facility]..../ Bed ....      Skin Atopy (atopic dermatitis) [x] Yes   [] No  Rhinitis/Sinusitis:   [x] Yes   [] No  Allergic Asthma:   [x] Yes   [] No  Food Allergy:   [] Yes   [x] No  Leg ulcers:   [] Yes   [x] No  Hand eczema:   [] Yes   [x] No   Leading hand:   [] R   [] L       [] Ambidextrous                        Reason for tests (suspected allergy): allergic contact dermatitis to tattoo and seasonal aggravation to pollens  Known previous allergies: see below    Standardized panels  [x] Standard panel (40 tests)  [x] Preservatives & Antimicrobials (31 tests)  [x] Emulsifiers & Additives (25 tests)   [] Perfumes/Flavours & Plants (25 tests)  [] Hairdresser panel (12 tests)  [] Rubber Chemicals (22 tests)  [] Plastics (26 tests)  [x] Colorants/Dyes/Food additives (20 tests)  [] Metals (implants/dental) (24 tests)  [] Local anaesthetics/NSAIDs (13 tests)  [] Antibiotics & Antimycotics (14 tests)   [] Corticosteroids (15 tests)   [] Photopatch test (62 tests)   [x] others: house dust mites, molds, tree and grass pollens and dog from prick tests      [] Patient's own products: ...    DO NOT test if chemical or biological identity is unknown!     always ask from patient the product information and safety sheets (MSDS)   [x] Atopy screen prick test (Atopic predisposition): and if possible IDT with delayed  readings        Diagnosis:    >> delayed type reaction to black tattoo on certain areas and aggravation during pollen season   DDx allergic contact dermatitis to additive or color in tattoo and  aggravation by delayed type hypersensitivity to inhalant  allergens    >> perennial rhinoconjunctivitis and sinusitis/otitis with Asthma and seasonal aggravation (spring/summer)    Atopic predisposition    Perennial = house dust mite and molds (unlikely dog) = immediate and delayed?    Seasonal = tree pollens, maybe grass pollens = immediate and delayed?    Recurrent dermatitis (nummular)       Plan/treatment:    - Triamcinolone does not work always. Try Mometasone ointment (if flare up use for 4 days in a row or about 2xweekly)  - Allegra 180mg prn or Loratadine 10mg 1-2 times daily if necessary      Patient counseling:  Explained patch tests      Follow-up: will see patient for the patch tests (stop antihistamines 5 days prior)      Thank you for the opportunity be involved in the care of this patient.     Staff:  Janeth Rivera LPN        _____________________________________________________________________________    Teledermatology information:  - Location of patient: Home  - Patient presented in referral from: self   - Location of teledermatologist:  ([facility] (, [place], [place])  - Reason teledermatology is appropriate:  of National Emergency Regarding Coronavirus disease (COVID 19) Outbreak  - Method of transmission:  Store and Forward and Video ( Invitation sent by:  Elvi and send to e-mail at: [email or web address] )  - Date of images: per video  - Service start time:1:19 pm  - Service end time:1:56 pm  - Date of report: 04/22/20      I spent a total of 35 minutes for telemedicine consult with Abimael Martinez during today s video meeting. Over 50% of this time was spent counseling the patient and/or coordinating care. Please see Assessment and Plan for details.

## 2020-04-30 ENCOUNTER — TELEPHONE (OUTPATIENT)
Dept: FAMILY MEDICINE | Facility: CLINIC | Age: 30
End: 2020-04-30

## 2020-04-30 ENCOUNTER — TRANSFERRED RECORDS (OUTPATIENT)
Dept: HEALTH INFORMATION MANAGEMENT | Facility: CLINIC | Age: 30
End: 2020-04-30

## 2020-04-30 DIAGNOSIS — F90.2 ATTENTION DEFICIT HYPERACTIVITY DISORDER (ADHD), COMBINED TYPE: ICD-10-CM

## 2020-04-30 RX ORDER — DEXTROAMPHETAMINE SACCHARATE, AMPHETAMINE ASPARTATE, DEXTROAMPHETAMINE SULFATE AND AMPHETAMINE SULFATE 5; 5; 5; 5 MG/1; MG/1; MG/1; MG/1
20 TABLET ORAL 3 TIMES DAILY
Qty: 90 TABLET | Refills: 0 | Status: SHIPPED | OUTPATIENT
Start: 2020-04-30 | End: 2020-05-31

## 2020-04-30 NOTE — TELEPHONE ENCOUNTER
Reason for call:  Other   Patient called regarding (reason for call): call back  Additional comments:  Prescription renewal for adderall     Phone number to reach patient:  Cell number on file:    Telephone Information:   Mobile [phone]       Best Time:  anytime    Can we leave a detailed message on this number?  YES    Travel screening: Negative

## 2020-04-30 NOTE — TELEPHONE ENCOUNTER
Controlled Substance Refill Request for adderall  Problem List Complete:  Yes     Patient is followed by MICHAEL OROPEZA for ongoing prescription of stimulants.  All refills should be approved by this provider, or covering partner.     Medication(s): adderall 20 mg.   Maximum quantity per month: 90  Clinic visit frequency required: Q 6  months      Controlled substance agreement on file: 07/31/17     Neuropsych evaluation for ADD completed:  Yes, completed 7-2008 at [facility], on file and diagnosis confirmed     Last [facility] website verification:  3/2/20, printed and given to Dr Geno Lin BSN, RN

## 2020-05-08 ENCOUNTER — TELEPHONE (OUTPATIENT)
Dept: FAMILY MEDICINE | Facility: CLINIC | Age: 30
End: 2020-05-08

## 2020-05-08 DIAGNOSIS — R06.2 WHEEZING: ICD-10-CM

## 2020-05-08 DIAGNOSIS — F41.9 ANXIETY: ICD-10-CM

## 2020-05-08 DIAGNOSIS — J45.20 INTERMITTENT ASTHMA, UNCOMPLICATED: ICD-10-CM

## 2020-05-08 RX ORDER — ALPRAZOLAM 2 MG
2 TABLET ORAL 3 TIMES DAILY PRN
Qty: 30 TABLET | Refills: 0 | Status: SHIPPED | OUTPATIENT
Start: 2020-05-08 | End: 2020-06-07

## 2020-05-29 ENCOUNTER — TRANSFERRED RECORDS (OUTPATIENT)
Dept: HEALTH INFORMATION MANAGEMENT | Facility: CLINIC | Age: 30
End: 2020-05-29

## 2020-05-31 ENCOUNTER — MYC REFILL (OUTPATIENT)
Dept: FAMILY MEDICINE | Facility: CLINIC | Age: 30
End: 2020-05-31

## 2020-05-31 DIAGNOSIS — R10.10 UPPER ABDOMINAL PAIN: ICD-10-CM

## 2020-05-31 DIAGNOSIS — F90.2 ATTENTION DEFICIT HYPERACTIVITY DISORDER (ADHD), COMBINED TYPE: ICD-10-CM

## 2020-06-01 RX ORDER — FAMOTIDINE 40 MG/1
40 TABLET, FILM COATED ORAL AT BEDTIME
Qty: 90 TABLET | Refills: 1 | Status: SHIPPED | OUTPATIENT
Start: 2020-06-01 | End: 2020-10-29

## 2020-06-01 RX ORDER — DEXTROAMPHETAMINE SACCHARATE, AMPHETAMINE ASPARTATE, DEXTROAMPHETAMINE SULFATE AND AMPHETAMINE SULFATE 5; 5; 5; 5 MG/1; MG/1; MG/1; MG/1
20 TABLET ORAL 3 TIMES DAILY
Qty: 90 TABLET | Refills: 0 | Status: SHIPPED | OUTPATIENT
Start: 2020-06-01 | End: 2020-06-30

## 2020-06-01 NOTE — TELEPHONE ENCOUNTER
Controlled Substance Refill Request for adderall  Problem List Complete:  Yes     Patient is followed by MICHAEL OROPEZA for ongoing prescription of stimulants.  All refills should be approved by this provider, or covering partner.     Medication(s): adderall 20 mg.   Maximum quantity per month: 90  Clinic visit frequency required: Q 6  months      Controlled substance agreement on file: 07/31/17     Neuropsych evaluation for ADD completed:  Yes, completed 7-2008 at [facility], on file and diagnosis confirmed     Last [facility] website verification:  3/2/20, printed and given to Dr Geno Lin BSN, RN

## 2020-06-07 ENCOUNTER — MYC REFILL (OUTPATIENT)
Dept: FAMILY MEDICINE | Facility: CLINIC | Age: 30
End: 2020-06-07

## 2020-06-07 DIAGNOSIS — F41.9 ANXIETY: ICD-10-CM

## 2020-06-08 RX ORDER — ALPRAZOLAM 2 MG
2 TABLET ORAL 3 TIMES DAILY PRN
Qty: 30 TABLET | Refills: 0 | Status: SHIPPED | OUTPATIENT
Start: 2020-06-08 | End: 2020-07-08

## 2020-06-08 NOTE — TELEPHONE ENCOUNTER
Controlled Substance Refill Request for xanax 2 mg   Problem List Complete:  Yes    Patient is followed by MICHAEL OROPEZA for ongoing prescription of benzodiazepines.  All refills should be approved by this provider, or covering partner.     Medication(s): alprazolam 2 mg .   Maximum quantity per month: 36  Clinic visit frequency required: Q 6  months      Controlled substance agreement on file: No  Benzodiazepine use reviewed by psychiatry:  No       checked in past 3 months?  Yes, 5/8/20    Heather Lin BSN, RN

## 2020-06-16 ENCOUNTER — PRE VISIT (OUTPATIENT)
Dept: ALLERGY | Facility: CLINIC | Age: 30
End: 2020-06-16

## 2020-06-26 ENCOUNTER — TRANSFERRED RECORDS (OUTPATIENT)
Dept: HEALTH INFORMATION MANAGEMENT | Facility: CLINIC | Age: 30
End: 2020-06-26

## 2020-06-26 LAB — PHQ9 SCORE: 9

## 2020-06-29 ENCOUNTER — TRANSFERRED RECORDS (OUTPATIENT)
Dept: HEALTH INFORMATION MANAGEMENT | Facility: CLINIC | Age: 30
End: 2020-06-29

## 2020-06-30 ENCOUNTER — MYC REFILL (OUTPATIENT)
Dept: FAMILY MEDICINE | Facility: CLINIC | Age: 30
End: 2020-06-30

## 2020-06-30 DIAGNOSIS — F90.2 ATTENTION DEFICIT HYPERACTIVITY DISORDER (ADHD), COMBINED TYPE: ICD-10-CM

## 2020-07-01 RX ORDER — DEXTROAMPHETAMINE SACCHARATE, AMPHETAMINE ASPARTATE, DEXTROAMPHETAMINE SULFATE AND AMPHETAMINE SULFATE 5; 5; 5; 5 MG/1; MG/1; MG/1; MG/1
20 TABLET ORAL 3 TIMES DAILY
Qty: 90 TABLET | Refills: 0 | Status: SHIPPED | OUTPATIENT
Start: 2020-07-01 | End: 2020-08-02

## 2020-07-01 NOTE — TELEPHONE ENCOUNTER
Controlled Substance Refill Request for see above   Problem List Complete:  Yes   checked in past 3 months?  Yes :  7/1/2020 no issues

## 2020-07-08 ENCOUNTER — MYC REFILL (OUTPATIENT)
Dept: FAMILY MEDICINE | Facility: CLINIC | Age: 30
End: 2020-07-08

## 2020-07-08 DIAGNOSIS — F41.9 ANXIETY: ICD-10-CM

## 2020-07-09 RX ORDER — ALPRAZOLAM 2 MG
2 TABLET ORAL 3 TIMES DAILY PRN
Qty: 30 TABLET | Refills: 0 | Status: SHIPPED | OUTPATIENT
Start: 2020-07-09 | End: 2020-08-07

## 2020-07-14 ENCOUNTER — TELEPHONE (OUTPATIENT)
Dept: DERMATOLOGY | Facility: CLINIC | Age: 30
End: 2020-07-14

## 2020-07-14 NOTE — TELEPHONE ENCOUNTER
"[facility] Call Center    Phone Message    May a detailed message be left on voicemail: yes     Reason for Call: Other: Patient is wanting a phone appointment with pictures. BUT will only send pictures is they pictures can be deleted after the visit. He does not want his \"private area to be viewed by all  employees\" Please advise     Action Taken: Message routed to:  Adult Clinics: Dermatology p [phone]                                                                      "

## 2020-07-15 NOTE — TELEPHONE ENCOUNTER
This writer attempted to contact 1 on 07/15/20      Reason for call per message and left message.      If patient calls back:   Relay message - Unfortunately, the photos will be part of the patient's medical record and cannot be deleted once photos are received. He may postpone the appointment out to Sep. Or Oct., (read verbatim), document that pt called and close encounter.        [id number], MEDICAL ASSISTANT

## 2020-07-17 NOTE — TELEPHONE ENCOUNTER
Left message for patient to call [facility] in [place] back at [phone] to discuss message below    Mehnaz Salazar LPN

## 2020-07-22 ENCOUNTER — MYC MEDICAL ADVICE (OUTPATIENT)
Dept: FAMILY MEDICINE | Facility: CLINIC | Age: 30
End: 2020-07-22

## 2020-07-22 ENCOUNTER — OFFICE VISIT (OUTPATIENT)
Dept: NURSING | Facility: CLINIC | Age: 30
End: 2020-07-22
Payer: COMMERCIAL

## 2020-07-22 ENCOUNTER — VIRTUAL VISIT (OUTPATIENT)
Dept: FAMILY MEDICINE | Facility: CLINIC | Age: 30
End: 2020-07-22
Payer: COMMERCIAL

## 2020-07-22 DIAGNOSIS — J02.9 PHARYNGITIS, UNSPECIFIED ETIOLOGY: Primary | ICD-10-CM

## 2020-07-22 LAB
DEPRECATED S PYO AG THROAT QL EIA: NEGATIVE
SPECIMEN SOURCE: NORMAL

## 2020-07-22 PROCEDURE — 87651 STREP A DNA AMP PROBE: CPT | Performed by: FAMILY MEDICINE

## 2020-07-22 PROCEDURE — 99213 OFFICE O/P EST LOW 20 MIN: CPT | Mod: 95 | Performed by: FAMILY MEDICINE

## 2020-07-22 PROCEDURE — 99207 ZZC NO CHARGE NURSE ONLY: CPT

## 2020-07-22 PROCEDURE — 40001204 ZZHCL STATISTIC STREP A RAPID: Performed by: FAMILY MEDICINE

## 2020-07-22 RX ORDER — LIDOCAINE HYDROCHLORIDE 20 MG/ML
SOLUTION OROPHARYNGEAL
Qty: 200 ML | Refills: 1 | Status: SHIPPED | OUTPATIENT
Start: 2020-07-22 | End: 2020-09-08

## 2020-07-22 NOTE — PROGRESS NOTES
"Abimael Martinez is a 30 year old male who is being evaluated via a billable telephone visit.      The patient has been notified of following:     \"This telephone visit will be conducted via a call between you and your physician/provider. We have found that certain health care needs can be provided without the need for a physical exam.  This service lets us provide the care you need with a short phone conversation.  If a prescription is necessary we can send it directly to your pharmacy.  If lab work is needed we can place an order for that and you can then stop by our lab to have the test done at a later time.    Telephone visits are billed at different rates depending on your insurance coverage. During this emergency period, for some insurers they may be billed the same as an in-person visit.  Please reach out to your insurance provider with any questions.    If during the course of the call the physician/provider feels a telephone visit is not appropriate, you will not be charged for this service.\"    Patient has given verbal consent for Telephone visit?  Yes    What phone number would you like to be contacted at? [phone]    How would you like to obtain your AVS? Melisahart    Subjective     Abimael Martinez is a 30 year old male who presents via phone visit today for the following health issues:    Sinus issues, tried nasal sprays, feels very dry  And painful in the back to nose. No fever or chills. x2 days. Little headache throughout the night last night    HPI                   Reviewed and updated as needed this visit by Provider         Review of Systems          Objective   Reported vitals:  There were no vitals taken for this visit.   healthy, alert and no distress  PSYCH: Alert and oriented times 3; coherent speech, normal   rate and volume, able to articulate logical thoughts, able   to abstract reason, no tangential thoughts, no hallucinations   or delusions  His affect is normal  RESP: No " "cough, no audible wheezing, able to talk in full sentences  Remainder of exam unable to be completed due to telephone visits    Diagnostic Test Results:  Labs reviewed in Epic        Assessment/Plan:    1. Pharyngitis, unspecified etiology    - lidocaine (XYLOCAINE) 2 % solution; Gargle and spit out 5-15 ml every 3 hours  Max 8 doses/24 hour period.  Dispense: 200 mL; Refill: 1  - Streptococcus A Rapid Scr w Reflx to PCR; Future    No follow-ups on file.      Phone call duration:  7 minutes    Jamison Lora MD      --------------------------------------------------------------------------------------------------------------------------------------  SUBJECTIVE:   Abimael Martinez is a 30 year old male presenting with a chief complaint of pain in the back of his throat and nose.  The patient first noted the onset of symptoms was 2 day(s) ago.  The back of his throat felt dry.  He woke up yesterday he felt like the back of his throat was more irritated and dry   Last night he could not sleep well because it hurt  Now every time he swallows it feels \"like a blow torch\".    He had mononucleosis many years ago.    He (or parent) denies: fever.  He (or parent) denies: a cough.  He (or parent) denies: shortness of breath.    The patient (or parent) reports that he had tried a nasal spray and it has not been helpful.  The patient has the following predisposing factors for infection:None.    Patient Active Problem List   Diagnosis     Anxiety     CARDIOVASCULAR SCREENING; LDL GOAL LESS THAN 160     High risk sexual behavior     Tobacco use disorder     Low back pain     Essential hypertension     Internal hemorrhoids which bleed     Obesity, Class I, BMI 30-34.9     Attention deficit hyperactivity disorder (ADHD), combined type     AYALA (generalized anxiety disorder)     OCD (obsessive compulsive disorder)     Drug-induced erectile dysfunction     Mild persistent asthma without complication     Lumbosacral " radiculopathy     Dyslipidemia     Elevated serum creatinine     Current Outpatient Medications   Medication Sig Dispense Refill     albuterol (PROAIR HFA/PROVENTIL HFA/VENTOLIN HFA) 108 (90 Base) MCG/ACT inhaler Inhale 2 puffs into the lungs every 4 hours as needed for wheezing 1 Inhaler 0     ALPRAZolam (XANAX) 2 MG tablet Take 1 tablet (2 mg) by mouth 3 times daily as needed for anxiety 30 tablet 0     amphetamine-dextroamphetamine (ADDERALL) 20 MG tablet Take 1 tablet (20 mg) by mouth 3 times daily 90 tablet 0     azelastine (ASTELIN) 0.1 % nasal spray Spray 2 sprays into both nostrils 2 times daily 30 mL 11     famotidine (PEPCID) 40 MG tablet Take 1 tablet (40 mg) by mouth At Bedtime 90 tablet 1     fluticasone (FLOVENT HFA) 220 MCG/ACT Inhaler Inhale 2 puffs into the lungs 2 times daily Please give patient spacer 1 Inhaler 3     Hyoscyamine Sulfate 0.375 MG TBCR Take 1 capful by mouth 3 times daily as needed 90 tablet 1     mometasone (ELOCON) 0.1 % external ointment If necessary use 3-4 days in a row or 2xweekly on eczematous lesions 45 g 2     montelukast (SINGULAIR) 10 MG tablet Take 1 tablet (10 mg) by mouth At Bedtime 90 tablet 0     oxyCODONE-acetaminophen (PERCOCET) 5-325 MG per tablet Take 1 tablet by mouth every 6 hours as needed for pain 12 tablet 0     sildenafil (REVATIO) 20 MG tablet Take 1-2 tablets (20-40 mg) by mouth daily as needed As needed for prevention of erectile dysfunction 30 tablet 2     triamcinolone (KENALOG) 0.025 % external ointment Apply topically 2 times daily Apply twice a day to sites of irritation 80 g 1     amLODIPine (NORVASC) 10 MG tablet Take 1 tablet (10 mg) by mouth daily (Patient not taking: Reported on 2020) 30 tablet 1     Social History     Tobacco Use     Smoking status: Former Smoker     Packs/day: 0.50     Years: 6.00     Pack years: 3.00     Types: Cigarettes     Last attempt to quit: 3/12/2018     Years since quittin.3     Smokeless tobacco: Never Used      Tobacco comment: second hand smoke exposure   Substance Use Topics     Alcohol use: Yes     Comment: once a week         OBJECTIVE  :There were no vitals taken for this visit.      ASSESSMENT:  Pharyngitis    PLAN:  I asked the patient to make a laboratory appointment(s) at our drive [place] tent to get a strep test.  I prescribed viscous lidocaine.  If it is not covered he will use the over the counter products below.  I recommended that the patient get lots of fluids and rest. and I recommended OTC med for sore throat including cepacol, cepastat or chloroceptic spray or saltwater gargles.

## 2020-07-23 ENCOUNTER — VIRTUAL VISIT (OUTPATIENT)
Dept: FAMILY MEDICINE | Facility: CLINIC | Age: 30
End: 2020-07-23
Payer: COMMERCIAL

## 2020-07-23 ENCOUNTER — NURSE TRIAGE (OUTPATIENT)
Dept: NURSING | Facility: CLINIC | Age: 30
End: 2020-07-23

## 2020-07-23 ENCOUNTER — OFFICE VISIT (OUTPATIENT)
Dept: NURSING | Facility: CLINIC | Age: 30
End: 2020-07-23
Payer: COMMERCIAL

## 2020-07-23 DIAGNOSIS — J02.9 ACUTE PHARYNGITIS, UNSPECIFIED ETIOLOGY: ICD-10-CM

## 2020-07-23 DIAGNOSIS — Z20.822 ENCOUNTER FOR LABORATORY TESTING FOR COVID-19 VIRUS: ICD-10-CM

## 2020-07-23 DIAGNOSIS — R50.9 FEVER, UNSPECIFIED FEVER CAUSE: ICD-10-CM

## 2020-07-23 DIAGNOSIS — R50.9 FEVER, UNSPECIFIED FEVER CAUSE: Primary | ICD-10-CM

## 2020-07-23 DIAGNOSIS — R07.0 THROAT PAIN: Primary | ICD-10-CM

## 2020-07-23 DIAGNOSIS — R11.0 NAUSEA: ICD-10-CM

## 2020-07-23 DIAGNOSIS — Z20.822 SUSPECTED COVID-19 VIRUS INFECTION: ICD-10-CM

## 2020-07-23 LAB
DEPRECATED S PYO AG THROAT QL EIA: NEGATIVE
SPECIMEN SOURCE: NORMAL
STREP GROUP A PCR: NOT DETECTED
STREP GROUP A PCR: NOT DETECTED

## 2020-07-23 PROCEDURE — 87651 STREP A DNA AMP PROBE: CPT | Performed by: FAMILY MEDICINE

## 2020-07-23 PROCEDURE — 40001204 ZZHCL STATISTIC STREP A RAPID: Performed by: FAMILY MEDICINE

## 2020-07-23 PROCEDURE — 99214 OFFICE O/P EST MOD 30 MIN: CPT | Mod: 95 | Performed by: FAMILY MEDICINE

## 2020-07-23 RX ORDER — ONDANSETRON 4 MG/1
4 TABLET, FILM COATED ORAL EVERY 6 HOURS PRN
Qty: 12 TABLET | Refills: 0 | Status: SHIPPED | OUTPATIENT
Start: 2020-07-23 | End: 2020-09-08

## 2020-07-23 ASSESSMENT — PAIN SCALES - GENERAL: PAINLEVEL: NO PAIN (0)

## 2020-07-23 NOTE — PROGRESS NOTES
"Abimael Martinez is a 30 year old male who is being evaluated via a billable video visit.      The patient has been notified of following:     \"This video visit will be conducted via a call between you and your physician/provider. We have found that certain health care needs can be provided without the need for an in-person physical exam.  This service lets us provide the care you need with a video conversation.  If a prescription is necessary we can send it directly to your pharmacy.  If lab work is needed we can place an order for that and you can then stop by our lab to have the test done at a later time.    Video visits are billed at different rates depending on your insurance coverage.  Please reach out to your insurance provider with any questions.    If during the course of the call the physician/provider feels a video visit is not appropriate, you will not be charged for this service.\"    Patient has given verbal consent for Video visit? Yes  How would you like to obtain your AVS? MyChart  If you are dropped from the video visit, the video invite should be resent to: Text to cell phone: [phone]  Will anyone else be joining your video visit? No    Subjective     Abimael Martinez is a 30 year old male who presents today via video visit for the following health issues:    HPI    ENT Symptoms             Symptoms: cc Present Absent Comment   Fever/Chills  x  Temp 102 started later on first day, chills   Fatigue  x     Muscle Aches  x     Eye Irritation  x     Sneezing   x    Nasal Yassine/Drg  x     Sinus Pressure/Pain   x    Loss of smell   x    Dental pain   x    Sore Throat  x  Initial symptom, worse later, then a little better today   Swollen Glands  x     Ear Pain/Fullness  x     Cough  x     Wheeze  x  occ wheeze   Chest Pain   x    Shortness of breath    x    Rash   x    Other  x  Headache, stomach cramps, nausea (no vom) worse if tries to eat     Symptom duration:  7/21/20   Symptom severity:  " moderate   Treatments tried:  nasal spray, nasal rinses,    Contacts:  unsure        Past medical, family, and social histories, medications, and allergies are reviewed and updated in Epic.  Allergies: Cymbalta; Duloxetine; No clinical screening - see comments; Paroxetine; Paxil [paroxetine mesylate]; Seasonal allergies; and Vicodin [hydrocodone-acetaminophen]    Review Of Systems:   Constitutional, HEENT, cardiovascular, pulmonary, gi and gu systems are negative, except as otherwise noted.    Objective:  Reported vitals:There were no vitals taken for this visit.   GENERAL: Mildly ill appearing, alert and no distress  EYES: Eyes grossly normal to inspection.  No discharge or erythema, or obvious scleral/conjunctival abnormalities.  RESP: Occasional single cough. No audible wheeze, or visible cyanosis.  No visible retractions or increased work of breathing.    SKIN: Visible skin clear. No significant rash, abnormal pigmentation or lesions.  NEURO: Cranial nerves grossly intact.  Mentation and speech appropriate for age.  PSYCH: Mentation appears normal, affect normal/bright, judgement and insight intact, normal speech and appearance well-groomed.      Diagnostic Test Results:     Ref. Range 7/22/2020 11:11   Strep Group A PCR Not Detected  Not Detected       =====    ASSESSMENT/PLAN:  (R50.9) Fever, unspecified fever cause  (primary encounter diagnosis)  Comment: His symptoms resemble Strep or COVID-19  Plan: Symptomatic COVID-19 Virus (Coronavirus) by         PCR, Streptococcus A Rapid Scr w Reflx to PCR          (J02.9) Acute pharyngitis, unspecified etiology  Comment: The strep culture result is not back yet (if it is being done).  Since the sensitivity is a little low, I would like it rechecked if the culture is negative.  Plan: Symptomatic COVID-19 Virus (Coronavirus) by         PCR, Streptococcus A Rapid Scr w Reflx to PCR        Treat as needed    (R11.0) Nausea  Comment: Patient states that this is the single  worst symptom he has right now, and would like to have it treated  Plan: Symptomatic COVID-19 Virus (Coronavirus) by         PCR, ondansetron (ZOFRAN) 4 MG tablet          (Z20.828) Suspected COVID-19 virus infection  (Z11.59) Encounter for laboratory testing for COVID-19 virus  Comment:   Plan: Symptomatic COVID-19 Virus (Coronavirus) by PCR        Handout provided he should expect to self quarantine until he gets his test result back, or until at least August 1 if he tests positive    Jayy Fraire MD      Video-Visit Details    Type of service:  Video Visit    Video Start Time: 2:32     Video End Time:2:47    Originating Location (pt. Location): Home    Distant Location (provider location):  [facility]     Platform used for Video Visit: Ary

## 2020-07-23 NOTE — TELEPHONE ENCOUNTER
"Patient calls stating he sent a OANDA message last night and has not heard back yet. He had done a virtual visit and got a strep test done that was negative. The next day he developed fever, nausea, body aches, and lethargy. He then found out he was exposed to several people who tested positive for COVID19. He would like to get tested.     Offered [email or web address] and patient prefers a telephone visit. Transferred to lise.     SAMANTHA Cintron RN      Reason for Disposition    HIGH RISK patient (e.g., age > 64 years, diabetes, heart or lung disease, weak immune system)    Answer Assessment - Initial Assessment Questions  1. COVID-19 DIAGNOSIS: \"Who made your Coronavirus (COVID-19) diagnosis?\" \"Was it confirmed by a positive lab test?\" If not diagnosed by a HCP, ask \"Are there lots of cases (community spread) where you live?\" (See public health department website, if unsure)        2. ONSET: \"When did the COVID-19 symptoms start?\"       2 days ago  3. WORST SYMPTOM: \"What is your worst symptom?\" (e.g., cough, fever, shortness of breath, muscle aches)      Nausea, chills  4. COUGH: \"Do you have a cough?\" If so, ask: \"How bad is the cough?\"        Dry cough  5. FEVER: \"Do you have a fever?\" If so, ask: \"What is your temperature, how was it measured, and when did it start?\"      Temp 101.9 last night  6. RESPIRATORY STATUS: \"Describe your breathing?\" (e.g., shortness of breath, wheezing, unable to speak)       Wheezing occasionally, has asthma  7. BETTER-SAME-WORSE: \"Are you getting better, staying the same or getting worse compared to yesterday?\"  If getting worse, ask, \"In what way?\"      worse  8. HIGH RISK DISEASE: \"Do you have any chronic medical problems?\" (e.g., asthma, heart or lung disease, weak immune system, etc.)      Asthma   9. PREGNANCY: \"Is there any chance you are pregnant?\" \"When was your last menstrual period?\"      N/a   10. OTHER SYMPTOMS: \"Do you have any other symptoms?\"  (e.g., chills, fatigue, " headache, loss of smell or taste, muscle pain, sore throat)        Headache, bilateral earache, body aches, sore throat, chills, fatigue, nausea, loss of taste, and bilateral ear pain.    Protocols used: CORONAVIRUS (COVID-19) DIAGNOSED OR [id number] 5.16.20

## 2020-07-23 NOTE — PATIENT INSTRUCTIONS
Instructions for Patients    Your symptoms show that you may have coronavirus (COVID-19). This illness can cause fever, cough and trouble breathing.     We would like to test you for this virus. This will be a curbside test--you will drive to the clinic, and we will test you in your car.    Please follow these steps:    1. We will call to schedule your test. Be ready to share details about the car you ll be in.   2. A member of our care team will ask you some questions. Then, they will use a swab to collect samples from your nose and throat.     We will test your samples for COVID-19, the flu and maybe other illnesses as well. We will call to share your test results.    How can I protect others?    Stay home and away from others (self-isolate) until:    You ve had no fever--and no medicine that reduces fever--for 3 full days (72 hours). And      Your other symptoms have resolved (gotten better). For example, your cough or breathing has improved. And     At least 10 days have passed since your symptoms started.    Stay at least 6 feet away from others. (If someone will drive you to your test, stay in the backseat, as far away from the  as you can.)     Don t go to work, school or anywhere else. When it s time for your test, go straight to the testing site. Don t make any stops on the way there or back.     Wash your hands and face often. Use soap and water.     Cover your mouth and nose with a mask, tissue or washcloth.     Don t touch anyone. No hugging, kissing or handshakes.    How can I take care of myself?    1. Get lots of rest. Drink extra fluids (unless a doctor has told you not to).     2. Take Tylenol (acetaminophen) for fever or pain. If you have liver or kidney problems, ask your family doctor if it's okay to take Tylenol.     Adults can take either:     650 mg (two 325 mg pills) every 4 to 6 hours, or     1,000 mg (two 500 mg pills) every 8 hours as needed.     Note: Don't take more than 3,000 mg  in one day.   Acetaminophen is found in many medicines (both prescribed and over-the-counter medicines). Read all labels to be sure you don't take too much.   For children, check the Tylenol bottle for the right dose. The dose is based on  the child's age or weight.    3. If you have other health problems (like cancer, heart failure, an organ transplant or severe kidney disease): Call your specialty clinic if you don't feel better in the next 2 days.    4. Know when to call 911: If your breathing is so bad that it keeps you from doing normal activities, call 911 or go to the emergency room. Tell them that you've been staying home and may have COVID-19.      Thank you for taking steps to prevent the spread of this virus.  o Limit your contact with others.  o Wear a simple mask to cover your cough.  o Wash your hands well and often.  o If you need medical care, go to [email or web address] or contact your health care provider.     For more about COVID-19 and caring for yourself at home, visit the CDC website at https://www.cdc.gov/coronavirus/2019-ncov/about/steps-when-sick.html.     To learn about care at [facility], please go to [email or web address].     Below are the COVID-19 hotlines at the [place] Department of Health ([facility]). Interpreters are available.     For health questions: Call 329-163-8954 or 1-639.164.9619 (7 a.m. to 7 p.m.)    For questions about schools and childcare: Call 597-942-6524 or 1-973.114.4610 (7 a.m. to 7 p.m.)        At [facility], we strive to deliver an exceptional experience to you, every time we see you. If you receive a survey, please complete it as we do value your feedback.  If you have [employer], you can expect to receive results automatically within 24 hours of their completion.  Your provider will send a note interpreting your results as well.   If you do not have MyChart, you should receive your results in about a week by  mail.    Your care team:                            Family Medicine Internal Medicine   MD Sanjay García MD Shantel Branch-Fleming, MD Srinivasa Vaka, MD Katya Georgiev PASRINATH Curtis, APRN CNP    Jorge Zhong, MD Pediatrics   Royer Eubanks, PASRINATH Doty, MD Susie Ye APRN CNP   Tg Mathias, MD Adilene Robles, MD Melva Elkins, APRN CNP  Ana Cristina Humphrey, PASRINATH Jackson, CNP  MD Juana Hurst MD Angela Wermerskirchen, MD      Clinic hours: Monday - Thursday 7 am-7 pm; Fridays 7 am-5 pm.   Urgent care: Monday - Friday 11 am-9 pm; Saturday and Sunday 9 am-5 pm.    Clinic: [phone]       [facility]: Monday - Thursday 8 am - 7 pm; Friday 8 am - 6 pm  [facility]: [phone]     Use www.[email or web address] for 24/7 diagnosis and treatment of dozens of conditions.

## 2020-07-24 ENCOUNTER — TELEPHONE (OUTPATIENT)
Dept: FAMILY MEDICINE | Facility: CLINIC | Age: 30
End: 2020-07-24

## 2020-07-24 ASSESSMENT — ASTHMA QUESTIONNAIRES: ACT_TOTALSCORE: 22

## 2020-07-24 NOTE — TELEPHONE ENCOUNTER
RN noted pt is scheduled with  on 8/31/2020. Will close this encounter. Appt with  on 8/25 cancelled ..Lilia Soto RN

## 2020-07-24 NOTE — TELEPHONE ENCOUNTER
Reason for Call:  Request for results:    Name of test or procedure: strep test    Date of test of procedure: 07/23/2020    Location of the test or procedure: [facility] to leave the result message on voice mail or with a family member? YES    Phone number Patient can be reached at:  Cell number on file:    Telephone Information:   Mobile [phone]       Additional comments: Any    Call taken on 7/24/2020 at 3:38 PM by Sergey Zuleta

## 2020-07-25 DIAGNOSIS — R50.9 FEVER, UNSPECIFIED FEVER CAUSE: ICD-10-CM

## 2020-07-25 DIAGNOSIS — Z20.822 ENCOUNTER FOR LABORATORY TESTING FOR COVID-19 VIRUS: ICD-10-CM

## 2020-07-25 DIAGNOSIS — Z20.822 SUSPECTED COVID-19 VIRUS INFECTION: ICD-10-CM

## 2020-07-25 DIAGNOSIS — R11.0 NAUSEA: ICD-10-CM

## 2020-07-25 DIAGNOSIS — J02.9 ACUTE PHARYNGITIS, UNSPECIFIED ETIOLOGY: ICD-10-CM

## 2020-07-25 PROCEDURE — U0003 INFECTIOUS AGENT DETECTION BY NUCLEIC ACID (DNA OR RNA); SEVERE ACUTE RESPIRATORY SYNDROME CORONAVIRUS 2 (SARS-COV-2) (CORONAVIRUS DISEASE [COVID-19]), AMPLIFIED PROBE TECHNIQUE, MAKING USE OF HIGH THROUGHPUT TECHNOLOGIES AS DESCRIBED BY CMS-2020-01-R: HCPCS | Performed by: FAMILY MEDICINE

## 2020-07-26 LAB
SARS-COV-2 RNA SPEC QL NAA+PROBE: NOT DETECTED
SPECIMEN SOURCE: NORMAL

## 2020-07-27 NOTE — TELEPHONE ENCOUNTER
This writer attempted to contact patient on 07/27/20      Reason for call negative results and left detailed message.      If patient calls back:   Relay message, (read verbatim), document that pt called and close encounter        Yamilex Mcclendon MA

## 2020-07-30 ENCOUNTER — PATIENT OUTREACH (OUTPATIENT)
Dept: CARE COORDINATION | Facility: CLINIC | Age: 30
End: 2020-07-30

## 2020-07-30 NOTE — PROGRESS NOTES
Clinic Care Coordination Contact  [facility]/Voicemail    Referral Source: ED Follow-Up  Clinical Data: Care Coordinator Outreach  Outreach attempted x 1.  Left message on patient's voicemail with call back information and requested return call.  Plan: Care Coordinator will send care coordination introduction letter with care coordinator contact information and explanation of care coordination services via [employer]. Care Coordinator will try to reach patient again in 1-2 business days.  Alexis Vides RN  Primary Care Clinic RN Care Coordinator  [facility]   [phone]

## 2020-07-30 NOTE — LETTER
[place] CARE COORDINATION   [facility]:   [address] [place] [address]  [phone]    July 30, 2020    Abimael Martinez  [address]  [facility] [address]      Dear Abimael,    I am a clinic care coordinator who works with Jamison Lora MD at [facility]. I recently tried to call and was unable to reach you. Below is a description of clinic care coordination and how I can further assist you.      The clinic care coordination team is made up of a registered nurse,  and community health worker who understand the health care system. The goal of clinic care coordination is to help you manage your health and improve access to the health care system in the most efficient manner. The team can assist you in meeting your health care goals by providing education, coordinating services, strengthening the communication among your providers and supporting you with any resource needs.    Please feel free to contact me at [phone] with any questions or concerns. We are focused on providing you with the highest-quality healthcare experience possible and that all starts with you.     Sincerely,     Alexis Vides RN  Clinic Care Coordinator          Discharge Instructions for COVID-19 Patients  You have--or may have--COVID-19. Please follow the instructions listed below.   If you have a weakened immune system, discuss with your doctor any other actions you need to take.  How can I protect others?  If you have symptoms (fever, cough, body aches or trouble breathing):    Stay home and away from others (self-isolate) until:  ? At least 10 days have passed since your symptoms started. And   ? You've had no fever--and no medicine that reduces fever--for 3 full days (72 hours). And   ? Your other symptoms have resolved (gotten better).  If you don't show symptoms, but testing showed that you have COVID-19:    Stay home and away from others (self-isolate) until at  "least 10 days have passed since the date of your first positive COVID-19 test.  During this time    Stay in your own room, even for meals. Use your own bathroom if you can.    Stay away from others in your home. No hugging, kissing or shaking hands. No visitors.    Don't go to work, school or anywhere else.    Clean \"high touch\" surfaces often (doorknobs, counters, handles). Use household cleaning spray or wipes. You'll find a full list of  on the EPA website: www.epa.gov/pesticide-registration/list-n-disinfectants-use-against-sars-cov-2.    Cover your mouth and nose with a mask, tissue or wash cloth to avoid spreading germs.    Wash your hands and face often. Use soap and water.    Caregivers in these groups are at risk for severe illness due to COVID-19:  ? People 65 years and older  ? People who live in a nursing home or long-term care facility  ? People with chronic disease (lung, heart, cancer, diabetes, kidney, liver, immunologic)  ? People who have a weakened immune system, including those who:    Are in cancer treatment    Take medicine that weakens the immune system, such as corticosteroids    Had a bone marrow or organ transplant    Have an immune deficiency    Have poorly controlled HIV or AIDS    Are obese (body mass index of 40 or higher)    Smoke regularly    Caregivers should wear gloves while washing dishes, handling laundry and cleaning bedrooms and bathrooms.    Use caution when washing and drying laundry: Don't shake dirty laundry and use the warmest water setting that you can.    For more tips on managing your health at home, go to www.cdc.gov/coronavirus/2019-ncov/downloads/10Things.pdf.  How can I take care of myself at home?  1. Get lots of rest. Drink extra fluids (unless a doctor has told you not to).  2. Take Tylenol (acetaminophen) for fever or pain. If you have liver or kidney problems, ask your family doctor if it's okay to take Tylenol.     Adults can take either:  ? 650 mg (two " 325 mg pills) every 4 to 6 hours, or   ? 1,000 mg (two 500 mg pills) every 8 hours as needed.  ? Note: Don't take more than 3,000 mg in one day. Acetaminophen is found in many medicines (both prescribed and over-the-counter medicines). Read all labels to be sure you don't take too much.   For children, check the Tylenol bottle for the right dose. The dose is based on the child's age or weight.  3. If you have other health problems (like cancer, heart failure, an organ transplant or severe kidney disease): Call your specialty clinic if you don't feel better in the next 2 days.  4. Know when to call 911. Emergency warning signs include:  ? Trouble breathing or shortness of breath  ? Pain or pressure in the chest that doesn't go away  ? Feeling confused like you haven't felt before, or not being able to wake up  ? Bluish-colored lips or face  5. Your doctor may have prescribed a blood thinner medicine. Follow their instructions.  Where can I get more information?    [facility] - About COVID-19:   [email or web address]    CDC - What to Do If You're Sick: www.cdc.gov/coronavirus/2019-ncov/about/steps-when-sick.html    CDC - Ending Home Isolation: www.cdc.gov/coronavirus/2019-ncov/hcp/disposition-in-home-patients.html    CDC - Caring for Someone: www.cdc.gov/coronavirus/2019-ncov/if-you-are-sick/care-for-someone.html    [facility] - Interim Guidance for Hospital Discharge to Home: www.health.[facility].[place].us/diseases/coronavirus/hcp/hospdischarge.pdf    [facility] clinical trials (COVID-19 research studies): clinicalaffairs.[facility].edu/um-clinical-trials    Below are the COVID-19 hotlines at the [place] Department of Health ([facility]). Interpreters are available.  ? For health questions: Call 854-415-8041 or 1-739.971.6336 (7 a.m. to 7 p.m.)  ? For questions about schools and childcare: Call 047-278-7117 or 1-643.589.6245 (7 a.m. to 7 p.m.)    For informational purposes only. Not to replace the advice of your  health care provider. Clinically reviewed by the Infection Prevention Team.Copyright   2020 [facility]. All rights reserved. [employer]in[employer] [id number] - 06/20.

## 2020-07-31 NOTE — PROGRESS NOTES
Clinic Care Coordination Contact  [facility]/Voicemail    Referral Source: ED Follow-Up  Clinical Data: Care Coordinator Outreach  Outreach attempted x 2.  Left message on patient's voicemail with call back information and requested return call.  Plan: Care Coordinator sent care coordination introduction letter on 7-30 via [employer]. Care Coordinator will do no further outreaches at this time.  Alexis Vides RN  Primary Care Clinic RN Care Coordinator  [facility]   [phone]

## 2020-08-02 ENCOUNTER — MYC REFILL (OUTPATIENT)
Dept: FAMILY MEDICINE | Facility: CLINIC | Age: 30
End: 2020-08-02

## 2020-08-02 DIAGNOSIS — F90.2 ATTENTION DEFICIT HYPERACTIVITY DISORDER (ADHD), COMBINED TYPE: ICD-10-CM

## 2020-08-03 RX ORDER — DEXTROAMPHETAMINE SACCHARATE, AMPHETAMINE ASPARTATE, DEXTROAMPHETAMINE SULFATE AND AMPHETAMINE SULFATE 5; 5; 5; 5 MG/1; MG/1; MG/1; MG/1
20 TABLET ORAL 3 TIMES DAILY
Qty: 90 TABLET | Refills: 0 | Status: SHIPPED | OUTPATIENT
Start: 2020-08-03 | End: 2020-08-31

## 2020-08-03 NOTE — TELEPHONE ENCOUNTER
Controlled Substance Refill Request for Adderall  Problem List Complete:  Yes    Last Written Prescription Date:  7/1/20  Last Fill Quantity: 90,   # refills: 0      Last Office Visit with [facility] primary care provider: 7/22/20    Future Office visit:   Next 5 appointments (look out 90 days)    Aug 31, 2020  1:00 PM CDT  [facility] [address] [place] [address] [id number] [phone]          Controlled substance agreement:   Encounter-Level CSA - 07/31/2017:    Controlled Substance Agreement - Scan on 8/11/2017  6:48 AM: CONTROLLED SUBSTANCE AGREEMENT     Patient-Level CSA:    There are no patient-level csa.         Last Urine Drug Screen: No results found for: CDAUT, No results found for: COMDAT, No results found for: THC13, PCP13, COC13, MAMP13, OPI13, AMP13, BZO13, TCA13, MTD13, BAR13, OXY13, PPX13, BUP13         [email or web address]   checked in past 3 months?  Yes 8/3/20, no concerns       Poonam RIDERN, RN, CPN

## 2020-08-07 ENCOUNTER — MYC REFILL (OUTPATIENT)
Dept: FAMILY MEDICINE | Facility: CLINIC | Age: 30
End: 2020-08-07

## 2020-08-07 DIAGNOSIS — F41.9 ANXIETY: ICD-10-CM

## 2020-08-07 RX ORDER — ALPRAZOLAM 2 MG
2 TABLET ORAL 3 TIMES DAILY PRN
Qty: 30 TABLET | Refills: 0 | Status: SHIPPED | OUTPATIENT
Start: 2020-08-07 | End: 2020-09-04

## 2020-08-07 NOTE — TELEPHONE ENCOUNTER
Controlled Substance Refill Request for xanax 2 mg   Problem List Complete:  Yes     Patient is followed by MICHAEL OROPEZA for ongoing prescription of benzodiazepines.  All refills should be approved by this provider, or covering partner.     Medication(s): alprazolam 2 mg .   Maximum quantity per month: 36  Clinic visit frequency required: Q 6  months      Controlled substance agreement on file: No  Benzodiazepine use reviewed by psychiatry:  No       checked in past 3 months?  Yes, 5/8/20     Caro RIDERN, RN

## 2020-08-20 ENCOUNTER — MYC REFILL (OUTPATIENT)
Dept: FAMILY MEDICINE | Facility: CLINIC | Age: 30
End: 2020-08-20

## 2020-08-20 ENCOUNTER — TRANSFERRED RECORDS (OUTPATIENT)
Dept: HEALTH INFORMATION MANAGEMENT | Facility: CLINIC | Age: 30
End: 2020-08-20

## 2020-08-20 DIAGNOSIS — N52.2 DRUG-INDUCED ERECTILE DYSFUNCTION: ICD-10-CM

## 2020-08-20 RX ORDER — SILDENAFIL CITRATE 20 MG/1
20-40 TABLET ORAL DAILY PRN
Qty: 30 TABLET | Refills: 2 | Status: SHIPPED | OUTPATIENT
Start: 2020-08-20 | End: 2021-12-06

## 2020-08-28 ENCOUNTER — OFFICE VISIT (OUTPATIENT)
Dept: NUTRITION | Facility: CLINIC | Age: 30
End: 2020-08-28
Payer: COMMERCIAL

## 2020-08-28 DIAGNOSIS — E66.811 OBESITY, CLASS I, BMI 30-34.9: Primary | ICD-10-CM

## 2020-08-28 PROCEDURE — 97802 MEDICAL NUTRITION INDIV IN: CPT | Performed by: DIETITIAN, REGISTERED

## 2020-08-28 NOTE — PATIENT INSTRUCTIONS
NUTRITION INTERVENTION:    Long Term Goals:   Goal: 1-2 bowl movement daily [place] Stool Type 4 soft and formed  Goal: Decrease digestive symptoms -constipation, gas, bloating, heartburn and stomach aches  Goal: Weight loss     Short Term Goals:  Goal 1: Focus on starting smoothie for breakfast - see the recipe provided in handouts and book below under resources - add in unsweetened flax, hemp or coconut milk, 1 tbsp of healthy fat such as flax seed ground or remigio seed ground. serving of fruit 1 cup of berries. Veggie (optional), 1 scoop of plant based protein powder or collagen powder, 1 serving of fruit and veggie such as kale, spinach plus optional GI boost (glutamine powder and or GI revive powder to boost and provide GI support) - see replace section below for details)   Goal 2:  Work on going to sleep 1 hour earlier and see sleep resources   Goal 3: Get green  - paleo   Goal 4: At least 2 snacks daily - see menu for ideas - sunflower seed butter, hemp seeds, remigio seed pudding, tahini butter      REMOVE  Remove anything that could be irritating to the gut such as reactive foods, stress    Avoid Common Trigger foods. Dairy tops the list. The proteins like casein and whey in dairy can irritate and inflame your gut, while gluten the protein in wheat, rye and barley is a close second. Give those foods up for at least 3-6 months and see if digestion, blood sugars, weight and overall health improve.            Continue to eliminate gluten, dairy, soy, corn, processed refined grains, sugar, and caffeine    Track what you eat. Writing down or tracking through an yair what you eat as well as how you feel and help you identify patterns in your symptoms. This can help you become more aware and create a diet that is right for you.    Pick a food tracking yair:          Through the [employer] yair, you can track symptoms, bowel movements, medications, stress, exercise, sleep and foods as well as beverages to become more  mindful. [email or web address]    **track by paper if yair feels like too much. Make list of foods that cause symptoms to provide and review at follow up. I recommend the dailygreatness journal as you can track your goals, mindset, foods etc to keep you on track and motivated.     REINTRODUCE    Eat real anti-inflammatory foods. When you eat whole, real foods in their unprocessed forms, you take the first step to healing our gut and overall health. Eat plenty of vegetables, fruits, non-gluten whole grains (monitor for symptoms), beans, nuts, seeds, lean meats, healthy fats and other plant foods.    Start following reintroduction phase (see guide for details) of elimination diet to see if certain foods trigger symptoms. If you are avoiding FODMAPS focus on keeping out wheat and gluten and then add in cashew butter or high fodmap nuts 1 serving at a time to see if this high fodmap food for example gives you issues. The same can be done for nightshades. Tracking your foods and symptoms can be beneficial in helping you identify patterns while becoming more aware of what you eat.    Focus on Reintroduction Diet: Introduce foods only one food at a time for one day, followed by a 24 hour observation period. I like to focus on keeping the food out for at least 3-4 days and monitor symptoms. If no reactions occur, add in another food.  It's important to wait till symptoms subside before you add in another food to help you better pinpoint a trigger food.      Cardiometabolic Food plan - 1800-2,200 calories per day     Protein 10-12 servings per day - include at each meal to stabilize blood sugars   (Choose 3oz or 21g per meal and aim for 1oz of 7g for snacks)   - Strive for 1-2 servings of fish per week especially of higher omega-3 fatty acid containing fish such as salmon.     Legumes <2 serving per day     Dairy alternatives 2-3 serving per day     Nuts and seeds 3-4 servings per day - great to incorporate as  snacks    Fats and oils 4 servings per day     Non starchy vegetables 8-10 servings per day     Starchy vegetable limit 1 serving per day as they tend to impact blood sugar (they are moderate-GI).     Fruits 2 servings per day - best to couple with a little bit of protein or fat to offset a rise in blood sugars (they are low-moderate-GI foods).   Whole grains <2 serving per day - try gluten free whole grains instead      Incorporate protein powder daily:          Plant based hemp (recommended brands: [place], Nutiva, Just Hemp Protein, [employer])           Plant based pea (recommended brands: Naked Pea, Now Sports). If you want to try a combo of pea and hemp the brand Page in vanilla or chocolate is a great option.          Try Bone broth protein powder or collagen peptides in liquid bone broth, vegetable broth or 12 oz of water as snack. The bone broth powder and collagen can be used for soups as well. This can help provide essential amino acids and minerals that heal your gut as well as balance blood sugars. A great option if you have a hard time tolerating solids.    Choose Low Glycemic (GI) foods: Regulate your sugar levels by eating foods that do not spike blood sugars.  Eat low -GI foods so only small fluctuations in blood glucose and insulin levels are produced.     Examples of low-GI foods: nuts, seeds, GF oats, most vegetables especially non-starchy and fruits.    Medium or high-GI foods should be eaten with a protein or fat, both of which blunt the glycemic effect of these foods. This reduces the overall glycemic impact of a meal.  Ex: Most grains and starchy veggies are medium/high GI.    Avoid foods containing refined sugars, artificial sweeteners, and refined grains they are considered high-GI because they lead to sharp increases in blood sugars levels, which increase insulin sensitivity causing increased TG, and low good cholesterol (HDL).  Ex: cakes, cookies, pies, bread, sodas, fruit  drinks, presweetened tea, coffee drinks, energy or sport drinks, flavored milk and other processed foods.    Choose High Quality Fats: Adding anti-inflammatory fats into your diet such as fish (salmon, herring, mackerel, and sardines), omega 3 eggs, remigio seeds, ground flax seeds/milk, hemp seeds/milk, and some other certain leafy greens will increase omega-3 fats to omeaga-6 fats ratio.    Therapeutic fats both monounsaturated and polyunsaturated to include daily: ground flaxseeds, unsalted seeds, avocados, olives, extra-virgin olive oil.    Emphasize high-quality oils and fats in the diet daily such as avocado oil, coconut oil, flaxseed, olive, sesame. Ex: 1 tsp to 1 tbsp of MCT oil from coconuts can be added into smoothies, and salad dressings per day.          Avoid trans fats found in processed foods    Drink more water. Hydration is critical, so drink at least six to eight glasses of water a day. Drink more water between meals and less at meals.     Alcohol and caffeine can stimulate your intestines, which may cause diarrhea. Artificial sweeteners that contain sugar alcohols such as sorbitol, mannitol and xylitol may cause diarrhea too. Carbonated drinks can produce gas.          Fiber draws water from your body to move foods through your intestine. Without enough  water and fluids, you may become constipated.          Try adding herbal teas (sugar free) or lemon/lime/cucumber/fruit to water for flavor. Avoid artificial sweetener packets to flavor your water.          Cut back on coffee switch to green tea. Avoid adding sugar and milk to coffee instead use dairy alternatives such as almond, flax, coconut milk.    REPLACE  Replace certain elements that are key to digestion and absorption for proper digestion of fat, carbs and protein.                            Focus on high quality micro-nutrients    Start Pure Encapsulations ONE Multivitamin Daily - take 1 capsule daily with food     Start Nordic Naturals Plus  vitamin D - 1-2 capsules daily with food       REPOPULATE  Recolonization with healthy, beneficial bacteria (probiotics) and reintroduce prebiotics from foods/supplements as tolerated that will help rebalance the microbiome.          Probiotics: Consume foods rich in beneficial bacteria such as coconut kefir, coconut yogurt, sauerkraut, kimchi, kombucha or [employer]ita.            Prebiotics: Incorporate carbohydrates that bacteria love to eat such as inulin from chicory, onions, garlic, leeks, artichokes, dandelion leaves, zaira gum, asparagus, apples, remigio/flax seeds, psyllium, beans and resistant starches (seeds, cashews, legumes, plantains, green bananas and potatoes that are cooked and cooled.    **avoid any foods that cause adverse reactions and not tolerated when you reintroduce. Introduce slowly small amounts of gas and bloating is normal and should pass.      Choose foods high in fiber: Aim for at least 5 grams of fiber per serving of food or a total of 25-35 grams fiber per day. Remember, when looking at the label, you can take the fiber away from the total carbs. Ex:15g of total carbs - 4g of fiber = 11g net carbs    Insoluble fiber acts like a bulky  inner broom,  sweeping out debris from the intestine and creating more motility and movement.     Soluble fiber attracts water and swells, creating a gel that slows digestion.  Also, slows the release of glucose from foods into the blood which stops spikes in blood sugar levels.  Soluble fiber traps toxins in the gut, helping to carry them to excretion and provides healthy bacteria in the digestive tract.    Rebuild your friendly bacteria in your microbime. Start taking probiotic supplements. They will help rebuild the healthy bacteria so essential to good gut health.          Recommend trying BIOHM probiotic.  Take 1 capsule daily anytime with our without food. Does not need to be refrigerate       Or Try the visbiome probiotics sample first 1 capsule per week -  can get more if symptoms get better       REBLANCE  Establish regular eating habits. Eating your meals at the same time each day may help regulate your bowels.      Eat small, frequent meals instead of large ones. This will ease the amount of food moving through your intestinal tract.      Manage your stress. Stress can negatively impact the way you digest foods and absorb nutrients leading to more digestive issues (constipation, diarrhea, indigestion, nausea etc), imbalanced blood sugars and weight imbalances. Try to focus on the following relaxation techniques:          Regular exercise such as walking          Yoga          Meditation          Breathing techniques          Time management    Increase physical activity. Moving our body helps move our bowels and speeds up your metabolism.          Exercise 15-60 minutes daily--whether that looks like burst training, yoga, or vigorous walking. Make sure you get out and get sunshine for natural vitamin d.    Work on getting great sleep. Sleep plays a huge role on gut health.           Try to get at least 7-9 hours of sleep per night. Try to shut off lights and computer around 10pm and to go to bed before midnight.           Try to have supper by 6-7pm and don't eat late within 3 hours of bedtime. Try to have smaller meal for dinner and light snack if needed around 9-10pm.      NUTRITION RESOURCES:          IFM Elimination Diet - Recipes, guide, meal plan, supplement handouts         Sleep Smarter by Anuel Martin - book     Eat Fat Get Thin By Jamie Jean Baptiste - book and cookbook

## 2020-08-28 NOTE — PROGRESS NOTES
Medical Nutrition Therapy  Visit Type: Initial assessment and intervention    Referring Provider: Jamison Lora  [facility]     REASON FOR REFERRAL:   Abimael Martinez is a 30 year old male who is interested in Medical Nutrition Therapy (MNT) and education related to weight management and GI issues.  He is accompanied by self.     Would like to lose 30-40lbs. Gets fatigued after eating.     NUTRITION ASSESSMENT:   Nutritional Goals 8/28/2020   Nutritional Goal Create healthier eating patterns;Create a plan to lose weight;Manage Digestive Issues;Increase energy;Address anxiety and or depression around eating;Other        No flowsheet data found.    No flowsheet data found.   No flowsheet data found.   Gastrointestinal 8/28/2020   Hemorrhoids Past;Current   Gas/bloating Past;Current   Heartburn/Reflux/GERD Past;Current   Blood in stool Past   Irritable Bowel Syndrome (IBS) Current   Abdominal Pain Current       Food Sensitivities 8/28/2020   Lactose intolerance Current      Endocrine 8/28/2020   Overweight/obesity Current      Skin 8/28/2020   Eczema Current   Dry Skin Current      Cardiopulmonary 8/28/2020   High blood pressure Current   High Cholesterol Current      Musculoskeletal 8/28/2020   Restless Legs Current      Psychological 8/28/2020   ADD/ADHD/Asperger's Current   Depression/Anxiety Current      No flowsheet data found.   No flowsheet data found.     Past Medical History:  Past Medical History:   Diagnosis Date     Acute right otitis media 1/8/2013     Anxiety      Depression      Drug abuse (H)      Gonococcal urethritis 02/05/2016     Urethritis 5/20/2013     Problem list name updated by automated process. Provider to review       Previous Surgeries:   Past Surgical History:   Procedure Laterality Date     BACK SURGERY  04/05/2018        Family History:  Family History   Problem Relation Age of Onset     Unknown/Adopted Mother      Unknown/Adopted Father      Unknown/Adopted Maternal  Grandmother      Unknown/Adopted Maternal Grandfather      Unknown/Adopted Paternal Grandmother      Unknown/Adopted Paternal Grandfather      Unknown/Adopted Brother         Lifestyle History:  Lifestyle 8/25/2020   Do you feel your life is stressful right now?  Yes   What is the cause(s) of stress in your life?  Work;Health Concerns;Emotional problems;Over thinking and too much analysis;Other   Are you currently implementing any strategies to help manage stress? Yes   What are you doing to manage stress?  Movement and exercise;Listening to music        Exercise History:  Exercise 8/25/2020   Does your occupation require extended periods of sitting?  No   Does your occupation require extended periods of repetitive movements (ex: walking or lifting)?  No   Do you currently participate in any forms of exercise? Yes   Check all the exercises you participate in: Walking;Yoga;Other   How many times per week do you exercise? 2 times   How long do you usually exercise? 45 min        Sleep History:  Sleep 8/25/2020   How many hours (on average) do you sleep per night? 7-9   What time do you turn off the lights? 2 AM   How long does it take for you to fall asleep? 15-20 mins   What time do you stop using electronic devices? 1 AM   What time do you wake up? 10 AM   When do you eat your first meal?  3 PM   Do you feel well-rested during the day?  No   Do you take naps?  No   Do you have a comfortable bedroom environment (cool, quiet, dark, etc)? Yes   Do you have a sleep routine/ ritual that you do before bed?  No   How many hours do you spend per day looking at a screen (TV, computer, tablet and phone)? 5 to 6   Select all factors that apply to your current sleep habits: Not hungry in the morning;Eat large meals within 3 hours of going to bed;Feel tired/sluggish/fatigued during the day;Grinding teeth        Nutrition History:  Nutrition 8/25/2020   Have you ever had a nutrition consultation? No   Do you currently follow a  special diet or nutritional program? No   What do you feel are the biggest barriers getting in the way of achieving you nutritional goals? Motivation/Readiness to change;Lack of prep/cooking skills;Lack of nutrition knowledge;Other   Do you have any food allergies, sensitivities or intolerances?  Yes   Specific food(s) causing adverse reactions Dairy       Digestion 8/25/2020   Do you experience stomach pains/cramping? Weekly   Do you experience bloating?  Weekly   Do you experience gas?  Daily   Do you experience heartburn/acid reflux/indigestion? Daily   How often do you have a bowel movement? 1 time per day   What is a typical bowel movement like for you? Select all that apply: Formed and soft      Food Access:  8/25/2020   Who does the grocery shopping? Self   How often is grocery shopping done? 1 time per month   Where do you usually receive your groceries from? Select all that apply: [employer]   Do you read food labels? No   Who does the cooking? Select all that apply: Self   How many meals do you eat out per week?  5 or more   What restaurants do you typically choose? [employer]      Daily Patterns: 8/25/2020   How many days per week do you have breakfast? 0   How many days per week do you have lunch? 5   How many days per week do you have dinner? 7   How many days per week do you have snacks? 2      Protein Intake: 8/25/2020   How many times per day do you typically consume a protein source(s)? 2   What types of protein do you currently eat?  Ground Beef;Steak;Hamburgers;Other Chicken       Fat Intake:  8/25/2020   How many times per day do you typically consume healthy fat(s)? 0   What types of health fats do you currently eat? Select all that apply:  Butter       Fruit Intake:  8/25/2020   How many times per day do you typically consume fruits? 0   What types of fruit do currently eat? Apple;Banana       Vegetable Intake:  8/25/2020   How many times per day do you typically consume vegetables? 0   What types of  vegetables do you currently eat? Asparagus;Carrots;Corn;Potato (baked, boiled, mashed, French fries)      Grain Intake:  8/25/2020   How many times per day do you typically consume grains? 2   What types of grains do currently eat? Select all that apply:  Breads (non-gluten free);Pasta (non-gluten free);Pretzels (non-gluten free);Other (non-gluten free)       Dairy Intake:  8/25/2020   How many times per day do you typically consume dairy? 1   What types of dairy do currently eat? Select all that apply:  Cheese       Non-Dairy Alternative Intake:  8/25/2020   How many times per day do you typically consume non-dairy alternatives? 0   What types of non-dairy alternatives do currently eat? Select all that apply:  [place] milk       Sweets Intake:  8/25/2020   How many times per day do you typically consume sweets? 0      Beverage Intake:  8/25/2020   How many 8 oz cups of water do you typically consume per day?  8 or more   How many 8 oz cups of caffeine do you typically consume per day?  0   How many drinks of alcohol do you typically consume per week (1 drink = 5 oz wine, 12 oz beer, 1.5 oz spirits)?   1 to 3       Lifestyle Recall:  8/25/2020   What time did you wake up? 10 PM   What time did you go to sleep? 2 AM   What time did you have breakfast? No breakfast   What time did you have a morning snack? No snack   What time did you have lunch? 2-3 PM   Where did you have lunch?  Home   What time did you have an afternoon snack? No snack   What time did you have dinner? 9-10 PM   Where did you have dinner?  Restaurant   What time did you have an evening snack? 11 PM-12 AM   Where did you have your evening snack? Home   What time of day did you exercise? 12 PM   Where did you exercise? Home          Additional concerns:   Skipping breakfast and snacks  Eats out for lunch chipotle and wings - more fried foods     MEDICATIONS:  Current Outpatient Medications   Medication Sig Dispense Refill     albuterol (PROAIR  HFA/PROVENTIL HFA/VENTOLIN HFA) 108 (90 Base) MCG/ACT inhaler Inhale 2 puffs into the lungs every 4 hours as needed for wheezing 1 Inhaler 0     ALPRAZolam (XANAX) 2 MG tablet Take 1 tablet (2 mg) by mouth 3 times daily as needed for anxiety 30 tablet 0     amLODIPine (NORVASC) 10 MG tablet Take 1 tablet (10 mg) by mouth daily 30 tablet 1     amphetamine-dextroamphetamine (ADDERALL) 20 MG tablet Take 1 tablet (20 mg) by mouth 3 times daily 90 tablet 0     azelastine (ASTELIN) 0.1 % nasal spray Spray 2 sprays into both nostrils 2 times daily 30 mL 11     famotidine (PEPCID) 40 MG tablet Take 1 tablet (40 mg) by mouth At Bedtime 90 tablet 1     fluticasone (FLOVENT HFA) 220 MCG/ACT Inhaler Inhale 2 puffs into the lungs 2 times daily Please give patient spacer 1 Inhaler 3     Hyoscyamine Sulfate 0.375 MG TBCR Take 1 capful by mouth 3 times daily as needed 90 tablet 1     lidocaine (XYLOCAINE) 2 % solution Gargle and spit out 5-15 ml every 3 hours  Max 8 doses/24 hour period. 200 mL 1     mometasone (ELOCON) 0.1 % external ointment If necessary use 3-4 days in a row or 2xweekly on eczematous lesions 45 g 2     montelukast (SINGULAIR) 10 MG tablet Take 1 tablet (10 mg) by mouth At Bedtime 90 tablet 0     ondansetron (ZOFRAN) 4 MG tablet Take 1 tablet (4 mg) by mouth every 6 hours as needed for nausea or vomiting 12 tablet 0     oxyCODONE-acetaminophen (PERCOCET) 5-325 MG per tablet Take 1 tablet by mouth every 6 hours as needed for pain 12 tablet 0     sildenafil (REVATIO) 20 MG tablet Take 1-2 tablets (20-40 mg) by mouth daily as needed As needed for prevention of erectile dysfunction 30 tablet 2     triamcinolone (KENALOG) 0.025 % external ointment Apply topically 2 times daily Apply twice a day to sites of irritation 80 g 1       No flowsheet data found.      ALLERGIES:   Allergies   Allergen Reactions     Cymbalta      Weight gain and fatigue     Duloxetine Other (See Comments) and Fatigue     enlarged spleen and  weight gain     Paroxetine Other (See Comments), Fatigue and GI Disturbance     Weight gain, Spleen issues     Seasonal Allergies      Vicodin [Hydrocodone-Acetaminophen]         .na  LABS:  Last Basic Metabolic Panel:  Lab Results   Component Value Date     03/12/2018     03/13/2014     03/06/2014      Lab Results   Component Value Date    POTASSIUM 4.0 03/12/2018    POTASSIUM 4.6 03/13/2014    POTASSIUM 4.3 03/06/2014     Lab Results   Component Value Date    CHLORIDE 106 03/12/2018    CHLORIDE 97 03/13/2014    CHLORIDE 99 03/06/2014     Lab Results   Component Value Date    LAUREN 9.5 03/12/2018    LAUREN 9.4 03/13/2014    LAUREN 9.8 03/06/2014     Lab Results   Component Value Date    CO2 25 03/12/2018    CO2 28 03/13/2014    CO2 26 03/06/2014     Lab Results   Component Value Date    BUN 15 03/12/2018    BUN 13 03/13/2014    BUN 15 03/06/2014     Lab Results   Component Value Date    CR 1.15 03/19/2018    CR 1.41 03/12/2018    CR 1.30 03/13/2014     Lab Results   Component Value Date    GLC 93 03/12/2018    GLC 82 03/13/2014    GLC 92 03/06/2014       Last Glucose Profile:   No results found for: A1C    Last Lipid Profile:   Cholesterol   Date Value Ref Range Status   03/12/2018 200 (H) <200 mg/dL Final     Comment:     Desirable:       <200 mg/dl   03/06/2014 228 (H) <200 mg/dL Final     Comment:     LDL Cholesterol is the primary guide to therapy.   The NCEP recommends further evaluation of: patients with cholesterol greater   than 200 mg/dL if additional risk factors are present, cholesterol greater   than   240 mg/dL, triglycerides greater than 150 mg/dL, or HDL less than 40 mg/dL.   11/19/2010 210 (H) 0 - 200 mg/dL Final     Comment:     LDL Cholesterol is the primary guide to therapy.   The NCEP recommends further evaluation of: patients with cholesterol <200   mg/dL   if additional risk factors are present, cholesterol >240 mg/dL, triglycerides   >150 mg/dL, or HDL <40 mg/dL.     HDL Cholesterol    Date Value Ref Range Status   03/12/2018 29 (L) >39 mg/dL Final   03/06/2014 42 >40 mg/dL Final   11/19/2010 52 40 - 110 mg/dL Final     LDL Cholesterol Calculated   Date Value Ref Range Status   03/12/2018 125 (H) <100 mg/dL Final     Comment:     Above desirable:  100-129 mg/dl  Borderline High:  130-159 mg/dL  High:             160-189 mg/dL  Very high:       >189 mg/dl     03/06/2014 143 (H) 0 - 129 mg/dL Final     Comment:     LDL Cholesterol is the primary guide to therapy: LDL-cholesterol goal in high   risk patients is <100 mg/dL and in very high risk patients is <70 mg/dL.   11/19/2010 131 (H) 0 - 129 mg/dL Final     Comment:     LDL Cholesterol is the primary guide to therapy: LDL-cholesterol goal in high   risk patients is <100 mg/dL and in very high risk patients is <70 mg/dL.     Triglycerides   Date Value Ref Range Status   03/12/2018 230 (H) <150 mg/dL Final     Comment:     Borderline high:  150-199 mg/dl  High:             200-499 mg/dl  Very high:       >499 mg/dl  Fasting specimen     03/06/2014 215 (H) 0 - 150 mg/dL Final     Comment:     Fasting specimen   11/19/2010 135 0 - 150 mg/dL Final     Cholesterol/HDL Ratio   Date Value Ref Range Status   03/06/2014 5.5 (H) 0.0 - 5.0 Final   11/19/2010 4.0 0.0 - 5.0 Final       Most recent CBC:  Recent Labs   Lab Test 03/12/18  1145 10/27/16  1659 04/30/16  1733 01/09/16  1153   WBC 6.0 7.3  --  6.7   HGB 15.2 15.3 15.4 15.6   HCT 43.6 43.3  --  44.7    237  --  242     Most recent hepatic panel:  Recent Labs   Lab Test 04/09/14 03/13/14  1500   ALT 36 37   AST 15 25     Most recent creatinine:  Recent Labs   Lab Test 03/19/18  1545 03/12/18  1145   CR 1.15 1.41*       No components found for: GFRESETIMATEDLASTLAB(gfrestblack:1@  Lab Results   Component Value Date    ALBUMIN 4.2 03/13/2014       Last Thyroid Profile:   TSH   Date Value Ref Range Status   03/06/2014 1.51 0.4 - 5.0 mU/L Final       Last Mineral Profile:   No results found for:  "JESENIA, IRON, FEB    Autoimmune & Inflammatory   No results found for: CRP      Last Vitamin Profile:   No results found for: FJX280, TDTO728, LAVV70MUOTV, VITD3, D2VIT, D3VIT, DTOT, [id number], [id number], [id number], [id number], [id number], [id number]    ANTHROPOMETRICS:  Vitals:   BP Readings from Last 1 Encounters:   02/11/20 118/85     Pulse Readings from Last 1 Encounters:   02/11/20 85     Estimated body mass index is 34.96 kg/m  as calculated from the following:    Height as of 2/11/20: 1.854 m (6' 1\").    Weight as of 2/11/20: 120.2 kg (265 lb).    Wt Readings from Last 5 Encounters:   02/11/20 120.2 kg (265 lb)   02/06/20 121.2 kg (267 lb 3.2 oz)   01/07/20 116.1 kg (256 lb)   12/18/19 118.4 kg (261 lb)   11/29/19 119.4 kg (263 lb 3.2 oz)           NUTRITION DIAGNOSIS:    1. Altered GI function related to higher intake of carbs (fodmap) wheat as evidenced by food recall lower intake of healthy fats, and protein.     2. Excessive intake of high FODMAP foods related to food and nutrition knowledge deficit regarding effect of FODMAPs as evidenced by symptoms of heartburn, gas, bloating, abdominal bloating and pain.     3. Overweight/Obesity related to food and nutrition related knowledge deficit (excessive CHO intake, Inadequate fiber intake, inappropriate intake of healthy omega 3 fatty acids) as evidenced by nutrition intake record (limited variety of foods), BMI and past abnormal lipid labs.    NUTRITION INTERVENTION:    Long Term Goals:   Goal: 1-2 bowl movement daily [place] Stool Type 4 soft and formed  Goal: Decrease digestive symptoms -constipation, gas, bloating, heartburn and stomach aches  Goal: Weight loss     Short Term Goals:  Goal 1: Focus on starting smoothie for breakfast - see the recipe provided in handouts and book below under resources - add in unsweetened flax, hemp or coconut milk, 1 tbsp of healthy fat such as flax seed ground or remigio seed ground. serving of fruit 1 cup of berries. Veggie " (optional), 1 scoop of plant based protein powder or collagen powder, 1 serving of fruit and veggie such as kale, spinach plus optional GI boost (glutamine powder and or GI revive powder to boost and provide GI support) - see replace section below for details)   Goal 2:  Work on going to sleep 1 hour earlier and see sleep resources   Goal 3: Get green  - paleo   Goal 4: At least 2 snacks daily - see menu for ideas - sunflower seed butter, hemp seeds, remigio seed pudding, tahini butter      REMOVE  Remove anything that could be irritating to the gut such as reactive foods, stress    Avoid Common Trigger foods. Dairy tops the list. The proteins like casein and whey in dairy can irritate and inflame your gut, while gluten the protein in wheat, rye and barley is a close second. Give those foods up for at least 3-6 months and see if digestion, blood sugars, weight and overall health improve.            Continue to eliminate gluten, dairy, soy, corn, processed refined grains, sugar, and caffeine    Track what you eat. Writing down or tracking through an yair what you eat as well as how you feel and help you identify patterns in your symptoms. This can help you become more aware and create a diet that is right for you.    Pick a food tracking yair:          Through the [employer] yair, you can track symptoms, bowel movements, medications, stress, exercise, sleep and foods as well as beverages to become more mindful. [email or web address]    **track by paper if yair feels like too much. Make list of foods that cause symptoms to provide and review at follow up. I recommend the dailygreatness journal as you can track your goals, mindset, foods etc to keep you on track and motivated.     REINTRODUCE    Eat real anti-inflammatory foods. When you eat whole, real foods in their unprocessed forms, you take the first step to healing our gut and overall health. Eat plenty of vegetables, fruits, non-gluten whole grains (monitor  for symptoms), beans, nuts, seeds, lean meats, healthy fats and other plant foods.    Start following reintroduction phase (see guide for details) of elimination diet to see if certain foods trigger symptoms. If you are avoiding FODMAPS focus on keeping out wheat and gluten and then add in cashew butter or high fodmap nuts 1 serving at a time to see if this high fodmap food for example gives you issues. The same can be done for nightshades. Tracking your foods and symptoms can be beneficial in helping you identify patterns while becoming more aware of what you eat.    Focus on Reintroduction Diet: Introduce foods only one food at a time for one day, followed by a 24 hour observation period. I like to focus on keeping the food out for at least 3-4 days and monitor symptoms. If no reactions occur, add in another food.  It's important to wait till symptoms subside before you add in another food to help you better pinpoint a trigger food.      Cardiometabolic Food plan - 1800-2,200 calories per day     Protein 10-12 servings per day - include at each meal to stabilize blood sugars   (Choose 3oz or 21g per meal and aim for 1oz of 7g for snacks)   - Strive for 1-2 servings of fish per week especially of higher omega-3 fatty acid containing fish such as salmon.     Legumes <2 serving per day     Dairy alternatives 2-3 serving per day     Nuts and seeds 3-4 servings per day - great to incorporate as snacks    Fats and oils 4 servings per day     Non starchy vegetables 8-10 servings per day     Starchy vegetable limit 1 serving per day as they tend to impact blood sugar (they are moderate-GI).     Fruits 2 servings per day - best to couple with a little bit of protein or fat to offset a rise in blood sugars (they are low-moderate-GI foods).   Whole grains <2 serving per day - try gluten free whole grains instead      Incorporate protein powder daily:          Plant based hemp (recommended brands: [employer], Nutiva,  Just Hemp Protein, [employer])           Plant based pea (recommended brands: Naked Pea, [employer]). If you want to try a combo of pea and hemp the brand Page in vanilla or chocolate is a great option.          Try Bone broth protein powder or collagen peptides in liquid bone broth, vegetable broth or 12 oz of water as snack. The bone broth powder and collagen can be used for soups as well. This can help provide essential amino acids and minerals that heal your gut as well as balance blood sugars. A great option if you have a hard time tolerating solids.    Choose Low Glycemic (GI) foods: Regulate your sugar levels by eating foods that do not spike blood sugars.  Eat low -GI foods so only small fluctuations in blood glucose and insulin levels are produced.     Examples of low-GI foods: nuts, seeds, GF oats, most vegetables especially non-starchy and fruits.    Medium or high-GI foods should be eaten with a protein or fat, both of which blunt the glycemic effect of these foods. This reduces the overall glycemic impact of a meal.  Ex: Most grains and starchy veggies are medium/high GI.    Avoid foods containing refined sugars, artificial sweeteners, and refined grains they are considered high-GI because they lead to sharp increases in blood sugars levels, which increase insulin sensitivity causing increased TG, and low good cholesterol (HDL).  Ex: cakes, cookies, pies, bread, sodas, fruit drinks, presweetened tea, coffee drinks, energy or sport drinks, flavored milk and other processed foods.    Choose High Quality Fats: Adding anti-inflammatory fats into your diet such as fish (salmon, herring, mackerel, and sardines), omega 3 eggs, remigio seeds, ground flax seeds/milk, hemp seeds/milk, and some other certain leafy greens will increase omega-3 fats to omeaga-6 fats ratio.    Therapeutic fats both monounsaturated and polyunsaturated to include daily: ground flaxseeds, unsalted seeds, avocados, olives, extra-virgin  olive oil.    Emphasize high-quality oils and fats in the diet daily such as avocado oil, coconut oil, flaxseed, olive, sesame. Ex: 1 tsp to 1 tbsp of MCT oil from coconuts can be added into smoothies, and salad dressings per day.          Avoid trans fats found in processed foods    Drink more water. Hydration is critical, so drink at least six to eight glasses of water a day. Drink more water between meals and less at meals.     Alcohol and caffeine can stimulate your intestines, which may cause diarrhea. Artificial sweeteners that contain sugar alcohols such as sorbitol, mannitol and xylitol may cause diarrhea too. Carbonated drinks can produce gas.          Fiber draws water from your body to move foods through your intestine. Without enough  water and fluids, you may become constipated.          Try adding herbal teas (sugar free) or lemon/lime/cucumber/fruit to water for flavor. Avoid artificial sweetener packets to flavor your water.          Cut back on coffee switch to green tea. Avoid adding sugar and milk to coffee instead use dairy alternatives such as almond, flax, coconut milk.    REPLACE  Replace certain elements that are key to digestion and absorption for proper digestion of fat, carbs and protein.                            Focus on high quality micro-nutrients    Start Pure Encapsulations ONE Multivitamin Daily - take 1 capsule daily with food     Start Nordic Naturals Plus vitamin D - 1-2 capsules daily with food       REPOPULATE  Recolonization with healthy, beneficial bacteria (probiotics) and reintroduce prebiotics from foods/supplements as tolerated that will help rebalance the microbiome.          Probiotics: Consume foods rich in beneficial bacteria such as coconut kefir, coconut yogurt, sauerkraut, kimchi, kombucha or [employer]ita.            Prebiotics: Incorporate carbohydrates that bacteria love to eat such as inulin from chicory, onions, garlic, leeks, artichokes, dandelion leaves, zaira  gum, asparagus, apples, remigio/flax seeds, psyllium, beans and resistant starches (seeds, cashews, legumes, plantains, green bananas and potatoes that are cooked and cooled.    **avoid any foods that cause adverse reactions and not tolerated when you reintroduce. Introduce slowly small amounts of gas and bloating is normal and should pass.      Choose foods high in fiber: Aim for at least 5 grams of fiber per serving of food or a total of 25-35 grams fiber per day. Remember, when looking at the label, you can take the fiber away from the total carbs. Ex:15g of total carbs - 4g of fiber = 11g net carbs    Insoluble fiber acts like a bulky  inner broom,  sweeping out debris from the intestine and creating more motility and movement.     Soluble fiber attracts water and swells, creating a gel that slows digestion.  Also, slows the release of glucose from foods into the blood which stops spikes in blood sugar levels.  Soluble fiber traps toxins in the gut, helping to carry them to excretion and provides healthy bacteria in the digestive tract.    Rebuild your friendly bacteria in your microbime. Start taking probiotic supplements. They will help rebuild the healthy bacteria so essential to good gut health.          Recommend trying BIOHM probiotic.  Take 1 capsule daily anytime with our without food. Does not need to be refrigerate       Or Try the visbiome probiotics sample first 1 capsule per week - can get more if symptoms get better       REBLANCE  Establish regular eating habits. Eating your meals at the same time each day may help regulate your bowels.      Eat small, frequent meals instead of large ones. This will ease the amount of food moving through your intestinal tract.      Manage your stress. Stress can negatively impact the way you digest foods and absorb nutrients leading to more digestive issues (constipation, diarrhea, indigestion, nausea etc), imbalanced blood sugars and weight imbalances. Try to focus  on the following relaxation techniques:          Regular exercise such as walking          Yoga          Meditation          Breathing techniques          Time management    Increase physical activity. Moving our body helps move our bowels and speeds up your metabolism.          Exercise 15-60 minutes daily--whether that looks like burst training, yoga, or vigorous walking. Make sure you get out and get sunshine for natural vitamin d.    Work on getting great sleep. Sleep plays a huge role on gut health.           Try to get at least 7-9 hours of sleep per night. Try to shut off lights and computer around 10pm and to go to bed before midnight.           Try to have supper by 6-7pm and don't eat late within 3 hours of bedtime. Try to have smaller meal for dinner and light snack if needed around 9-10pm.      NUTRITION RESOURCES:          IFM Elimination Diet - Recipes, guide, meal plan, supplement handouts         Sleep Smarter by Anuel Martin - book     Eat Fat Get Thin By Jamie Jean Baptiste - book and cookbook           PATIENT'S BEHAVIOR CHANGE GOALS:   See nutrition intervention for patient stated behavior change goals. AVS was printed and given to patient at today's appointment.    MONITOR / EVALUATE:  Registered Dietitian will monitor/evaluate the following:     Beliefs and attitudes related to food    Food and nutrition knowledge / skills    Food / Beverage / Nutrient intake     Pertinent Labs    Progress toward meeting stated nutrition-related goals    Readiness to change nutrition-related behaviors    Weight change    Digestion     COORDINATION OF CARE:  Follow up with primary care provider       FOLLOW-UP:  Follow-up appointment scheduled in Oct.       Time spent in minutes: 90 minutes 6 units   Encounter: Individual    Arely De La Rosa RD, CLT, LD  Integrative Registered Dietitian

## 2020-08-31 ENCOUNTER — OFFICE VISIT (OUTPATIENT)
Dept: DERMATOLOGY | Facility: CLINIC | Age: 30
End: 2020-08-31
Payer: COMMERCIAL

## 2020-08-31 ENCOUNTER — MYC REFILL (OUTPATIENT)
Dept: FAMILY MEDICINE | Facility: CLINIC | Age: 30
End: 2020-08-31

## 2020-08-31 DIAGNOSIS — F90.2 ATTENTION DEFICIT HYPERACTIVITY DISORDER (ADHD), COMBINED TYPE: ICD-10-CM

## 2020-08-31 DIAGNOSIS — L30.9 DERMATITIS: Primary | ICD-10-CM

## 2020-08-31 PROCEDURE — 99213 OFFICE O/P EST LOW 20 MIN: CPT | Performed by: DERMATOLOGY

## 2020-08-31 RX ORDER — DEXTROAMPHETAMINE SACCHARATE, AMPHETAMINE ASPARTATE, DEXTROAMPHETAMINE SULFATE AND AMPHETAMINE SULFATE 5; 5; 5; 5 MG/1; MG/1; MG/1; MG/1
20 TABLET ORAL 3 TIMES DAILY
Qty: 90 TABLET | Refills: 0 | Status: SHIPPED | OUTPATIENT
Start: 2020-08-31 | End: 2020-09-29

## 2020-08-31 RX ORDER — TACROLIMUS 1 MG/G
OINTMENT TOPICAL
Qty: 30 G | Refills: 11 | Status: SHIPPED | OUTPATIENT
Start: 2020-08-31 | End: 2021-02-23

## 2020-08-31 RX ORDER — DEXTROAMPHETAMINE SACCHARATE, AMPHETAMINE ASPARTATE, DEXTROAMPHETAMINE SULFATE AND AMPHETAMINE SULFATE 5; 5; 5; 5 MG/1; MG/1; MG/1; MG/1
20 TABLET ORAL 3 TIMES DAILY
Qty: 90 TABLET | Refills: 0 | Status: CANCELLED | OUTPATIENT
Start: 2020-08-31

## 2020-08-31 ASSESSMENT — PAIN SCALES - GENERAL: PAINLEVEL: NO PAIN (0)

## 2020-08-31 NOTE — LETTER
8/31/2020         RE: Abimael Martinez  [address]  [facility] [address]        Dear Colleague,    Thank you for referring your patient, Abimael Martinez, to the [facility]. Please see a copy of my visit note below.    [facility] Dermatology Note      Dermatology Problem List:  1. Dermatitis, groin area. Suspected irritant versus contact dermatitis.  - pending patch testing  - vaseline; protopic 0.1% ointment BID PRN  - prior tx: triam 0.025% ointment BID  2. Allergic contact dermatitis  - s/p punch biopsy 11/19/19  - referral for patch testing   - triam 0.1% ointment BID    CC:   Chief Complaint   Patient presents with     Derm Problem     Irritation on penis x 2-3 years. Worsening with time.     Encounter Date: Aug 31, 2020    History of Present Illness:  Mr. Abimael Martinez is a 30 year old male who presents as a follow-up for rash in the genital area.     Today, patient presents for evaluation of penile dermatitis. Has prior seen Dr. Wise and Dr. Cedillo for this. He states this first started about 2-3 years ago. He had recently bought a new sex toy and had used a new type of lubricant that had come with the package. He states shortly after use in about 3-4 days began to develop an minimally itchy, pink, scaly rash in the area. He states since then has continued to notice dry skin, mildly itchy including some desquamative patches near the glans penis. He states this is bothersome to him as has affected his sex life. He currently is applying either vaseline or a Jergen's lotion. He denies any other topical exposures and does not use any lubricants, just vaseline or jergen's lotion when having sex. Has prior been prescribed triam 0.025% ointment to the area but not using. He reports no personal or family history of psoriasis (patient is adopted). No rash elsewhere, except for prior episodes of potential contact dermatitis for which he saw me  last December. He has had an appointment with Dr. Harris to consider patch testing, but this was delayed due to COVID19 pandemic. Health otherwise stable. No other skin concerns.     Past Medical History:   Patient Active Problem List   Diagnosis     Anxiety     CARDIOVASCULAR SCREENING; LDL GOAL LESS THAN 160     High risk sexual behavior     Tobacco use disorder     Low back pain     Essential hypertension     Internal hemorrhoids which bleed     Obesity, Class I, BMI 30-34.9     Attention deficit hyperactivity disorder (ADHD), combined type     AYALA (generalized anxiety disorder)     OCD (obsessive compulsive disorder)     Drug-induced erectile dysfunction     Mild persistent asthma without complication     Lumbosacral radiculopathy     Dyslipidemia     Elevated serum creatinine     Past Medical History:   Diagnosis Date     Acute right otitis media 1/8/2013     Anxiety      Depression      Drug abuse (H)      Gonococcal urethritis 02/05/2016     Urethritis 5/20/2013     Problem list name updated by automated process. Provider to review     Past Surgical History:   Procedure Laterality Date     BACK SURGERY  04/05/2018       Social History:  Patient reports that he quit smoking about 2 years ago. His smoking use included cigarettes. He has a 3.00 pack-year smoking history. He has never used smokeless tobacco. He reports current alcohol use. He reports current drug use. Drug: Marijuana.    Family History:  Family History   Problem Relation Age of Onset     Unknown/Adopted Mother      Unknown/Adopted Father      Unknown/Adopted Maternal Grandmother      Unknown/Adopted Maternal Grandfather      Unknown/Adopted Paternal Grandmother      Unknown/Adopted Paternal Grandfather      Unknown/Adopted Brother        Medications:  Current Outpatient Medications   Medication Sig Dispense Refill     albuterol (PROAIR HFA/PROVENTIL HFA/VENTOLIN HFA) 108 (90 Base) MCG/ACT inhaler Inhale 2 puffs into the lungs every 4 hours as  needed for wheezing 1 Inhaler 0     ALPRAZolam (XANAX) 2 MG tablet Take 1 tablet (2 mg) by mouth 3 times daily as needed for anxiety 30 tablet 0     amLODIPine (NORVASC) 10 MG tablet Take 1 tablet (10 mg) by mouth daily 30 tablet 1     amphetamine-dextroamphetamine (ADDERALL) 20 MG tablet Take 1 tablet (20 mg) by mouth 3 times daily 90 tablet 0     azelastine (ASTELIN) 0.1 % nasal spray Spray 2 sprays into both nostrils 2 times daily 30 mL 11     famotidine (PEPCID) 40 MG tablet Take 1 tablet (40 mg) by mouth At Bedtime 90 tablet 1     fluticasone (FLOVENT HFA) 220 MCG/ACT Inhaler Inhale 2 puffs into the lungs 2 times daily Please give patient spacer 1 Inhaler 3     Hyoscyamine Sulfate 0.375 MG TBCR Take 1 capful by mouth 3 times daily as needed 90 tablet 1     lidocaine (XYLOCAINE) 2 % solution Gargle and spit out 5-15 ml every 3 hours  Max 8 doses/24 hour period. 200 mL 1     mometasone (ELOCON) 0.1 % external ointment If necessary use 3-4 days in a row or 2xweekly on eczematous lesions 45 g 2     montelukast (SINGULAIR) 10 MG tablet Take 1 tablet (10 mg) by mouth At Bedtime 90 tablet 0     ondansetron (ZOFRAN) 4 MG tablet Take 1 tablet (4 mg) by mouth every 6 hours as needed for nausea or vomiting 12 tablet 0     oxyCODONE-acetaminophen (PERCOCET) 5-325 MG per tablet Take 1 tablet by mouth every 6 hours as needed for pain 12 tablet 0     sildenafil (REVATIO) 20 MG tablet Take 1-2 tablets (20-40 mg) by mouth daily as needed As needed for prevention of erectile dysfunction 30 tablet 2     triamcinolone (KENALOG) 0.025 % external ointment Apply topically 2 times daily Apply twice a day to sites of irritation 80 g 1     Allergies   Allergen Reactions     Cymbalta      Weight gain and fatigue     Duloxetine Other (See Comments) and Fatigue     enlarged spleen and weight gain     Paroxetine Other (See Comments), Fatigue and GI Disturbance     Weight gain, Spleen issues     Seasonal Allergies      Vicodin  [Hydrocodone-Acetaminophen]          Review of Systems:  -Skin Establ Pt: The patient denies any new rash, pruritus, or lesions that are symptomatic, changing or bleeding, except as per HPI.  -Constitutional: Otherwise feeling well today, in usual state of health.  -HEENT: Patient denies nonhealing oral sores.  -Skin: As above in HPI. No additional skin concerns.    Physical exam:  Vitals: There were no vitals taken for this visit.  GEN: This is a well developed, well-nourished male in no acute distress, in a pleasant mood.    SKIN: Focused examination of the face, neck, genital area, upper extremities including hands/nail was performed.  -Pagan skin type: III  -Small pink patch with desquamative scale on the dorsal penile shaft near glans penis, otherwise no significant lesions.  - No nail pits appreciated.   -No other lesions of concern on areas examined.     Impression/Plan:    1. Penile dermatitis. Suspect xerotic dermatitis versus irritant/ACD versus less likely mild penile psoriasis. Given prior history of ACD and his temporality of onset to application of new topical exposure, did recommend he pursue patch testing with Dr. Harris to r/o ACD. In interim, will start protopic 0.1% ointment BID. Advised against any other topical exposures except for Vaseline only and gentle soaps. Patient expressed understanding and agreed to plan.   - Start protopic 0.1% ointment BID  - Will message [facility] staff to schedule for patch testing with Dr. Harris (has already had initial consultation)  - Return PRN after patch testing    Follow-up PRN after patch testing.     Staff Involved:  Staff Only    Rivera Lisa MD  Pronouns: he/him/his    Department of Dermatology  [facility]: Phone: [phone], Fax:[phone]  [facility] [place]: Phone: [phone] Fax: [phone]          Abimael TAVERAS  Michelle's goals for this visit include:   Chief Complaint   Patient presents with     Derm Problem     Irritation on penis x 2-3 years. Worsening with time.       He requests these members of his care team be copied on today's visit information: no    PCP: Jamison Lora    Referring Provider:  No referring provider defined for this encounter.    There were no vitals taken for this visit.    Do you need any medication refills at today's visit? No  Anjana Bear LPN        Again, thank you for allowing me to participate in the care of your patient.        Sincerely,        Rivera Lisa MD

## 2020-08-31 NOTE — PROGRESS NOTES
Abimael Martinez's goals for this visit include:   Chief Complaint   Patient presents with     Derm Problem     Irritation on penis x 2-3 years. Worsening with time.       He requests these members of his care team be copied on today's visit information: no    PCP: Jamison Lora    Referring Provider:  No referring provider defined for this encounter.    There were no vitals taken for this visit.    Do you need any medication refills at today's visit? No  Anjana Bear LPN

## 2020-08-31 NOTE — PROGRESS NOTES
[facility] Dermatology Note      Dermatology Problem List:  1. Dermatitis, groin area. Suspected irritant versus contact dermatitis.  - pending patch testing  - vaseline; protopic 0.1% ointment BID PRN  - prior tx: triam 0.025% ointment BID  2. Allergic contact dermatitis  - s/p punch biopsy 11/19/19  - referral for patch testing   - triam 0.1% ointment BID    CC:   Chief Complaint   Patient presents with     Derm Problem     Irritation on penis x 2-3 years. Worsening with time.     Encounter Date: Aug 31, 2020    History of Present Illness:  Mr. Abimael Martinez is a 30 year old male who presents as a follow-up for rash in the genital area.     Today, patient presents for evaluation of penile dermatitis. Has prior seen Dr. Wise and Dr. Cedillo for this. He states this first started about 2-3 years ago. He had recently bought a new sex toy and had used a new type of lubricant that had come with the package. He states shortly after use in about 3-4 days began to develop an minimally itchy, pink, scaly rash in the area. He states since then has continued to notice dry skin, mildly itchy including some desquamative patches near the glans penis. He states this is bothersome to him as has affected his sex life. He currently is applying either vaseline or a Jergen's lotion. He denies any other topical exposures and does not use any lubricants, just vaseline or jergen's lotion when having sex. Has prior been prescribed triam 0.025% ointment to the area but not using. He reports no personal or family history of psoriasis (patient is adopted). No rash elsewhere, except for prior episodes of potential contact dermatitis for which he saw me last December. He has had an appointment with Dr. Harris to consider patch testing, but this was delayed due to COVID19 pandemic. Health otherwise stable. No other skin concerns.     Past Medical History:   Patient Active Problem List   Diagnosis     Anxiety      CARDIOVASCULAR SCREENING; LDL GOAL LESS THAN 160     High risk sexual behavior     Tobacco use disorder     Low back pain     Essential hypertension     Internal hemorrhoids which bleed     Obesity, Class I, BMI 30-34.9     Attention deficit hyperactivity disorder (ADHD), combined type     AYALA (generalized anxiety disorder)     OCD (obsessive compulsive disorder)     Drug-induced erectile dysfunction     Mild persistent asthma without complication     Lumbosacral radiculopathy     Dyslipidemia     Elevated serum creatinine     Past Medical History:   Diagnosis Date     Acute right otitis media 1/8/2013     Anxiety      Depression      Drug abuse (H)      Gonococcal urethritis 02/05/2016     Urethritis 5/20/2013     Problem list name updated by automated process. Provider to review     Past Surgical History:   Procedure Laterality Date     BACK SURGERY  04/05/2018       Social History:  Patient reports that he quit smoking about 2 years ago. His smoking use included cigarettes. He has a 3.00 pack-year smoking history. He has never used smokeless tobacco. He reports current alcohol use. He reports current drug use. Drug: Marijuana.    Family History:  Family History   Problem Relation Age of Onset     Unknown/Adopted Mother      Unknown/Adopted Father      Unknown/Adopted Maternal Grandmother      Unknown/Adopted Maternal Grandfather      Unknown/Adopted Paternal Grandmother      Unknown/Adopted Paternal Grandfather      Unknown/Adopted Brother        Medications:  Current Outpatient Medications   Medication Sig Dispense Refill     albuterol (PROAIR HFA/PROVENTIL HFA/VENTOLIN HFA) 108 (90 Base) MCG/ACT inhaler Inhale 2 puffs into the lungs every 4 hours as needed for wheezing 1 Inhaler 0     ALPRAZolam (XANAX) 2 MG tablet Take 1 tablet (2 mg) by mouth 3 times daily as needed for anxiety 30 tablet 0     amLODIPine (NORVASC) 10 MG tablet Take 1 tablet (10 mg) by mouth daily 30 tablet 1      amphetamine-dextroamphetamine (ADDERALL) 20 MG tablet Take 1 tablet (20 mg) by mouth 3 times daily 90 tablet 0     azelastine (ASTELIN) 0.1 % nasal spray Spray 2 sprays into both nostrils 2 times daily 30 mL 11     famotidine (PEPCID) 40 MG tablet Take 1 tablet (40 mg) by mouth At Bedtime 90 tablet 1     fluticasone (FLOVENT HFA) 220 MCG/ACT Inhaler Inhale 2 puffs into the lungs 2 times daily Please give patient spacer 1 Inhaler 3     Hyoscyamine Sulfate 0.375 MG TBCR Take 1 capful by mouth 3 times daily as needed 90 tablet 1     lidocaine (XYLOCAINE) 2 % solution Gargle and spit out 5-15 ml every 3 hours  Max 8 doses/24 hour period. 200 mL 1     mometasone (ELOCON) 0.1 % external ointment If necessary use 3-4 days in a row or 2xweekly on eczematous lesions 45 g 2     montelukast (SINGULAIR) 10 MG tablet Take 1 tablet (10 mg) by mouth At Bedtime 90 tablet 0     ondansetron (ZOFRAN) 4 MG tablet Take 1 tablet (4 mg) by mouth every 6 hours as needed for nausea or vomiting 12 tablet 0     oxyCODONE-acetaminophen (PERCOCET) 5-325 MG per tablet Take 1 tablet by mouth every 6 hours as needed for pain 12 tablet 0     sildenafil (REVATIO) 20 MG tablet Take 1-2 tablets (20-40 mg) by mouth daily as needed As needed for prevention of erectile dysfunction 30 tablet 2     triamcinolone (KENALOG) 0.025 % external ointment Apply topically 2 times daily Apply twice a day to sites of irritation 80 g 1     Allergies   Allergen Reactions     Cymbalta      Weight gain and fatigue     Duloxetine Other (See Comments) and Fatigue     enlarged spleen and weight gain     Paroxetine Other (See Comments), Fatigue and GI Disturbance     Weight gain, Spleen issues     Seasonal Allergies      Vicodin [Hydrocodone-Acetaminophen]          Review of Systems:  -Skin Establ Pt: The patient denies any new rash, pruritus, or lesions that are symptomatic, changing or bleeding, except as per HPI.  -Constitutional: Otherwise feeling well today, in usual  state of health.  -HEENT: Patient denies nonhealing oral sores.  -Skin: As above in HPI. No additional skin concerns.    Physical exam:  Vitals: There were no vitals taken for this visit.  GEN: This is a well developed, well-nourished male in no acute distress, in a pleasant mood.    SKIN: Focused examination of the face, neck, genital area, upper extremities including hands/nail was performed.  -Pagan skin type: III  -Small pink patch with desquamative scale on the dorsal penile shaft near glans penis, otherwise no significant lesions.  - No nail pits appreciated.   -No other lesions of concern on areas examined.     Impression/Plan:    1. Penile dermatitis. Suspect xerotic dermatitis versus irritant/ACD versus less likely mild penile psoriasis. Given prior history of ACD and his temporality of onset to application of new topical exposure, did recommend he pursue patch testing with Dr. Harris to r/o ACD. In interim, will start protopic 0.1% ointment BID. Advised against any other topical exposures except for Vaseline only and gentle soaps. Patient expressed understanding and agreed to plan.   - Start protopic 0.1% ointment BID  - Will message [facility] staff to schedule for patch testing with Dr. Harris (has already had initial consultation)  - Return PRN after patch testing    Follow-up PRN after patch testing.     Staff Involved:  Staff Only    Rivera Lisa MD  Pronouns: he/him/his    Department of Dermatology  [facility]: Phone: [phone], Fax:[phone]  [facility] [place]: Phone: [phone] Fax: [phone]

## 2020-08-31 NOTE — TELEPHONE ENCOUNTER
Controlled Substance Refill Request for Adderall 20 mg  Problem List Complete:  Yes     Last Written Prescription Date:  8/3/20  Last Fill Quantity: 90,   # refills: 0     Last Office Visit with [facility] primary care provider: 7/2/20     Future Office visit:      Controlled substance agreement:   Encounter-Level CSA - 07/31/2017:    Controlled Substance Agreement - Scan on 8/11/2017  6:48 AM: CONTROLLED SUBSTANCE AGREEMENT      Patient-Level CSA:    There are no patient-level csa.      Last Urine Drug Screen: No results found for: CDAUT, No results found for: COMDAT, No results found for: THC13, PCP13, COC13, MAMP13, OPI13, AMP13, BZO13, TCA13, MTD13, BAR13, OXY13, PPX13, BUP13     [email or web address]     checked in past 3 months?  Yes 8/3/20, no concerns     Heather RIDERN, RN

## 2020-09-04 ENCOUNTER — MYC REFILL (OUTPATIENT)
Dept: FAMILY MEDICINE | Facility: CLINIC | Age: 30
End: 2020-09-04

## 2020-09-04 DIAGNOSIS — F41.9 ANXIETY: ICD-10-CM

## 2020-09-04 RX ORDER — ALPRAZOLAM 2 MG
2 TABLET ORAL 3 TIMES DAILY PRN
Qty: 30 TABLET | Refills: 0 | Status: SHIPPED | OUTPATIENT
Start: 2020-09-04 | End: 2020-10-07

## 2020-09-04 NOTE — TELEPHONE ENCOUNTER
Controlled Substance Refill Request for xanax 2 mg   Problem List Complete:  Yes     Patient is followed by MICHAEL OROPEZA for ongoing prescription of benzodiazepines.  All refills should be approved by this provider, or covering partner.     Medication(s): alprazolam 2 mg .   Maximum quantity per month: 36  Clinic visit frequency required: Q 6  months      Controlled substance agreement on file: No  Benzodiazepine use reviewed by psychiatry:  No       checked in past 3 months?  Yes, 8/3/20    Heather Lin BSN, RN

## 2020-09-08 ENCOUNTER — OFFICE VISIT (OUTPATIENT)
Dept: URGENT CARE | Facility: URGENT CARE | Age: 30
End: 2020-09-08
Payer: COMMERCIAL

## 2020-09-08 VITALS
TEMPERATURE: 98.2 F | BODY MASS INDEX: 33.8 KG/M2 | SYSTOLIC BLOOD PRESSURE: 147 MMHG | RESPIRATION RATE: 10 BRPM | WEIGHT: 256.2 LBS | OXYGEN SATURATION: 95 % | HEART RATE: 89 BPM | DIASTOLIC BLOOD PRESSURE: 97 MMHG

## 2020-09-08 DIAGNOSIS — Z72.51 UNPROTECTED SEXUAL INTERCOURSE: ICD-10-CM

## 2020-09-08 DIAGNOSIS — R35.0 URINARY FREQUENCY: ICD-10-CM

## 2020-09-08 DIAGNOSIS — R07.0 THROAT PAIN: Primary | ICD-10-CM

## 2020-09-08 DIAGNOSIS — I10 ESSENTIAL HYPERTENSION: ICD-10-CM

## 2020-09-08 DIAGNOSIS — H69.92 DYSFUNCTION OF LEFT EUSTACHIAN TUBE: ICD-10-CM

## 2020-09-08 LAB
ALBUMIN UR-MCNC: NEGATIVE MG/DL
APPEARANCE UR: CLEAR
BACTERIA #/AREA URNS HPF: ABNORMAL /HPF
BILIRUB UR QL STRIP: NEGATIVE
COLOR UR AUTO: YELLOW
DEPRECATED S PYO AG THROAT QL EIA: NEGATIVE
GLUCOSE UR STRIP-MCNC: NEGATIVE MG/DL
HGB UR QL STRIP: NEGATIVE
KETONES UR STRIP-MCNC: NEGATIVE MG/DL
LEUKOCYTE ESTERASE UR QL STRIP: NEGATIVE
NITRATE UR QL: NEGATIVE
NON-SQ EPI CELLS #/AREA URNS LPF: ABNORMAL /LPF
PH UR STRIP: 6 PH (ref 5–7)
RBC #/AREA URNS AUTO: ABNORMAL /HPF
SOURCE: ABNORMAL
SP GR UR STRIP: 1.02 (ref 1–1.03)
SPECIMEN SOURCE: NORMAL
SPECIMEN SOURCE: NORMAL
STREP GROUP A PCR: NOT DETECTED
UROBILINOGEN UR STRIP-ACNC: 0.2 EU/DL (ref 0.2–1)
WBC #/AREA URNS AUTO: ABNORMAL /HPF

## 2020-09-08 PROCEDURE — U0003 INFECTIOUS AGENT DETECTION BY NUCLEIC ACID (DNA OR RNA); SEVERE ACUTE RESPIRATORY SYNDROME CORONAVIRUS 2 (SARS-COV-2) (CORONAVIRUS DISEASE [COVID-19]), AMPLIFIED PROBE TECHNIQUE, MAKING USE OF HIGH THROUGHPUT TECHNOLOGIES AS DESCRIBED BY CMS-2020-01-R: HCPCS | Performed by: PHYSICIAN ASSISTANT

## 2020-09-08 PROCEDURE — 99214 OFFICE O/P EST MOD 30 MIN: CPT | Mod: 25 | Performed by: PHYSICIAN ASSISTANT

## 2020-09-08 PROCEDURE — 87591 N.GONORRHOEAE DNA AMP PROB: CPT | Performed by: PHYSICIAN ASSISTANT

## 2020-09-08 PROCEDURE — 87651 STREP A DNA AMP PROBE: CPT | Performed by: PHYSICIAN ASSISTANT

## 2020-09-08 PROCEDURE — 81001 URINALYSIS AUTO W/SCOPE: CPT | Performed by: PHYSICIAN ASSISTANT

## 2020-09-08 PROCEDURE — 96372 THER/PROPH/DIAG INJ SC/IM: CPT | Performed by: PHYSICIAN ASSISTANT

## 2020-09-08 PROCEDURE — 87491 CHLMYD TRACH DNA AMP PROBE: CPT | Performed by: PHYSICIAN ASSISTANT

## 2020-09-08 RX ORDER — AZITHROMYCIN 250 MG/1
1000 TABLET, FILM COATED ORAL ONCE
Qty: 4 TABLET | Refills: 0 | Status: SHIPPED | OUTPATIENT
Start: 2020-09-08 | End: 2020-09-08

## 2020-09-08 RX ORDER — CEFTRIAXONE SODIUM 250 MG
250 VIAL (EA) INJECTION ONCE
Status: COMPLETED | OUTPATIENT
Start: 2020-09-08 | End: 2020-09-08

## 2020-09-08 RX ADMIN — Medication 250 MG: at 17:15

## 2020-09-08 ASSESSMENT — ENCOUNTER SYMPTOMS
DIZZINESS: 0
WEAKNESS: 0
PALPITATIONS: 0
WHEEZING: 0
DYSURIA: 0
HEADACHES: 0
EYE DISCHARGE: 0
GASTROINTESTINAL NEGATIVE: 1
SORE THROAT: 1
RESPIRATORY NEGATIVE: 1
CARDIOVASCULAR NEGATIVE: 1
FEVER: 0
NEUROLOGICAL NEGATIVE: 1
ENDOCRINE NEGATIVE: 1
DIAPHORESIS: 0
RHINORRHEA: 0
MUSCULOSKELETAL NEGATIVE: 1
MYALGIAS: 0
ADENOPATHY: 0
CHEST TIGHTNESS: 0
POLYDIPSIA: 0
ABDOMINAL PAIN: 0
EYE REDNESS: 0
SHORTNESS OF BREATH: 0
FREQUENCY: 1
EYES NEGATIVE: 1
EYE ITCHING: 0
DIARRHEA: 0
CONSTITUTIONAL NEGATIVE: 1
VOMITING: 0
COUGH: 0
LIGHT-HEADEDNESS: 0
NAUSEA: 0
CHILLS: 0
HEMATURIA: 0

## 2020-09-08 NOTE — PATIENT INSTRUCTIONS
"Discharge Instructions for COVID-19 Patients  You have--or may have--COVID-19. Please follow the instructions listed below.   If you have a weakened immune system, discuss with your doctor any other actions you need to take.  How can I protect others?  If you have symptoms (fever, cough, body aches or trouble breathing):    Stay home and away from others (self-isolate) until:  ? At least 10 days have passed since your symptoms started. And   ? You've had no fever--and no medicine that reduces fever--for 1 full day (24 hours). And   ? Your other symptoms have resolved (gotten better).  If you don't show symptoms, but testing showed that you have COVID-19:    Stay home and away from others (self-isolate) until at least 10 days have passed since the date of your first positive COVID-19 test.  During this time    Stay in your own room, even for meals. Use your own bathroom if you can.    Stay away from others in your home. No hugging, kissing or shaking hands. No visitors.    Don't go to work, school or anywhere else.    Clean \"high touch\" surfaces often (doorknobs, counters, handles). Use household cleaning spray or wipes. You'll find a full list of  on the EPA website: www.epa.gov/pesticide-registration/list-n-disinfectants-use-against-sars-cov-2.    Cover your mouth and nose with a mask or other face covering to avoid spreading germs.    Wash your hands and face often. Use soap and water.    Caregivers in these groups are at risk for severe illness due to COVID-19:  ? People 65 years and older  ? People who live in a nursing home or long-term care facility  ? People with chronic disease (lung, heart, cancer, diabetes, kidney, liver, immunologic)  ? People who have a weakened immune system, including those who:    Are in cancer treatment    Take medicine that weakens the immune system, such as corticosteroids    Had a bone marrow or organ transplant    Have an immune deficiency    Have poorly controlled HIV or " AIDS    Are obese (body mass index of 40 or higher)    Smoke regularly    Caregivers should wear gloves while washing dishes, handling laundry and cleaning bedrooms and bathrooms.    Use caution when washing and drying laundry: Don't shake dirty laundry and use the warmest water setting that you can.    For more tips on managing your health at home, go to www.cdc.gov/coronavirus/2019-ncov/downloads/10Things.pdf.  How can I take care of myself at home?  1. Get lots of rest. Drink extra fluids (unless a doctor has told you not to).  2. Take Tylenol (acetaminophen) for fever or pain. If you have liver or kidney problems, ask your family doctor if it's okay to take Tylenol.     Adults can take either:  ? 650 mg (two 325 mg pills) every 4 to 6 hours, or   ? 1,000 mg (two 500 mg pills) every 8 hours as needed.  ? Note: Don't take more than 3,000 mg in one day. Acetaminophen is found in many medicines (both prescribed and over-the-counter medicines). Read all labels to be sure you don't take too much.   For children, check the Tylenol bottle for the right dose. The dose is based on the child's age or weight.  3. If you have other health problems (like cancer, heart failure, an organ transplant or severe kidney disease): Call your specialty clinic if you don't feel better in the next 2 days.  4. Know when to call 911. Emergency warning signs include:  ? Trouble breathing or shortness of breath  ? Pain or pressure in the chest that doesn't go away  ? Feeling confused like you haven't felt before, or not being able to wake up  ? Bluish-colored lips or face  5. Your doctor may have prescribed a blood thinner medicine. Follow their instructions.  Where can I get more information?     [employer] - About COVID-19: [email or web address]    CDC - What to Do If You're Sick: www.cdc.gov/coronavirus/2019-ncov/about/steps-when-sick.html    CDC - Ending Home Isolation:  www.cdc.gov/coronavirus/2019-ncov/hcp/disposition-in-home-patients.html    CDC - Caring for Someone: www.cdc.gov/coronavirus/2019-ncov/if-you-are-sick/care-for-someone.html    [facility] - Interim Guidance for Hospital Discharge to Home: www.[facility].[place]/diseases/coronavirus/hcp/hospdischarge.pdf    [facility] clinical trials (COVID-19 research studies): clinicalaffairs.[facility]-clinical-trials    Below are the COVID-19 hotlines at the [place] Department of Health ([facility]). Interpreters are available.  ? For health questions: Call 912-189-6776 or 1-558.243.1121 (7 a.m. to 7 p.m.)  ? For questions about schools and childcare: Call 861-255-7409 or 1-127.526.9517 (7 a.m. to 7 p.m.)    For informational purposes only. Not to replace the advice of your health care provider. Clinically reviewed by the Infection Prevention Team. Copyright   2020 [facility]. All rights reserved. 60mo 008649 - REV 08/04/20.

## 2020-09-08 NOTE — PROGRESS NOTES
Chief Complaint:     Chief Complaint   Patient presents with     Pharyngitis     x3-4 days     Nausea     Ear Problem     Pain in left ear      Rectal Problem       HPI: Abimael Martinez is an 30 year old male who presents with ear pain left, nausea and sore throat. Patient has a significant Hx of Asthma. Patient was sent here via [facility]care for further evaluation of symptoms.  Symptoms began 3  days ago and has unchanged.  There is no shortness of breath, wheezing and chest pain.      Patient also mentions that he had unprotected sex.  He did perform oral sex on another man and is concerned about Gonorrhea and Chlamydia of the throat.  He was not informed that his partner was positive for any testing.  He has had some urinary frequency, but denies any discharge from the penis.     Patient denies any recent travel or exposure to know COVID positive tested individual.  Patient is not a healthcare worker or .      ROS:     Review of Systems   Constitutional: Negative.  Negative for chills, diaphoresis and fever.   HENT: Positive for ear pain and sore throat. Negative for congestion and rhinorrhea.    Eyes: Negative.  Negative for discharge, redness and itching.   Respiratory: Negative.  Negative for cough, chest tightness, shortness of breath and wheezing.    Cardiovascular: Negative.  Negative for chest pain and palpitations.   Gastrointestinal: Negative.  Negative for abdominal pain, diarrhea, nausea and vomiting.   Endocrine: Negative.  Negative for polydipsia and polyuria.   Genitourinary: Positive for frequency. Negative for dysuria, hematuria and urgency.   Musculoskeletal: Negative.  Negative for myalgias.   Skin: Negative for rash.   Allergic/Immunologic: Negative for immunocompromised state.   Neurological: Negative.  Negative for dizziness, weakness, light-headedness and headaches.   Hematological: Negative for adenopathy.        Respiratory History  occasional episodes of bronchitis, asthma  and tobacco abuse       Family History   Family History   Problem Relation Age of Onset     Unknown/Adopted Mother      Unknown/Adopted Father      Unknown/Adopted Maternal Grandmother      Unknown/Adopted Maternal Grandfather      Unknown/Adopted Paternal Grandmother      Unknown/Adopted Paternal Grandfather      Unknown/Adopted Brother         Problem history  Patient Active Problem List   Diagnosis     Anxiety     CARDIOVASCULAR SCREENING; LDL GOAL LESS THAN 160     High risk sexual behavior     Tobacco use disorder     Low back pain     Essential hypertension     Internal hemorrhoids which bleed     Obesity, Class I, BMI 30-34.9     Attention deficit hyperactivity disorder (ADHD), combined type     AYALA (generalized anxiety disorder)     OCD (obsessive compulsive disorder)     Drug-induced erectile dysfunction     Mild persistent asthma without complication     Lumbosacral radiculopathy     Dyslipidemia     Elevated serum creatinine        Allergies  Allergies   Allergen Reactions     Cymbalta      Weight gain and fatigue     Duloxetine Other (See Comments) and Fatigue     enlarged spleen and weight gain     Paroxetine Other (See Comments), Fatigue and GI Disturbance     Weight gain, Spleen issues     Seasonal Allergies      Vicodin [Hydrocodone-Acetaminophen]         Social History  Social History     Socioeconomic History     Marital status: Single     Spouse name: Not on file     Number of children: Not on file     Years of education: Not on file     Highest education level: Not on file   Occupational History     Not on file   Social Needs     Financial resource strain: Not on file     Food insecurity     Worry: Not on file     Inability: Not on file     Transportation needs     Medical: Not on file     Non-medical: Not on file   Tobacco Use     Smoking status: Former Smoker     Packs/day: 0.50     Years: 6.00     Pack years: 3.00     Types: Cigarettes     Last attempt to quit: 3/12/2018     Years since  quittin.4     Smokeless tobacco: Never Used     Tobacco comment: second hand smoke exposure   Substance and Sexual Activity     Alcohol use: Yes     Comment: once a week     Drug use: Yes     Types: Marijuana     Comment: pot once a month, ectasy  As of 2016 he only smokes pot occasionally     Sexual activity: Yes     Partners: Female     Birth control/protection: Condom   Lifestyle     Physical activity     Days per week: Not on file     Minutes per session: Not on file     Stress: Not on file   Relationships     Social connections     Talks on phone: Not on file     Gets together: Not on file     Attends Gnosticism service: Not on file     Active member of club or organization: Not on file     Attends meetings of clubs or organizations: Not on file     Relationship status: Not on file     Intimate partner violence     Fear of current or ex partner: Not on file     Emotionally abused: Not on file     Physically abused: Not on file     Forced sexual activity: Not on file   Other Topics Concern     Parent/sibling w/ CABG, MI or angioplasty before 65F 55M? No   Social History Narrative     Not on file        Current Meds    Current Outpatient Medications:      albuterol (PROAIR HFA/PROVENTIL HFA/VENTOLIN HFA) 108 (90 Base) MCG/ACT inhaler, Inhale 2 puffs into the lungs every 4 hours as needed for wheezing, Disp: 1 Inhaler, Rfl: 0     ALPRAZolam (XANAX) 2 MG tablet, Take 1 tablet (2 mg) by mouth 3 times daily as needed for anxiety, Disp: 30 tablet, Rfl: 0     amLODIPine (NORVASC) 10 MG tablet, Take 1 tablet (10 mg) by mouth daily, Disp: 30 tablet, Rfl: 1     amphetamine-dextroamphetamine (ADDERALL) 20 MG tablet, Take 1 tablet (20 mg) by mouth 3 times daily, Disp: 90 tablet, Rfl: 0     azelastine (ASTELIN) 0.1 % nasal spray, Spray 2 sprays into both nostrils 2 times daily, Disp: 30 mL, Rfl: 11     azithromycin (ZITHROMAX) 250 MG tablet, Take 4 tablets (1,000 mg) by mouth once for 1 dose, Disp: 4 tablet, Rfl:  0     famotidine (PEPCID) 40 MG tablet, Take 1 tablet (40 mg) by mouth At Bedtime, Disp: 90 tablet, Rfl: 1     fluticasone (FLOVENT HFA) 220 MCG/ACT Inhaler, Inhale 2 puffs into the lungs 2 times daily Please give patient spacer, Disp: 1 Inhaler, Rfl: 3     Hyoscyamine Sulfate 0.375 MG TBCR, Take 1 capful by mouth 3 times daily as needed, Disp: 90 tablet, Rfl: 1     mometasone (ELOCON) 0.1 % external ointment, If necessary use 3-4 days in a row or 2xweekly on eczematous lesions, Disp: 45 g, Rfl: 2     montelukast (SINGULAIR) 10 MG tablet, Take 1 tablet (10 mg) by mouth At Bedtime, Disp: 90 tablet, Rfl: 0     oxyCODONE-acetaminophen (PERCOCET) 5-325 MG per tablet, Take 1 tablet by mouth every 6 hours as needed for pain, Disp: 12 tablet, Rfl: 0     sildenafil (REVATIO) 20 MG tablet, Take 1-2 tablets (20-40 mg) by mouth daily as needed As needed for prevention of erectile dysfunction, Disp: 30 tablet, Rfl: 2     tacrolimus (PROTOPIC) 0.1 % external ointment, Apply twice daily as needed for rash on penis., Disp: 30 g, Rfl: 11     triamcinolone (KENALOG) 0.025 % external ointment, Apply topically 2 times daily Apply twice a day to sites of irritation, Disp: 80 g, Rfl: 1     lidocaine (XYLOCAINE) 2 % solution, Gargle and spit out 5-15 ml every 3 hours Max 8 doses/24 hour period., Disp: 200 mL, Rfl: 1     ondansetron (ZOFRAN) 4 MG tablet, Take 1 tablet (4 mg) by mouth every 6 hours as needed for nausea or vomiting, Disp: 12 tablet, Rfl: 0  No current facility-administered medications for this visit.         OBJECTIVE     Vital signs reviewed by Kevin Morrow PA-C  BP (!) 147/97   Pulse 89   Temp 98.2  F (36.8  C) (Tympanic)   Resp 10   Wt 116.2 kg (256 lb 3.2 oz)   SpO2 95%   BMI 33.80 kg/m       Physical Exam  Vitals signs reviewed.   Constitutional:       General: He is not in acute distress.     Appearance: He is well-developed. He is not ill-appearing, toxic-appearing or diaphoretic.   HENT:      Head:  Normocephalic and atraumatic.      Right Ear: Hearing, tympanic membrane, ear canal and external ear normal. No drainage, swelling or tenderness. Tympanic membrane is not perforated, erythematous, retracted or bulging.      Left Ear: Hearing, tympanic membrane, ear canal and external ear normal. No drainage, swelling or tenderness. Tympanic membrane is not perforated, erythematous, retracted or bulging.      Nose: Congestion present. No nasal tenderness, mucosal edema or rhinorrhea.      Right Turbinates: Not enlarged or swollen.      Left Turbinates: Not enlarged or swollen.      Right Sinus: No maxillary sinus tenderness or frontal sinus tenderness.      Left Sinus: No maxillary sinus tenderness or frontal sinus tenderness.      Mouth/Throat:      Pharynx: Posterior oropharyngeal erythema present. No pharyngeal swelling, oropharyngeal exudate or uvula swelling.      Tonsils: Tonsillar exudate present. 0 on the right. 0 on the left.   Eyes:      General: Lids are normal.         Right eye: No discharge.         Left eye: No discharge.      Conjunctiva/sclera: Conjunctivae normal.      Right eye: Right conjunctiva is not injected. No exudate.     Left eye: Left conjunctiva is not injected. No exudate.     Pupils: Pupils are equal, round, and reactive to light.   Neck:      Musculoskeletal: Normal range of motion and neck supple.   Cardiovascular:      Rate and Rhythm: Normal rate and regular rhythm.      Heart sounds: Normal heart sounds. No murmur. No friction rub. No gallop.    Pulmonary:      Effort: Pulmonary effort is normal. No accessory muscle usage, respiratory distress or retractions.      Breath sounds: Normal breath sounds and air entry. No stridor, decreased air movement or transmitted upper airway sounds. No decreased breath sounds, wheezing, rhonchi or rales.   Chest:      Chest wall: No tenderness.   Abdominal:      General: Bowel sounds are normal. There is no distension.      Palpations: Abdomen is  soft. Abdomen is not rigid. There is no mass.      Tenderness: There is no abdominal tenderness. There is no guarding or rebound.   Genitourinary:     Penis: No erythema, tenderness, discharge, swelling or lesions.       Scrotum/Testes:         Right: Mass, tenderness or swelling not present. Right testis is descended.         Left: Mass, tenderness or swelling not present. Left testis is descended.      Epididymis:      Right: Normal. Not inflamed.      Left: Normal. Not inflamed.   Musculoskeletal: Normal range of motion.   Lymphadenopathy:      Head:      Right side of head: No submental, submandibular, tonsillar, preauricular or posterior auricular adenopathy.      Left side of head: No submental, submandibular, tonsillar, preauricular or posterior auricular adenopathy.      Cervical:      Right cervical: No superficial or posterior cervical adenopathy.     Left cervical: No superficial or posterior cervical adenopathy.   Skin:     General: Skin is warm.      Capillary Refill: Capillary refill takes less than 2 seconds.   Neurological:      Mental Status: He is alert and oriented to person, place, and time.      Cranial Nerves: No cranial nerve deficit.      Sensory: No sensory deficit.      Motor: No abnormal muscle tone.      Coordination: Coordination normal.      Deep Tendon Reflexes: Reflexes normal.   Psychiatric:         Behavior: Behavior normal. Behavior is cooperative.         Thought Content: Thought content normal.         Judgment: Judgment normal.           Labs:     Results for orders placed or performed in visit on 09/08/20   UA with Microscopic reflex to Culture     Status: Abnormal    Specimen: Midstream Urine   Result Value Ref Range    Color Urine Yellow     Appearance Urine Clear     Glucose Urine Negative NEG^Negative mg/dL    Bilirubin Urine Negative NEG^Negative    Ketones Urine Negative NEG^Negative mg/dL    Specific Gravity Urine 1.020 1.003 - 1.035    pH Urine 6.0 5.0 - 7.0 pH     Protein Albumin Urine Negative NEG^Negative mg/dL    Urobilinogen Urine 0.2 0.2 - 1.0 EU/dL    Nitrite Urine Negative NEG^Negative    Blood Urine Negative NEG^Negative    Leukocyte Esterase Urine Negative NEG^Negative    Source Midstream Urine     WBC Urine 0 - 5 OTO5^0 - 5 /HPF    RBC Urine O - 2 OTO2^O - 2 /HPF    Squamous Epithelial /LPF Urine Few FEW^Few /LPF    Bacteria Urine Few (A) NEG^Negative /HPF   Streptococcus A Rapid Scr w Reflx to PCR     Status: None    Specimen: Throat   Result Value Ref Range    Strep Specimen Description Throat     Streptococcus Group A Rapid Screen Negative NEG^Negative       Medical Decision Making:    Differential Diagnosis:  URI Adult/Peds:  Sinusitis, Strep pharyngitis, Tonsilitis, Viral pharyngitis, Viral syndrome and Viral upper respiratory illness        ASSESSMENT    1. Throat pain    2. Urinary frequency    3. Essential hypertension    4. Unprotected sexual intercourse    5. Dysfunction of left eustachian tube        PLAN    Patient presents with 3 day(s) ear pain left, nausea and sore throat.    Patient is in no acute distress.    Temp is 98.2 in clinic today, lung sounds were clear, and O2 sats at 95% on RA.  Imaging to rule out pneumonia is not indicated at this time.  Urine discussed with patient.  This was unremarkable for UTI.  Patient is very concerned about Gonorrhea or chlamydia of the throat.  His strep was negative.  With symptoms, he was treated with 250 Mg Rocephin IM in clinic.  Rx for Zithromax sent in.  RST was negative.  We will call with culture results only if positive.  COVID test ordered and swab collected in clinic today.  Rest, Push fluids, vaporizer, elevation of head of bed.  Ibuprofen and or Tylenol for any fever or body aches.  Over the counter cough suppressant- PRN- as discussed.   Patient is hypertensive in clinic today.  Second BP reading was also above goal.  Patient instructed to follow up with PCP in the next week for BP recheck.  Sooner  if symptoms worsen.    Worrisome symptoms discussed with instructions to go to the ED.  Patient given COVID isolation instructions.  Patient verbalized understanding and agreed with this plan.    Droplet precautions were observed during this visit.  PPE was worn by me during the visit.  PPE included gown, double gloves, surgical mask, and face shield.  Vital signs were collected by me as well as any NP, or OP swabs if needed.      Kevin Morrow PA-C  9/8/2020, 4:09 PM

## 2020-09-08 NOTE — NURSING NOTE
Clinic Administered Medication Documentation      Injectable Medication Documentation    Patient was given Ceftriaxone Sodium (Rocephin). Prior to medication administration, verified patients identity using patient s name and date of birth. Please see MAR and medication order for additional information. Patient instructed to remain in clinic for 15 minutes and report any adverse reaction to staff immediately .      Was entire vial of medication used? Yes  Vial/Syringe: Single dose vial  Expiration Date:  08/22  Was this medication supplied by the patient? No     Paola Lazcano CMA

## 2020-09-09 ENCOUNTER — TELEPHONE (OUTPATIENT)
Dept: EMERGENCY MEDICINE | Facility: CLINIC | Age: 30
End: 2020-09-09

## 2020-09-09 NOTE — TELEPHONE ENCOUNTER
Hebert Morrow PA-C wont be in until next week.  Is this okay to wait til next week or is he okay with any provider calling him?    Jennie See MIGUEL Escobar

## 2020-09-09 NOTE — TELEPHONE ENCOUNTER
Reason for Call:  Other questions    Detailed comments: Patient would like to speak to Kevin Morrow regarding their appointment yesterday 9/8/2020 please follow up with patient and advise thank you.    Phone Number Patient can be reached at: Cell number on file:    Telephone Information:   Mobile [phone]       Best Time: any    Can we leave a detailed message on this number? YES    Call taken on 9/9/2020 at 2:01 PM by Bethany Herring

## 2020-09-10 LAB
SARS-COV-2 RNA SPEC QL NAA+PROBE: NOT DETECTED
SPECIMEN SOURCE: NORMAL

## 2020-09-10 NOTE — TELEPHONE ENCOUNTER
I will not treat with a different antibiotic. The shot of Rocephin would treat tonsillitis and the azithromycin would cover strep. If not better would have to be seen again to get a different antibiotic.  Please inform.  Stephani Wolfe PA-C

## 2020-09-10 NOTE — TELEPHONE ENCOUNTER
Patient is wondering if he should get a different antibiotic for tonsillitis? He was treated in clinic with azithromycin & a rocephin injection for possible STD. All the STD testing came back negative.     Paola Lazcano, CMA

## 2020-09-22 ENCOUNTER — MYC MEDICAL ADVICE (OUTPATIENT)
Dept: DERMATOLOGY | Facility: CLINIC | Age: 30
End: 2020-09-22

## 2020-09-23 ENCOUNTER — OFFICE VISIT (OUTPATIENT)
Dept: DERMATOLOGY | Facility: CLINIC | Age: 30
End: 2020-09-23
Payer: COMMERCIAL

## 2020-09-23 DIAGNOSIS — L24.89 IRRITANT CONTACT DERMATITIS DUE TO OTHER AGENTS: Primary | ICD-10-CM

## 2020-09-23 DIAGNOSIS — R30.0 DYSURIA: ICD-10-CM

## 2020-09-23 PROCEDURE — 99213 OFFICE O/P EST LOW 20 MIN: CPT | Performed by: PHYSICIAN ASSISTANT

## 2020-09-23 RX ORDER — PIMECROLIMUS 10 MG/G
CREAM TOPICAL 2 TIMES DAILY
Qty: 30 G | Refills: 0 | Status: SHIPPED | OUTPATIENT
Start: 2020-09-23 | End: 2021-06-21

## 2020-09-23 ASSESSMENT — PAIN SCALES - GENERAL: PAINLEVEL: MODERATE PAIN (5)

## 2020-09-23 NOTE — TELEPHONE ENCOUNTER
Called patient to schedule an appointment to come in sooner due to nothing working for him. No answer, left voicemail and call back number. Will reply to his my chart message.

## 2020-09-23 NOTE — PATIENT INSTRUCTIONS
[facility] Dermatology Visit    Thank you for allowing us to participate in your care.     When should I call my doctor?    If you are worsening or not improving, please, contact us or seek urgent care as noted below.     Who should I call with questions (adults)?    [facility] (adult and pediatric): [phone]     [facility] (adult): [phone]    For urgent needs outside of business hours call the [facility] at [phone] and ask for the dermatology resident on call    If this is a medical emergency and you are unable to reach an ER, Call 911      Who should I call with questions (pediatric)?  [facility]- Pediatric Dermatology  Dr. Madison Sierra, Dr. Gertrudis Wild, Dr. Pari Lezama, Amarilys Huynh, JESSIE Retana, Dr. Daniela Faye & Dr. Davon Avendano  Non Urgent  Nurse Triage Line; [phone]- Barbara and Kenisha CANO Care Coordinatorvani Donato (/Complex ) [phone]    If you need a prescription refill, please contact your pharmacy. Refills are approved or denied by our Physicians during normal business hours, Monday through Fridays  Per office policy, refills will not be granted if you have not been seen within the past year (or sooner depending on your child's condition)    Scheduling Information:  Pediatric Appointment Scheduling and Call Center [phone]  Radiology Scheduling- [phone]  Sedation Unit Scheduling- [phone]  [place] Scheduling- General [phone]; Pediatric Dermatology [phone]  Main  Services: [phone]  Macedonian: [phone]  Monegasque: [phone]  Hmong/Adolph/Citizen of [place]: [phone]  Preadmission Nursing Department Fax Number: [phone] (Fax all pre-operative paperwork to this number)    For urgent matters arising during evenings, weekends, or holidays that cannot wait for normal business hours  please call [phone] and ask for the Dermatology Resident On-Call to be paged.          Cleanse the area with Cetaphil gentle cleanser. Use CeraVe moisturizing cream immediately after and topical medication either on top of CeraVe moisturizer or under moisturizer. Okay to use Protopic for up to two weeks twice a day, then use new topical for two weeks leading up to patch testing. Avoid sexual activity (including masturbation) for two weeks.

## 2020-09-23 NOTE — LETTER
"    9/23/2020         RE: Abimael Martinez  [address]  [facility] [address]        Dear Colleague,    Thank you for referring your patient, Abimael Martinez, to the [facility]. Please see a copy of my visit note below.    [facility] Dermatology Note      Dermatology Problem List:  1. Dermatitis, groin area. Suspected irritant versus contact dermatitis.  - pending patch testing  - vaseline; elidel  - prior tx: triam 0.025% ointment BID, protopic 0.1% ointment BID PRN  2. Allergic contact dermatitis  - s/p punch biopsy 11/19/19  - referral for patch testing   - triam 0.1% ointment BID     CC:   Chief Complaint   Patient presents with     Derm Problem     Irritation on penis x 2-3 years. Very painful area, not getting any better. He had been using tacrolimus x TID with no improvement.      Encounter Date: Sep 23, 2020    History of Present Illness:  Mr. Abimael Martinez is a 30 year old male who presents as a follow-up for penile irritation. The patient was last seen 8/31/20 when Dr. Lisa recommended patch testing and prescribed Protopic 0.1%. In the past he has seen Dr. Cedillo as well as Dr. Curtis as well regarding this same issue. He reports that he believes the irritation started 2-3 years ago when he used a  for a sex toy instead of lubricant during intercourse. Since then this has been an ongoing problem for him. He states that he had sex about 4-5 days ago and he was in \"excruciating pain\" during and after. Patient states that the pain is worse than the itching and that the skin there feels very inflamed/irritated. Unknown family history due to patient being adopted. He states that the pain is now mostly coming from the urethra when he is urinating. He states that he was tested recently for STI's at an urgent care due to throat pain (oropharegeal swab), at that visit he received a Rocephin injection and was prophylacticly treated for " Gonorrhea/Chlamydia. Patient is using Dove soap. Patient states he frequently swims and has concerns about chlorine causing dryness. He is otherwise doing well and has no other concerns.    He notes that he has not been particularly complaint with his treatment options. He says he would use the rx cream - either triamcinolone or tacroliums maybe 1-2x per day, but only for about 6-7 days in a row, then he would stop. He thinks perhaps he needs to give it another try, but use for a longer period of time.    Past Medical History:   Patient Active Problem List   Diagnosis     Anxiety     CARDIOVASCULAR SCREENING; LDL GOAL LESS THAN 160     High risk sexual behavior     Tobacco use disorder     Low back pain     Essential hypertension     Internal hemorrhoids which bleed     Obesity, Class I, BMI 30-34.9     Attention deficit hyperactivity disorder (ADHD), combined type     AYALA (generalized anxiety disorder)     OCD (obsessive compulsive disorder)     Drug-induced erectile dysfunction     Mild persistent asthma without complication     Lumbosacral radiculopathy     Dyslipidemia     Elevated serum creatinine     Past Medical History:   Diagnosis Date     Acute right otitis media 1/8/2013     Anxiety      Depression      Drug abuse (H)      Gonococcal urethritis 02/05/2016     Urethritis 5/20/2013     Problem list name updated by automated process. Provider to review     Past Surgical History:   Procedure Laterality Date     BACK SURGERY  04/05/2018       Social History:  Often sexually active, with known and unknown partners  Patient reports that he quit smoking about 2 years ago. His smoking use included cigarettes. He has a 3.00 pack-year smoking history. He has never used smokeless tobacco. He reports current alcohol use. He reports current drug use. Drug: Marijuana.    Family History:  Family history unknown. Patient is adopted.    Medications:  Current Outpatient Medications   Medication Sig Dispense Refill      albuterol (PROAIR HFA/PROVENTIL HFA/VENTOLIN HFA) 108 (90 Base) MCG/ACT inhaler Inhale 2 puffs into the lungs every 4 hours as needed for wheezing 1 Inhaler 0     ALPRAZolam (XANAX) 2 MG tablet Take 1 tablet (2 mg) by mouth 3 times daily as needed for anxiety 30 tablet 0     amLODIPine (NORVASC) 10 MG tablet Take 1 tablet (10 mg) by mouth daily 30 tablet 1     amphetamine-dextroamphetamine (ADDERALL) 20 MG tablet Take 1 tablet (20 mg) by mouth 3 times daily 90 tablet 0     azelastine (ASTELIN) 0.1 % nasal spray Spray 2 sprays into both nostrils 2 times daily 30 mL 11     famotidine (PEPCID) 40 MG tablet Take 1 tablet (40 mg) by mouth At Bedtime 90 tablet 1     fluticasone (FLOVENT HFA) 220 MCG/ACT Inhaler Inhale 2 puffs into the lungs 2 times daily Please give patient spacer 1 Inhaler 3     Hyoscyamine Sulfate 0.375 MG TBCR Take 1 capful by mouth 3 times daily as needed 90 tablet 1     mometasone (ELOCON) 0.1 % external ointment If necessary use 3-4 days in a row or 2xweekly on eczematous lesions 45 g 2     montelukast (SINGULAIR) 10 MG tablet Take 1 tablet (10 mg) by mouth At Bedtime 90 tablet 0     oxyCODONE-acetaminophen (PERCOCET) 5-325 MG per tablet Take 1 tablet by mouth every 6 hours as needed for pain 12 tablet 0     sildenafil (REVATIO) 20 MG tablet Take 1-2 tablets (20-40 mg) by mouth daily as needed As needed for prevention of erectile dysfunction 30 tablet 2     tacrolimus (PROTOPIC) 0.1 % external ointment Apply twice daily as needed for rash on penis. 30 g 11     triamcinolone (KENALOG) 0.025 % external ointment Apply topically 2 times daily Apply twice a day to sites of irritation 80 g 1     Allergies   Allergen Reactions     Cymbalta      Weight gain and fatigue     Duloxetine Other (See Comments) and Fatigue     enlarged spleen and weight gain     Paroxetine Other (See Comments), Fatigue and GI Disturbance     Weight gain, Spleen issues     Seasonal Allergies      Vicodin [Hydrocodone-Acetaminophen]       Review of Systems:  -As per HPI  -Constitutional: The patient denies fatigue, fevers, chills, unintended weight loss, and night sweats.  -HEENT: Patient denies nonhealing oral sores.  -Skin: As above in HPI. No additional skin concerns.    Physical exam:  Vitals: There were no vitals taken for this visit.  GEN: This is a well developed, well-nourished male in no acute distress, in a pleasant mood.    SKIN: Focused examination of the genital region was performed.  -no erythema or discharge at urethral meatus, no odor  -Small pink patch with desquamative scale on the dorsal penile shaft near glans penis, otherwise no significant lesions.  -Pagan's skin type III  -no nail pits  -No other lesions of concern on areas examined.     Impression/Plan:  1. Dermatitis - suspect irritant vs contact, however has some newer onset urinary sx and pain at the meatus. No ordor or discharge. Suspect xerotic dermatitis versus irritant/ACD versus less likely mild penile psoriasis. Given prior history of ACD and his temporality of onset to application of new topical exposure, did recommend he pursue patch testing with Dr. Harris to r/o ACD.  -Complete patch testing with Dr. Harris - already scheduled for one month from now  -Switch to Cetaphil non-soap gentle cleanser from Dove  -Discussed reducing over cleaning the area, moisturize BID with cetaphil/cerave cream or vaseline as opposed to lotion - previously was using jergens  -Avoid sexual activity for two weeks and also avoid other topical exposures except for vaseline and gentle soaps  -UA, Neisseria/Chylamida PCR testing ordered  -Patient has not been particularly compliant with topical treatments, he tries them for a few days, at most a week before stopping. Encouraged him to give his topical therapies two full weeks and to remain compliant.   -Start elidel 0.05% cream BID to the groin/penile shaft x 14 days  -may also trial his other options: tacrolimus and  triamcinolone for a total of 14 days to see if he gets benefit from complying with treatment. Atrophy risk dicussed again.   -Patient needs to have patch testing done before he can come back to the dermatology clinic    CC Dr. Narayanan on close of this encounter.  Follow-up in 2 months, for patch testing with Dr. Harris. Sooner with Dr. Lisa if worsening of symptoms.    Staff Involved:  Staff Only  All risks, benefits and alternatives were discussed with patient.  Patient is in agreement and understands the assessment and plan.  All questions were answered.    Rochelle Rain PA-C, RUSTS  [facility] [place]: Phone: [phone], Fax: [phone]  [facility]: Phone: [phone],  Fax: [phone]              Again, thank you for allowing me to participate in the care of your patient.        Sincerely,        Rochelle Rain PA-C

## 2020-09-23 NOTE — PROGRESS NOTES
"[facility] Dermatology Note      Dermatology Problem List:  1. Dermatitis, groin area. Suspected irritant versus contact dermatitis.  - pending patch testing  - vaseline; elidel  - prior tx: triam 0.025% ointment BID, protopic 0.1% ointment BID PRN  2. Allergic contact dermatitis  - s/p punch biopsy 11/19/19  - referral for patch testing   - triam 0.1% ointment BID     CC:   Chief Complaint   Patient presents with     Derm Problem     Irritation on penis x 2-3 years. Very painful area, not getting any better. He had been using tacrolimus x TID with no improvement.      Encounter Date: Sep 23, 2020    History of Present Illness:  Mr. Abimael Martinez is a 30 year old male who presents as a follow-up for penile irritation. The patient was last seen 8/31/20 when Dr. Lisa recommended patch testing and prescribed Protopic 0.1%. In the past he has seen Dr. Cedillo as well as Dr. Curtis as well regarding this same issue. He reports that he believes the irritation started 2-3 years ago when he used a  for a sex toy instead of lubricant during intercourse. Since then this has been an ongoing problem for him. He states that he had sex about 4-5 days ago and he was in \"excruciating pain\" during and after. Patient states that the pain is worse than the itching and that the skin there feels very inflamed/irritated. Unknown family history due to patient being adopted. He states that the pain is now mostly coming from the urethra when he is urinating. He states that he was tested recently for STI's at an urgent care due to throat pain (oropharegeal swab), at that visit he received a Rocephin injection and was prophylacticly treated for Gonorrhea/Chlamydia. Patient is using Dove soap. Patient states he frequently swims and has concerns about chlorine causing dryness. He is otherwise doing well and has no other concerns.    He notes that he has not been particularly complaint with his treatment " options. He says he would use the rx cream - either triamcinolone or tacroliums maybe 1-2x per day, but only for about 6-7 days in a row, then he would stop. He thinks perhaps he needs to give it another try, but use for a longer period of time.    Past Medical History:   Patient Active Problem List   Diagnosis     Anxiety     CARDIOVASCULAR SCREENING; LDL GOAL LESS THAN 160     High risk sexual behavior     Tobacco use disorder     Low back pain     Essential hypertension     Internal hemorrhoids which bleed     Obesity, Class I, BMI 30-34.9     Attention deficit hyperactivity disorder (ADHD), combined type     AYALA (generalized anxiety disorder)     OCD (obsessive compulsive disorder)     Drug-induced erectile dysfunction     Mild persistent asthma without complication     Lumbosacral radiculopathy     Dyslipidemia     Elevated serum creatinine     Past Medical History:   Diagnosis Date     Acute right otitis media 1/8/2013     Anxiety      Depression      Drug abuse (H)      Gonococcal urethritis 02/05/2016     Urethritis 5/20/2013     Problem list name updated by automated process. Provider to review     Past Surgical History:   Procedure Laterality Date     BACK SURGERY  04/05/2018       Social History:  Often sexually active, with known and unknown partners  Patient reports that he quit smoking about 2 years ago. His smoking use included cigarettes. He has a 3.00 pack-year smoking history. He has never used smokeless tobacco. He reports current alcohol use. He reports current drug use. Drug: Marijuana.    Family History:  Family history unknown. Patient is adopted.    Medications:  Current Outpatient Medications   Medication Sig Dispense Refill     albuterol (PROAIR HFA/PROVENTIL HFA/VENTOLIN HFA) 108 (90 Base) MCG/ACT inhaler Inhale 2 puffs into the lungs every 4 hours as needed for wheezing 1 Inhaler 0     ALPRAZolam (XANAX) 2 MG tablet Take 1 tablet (2 mg) by mouth 3 times daily as needed for anxiety 30  tablet 0     amLODIPine (NORVASC) 10 MG tablet Take 1 tablet (10 mg) by mouth daily 30 tablet 1     amphetamine-dextroamphetamine (ADDERALL) 20 MG tablet Take 1 tablet (20 mg) by mouth 3 times daily 90 tablet 0     azelastine (ASTELIN) 0.1 % nasal spray Spray 2 sprays into both nostrils 2 times daily 30 mL 11     famotidine (PEPCID) 40 MG tablet Take 1 tablet (40 mg) by mouth At Bedtime 90 tablet 1     fluticasone (FLOVENT HFA) 220 MCG/ACT Inhaler Inhale 2 puffs into the lungs 2 times daily Please give patient spacer 1 Inhaler 3     Hyoscyamine Sulfate 0.375 MG TBCR Take 1 capful by mouth 3 times daily as needed 90 tablet 1     mometasone (ELOCON) 0.1 % external ointment If necessary use 3-4 days in a row or 2xweekly on eczematous lesions 45 g 2     montelukast (SINGULAIR) 10 MG tablet Take 1 tablet (10 mg) by mouth At Bedtime 90 tablet 0     oxyCODONE-acetaminophen (PERCOCET) 5-325 MG per tablet Take 1 tablet by mouth every 6 hours as needed for pain 12 tablet 0     sildenafil (REVATIO) 20 MG tablet Take 1-2 tablets (20-40 mg) by mouth daily as needed As needed for prevention of erectile dysfunction 30 tablet 2     tacrolimus (PROTOPIC) 0.1 % external ointment Apply twice daily as needed for rash on penis. 30 g 11     triamcinolone (KENALOG) 0.025 % external ointment Apply topically 2 times daily Apply twice a day to sites of irritation 80 g 1     Allergies   Allergen Reactions     Cymbalta      Weight gain and fatigue     Duloxetine Other (See Comments) and Fatigue     enlarged spleen and weight gain     Paroxetine Other (See Comments), Fatigue and GI Disturbance     Weight gain, Spleen issues     Seasonal Allergies      Vicodin [Hydrocodone-Acetaminophen]      Review of Systems:  -As per HPI  -Constitutional: The patient denies fatigue, fevers, chills, unintended weight loss, and night sweats.  -HEENT: Patient denies nonhealing oral sores.  -Skin: As above in HPI. No additional skin concerns.    Physical  exam:  Vitals: There were no vitals taken for this visit.  GEN: This is a well developed, well-nourished male in no acute distress, in a pleasant mood.    SKIN: Focused examination of the genital region was performed.  -no erythema or discharge at urethral meatus, no odor  -Small pink patch with desquamative scale on the dorsal penile shaft near glans penis, otherwise no significant lesions.  -Pagan's skin type III  -no nail pits  -No other lesions of concern on areas examined.     Impression/Plan:  1. Dermatitis - suspect irritant vs contact, however has some newer onset urinary sx and pain at the meatus. No ordor or discharge. Suspect xerotic dermatitis versus irritant/ACD versus less likely mild penile psoriasis. Given prior history of ACD and his temporality of onset to application of new topical exposure, did recommend he pursue patch testing with Dr. Harris to r/o ACD.  -Complete patch testing with Dr. Harris - already scheduled for one month from now  -Switch to Cetaphil non-soap gentle cleanser from Dove  -Discussed reducing over cleaning the area, moisturize BID with cetaphil/cerave cream or vaseline as opposed to lotion - previously was using jergens  -Avoid sexual activity for two weeks and also avoid other topical exposures except for vaseline and gentle soaps  -UA, Neisseria/Chylamida PCR testing ordered  -Patient has not been particularly compliant with topical treatments, he tries them for a few days, at most a week before stopping. Encouraged him to give his topical therapies two full weeks and to remain compliant.   -Start elidel 0.05% cream BID to the groin/penile shaft x 14 days  -may also trial his other options: tacrolimus and triamcinolone for a total of 14 days to see if he gets benefit from complying with treatment. Atrophy risk dicussed again.   -Patient needs to have patch testing done before he can come back to the dermatology clinic    CC Dr. Narayanan on close of this  encounter.  Follow-up in 2 months, for patch testing with Dr. Harris. Sooner with Dr. Lisa if worsening of symptoms.    Staff Involved:  Staff Only  All risks, benefits and alternatives were discussed with patient.  Patient is in agreement and understands the assessment and plan.  All questions were answered.    Rochelle Rain PA-C, MPAS  [facility]: Phone: [phone], Fax: [phone]  [facility]: Phone: [phone],  Fax: [phone]

## 2020-09-23 NOTE — NURSING NOTE
Abimael Martinez's goals for this visit include:   Chief Complaint   Patient presents with     Derm Problem     Irritation on penis x 2-3 years. Very painful area, not getting any better. He had been using tacrolimus x TID with no improvement.        He requests these members of his care team be copied on today's visit information: Yes     PCP: Jamison Lora    Referring Provider:  No referring provider defined for this encounter.    There were no vitals taken for this visit.    Do you need any medication refills at today's visit? No   LXIONG3, MEDICAL ASSISTANT

## 2020-09-24 ENCOUNTER — TELEPHONE (OUTPATIENT)
Dept: DERMATOLOGY | Facility: CLINIC | Age: 30
End: 2020-09-24

## 2020-09-24 ENCOUNTER — TRANSFERRED RECORDS (OUTPATIENT)
Dept: HEALTH INFORMATION MANAGEMENT | Facility: CLINIC | Age: 30
End: 2020-09-24

## 2020-09-24 NOTE — TELEPHONE ENCOUNTER
[facility] Call Center    Phone Message    May a detailed message be left on voicemail: yes     Reason for Call: Medication Question or concern regarding medication   Prescription Clarification  Name of Medication: pimecrolimus (ELIDEL) 1 % external cream   Prescribing Provider: Rochelle Rain  Patient called wanting to know if this is a steroid cream. Patient was under the impression he is supposed to be getting a steroid cream. Please advise. Thank you    Action Taken: Message routed to:  Adult Clinics: Dermatology p [id number]    Travel Screening: Not Applicable

## 2020-09-24 NOTE — TELEPHONE ENCOUNTER
Pt called, No answer.  does not identify pt. Left general message for pt to call the [facility] back at [phone].    Melissa Trevino LPN

## 2020-09-28 NOTE — TELEPHONE ENCOUNTER
"I left a message for patient to call [facility].    Per Rochelle, \"Protopic is the alternative. So lets have him trial the protopic for a solid two weeks in a row.\"    Dr. Lisa prescribed protopic in August with 11 refills.  He can have the pharmacy fill that if he doesn't have a tube at home.    Lisa Low RN    "

## 2020-09-29 ENCOUNTER — MYC REFILL (OUTPATIENT)
Dept: FAMILY MEDICINE | Facility: CLINIC | Age: 30
End: 2020-09-29

## 2020-09-29 DIAGNOSIS — F90.2 ATTENTION DEFICIT HYPERACTIVITY DISORDER (ADHD), COMBINED TYPE: ICD-10-CM

## 2020-09-29 RX ORDER — DEXTROAMPHETAMINE SACCHARATE, AMPHETAMINE ASPARTATE, DEXTROAMPHETAMINE SULFATE AND AMPHETAMINE SULFATE 5; 5; 5; 5 MG/1; MG/1; MG/1; MG/1
20 TABLET ORAL 3 TIMES DAILY
Qty: 90 TABLET | Refills: 0 | Status: SHIPPED | OUTPATIENT
Start: 2020-09-29 | End: 2020-10-29

## 2020-09-29 NOTE — TELEPHONE ENCOUNTER
Controlled Substance Refill Request for Adderall 20 mg  Problem List Complete:  Yes     Last Written Prescription Date:  8/31/20  Last Fill Quantity: 90,   # refills: 0     Last Office Visit with [facility] primary care provider: 7/2/20     Future Office visit:       Controlled substance agreement:   Encounter-Level CSA - 07/31/2017:    Controlled Substance Agreement - Scan on 8/11/2017  6:48 AM: CONTROLLED SUBSTANCE AGREEMENT      Patient-Level CSA:    There are no patient-level csa.      Last Urine Drug Screen: No results found for: CDAUT, No results found for: COMDAT, No results found for: THC13, PCP13, COC13, MAMP13, OPI13, AMP13, BZO13, TCA13, MTD13, BAR13, OXY13, PPX13, BUP13     [email or web address]      checked in past 3 months?  Yes 8/3/20, no concerns      Heather RIDERN, RN

## 2020-10-01 NOTE — TELEPHONE ENCOUNTER
No other alternative medications mentioned in Rochelle's last office visit notes other than the trial of the Tacrolimus and the Triamcinolone for a total of 14 days. Pt called, no answer. Left detailed message notifying pt that he could trial triamcinolone and Tacrolimus for 14 days as an alternative.  Pt advised to call back with any questions...Lilia Soto RN                    Impression/Plan:  1. Dermatitis - suspect irritant vs contact, however has some newer onset urinary sx and pain at the meatus. No ordor or discharge. Suspect xerotic dermatitis versus irritant/ACD versus less likely mild penile psoriasis. Given prior history of ACD and his temporality of onset to application of new topical exposure, did recommend he pursue patch testing with Dr. Harris to r/o ACD.  -Complete patch testing with Dr. Harris - already scheduled for one month from now  -Switch to Cetaphil non-soap gentle cleanser from Dove  -Discussed reducing over cleaning the area, moisturize BID with cetaphil/cerave cream or vaseline as opposed to lotion - previously was using jergens  -Avoid sexual activity for two weeks and also avoid other topical exposures except for vaseline and gentle soaps  -UA, Neisseria/Chylamida PCR testing ordered  -Patient has not been particularly compliant with topical treatments, he tries them for a few days, at most a week before stopping. Encouraged him to give his topical therapies two full weeks and to remain compliant.   -Start elidel 0.05% cream BID to the groin/penile shaft x 14 days  -may also trial his other options: tacrolimus and triamcinolone for a total of 14 days to see if he gets benefit from complying with treatment. Atrophy risk dicussed again.   -Patient needs to have patch testing done before he can come back to the dermatology clinic     CC Dr. Narayanan on close of this encounter.  Follow-up in 2 months, for patch testing with Dr. Harris. Sooner with Dr. Lisa if worsening of  symptoms.     Staff Involved:  Staff Only  All risks, benefits and alternatives were discussed with patient.  Patient is in agreement and understands the assessment and plan.  All questions were answered.     Rochelle Rain PA-C, MPAS  [facility] [place]: Phone: [phone], Fax: [phone]  [facility]: Phone: [phone],  Fax: [phone]

## 2020-10-01 NOTE — TELEPHONE ENCOUNTER
Pt medication that was sent is not covered under insurance and would like a new medication sent. Please advise

## 2020-10-07 ENCOUNTER — MYC REFILL (OUTPATIENT)
Dept: FAMILY MEDICINE | Facility: CLINIC | Age: 30
End: 2020-10-07

## 2020-10-07 DIAGNOSIS — F41.9 ANXIETY: ICD-10-CM

## 2020-10-08 RX ORDER — ALPRAZOLAM 2 MG
2 TABLET ORAL 3 TIMES DAILY PRN
Qty: 30 TABLET | Refills: 0 | Status: SHIPPED | OUTPATIENT
Start: 2020-10-08 | End: 2020-10-09

## 2020-10-09 ENCOUNTER — TELEPHONE (OUTPATIENT)
Dept: FAMILY MEDICINE | Facility: CLINIC | Age: 30
End: 2020-10-09

## 2020-10-09 DIAGNOSIS — F41.9 ANXIETY: ICD-10-CM

## 2020-10-09 RX ORDER — ALPRAZOLAM 2 MG
2 TABLET ORAL 3 TIMES DAILY PRN
Qty: 30 TABLET | Refills: 0 | Status: SHIPPED | OUTPATIENT
Start: 2020-10-09 | End: 2020-11-05

## 2020-10-09 NOTE — TELEPHONE ENCOUNTER
Patient calling he is completely out of his Alprazolam, pharmacy does not have it in stock. Would like script sent to [facility] as he called them and it is in stock. Pharmacy is attached.

## 2020-10-09 NOTE — TELEPHONE ENCOUNTER
I called [facility] and confirmed they do not have them in stock.  Advised them to discontinue prescription.    Please sign prescription to go to new pharmacy.  Caro Quintanilla BSN, RN

## 2020-10-20 ENCOUNTER — TRANSFERRED RECORDS (OUTPATIENT)
Dept: HEALTH INFORMATION MANAGEMENT | Facility: CLINIC | Age: 30
End: 2020-10-20

## 2020-10-25 ENCOUNTER — OFFICE VISIT (OUTPATIENT)
Dept: URGENT CARE | Facility: URGENT CARE | Age: 30
End: 2020-10-25
Payer: COMMERCIAL

## 2020-10-25 VITALS
SYSTOLIC BLOOD PRESSURE: 126 MMHG | TEMPERATURE: 98.8 F | DIASTOLIC BLOOD PRESSURE: 78 MMHG | HEART RATE: 104 BPM | OXYGEN SATURATION: 99 %

## 2020-10-25 DIAGNOSIS — R19.7 DIARRHEA, UNSPECIFIED TYPE: Primary | ICD-10-CM

## 2020-10-25 DIAGNOSIS — Z11.3 SCREEN FOR STD (SEXUALLY TRANSMITTED DISEASE): ICD-10-CM

## 2020-10-25 LAB
ALBUMIN UR-MCNC: NEGATIVE MG/DL
APPEARANCE UR: CLEAR
BILIRUB UR QL STRIP: NEGATIVE
COLOR UR AUTO: YELLOW
ERYTHROCYTE [DISTWIDTH] IN BLOOD BY AUTOMATED COUNT: 12.9 % (ref 10–15)
GLUCOSE UR STRIP-MCNC: NEGATIVE MG/DL
HCT VFR BLD AUTO: 45.8 % (ref 40–53)
HGB BLD-MCNC: 15.7 G/DL (ref 13.3–17.7)
HGB UR QL STRIP: NEGATIVE
KETONES UR STRIP-MCNC: NEGATIVE MG/DL
LEUKOCYTE ESTERASE UR QL STRIP: NEGATIVE
MCH RBC QN AUTO: 28.6 PG (ref 26.5–33)
MCHC RBC AUTO-ENTMCNC: 34.3 G/DL (ref 31.5–36.5)
MCV RBC AUTO: 84 FL (ref 78–100)
NITRATE UR QL: NEGATIVE
PH UR STRIP: 6.5 PH (ref 5–7)
PLATELET # BLD AUTO: 265 10E9/L (ref 150–450)
RBC # BLD AUTO: 5.48 10E12/L (ref 4.4–5.9)
SOURCE: NORMAL
SP GR UR STRIP: 1.01 (ref 1–1.03)
UROBILINOGEN UR STRIP-ACNC: 0.2 EU/DL (ref 0.2–1)
WBC # BLD AUTO: 6.6 10E9/L (ref 4–11)

## 2020-10-25 PROCEDURE — 87491 CHLMYD TRACH DNA AMP PROBE: CPT | Performed by: FAMILY MEDICINE

## 2020-10-25 PROCEDURE — 86780 TREPONEMA PALLIDUM: CPT | Mod: 90 | Performed by: FAMILY MEDICINE

## 2020-10-25 PROCEDURE — 87591 N.GONORRHOEAE DNA AMP PROB: CPT | Performed by: FAMILY MEDICINE

## 2020-10-25 PROCEDURE — 36415 COLL VENOUS BLD VENIPUNCTURE: CPT | Performed by: FAMILY MEDICINE

## 2020-10-25 PROCEDURE — 85027 COMPLETE CBC AUTOMATED: CPT | Performed by: FAMILY MEDICINE

## 2020-10-25 PROCEDURE — 87389 HIV-1 AG W/HIV-1&-2 AB AG IA: CPT | Performed by: FAMILY MEDICINE

## 2020-10-25 PROCEDURE — 81003 URINALYSIS AUTO W/O SCOPE: CPT | Performed by: FAMILY MEDICINE

## 2020-10-25 PROCEDURE — 86803 HEPATITIS C AB TEST: CPT | Performed by: FAMILY MEDICINE

## 2020-10-25 PROCEDURE — U0003 INFECTIOUS AGENT DETECTION BY NUCLEIC ACID (DNA OR RNA); SEVERE ACUTE RESPIRATORY SYNDROME CORONAVIRUS 2 (SARS-COV-2) (CORONAVIRUS DISEASE [COVID-19]), AMPLIFIED PROBE TECHNIQUE, MAKING USE OF HIGH THROUGHPUT TECHNOLOGIES AS DESCRIBED BY CMS-2020-01-R: HCPCS | Performed by: FAMILY MEDICINE

## 2020-10-25 PROCEDURE — 99000 SPECIMEN HANDLING OFFICE-LAB: CPT | Performed by: FAMILY MEDICINE

## 2020-10-25 PROCEDURE — 99214 OFFICE O/P EST MOD 30 MIN: CPT | Performed by: FAMILY MEDICINE

## 2020-10-25 PROCEDURE — 80053 COMPREHEN METABOLIC PANEL: CPT | Performed by: FAMILY MEDICINE

## 2020-10-25 NOTE — PROGRESS NOTES
Subjective     Abimael Martinez is a 30 year old male who presents to clinic today for the following health issues:    HPI         Concern -     Onset: 4 days   Description: headache, sore throat, mild cough, chills, diarrhea, fatigue  Intensity: moderate  Progression of Symptoms:  same  Accompanying Signs & Symptoms: none   Previous history of similar problem: none  Therapies tried and outcome: Over-the-counter analgesia    Would like STD screen as well, had unprotected sexual intercourse 3 weeks ago        Review of Systems   Constitutional, HEENT, cardiovascular, pulmonary, GI, , musculoskeletal, neuro, skin, endocrine and psych systems are negative, except as otherwise noted.      Objective    /78   Pulse 104   Temp 98.8  F (37.1  C) (Tympanic)   SpO2 99%   There is no height or weight on file to calculate BMI.  Physical Exam   GENERAL: alert, no distress and obese  EYES: Eyes grossly normal to inspection, PERRL and conjunctivae and sclerae normal  HENT: ear canals and TM's normal, nose and mouth without ulcers or lesions  NECK: no adenopathy, no asymmetry, masses, or scars and thyroid normal to palpation  RESP: lungs clear to auscultation - no rales, rhonchi or wheezes  CV: tachycardia, normal S1 S2, no S3 or S4 and no murmur, click or rub  ABDOMEN: soft, nontender  MS: no gross musculoskeletal defects noted, no edema  SKIN: no suspicious lesions or rashes  NEURO: Normal strength and tone, mentation intact and speech normal  PSYCH: mentation appears normal, affect normal/bright        Assessment & Plan     Diarrhea, unspecified type  Differentials are broad including viral illness.  CBC, CMP, COVID-19 ordered for further evaluation.  Suggested well hydration, warm fluids, Tylenol.  Instructed to go ER if symptoms worsen.  Routine precautionary instructions regarding COVID-19 discussed in detail.  - CBC with platelets  - Comprehensive metabolic panel (BMP + Alb, Alk Phos, ALT, AST, Total. Bili,  "TP)  - Symptomatic COVID-19 Virus (Coronavirus) by PCR    Screen for STD (sexually transmitted disease)  --STD screen ordered.  Safe sexual practice counseling provided   - UA reflex to Microscopic  - HIV Antigen Antibody Combo  - Chlamydia trachomatis PCR  - Neisseria gonorrhoeae PCR  - Treponema Abs w Reflex to RPR and Titer  - **Hepatitis C Screen Reflex to RNA FUTURE anytime       Patient Instructions     Patient Education   After Your COVID-19 (Coronavirus) Test  You have been tested for COVID-19 (coronavirus).   If you'll have surgery in the next few days, we'll let you know ahead of time if you have the virus. Please call your surgeon's office with any questions.  For all other patients: Results are usually available in Emgot within 2 to 3 days.   If you do not have a [employer] account, you'll get a letter in the mail in about 7 to 10 days.   MicroCHIP[employer]art is often the fastest way to get test results. Please sign up if you do not already have a [employer] account. See the handout \"Getting COVID-19 Test Results in MicroCHIPShart\" for help.   What if my test result is positive?  If your test result is positive, you'll also get a phone call letting you know. (A positive test means that you have the virus.)     Follow the tips under \"How do I self-isolate?\" below for 10 days (20 days if you have a weak immune system).    You don't need to be retested for COVID-19 before going back to school or work. As long as you're fever-free and feeling better, you can go back to school, work and other activities after waiting the 10 or 20 days.  What if I have questions after I get my results?  If you have questions about your results, please visit our testing website at "[email or web address].  After 7 to 10 days, if you have not gotten your results:     Call [phone] ([phone]) and ask to speak with our COVID-19 results team.    If you're being treated at an infusion center: Call your infusion center " "directly.  What are the symptoms of COVID-19?  Cough, fever and trouble breathing are the most common signs of COVID-19.  Other symptoms can include new headaches, new muscle or body aches, new and unexplained fatigue (feeling very tired), chills, sore throat, congestion (stuffy or runny nose), diarrhea (loose poop), loss of taste or smell, belly pain, and nausea or vomiting (feeling sick to your stomach or throwing up).  You may already have symptoms of COVID-19, or they may show up later.  What should I do if I have symptoms?  If you're having surgery: Call your surgeon's office.  For all other patients: Stay home and away from others (self-isolate) until ...    You've had no fever--and no medicine that reduces fever--for 1 full day (24 hours), AND    Other symptoms have gotten better. For example, your cough or breathing has improved, AND    At least 10 days have passed since your symptoms first started.  How do I self-isolate?    Stay in your own room, even for meals. Use your own bathroom if you can.    Stay away from others in your home. No hugging, kissing or shaking hands. No visitors.    Don't go to work, school or anywhere else.    Clean \"high touch\" surfaces often (doorknobs, counters, handles). Use household cleaning spray or wipes. You'll find a full list of  on the EPA website: www.epa.gov/pesticide-registration/list-n-disinfectants-use-against-sars-cov-2.    Cover your mouth and nose with a mask or other face covering to avoid spreading germs.    Wash your hands and face often. Use soap and water.    Caregivers in these groups are at risk for severe illness due to COVID-19:  ? People 65 years and older  ? People who live in a nursing home or long-term care facility  ? People with chronic disease (lung, heart, cancer, diabetes, kidney, liver, immunologic)  ? People who have a weakened immune system, including those who:    Are in cancer treatment    Take medicine that weakens the immune system, " such as corticosteroids    Had a bone marrow or organ transplant    Have an immune deficiency    Have poorly controlled HIV or AIDS    Are obese (body mass index of 40 or higher)    Smoke regularly    Caregivers should wear gloves while washing dishes, handling laundry and cleaning bedrooms and bathrooms.    Use caution when washing and drying laundry: Don't shake dirty laundry and use the warmest water setting that you can.    For more tips on managing your health at home, go to www.cdc.gov/coronavirus/2019-ncov/downloads/10Things.pdf.  How can I take care of myself at home?  1. Get lots of rest. Drink extra fluids (unless a doctor has told you not to).  2. Take Tylenol (acetaminophen) for fever or pain. If you have liver or kidney problems, ask your family doctor if it's OK to take Tylenol.   Adults can take either:  ? 650 mg (two 325 mg pills) every 4 to 6 hours, or   ? 1,000 mg (two 500 mg pills) every 8 hours as needed.  ? Note: Don't take more than 3,000 mg in one day. Acetaminophen is found in many medicines (both prescribed and over-the-counter medicines). Read all labels to be sure you don't take too much.   For children, check the Tylenol bottle for the right dose. The dose is based on the child's age or weight.  3. If you have other health problems (like cancer, heart failure, an organ transplant or severe kidney disease): Call your specialty clinic if you don't feel better in the next 2 days.  4. Know when to call 911. Emergency warning signs include:  ? Trouble breathing or shortness of breath  ? Chest pain or pressure that doesn't go away  ? Feeling confused like you haven't felt before, or not being able to wake up  ? Bluish-colored lips or face  5. If your doctor prescribed a blood thinner medicine: Follow their instructions.  Where can I get more information?     [facility] - About COVID-19: [email or web address]    CDC - If You're Sick:  cdc.gov/coronavirus/2019-ncov/about/steps-when-sick.html    CDC - Ending Home Isolation: www.cdc.gov/coronavirus/2019-ncov/hcp/disposition-in-home-patients.html    CDC - Caring for Someone: www.cdc.gov/coronavirus/2019-ncov/if-you-are-sick/care-for-someone.html    [facility] - Interim Guidance for Hospital Discharge to Home: www.health.[facility].[place]./diseases/coronavirus/hcp/hospdischarge.pdf    [facility] clinical trials (COVID-19 research studies): [email or web address].[facility]-clinical-trials    Below are the COVID-19 hotlines at the [place] Department of Health ([facility]). Interpreters are available.  ? For health questions: Call 924-990-4613 or [phone] (7 a.m. to 7 p.m.)  ? For questions about schools and childcare: Call 276-835-5761 or [phone] (7 a.m. to 7 p.m.)    For informational purposes only. Not to replace the advice of your health care provider. Clinically reviewed by Infection Prevention and the [facility] COVID-19 Clinical Team. Copyright   2020 [facility]. All rights reserved. [employer] [id number] - Rev 10/2/20.       Patient Education     Understanding STDs    When it comes to sex, nothing is risk-free. Any sexual contact with the penis, vagina, anus, or mouth can spread a sexually transmitted disease (STD). The only sure way to prevent STDs is abstinence (not having sex). But there are ways to make sex safer. Use a latex condom each time you have sex. And choose your partner wisely.  Use condoms for safer sex  If you have sex, latex condoms provide the best protection against STDs. Latex condoms stop the exchange of body fluids that carry certain STDs. They also limit contact with affected skin. Be aware though, a condom doesn t cover all skin. So, affected skin that is not covered can still transfer disease. But you re safer with a condom than without one. Use a condom even if you use other birth control. While birth control methods like the pill or IUD help prevent  pregnancy, they do not protect against STDs.  Choose the right condom  Condoms made of latex prevent disease best. If you re allergic to latex, use polyurethane condoms instead. Male condoms fit over the penis. Female condoms line the vagina. Before buying a condom, read the label to be sure it prevents disease. Some novelty condoms don t.  The right lubricant helps  Buy lubricated condoms or use lubricant. This provides greater comfort and reduces the risk of condom breakage. Use only water-based lubricants. Don t use oil, lotion, or petroleum jelly. They can weaken the condom, causing breakage. Also, you may want to choose lubricants without nonoxynol-9. It s now known that this spermicide does not prevent disease and may cause irritation.  Use condoms correctly  For condoms to work, they must be used the right way. Keep these tips in mind:    Use a new latex condom each time you have sex. Slip the condom on the penis before any contact is made.    When ready to withdraw, hold the rim of the condom as the penis pulls out. This prevents the condom from slipping off.    Check the expiration date before using a condom.    Don t store condoms in places that can get hot, such as a car or a wallet that is carried in a back pocket.  Get to know your partner  Safer sex is a process. It involves getting to know your partner and making informed choices. Ask each other how many partners you have had in the past, and how many you have now. Find out if either of you has an STD. If you decide to have sex, use a condom each time. Don t stop using condoms unless you re sure neither of you has other partners and you ve both been tested to confirm you don t have STDs. Then stay free of disease by having sex only with each other (monogamy).  Keep your cool  Don t let alcohol or drugs cloud your judgment. They could lead you to have sex with someone you wouldn t have chosen if you were sober. Or, you might forget to use a condom. If  you do plan to have sex, keep a latex condom with you. Don t wait until you re in the heat of passion to try to find one.  Consider abstinence  The only way to be sure you won t get an STD is to abstain from sex. Abstinence is a choice that many people make at some point in their lives. Maybe you want to wait until you are sure you re ready before you have sex. Maybe you d like a break from the responsibilities of sex for a while. Or maybe you just want to know your partner better before taking the next step. Abstinence is a choice you can make now to protect your future.  Date Last Reviewed: 12/1/2016 2000-2019 [employer]. [address] [place] [address], [place] [address]. All rights reserved. This information is not intended as a substitute for professional medical care. Always follow your healthcare professional's instructions.               Antoni Flynn MD  [facility] [place]

## 2020-10-25 NOTE — PATIENT INSTRUCTIONS
"  Patient Education   After Your COVID-19 (Coronavirus) Test  You have been tested for COVID-19 (coronavirus).   If you'll have surgery in the next few days, we'll let you know ahead of time if you have the virus. Please call your surgeon's office with any questions.  For all other patients: Results are usually available in Galaxy Digital[employer] within 2 to 3 days.   If you do not have a "[employer] account, you'll get a letter in the mail in about 7 to 10 days.   Galaxy Digitalhart is often the fastest way to get test results. Please sign up if you do not already have a "[employer] account. See the handout \"Getting COVID-19 Test Results in MyChart\" for help.   What if my test result is positive?  If your test result is positive, you'll also get a phone call letting you know. (A positive test means that you have the virus.)     Follow the tips under \"How do I self-isolate?\" below for 10 days (20 days if you have a weak immune system).    You don't need to be retested for COVID-19 before going back to school or work. As long as you're fever-free and feeling better, you can go back to school, work and other activities after waiting the 10 or 20 days.  What if I have questions after I get my results?  If you have questions about your results, please visit our testing website at [email or web address].  After 7 to 10 days, if you have not gotten your results:     Call [phone] ([phone]) and ask to speak with our COVID-19 results team.    If you're being treated at an infusion center: Call your infusion center directly.  What are the symptoms of COVID-19?  Cough, fever and trouble breathing are the most common signs of COVID-19.  Other symptoms can include new headaches, new muscle or body aches, new and unexplained fatigue (feeling very tired), chills, sore throat, congestion (stuffy or runny nose), diarrhea (loose poop), loss of taste or smell, belly pain, and nausea or vomiting (feeling sick to your stomach or " "throwing up).  You may already have symptoms of COVID-19, or they may show up later.  What should I do if I have symptoms?  If you're having surgery: Call your surgeon's office.  For all other patients: Stay home and away from others (self-isolate) until ...    You've had no fever--and no medicine that reduces fever--for 1 full day (24 hours), AND    Other symptoms have gotten better. For example, your cough or breathing has improved, AND    At least 10 days have passed since your symptoms first started.  How do I self-isolate?    Stay in your own room, even for meals. Use your own bathroom if you can.    Stay away from others in your home. No hugging, kissing or shaking hands. No visitors.    Don't go to work, school or anywhere else.    Clean \"high touch\" surfaces often (doorknobs, counters, handles). Use household cleaning spray or wipes. You'll find a full list of  on the EPA website: www.epa.gov/pesticide-registration/list-n-disinfectants-use-against-sars-cov-2.    Cover your mouth and nose with a mask or other face covering to avoid spreading germs.    Wash your hands and face often. Use soap and water.    Caregivers in these groups are at risk for severe illness due to COVID-19:  ? People 65 years and older  ? People who live in a nursing home or long-term care facility  ? People with chronic disease (lung, heart, cancer, diabetes, kidney, liver, immunologic)  ? People who have a weakened immune system, including those who:    Are in cancer treatment    Take medicine that weakens the immune system, such as corticosteroids    Had a bone marrow or organ transplant    Have an immune deficiency    Have poorly controlled HIV or AIDS    Are obese (body mass index of 40 or higher)    Smoke regularly    Caregivers should wear gloves while washing dishes, handling laundry and cleaning bedrooms and bathrooms.    Use caution when washing and drying laundry: Don't shake dirty laundry and use the warmest water " setting that you can.    For more tips on managing your health at home, go to www.cdc.gov/coronavirus/2019-ncov/downloads/10Things.pdf.  How can I take care of myself at home?  1. Get lots of rest. Drink extra fluids (unless a doctor has told you not to).  2. Take Tylenol (acetaminophen) for fever or pain. If you have liver or kidney problems, ask your family doctor if it's OK to take Tylenol.   Adults can take either:  ? 650 mg (two 325 mg pills) every 4 to 6 hours, or   ? 1,000 mg (two 500 mg pills) every 8 hours as needed.  ? Note: Don't take more than 3,000 mg in one day. Acetaminophen is found in many medicines (both prescribed and over-the-counter medicines). Read all labels to be sure you don't take too much.   For children, check the Tylenol bottle for the right dose. The dose is based on the child's age or weight.  3. If you have other health problems (like cancer, heart failure, an organ transplant or severe kidney disease): Call your specialty clinic if you don't feel better in the next 2 days.  4. Know when to call 911. Emergency warning signs include:  ? Trouble breathing or shortness of breath  ? Chest pain or pressure that doesn't go away  ? Feeling confused like you haven't felt before, or not being able to wake up  ? Bluish-colored lips or face  5. If your doctor prescribed a blood thinner medicine: Follow their instructions.  Where can I get more information?    [facility] - About COVID-19: [email or web address]    CDC - If You're Sick: cdc.gov/coronavirus/2019-ncov/about/steps-when-sick.html    CDC - Ending Home Isolation: www.cdc.gov/coronavirus/2019-ncov/hcp/disposition-in-home-patients.html    CDC - Caring for Someone: www.cdc.gov/coronavirus/2019-ncov/if-you-are-sick/care-for-someone.html    [facility] - Interim Guidance for Hospital Discharge to Home: www.health.[facility].mn.us/diseases/coronavirus/hcp/hospdischarge.pdf    [facility] clinical trials (COVID-19 research  studies): clinicalaffairs.[facility]-clinical-trials    Below are the COVID-19 hotlines at the [place] Department of Health ([facility]). Interpreters are available.  ? For health questions: Call 827-351-7428 or 1-622.219.6977 (7 a.m. to 7 p.m.)  ? For questions about schools and childcare: Call 256-883-4452 or 1-690.424.4760 (7 a.m. to 7 p.m.)    For informational purposes only. Not to replace the advice of your health care provider. Clinically reviewed by Infection Prevention and [facility] COVID-19 Clinical Team. Copyright   2020 [facility]. All rights reserved. [employer] 270101 - Rev 10/2/20.       Patient Education     Understanding STDs    When it comes to sex, nothing is risk-free. Any sexual contact with the penis, vagina, anus, or mouth can spread a sexually transmitted disease (STD). The only sure way to prevent STDs is abstinence (not having sex). But there are ways to make sex safer. Use a latex condom each time you have sex. And choose your partner wisely.  Use condoms for safer sex  If you have sex, latex condoms provide the best protection against STDs. Latex condoms stop the exchange of body fluids that carry certain STDs. They also limit contact with affected skin. Be aware though, a condom doesn t cover all skin. So, affected skin that is not covered can still transfer disease. But you re safer with a condom than without one. Use a condom even if you use other birth control. While birth control methods like the pill or IUD help prevent pregnancy, they do not protect against STDs.  Choose the right condom  Condoms made of latex prevent disease best. If you re allergic to latex, use polyurethane condoms instead. Male condoms fit over the penis. Female condoms line the vagina. Before buying a condom, read the label to be sure it prevents disease. Some novelty condoms don t.  The right lubricant helps  Buy lubricated condoms or use lubricant. This provides greater comfort and  reduces the risk of condom breakage. Use only water-based lubricants. Don t use oil, lotion, or petroleum jelly. They can weaken the condom, causing breakage. Also, you may want to choose lubricants without nonoxynol-9. It s now known that this spermicide does not prevent disease and may cause irritation.  Use condoms correctly  For condoms to work, they must be used the right way. Keep these tips in mind:    Use a new latex condom each time you have sex. Slip the condom on the penis before any contact is made.    When ready to withdraw, hold the rim of the condom as the penis pulls out. This prevents the condom from slipping off.    Check the expiration date before using a condom.    Don t store condoms in places that can get hot, such as a car or a wallet that is carried in a back pocket.  Get to know your partner  Safer sex is a process. It involves getting to know your partner and making informed choices. Ask each other how many partners you have had in the past, and how many you have now. Find out if either of you has an STD. If you decide to have sex, use a condom each time. Don t stop using condoms unless you re sure neither of you has other partners and you ve both been tested to confirm you don t have STDs. Then stay free of disease by having sex only with each other (monogamy).  Keep your cool  Don t let alcohol or drugs cloud your judgment. They could lead you to have sex with someone you wouldn t have chosen if you were sober. Or, you might forget to use a condom. If you do plan to have sex, keep a latex condom with you. Don t wait until you re in the heat of passion to try to find one.  Consider abstinence  The only way to be sure you won t get an STD is to abstain from sex. Abstinence is a choice that many people make at some point in their lives. Maybe you want to wait until you are sure you re ready before you have sex. Maybe you d like a break from the responsibilities of sex for a while. Or maybe  you just want to know your partner better before taking the next step. Abstinence is a choice you can make now to protect your future.  Date Last Reviewed: 12/1/2016 2000-2019 [employer]. [address] [place] [address], [place] [address]. All rights reserved. This information is not intended as a substitute for professional medical care. Always follow your healthcare professional's instructions.

## 2020-10-26 ENCOUNTER — TELEPHONE (OUTPATIENT)
Dept: ALLERGY | Facility: CLINIC | Age: 30
End: 2020-10-26

## 2020-10-26 LAB
ALBUMIN SERPL-MCNC: 4.1 G/DL (ref 3.4–5)
ALP SERPL-CCNC: 104 U/L (ref 40–150)
ALT SERPL W P-5'-P-CCNC: 58 U/L (ref 0–70)
ANION GAP SERPL CALCULATED.3IONS-SCNC: 5 MMOL/L (ref 3–14)
AST SERPL W P-5'-P-CCNC: 19 U/L (ref 0–45)
BILIRUB SERPL-MCNC: 0.8 MG/DL (ref 0.2–1.3)
BUN SERPL-MCNC: 15 MG/DL (ref 7–30)
CALCIUM SERPL-MCNC: 9.5 MG/DL (ref 8.5–10.1)
CHLORIDE SERPL-SCNC: 103 MMOL/L (ref 94–109)
CO2 SERPL-SCNC: 29 MMOL/L (ref 20–32)
CREAT SERPL-MCNC: 1.11 MG/DL (ref 0.66–1.25)
GFR SERPL CREATININE-BSD FRML MDRD: 89 ML/MIN/{1.73_M2}
GLUCOSE SERPL-MCNC: 101 MG/DL (ref 70–99)
HCV AB SERPL QL IA: NONREACTIVE
HIV 1+2 AB+HIV1 P24 AG SERPL QL IA: NONREACTIVE
POTASSIUM SERPL-SCNC: 3.7 MMOL/L (ref 3.4–5.3)
PROT SERPL-MCNC: 8.7 G/DL (ref 6.8–8.8)
SODIUM SERPL-SCNC: 137 MMOL/L (ref 133–144)

## 2020-10-26 NOTE — LETTER
Dear Insurance Carrier,      One of your enrolled members, Abimael Martinez , has been referred by Dr.Paul Harris, who is a dermatoallergist, for Comprehensive Patch Testing. Comprehensive patch testing is medically   necessary for this patient.    COMPREHENSIVE PATCH TESTING IS INDICATED FOR PATIENTS WHO HAVE  CHRONIC DERMATITIS THAT HAS NOT IMPROVED AFTER RECEIVING  AGGRESSIVE TREATMENT BY DERMATOLOGISTS AND/OR ALLERGISTS  AND WHICH SEVERELY IMPACTS THE INDIVIDUAL S HEALTH AND  QUALITY OF LIFE.    This patient meets the above criteria and thus requires comprehensive patch testing. This  is likely to entail more than 80 units of CPT code 15497. In addition, Dr. Harris will spend  a substantial amount of time working to determine the cause of the dermatitis and then is  able to more precisely develop a personalized treatment plan.    This Comprehensive Patch Testing cannot be performed by most allergists or  dermatologists, who are only able to perform limited patch testing, which is inadequate or  inappropriate for the diagnosis of possible contact allergy.    Please see the following document for additional information and many peer reviewed  references on the medical necessity of Comprehensive Patch Testing.

## 2020-10-26 NOTE — TELEPHONE ENCOUNTER
STANDARD Series      No Substance 2 days 4 days remarks   1 Kyle Mix [C] - -    2 Colophony - -    3  2-Mercaptobenzothiazole  - -     4 Methylisothiazolinone - -    5 Carba Mix - -    6 Thiuram Mix [A] - -    7 Bisphenol A Epoxy Resin - -    8 O-Zzdc-Ukwlnavmraz-Formaldehyde Resin - -    9 Mercapto Mix [A] - -    10 Black Rubber Mix- PPD [B] - -    11 Potassium Dichromate  -  -    12 Balsam of [place] (Myroxylon Pereirae Resin) - -    13 Nickel Sulphate Hexahydrate - -    14 Mixed Dialkyl Thiourea - -    15 Paraben Mix [B] - -    16 Methyldibromo Glutaronitrile - -    17 Fragrance Mix - -    18 2-Bromo-2-Nitropropane-1,3-Diol (Bronopol) - -    19 Lyral - -    20 Tixocortol-21- Pivalate - -    21 Diazolidiyl Urea (Germall II) - -    22 Methyl Methacrylate - -    23 Cobalt (II) Chloride Hexahydrate - -    24 Fragrance Mix II  - -    25 Compositae Mix - -    26 Benzoyl Peroxide - -    27 Bacitracin - -    28 Formaldehyde - -    29 Methylchloroisothiazolinone / Methylisothiazolinone - -    30 Corticosteroid Mix - -    31 Sodium Lauryl Sulfate - -    32 Lanolin Alcohol - -    33 Turpentine - -    34 Cetylstearylalcohol - -    35 Chlorhexidine Dicluconate - -    36 Budenoside - -    37 Imidazolidinyl Urea  - -    38 Ethyl-2 Cyanoacrylate - -    39 Quaternium 15 (Dowicil 200) - -    40 Decyl Glucoside - -      COLORANTS, DYES, FOOD ADDITIVES   No Substance 2 days 4 days remarks   41 1 Aniline - -    42 2 Aj Brown R - -    43 3 Textile Dye Mix [A] - -    44 4 [place]  - -    45 5 Disperse Blue-3 - -    46 6 Disperse [place]-3 - -    47 7 Disperse Red-11 NA NA    48 8 Disperse Yellow-9 - -    49 9 Erythrosine-B - -    50 10 Naphthol AS - -       Food additives      51 11 Aspartame - -    52 12 Azorubine - -    53 13 Brilliant Black - -    54 14 Cochineal Red - -    55 15 Patent Blue-VF NA NA    56 16 Pectin - -    57 17 Quinoline Yellow - -    58 18 Saccharin - -    59 19 Tartrazine - -    60 20 Sodium Glutamate NA NA       EMULSIFIERS & ADDITIVES   No Substance 2 days 4 days remarks   61 1 Polyethylene Glycol-400 - -    62 2 Cocamidopropyl Betaine - -    63 3 Amerchol L101 - -    64 4 Propylene Glycol - -    65 5 Triethanolamine - -    66 6 Sorbitane Sesquiolate - -    67 7 Isopropylmyristate - -    68 8 Polysorbate 80  - -    69 9 Amidoamine   (Stearamidopropyl Dimethylamine) - -    70 10 Oleamidopropyl Dimethylamine - -    71 11 Lauryl Glucoside - -    72 12 Coconut Diethanolamide  - -    73 13 2-Hydroxy-4-Methoxy Benzophenone (Oxybenzone) - -    74 14 Benzophenone-4 (Sulisobenzon) - -    75 15 Propolis - -    76 16 Dexpanthenol - -    77 17 Carboxymethyl Cellulose Sodium NA NA    78 18 Abitol - -    79 19 Tert-Butylhydroquinone - -    80 20 Benzyl Salicylate - -      Antioxidant      81 21 Dodecyl Gallate - -    82 22 Butylhydroxyanisole (BHA) - -    83 23 Butylhydroxytoluene (BHT) - -    84 24 Di-Alpha-Tocopherol (Vit E) - -    85 25 Propyl Gallate - -    86 26 Zinc Pyrithione NA NA    87 27 Dimethylaminopropylamin (DMAPA) - -      PRESERVATIVES & ANTIMICROBIALS     No Substance 2 days 4 days remarks   88 1  1,2-Benzisothiazoline-3-One, Sodium Salt - -    89 2  1,3,5-Waqar (2-Hydroxyethyl) - Hexahydrotriazine (Grotan BK) - -    90 3 6-Lsxlsvzslzdoy-4-Nitro-1, 3-Propanediol - -    91 4  3, 4, 4' - Triclocarban - -    92 5 4 - Chloro - 3 - Cresol - -    93 6 4 - Chloro - 4 - Xylenol (PCMX) - -    94 7 7-Ethylbicyclooxazolidine (Bioban [id number]) - -    95 8 Benzalkonium Chloride NA NA    96 9 Benzyl Alcohol - -    97 10 Cetalkonium Chloride - -    98 11 Cetylpyrimidine Chloride  - -    99 12 Chloroacetamide - -    100 13 DMDM Hydantoin - -    101 14 Glutaraldehyde - -    102 15 Triclosan - -    103 16 Glyoxal Trimeric Dihydrate - -    104 17 Iodopropynyl Butylcarbamate - -    105 18 Octylisothiazoline - -    106 19 Iodoform NA NA    107 20 (Nitrobutyl) Morpholine/(Ethylnitro-Trimethylene) Dimorpholine ([facility]) - -    108 21  "Phenoxyethanol - -    109 22 Phenyl Salicylate - -    110 23 Povidone Iodine - -    111 24 Sodium Benzoate - -    112 25 Sodium Disulfite - -    113 26 Sorbic Acid - -    114 27 Thimerosal - -    115 28 Melamine Formeladyde Resin      116 29 Ethylenediamine Dihydrochloride        Parabens      117 30 Butyl-P-Hydroxybenzoate - -    118 31 Ethyl-P-Hydroxybenzoate - -    119 32 Methyl-P-Hydroxybenzoate - -    120 33 Propyl-P-Hydroxybenzoate - -      OTHER PRODUCTS       No Substance Conc  % solv 2 days 4 days remarks   121 DF        122 DP        123 Aspergillius        124 penicillium        125 Dog        126 Grass?        127         128         129         130           Patients own products:  è DO NOT test if chemical or biological identity is unknown!   - always ask from patient the product information and safety sheets and consult with the physician   - check neutral pH with pH indicator paper (do not test pH below 5 or over 8 or consult with physician)  - leave-on cosmetics can be tested \"as is\"  - rinse-off products have to be diluted with water, buffer, olive oil or paraffin (discuss with physician)  à remember:   - non-specific toxic/corrosive or biological reactions can occur  - tests with patients own products are not standardized and test conditions are not optimized for patch tests. Whenever possible test with standardized test series and correlate use of product with the result of the patch tests  - if not sure if compounds can be tested under occlusion in patch tests consider open application tests          Results of patch tests:                         Interpretation:    - Negative                    A    = Allergic      (+) Erythema    TI   = Toxic/irritant   + E + Infiltration    RaP = Relevance at Present     ++ E/I + Papulovesicle   Rpr  = Relevance Previously     +++ E/I/P + Blister     nR   = No Relevance            "

## 2020-10-27 LAB
C TRACH DNA SPEC QL NAA+PROBE: NEGATIVE
N GONORRHOEA DNA SPEC QL NAA+PROBE: NEGATIVE
SARS-COV-2 RNA SPEC QL NAA+PROBE: NOT DETECTED
SPECIMEN SOURCE: NORMAL
T PALLIDUM AB SER QL: NONREACTIVE

## 2020-10-29 ENCOUNTER — MYC REFILL (OUTPATIENT)
Dept: FAMILY MEDICINE | Facility: CLINIC | Age: 30
End: 2020-10-29

## 2020-10-29 DIAGNOSIS — F90.2 ATTENTION DEFICIT HYPERACTIVITY DISORDER (ADHD), COMBINED TYPE: ICD-10-CM

## 2020-10-29 DIAGNOSIS — R10.10 UPPER ABDOMINAL PAIN: ICD-10-CM

## 2020-10-29 RX ORDER — DEXTROAMPHETAMINE SACCHARATE, AMPHETAMINE ASPARTATE, DEXTROAMPHETAMINE SULFATE AND AMPHETAMINE SULFATE 5; 5; 5; 5 MG/1; MG/1; MG/1; MG/1
20 TABLET ORAL 3 TIMES DAILY
Qty: 90 TABLET | Refills: 0 | Status: SHIPPED | OUTPATIENT
Start: 2020-10-29 | End: 2020-11-29

## 2020-10-29 RX ORDER — FAMOTIDINE 40 MG/1
40 TABLET, FILM COATED ORAL AT BEDTIME
Qty: 90 TABLET | Refills: 1 | Status: SHIPPED | OUTPATIENT
Start: 2020-10-29 | End: 2021-04-29

## 2020-10-29 NOTE — TELEPHONE ENCOUNTER
Controlled Substance Refill Request for Adderall 20 mg  Problem List Complete:  Yes     Last Written Prescription Date:  9/29/20  Last Fill Quantity: 90,   # refills: 0     Last Office Visit with [facility] [place], [place] primary care provider: 7/2/20     Future Office visit:       Controlled substance agreement:   Encounter-Level CSA - 07/31/2017:    Controlled Substance Agreement - Scan on 8/11/2017  6:48 AM: CONTROLLED SUBSTANCE AGREEMENT      Patient-Level CSA:    There are no patient-level csa.      Last Urine Drug Screen: No results found for: CDAUT, No results found for: COMDAT, No results found for: THC13, PCP13, COC13, MAMP13, OPI13, AMP13, BZO13, TCA13, MTD13, BAR13, OXY13, PPX13, BUP13     [email or web address]      checked in past 3 months?  Yes 8/3/20, no concerns      Heather RIDERN, RN

## 2020-11-02 ENCOUNTER — OFFICE VISIT (OUTPATIENT)
Dept: ALLERGY | Facility: CLINIC | Age: 30
End: 2020-11-02
Payer: COMMERCIAL

## 2020-11-02 ENCOUNTER — MYC MEDICAL ADVICE (OUTPATIENT)
Dept: ALLERGY | Facility: CLINIC | Age: 30
End: 2020-11-02

## 2020-11-02 DIAGNOSIS — J32.9 SINUSITIS, UNSPECIFIED CHRONICITY, UNSPECIFIED LOCATION: ICD-10-CM

## 2020-11-02 DIAGNOSIS — L23.89 ALLERGIC CONTACT DERMATITIS DUE TO OTHER AGENTS: Primary | ICD-10-CM

## 2020-11-02 DIAGNOSIS — J30.9 ALLERGIC RHINOCONJUNCTIVITIS: ICD-10-CM

## 2020-11-02 DIAGNOSIS — Z88.9 ATOPY: ICD-10-CM

## 2020-11-02 DIAGNOSIS — H10.10 ALLERGIC RHINOCONJUNCTIVITIS: ICD-10-CM

## 2020-11-02 DIAGNOSIS — J45.20 MILD INTERMITTENT ASTHMA WITHOUT COMPLICATION: ICD-10-CM

## 2020-11-02 PROCEDURE — 99214 OFFICE O/P EST MOD 30 MIN: CPT | Mod: 25 | Performed by: DERMATOLOGY

## 2020-11-02 PROCEDURE — 95044 PATCH/APPLICATION TESTS: CPT | Mod: 59 | Performed by: DERMATOLOGY

## 2020-11-02 ASSESSMENT — PAIN SCALES - GENERAL: PAINLEVEL: NO PAIN (0)

## 2020-11-02 NOTE — PROGRESS NOTES
Chief Complaint   Patient presents with     Allergy Recheck     Patch and Prick testing day #1      Janeth Rivera LPN

## 2020-11-02 NOTE — LETTER
11/2/2020         RE: Abimael Martinez  [address]  [facility] [address]        Dear Colleague,    Thank you for referring your patient, Abimael Martinez, to the [facility] [place]. Please see a copy of my visit note below.    Note for return visit for Dermato-Allergy       Encounter Date: Nov 2, 2020    History of Present Illness:  bAimael Martinez is a 30 year old male who presents in person to the consult following information has been take directly from the prior notes, patients informations, Epic and/or other sources and exam/history performed by myself:     Patient here for allergy tests as proposed earlier    Additional comments and observations from review of the patient s chart including the following:    See notes for more details    ROS: Patient generally feeling well today   Physical Examination:  General: Well-appearing, appropriately-developed individual.  - Skin: Focused examination of the test sites on back within the consultation was performed.   No active skin lesions on back  As above in HPI. No additional skin concerns.  - upper respiratory tract: currently no obvious Rhinitis  - lungs: no signs for active and present Asthma/Wheezing or coughing  - eyes: currently no active conjunctivits  - GI system/digestion: currently no problems, no bloating or Diarrhoea        Earlier History and Allergy exams:    Patient had a tattoo more than 10 years. Never problems until 2 years ago with raised outline of the tattoo. It is mostly itchy and inflamed during the allergy season. Big black tattoo on left upper arm. It scabs and is very itchy.    Has all over the body black tattoos, but only that on the arm makes troubles.    Patient has seasonal allergies. As a child less problems, but [place] patient gets stuffy, runny nose with postnasal drip and sinusitis and chronic ear infections --> got 4-6 months allergy shots and  then problems with ordering of the allergens. After 3rd shot patient had generalized reactions. Afterwards patient had to dilute and no issues. Got few days after IT always some eczematous lesions on trunk.  Patient has year around allergies with aggravation in spring/summer, but not fall. Patient has pets with small dog and no issues.    Patient has Asthma (more cold induced and some wheezing at exhalation during season) --> treatment with prolair    Never eczema as child. In the past year random eczematous spots on trunk and extremities      Past Medical History:   Patient Active Problem List   Diagnosis     Anxiety     CARDIOVASCULAR SCREENING; LDL GOAL LESS THAN 160     High risk sexual behavior     Tobacco use disorder     Low back pain     Essential hypertension     Internal hemorrhoids which bleed     Obesity, Class I, BMI 30-34.9     Attention deficit hyperactivity disorder (ADHD), combined type     AYALA (generalized anxiety disorder)     OCD (obsessive compulsive disorder)     Drug-induced erectile dysfunction     Mild persistent asthma without complication     Lumbosacral radiculopathy     Dyslipidemia     Elevated serum creatinine     Past Medical History:   Diagnosis Date     Acute right otitis media 1/8/2013     Anxiety      Depression      Drug abuse (H)      Gonococcal urethritis 02/05/2016     Urethritis 5/20/2013     Problem list name updated by automated process. Provider to review       Allergy History:     Allergies   Allergen Reactions     Cymbalta      Weight gain and fatigue     Duloxetine Other (See Comments) and Fatigue     enlarged spleen and weight gain     Paroxetine Other (See Comments), Fatigue and GI Disturbance     Weight gain, Spleen issues     Seasonal Allergies      Vicodin [Hydrocodone-Acetaminophen]        Social History:  The patient works as a . Patient has the following hobbies or non-occupational exposure: tattoos     reports that he quit smoking about 2 years  ago. His smoking use included cigarettes. He has a 3.00 pack-year smoking history. He has never used smokeless tobacco. He reports current alcohol use. He reports current drug use. Drug: Marijuana.      Family History:  Family History   Problem Relation Age of Onset     Unknown/Adopted Mother      Unknown/Adopted Father      Unknown/Adopted Maternal Grandmother      Unknown/Adopted Maternal Grandfather      Unknown/Adopted Paternal Grandmother      Unknown/Adopted Paternal Grandfather      Unknown/Adopted Brother        Medications:  Current Outpatient Medications   Medication Sig Dispense Refill     albuterol (PROAIR HFA/PROVENTIL HFA/VENTOLIN HFA) 108 (90 Base) MCG/ACT inhaler Inhale 2 puffs into the lungs every 4 hours as needed for wheezing 1 Inhaler 0     ALPRAZolam (XANAX) 2 MG tablet Take 1 tablet (2 mg) by mouth 3 times daily as needed for anxiety 30 tablet 0     amLODIPine (NORVASC) 10 MG tablet Take 1 tablet (10 mg) by mouth daily 30 tablet 1     amphetamine-dextroamphetamine (ADDERALL) 20 MG tablet Take 1 tablet (20 mg) by mouth 3 times daily 90 tablet 0     azelastine (ASTELIN) 0.1 % nasal spray Spray 2 sprays into both nostrils 2 times daily 30 mL 11     famotidine (PEPCID) 40 MG tablet Take 1 tablet (40 mg) by mouth At Bedtime 90 tablet 1     fluticasone (FLOVENT HFA) 220 MCG/ACT Inhaler Inhale 2 puffs into the lungs 2 times daily Please give patient spacer 1 Inhaler 3     Hyoscyamine Sulfate 0.375 MG TBCR Take 1 capful by mouth 3 times daily as needed 90 tablet 1     mometasone (ELOCON) 0.1 % external ointment If necessary use 3-4 days in a row or 2xweekly on eczematous lesions 45 g 2     montelukast (SINGULAIR) 10 MG tablet Take 1 tablet (10 mg) by mouth At Bedtime 90 tablet 0     oxyCODONE-acetaminophen (PERCOCET) 5-325 MG per tablet Take 1 tablet by mouth every 6 hours as needed for pain 12 tablet 0     pimecrolimus (ELIDEL) 1 % external cream Apply topically 2 times daily 30 g 0     sildenafil  (REVATIO) 20 MG tablet Take 1-2 tablets (20-40 mg) by mouth daily as needed As needed for prevention of erectile dysfunction 30 tablet 2     tacrolimus (PROTOPIC) 0.1 % external ointment Apply twice daily as needed for rash on penis. 30 g 11     triamcinolone (KENALOG) 0.025 % external ointment Apply topically 2 times daily Apply twice a day to sites of irritation 80 g 1       Allergy Tests:    Past Allergy Test: prick tests positive to pollens, cockroach, dust mites, trees, ragweed, grass pollens ==> had for some time IT and eczema got worse = delayed type reactions to allergens?    Future Allergy Test:     Order for PATCH TESTS    [] Outpatient  [] Inpatient: [facility]..../ Bed ....      Skin Atopy (atopic dermatitis) [x] Yes   [] No  Rhinitis/Sinusitis:   [x] Yes   [] No  Allergic Asthma:   [x] Yes   [] No  Food Allergy:   [] Yes   [x] No  Leg ulcers:   [] Yes   [x] No  Hand eczema:   [] Yes   [x] No   Leading hand:   [] R   [] L       [] Ambidextrous                        Reason for tests (suspected allergy): allergic contact dermatitis to tattoo and seasonal aggravation to pollens  Known previous allergies: see below    Standardized panels  [x] Standard panel (40 tests)  [x] Preservatives & Antimicrobials (31 tests)  [x] Emulsifiers & Additives (25 tests)   [] Perfumes/Flavours & Plants (25 tests)  [] Hairdresser panel (12 tests)  [] Rubber Chemicals (22 tests)  [] Plastics (26 tests)  [x] Colorants/Dyes/Food additives (20 tests)  [] Metals (implants/dental) (24 tests)  [] Local anaesthetics/NSAIDs (13 tests)  [] Antibiotics & Antimycotics (14 tests)   [] Corticosteroids (15 tests)   [] Photopatch test (62 tests)   [x] others: house dust mites, molds, tree and grass pollens and dog from prick tests      [] Patient's own products: ...    DO NOT test if chemical or biological identity is unknown!     always ask from patient the product information and safety sheets (MSDS)   [] Atopy screen prick test (Atopic  predisposition): and if possible IDT with delayed readings --> patient had recently a prick test (see above) and therefore do not repeat that except we would like to perform on Friday IDT.  RESULTS & EVALUATION of PATCH TESTS    Patch test readings after     [x] 2 days, [] 3 days [x] 4 days, [] 5 days,    Applied patch tests with results (import here the list of patch tests):    STANDARD Series      No Substance 2 days 4 days remarks   1 Kyle Mix [C] - -    2 Colophony - -    3  2-Mercaptobenzothiazole  - -     4 Methylisothiazolinone - -    5 Carba Mix - -    6 Thiuram Mix [A] - -    7 Bisphenol A Epoxy Resin - -    8 B-Stwf-Wepyxlslrbj-Formaldehyde Resin - -    9 Mercapto Mix [A] - -    10 Black Rubber Mix- PPD [B] - -    11 Potassium Dichromate  -  -    12 Balsam of [place] (Myroxylon Pereirae Resin) - -    13 Nickel Sulphate Hexahydrate - -    14 Mixed Dialkyl Thiourea - -    15 Paraben Mix [B] - -    16 Methyldibromo Glutaronitrile - -    17 Fragrance Mix - -    18 2-Bromo-2-Nitropropane-1,3-Diol (Bronopol) - -    19 Lyral - -    20 Tixocortol-21- Pivalate - -    21 Diazolidiyl Urea (Germall II) - -    22 Methyl Methacrylate - -    23 Cobalt (II) Chloride Hexahydrate - -    24 Fragrance Mix II  - -    25 Compositae Mix - -    26 Benzoyl Peroxide - -    27 Bacitracin - -    28 Formaldehyde - -    29 Methylchloroisothiazolinone / Methylisothiazolinone - -    30 Corticosteroid Mix - -    31 Sodium Lauryl Sulfate - -    32 Lanolin Alcohol - -    33 Turpentine - -    34 Cetylstearylalcohol - -    35 Chlorhexidine Dicluconate - -    36 Budenoside - -    37 Imidazolidinyl Urea  - -    38 Ethyl-2 Cyanoacrylate - -    39 Quaternium 15 (Dowicil 200) - -    40 Decyl Glucoside - -      COLORANTS, DYES, FOOD ADDITIVES   No Substance 2 days 4 days remarks   41 1 Aniline - -    42 2 Aj Brown R - -    43 3 Textile Dye Mix [A] - -    44 4 [place]  - -    45 5 Disperse Blue-3 - -    46 6 Disperse [place]-3 - -    47 7 Disperse  Red-11 NA NA    48 8 Disperse Yellow-9 - -    49 9 Erythrosine-B - -    50 10 Naphthol AS - -       Food additives      51 11 Aspartame - -    52 12 Azorubine - -    53 13 Brilliant Black - -    54 14 Cochineal Red - -    55 15 Patent Blue-VF NA NA    56 16 Pectin - -    57 17 Quinoline Yellow - -    58 18 Saccharin - -    59 19 Tartrazine - -    60 20 Sodium Glutamate NA NA      EMULSIFIERS & ADDITIVES   No Substance 2 days 4 days remarks   61 1 Polyethylene Glycol-400 - -    62 2 Cocamidopropyl Betaine - -    63 3 Amerchol L101 - -    64 4 Propylene Glycol - -    65 5 Triethanolamine - -    66 6 Sorbitane Sesquiolate - -    67 7 Isopropylmyristate - -    68 8 Polysorbate 80  - -    69 9 Amidoamine   (Stearamidopropyl Dimethylamine) - -    70 10 Oleamidopropyl Dimethylamine - -    71 11 Lauryl Glucoside - -    72 12 Coconut Diethanolamide  - -    73 13 2-Hydroxy-4-Methoxy Benzophenone (Oxybenzone) - -    74 14 Benzophenone-4 (Sulisobenzon) - -    75 15 Propolis - -    76 16 Dexpanthenol - -    77 17 Carboxymethyl Cellulose Sodium NA NA    78 18 Abitol - -    79 19 Tert-Butylhydroquinone - -    80 20 Benzyl Salicylate - -      Antioxidant      81 21 Dodecyl Gallate - -    82 22 Butylhydroxyanisole (BHA) - -    83 23 Butylhydroxytoluene (BHT) - -    84 24 Di-Alpha-Tocopherol (Vit E) - -    85 25 Propyl Gallate - -    86 26 Zinc Pyrithione NA NA    87 27 Dimethylaminopropylamin (DMAPA) - -      PRESERVATIVES & ANTIMICROBIALS     No Substance 2 days 4 days remarks   88 1  1,2-Benzisothiazoline-3-One, Sodium Salt - -    89 2  1,3,5-Waqar (2-Hydroxyethyl) - Hexahydrotriazine (Grotan BK) - -    90 3 1-Wqzmzxpxwlyeh-9-Nitro-1, 3-Propanediol - -    91 4  3, 4, 4' - Triclocarban - -    92 5 4 - Chloro - 3 - Cresol - -    93 6 4 - Chloro - 4 - Xylenol (PCMX) - -    94 7 7-Ethylbicyclooxazolidine ([facility] [id number]) - -    95 8 Benzalkonium Chloride NA NA    96 9 Benzyl Alcohol - -    97 10 Cetalkonium Chloride - -    98 11  "Cetylpyrimidine Chloride  - -    99 12 Chloroacetamide - -    100 13 DMDM Hydantoin - -    101 14 Glutaraldehyde - -    102 15 Triclosan - -    103 16 Glyoxal Trimeric Dihydrate - -    104 17 Iodopropynyl Butylcarbamate - -    105 18 Octylisothiazoline - -    106 19 Iodoform NA NA    107 20 (Nitrobutyl) Morpholine/(Ethylnitro-Trimethylene) Dimorpholine (Bioban P 1487) - -    108 21 Phenoxyethanol - -    109 22 Phenyl Salicylate - -    110 23 Povidone Iodine - -    111 24 Sodium Benzoate - -    112 25 Sodium Disulfite - -    113 26 Sorbic Acid - -    114 27 Thimerosal - -    115 28 Melamine Formeladyde Resin      116 29 Ethylenediamine Dihydrochloride        Parabens      117 30 Butyl-P-Hydroxybenzoate - -    118 31 Ethyl-P-Hydroxybenzoate - -    119 32 Methyl-P-Hydroxybenzoate - -    120 33 Propyl-P-Hydroxybenzoate - -      OTHER PRODUCTS       No Substance Conc  % solv 2 days 4 days remarks   121 D. farinae        122 D. pteronyssinus        123 Aspergillius        124 penicillium        125 Dog        126 9 Tree Mix - ALK        127 Weed Mix 2630 - HS        128 Tree Mix 5- HS        129 5 Grass Mix - ALK        130           Patients own products:  è DO NOT test if chemical or biological identity is unknown!   - always ask from patient the product information and safety sheets and consult with the physician   - check neutral pH with pH indicator paper (do not test pH below 5 or over 8 or consult with physician)  - leave-on cosmetics can be tested \"as is\"  - rinse-off products have to be diluted with water, buffer, olive oil or paraffin (discuss with physician)  à remember:   - non-specific toxic/corrosive or biological reactions can occur  - tests with patients own products are not standardized and test conditions are not optimized for patch tests. Whenever possible test with standardized test series and correlate use of product with the result of the patch tests  - if not sure if compounds can be tested under " occlusion in patch tests consider open application tests    Results of patch tests:                         Interpretation:    - Negative                    A    = Allergic      (+) Erythema    TI   = Toxic/irritant   + E + Infiltration    RaP = Relevance at Present     ++ E/I + Papulovesicle   Rpr  = Relevance Previously     +++ E/I/P + Blister     nR   = No Relevance    Atopy Screen (Placed 11/xx/20 )    No Substance Readings (15 min) Evaluation   POS Histamine 1mg/ml -    NEG NaCl 0.9% -      No Substance Readings (15 min) Evaluation   1 Alternaria alternata (tenuis)  -    2 Cladosporium herbarum -    3 Aspergillus fumigatus -    4 Penicillium notatum -    5 Dermatophagoides pteronyssinus -    6 Dermatophagoides farinae -      Intradermal Testing (Placed 11/xx/20 )    No Substance Conc.  Readings (15min) Evaluation   - NaCl  0.9% -    + Histamine (prick) 0.1mg / ml -    DF Standard Dust Mite - D. Farinae 1:10 -    DP Standard Dust Mite - D. Pteronyssinus 1:10 -    A Aspergillus fumigatus  1:10 -    P Penicillium notatum 1:10 -      Conclusion:   [] No relevant allergic reaction observed    [] Allergic reaction diagnosed against following allergens:      Interpretation/ remarks:   See later    [] Patient information given   [] ACDS CAMP information's (# ....) to following compounds: .....   [] General information's to following compounds: ......    ==> final Diagnosis:    >> delayed type reaction to black tattoo on certain areas and aggravation during pollen season   DDx allergic contact dermatitis to additive or color in tattoo and aggravation by delayed  type hypersensitivity to inhalant allergens    >> perennial rhinoconjunctivitis and sinusitis/otitis with Asthma and seasonal aggravation (spring/summer)    Atopic predisposition    Perennial = house dust mite and molds (unlikely dog) = immediate and delayed?    Seasonal = tree pollens, maybe grass pollens = immediate and delayed?    Recurrent dermatitis  (nummular)    These conclusions are made at the best of one's knowledge and belief based on the provided evidence such as patient's history and allergy test results and they can change over time or can be incomplete because of missing information's.    ==> Treatment prescribed/Plan:    - Triamcinolone does not work always. Try Mometasone ointment (if flare up use for 4 days in a row or about 2xweekly)  - Allegra 180mg prn or Loratadine 10mg 1-2 times daily if necessary      Patient counseling:  Explained patch tests      Follow-up: in Derm-Allergy clinic for delayed readings of prick and patch tests after 2 days virtually and after 4 days in person for conclusions        Thank you for the opportunity be involved in the care of this patient.     Staff: Delisa Granados & Janeth Rivera LPN      I spent a total of 25 minutes face to face with Abimael Martinez during today s office visit. Over 50% of this time was spent discussing all the individual test results, correlating them to the clinical relevance, counseling the patient and/or coordinating care. Please see Assessment and Plan for details.  This excludes any time spent performing patch tests    Chief Complaint   Patient presents with     Allergy Recheck     Patch and Prick testing day #1      Janeth Rivera LPN            Patient had 121 patch tests placed this visit.    Standard panel (40 tests)    Preservatives & Antimicrobials (30 tests)    Emulsifiers & Additives (25 tests)     Colorants/Dyes/Food additives (17 tests)    Personal Products (9 tests)        Again, thank you for allowing me to participate in the care of your patient.        Sincerely,        James Harris MD

## 2020-11-02 NOTE — PROGRESS NOTES
Patient had 121 patch tests placed this visit.    Standard panel (40 tests)    Preservatives & Antimicrobials (30 tests)    Emulsifiers & Additives (25 tests)     Colorants/Dyes/Food additives (17 tests)    Personal Products (9 tests)

## 2020-11-02 NOTE — PROGRESS NOTES
Note for return visit for Dermato-Allergy       Encounter Date: Nov 2, 2020    History of Present Illness:  Abimael Martinez is a 30 year old male who presents in person to the consult following information has been take directly from the prior notes, patients informations, Epic and/or other sources and exam/history performed by myself:     Patient here for allergy tests as proposed earlier    Additional comments and observations from review of the patient s chart including the following:    See notes for more details    ROS: Patient generally feeling well today   Physical Examination:  General: Well-appearing, appropriately-developed individual.  - Skin: Focused examination of the test sites on back within the consultation was performed.   No active skin lesions on back  As above in HPI. No additional skin concerns.  - upper respiratory tract: currently no obvious Rhinitis  - lungs: no signs for active and present Asthma/Wheezing or coughing  - eyes: currently no active conjunctivits  - GI system/digestion: currently no problems, no bloating or Diarrhoea        Earlier History and Allergy exams:    Patient had a tattoo more than 10 years. Never problems until 2 years ago with raised outline of the tattoo. It is mostly itchy and inflamed during the allergy season. Big black tattoo on left upper arm. It scabs and is very itchy.    Has all over the body black tattoos, but only that on the arm makes troubles.    Patient has seasonal allergies. As a child less problems, but since [place] patient gets stuffy, runny nose with postnasal drip and sinusitis and chronic ear infections --> got 4-6 months allergy shots and then problems with ordering of the allergens. After 3rd shot patient had generalized reactions. Afterwards patient had to dilute and no issues. Got few days after IT always some eczematous lesions on trunk.  Patient has year around allergies with aggravation in spring/summer, but  not fall. Patient has pets with small dog and no issues.    Patient has Asthma (more cold induced and some wheezing at exhalation during season) --> treatment with prolair    Never eczema as child. In the past year random eczematous spots on trunk and extremities      Past Medical History:   Patient Active Problem List   Diagnosis     Anxiety     CARDIOVASCULAR SCREENING; LDL GOAL LESS THAN 160     High risk sexual behavior     Tobacco use disorder     Low back pain     Essential hypertension     Internal hemorrhoids which bleed     Obesity, Class I, BMI 30-34.9     Attention deficit hyperactivity disorder (ADHD), combined type     AYALA (generalized anxiety disorder)     OCD (obsessive compulsive disorder)     Drug-induced erectile dysfunction     Mild persistent asthma without complication     Lumbosacral radiculopathy     Dyslipidemia     Elevated serum creatinine     Past Medical History:   Diagnosis Date     Acute right otitis media 1/8/2013     Anxiety      Depression      Drug abuse (H)      Gonococcal urethritis 02/05/2016     Urethritis 5/20/2013     Problem list name updated by automated process. Provider to review       Allergy History:     Allergies   Allergen Reactions     Cymbalta      Weight gain and fatigue     Duloxetine Other (See Comments) and Fatigue     enlarged spleen and weight gain     Paroxetine Other (See Comments), Fatigue and GI Disturbance     Weight gain, Spleen issues     Seasonal Allergies      Vicodin [Hydrocodone-Acetaminophen]        Social History:  The patient works as a . Patient has the following hobbies or non-occupational exposure: tattoos     reports that he quit smoking about 2 years ago. His smoking use included cigarettes. He has a 3.00 pack-year smoking history. He has never used smokeless tobacco. He reports current alcohol use. He reports current drug use. Drug: Marijuana.      Family History:  Family History   Problem Relation Age of Onset      Unknown/Adopted Mother      Unknown/Adopted Father      Unknown/Adopted Maternal Grandmother      Unknown/Adopted Maternal Grandfather      Unknown/Adopted Paternal Grandmother      Unknown/Adopted Paternal Grandfather      Unknown/Adopted Brother        Medications:  Current Outpatient Medications   Medication Sig Dispense Refill     albuterol (PROAIR HFA/PROVENTIL HFA/VENTOLIN HFA) 108 (90 Base) MCG/ACT inhaler Inhale 2 puffs into the lungs every 4 hours as needed for wheezing 1 Inhaler 0     ALPRAZolam (XANAX) 2 MG tablet Take 1 tablet (2 mg) by mouth 3 times daily as needed for anxiety 30 tablet 0     amLODIPine (NORVASC) 10 MG tablet Take 1 tablet (10 mg) by mouth daily 30 tablet 1     amphetamine-dextroamphetamine (ADDERALL) 20 MG tablet Take 1 tablet (20 mg) by mouth 3 times daily 90 tablet 0     azelastine (ASTELIN) 0.1 % nasal spray Spray 2 sprays into both nostrils 2 times daily 30 mL 11     famotidine (PEPCID) 40 MG tablet Take 1 tablet (40 mg) by mouth At Bedtime 90 tablet 1     fluticasone (FLOVENT HFA) 220 MCG/ACT Inhaler Inhale 2 puffs into the lungs 2 times daily Please give patient spacer 1 Inhaler 3     Hyoscyamine Sulfate 0.375 MG TBCR Take 1 capful by mouth 3 times daily as needed 90 tablet 1     mometasone (ELOCON) 0.1 % external ointment If necessary use 3-4 days in a row or 2xweekly on eczematous lesions 45 g 2     montelukast (SINGULAIR) 10 MG tablet Take 1 tablet (10 mg) by mouth At Bedtime 90 tablet 0     oxyCODONE-acetaminophen (PERCOCET) 5-325 MG per tablet Take 1 tablet by mouth every 6 hours as needed for pain 12 tablet 0     pimecrolimus (ELIDEL) 1 % external cream Apply topically 2 times daily 30 g 0     sildenafil (REVATIO) 20 MG tablet Take 1-2 tablets (20-40 mg) by mouth daily as needed As needed for prevention of erectile dysfunction 30 tablet 2     tacrolimus (PROTOPIC) 0.1 % external ointment Apply twice daily as needed for rash on penis. 30 g 11     triamcinolone (KENALOG)  0.025 % external ointment Apply topically 2 times daily Apply twice a day to sites of irritation 80 g 1       Allergy Tests:    Past Allergy Test: prick tests positive to pollens, cockroach, dust mites, trees, ragweed, grass pollens ==> had for some time IT and eczema got worse = delayed type reactions to allergens?    Future Allergy Test:     Order for PATCH TESTS    [] Outpatient  [] Inpatient: [facility]..../ Bed ....      Skin Atopy (atopic dermatitis) [x] Yes   [] No  Rhinitis/Sinusitis:   [x] Yes   [] No  Allergic Asthma:   [x] Yes   [] No  Food Allergy:   [] Yes   [x] No  Leg ulcers:   [] Yes   [x] No  Hand eczema:   [] Yes   [x] No   Leading hand:   [] R   [] L       [] Ambidextrous                        Reason for tests (suspected allergy): allergic contact dermatitis to tattoo and seasonal aggravation to pollens  Known previous allergies: see below    Standardized panels  [x] Standard panel (40 tests)  [x] Preservatives & Antimicrobials (31 tests)  [x] Emulsifiers & Additives (25 tests)   [] Perfumes/Flavours & Plants (25 tests)  [] Hairdresser panel (12 tests)  [] Rubber Chemicals (22 tests)  [] Plastics (26 tests)  [x] Colorants/Dyes/Food additives (20 tests)  [] Metals (implants/dental) (24 tests)  [] Local anaesthetics/NSAIDs (13 tests)  [] Antibiotics & Antimycotics (14 tests)   [] Corticosteroids (15 tests)   [] Photopatch test (62 tests)   [x] others: house dust mites, molds, tree and grass pollens and dog from prick tests      [] Patient's own products: ...    DO NOT test if chemical or biological identity is unknown!     always ask from patient the product information and safety sheets (MSDS)   [] Atopy screen prick test (Atopic predisposition): and if possible IDT with delayed readings --> patient had recently a prick test (see above) and therefore do not repeat that except we would like to perform on Friday IDT.  RESULTS & EVALUATION of PATCH TESTS    Patch test readings after     [x] 2 days, [] 3  days [x] 4 days, [] 5 days,    Applied patch tests with results (import here the list of patch tests):    STANDARD Series      No Substance 2 days 4 days remarks   1 Kyle Mix [C] - -    2 Colophony - -    3  2-Mercaptobenzothiazole  - -     4 Methylisothiazolinone - -    5 Carba Mix - -    6 Thiuram Mix [A] - -    7 Bisphenol A Epoxy Resin - -    8 C-Asjp-Zmuxjeydpma-Formaldehyde Resin - -    9 Mercapto Mix [A] - -    10 Black Rubber Mix- PPD [B] - -    11 Potassium Dichromate  -  -    12 Balsam of [place] (Myroxylon Pereirae Resin) - -    13 Nickel Sulphate Hexahydrate - -    14 Mixed Dialkyl Thiourea - -    15 Paraben Mix [B] - -    16 Methyldibromo Glutaronitrile - -    17 Fragrance Mix - -    18 2-Bromo-2-Nitropropane-1,3-Diol (Bronopol) - -    19 Lyral - -    20 Tixocortol-21- Pivalate - -    21 Diazolidiyl Urea (Germall II) - -    22 Methyl Methacrylate - -    23 Cobalt (II) Chloride Hexahydrate - -    24 Fragrance Mix II  - -    25 Compositae Mix - -    26 Benzoyl Peroxide - -    27 Bacitracin - -    28 Formaldehyde - -    29 Methylchloroisothiazolinone / Methylisothiazolinone - -    30 Corticosteroid Mix - -    31 Sodium Lauryl Sulfate - -    32 Lanolin Alcohol - -    33 Turpentine - -    34 Cetylstearylalcohol - -    35 Chlorhexidine Dicluconate - -    36 Budenoside - -    37 Imidazolidinyl Urea  - -    38 Ethyl-2 Cyanoacrylate - -    39 Quaternium 15 (Dowicil 200) - -    40 Decyl Glucoside - -      COLORANTS, DYES, FOOD ADDITIVES   No Substance 2 days 4 days remarks   41 1 Aniline - -    42 2 Aj Brown R - -    43 3 Textile Dye Mix [A] - -    44 4 [place]  - -    45 5 Disperse Blue-3 - -    46 6 Disperse Sedan-3 - -    47 7 Disperse Red-11 NA NA    48 8 Disperse Yellow-9 - -    49 9 Erythrosine-B - -    50 10 Naphthol AS - -       Food additives      51 11 Aspartame - -    52 12 Azorubine - -    53 13 Brilliant Black - -    54 14 Cochineal Red - -    55 15 Patent Blue-VF NA NA    56 16 Pectin - -    57  17 Quinoline Yellow - -    58 18 Saccharin - -    59 19 Tartrazine - -    60 20 Sodium Glutamate NA NA      EMULSIFIERS & ADDITIVES   No Substance 2 days 4 days remarks   61 1 Polyethylene Glycol-400 - -    62 2 Cocamidopropyl Betaine - -    63 3 Amerchol L101 - -    64 4 Propylene Glycol - -    65 5 Triethanolamine - -    66 6 Sorbitane Sesquiolate - -    67 7 Isopropylmyristate - -    68 8 Polysorbate 80  - -    69 9 Amidoamine   (Stearamidopropyl Dimethylamine) - -    70 10 Oleamidopropyl Dimethylamine - -    71 11 Lauryl Glucoside - -    72 12 Coconut Diethanolamide  - -    73 13 2-Hydroxy-4-Methoxy Benzophenone (Oxybenzone) - -    74 14 Benzophenone-4 (Sulisobenzon) - -    75 15 Propolis - -    76 16 Dexpanthenol - -    77 17 Carboxymethyl Cellulose Sodium NA NA    78 18 Abitol - -    79 19 Tert-Butylhydroquinone - -    80 20 Benzyl Salicylate - -      Antioxidant      81 21 Dodecyl Gallate - -    82 22 Butylhydroxyanisole (BHA) - -    83 23 Butylhydroxytoluene (BHT) - -    84 24 Di-Alpha-Tocopherol (Vit E) - -    85 25 Propyl Gallate - -    86 26 Zinc Pyrithione NA NA    87 27 Dimethylaminopropylamin (DMAPA) - -      PRESERVATIVES & ANTIMICROBIALS     No Substance 2 days 4 days remarks   88 1  1,2-Benzisothiazoline-3-One, Sodium Salt - -    89 2  1,3,5-Waqar (2-Hydroxyethyl) - Hexahydrotriazine (Grotan BK) - -    90 3 2-Dmkvsxbilaeok-3-Nitro-1, 3-Propanediol - -    91 4  3, 4, 4' - Triclocarban - -    92 5 4 - Chloro - 3 - Cresol - -    93 6 4 - Chloro - 4 - Xylenol (PCMX) - -    94 7 7-Ethylbicyclooxazolidine (Bioban VS5467) - -    95 8 Benzalkonium Chloride NA NA    96 9 Benzyl Alcohol - -    97 10 Cetalkonium Chloride - -    98 11 Cetylpyrimidine Chloride  - -    99 12 Chloroacetamide - -    100 13 DMDM Hydantoin - -    101 14 Glutaraldehyde - -    102 15 Triclosan - -    103 16 Glyoxal Trimeric Dihydrate - -    104 17 Iodopropynyl Butylcarbamate - -    105 18 Octylisothiazoline - -    106 19 Iodoform NA NA   "  107 20 (Nitrobutyl) Morpholine/(Ethylnitro-Trimethylene) Dimorpholine (Bioban P 1487) - -    108 21 Phenoxyethanol - -    109 22 Phenyl Salicylate - -    110 23 Povidone Iodine - -    111 24 Sodium Benzoate - -    112 25 Sodium Disulfite - -    113 26 Sorbic Acid - -    114 27 Thimerosal - -    115 28 Melamine Formeladyde Resin      116 29 Ethylenediamine Dihydrochloride        Parabens      117 30 Butyl-P-Hydroxybenzoate - -    118 31 Ethyl-P-Hydroxybenzoate - -    119 32 Methyl-P-Hydroxybenzoate - -    120 33 Propyl-P-Hydroxybenzoate - -      OTHER PRODUCTS       No Substance Conc  % solv 2 days 4 days remarks   121 D. farinae        122 D. pteronyssinus        123 Aspergillius        124 penicillium        125 Dog        126 9 Tree Mix - ALK        127 Weed Mix 2630 - HS        128 Tree Mix 5- HS        129 5 Grass Mix - ALK        130           Patients own products:  è DO NOT test if chemical or biological identity is unknown!   - always ask from patient the product information and safety sheets and consult with the physician   - check neutral pH with pH indicator paper (do not test pH below 5 or over 8 or consult with physician)  - leave-on cosmetics can be tested \"as is\"  - rinse-off products have to be diluted with water, buffer, olive oil or paraffin (discuss with physician)  à remember:   - non-specific toxic/corrosive or biological reactions can occur  - tests with patients own products are not standardized and test conditions are not optimized for patch tests. Whenever possible test with standardized test series and correlate use of product with the result of the patch tests  - if not sure if compounds can be tested under occlusion in patch tests consider open application tests    Results of patch tests:                         Interpretation:    - Negative                    A    = Allergic      (+) Erythema    TI   = Toxic/irritant   + E + Infiltration    RaP = Relevance at Present     ++ E/I + " Papulovesicle   Rpr  = Relevance Previously     +++ E/I/P + Blister     nR   = No Relevance    Atopy Screen (Placed 11/xx/20 )    No Substance Readings (15 min) Evaluation   POS Histamine 1mg/ml -    NEG NaCl 0.9% -      No Substance Readings (15 min) Evaluation   1 Alternaria alternata (tenuis)  -    2 Cladosporium herbarum -    3 Aspergillus fumigatus -    4 Penicillium notatum -    5 Dermatophagoides pteronyssinus -    6 Dermatophagoides farinae -      Intradermal Testing (Placed 11/xx/20 )    No Substance Conc.  Readings (15min) Evaluation   - NaCl  0.9% -    + Histamine (prick) 0.1mg / ml -    DF Standard Dust Mite - D. Farinae 1:10 -    DP Standard Dust Mite - D. Pteronyssinus 1:10 -    A Aspergillus fumigatus  1:10 -    P Penicillium notatum 1:10 -      Conclusion:   [] No relevant allergic reaction observed    [] Allergic reaction diagnosed against following allergens:      Interpretation/ remarks:   See later    [] Patient information given   [] ACDS CAMP information's (# ....) to following compounds: .....   [] General information's to following compounds: ......    ==> final Diagnosis:    >> delayed type reaction to black tattoo on certain areas and aggravation during pollen season   DDx allergic contact dermatitis to additive or color in tattoo and aggravation by delayed  type hypersensitivity to inhalant allergens    >> perennial rhinoconjunctivitis and sinusitis/otitis with Asthma and seasonal aggravation (spring/summer)    Atopic predisposition    Perennial = house dust mite and molds (unlikely dog) = immediate and delayed?    Seasonal = tree pollens, maybe grass pollens = immediate and delayed?    Recurrent dermatitis (nummular)    These conclusions are made at the best of one's knowledge and belief based on the provided evidence such as patient's history and allergy test results and they can change over time or can be incomplete because of missing information's.    ==> Treatment  prescribed/Plan:    - Triamcinolone does not work always. Try Mometasone ointment (if flare up use for 4 days in a row or about 2xweekly)  - Allegra 180mg prn or Loratadine 10mg 1-2 times daily if necessary      Patient counseling:  Explained patch tests      Follow-up: in Derm-Allergy clinic for delayed readings of prick and patch tests after 2 days virtually and after 4 days in person for conclusions        Thank you for the opportunity be involved in the care of this patient.     Staff: Delisa Granados & Janeth Rivera LPN      I spent a total of 25 minutes face to face with Abimael Martinez during today s office visit. Over 50% of this time was spent discussing all the individual test results, correlating them to the clinical relevance, counseling the patient and/or coordinating care. Please see Assessment and Plan for details.  This excludes any time spent performing patch tests

## 2020-11-03 ENCOUNTER — MYC MEDICAL ADVICE (OUTPATIENT)
Dept: ALLERGY | Facility: CLINIC | Age: 30
End: 2020-11-03

## 2020-11-04 ENCOUNTER — MYC MEDICAL ADVICE (OUTPATIENT)
Dept: ALLERGY | Facility: CLINIC | Age: 30
End: 2020-11-04

## 2020-11-04 ENCOUNTER — VIRTUAL VISIT (OUTPATIENT)
Dept: ALLERGY | Facility: CLINIC | Age: 30
End: 2020-11-04
Payer: COMMERCIAL

## 2020-11-04 DIAGNOSIS — U07.1 CLINICAL DIAGNOSIS OF COVID-19: ICD-10-CM

## 2020-11-04 DIAGNOSIS — H10.10 ALLERGIC RHINOCONJUNCTIVITIS: ICD-10-CM

## 2020-11-04 DIAGNOSIS — L23.89 ALLERGIC CONTACT DERMATITIS DUE TO OTHER AGENTS: Primary | ICD-10-CM

## 2020-11-04 DIAGNOSIS — J30.9 ALLERGIC RHINOCONJUNCTIVITIS: ICD-10-CM

## 2020-11-04 PROCEDURE — 99207 PR NO CHARGE LOS: CPT | Mod: 95 | Performed by: DERMATOLOGY

## 2020-11-04 NOTE — PROGRESS NOTES
Note for return visit for Dermato-Allergy       Encounter Date: Nov 4, 2020    History of Present Illness:  I have reviewed the teledermatology  information and the nursing intake corresponding to this issue. Abimael Martinez is a 30 year old male who presents as a teledermatology consult for the following information take directly from the prior notes and video call performed by myself:     Patient here for 1st reading of patch tests after 2 days    Additional comments and observations from review of the patient s chart including the following:    See notes    ROS: Patient generally feeling well today   Physical Examination:  General: Well-appearing, appropriately-developed individual.  - Skin: Examination of the test sites on back within the teledermatology was performed.   See test results below  As above in HPI. No additional skin concerns.  - upper respiratory tract: currently no obvious Rhinitis  - lungs: no signs for active and present Asthma/Wheezing or coughing  - eyes: currently no active conjunctivits  - GI system/digestion: currently no problems, no bloating or Diarrhoea      Earlier History and Allergy exams:    Patient had a tattoo more than 10 years. Never problems until 2 years ago with raised outline of the tattoo. It is mostly itchy and inflamed during the allergy season. Big black tattoo on left upper arm. It scabs and is very itchy.    Has all over the body black tattoos, but only that on the arm makes troubles.    Patient has seasonal allergies. As a child less problems, but [place] patient gets stuffy, runny nose with postnasal drip and sinusitis and chronic ear infections --> got 4-6 months allergy shots and then problems with ordering of the allergens. After 3rd shot patient had generalized reactions. Afterwards patient had to dilute and no issues. Got few days after IT always some eczematous lesions on trunk.  Patient has year around allergies with  aggravation in spring/summer, but not fall. Patient has pets with small dog and no issues.    Patient has Asthma (more cold induced and some wheezing at exhalation during season) --> treatment with prolair    Never eczema as child. In the past year random eczematous spots on trunk and extremities      Past Medical History:   Patient Active Problem List   Diagnosis     Anxiety     CARDIOVASCULAR SCREENING; LDL GOAL LESS THAN 160     High risk sexual behavior     Tobacco use disorder     Low back pain     Essential hypertension     Internal hemorrhoids which bleed     Obesity, Class I, BMI 30-34.9     Attention deficit hyperactivity disorder (ADHD), combined type     AYALA (generalized anxiety disorder)     OCD (obsessive compulsive disorder)     Drug-induced erectile dysfunction     Mild persistent asthma without complication     Lumbosacral radiculopathy     Dyslipidemia     Elevated serum creatinine     Past Medical History:   Diagnosis Date     Acute right otitis media 1/8/2013     Anxiety      Depression      Drug abuse (H)      Gonococcal urethritis 02/05/2016     Urethritis 5/20/2013     Problem list name updated by automated process. Provider to review       Allergy History:     Allergies   Allergen Reactions     Cymbalta      Weight gain and fatigue     Duloxetine Other (See Comments) and Fatigue     enlarged spleen and weight gain     Paroxetine Other (See Comments), Fatigue and GI Disturbance     Weight gain, Spleen issues     Seasonal Allergies      Vicodin [Hydrocodone-Acetaminophen]        Social History:  The patient works as a . Patient has the following hobbies or non-occupational exposure: tattoos     reports that he quit smoking about 2 years ago. His smoking use included cigarettes. He has a 3.00 pack-year smoking history. He has never used smokeless tobacco. He reports current alcohol use. He reports current drug use. Drug: Marijuana.      Family History:  Family History   Problem  Relation Age of Onset     Unknown/Adopted Mother      Unknown/Adopted Father      Unknown/Adopted Maternal Grandmother      Unknown/Adopted Maternal Grandfather      Unknown/Adopted Paternal Grandmother      Unknown/Adopted Paternal Grandfather      Unknown/Adopted Brother        Medications:  Current Outpatient Medications   Medication Sig Dispense Refill     albuterol (PROAIR HFA/PROVENTIL HFA/VENTOLIN HFA) 108 (90 Base) MCG/ACT inhaler Inhale 2 puffs into the lungs every 4 hours as needed for wheezing 1 Inhaler 0     ALPRAZolam (XANAX) 2 MG tablet Take 1 tablet (2 mg) by mouth 3 times daily as needed for anxiety 30 tablet 0     amLODIPine (NORVASC) 10 MG tablet Take 1 tablet (10 mg) by mouth daily 30 tablet 1     amphetamine-dextroamphetamine (ADDERALL) 20 MG tablet Take 1 tablet (20 mg) by mouth 3 times daily 90 tablet 0     azelastine (ASTELIN) 0.1 % nasal spray Spray 2 sprays into both nostrils 2 times daily 30 mL 11     famotidine (PEPCID) 40 MG tablet Take 1 tablet (40 mg) by mouth At Bedtime 90 tablet 1     fluticasone (FLOVENT HFA) 220 MCG/ACT Inhaler Inhale 2 puffs into the lungs 2 times daily Please give patient spacer 1 Inhaler 3     Hyoscyamine Sulfate 0.375 MG TBCR Take 1 capful by mouth 3 times daily as needed 90 tablet 1     mometasone (ELOCON) 0.1 % external ointment If necessary use 3-4 days in a row or 2xweekly on eczematous lesions 45 g 2     montelukast (SINGULAIR) 10 MG tablet Take 1 tablet (10 mg) by mouth At Bedtime 90 tablet 0     oxyCODONE-acetaminophen (PERCOCET) 5-325 MG per tablet Take 1 tablet by mouth every 6 hours as needed for pain 12 tablet 0     pimecrolimus (ELIDEL) 1 % external cream Apply topically 2 times daily 30 g 0     sildenafil (REVATIO) 20 MG tablet Take 1-2 tablets (20-40 mg) by mouth daily as needed As needed for prevention of erectile dysfunction 30 tablet 2     tacrolimus (PROTOPIC) 0.1 % external ointment Apply twice daily as needed for rash on penis. 30 g 11      triamcinolone (KENALOG) 0.025 % external ointment Apply topically 2 times daily Apply twice a day to sites of irritation 80 g 1       Allergy Tests:    Past Allergy Test: prick tests positive to pollens, cockroach, dust mites, trees, ragweed, grass pollens ==> had for some time IT and eczema got worse = delayed type reactions to allergens?    Future Allergy Test:     Order for PATCH TESTS    [] Outpatient  [] Inpatient: [facility]..../ Bed ....      Skin Atopy (atopic dermatitis) [x] Yes   [] No  Rhinitis/Sinusitis:   [x] Yes   [] No  Allergic Asthma:   [x] Yes   [] No  Food Allergy:   [] Yes   [x] No  Leg ulcers:   [] Yes   [x] No  Hand eczema:   [] Yes   [x] No   Leading hand:   [] R   [] L       [] Ambidextrous                        Reason for tests (suspected allergy): allergic contact dermatitis to tattoo and seasonal aggravation to pollens  Known previous allergies: see below    Standardized panels  [x] Standard panel (40 tests)  [x] Preservatives & Antimicrobials (31 tests)  [x] Emulsifiers & Additives (25 tests)   [] Perfumes/Flavours & Plants (25 tests)  [] Hairdresser panel (12 tests)  [] Rubber Chemicals (22 tests)  [] Plastics (26 tests)  [x] Colorants/Dyes/Food additives (20 tests)  [] Metals (implants/dental) (24 tests)  [] Local anaesthetics/NSAIDs (13 tests)  [] Antibiotics & Antimycotics (14 tests)   [] Corticosteroids (15 tests)   [] Photopatch test (62 tests)   [x] others: house dust mites, molds, tree and grass pollens and dog from prick tests      [] Patient's own products: ...    DO NOT test if chemical or biological identity is unknown!     always ask from patient the product information and safety sheets (MSDS)   [] Atopy screen prick test (Atopic predisposition): and if possible IDT with delayed readings --> patient had recently a prick test (see above) and therefore do not repeat that except we would like to perform on Friday IDT.  RESULTS & EVALUATION of PATCH TESTS    Patch test readings after      [x] 2 days, [] 3 days [x] 4 days, [] 5 days,    Applied patch tests with results (import here the list of patch tests):    STANDARD Series      No Substance 2 days 4 days remarks   1 Kyle Mix [C] - -    2 Colophony - -    3  2-Mercaptobenzothiazole  - -     4 Methylisothiazolinone - -    5 Carba Mix - -    6 Thiuram Mix [A] - -    7 Bisphenol A Epoxy Resin - -    8 C-Ujgz-Rbpcvrtnfpc-Formaldehyde Resin - -    9 Mercapto Mix [A] (+) -    10 Black Rubber Mix- PPD [B] - -    11 Potassium Dichromate  -  -    12 Balsam of [place] (Myroxylon Pereirae Resin) - -    13 Nickel Sulphate Hexahydrate - -    14 Mixed Dialkyl Thiourea - -    15 Paraben Mix [B] - -    16 Methyldibromo Glutaronitrile - -    17 Fragrance Mix - -    18 2-Bromo-2-Nitropropane-1,3-Diol (Bronopol) - -    19 Lyral - -    20 Tixocortol-21- Pivalate - -    21 Diazolidiyl Urea (Germall II) - -    22 Methyl Methacrylate - -    23 Cobalt (II) Chloride Hexahydrate - -    24 Fragrance Mix II  - -    25 Compositae Mix - -    26 Benzoyl Peroxide - -    27 Bacitracin - -    28 Formaldehyde - -    29 Methylchloroisothiazolinone / Methylisothiazolinone - -    30 Corticosteroid Mix - -    31 Sodium Lauryl Sulfate - -    32 Lanolin Alcohol - -    33 Turpentine - -    34 Cetylstearylalcohol - -    35 Chlorhexidine Dicluconate - -    36 Budenoside - -    37 Imidazolidinyl Urea  - -    38 Ethyl-2 Cyanoacrylate - -    39 Quaternium 15 (Dowicil 200) - -    40 Decyl Glucoside - -      COLORANTS, DYES, FOOD ADDITIVES   No Substance 2 days 4 days remarks   41 1 Aniline - -    42 2 Aj Brown R - -    43 3 Textile Dye Mix [A] - -    44 4 [place]  - -    45 5 Disperse Blue-3 - -    46 6 Disperse [place]-3 - -    47 7 Disperse Red-11 NA NA    48 8 Disperse Yellow-9 - -    49 9 Erythrosine-B - -    50 10 Naphthol AS - -       Food additives      51 11 Aspartame - -    52 12 Azorubine - -    53 13 Brilliant Black - -    54 14 Cochineal Red - -    55 15 Patent Blue-VF NA NA     56 16 Pectin - -    57 17 Quinoline Yellow - -    58 18 Saccharin - -    59 19 Tartrazine - -    60 20 Sodium Glutamate NA NA      EMULSIFIERS & ADDITIVES   No Substance 2 days 4 days remarks   61 1 Polyethylene Glycol-400 - -    62 2 Cocamidopropyl Betaine - -    63 3 Amerchol L101 - -    64 4 Propylene Glycol - -    65 5 Triethanolamine - -    66 6 Sorbitane Sesquiolate - -    67 7 Isopropylmyristate - -    68 8 Polysorbate 80  - -    69 9 Amidoamine   (Stearamidopropyl Dimethylamine) - -    70 10 Oleamidopropyl Dimethylamine - -    71 11 Lauryl Glucoside - -    72 12 Coconut Diethanolamide  - -    73 13 2-Hydroxy-4-Methoxy Benzophenone (Oxybenzone) - -    74 14 Benzophenone-4 (Sulisobenzon) - -    75 15 Propolis - -    76 16 Dexpanthenol - -    77 17 Carboxymethyl Cellulose Sodium NA NA    78 18 Abitol - -    79 19 Tert-Butylhydroquinone - -    80 20 Benzyl Salicylate - -      Antioxidant      81 21 Dodecyl Gallate - -    82 22 Butylhydroxyanisole (BHA) - -    83 23 Butylhydroxytoluene (BHT) - -    84 24 Di-Alpha-Tocopherol (Vit E) - -    85 25 Propyl Gallate - -    86 26 Zinc Pyrithione NA NA    87 27 Dimethylaminopropylamin (DMAPA) - -      PRESERVATIVES & ANTIMICROBIALS     No Substance 2 days 4 days remarks   88 1  1,2-Benzisothiazoline-3-One, Sodium Salt - -    89 2  1,3,5-Waqar (2-Hydroxyethyl) - Hexahydrotriazine (Grotan BK) - -    90 3 1-Cbgcpxtveakct-7-Nitro-1, 3-Propanediol - -    91 4  3, 4, 4' - Triclocarban - -    92 5 4 - Chloro - 3 - Cresol - -    93 6 4 - Chloro - 4 - Xylenol (PCMX) - -    94 7 7-Ethylbicyclooxazolidine (Bioban BQ8038) - -    95 8 Benzalkonium Chloride NA NA    96 9 Benzyl Alcohol - -    97 10 Cetalkonium Chloride - -    98 11 Cetylpyrimidine Chloride  - -    99 12 Chloroacetamide - -    100 13 DMDM Hydantoin - -    101 14 Glutaraldehyde - -    102 15 Triclosan - -    103 16 Glyoxal Trimeric Dihydrate - -    104 17 Iodopropynyl Butylcarbamate - -    105 18 Octylisothiazoline - -   "  106 19 Iodoform NA NA    107 20 (Nitrobutyl) Morpholine/(Ethylnitro-Trimethylene) Dimorpholine (Bioban P 1487) - -    108 21 Phenoxyethanol - -    109 22 Phenyl Salicylate - -    110 23 Povidone Iodine - -    111 24 Sodium Benzoate - -    112 25 Sodium Disulfite - -    113 26 Sorbic Acid - -    114 27 Thimerosal - -    115 28 Melamine Formeladyde Resin      116 29 Ethylenediamine Dihydrochloride        Parabens      117 30 Butyl-P-Hydroxybenzoate - -    118 31 Ethyl-P-Hydroxybenzoate - -    119 32 Methyl-P-Hydroxybenzoate - -    120 33 Propyl-P-Hydroxybenzoate - -      OTHER PRODUCTS       No Substance Conc  % solv 2 days 4 days remarks   121 D. farinae        122 D. pteronyssinus        123 Aspergillius        124 penicillium        125 Dog        126 9 Tree Mix - ALK        127 Weed Mix 2630 - HS        128 Tree Mix 5- HS        129 5 Grass Mix - ALK        130           Patients own products:  è DO NOT test if chemical or biological identity is unknown!   - always ask from patient the product information and safety sheets and consult with the physician   - check neutral pH with pH indicator paper (do not test pH below 5 or over 8 or consult with physician)  - leave-on cosmetics can be tested \"as is\"  - rinse-off products have to be diluted with water, buffer, olive oil or paraffin (discuss with physician)  à remember:   - non-specific toxic/corrosive or biological reactions can occur  - tests with patients own products are not standardized and test conditions are not optimized for patch tests. Whenever possible test with standardized test series and correlate use of product with the result of the patch tests  - if not sure if compounds can be tested under occlusion in patch tests consider open application tests    Results of patch tests:                         Interpretation:    - Negative                    A    = Allergic      (+) Erythema    TI   = Toxic/irritant   + E + Infiltration    RaP = Relevance at " Present     ++ E/I + Papulovesicle   Rpr  = Relevance Previously     +++ E/I/P + Blister     nR   = No Relevance    Atopy Screen (Placed 11/xx/20 )    No Substance Readings (15 min) Evaluation   POS Histamine 1mg/ml -    NEG NaCl 0.9% -      No Substance Readings (15 min) Evaluation   1 Alternaria alternata (tenuis)  -    2 Cladosporium herbarum -    3 Aspergillus fumigatus -    4 Penicillium notatum -    5 Dermatophagoides pteronyssinus -    6 Dermatophagoides farinae -      Intradermal Testing (Placed 11/xx/20 )    No Substance Conc.  Readings (15min) Evaluation   - NaCl  0.9% -    + Histamine (prick) 0.1mg / ml -    DF Standard Dust Mite - D. Farinae 1:10 -    DP Standard Dust Mite - D. Pteronyssinus 1:10 -    A Aspergillus fumigatus  1:10 -    P Penicillium notatum 1:10 -      Conclusion:   [x] No relevant allergic reaction observed  After 2 days    [] Allergic reaction diagnosed against following allergens:      Interpretation/ remarks:   See later    [] Patient information given   [] ACDS CAMP information's (# ....) to following compounds: .....   [] General information's to following compounds: ......    ==> final Diagnosis:    >> delayed type reaction to black tattoo on certain areas and aggravation during pollen season   DDx allergic contact dermatitis to additive or color in tattoo and aggravation by delayed  type hypersensitivity to inhalant allergens    >> perennial rhinoconjunctivitis and sinusitis/otitis with Asthma and seasonal aggravation (spring/summer)    Atopic predisposition    Perennial = house dust mite and molds (unlikely dog) = immediate and delayed?    Seasonal = tree pollens, maybe grass pollens = immediate and delayed?    Recurrent dermatitis (nummular)    These conclusions are made at the best of one's knowledge and belief based on the provided evidence such as patient's history and allergy test results and they can change over time or can be incomplete because of missing  information's.    ==> Treatment prescribed/Plan:    - Triamcinolone does not work always. Try Mometasone ointment (if flare up use for 4 days in a row or about 2xweekly)  - Allegra 180mg prn or Loratadine 10mg 1-2 times daily if necessary      Patient counseling:  Problem: Monday evening of patch tests patient developed fever (>100F), coughing and muscle pain and went to ED. Thought reactions from tests. No COVID test done. Talked today virtually with patient and recommended to make COVID test. Got message back on 5th of November, patient is COVID positive!  Told patient to make a picture on the 5th of November and we will try to discuss on the 6th the results virtually      Patient counseling:  See later      Follow-up: in Derm-Allergy clinic in person for 2nd readings of patch tests after 4 days virtually      Thank you for the opportunity be involved in the care of this patient.     Staff: Janeth Rivera LPN    _____________________________________________________________________________    Teledermatology information:  - Location of patient: Home  - Patient presented in referral from: other  - Location of teledermatologist:  [facility] [place] (, [place], [place])  - Reason teledermatology is appropriate:  of National Emergency Regarding Coronavirus disease (COVID 19) Outbreak  - Method of transmission:  Store and Forward and Video ( Invitation sent by:  Elvi and send to e-mail at: [email or web address] )  - Date of images: per Epic media  - Service start time:11:51am  - Service end time:12:18pm  - Date of report: 11/04/20      I spent a total of 27 minutes for telemedicine consult with Abimael Martinez during today s video meeting. Over 50% of this time was spent counseling the patient and/or coordinating care. Please see Assessment and Plan for details.

## 2020-11-04 NOTE — PROGRESS NOTES
"Abimael Martinez is a 30 year old male who is being evaluated via a billable video visit.      The patient has been notified of following:     \"This video visit will be conducted via a call between you and your physician/provider. We have found that certain health care needs can be provided without the need for an in-person physical exam.  This service lets us provide the care you need with a video conversation.  If a prescription is necessary we can send it directly to your pharmacy.  If lab work is needed we can place an order for that and you can then stop by our lab to have the test done at a later time.    Video visits are billed at different rates depending on your insurance coverage.  Please reach out to your insurance provider with any questions.    If during the course of the call the physician/provider feels a video visit is not appropriate, you will not be charged for this service.\"    Patient has given verbal consent for Video visit? Yes  How would you like to obtain your AVS? MyChart  If you are dropped from the video visit, the video invite should be resent to: Text to cell phone: [phone]  Will anyone else be joining your video visit? No        Video-Visit Details    Type of service:  Video Visit      Originating Location (pt. Location): Home    Distant Location (provider location):  [facility] [place]     Platform used for Video Visit: Ary Rivera LPN                                                                     "

## 2020-11-04 NOTE — LETTER
11/4/2020         RE: Abimael Martinez  [address]  [facility] [id number]        Dear Colleague,    Thank you for referring your patient, Abimael Martinez, to the [facility] [place]. Please see a copy of my visit note below.    Note for return visit for Dermato-Allergy       Encounter Date: Nov 4, 2020    History of Present Illness:  I have reviewed the teledermatology  information and the nursing intake corresponding to this issue. Abimael Martinez is a 30 year old male who presents as a teledermatology consult for the following information take directly from the prior notes and video call performed by myself:     Patient here for 1st reading of patch tests after 2 days    Additional comments and observations from review of the patient s chart including the following:    See notes    ROS: Patient generally feeling well today   Physical Examination:  General: Well-appearing, appropriately-developed individual.  - Skin: Examination of the test sites on back within the teledermatology was performed.   See test results below  As above in HPI. No additional skin concerns.  - upper respiratory tract: currently no obvious Rhinitis  - lungs: no signs for active and present Asthma/Wheezing or coughing  - eyes: currently no active conjunctivits  - GI system/digestion: currently no problems, no bloating or Diarrhoea      Earlier History and Allergy exams:    Patient had a tattoo more than 10 years. Never problems until 2 years ago with raised outline of the tattoo. It is mostly itchy and inflamed during the allergy season. Big black tattoo on left upper arm. It scabs and is very itchy.    Has all over the body black tattoos, but only that on the arm makes troubles.    Patient has seasonal allergies. As a child less problems, but since [place] patient gets stuffy, runny nose with postnasal drip and sinusitis and chronic ear infections --> got 4-6  months allergy shots and then problems with ordering of the allergens. After 3rd shot patient had generalized reactions. Afterwards patient had to dilute and no issues. Got few days after IT always some eczematous lesions on trunk.  Patient has year around allergies with aggravation in spring/summer, but not fall. Patient has pets with small dog and no issues.    Patient has Asthma (more cold induced and some wheezing at exhalation during season) --> treatment with prolair    Never eczema as child. In the past year random eczematous spots on trunk and extremities      Past Medical History:   Patient Active Problem List   Diagnosis     Anxiety     CARDIOVASCULAR SCREENING; LDL GOAL LESS THAN 160     High risk sexual behavior     Tobacco use disorder     Low back pain     Essential hypertension     Internal hemorrhoids which bleed     Obesity, Class I, BMI 30-34.9     Attention deficit hyperactivity disorder (ADHD), combined type     AYALA (generalized anxiety disorder)     OCD (obsessive compulsive disorder)     Drug-induced erectile dysfunction     Mild persistent asthma without complication     Lumbosacral radiculopathy     Dyslipidemia     Elevated serum creatinine     Past Medical History:   Diagnosis Date     Acute right otitis media 1/8/2013     Anxiety      Depression      Drug abuse (H)      Gonococcal urethritis 02/05/2016     Urethritis 5/20/2013     Problem list name updated by automated process. Provider to review       Allergy History:     Allergies   Allergen Reactions     Cymbalta      Weight gain and fatigue     Duloxetine Other (See Comments) and Fatigue     enlarged spleen and weight gain     Paroxetine Other (See Comments), Fatigue and GI Disturbance     Weight gain, Spleen issues     Seasonal Allergies      Vicodin [Hydrocodone-Acetaminophen]        Social History:  The patient works as a . Patient has the following hobbies or non-occupational exposure: tattoos     reports that he  quit smoking about 2 years ago. His smoking use included cigarettes. He has a 3.00 pack-year smoking history. He has never used smokeless tobacco. He reports current alcohol use. He reports current drug use. Drug: Marijuana.      Family History:  Family History   Problem Relation Age of Onset     Unknown/Adopted Mother      Unknown/Adopted Father      Unknown/Adopted Maternal Grandmother      Unknown/Adopted Maternal Grandfather      Unknown/Adopted Paternal Grandmother      Unknown/Adopted Paternal Grandfather      Unknown/Adopted Brother        Medications:  Current Outpatient Medications   Medication Sig Dispense Refill     albuterol (PROAIR HFA/PROVENTIL HFA/VENTOLIN HFA) 108 (90 Base) MCG/ACT inhaler Inhale 2 puffs into the lungs every 4 hours as needed for wheezing 1 Inhaler 0     ALPRAZolam (XANAX) 2 MG tablet Take 1 tablet (2 mg) by mouth 3 times daily as needed for anxiety 30 tablet 0     amLODIPine (NORVASC) 10 MG tablet Take 1 tablet (10 mg) by mouth daily 30 tablet 1     amphetamine-dextroamphetamine (ADDERALL) 20 MG tablet Take 1 tablet (20 mg) by mouth 3 times daily 90 tablet 0     azelastine (ASTELIN) 0.1 % nasal spray Spray 2 sprays into both nostrils 2 times daily 30 mL 11     famotidine (PEPCID) 40 MG tablet Take 1 tablet (40 mg) by mouth At Bedtime 90 tablet 1     fluticasone (FLOVENT HFA) 220 MCG/ACT Inhaler Inhale 2 puffs into the lungs 2 times daily Please give patient spacer 1 Inhaler 3     Hyoscyamine Sulfate 0.375 MG TBCR Take 1 capful by mouth 3 times daily as needed 90 tablet 1     mometasone (ELOCON) 0.1 % external ointment If necessary use 3-4 days in a row or 2xweekly on eczematous lesions 45 g 2     montelukast (SINGULAIR) 10 MG tablet Take 1 tablet (10 mg) by mouth At Bedtime 90 tablet 0     oxyCODONE-acetaminophen (PERCOCET) 5-325 MG per tablet Take 1 tablet by mouth every 6 hours as needed for pain 12 tablet 0     pimecrolimus (ELIDEL) 1 % external cream Apply topically 2 times  daily 30 g 0     sildenafil (REVATIO) 20 MG tablet Take 1-2 tablets (20-40 mg) by mouth daily as needed As needed for prevention of erectile dysfunction 30 tablet 2     tacrolimus (PROTOPIC) 0.1 % external ointment Apply twice daily as needed for rash on penis. 30 g 11     triamcinolone (KENALOG) 0.025 % external ointment Apply topically 2 times daily Apply twice a day to sites of irritation 80 g 1       Allergy Tests:    Past Allergy Test: prick tests positive to pollens, cockroach, dust mites, trees, ragweed, grass pollens ==> had for some time IT and eczema got worse = delayed type reactions to allergens?    Future Allergy Test:     Order for PATCH TESTS    [] Outpatient  [] Inpatient: [facility]..../ Bed ....      Skin Atopy (atopic dermatitis) [x] Yes   [] No  Rhinitis/Sinusitis:   [x] Yes   [] No  Allergic Asthma:   [x] Yes   [] No  Food Allergy:   [] Yes   [x] No  Leg ulcers:   [] Yes   [x] No  Hand eczema:   [] Yes   [x] No   Leading hand:   [] R   [] L       [] Ambidextrous                        Reason for tests (suspected allergy): allergic contact dermatitis to tattoo and seasonal aggravation to pollens  Known previous allergies: see below    Standardized panels  [x] Standard panel (40 tests)  [x] Preservatives & Antimicrobials (31 tests)  [x] Emulsifiers & Additives (25 tests)   [] Perfumes/Flavours & Plants (25 tests)  [] Hairdresser panel (12 tests)  [] Rubber Chemicals (22 tests)  [] Plastics (26 tests)  [x] Colorants/Dyes/Food additives (20 tests)  [] Metals (implants/dental) (24 tests)  [] Local anaesthetics/NSAIDs (13 tests)  [] Antibiotics & Antimycotics (14 tests)   [] Corticosteroids (15 tests)   [] Photopatch test (62 tests)   [x] others: house dust mites, molds, tree and grass pollens and dog from prick tests      [] Patient's own products: ...    DO NOT test if chemical or biological identity is unknown!     always ask from patient the product information and safety sheets (MSDS)   [] Atopy screen  prick test (Atopic predisposition): and if possible IDT with delayed readings --> patient had recently a prick test (see above) and therefore do not repeat that except we would like to perform on Friday IDT.  RESULTS & EVALUATION of PATCH TESTS    Patch test readings after     [x] 2 days, [] 3 days [x] 4 days, [] 5 days,    Applied patch tests with results (import here the list of patch tests):    STANDARD Series      No Substance 2 days 4 days remarks   1 Kyle Mix [C] - -    2 Colophony - -    3  2-Mercaptobenzothiazole  - -     4 Methylisothiazolinone - -    5 Carba Mix - -    6 Thiuram Mix [A] - -    7 Bisphenol A Epoxy Resin - -    8 D-Hwyd-Vxlppbdqfuf-Formaldehyde Resin - -    9 Mercapto Mix [A] (+) -    10 Black Rubber Mix- PPD [B] - -    11 Potassium Dichromate  -  -    12 Balsam of [place] (Myroxylon Pereirae Resin) - -    13 Nickel Sulphate Hexahydrate - -    14 Mixed Dialkyl Thiourea - -    15 Paraben Mix [B] - -    16 Methyldibromo Glutaronitrile - -    17 Fragrance Mix - -    18 2-Bromo-2-Nitropropane-1,3-Diol (Bronopol) - -    19 Lyral - -    20 Tixocortol-21- Pivalate - -    21 Diazolidiyl Urea (Germall II) - -    22 Methyl Methacrylate - -    23 Cobalt (II) Chloride Hexahydrate - -    24 Fragrance Mix II  - -    25 Compositae Mix - -    26 Benzoyl Peroxide - -    27 Bacitracin - -    28 Formaldehyde - -    29 Methylchloroisothiazolinone / Methylisothiazolinone - -    30 Corticosteroid Mix - -    31 Sodium Lauryl Sulfate - -    32 Lanolin Alcohol - -    33 Turpentine - -    34 Cetylstearylalcohol - -    35 Chlorhexidine Dicluconate - -    36 Budenoside - -    37 Imidazolidinyl Urea  - -    38 Ethyl-2 Cyanoacrylate - -    39 Quaternium 15 (Dowicil 200) - -    40 Decyl Glucoside - -      COLORANTS, DYES, FOOD ADDITIVES   No Substance 2 days 4 days remarks   41 1 Aniline - -    42 2 Aj Brown R - -    43 3 Textile Dye Mix [A] - -    44 4 [place]  - -    45 5 Disperse Blue-3 - -    46 6 Disperse Orange-3  - -    47 7 Disperse Red-11 NA NA    48 8 Disperse Yellow-9 - -    49 9 Erythrosine-B - -    50 10 Naphthol AS - -       Food additives      51 11 Aspartame - -    52 12 Azorubine - -    53 13 Brilliant Black - -    54 14 Cochineal Red - -    55 15 Patent Blue-VF NA NA    56 16 Pectin - -    57 17 Quinoline Yellow - -    58 18 Saccharin - -    59 19 Tartrazine - -    60 20 Sodium Glutamate NA NA      EMULSIFIERS & ADDITIVES   No Substance 2 days 4 days remarks   61 1 Polyethylene Glycol-400 - -    62 2 Cocamidopropyl Betaine - -    63 3 Amerchol L101 - -    64 4 Propylene Glycol - -    65 5 Triethanolamine - -    66 6 Sorbitane Sesquiolate - -    67 7 Isopropylmyristate - -    68 8 Polysorbate 80  - -    69 9 Amidoamine   (Stearamidopropyl Dimethylamine) - -    70 10 Oleamidopropyl Dimethylamine - -    71 11 Lauryl Glucoside - -    72 12 Coconut Diethanolamide  - -    73 13 2-Hydroxy-4-Methoxy Benzophenone (Oxybenzone) - -    74 14 Benzophenone-4 (Sulisobenzon) - -    75 15 Propolis - -    76 16 Dexpanthenol - -    77 17 Carboxymethyl Cellulose Sodium NA NA    78 18 Abitol - -    79 19 Tert-Butylhydroquinone - -    80 20 Benzyl Salicylate - -      Antioxidant      81 21 Dodecyl Gallate - -    82 22 Butylhydroxyanisole (BHA) - -    83 23 Butylhydroxytoluene (BHT) - -    84 24 Di-Alpha-Tocopherol (Vit E) - -    85 25 Propyl Gallate - -    86 26 Zinc Pyrithione NA NA    87 27 Dimethylaminopropylamin (DMAPA) - -      PRESERVATIVES & ANTIMICROBIALS     No Substance 2 days 4 days remarks   88 1  1,2-Benzisothiazoline-3-One, Sodium Salt - -    89 2  1,3,5-Waqar (2-Hydroxyethyl) - Hexahydrotriazine (Grotan BK) - -    90 3 4-Wihwdvejmwbdx-3-Nitro-1, 3-Propanediol - -    91 4  3, 4, 4' - Triclocarban - -    92 5 4 - Chloro - 3 - Cresol - -    93 6 4 - Chloro - 4 - Xylenol (PCMX) - -    94 7 7-Ethylbicyclooxazolidine ([facility] [id number]) - -    95 8 Benzalkonium Chloride NA NA    96 9 Benzyl Alcohol - -    97 10 Cetalkonium Chloride  "- -    98 11 Cetylpyrimidine Chloride  - -    99 12 Chloroacetamide - -    100 13 DMDM Hydantoin - -    101 14 Glutaraldehyde - -    102 15 Triclosan - -    103 16 Glyoxal Trimeric Dihydrate - -    104 17 Iodopropynyl Butylcarbamate - -    105 18 Octylisothiazoline - -    106 19 Iodoform NA NA    107 20 (Nitrobutyl) Morpholine/(Ethylnitro-Trimethylene) Dimorpholine (Bioban P 1487) - -    108 21 Phenoxyethanol - -    109 22 Phenyl Salicylate - -    110 23 Povidone Iodine - -    111 24 Sodium Benzoate - -    112 25 Sodium Disulfite - -    113 26 Sorbic Acid - -    114 27 Thimerosal - -    115 28 Melamine Formeladyde Resin      116 29 Ethylenediamine Dihydrochloride        Parabens      117 30 Butyl-P-Hydroxybenzoate - -    118 31 Ethyl-P-Hydroxybenzoate - -    119 32 Methyl-P-Hydroxybenzoate - -    120 33 Propyl-P-Hydroxybenzoate - -      OTHER PRODUCTS       No Substance Conc  % solv 2 days 4 days remarks   121 D. farinae        122 D. pteronyssinus        123 Aspergillius        124 penicillium        125 Dog        126 9 Tree Mix - ALK        127 Weed Mix 2630 - HS        128 Tree Mix 5- HS        129 5 Grass Mix - ALK        130           Patients own products:  è DO NOT test if chemical or biological identity is unknown!   - always ask from patient the product information and safety sheets and consult with the physician   - check neutral pH with pH indicator paper (do not test pH below 5 or over 8 or consult with physician)  - leave-on cosmetics can be tested \"as is\"  - rinse-off products have to be diluted with water, buffer, olive oil or paraffin (discuss with physician)  à remember:   - non-specific toxic/corrosive or biological reactions can occur  - tests with patients own products are not standardized and test conditions are not optimized for patch tests. Whenever possible test with standardized test series and correlate use of product with the result of the patch tests  - if not sure if compounds can be " tested under occlusion in patch tests consider open application tests    Results of patch tests:                         Interpretation:    - Negative                    A    = Allergic      (+) Erythema    TI   = Toxic/irritant   + E + Infiltration    RaP = Relevance at Present     ++ E/I + Papulovesicle   Rpr  = Relevance Previously     +++ E/I/P + Blister     nR   = No Relevance    Atopy Screen (Placed 11/xx/20 )    No Substance Readings (15 min) Evaluation   POS Histamine 1mg/ml -    NEG NaCl 0.9% -      No Substance Readings (15 min) Evaluation   1 Alternaria alternata (tenuis)  -    2 Cladosporium herbarum -    3 Aspergillus fumigatus -    4 Penicillium notatum -    5 Dermatophagoides pteronyssinus -    6 Dermatophagoides farinae -      Intradermal Testing (Placed 11/xx/20 )    No Substance Conc.  Readings (15min) Evaluation   - NaCl  0.9% -    + Histamine (prick) 0.1mg / ml -    DF Standard Dust Mite - D. Farinae 1:10 -    DP Standard Dust Mite - D. Pteronyssinus 1:10 -    A Aspergillus fumigatus  1:10 -    P Penicillium notatum 1:10 -      Conclusion:   [x] No relevant allergic reaction observed  After 2 days    [] Allergic reaction diagnosed against following allergens:      Interpretation/ remarks:   See later    [] Patient information given   [] ACDS CAMP information's (# ....) to following compounds: .....   [] General information's to following compounds: ......    ==> final Diagnosis:    >> delayed type reaction to black tattoo on certain areas and aggravation during pollen season   DDx allergic contact dermatitis to additive or color in tattoo and aggravation by delayed  type hypersensitivity to inhalant allergens    >> perennial rhinoconjunctivitis and sinusitis/otitis with Asthma and seasonal aggravation (spring/summer)    Atopic predisposition    Perennial = house dust mite and molds (unlikely dog) = immediate and delayed?    Seasonal = tree pollens, maybe grass pollens = immediate and  delayed?    Recurrent dermatitis (nummular)    These conclusions are made at the best of one's knowledge and belief based on the provided evidence such as patient's history and allergy test results and they can change over time or can be incomplete because of missing information's.    ==> Treatment prescribed/Plan:    - Triamcinolone does not work always. Try Mometasone ointment (if flare up use for 4 days in a row or about 2xweekly)  - Allegra 180mg prn or Loratadine 10mg 1-2 times daily if necessary      Patient counseling:  Problem: Monday evening of patch tests patient developed fever (>100F), coughing and muscle pain and went to ED. Thought reactions from tests. No COVID test done. Talked today virtually with patient and recommended to make COVID test. Got message back on 5th of November, patient is COVID positive!  Told patient to make a picture on the 5th of November and we will try to discuss on the 6th the results virtually      Patient counseling:  See later      Follow-up: in Derm-Allergy clinic in person for 2nd readings of patch tests after 4 days virtually      Thank you for the opportunity be involved in the care of this patient.     Staff: Janeth Rivera LPN    _____________________________________________________________________________    Teledermatology information:  - Location of patient: Home  - Patient presented in referral from: other  - Location of teledermatologist:  [facility] [place] (, [place], [place])  - Reason teledermatology is appropriate:  of National Emergency Regarding Coronavirus disease (COVID 19) Outbreak  - Method of transmission:  Store and Forward and Video ( Invitation sent by:  Elvi and send to e-mail at: [email or web address] )  - Date of images: per Epic media  - Service start time:11:51am  - Service end time:12:18pm  - Date of report: 11/04/20      I spent a total of 27 minutes for telemedicine consult with Abimael Martinez during today s  "video meeting. Over 50% of this time was spent counseling the patient and/or coordinating care. Please see Assessment and Plan for details.     Abimael Martinez is a 30 year old male who is being evaluated via a billable video visit.      The patient has been notified of following:     \"This video visit will be conducted via a call between you and your physician/provider. We have found that certain health care needs can be provided without the need for an in-person physical exam.  This service lets us provide the care you need with a video conversation.  If a prescription is necessary we can send it directly to your pharmacy.  If lab work is needed we can place an order for that and you can then stop by our lab to have the test done at a later time.    Video visits are billed at different rates depending on your insurance coverage.  Please reach out to your insurance provider with any questions.    If during the course of the call the physician/provider feels a video visit is not appropriate, you will not be charged for this service.\"    Patient has given verbal consent for Video visit? Yes  How would you like to obtain your AVS? MyChart  If you are dropped from the video visit, the video invite should be resent to: Text to cell phone: [phone]  Will anyone else be joining your video visit? No        Video-Visit Details    Type of service:  Video Visit      Originating Location (pt. Location): Home    Distant Location (provider location):  [facility] [place]     Platform used for Video Visit: Ary Rivera LPN                                                                         Again, thank you for allowing me to participate in the care of your patient.        Sincerely,        James Harris MD    "

## 2020-11-05 ENCOUNTER — MYC REFILL (OUTPATIENT)
Dept: FAMILY MEDICINE | Facility: CLINIC | Age: 30
End: 2020-11-05

## 2020-11-05 DIAGNOSIS — F41.9 ANXIETY: ICD-10-CM

## 2020-11-05 RX ORDER — ALPRAZOLAM 2 MG
2 TABLET ORAL 3 TIMES DAILY PRN
Qty: 30 TABLET | Refills: 0 | Status: SHIPPED | OUTPATIENT
Start: 2020-11-05 | End: 2020-12-06

## 2020-11-06 ENCOUNTER — VIRTUAL VISIT (OUTPATIENT)
Dept: ALLERGY | Facility: CLINIC | Age: 30
End: 2020-11-06
Payer: COMMERCIAL

## 2020-11-06 DIAGNOSIS — J45.20 MILD INTERMITTENT ASTHMA WITHOUT COMPLICATION: ICD-10-CM

## 2020-11-06 DIAGNOSIS — H10.10 ALLERGIC RHINOCONJUNCTIVITIS: ICD-10-CM

## 2020-11-06 DIAGNOSIS — J30.9 ALLERGIC RHINOCONJUNCTIVITIS: ICD-10-CM

## 2020-11-06 DIAGNOSIS — Z88.9 ATOPY: ICD-10-CM

## 2020-11-06 DIAGNOSIS — L23.89 ALLERGIC CONTACT DERMATITIS DUE TO OTHER AGENTS: Primary | ICD-10-CM

## 2020-11-06 PROCEDURE — 99213 OFFICE O/P EST LOW 20 MIN: CPT | Mod: 95 | Performed by: DERMATOLOGY

## 2020-11-06 ASSESSMENT — PAIN SCALES - GENERAL: PAINLEVEL: NO PAIN (0)

## 2020-11-06 NOTE — LETTER
11/6/2020         RE: Abimael Martinez  [address]  [facility] [address]        Dear Colleague,    Thank you for referring your patient, Abimael Martinez, to the [facility] [place]. Please see a copy of my visit note below.    Note for return visit for Dermato-Allergy       Encounter Date: Nov 6, 2020    History of Present Illness:  I have reviewed the teledermatology  information and the nursing intake corresponding to this issue. Abimael Martinez is a 30 year old male who presents as a teledermatology consult for the following information take directly from the prior notes and video call performed by myself:     Patient contacted for 2nd readings of patch tests after 4 days and final conclusions      Additional comments and observations from review of the patient s chart including the following:    See notes    ROS: Patient generally feeling well today   Physical Examination:  General: Well-appearing, appropriately-developed individual.  - Skin: Examination of the skin on the back within the teledermatology (in Epic media) was performed  See test results below  As above in HPI. No additional skin concerns.  - upper respiratory tract: currently no obvious Rhinitis  - lungs: no signs for active and present Asthma/Wheezing or coughing  - eyes: currently no active conjunctivits  - GI system/digestion: currently no problems, no bloating or Diarrhoea       Earlier History and Allergy exams:    Patient had a tattoo more than 10 years. Never problems until 2 years ago with raised outline of the tattoo. It is mostly itchy and inflamed during the allergy season. Big black tattoo on left upper arm. It scabs and is very itchy.    Has all over the body black tattoos, but only that on the arm makes troubles.    Patient has seasonal allergies. As a child less problems, but [place] patient gets stuffy, runny nose with postnasal drip and  sinusitis and chronic ear infections --> got 4-6 months allergy shots and then problems with ordering of the allergens. After 3rd shot patient had generalized reactions. Afterwards patient had to dilute and no issues. Got few days after IT always some eczematous lesions on trunk.  Patient has year around allergies with aggravation in spring/summer, but not fall. Patient has pets with small dog and no issues.    Patient has Asthma (more cold induced and some wheezing at exhalation during season) --> treatment with prolair    Never eczema as child. In the past year random eczematous spots on trunk and extremities      Past Medical History:   Patient Active Problem List   Diagnosis     Anxiety     CARDIOVASCULAR SCREENING; LDL GOAL LESS THAN 160     High risk sexual behavior     Tobacco use disorder     Low back pain     Essential hypertension     Internal hemorrhoids which bleed     Obesity, Class I, BMI 30-34.9     Attention deficit hyperactivity disorder (ADHD), combined type     AYALA (generalized anxiety disorder)     OCD (obsessive compulsive disorder)     Drug-induced erectile dysfunction     Mild persistent asthma without complication     Lumbosacral radiculopathy     Dyslipidemia     Elevated serum creatinine     Past Medical History:   Diagnosis Date     Acute right otitis media 1/8/2013     Anxiety      Depression      Drug abuse (H)      Gonococcal urethritis 02/05/2016     Urethritis 5/20/2013     Problem list name updated by automated process. Provider to review       Allergy History:     Allergies   Allergen Reactions     Cymbalta      Weight gain and fatigue     Duloxetine Other (See Comments) and Fatigue     enlarged spleen and weight gain     Paroxetine Other (See Comments), Fatigue and GI Disturbance     Weight gain, Spleen issues     Seasonal Allergies      Vicodin [Hydrocodone-Acetaminophen]        Social History:  The patient works as a . Patient has the following hobbies or  non-occupational exposure: vonda     reports that he quit smoking about 2 years ago. His smoking use included cigarettes. He has a 3.00 pack-year smoking history. He has never used smokeless tobacco. He reports current alcohol use. He reports current drug use. Drug: Marijuana.      Family History:  Family History   Problem Relation Age of Onset     Unknown/Adopted Mother      Unknown/Adopted Father      Unknown/Adopted Maternal Grandmother      Unknown/Adopted Maternal Grandfather      Unknown/Adopted Paternal Grandmother      Unknown/Adopted Paternal Grandfather      Unknown/Adopted Brother        Medications:  Current Outpatient Medications   Medication Sig Dispense Refill     albuterol (PROAIR HFA/PROVENTIL HFA/VENTOLIN HFA) 108 (90 Base) MCG/ACT inhaler Inhale 2 puffs into the lungs every 4 hours as needed for wheezing 1 Inhaler 0     ALPRAZolam (XANAX) 2 MG tablet Take 1 tablet (2 mg) by mouth 3 times daily as needed for anxiety 30 tablet 0     amLODIPine (NORVASC) 10 MG tablet Take 1 tablet (10 mg) by mouth daily 30 tablet 1     amphetamine-dextroamphetamine (ADDERALL) 20 MG tablet Take 1 tablet (20 mg) by mouth 3 times daily 90 tablet 0     azelastine (ASTELIN) 0.1 % nasal spray Spray 2 sprays into both nostrils 2 times daily 30 mL 11     famotidine (PEPCID) 40 MG tablet Take 1 tablet (40 mg) by mouth At Bedtime 90 tablet 1     fluticasone (FLOVENT HFA) 220 MCG/ACT Inhaler Inhale 2 puffs into the lungs 2 times daily Please give patient spacer 1 Inhaler 3     Hyoscyamine Sulfate 0.375 MG TBCR Take 1 capful by mouth 3 times daily as needed 90 tablet 1     mometasone (ELOCON) 0.1 % external ointment If necessary use 3-4 days in a row or 2xweekly on eczematous lesions 45 g 2     montelukast (SINGULAIR) 10 MG tablet Take 1 tablet (10 mg) by mouth At Bedtime 90 tablet 0     oxyCODONE-acetaminophen (PERCOCET) 5-325 MG per tablet Take 1 tablet by mouth every 6 hours as needed for pain 12 tablet 0     pimecrolimus  (ELIDEL) 1 % external cream Apply topically 2 times daily 30 g 0     sildenafil (REVATIO) 20 MG tablet Take 1-2 tablets (20-40 mg) by mouth daily as needed As needed for prevention of erectile dysfunction 30 tablet 2     tacrolimus (PROTOPIC) 0.1 % external ointment Apply twice daily as needed for rash on penis. 30 g 11     triamcinolone (KENALOG) 0.025 % external ointment Apply topically 2 times daily Apply twice a day to sites of irritation 80 g 1       Allergy Tests:    Past Allergy Test: prick tests positive to pollens, cockroach, dust mites, trees, ragweed, grass pollens ==> had for some time IT and eczema got worse = delayed type reactions to allergens?    Future Allergy Test:     Order for PATCH TESTS    [] Outpatient  [] Inpatient: [facility]..../ Bed ....      Skin Atopy (atopic dermatitis) [x] Yes   [] No  Rhinitis/Sinusitis:   [x] Yes   [] No  Allergic Asthma:   [x] Yes   [] No  Food Allergy:   [] Yes   [x] No  Leg ulcers:   [] Yes   [x] No  Hand eczema:   [] Yes   [x] No   Leading hand:   [] R   [] L       [] Ambidextrous                        Reason for tests (suspected allergy): allergic contact dermatitis to tattoo and seasonal aggravation to pollens  Known previous allergies: see below    Standardized panels  [x] Standard panel (40 tests)  [x] Preservatives & Antimicrobials (31 tests)  [x] Emulsifiers & Additives (25 tests)   [] Perfumes/Flavours & Plants (25 tests)  [] Hairdresser panel (12 tests)  [] Rubber Chemicals (22 tests)  [] Plastics (26 tests)  [x] Colorants/Dyes/Food additives (20 tests)  [] Metals (implants/dental) (24 tests)  [] Local anaesthetics/NSAIDs (13 tests)  [] Antibiotics & Antimycotics (14 tests)   [] Corticosteroids (15 tests)   [] Photopatch test (62 tests)   [x] others: house dust mites, molds, tree and grass pollens and dog from prick tests      [] Patient's own products: ...    DO NOT test if chemical or biological identity is unknown!     always ask from patient the product  information and safety sheets (MSDS)   [] Atopy screen prick test (Atopic predisposition): and if possible IDT with delayed readings --> patient had recently a prick test (see above) and therefore do not repeat that except we would like to perform on Friday IDT.  RESULTS & EVALUATION of PATCH TESTS    Patch test readings after     [x] 2 days, [] 3 days [x] 4 days, [] 5 days,    Applied patch tests with results (import here the list of patch tests):    STANDARD Series      No Substance 2 days 4 days remarks   1 Kyle Mix [C] - -    2 Colophony - -    3  2-Mercaptobenzothiazole  - -     4 Methylisothiazolinone - -    5 Carba Mix - -    6 Thiuram Mix [A] - -    7 Bisphenol A Epoxy Resin - -    8 U-Krxv-Txqufctidju-Formaldehyde Resin - -    9 Mercapto Mix [A] (+) -    10 Black Rubber Mix- PPD [B] - -    11 Potassium Dichromate  -  -    12 Balsam of [place] (Myroxylon Pereirae Resin) - -    13 Nickel Sulphate Hexahydrate - -    14 Mixed Dialkyl Thiourea - -    15 Paraben Mix [B] - -    16 Methyldibromo Glutaronitrile - -    17 Fragrance Mix - -    18 2-Bromo-2-Nitropropane-1,3-Diol (Bronopol) - -    19 Lyral - -    20 Tixocortol-21- Pivalate - -    21 Diazolidiyl Urea (Germall II) - -    22 Methyl Methacrylate - -    23 Cobalt (II) Chloride Hexahydrate - -    24 Fragrance Mix II  - -    25 Compositae Mix - -    26 Benzoyl Peroxide - -    27 Bacitracin - -    28 Formaldehyde - -    29 Methylchloroisothiazolinone / Methylisothiazolinone - -    30 Corticosteroid Mix - -    31 Sodium Lauryl Sulfate - -    32 Lanolin Alcohol - -    33 Turpentine - -    34 Cetylstearylalcohol - -    35 Chlorhexidine Dicluconate - -    36 Budenoside - -    37 Imidazolidinyl Urea  - -    38 Ethyl-2 Cyanoacrylate - -    39 Quaternium 15 (Dowicil 200) - -    40 Decyl Glucoside - -      COLORANTS, DYES, FOOD ADDITIVES   No Substance 2 days 4 days remarks   41 1 Aniline - -    42 2 Aj Brown R - -    43 3 Textile Dye Mix [A] - -    44 4 Malone  -  -    45 5 Disperse Blue-3 - -    46 6 Disperse [place]-3 - -    47 7 Disperse Red-11 NA NA    48 8 Disperse Yellow-9 - -    49 9 Erythrosine-B - -    50 10 Naphthol AS - -       Food additives      51 11 Aspartame - -    52 12 Azorubine - -    53 13 Brilliant Black - -    54 14 Cochineal Red - -    55 15 Patent Blue-VF NA NA    56 16 Pectin - -    57 17 Quinoline Yellow - -    58 18 Saccharin - -    59 19 Tartrazine - -    60 20 Sodium Glutamate NA NA      EMULSIFIERS & ADDITIVES   No Substance 2 days 4 days remarks   61 1 Polyethylene Glycol-400 - -    62 2 Cocamidopropyl Betaine - -    63 3 Amerchol L101 - -    64 4 Propylene Glycol - -    65 5 Triethanolamine - -    66 6 Sorbitane Sesquiolate - -    67 7 Isopropylmyristate - -    68 8 Polysorbate 80  - -    69 9 Amidoamine   (Stearamidopropyl Dimethylamine) - -    70 10 Oleamidopropyl Dimethylamine - -    71 11 Lauryl Glucoside - -    72 12 Coconut Diethanolamide  - -    73 13 2-Hydroxy-4-Methoxy Benzophenone (Oxybenzone) - -    74 14 Benzophenone-4 (Sulisobenzon) - -    75 15 Propolis - -    76 16 Dexpanthenol - -    77 17 Carboxymethyl Cellulose Sodium NA NA    78 18 Abitol - -    79 19 Tert-Butylhydroquinone - -    80 20 Benzyl Salicylate - -      Antioxidant      81 21 Dodecyl Gallate - -    82 22 Butylhydroxyanisole (BHA) - -    83 23 Butylhydroxytoluene (BHT) - -    84 24 Di-Alpha-Tocopherol (Vit E) - -    85 25 Propyl Gallate - -    86 26 Zinc Pyrithione NA NA    87 27 Dimethylaminopropylamin (DMAPA) - -      PRESERVATIVES & ANTIMICROBIALS     No Substance 2 days 4 days remarks   88 1  1,2-Benzisothiazoline-3-One, Sodium Salt - -    89 2  1,3,5-Waqar (2-Hydroxyethyl) - Hexahydrotriazine (Grotan BK) - -    90 3 2-Vkruxoeldhive-3-Nitro-1, 3-Propanediol - -    91 4  3, 4, 4' - Triclocarban - -    92 5 4 - Chloro - 3 - Cresol - -    93 6 4 - Chloro - 4 - Xylenol (PCMX) - -    94 7 7-Ethylbicyclooxazolidine ([facility] BQ5259) - -    95 8 Benzalkonium Chloride NA NA     96 9 Benzyl Alcohol - -    97 10 Cetalkonium Chloride - -    98 11 Cetylpyrimidine Chloride  - -    99 12 Chloroacetamide - -    100 13 DMDM Hydantoin - -    101 14 Glutaraldehyde - -    102 15 Triclosan - -    103 16 Glyoxal Trimeric Dihydrate - -    104 17 Iodopropynyl Butylcarbamate - -    105 18 Octylisothiazoline - -    106 19 Iodoform NA NA    107 20 (Nitrobutyl) Morpholine/(Ethylnitro-Trimethylene) Dimorpholine (Bioban P 1487) - -    108 21 Phenoxyethanol - -    109 22 Phenyl Salicylate - -    110 23 Povidone Iodine - -    111 24 Sodium Benzoate - -    112 25 Sodium Disulfite - -    113 26 Sorbic Acid - -    114 27 Thimerosal - -    115 28 Melamine Formeladyde Resin      116 29 Ethylenediamine Dihydrochloride        Parabens      117 30 Butyl-P-Hydroxybenzoate - -    118 31 Ethyl-P-Hydroxybenzoate - -    119 32 Methyl-P-Hydroxybenzoate - -    120 33 Propyl-P-Hydroxybenzoate - -      OTHER PRODUCTS       No Substance Conc  % solv 2 days 4 days remarks   121 D. farinae        122 D. pteronyssinus        123 Aspergillius        124 penicillium        125 Dog        126 9 Tree Mix - ALK        127 Weed Mix 2630 - HS        128 Tree Mix 5- HS        129 5 Grass Mix - ALK        130           Results of patch tests:                         Interpretation:    - Negative                    A    = Allergic      (+) Erythema    TI   = Toxic/irritant   + E + Infiltration    RaP = Relevance at Present     ++ E/I + Papulovesicle   Rpr  = Relevance Previously     +++ E/I/P + Blister     nR   = No Relevance    Atopy Screen (Placed 11/xx/20 )    No Substance Readings (15 min) Evaluation   POS Histamine 1mg/ml -    NEG NaCl 0.9% -      No Substance Readings (15 min) Evaluation   1 Alternaria alternata (tenuis)  -    2 Cladosporium herbarum -    3 Aspergillus fumigatus -    4 Penicillium notatum -    5 Dermatophagoides pteronyssinus -    6 Dermatophagoides farinae -      Intradermal Testing (Placed 11/xx/20 )    No Substance  Conc.  Readings (15min) Evaluation   - NaCl  0.9% -    + Histamine (prick) 0.1mg / ml -    DF Standard Dust Mite - D. Farinae 1:10 -    DP Standard Dust Mite - D. Pteronyssinus 1:10 -    A Aspergillus fumigatus  1:10 -    P Penicillium notatum 1:10 -      Conclusion:   [x] No relevant allergic reaction observed: was not under perfect conditions, because patient developed SARS-Cov2 infection on 1st day, but according to photos from Thursday 5th November and patients info from 6th November no itchy spot on back. Not sure about possible reactions with the inhalant allergens around 120! Skin there red!    Interpretation/ remarks:   No signs for contact sensitization. Some erythema over test site of inhalant allergens ==> might have to repeat them later    ==> final Diagnosis:    >> delayed type reaction to black tattoo on certain areas and aggravation during pollen season   DDx allergic contact dermatitis to additive or color in tattoo and aggravation by  delayed type hypersensitivity to inhalant allergens    >> perennial rhinoconjunctivitis and sinusitis/otitis with Asthma and seasonal aggravation (spring/summer)    Atopic predisposition    Perennial = house dust mite and molds (unlikely dog) = immediate and delayed?    Seasonal = tree pollens, maybe grass pollens = immediate and delayed?    Recurrent dermatitis (nummular)    These conclusions are made at the best of one's knowledge and belief based on the provided evidence such as patient's history and allergy test results and they can change over time or can be incomplete because of missing information's.    ==> Treatment prescribed/Plan:    - Triamcinolone does not work always. Try Mometasone ointment (if flare up use for 4 days in a row or about 2xweekly)  - Allegra 180mg prn or Loratadine 10mg 1-2 times daily if necessary      Patient counseling:  See later      Follow-up: in Derm-Allergy clinic virtually in about 4 weeks (had to cut short the video consult -- got  "message that one of his friends  during consult and he has active COVID)      Thank you for the opportunity be involved in the care of this patient.       Staff: Delisa Granados & Janeth Rivera LPN      _____________________________________________________________________________    Teledermatology information:  - Location of patient: Home  - Patient presented in referral from: other  - Location of teledermatologist:  [facility] [place] (, [place], [place])  - Reason teledermatology is appropriate:  of National Emergency Regarding Coronavirus disease (COVID 19) Outbreak  - Method of transmission:  Store and Forward and Video ( Invitation sent by:  Elvi and send to e-mail at: [email or web address] )  - Date of images: in Epic media from yesterday  - Service start time:12:20pm  - Service end time:12:35pm  - Date of report: 20      I spent a total of 15 minutes for telemedicine consult with Abimael Martinez during today s video meeting. Over 50% of this time was spent counseling the patient and/or coordinating care. Please see Assessment and Plan for details.    Abimael Martinez is a 30 year old male who is being evaluated via a billable video visit.      The patient has been notified of following:     \"This video visit will be conducted via a call between you and your physician/provider. We have found that certain health care needs can be provided without the need for an in-person physical exam.  This service lets us provide the care you need with a video conversation.  If a prescription is necessary we can send it directly to your pharmacy.  If lab work is needed we can place an order for that and you can then stop by our lab to have the test done at a later time.    Video visits are billed at different rates depending on your insurance coverage.  Please reach out to your insurance provider with any questions.    If during the course of the call the physician/provider feels a " "video visit is not appropriate, you will not be charged for this service.\"    Patient has given verbal consent for Video visit? Yes  How would you like to obtain your AVS? MyChart  If you are dropped from the video visit, the video invite should be resent to: Send to e-mail at: [email or web address]  Will anyone else be joining your video visit? No        Video-Visit Details    Type of service:  Video Visit    Originating Location (pt. Location): Home    Distant Location (provider location):  [facility] [place]     Platform used for Video Visit: Ary Granados LPN            Again, thank you for allowing me to participate in the care of your patient.        Sincerely,        James Harris MD    "

## 2020-11-06 NOTE — NURSING NOTE
Allergy Rooming Note    Raine Martinez's goals for this visit include:   Chief Complaint   Patient presents with     Allergy Testing Followup     Raine is here for allergy testing day 5     Delisa Granados LPN

## 2020-11-06 NOTE — PROGRESS NOTES
Note for return visit for Dermato-Allergy       Encounter Date: Nov 6, 2020    History of Present Illness:  I have reviewed the teledermatology  information and the nursing intake corresponding to this issue. Abimael Martinez is a 30 year old male who presents as a teledermatology consult for the following information take directly from the prior notes and video call performed by myself:     Patient contacted for 2nd readings of patch tests after 4 days and final conclusions      Additional comments and observations from review of the patient s chart including the following:    See notes    ROS: Patient generally feeling well today   Physical Examination:  General: Well-appearing, appropriately-developed individual.  - Skin: Examination of the skin on the back within the teledermatology (in Epic media) was performed  See test results below  As above in HPI. No additional skin concerns.  - upper respiratory tract: currently no obvious Rhinitis  - lungs: no signs for active and present Asthma/Wheezing or coughing  - eyes: currently no active conjunctivits  - GI system/digestion: currently no problems, no bloating or Diarrhoea       Earlier History and Allergy exams:    Patient had a tattoo more than 10 years. Never problems until 2 years ago with raised outline of the tattoo. It is mostly itchy and inflamed during the allergy season. Big black tattoo on left upper arm. It scabs and is very itchy.    Has all over the body black tattoos, but only that on the arm makes troubles.    Patient has seasonal allergies. As a child less problems, but [place] patient gets stuffy, runny nose with postnasal drip and sinusitis and chronic ear infections --> got 4-6 months allergy shots and then problems with ordering of the allergens. After 3rd shot patient had generalized reactions. Afterwards patient had to dilute and no issues. Got few days after IT always some eczematous lesions on  trunk.  Patient has year around allergies with aggravation in spring/summer, but not fall. Patient has pets with small dog and no issues.    Patient has Asthma (more cold induced and some wheezing at exhalation during season) --> treatment with prolair    Never eczema as child. In the past year random eczematous spots on trunk and extremities      Past Medical History:   Patient Active Problem List   Diagnosis     Anxiety     CARDIOVASCULAR SCREENING; LDL GOAL LESS THAN 160     High risk sexual behavior     Tobacco use disorder     Low back pain     Essential hypertension     Internal hemorrhoids which bleed     Obesity, Class I, BMI 30-34.9     Attention deficit hyperactivity disorder (ADHD), combined type     AYALA (generalized anxiety disorder)     OCD (obsessive compulsive disorder)     Drug-induced erectile dysfunction     Mild persistent asthma without complication     Lumbosacral radiculopathy     Dyslipidemia     Elevated serum creatinine     Past Medical History:   Diagnosis Date     Acute right otitis media 1/8/2013     Anxiety      Depression      Drug abuse (H)      Gonococcal urethritis 02/05/2016     Urethritis 5/20/2013     Problem list name updated by automated process. Provider to review       Allergy History:     Allergies   Allergen Reactions     Cymbalta      Weight gain and fatigue     Duloxetine Other (See Comments) and Fatigue     enlarged spleen and weight gain     Paroxetine Other (See Comments), Fatigue and GI Disturbance     Weight gain, Spleen issues     Seasonal Allergies      Vicodin [Hydrocodone-Acetaminophen]        Social History:  The patient works as a . Patient has the following hobbies or non-occupational exposure: tattoos     reports that he quit smoking about 2 years ago. His smoking use included cigarettes. He has a 3.00 pack-year smoking history. He has never used smokeless tobacco. He reports current alcohol use. He reports current drug use. Drug:  Marijuana.      Family History:  Family History   Problem Relation Age of Onset     Unknown/Adopted Mother      Unknown/Adopted Father      Unknown/Adopted Maternal Grandmother      Unknown/Adopted Maternal Grandfather      Unknown/Adopted Paternal Grandmother      Unknown/Adopted Paternal Grandfather      Unknown/Adopted Brother        Medications:  Current Outpatient Medications   Medication Sig Dispense Refill     albuterol (PROAIR HFA/PROVENTIL HFA/VENTOLIN HFA) 108 (90 Base) MCG/ACT inhaler Inhale 2 puffs into the lungs every 4 hours as needed for wheezing 1 Inhaler 0     ALPRAZolam (XANAX) 2 MG tablet Take 1 tablet (2 mg) by mouth 3 times daily as needed for anxiety 30 tablet 0     amLODIPine (NORVASC) 10 MG tablet Take 1 tablet (10 mg) by mouth daily 30 tablet 1     amphetamine-dextroamphetamine (ADDERALL) 20 MG tablet Take 1 tablet (20 mg) by mouth 3 times daily 90 tablet 0     azelastine (ASTELIN) 0.1 % nasal spray Spray 2 sprays into both nostrils 2 times daily 30 mL 11     famotidine (PEPCID) 40 MG tablet Take 1 tablet (40 mg) by mouth At Bedtime 90 tablet 1     fluticasone (FLOVENT HFA) 220 MCG/ACT Inhaler Inhale 2 puffs into the lungs 2 times daily Please give patient spacer 1 Inhaler 3     Hyoscyamine Sulfate 0.375 MG TBCR Take 1 capful by mouth 3 times daily as needed 90 tablet 1     mometasone (ELOCON) 0.1 % external ointment If necessary use 3-4 days in a row or 2xweekly on eczematous lesions 45 g 2     montelukast (SINGULAIR) 10 MG tablet Take 1 tablet (10 mg) by mouth At Bedtime 90 tablet 0     oxyCODONE-acetaminophen (PERCOCET) 5-325 MG per tablet Take 1 tablet by mouth every 6 hours as needed for pain 12 tablet 0     pimecrolimus (ELIDEL) 1 % external cream Apply topically 2 times daily 30 g 0     sildenafil (REVATIO) 20 MG tablet Take 1-2 tablets (20-40 mg) by mouth daily as needed As needed for prevention of erectile dysfunction 30 tablet 2     tacrolimus (PROTOPIC) 0.1 % external ointment  Apply twice daily as needed for rash on penis. 30 g 11     triamcinolone (KENALOG) 0.025 % external ointment Apply topically 2 times daily Apply twice a day to sites of irritation 80 g 1       Allergy Tests:    Past Allergy Test: prick tests positive to pollens, cockroach, dust mites, trees, ragweed, grass pollens ==> had for some time IT and eczema got worse = delayed type reactions to allergens?    Future Allergy Test:     Order for PATCH TESTS    [] Outpatient  [] Inpatient: [facility]..../ Bed ....      Skin Atopy (atopic dermatitis) [x] Yes   [] No  Rhinitis/Sinusitis:   [x] Yes   [] No  Allergic Asthma:   [x] Yes   [] No  Food Allergy:   [] Yes   [x] No  Leg ulcers:   [] Yes   [x] No  Hand eczema:   [] Yes   [x] No   Leading hand:   [] R   [] L       [] Ambidextrous                        Reason for tests (suspected allergy): allergic contact dermatitis to tattoo and seasonal aggravation to pollens  Known previous allergies: see below    Standardized panels  [x] Standard panel (40 tests)  [x] Preservatives & Antimicrobials (31 tests)  [x] Emulsifiers & Additives (25 tests)   [] Perfumes/Flavours & Plants (25 tests)  [] Hairdresser panel (12 tests)  [] Rubber Chemicals (22 tests)  [] Plastics (26 tests)  [x] Colorants/Dyes/Food additives (20 tests)  [] Metals (implants/dental) (24 tests)  [] Local anaesthetics/NSAIDs (13 tests)  [] Antibiotics & Antimycotics (14 tests)   [] Corticosteroids (15 tests)   [] Photopatch test (62 tests)   [x] others: house dust mites, molds, tree and grass pollens and dog from prick tests      [] Patient's own products: ...    DO NOT test if chemical or biological identity is unknown!     always ask from patient the product information and safety sheets (MSDS)   [] Atopy screen prick test (Atopic predisposition): and if possible IDT with delayed readings --> patient had recently a prick test (see above) and therefore do not repeat that except we would like to perform on Friday  IDT.  RESULTS & EVALUATION of PATCH TESTS    Patch test readings after     [x] 2 days, [] 3 days [x] 4 days, [] 5 days,    Applied patch tests with results (import here the list of patch tests):    STANDARD Series      No Substance 2 days 4 days remarks   1 Kyle Mix [C] - -    2 Colophony - -    3  2-Mercaptobenzothiazole  - -     4 Methylisothiazolinone - -    5 Carba Mix - -    6 Thiuram Mix [A] - -    7 Bisphenol A Epoxy Resin - -    8 W-Okrr-Xuoxdvghycw-Formaldehyde Resin - -    9 Mercapto Mix [A] (+) -    10 Black Rubber Mix- PPD [B] - -    11 Potassium Dichromate  -  -    12 Balsam of [place] (Myroxylon Pereirae Resin) - -    13 Nickel Sulphate Hexahydrate - -    14 Mixed Dialkyl Thiourea - -    15 Paraben Mix [B] - -    16 Methyldibromo Glutaronitrile - -    17 Fragrance Mix - -    18 2-Bromo-2-Nitropropane-1,3-Diol (Bronopol) - -    19 Lyral - -    20 Tixocortol-21- Pivalate - -    21 Diazolidiyl Urea (Germall II) - -    22 Methyl Methacrylate - -    23 Cobalt (II) Chloride Hexahydrate - -    24 Fragrance Mix II  - -    25 Compositae Mix - -    26 Benzoyl Peroxide - -    27 Bacitracin - -    28 Formaldehyde - -    29 Methylchloroisothiazolinone / Methylisothiazolinone - -    30 Corticosteroid Mix - -    31 Sodium Lauryl Sulfate - -    32 Lanolin Alcohol - -    33 Turpentine - -    34 Cetylstearylalcohol - -    35 Chlorhexidine Dicluconate - -    36 Budenoside - -    37 Imidazolidinyl Urea  - -    38 Ethyl-2 Cyanoacrylate - -    39 Quaternium 15 (Dowicil 200) - -    40 Decyl Glucoside - -      COLORANTS, DYES, FOOD ADDITIVES   No Substance 2 days 4 days remarks   41 1 Aniline - -    42 2 Aj Brown R - -    43 3 Textile Dye Mix [A] - -    44 4 Newalla  - -    45 5 Disperse Blue-3 - -    46 6 Disperse Worden-3 - -    47 7 Disperse Red-11 NA NA    48 8 Disperse Yellow-9 - -    49 9 Erythrosine-B - -    50 10 Naphthol AS - -       Food additives      51 11 Aspartame - -    52 12 Azorubine - -    53 13 Brilliant  Black - -    54 14 Cochineal Red - -    55 15 Patent Blue-VF NA NA    56 16 Pectin - -    57 17 Quinoline Yellow - -    58 18 Saccharin - -    59 19 Tartrazine - -    60 20 Sodium Glutamate NA NA      EMULSIFIERS & ADDITIVES   No Substance 2 days 4 days remarks   61 1 Polyethylene Glycol-400 - -    62 2 Cocamidopropyl Betaine - -    63 3 Amerchol L101 - -    64 4 Propylene Glycol - -    65 5 Triethanolamine - -    66 6 Sorbitane Sesquiolate - -    67 7 Isopropylmyristate - -    68 8 Polysorbate 80  - -    69 9 Amidoamine   (Stearamidopropyl Dimethylamine) - -    70 10 Oleamidopropyl Dimethylamine - -    71 11 Lauryl Glucoside - -    72 12 Coconut Diethanolamide  - -    73 13 2-Hydroxy-4-Methoxy Benzophenone (Oxybenzone) - -    74 14 Benzophenone-4 (Sulisobenzon) - -    75 15 Propolis - -    76 16 Dexpanthenol - -    77 17 Carboxymethyl Cellulose Sodium NA NA    78 18 Abitol - -    79 19 Tert-Butylhydroquinone - -    80 20 Benzyl Salicylate - -      Antioxidant      81 21 Dodecyl Gallate - -    82 22 Butylhydroxyanisole (BHA) - -    83 23 Butylhydroxytoluene (BHT) - -    84 24 Di-Alpha-Tocopherol (Vit E) - -    85 25 Propyl Gallate - -    86 26 Zinc Pyrithione NA NA    87 27 Dimethylaminopropylamin (DMAPA) - -      PRESERVATIVES & ANTIMICROBIALS     No Substance 2 days 4 days remarks   88 1  1,2-Benzisothiazoline-3-One, Sodium Salt - -    89 2  1,3,5-Waqar (2-Hydroxyethyl) - Hexahydrotriazine (Grotan BK) - -    90 3 8-Ykhgtmefbmuzf-4-Nitro-1, 3-Propanediol - -    91 4  3, 4, 4' - Triclocarban - -    92 5 4 - Chloro - 3 - Cresol - -    93 6 4 - Chloro - 4 - Xylenol (PCMX) - -    94 7 7-Ethylbicyclooxazolidine (Bioban [id number]) - -    95 8 Benzalkonium Chloride NA NA    96 9 Benzyl Alcohol - -    97 10 Cetalkonium Chloride - -    98 11 Cetylpyrimidine Chloride  - -    99 12 Chloroacetamide - -    100 13 DMDM Hydantoin - -    101 14 Glutaraldehyde - -    102 15 Triclosan - -    103 16 Glyoxal Trimeric Dihydrate - -    104  17 Iodopropynyl Butylcarbamate - -    105 18 Octylisothiazoline - -    106 19 Iodoform NA NA    107 20 (Nitrobutyl) Morpholine/(Ethylnitro-Trimethylene) Dimorpholine (Bioban P 1487) - -    108 21 Phenoxyethanol - -    109 22 Phenyl Salicylate - -    110 23 Povidone Iodine - -    111 24 Sodium Benzoate - -    112 25 Sodium Disulfite - -    113 26 Sorbic Acid - -    114 27 Thimerosal - -    115 28 Melamine Formeladyde Resin      116 29 Ethylenediamine Dihydrochloride        Parabens      117 30 Butyl-P-Hydroxybenzoate - -    118 31 Ethyl-P-Hydroxybenzoate - -    119 32 Methyl-P-Hydroxybenzoate - -    120 33 Propyl-P-Hydroxybenzoate - -      OTHER PRODUCTS       No Substance Conc  % solv 2 days 4 days remarks   121 D. farinae        122 D. pteronyssinus        123 Aspergillius        124 penicillium        125 Dog        126 9 Tree Mix - ALK        127 Weed Mix 2630 - HS        128 Tree Mix 5- HS        129 5 Grass Mix - ALK        130           Results of patch tests:                         Interpretation:    - Negative                    A    = Allergic      (+) Erythema    TI   = Toxic/irritant   + E + Infiltration    RaP = Relevance at Present     ++ E/I + Papulovesicle   Rpr  = Relevance Previously     +++ E/I/P + Blister     nR   = No Relevance    Atopy Screen (Placed 11/xx/20 )    No Substance Readings (15 min) Evaluation   POS Histamine 1mg/ml -    NEG NaCl 0.9% -      No Substance Readings (15 min) Evaluation   1 Alternaria alternata (tenuis)  -    2 Cladosporium herbarum -    3 Aspergillus fumigatus -    4 Penicillium notatum -    5 Dermatophagoides pteronyssinus -    6 Dermatophagoides farinae -      Intradermal Testing (Placed 11/xx/20 )    No Substance Conc.  Readings (15min) Evaluation   - NaCl  0.9% -    + Histamine (prick) 0.1mg / ml -    DF Standard Dust Mite - D. Farinae 1:10 -    DP Standard Dust Mite - D. Pteronyssinus 1:10 -    A Aspergillus fumigatus  1:10 -    P Penicillium notatum 1:10 -       Conclusion:   [x] No relevant allergic reaction observed: was not under perfect conditions, because patient developed SARS-Cov2 infection on  day, but according to photos from  and patients info from  no itchy spot on back. Not sure about possible reactions with the inhalant allergens around 120! Skin there red!    Interpretation/ remarks:   No signs for contact sensitization. Some erythema over test site of inhalant allergens ==> might have to repeat them later    ==> final Diagnosis:    >> delayed type reaction to black tattoo on certain areas and aggravation during pollen season   DDx allergic contact dermatitis to additive or color in tattoo and aggravation by  delayed type hypersensitivity to inhalant allergens    >> perennial rhinoconjunctivitis and sinusitis/otitis with Asthma and seasonal aggravation (spring/summer)    Atopic predisposition    Perennial = house dust mite and molds (unlikely dog) = immediate and delayed?    Seasonal = tree pollens, maybe grass pollens = immediate and delayed?    Recurrent dermatitis (nummular)    These conclusions are made at the best of one's knowledge and belief based on the provided evidence such as patient's history and allergy test results and they can change over time or can be incomplete because of missing information's.    ==> Treatment prescribed/Plan:    - Triamcinolone does not work always. Try Mometasone ointment (if flare up use for 4 days in a row or about 2xweekly)  - Allegra 180mg prn or Loratadine 10mg 1-2 times daily if necessary      Patient counseling:  See later      Follow-up: in Derm-Allergy clinic virtually in about 4 weeks (had to cut short the video consult -- got message that one of his friends  during consult and he has active COVID)      Thank you for the opportunity be involved in the care of this patient.       Staff: Delisa Granados & Janeth Rivera,  LPN      _____________________________________________________________________________    Teledermatology information:  - Location of patient: Home  - Patient presented in referral from: other  - Location of teledermatologist:  [facility] [place] (, [facility], [place])  - Reason teledermatology is appropriate:  of National Emergency Regarding Coronavirus disease (COVID 19) Outbreak  - Method of transmission:  Store and Forward and Video ( Invitation sent by:  Elvi and send to e-mail at: [email or web address] )  - Date of images: in Epic media from yesterday  - Service start time:12:20pm  - Service end time:12:35pm  - Date of report: 11/06/20      I spent a total of 15 minutes for telemedicine consult with Abimael Martinez during today s video meeting. Over 50% of this time was spent counseling the patient and/or coordinating care. Please see Assessment and Plan for details.

## 2020-11-06 NOTE — PROGRESS NOTES
"Abimael Martinez is a 30 year old male who is being evaluated via a billable video visit.      The patient has been notified of following:     \"This video visit will be conducted via a call between you and your physician/provider. We have found that certain health care needs can be provided without the need for an in-person physical exam.  This service lets us provide the care you need with a video conversation.  If a prescription is necessary we can send it directly to your pharmacy.  If lab work is needed we can place an order for that and you can then stop by our lab to have the test done at a later time.    Video visits are billed at different rates depending on your insurance coverage.  Please reach out to your insurance provider with any questions.    If during the course of the call the physician/provider feels a video visit is not appropriate, you will not be charged for this service.\"    Patient has given verbal consent for Video visit? Yes  How would you like to obtain your AVS? MyChart  If you are dropped from the video visit, the video invite should be resent to: Send to e-mail at: [email or web address]  Will anyone else be joining your video visit? No        Video-Visit Details    Type of service:  Video Visit    Originating Location (pt. Location): Home    Distant Location (provider location):  [facility] [place]     Platform used for Video Visit: Ary Granados LPN        "

## 2020-11-10 ENCOUNTER — TRANSFERRED RECORDS (OUTPATIENT)
Dept: HEALTH INFORMATION MANAGEMENT | Facility: CLINIC | Age: 30
End: 2020-11-10

## 2020-11-13 NOTE — PROGRESS NOTES
SUBJECTIVE:   Abimael Martinez is a 29 year old male who presents to clinic today for the following health issues:      STD testing request per pt.    Asthma Follow-Up    Was ACT completed today?    Yes    ACT Total Scores 8/29/2018   ACT TOTAL SCORE -   ASTHMA ER VISITS -   ASTHMA HOSPITALIZATIONS -   ACT TOTAL SCORE (Goal Greater than or Equal to 20) 20   In the past 12 months, how many times did you visit the emergency room for your asthma without being admitted to the hospital? 0   In the past 12 months, how many times were you hospitalized overnight because of your asthma? 0       Recent asthma triggers that patient is dealing with: upper respiratory infections, still with sinus issues for a couple weeks.  Hx cold induced asthma, singualrir has helped. Just a little cough     was travelling in [place], came down with URI, was seen in [place] and then again here  in .  Did have unprotected sexual intercourse while over there with a couple different partners men and women, , no receptive anal intercourse, no anal intercourse with the man.  no rashes or dysuria. Has been travelling a lot receently          Allergies   Allergen Reactions     Cymbalta      Weight gain and fatigue     Duloxetine Other (See Comments)     Weight gain, fatigue  Enlarged spleen and weight gain     No Clinical Screening - See Comments GI Disturbance     Weight gain and fatigue     Paroxetine Other (See Comments), GI Disturbance and Unknown     Weight gain, fatigue  Weight gain  Spleen issues  Weight gain  Spleen issues     Paxil [Paroxetine Mesylate]      Weight gain and fatigue     Vicodin [Hydrocodone-Acetaminophen]        Past Medical History:   Diagnosis Date     Acute right otitis media 1/8/2013     Anxiety      Depression      Drug abuse (H)      Urethritis 5/20/2013     Problem list name updated by automated process. Provider to review       Patient Active Problem List   Diagnosis     Anxiety     CARDIOVASCULAR SCREENING;  LDL GOAL LESS THAN 160     High risk sexual behavior     Tobacco use disorder     Low back pain     Hypertension goal BP (blood pressure) < 140/90     Internal hemorrhoids which bleed     Obesity, Class I, BMI 30-34.9     Attention deficit hyperactivity disorder (ADHD), combined type     AYALA (generalized anxiety disorder)     OCD (obsessive compulsive disorder)     Drug-induced erectile dysfunction     Mild persistent asthma without complication     Lumbosacral radiculopathy     Dyslipidemia     Elevated serum creatinine         Current Outpatient Medications on File Prior to Visit:  albuterol (PROAIR HFA/PROVENTIL HFA/VENTOLIN HFA) 108 (90 Base) MCG/ACT inhaler Inhale 2 puffs into the lungs every 4 hours as needed for wheezing   ALPRAZolam (XANAX) 2 MG tablet Take 1 tablet (2 mg) by mouth 3 times daily as needed for anxiety   [START ON 3/28/2019] amphetamine-dextroamphetamine (ADDERALL) 20 MG tablet Take 1 tablet (20 mg) by mouth 3 times daily   famotidine (PEPCID) 40 MG tablet TAKE 1 TABLET(40 MG) BY MOUTH AT BEDTIME   fluticasone (FLOVENT HFA) 220 MCG/ACT Inhaler Inhale 2 puffs into the lungs 2 times daily Please give patient spacer   Hyoscyamine Sulfate 0.375 MG TBCR Take 1 capful by mouth 3 times daily as needed   oxyCODONE-acetaminophen (PERCOCET) 5-325 MG per tablet Take 1 tablet by mouth every 6 hours as needed for pain   predniSONE (DELTASONE) 20 MG tablet Take 40 mg by mouth daily for 5 days.   sertraline (ZOLOFT) 100 MG tablet TAKE 1 TABLET(100 MG) BY MOUTH DAILY   sildenafil (REVATIO) 20 MG tablet Take 1-2 tablets (20-40 mg) by mouth daily as needed As needed for prevention of erectile dysfunction   cyclobenzaprine (FLEXERIL) 10 MG tablet Take 1 tablet (10 mg) by mouth 3 times daily as needed for muscle spasms (Patient not taking: Reported on 2/27/2019)   hydrocortisone (ANUSOL-HC) 2.5 % cream Place rectally 2 times daily as needed for hemorrhoids external (Patient not taking: Reported on 2/27/2019)  "  lidocaine (XYLOCAINE) 5 % ointment Apply topically as needed for moderate pain (Patient not taking: Reported on 2019)   montelukast (SINGULAIR) 10 MG tablet Take 1 tablet (10 mg) by mouth At Bedtime (Patient not taking: Reported on 2019)   [] mupirocin (BACTROBAN) 2 % cream Apply topically 3 times daily for 7 days May change to ointment if cheaper.     No current facility-administered medications on file prior to visit.     Social History     Tobacco Use     Smoking status: Former Smoker     Packs/day: 0.50     Years: 6.00     Pack years: 3.00     Types: Cigarettes     Last attempt to quit: 3/12/2018     Years since quittin.9     Smokeless tobacco: Never Used     Tobacco comment: second hand smoke exposure   Substance Use Topics     Alcohol use: Yes     Comment: once a week     Drug use: Yes     Types: Marijuana     Comment: pot once a month, ectasy  As of 2016 he only smokes pot occasionally       Family History   Problem Relation Age of Onset     Unknown/Adopted Mother      Unknown/Adopted Father      Unknown/Adopted Maternal Grandmother      Unknown/Adopted Maternal Grandfather      Unknown/Adopted Paternal Grandmother      Unknown/Adopted Paternal Grandfather      Unknown/Adopted Brother        ROS:  General:   Fever with URI  ENt + sinus pressure  Resp: cough as above   = no dysuria as above    OBJECTIVE:  /84   Pulse 72   Temp 97.5  F (36.4  C) (Oral)   Resp 14   Ht 1.854 m (6' 1\")   Wt 104.3 kg (230 lb)   SpO2 98%   BMI 30.34 kg/m     General:   awake, alert, and cooperative.  NAD.   Head: Normocephalic, atraumatic.  Eyes: Conjunctiva clear,   Heart: Regular rate and rhythm. No murmur.  Lungs: Chest is clear; no wheezes or rales.   Neuro: Alert and oriented - normal speech.    ASSESSMENT:well appearing, reassurance provided much more likley URI/sinusitis than acute HIV.    ICD-10-CM    1. Acute sinusitis with symptoms > 10 days J01.90 amoxicillin-clavulanate " (AUGMENTIN) 875-125 MG tablet   2. Mild persistent asthma without complication J45.30 montelukast (SINGULAIR) 10 MG tablet   3. Screen for STD (sexually transmitted disease) Z11.3 Hepatitis C Screen Reflex to HCV RNA Quant and Genotype     Chlamydia trachomatis PCR     Hepatitis B surface antigen     HIV Antigen Antibody Combo     Neisseria gonorrhoeae PCR     Treponema Abs w Reflex to RPR and Titer     HIV Antigen Antibody Combo       PLAN:   Follow up:  For repeat HIV screening 3-4 months,  Advised about symptoms which might herald more serious problems.             none

## 2020-11-17 ENCOUNTER — VIRTUAL VISIT (OUTPATIENT)
Dept: DERMATOLOGY | Facility: CLINIC | Age: 30
End: 2020-11-17
Payer: COMMERCIAL

## 2020-11-17 ENCOUNTER — TELEPHONE (OUTPATIENT)
Dept: DERMATOLOGY | Facility: CLINIC | Age: 30
End: 2020-11-17

## 2020-11-17 DIAGNOSIS — L30.9 DERMATITIS: Primary | ICD-10-CM

## 2020-11-17 PROCEDURE — 99213 OFFICE O/P EST LOW 20 MIN: CPT | Mod: 95 | Performed by: DERMATOLOGY

## 2020-11-17 ASSESSMENT — PAIN SCALES - GENERAL: PAINLEVEL: MILD PAIN (3)

## 2020-11-17 NOTE — PATIENT INSTRUCTIONS
[facility] Dermatology Visit    Thank you for allowing us to participate in your care. Your findings, instructions and follow-up plan are as follows:      When should I call my doctor?    If you are worsening or not improving, please, contact us or seek urgent care as noted below.     Who should I call with questions (adults)?    [facility] (adult and pediatric): [phone]     [facility] (adult): [phone]    For urgent needs outside of business hours call the [facility] at [phone] and ask for the dermatology resident on call    If this is a medical emergency and you are unable to reach an ER, Call 911      Who should I call with questions (pediatric)?  [facility]- Pediatric Dermatology  Dr. Madison Sierra, Dr. Gertrudis Wild, Dr. Pari Lezama, Amarilys Huynh, PA  Dr. Rosa Retana, Dr. Daniela Faye & Dr. Davon Avendano  Non Urgent  Nurse Triage Line; [phone]- Barbara and Kenisha RN Care Coordinators   [place] (/Complex ) [phone]    If you need a prescription refill, please contact your pharmacy. Refills are approved or denied by our Physicians during normal business hours, Monday through Fridays  Per office policy, refills will not be granted if you have not been seen within the past year (or sooner depending on your child's condition)    Scheduling Information:  Pediatric Appointment Scheduling and Call Center [phone]  Radiology Scheduling- [phone]  Sedation Unit Scheduling- [phone]  [facility] Scheduling- General [phone]; Pediatric Dermatology [phone]  Main  Services: [phone]  Australian: [phone]  Tuvaluan: [phone]  Hmong/Danish/Nepali: [phone]  Preadmission Nursing Department Fax Number: [phone] (Fax all pre-operative paperwork to this number)    For urgent matters arising during evenings,  weekends, or holidays that cannot wait for normal business hours please call [phone] and ask for the Dermatology Resident On-Call to be paged.

## 2020-11-17 NOTE — LETTER
11/17/2020         RE: Abimael Martinez  [address]  [place] [address]        Dear Colleague,    Thank you for referring your patient, Abimael Martinez, to the [facility]. Please see a copy of my visit note below.    [facility] Dermatology Record:  Store and Forward and Telephone [phone]         Dermatology Problem List:  1. Dermatitis, groin area. Suspected irritant versus contact dermatitis.  -has seasonal allergies with asthma per Dr. Harris  - path testing with delayed reaction to black tattoo  - vaseline; elidel  - prior tx: triam 0.025% ointment BID, protopic 0.1% ointment BID PRN  2. Allergic contact dermatitis  - s/p punch biopsy 11/19/19  - referral for patch testing   - triam 0.1% ointment BID  3.COVID-19 Nov-2020    Encounter Date: Nov 17, 2020    CC:   Chief Complaint   Patient presents with     Derm Problem       History of Present Illness:  Abimael Martinez is a 30 year old male who presents for dermatitis follow up. Patient had patch testing with Dr. Harris. Using cerave. protopic every day, elidel triamcinolone. No sexual activity recently.He was totally clear post coronavirus. He had no issues. He reports sexual activity after 2 days ago.       ROS: Patient is generally feeling well today, did recently had coronavirus    Physical Examination:  General: Well-sounding appropriately-developed individual.  Skin: Focused examination including the penis was performed.   - beneath coronavirus erythema and mild scale-taken this am  Labs:  NA    Past Medical History:   Patient Active Problem List   Diagnosis     Anxiety     CARDIOVASCULAR SCREENING; LDL GOAL LESS THAN 160     High risk sexual behavior     Tobacco use disorder     Low back pain     Essential hypertension     Internal hemorrhoids which bleed     Obesity, Class I, BMI 30-34.9     Attention deficit hyperactivity disorder (ADHD), combined type     AYALA (generalized anxiety  disorder)     OCD (obsessive compulsive disorder)     Drug-induced erectile dysfunction     Mild persistent asthma without complication     Lumbosacral radiculopathy     Dyslipidemia     Elevated serum creatinine     Past Medical History:   Diagnosis Date     Acute right otitis media 1/8/2013     Anxiety      Depression      Drug abuse (H)      Gonococcal urethritis 02/05/2016     Urethritis 5/20/2013     Problem list name updated by automated process. Provider to review     Past Surgical History:   Procedure Laterality Date     BACK SURGERY  04/05/2018       Social History:  Patient reports that he quit smoking about 2 years ago. His smoking use included cigarettes. He has a 3.00 pack-year smoking history. He has never used smokeless tobacco. He reports current alcohol use. He reports current drug use. Drug: Marijuana.    Family History:  Family History   Problem Relation Age of Onset     Unknown/Adopted Mother      Unknown/Adopted Father      Unknown/Adopted Maternal Grandmother      Unknown/Adopted Maternal Grandfather      Unknown/Adopted Paternal Grandmother      Unknown/Adopted Paternal Grandfather      Unknown/Adopted Brother        Medications:  Current Outpatient Medications   Medication     albuterol (PROAIR HFA/PROVENTIL HFA/VENTOLIN HFA) 108 (90 Base) MCG/ACT inhaler     ALPRAZolam (XANAX) 2 MG tablet     amLODIPine (NORVASC) 10 MG tablet     amphetamine-dextroamphetamine (ADDERALL) 20 MG tablet     azelastine (ASTELIN) 0.1 % nasal spray     famotidine (PEPCID) 40 MG tablet     fluticasone (FLOVENT HFA) 220 MCG/ACT Inhaler     Hyoscyamine Sulfate 0.375 MG TBCR     mometasone (ELOCON) 0.1 % external ointment     montelukast (SINGULAIR) 10 MG tablet     oxyCODONE-acetaminophen (PERCOCET) 5-325 MG per tablet     pimecrolimus (ELIDEL) 1 % external cream     sildenafil (REVATIO) 20 MG tablet     tacrolimus (PROTOPIC) 0.1 % external ointment     triamcinolone (KENALOG) 0.025 % external ointment     No current  facility-administered medications for this visit.           Allergies   Allergen Reactions     Cymbalta      Weight gain and fatigue     Duloxetine Other (See Comments) and Fatigue     enlarged spleen and weight gain     Paroxetine Other (See Comments), Fatigue and GI Disturbance     Weight gain, Spleen issues     Seasonal Allergies      Vicodin [Hydrocodone-Acetaminophen]            Impression and Recommendations (Patient Counseled on the Following):  1. Penile Dermatitis - suspect irritant dermatitis, negative patch testing, consider early psoriasis. This has been on and off X2 year, was clear since patch testing but after using lubricant it appeared again. Biopsy from the right arm constant with dermatitis.   -Complete testing with Dr. Harris  -Switch to Cetaphil non-soap gentle cleanser from [employer]  -Will ask Dr. Cloud for  Hypoallergenic lubricant, he will review with him  -triamcinolone 0.1% cream twice daily for the next 5 days, then transition to tacrolimus but keep in the fridge and do a test area first. Make sure to take 1 week break from triamcinolone. If that doesn't work okay to use for a full 2 weeks.   -Consider dupixent if this continues, he has had coronavirus        Follow-up:   Follow-up with dermatology in approximately 4 weeks photos and phone Earlier for new or changing lesions or rash.      Staff only    Chelita Arvizu MD    Department of Dermatology  [facility]: Phone: [phone], Fax:[phone]  [facility]: Phone: [phone], Fax: [phone]      _____________________________________________________________________________    Teledermatology information:  - Location of patient: [place]  - Patient presented as: return  - Location of teledermatologist:  ([facility] [place] [facility] )  - Reason teledermatology is appropriate:  of National  Emergency Regarding Coronavirus disease (COVID 19) Outbreak  - Image quality and interpretability: acceptable  - Physician has received verbal consent for a Video/Photos Visit from the patient? YES  - In-person dermatology visit recommendation: no  - Date of images: today  - Service start time:1:54pm  - Service end time:2:11pm  - Date of report: 11/17/2020         Again, thank you for allowing me to participate in the care of your patient.        Sincerely,        Chelita Arvizu MD

## 2020-11-17 NOTE — NURSING NOTE
"Teledermatology Nurse Call for RETURN patients seen within the last 3 years:      The patient was contacted by phone and we reviewed they have a visit in teledermatology upcoming with an MD or MIGUEL;  Importantly, \"a teledermatology visit may not be as thorough as an in-person visit, and the quality of the photograph and/or video sent may not be of the same quality as that taken by the dermatology clinic.\"     We have found that certain health care needs can be provided without the need for an in-person physical exam.   If a prescription is necessary we can send it directly to your pharmacy.  If lab work is needed we can place an order for that and you can then stop by our lab to have the test done at a later time.Visits are billed at different rates depending on your insurance coverage. Please reach out to your insurance provider with any questions.    The patient chose to:                                                                                                                                                                                                                 Consent to a teledermatology visit with [email or web address] photos. Patient told by nursing these are already uploaded. Instructions sent to patient via [email or web address]. They verified they will be in the state of [place] at the time of the encounter.                                                                                                                                                                                                                                 The patient denied skin pain, fever, mucosal symptoms(lesions, blisters, sores in the mouth, nose, eyes, or genitals)  IF PATIENT ENDORSES ANY OF THESE STOP AND PAGE ON CALL ATTENDING      Chief Complaint   Patient presents with     Derm Problem     genital dermatitis      1. Dermatitis - irritated skin and burning sensation. Not getting worse; stable. No problems with urination.   -Patch testing done " "with Dr. Harris - result came back as \"no reaction\" per patient. He will f/u for more testing.   -Patient is using Cerave cleanser, Protopic everyday for 2-3 weeks, Elidel 0.05% cream to the groin/penile shaft, Tacrolimus and Triamcinolone PRN.    -Avoided sexual activity and \"touching myself\" for months now per patient stating repeatedly.          LXIONG[employer], MEDICAL ASSISTANT                                                                                                                                                                                                                          "

## 2020-11-17 NOTE — PROGRESS NOTES
[facility] Dermatology Record:  Store and Forward and Telephone [phone]         Dermatology Problem List:  1. Dermatitis, groin area. Suspected irritant versus contact dermatitis.  -has seasonal allergies with asthma per Dr. Harris  - path testing with delayed reaction to black tattoo  - vaseline; elidel  - prior tx: triam 0.025% ointment BID, protopic 0.1% ointment BID PRN  2. Allergic contact dermatitis  - s/p punch biopsy 11/19/19  - referral for patch testing   - triam 0.1% ointment BID  3.COVID-19 Nov-2020    Encounter Date: Nov 17, 2020    CC:   Chief Complaint   Patient presents with     Derm Problem       History of Present Illness:  Abimael Martinez is a 30 year old male who presents for dermatitis follow up. Patient had patch testing with Dr. Harris. Using cerave. protopic every day, elidel triamcinolone. No sexual activity recently.He was totally clear post coronavirus. He had no issues. He reports sexual activity after 2 days ago.       ROS: Patient is generally feeling well today, did recently had coronavirus    Physical Examination:  General: Well-sounding appropriately-developed individual.  Skin: Focused examination including the penis was performed.   - beneath coronavirus erythema and mild scale-taken this am  Labs:  NA    Past Medical History:   Patient Active Problem List   Diagnosis     Anxiety     CARDIOVASCULAR SCREENING; LDL GOAL LESS THAN 160     High risk sexual behavior     Tobacco use disorder     Low back pain     Essential hypertension     Internal hemorrhoids which bleed     Obesity, Class I, BMI 30-34.9     Attention deficit hyperactivity disorder (ADHD), combined type     AYALA (generalized anxiety disorder)     OCD (obsessive compulsive disorder)     Drug-induced erectile dysfunction     Mild persistent asthma without complication     Lumbosacral radiculopathy     Dyslipidemia     Elevated serum creatinine     Past Medical History:   Diagnosis Date     Acute right otitis  media 1/8/2013     Anxiety      Depression      Drug abuse (H)      Gonococcal urethritis 02/05/2016     Urethritis 5/20/2013     Problem list name updated by automated process. Provider to review     Past Surgical History:   Procedure Laterality Date     BACK SURGERY  04/05/2018       Social History:  Patient reports that he quit smoking about 2 years ago. His smoking use included cigarettes. He has a 3.00 pack-year smoking history. He has never used smokeless tobacco. He reports current alcohol use. He reports current drug use. Drug: Marijuana.    Family History:  Family History   Problem Relation Age of Onset     Unknown/Adopted Mother      Unknown/Adopted Father      Unknown/Adopted Maternal Grandmother      Unknown/Adopted Maternal Grandfather      Unknown/Adopted Paternal Grandmother      Unknown/Adopted Paternal Grandfather      Unknown/Adopted Brother        Medications:  Current Outpatient Medications   Medication     albuterol (PROAIR HFA/PROVENTIL HFA/VENTOLIN HFA) 108 (90 Base) MCG/ACT inhaler     ALPRAZolam (XANAX) 2 MG tablet     amLODIPine (NORVASC) 10 MG tablet     amphetamine-dextroamphetamine (ADDERALL) 20 MG tablet     azelastine (ASTELIN) 0.1 % nasal spray     famotidine (PEPCID) 40 MG tablet     fluticasone (FLOVENT HFA) 220 MCG/ACT Inhaler     Hyoscyamine Sulfate 0.375 MG TBCR     mometasone (ELOCON) 0.1 % external ointment     montelukast (SINGULAIR) 10 MG tablet     oxyCODONE-acetaminophen (PERCOCET) 5-325 MG per tablet     pimecrolimus (ELIDEL) 1 % external cream     sildenafil (REVATIO) 20 MG tablet     tacrolimus (PROTOPIC) 0.1 % external ointment     triamcinolone (KENALOG) 0.025 % external ointment     No current facility-administered medications for this visit.           Allergies   Allergen Reactions     Cymbalta      Weight gain and fatigue     Duloxetine Other (See Comments) and Fatigue     enlarged spleen and weight gain     Paroxetine Other (See Comments), Fatigue and GI  Disturbance     Weight gain, Spleen issues     Seasonal Allergies      Vicodin [Hydrocodone-Acetaminophen]            Impression and Recommendations (Patient Counseled on the Following):  1. Penile Dermatitis - suspect irritant dermatitis, negative patch testing, consider early psoriasis. This has been on and off X2 year, was clear since patch testing but after using lubricant it appeared again. Biopsy from the right arm constant with dermatitis.   -Complete testing with Dr. Harris  -Switch to Cetaphil non-soap gentle cleanser from [employer]  -Will ask Dr. Cloud for  Hypoallergenic lubricant, he will review with him  -triamcinolone 0.1% cream twice daily for the next 5 days, then transition to tacrolimus but keep in the fridge and do a test area first. Make sure to take 1 week break from triamcinolone. If that doesn't work okay to use for a full 2 weeks.   -Consider dupixent if this continues, he has had coronavirus        Follow-up:   Follow-up with dermatology in approximately 4 weeks photos and phone Earlier for new or changing lesions or rash.      Staff only    Chelita Arvizu MD    Department of Dermatology  [facility]: Phone: [phone], Fax:[phone]  [facility]: Phone: [phone], Fax: [phone]      _____________________________________________________________________________    Teledermatology information:  - Location of patient: [place]  - Patient presented as: return  - Location of teledermatologist:  ([facility] [place] )  - Reason teledermatology is appropriate:  of National Emergency Regarding Coronavirus disease (COVID 19) Outbreak  - Image quality and interpretability: acceptable  - Physician has received verbal consent for a Video/Photos Visit from the patient? YES  - In-person dermatology visit recommendation: no  - Date of images: today  -  Service start time:1:54pm  - Service end time:2:11pm  - Date of report: 11/17/2020

## 2020-11-17 NOTE — TELEPHONE ENCOUNTER
[facility] Call Center    Phone Message    May a detailed message be left on voicemail: yes     Reason for Call: Symptoms or Concerns   Patient having a flare up and worried a video appt is not going to help. sched today for 4pm. Would like to talk to a nurse prior.      Action Taken: Message routed to:  Adult Clinics: Dermatology p [phone]    Travel Screening: Not Applicable

## 2020-11-18 ENCOUNTER — TELEPHONE (OUTPATIENT)
Dept: DERMATOLOGY | Facility: CLINIC | Age: 30
End: 2020-11-18

## 2020-11-18 NOTE — LETTER
December 2, 2020      Abimael Martinez  [address]  [facility] [address]        Dear Abimael Martinez,    In order to ensure that we are providing the best quality care, we would like to remind you that you are due for a return phone visit with photos appointment with Dr Arvizu around 12/1720.      We have been unable to reach you by phone (or Fuel [email or web address]). Please call your clinic or use [facility] to make an appointment with your provider before you run out of medication.  Please let us know if you have any questions and we would be happy to help.     Thank you for trusting us with your care.    Sincerely,     [facility]  [phone]

## 2020-11-18 NOTE — TELEPHONE ENCOUNTER
1st attempt. Left message for patient to return call. Patient is due for a telephone appointment with Dr Arvizu with photos due around 12/17/2020. Number to clinic and Mychart option given, please assist in scheduling once patient returns clinic call.     Call Center OKAY TO SCHEDULE.    Thanks,   Lilia Goodman  Surgical Specialties Procedure   [facility] [place]  11/18/2020 10:23 AM

## 2020-11-19 DIAGNOSIS — R21 RASH AND OTHER NONSPECIFIC SKIN ERUPTION: ICD-10-CM

## 2020-11-23 RX ORDER — TRIAMCINOLONE ACETONIDE 1 MG/G
OINTMENT TOPICAL
Qty: 453.6 G | Refills: 0 | Status: SHIPPED | OUTPATIENT
Start: 2020-11-23 | End: 2020-11-30

## 2020-11-23 NOTE — TELEPHONE ENCOUNTER
Received refill request as resident on call. Reviewed patient chart. Patient was last seen 11/17/20 by Dr. Arvizu. Refill approved.

## 2020-11-23 NOTE — TELEPHONE ENCOUNTER
triamcinolone (KENALOG) 0.025 % external ointment      Last Written Prescription Date:  10/18/2019  Last Fill Quantity: 80 g,   # refills: 1  Last Office Visit : 11/17/2020  Future Office visit:  none    Routing refill request to provider for review/approval because:  Refill needs review-  Instructions.   - request from pharmacy instructions apply to arms/legs  - last filled per active med list apply to sites of irritation.  - last visit 11/17/2020 penile dermatitis - apply x 5 days then transition to Tacrolimus.  - continue to Rx for use on arms/legs?   *pended Rx as requested

## 2020-11-25 NOTE — TELEPHONE ENCOUNTER
2nd attempt to schedule as noted below. Message left for patient to return call and schedule.    Lilia Goodman  Surgical Specialties Procedure   [facility] [place]  11/25/2020 10:24 AM

## 2020-11-29 ENCOUNTER — MYC REFILL (OUTPATIENT)
Dept: FAMILY MEDICINE | Facility: CLINIC | Age: 30
End: 2020-11-29

## 2020-11-29 DIAGNOSIS — F90.2 ATTENTION DEFICIT HYPERACTIVITY DISORDER (ADHD), COMBINED TYPE: ICD-10-CM

## 2020-11-30 ENCOUNTER — OFFICE VISIT (OUTPATIENT)
Dept: FAMILY MEDICINE | Facility: CLINIC | Age: 30
End: 2020-11-30
Payer: COMMERCIAL

## 2020-11-30 VITALS
HEART RATE: 107 BPM | DIASTOLIC BLOOD PRESSURE: 94 MMHG | BODY MASS INDEX: 33.05 KG/M2 | HEIGHT: 73 IN | SYSTOLIC BLOOD PRESSURE: 133 MMHG | TEMPERATURE: 98.1 F | OXYGEN SATURATION: 98 % | RESPIRATION RATE: 18 BRPM | WEIGHT: 249.4 LBS

## 2020-11-30 DIAGNOSIS — R00.2 PALPITATIONS: Primary | ICD-10-CM

## 2020-11-30 DIAGNOSIS — I10 ESSENTIAL HYPERTENSION: ICD-10-CM

## 2020-11-30 DIAGNOSIS — Z87.898 HISTORY OF SPLENOMEGALY: ICD-10-CM

## 2020-11-30 PROCEDURE — 99214 OFFICE O/P EST MOD 30 MIN: CPT | Performed by: NURSE PRACTITIONER

## 2020-11-30 RX ORDER — DEXTROAMPHETAMINE SACCHARATE, AMPHETAMINE ASPARTATE, DEXTROAMPHETAMINE SULFATE AND AMPHETAMINE SULFATE 5; 5; 5; 5 MG/1; MG/1; MG/1; MG/1
20 TABLET ORAL 3 TIMES DAILY
Qty: 90 TABLET | Refills: 0 | Status: SHIPPED | OUTPATIENT
Start: 2020-11-30 | End: 2020-12-29

## 2020-11-30 ASSESSMENT — MIFFLIN-ST. JEOR: SCORE: 2142.21

## 2020-11-30 ASSESSMENT — ANXIETY QUESTIONNAIRES
6. BECOMING EASILY ANNOYED OR IRRITABLE: SEVERAL DAYS
3. WORRYING TOO MUCH ABOUT DIFFERENT THINGS: NEARLY EVERY DAY
GAD7 TOTAL SCORE: 13
7. FEELING AFRAID AS IF SOMETHING AWFUL MIGHT HAPPEN: SEVERAL DAYS
1. FEELING NERVOUS, ANXIOUS, OR ON EDGE: NEARLY EVERY DAY
5. BEING SO RESTLESS THAT IT IS HARD TO SIT STILL: SEVERAL DAYS
2. NOT BEING ABLE TO STOP OR CONTROL WORRYING: MORE THAN HALF THE DAYS

## 2020-11-30 ASSESSMENT — PAIN SCALES - GENERAL: PAINLEVEL: SEVERE PAIN (6)

## 2020-11-30 NOTE — PROGRESS NOTES
"Subjective     Abimael Martinez is a 30 year old male who presents to clinic today for the following health issues:    HPI         ED/UC Followup:    Facility:  [facility]  Date of visit: 11/1520  Reason for visit: chest pain  Current Status: having intermittent rapid heartbeat   Patient was seen in emergency room on 11/15/2020 for palpitations.  TSH, Trop, CBC, mg, BMP, EKG within normal limits there.   Still getting sensation every once in a while.    Not related to anxiety, caffeine intake, sleep deprivation, etc.  No recent use of sudafed, antihistamines.  .    Mild shortness of breath when comes on but then elaborates and states feels like he needs to take a deep breath to get rid of the sensation.  No pain or dizziness.   Episodes last 1-3 minutes.  +brain fog post-COVID infection  Stomach pain lower when eating.    Left sided upper quadrant abdominal fullness feeling/pressure when he has an illness.  Splenomegaly in 2012, mild, on ultrasound.      Blood pressure:  Consistently elevated in clinic but usually comes back to normal. States his back pain has been significant recently and will meet with surgeon at the end of this week.  Does not have a home blood pressure cuff.  Is not taking blood pressure medication.  Has Amlodipine on medication list from January 2020..       Review of Systems   Constitutional, HEENT, cardiovascular, pulmonary, GI, , musculoskeletal, neuro, skin, endocrine and psych systems are negative, except as otherwise noted.      Objective    BP (!) 133/94 (BP Location: Left arm, Patient Position: Sitting, Cuff Size: Adult Large)   Pulse 107   Temp 98.1  F (36.7  C) (Oral)   Resp 18   Ht 1.842 m (6' 0.5\")   Wt 113.1 kg (249 lb 6.4 oz)   SpO2 98%   BMI 33.36 kg/m    Body mass index is 33.36 kg/m .  Physical Exam   GENERAL: healthy, alert and no distress  EYES: Eyes grossly normal to inspection, PERRL and conjunctivae and sclerae normal  HENT: ear canals and TM's normal, nose " "and mouth without ulcers or lesions  NECK: no adenopathy, no asymmetry, masses, or scars and thyroid normal to palpation  RESP: lungs clear to auscultation - no rales, rhonchi or wheezes  CV: regular rate and rhythm, normal S1 S2, no S3 or S4, no murmur, click or rub, no peripheral edema and peripheral pulses strong  ABDOMEN: soft, nontender, no hepatosplenomegaly, no masses and bowel sounds normal  MS: no gross musculoskeletal defects noted, no edema  PSYCH: mentation appears normal, affect normal/bright          Assessment & Plan     Palpitations  Workup as below.  Blood work done in emergency room was normal.  Follow-up as indicated.   - Zio Patch Holter Adult Pediatric Greater than 48 hrs; Future  - Echocardiogram Complete; Future    Essential hypertension  Should monitor at home.  Report back if >140s/90s.    - Home Blood Pressure Monitor Order for DME - ONLY FOR DME    History of splenomegaly  Will follow up on this given persistent symptoms.   - US Abdomen Limited; Future     BMI:   Estimated body mass index is 33.36 kg/m  as calculated from the following:    Height as of this encounter: 1.842 m (6' 0.5\").    Weight as of this encounter: 113.1 kg (249 lb 6.4 oz).   Weight management plan: Patient was referred to their PCP to discuss a diet and exercise plan.         Patient Instructions   [phone] to schedule ultrasounds as well as heart monitor placement.      Return in about 1 week (around 12/7/2020).    STEFFEN Ac [facility]    "

## 2020-11-30 NOTE — TELEPHONE ENCOUNTER
Controlled Substance Refill Request for adderall 20 mg  Problem List Complete:  Yes    Patient is followed by MICHAEL OROPEZA for ongoing prescription of stimulants.  All refills should be approved by this provider, or covering partner.     Medication(s): adderall 20 mg.   Maximum quantity per month: 90  Clinic visit frequency required: Q 6  months      Controlled substance agreement on file: 07/31/17     Neuropsych evaluation for ADD completed:  Yes, completed 7-2008 at [facility], on file and diagnosis confirmed     Last [facility] website verification:  11/30/20 per  patient is still getting percocet regularly from another provider. Not sure if PCP knows this so I printed  and put on PCPs desk for review.      Heather Lin BSN, RN

## 2020-12-01 ENCOUNTER — TRANSFERRED RECORDS (OUTPATIENT)
Dept: HEALTH INFORMATION MANAGEMENT | Facility: CLINIC | Age: 30
End: 2020-12-01

## 2020-12-01 ENCOUNTER — MYC MEDICAL ADVICE (OUTPATIENT)
Dept: FAMILY MEDICINE | Facility: CLINIC | Age: 30
End: 2020-12-01

## 2020-12-01 ENCOUNTER — ANCILLARY PROCEDURE (OUTPATIENT)
Dept: CARDIOLOGY | Facility: CLINIC | Age: 30
End: 2020-12-01
Attending: NURSE PRACTITIONER
Payer: COMMERCIAL

## 2020-12-01 DIAGNOSIS — R00.2 PALPITATIONS: ICD-10-CM

## 2020-12-01 DIAGNOSIS — I10 ESSENTIAL HYPERTENSION: Primary | ICD-10-CM

## 2020-12-01 PROCEDURE — 0298T LEADLESS EKG MONITOR 3 TO 14 DAYS: CPT | Performed by: INTERNAL MEDICINE

## 2020-12-01 PROCEDURE — 0296T LEADLESS EKG MONITOR 3 TO 14 DAYS: CPT | Performed by: INTERNAL MEDICINE

## 2020-12-01 PROCEDURE — 93306 TTE W/DOPPLER COMPLETE: CPT | Performed by: INTERNAL MEDICINE

## 2020-12-01 ASSESSMENT — ASTHMA QUESTIONNAIRES: ACT_TOTALSCORE: 19

## 2020-12-01 ASSESSMENT — ANXIETY QUESTIONNAIRES: GAD7 TOTAL SCORE: 13

## 2020-12-01 NOTE — PATIENT INSTRUCTIONS
Patient has been prescribed a ZioPatch holter for 3 days.  Patient was instructed regarding the indication, function, care and prompt return of the ZioPatch holter monitor. The monitor, with S/N [id number],  was placed on the patient with instructions regarding care of the skin, electrodes, and monitor, as well as documentation in the patient diary. Patient demonstrated understanding of this information and [facility] questions or concerns.

## 2020-12-02 NOTE — TELEPHONE ENCOUNTER
3rd attempt. Patient has not called to schedule.  Appointment reminder letter sent to patient.      Lilia Goodman  Surgical Specialties Procedure   [employer] [facility] [place]  12/2/2020 7:32 AM

## 2020-12-03 ENCOUNTER — TELEPHONE (OUTPATIENT)
Dept: ALLERGY | Facility: CLINIC | Age: 30
End: 2020-12-03

## 2020-12-04 ENCOUNTER — OFFICE VISIT (OUTPATIENT)
Dept: NEUROLOGY | Facility: CLINIC | Age: 30
End: 2020-12-04
Payer: COMMERCIAL

## 2020-12-04 ENCOUNTER — ANCILLARY PROCEDURE (OUTPATIENT)
Dept: ULTRASOUND IMAGING | Facility: CLINIC | Age: 30
End: 2020-12-04
Attending: NURSE PRACTITIONER
Payer: COMMERCIAL

## 2020-12-04 VITALS
HEART RATE: 84 BPM | SYSTOLIC BLOOD PRESSURE: 129 MMHG | OXYGEN SATURATION: 99 % | DIASTOLIC BLOOD PRESSURE: 87 MMHG | RESPIRATION RATE: 16 BRPM | BODY MASS INDEX: 33.44 KG/M2 | WEIGHT: 250 LBS

## 2020-12-04 DIAGNOSIS — M51.16 LUMBAR DISC HERNIATION WITH RADICULOPATHY: Primary | ICD-10-CM

## 2020-12-04 DIAGNOSIS — Z87.898 HISTORY OF SPLENOMEGALY: ICD-10-CM

## 2020-12-04 PROCEDURE — 76705 ECHO EXAM OF ABDOMEN: CPT | Performed by: RADIOLOGY

## 2020-12-04 PROCEDURE — 99214 OFFICE O/P EST MOD 30 MIN: CPT | Performed by: NEUROLOGICAL SURGERY

## 2020-12-04 NOTE — PROGRESS NOTES
Mr. Martinez is a 30-year-old man seen today at his request for evaluation of low back pain associated with bilateral lower extremity symptoms.  He has a history of previous left L4-5 discectomy with Dr. Johnson several years previously.  Postoperatively he did well with resolution of the majority of his symptoms.  He has been troubled by intermittent pain near his left ankle.  However, he notes that over the past several months the symptoms have increased in severity.  He has undergone repeated epidural steroid injection since the time of his lumbar surgery, usually with good result in terms of symptom control.  He notes that his last epidural steroid injection reportedly performed about a month and a half ago was not particularly helpful.  His predominant complaint today is pain into his right leg and to a slightly lesser degree into his left leg.  He denies focal weakness but reports that his symptoms vary by the day.  At times, he notes significant functional limitations as a result of bilateral buttock and leg pain, right greater than left.  He also reports that in the past several weeks he has had occasion when his pain is minimal or nearly absent.  He has no complaint of changes related to bowel or bladder control.  He has no complaint currently of focal dermatomal sensory loss.  He has not undergone a recent course of physical therapy or other similar intervention.    On examination, he is able to change position from seated to standing without difficulty and without pain behavior.  Gait appears normal without antalgic component.  Motor examination of his bilateral lower extremity shows 5/5 strength throughout including plantar and dorsiflexion at the ankles while weightbearing.  There is no evidence of dermatomal sensory disturbance.  Deep tendon reflexes are 1+ and equal at the knees and ankles bilaterally.  There is no ankle clonus noted and no long track findings.  Straight leg raising is negative  bilaterally for radicular symptoms.  He is able to mount and dismount the examination table without difficulty and without assistance.  He has a well-healed just left of midline incision approximately 4 to 5 cm in length.  There is no evidence of focal abnormality in this region, underlying fluid or erythema.  Lumbar range of motion demonstrates 80 degrees of lumbar flexion with increase in mechanical back pain.  He is capable of at least 10 degrees of lumbar extension.    Review of his recent lumbar MRI scan obtained last week shows 2 level degenerative lumbar disc changes at L4-5 and L5-S1.  The most significant abnormality seen at the L4-5 level where there is evidence of a prior left L4-5 hemilaminectomy and microdiscectomy.  There appears to be some epidural fibrosis at the operative site as would be expected.  He also has a large bulging disc which extends across the spinal canal slightly more prominent toward the left side.  However, this produces severe lateral recess stenosis on the unoperated right side.  Spinal canal is otherwise patent.  There is no evidence to my review of significant neural impingement at the L5-S1 level although there may be a small annular tear immediately ventral to the transiting left S1 nerve root.  Spinal alignment is otherwise satisfactory.    Assessment: Recurrent bilateral lower extremity radicular symptoms which are intermittent although increased over the past several months.  There is no evidence of focal objective neurologic deficit presently.  Patient is concerned regarding his increase in symptoms and he is not currently working.  I reviewed the clinical and radiographic findings with him in detail and discussed management alternatives including both surgical and nonsurgical means of management.  I specifically discussed with him the possibility of a right L4-5 hemilaminectomy and microdiscectomy for decompression of the transiting right L5 nerve root.  I believe that a  large portion of the disc fragment centrally and perhaps even left-sided could be approached from a right-sided exposure.  I spoke with him at some length regarding the details of such a surgery as well as potential risks and benefits.  I told him that presently the primary indication for surgical consideration would be if his symptoms are intractable, unbearable and refractory to any other type of management.  Following this discussion, he was interested in pursuing physical therapy, possibly pool therapy, to determine if his symptoms would respond and if he could avoid an additional surgery.  I believe this is reasonable alternative and I provided referral to physician neck and back clinic at the patient's request.  I told him that if his symptoms are not responsive or if he should experience increasing pain, onset of numbness or weakness, difficulty with ambulation, etc. that he should call or return to the office for reevaluation and possible surgical planning for a right L4-5 hemilaminectomy and microdiscectomy.    Approximately 35 minutes was spent direct contact with the patient the majority reviewing the clinical and radiographic findings, management alternatives, risks and benefits.

## 2020-12-04 NOTE — LETTER
12/4/2020         RE: Abimael Martinez  [address]  [facility] [address]        Dear Colleague,    Thank you for referring your patient, Abimael Martinez, to the [facility] [place]. Please see a copy of my visit note below.    Mr. Martinez is a 30-year-old man seen today at his request for evaluation of low back pain associated with bilateral lower extremity symptoms.  He has a history of previous left L4-5 discectomy with Dr. Johnson several years previously.  Postoperatively he did well with resolution of the majority of his symptoms.  He has been troubled by intermittent pain near his left ankle.  However, he notes that over the past several months the symptoms have increased in severity.  He has undergone repeated epidural steroid injection since the time of his lumbar surgery, usually with good result in terms of symptom control.  He notes that his last epidural steroid injection reportedly performed about a month and a half ago was not particularly helpful.  His predominant complaint today is pain into his right leg and to a slightly lesser degree into his left leg.  He denies focal weakness but reports that his symptoms vary by the day.  At times, he notes significant functional limitations as a result of bilateral buttock and leg pain, right greater than left.  He also reports that in the past several weeks he has had occasion when his pain is minimal or nearly absent.  He has no complaint of changes related to bowel or bladder control.  He has no complaint currently of focal dermatomal sensory loss.  He has not undergone a recent course of physical therapy or other similar intervention.    On examination, he is able to change position from seated to standing without difficulty and without pain behavior.  Gait appears normal without antalgic component.  Motor examination of his bilateral lower extremity shows 5/5 strength throughout including plantar and  dorsiflexion at the ankles while weightbearing.  There is no evidence of dermatomal sensory disturbance.  Deep tendon reflexes are 1+ and equal at the knees and ankles bilaterally.  There is no ankle clonus noted and no long track findings.  Straight leg raising is negative bilaterally for radicular symptoms.  He is able to mount and dismount the examination table without difficulty and without assistance.  He has a well-healed just left of midline incision approximately 4 to 5 cm in length.  There is no evidence of focal abnormality in this region, underlying fluid or erythema.  Lumbar range of motion demonstrates 80 degrees of lumbar flexion with increase in mechanical back pain.  He is capable of at least 10 degrees of lumbar extension.    Review of his recent lumbar MRI scan obtained last week shows 2 level degenerative lumbar disc changes at L4-5 and L5-S1.  The most significant abnormality seen at the L4-5 level where there is evidence of a prior left L4-5 hemilaminectomy and microdiscectomy.  There appears to be some epidural fibrosis at the operative site as would be expected.  He also has a large bulging disc which extends across the spinal canal slightly more prominent toward the left side.  However, this produces severe lateral recess stenosis on the unoperated right side.  Spinal canal is otherwise patent.  There is no evidence to my review of significant neural impingement at the L5-S1 level although there may be a small annular tear immediately ventral to the transiting left S1 nerve root.  Spinal alignment is otherwise satisfactory.    Assessment: Recurrent bilateral lower extremity radicular symptoms which are intermittent although increased over the past several months.  There is no evidence of focal objective neurologic deficit presently.  Patient is concerned regarding his increase in symptoms and he is not currently working.  I reviewed the clinical and radiographic findings with him in detail  and discussed management alternatives including both surgical and nonsurgical means of management.  I specifically discussed with him the possibility of a right L4-5 hemilaminectomy and microdiscectomy for decompression of the transiting right L5 nerve root.  I believe that a large portion of the disc fragment centrally and perhaps even left-sided could be approached from a right-sided exposure.  I spoke with him at some length regarding the details of such a surgery as well as potential risks and benefits.  I told him that presently the primary indication for surgical consideration would be if his symptoms are intractable, unbearable and refractory to any other type of management.  Following this discussion, he was interested in pursuing physical therapy, possibly pool therapy, to determine if his symptoms would respond and if he could avoid an additional surgery.  I believe this is reasonable alternative and I provided referral to physician neck and back clinic at the patient's request.  I told him that if his symptoms are not responsive or if he should experience increasing pain, onset of numbness or weakness, difficulty with ambulation, etc. that he should call or return to the office for reevaluation and possible surgical planning for a right L4-5 hemilaminectomy and microdiscectomy.    Approximately 35 minutes was spent direct contact with the patient the majority reviewing the clinical and radiographic findings, management alternatives, risks and benefits.      Again, thank you for allowing me to participate in the care of your patient.        Sincerely,        Troy Wharton MD

## 2020-12-06 ENCOUNTER — MYC REFILL (OUTPATIENT)
Dept: FAMILY MEDICINE | Facility: CLINIC | Age: 30
End: 2020-12-06

## 2020-12-06 ENCOUNTER — HEALTH MAINTENANCE LETTER (OUTPATIENT)
Age: 30
End: 2020-12-06

## 2020-12-06 DIAGNOSIS — F41.9 ANXIETY: ICD-10-CM

## 2020-12-07 ENCOUNTER — MYC MEDICAL ADVICE (OUTPATIENT)
Dept: FAMILY MEDICINE | Facility: CLINIC | Age: 30
End: 2020-12-07

## 2020-12-07 DIAGNOSIS — R16.1 SPLENOMEGALY: Primary | ICD-10-CM

## 2020-12-07 RX ORDER — ALPRAZOLAM 2 MG
2 TABLET ORAL 3 TIMES DAILY PRN
Qty: 30 TABLET | Refills: 0 | Status: SHIPPED | OUTPATIENT
Start: 2020-12-07 | End: 2021-01-06

## 2020-12-07 NOTE — TELEPHONE ENCOUNTER
Controlled Substance Refill Request for xanax 2 mg   Problem List Complete:  Yes     Patient is followed by MICHAEL OROPEZA for ongoing prescription of benzodiazepines.  All refills should be approved by this provider, or covering partner.     Medication(s): alprazolam 2 mg .   Maximum quantity per month: 36  Clinic visit frequency required: Q 6  months      Controlled substance agreement on file: No  Benzodiazepine use reviewed by psychiatry:  No       checked in past 3 months?  Yes, 11/30/20     Heather Lin BSN, RN

## 2020-12-08 ENCOUNTER — MYC MEDICAL ADVICE (OUTPATIENT)
Dept: FAMILY MEDICINE | Facility: CLINIC | Age: 30
End: 2020-12-08

## 2020-12-08 ENCOUNTER — TRANSFERRED RECORDS (OUTPATIENT)
Dept: HEALTH INFORMATION MANAGEMENT | Facility: CLINIC | Age: 30
End: 2020-12-08

## 2020-12-09 ENCOUNTER — PRE VISIT (OUTPATIENT)
Dept: HEMATOLOGY | Facility: CLINIC | Age: 30
End: 2020-12-09

## 2020-12-09 NOTE — TELEPHONE ENCOUNTER
ONCOLOGY INTAKE: Records Information      APPT INFORMATION:  Referring provider:  LUZ MARIA Doty  Referring provider s clinic:  [facility]  Reason for visit/diagnosis:  Splenomegaly  Has patient been notified of appointment date and time?: yes    RECORDS INFORMATION:  Were the records received with the referral (via [employer])? no    Has patient been seen for any external appt for this diagnosis? no    If yes, where? n/a    Has patient had any imaging or procedures outside of [place] for this condition? no      If Yes, where? n/a    ADDITIONAL INFORMATION:  none

## 2020-12-11 NOTE — TELEPHONE ENCOUNTER
RECORDS STATUS - ALL OTHER DIAGNOSIS      RECORDS RECEIVED FROM: [facility]   DATE RECEIVED: 1/13/2021   NOTES STATUS DETAILS   OFFICE NOTE from referring provider Complete Toya Doty APRN CNP   OFFICE NOTE from medical oncologist N/A    DISCHARGE SUMMARY from hospital N.A    DISCHARGE REPORT from the ER     OPERATIVE REPORT N/A    MEDICATION LIST Complete [facility]   CLINICAL TRIAL TREATMENTS TO DATE     LABS     PATHOLOGY REPORTS     ANYTHING RELATED TO DIAGNOSIS Complete Labs last updated on 11/15/2020 in [facility]   GENONOMIC TESTING     TYPE:     IMAGING (NEED IMAGES & REPORT)     CT SCANS     MRI Complete MRI Lumbar Spine 12/1/2020, 6/29/2020   MAMMO     ULTRASOUND Complete US abdomen Limited 12/4/2020    PET

## 2020-12-15 ENCOUNTER — VIRTUAL VISIT (OUTPATIENT)
Dept: ALLERGY | Facility: CLINIC | Age: 30
End: 2020-12-15
Payer: COMMERCIAL

## 2020-12-15 DIAGNOSIS — L23.89 ALLERGIC CONTACT DERMATITIS DUE TO OTHER AGENTS: Primary | ICD-10-CM

## 2020-12-15 DIAGNOSIS — L20.89 OTHER ATOPIC DERMATITIS: ICD-10-CM

## 2020-12-15 DIAGNOSIS — Z88.9 ATOPY: ICD-10-CM

## 2020-12-15 DIAGNOSIS — J30.9 ALLERGIC RHINOCONJUNCTIVITIS: ICD-10-CM

## 2020-12-15 DIAGNOSIS — H10.10 ALLERGIC RHINOCONJUNCTIVITIS: ICD-10-CM

## 2020-12-15 PROCEDURE — 99214 OFFICE O/P EST MOD 30 MIN: CPT | Mod: 95 | Performed by: DERMATOLOGY

## 2020-12-15 RX ORDER — TACROLIMUS 0.3 MG/G
OINTMENT TOPICAL 2 TIMES DAILY
Qty: 60 G | Refills: 1 | Status: SHIPPED | OUTPATIENT
Start: 2020-12-15

## 2020-12-15 RX ORDER — LORATADINE 10 MG/1
10 TABLET ORAL EVERY EVENING
Qty: 30 TABLET | Refills: 1 | Status: SHIPPED | OUTPATIENT
Start: 2020-12-15 | End: 2021-01-14

## 2020-12-15 RX ORDER — MOMETASONE FUROATE 1 MG/G
OINTMENT TOPICAL PRN
Qty: 45 G | Refills: 1 | Status: SHIPPED | OUTPATIENT
Start: 2020-12-15 | End: 2020-12-18

## 2020-12-15 ASSESSMENT — PAIN SCALES - GENERAL: PAINLEVEL: NO PAIN (0)

## 2020-12-15 NOTE — PROGRESS NOTES
"Abimael Martinez is a 30 year old male who is being evaluated via a billable video visit.      The patient has been notified of following:     \"This video visit will be conducted via a call between you and your physician/provider. We have found that certain health care needs can be provided without the need for an in-person physical exam.  This service lets us provide the care you need with a video conversation.  If a prescription is necessary we can send it directly to your pharmacy.  If lab work is needed we can place an order for that and you can then stop by our lab to have the test done at a later time.    Video visits are billed at different rates depending on your insurance coverage.  Please reach out to your insurance provider with any questions.    If during the course of the call the physician/provider feels a video visit is not appropriate, you will not be charged for this service.\"    Patient has given verbal consent for Video visit? Yes  How would you like to obtain your AVS? MyChart  If you are dropped from the video visit, the video invite should be resent to: Send to e-mail at: [email or web address]  Will anyone else be joining your video visit? No        Video-Visit Details    Type of service:  Video Visit    Originating Location (pt. Location): Home    Distant Location (provider location):  [facility] [place]     Platform used for Video Visit: Ary Granados LPN        "

## 2020-12-15 NOTE — PROGRESS NOTES
Note for return visit for Dermato-Allergy       Encounter Date: Dec 15, 2020    History of Present Illness:  I have reviewed the teledermatology  information and the nursing intake corresponding to this issue. Abimael Martinez is a 30 year old male who presents as a teledermatology consult for the following information take directly from the prior notes and video call performed by myself:     Patient recovered from COVID, but had more stuffy nose  Had enlarged spleen (had that before as child already) --> probably infectious    Patient had also urticarial reactions on frontal part of face and after 6 days gone    Moreover, patient develops on penis after sex irriations and dermatitis that lasts for a few days and on and off the area over the tattoos becomes eczematous      Additional comments and observations from review of the patient s chart including the following:    See notes    ROS: Patient generally feeling well today   Physical Examination:  General: Well-appearing, appropriately-developed individual.  - Skin: Examination of the visible skin within the teledermatology was performed.   No active eczemas, but according to patient on and off over tattoo reactions and on penis eczematous lesions that stay days after sex  According to patient on and off urticarial rash that stays 6h  As above in HPI. No additional skin concerns.  - upper respiratory tract: currently no obvious Rhinitis  - lungs: no signs for active and present Asthma/Wheezing or coughing  - eyes: currently no active conjunctivits  - GI system/digestion: currently no problems, no bloating or Diarrhoea      Earlier History and Allergy exams:    Patient had a tattoo more than 10 years. Never problems until 2 years ago with raised outline of the tattoo. It is mostly itchy and inflamed during the allergy season. Big black tattoo on left upper arm. It scabs and is very itchy.    Has all over the body black tattoos, but only that on the arm makes  troubles.    Patient has seasonal allergies. As a child less problems, but since [place] patient gets stuffy, runny nose with postnasal drip and sinusitis and chronic ear infections --> got 4-6 months allergy shots and then problems with ordering of the allergens. After 3rd shot patient had generalized reactions. Afterwards patient had to dilute and no issues. Got few days after IT always some eczematous lesions on trunk.  Patient has year around allergies with aggravation in spring/summer, but not fall. Patient has pets with small dog and no issues.    Patient has Asthma (more cold induced and some wheezing at exhalation during season) --> treatment with proair    Never eczema as child. In the past year random eczematous spots on trunk and extremities      Past Medical History:   Patient Active Problem List   Diagnosis     Anxiety     CARDIOVASCULAR SCREENING; LDL GOAL LESS THAN 160     High risk sexual behavior     Tobacco use disorder     Low back pain     Essential hypertension     Internal hemorrhoids which bleed     Obesity, Class I, BMI 30-34.9     Attention deficit hyperactivity disorder (ADHD), combined type     AYALA (generalized anxiety disorder)     OCD (obsessive compulsive disorder)     Drug-induced erectile dysfunction     Mild persistent asthma without complication     Lumbosacral radiculopathy     Dyslipidemia     Elevated serum creatinine     Past Medical History:   Diagnosis Date     Acute right otitis media 1/8/2013     Anxiety      Depression      Drug abuse (H)      Gonococcal urethritis 02/05/2016     Urethritis 5/20/2013     Problem list name updated by automated process. Provider to review       Allergy History:     Allergies   Allergen Reactions     Cymbalta      Weight gain and fatigue     Duloxetine Other (See Comments) and Fatigue     enlarged spleen and weight gain     Paroxetine Other (See Comments), Fatigue and GI Disturbance     Weight gain, Spleen issues      Seasonal Allergies      Vicodin [Hydrocodone-Acetaminophen]        Social History:  The patient works as a . Patient has the following hobbies or non-occupational exposure: vonda     reports that he quit smoking about 2 years ago. His smoking use included cigarettes. He has a 3.00 pack-year smoking history. He has never used smokeless tobacco. He reports current alcohol use. He reports current drug use. Drug: Marijuana.      Family History:  Family History   Problem Relation Age of Onset     Unknown/Adopted Mother      Unknown/Adopted Father      Unknown/Adopted Maternal Grandmother      Unknown/Adopted Maternal Grandfather      Unknown/Adopted Paternal Grandmother      Unknown/Adopted Paternal Grandfather      Unknown/Adopted Brother        Medications:  Current Outpatient Medications   Medication Sig Dispense Refill     albuterol (PROAIR HFA/PROVENTIL HFA/VENTOLIN HFA) 108 (90 Base) MCG/ACT inhaler Inhale 2 puffs into the lungs every 4 hours as needed for wheezing 1 Inhaler 0     ALPRAZolam (XANAX) 2 MG tablet Take 1 tablet (2 mg) by mouth 3 times daily as needed for anxiety 30 tablet 0     amLODIPine (NORVASC) 10 MG tablet Take 1 tablet (10 mg) by mouth daily 30 tablet 1     amphetamine-dextroamphetamine (ADDERALL) 20 MG tablet Take 1 tablet (20 mg) by mouth 3 times daily 90 tablet 0     azelastine (ASTELIN) 0.1 % nasal spray Spray 2 sprays into both nostrils 2 times daily 30 mL 11     famotidine (PEPCID) 40 MG tablet Take 1 tablet (40 mg) by mouth At Bedtime 90 tablet 1     fluticasone (FLOVENT HFA) 220 MCG/ACT Inhaler Inhale 2 puffs into the lungs 2 times daily Please give patient spacer 1 Inhaler 3     Hyoscyamine Sulfate 0.375 MG TBCR Take 1 capful by mouth 3 times daily as needed 90 tablet 1     mometasone (ELOCON) 0.1 % external ointment If necessary use 3-4 days in a row or 2xweekly on eczematous lesions 45 g 2     montelukast (SINGULAIR) 10 MG tablet Take 1 tablet (10 mg) by mouth At  Bedtime 90 tablet 0     oxyCODONE-acetaminophen (PERCOCET) 5-325 MG per tablet Take 1 tablet by mouth every 6 hours as needed for pain 12 tablet 0     pimecrolimus (ELIDEL) 1 % external cream Apply topically 2 times daily 30 g 0     sildenafil (REVATIO) 20 MG tablet Take 1-2 tablets (20-40 mg) by mouth daily as needed As needed for prevention of erectile dysfunction 30 tablet 2     tacrolimus (PROTOPIC) 0.1 % external ointment Apply twice daily as needed for rash on penis. 30 g 11     triamcinolone (KENALOG) 0.025 % external ointment Apply topically 2 times daily Apply twice a day to sites of irritation 80 g 1       Allergy Tests:    Past Allergy Test: prick tests positive to pollens, cockroach, dust mites, trees, ragweed, grass pollens ==> had for some time IT and eczema got worse = delayed type reactions to allergens?    Future Allergy Test:     Order for PATCH TESTS    [] Outpatient  [] Inpatient: [facility]..../ Bed ....      Skin Atopy (atopic dermatitis) [x] Yes   [] No  Rhinitis/Sinusitis:   [x] Yes   [] No  Allergic Asthma:   [x] Yes   [] No  Food Allergy:   [] Yes   [x] No  Leg ulcers:   [] Yes   [x] No  Hand eczema:   [] Yes   [x] No   Leading hand:   [] R   [] L       [] Ambidextrous                        Reason for tests (suspected allergy): allergic contact dermatitis to tattoo and seasonal aggravation to pollens  Known previous allergies: see below    Standardized panels  [x] Standard panel (40 tests)  [x] Preservatives & Antimicrobials (31 tests)  [x] Emulsifiers & Additives (25 tests)   [] Perfumes/Flavours & Plants (25 tests)  [] Hairdresser panel (12 tests)  [] Rubber Chemicals (22 tests)  [] Plastics (26 tests)  [x] Colorants/Dyes/Food additives (20 tests)  [] Metals (implants/dental) (24 tests)  [] Local anaesthetics/NSAIDs (13 tests)  [] Antibiotics & Antimycotics (14 tests)   [] Corticosteroids (15 tests)   [] Photopatch test (62 tests)   [x] others: house dust mites, molds, tree and grass pollens  and dog from prick tests      [] Patient's own products: ...    DO NOT test if chemical or biological identity is unknown!     always ask from patient the product information and safety sheets (MSDS)   [] Atopy screen prick test (Atopic predisposition): and if possible IDT with delayed readings --> patient had recently a prick test (see above) and therefore do not repeat that except we would like to perform on Friday IDT.  RESULTS & EVALUATION of PATCH TESTS    Patch test readings after     [x] 2 days, [] 3 days [x] 4 days, [] 5 days,    Applied patch tests with results (import here the list of patch tests):    STANDARD Series      No Substance 2 days 4 days remarks   1 Kyle Mix [C] - -    2 Colophony - -    3  2-Mercaptobenzothiazole  - -     4 Methylisothiazolinone - -    5 Carba Mix - -    6 Thiuram Mix [A] - -    7 Bisphenol A Epoxy Resin - -    8 X-Xmmf-Sggbpsevzel-Formaldehyde Resin - -    9 Mercapto Mix [A] (+) -    10 Black Rubber Mix- PPD [B] - -    11 Potassium Dichromate  -  -    12 Balsam of [place] (Myroxylon Pereirae Resin) - -    13 Nickel Sulphate Hexahydrate - -    14 Mixed Dialkyl Thiourea - -    15 Paraben Mix [B] - -    16 Methyldibromo Glutaronitrile - -    17 Fragrance Mix - -    18 2-Bromo-2-Nitropropane-1,3-Diol (Bronopol) - -    19 Lyral - -    20 Tixocortol-21- Pivalate - -    21 Diazolidiyl Urea (Germall II) - -    22 Methyl Methacrylate - -    23 Cobalt (II) Chloride Hexahydrate - -    24 Fragrance Mix II  - -    25 Compositae Mix - -    26 Benzoyl Peroxide - -    27 Bacitracin - -    28 Formaldehyde - -    29 Methylchloroisothiazolinone / Methylisothiazolinone - -    30 Corticosteroid Mix - -    31 Sodium Lauryl Sulfate - -    32 Lanolin Alcohol - -    33 Turpentine - -    34 Cetylstearylalcohol - -    35 Chlorhexidine Dicluconate - -    36 Budenoside - -    37 Imidazolidinyl Urea  - -    38 Ethyl-2 Cyanoacrylate - -    39 Quaternium 15 (Dowicil 200) - -    40 Decyl Glucoside - -       COLORANTS, DYES, FOOD ADDITIVES   No Substance 2 days 4 days remarks   41 1 Aniline - -    42 2 Aj Brown R - -    43 3 Textile Dye Mix [A] - -    44 4 Paradox  - -    45 5 Disperse Blue-3 - -    46 6 Disperse Pendleton-3 - -    47 7 Disperse Red-11 NA NA    48 8 Disperse Yellow-9 - -    49 9 Erythrosine-B - -    50 10 Naphthol AS - -       Food additives      51 11 Aspartame - -    52 12 Azorubine - -    53 13 Brilliant Black - -    54 14 Cochineal Red - -    55 15 Patent Blue-VF NA NA    56 16 Pectin - -    57 17 Quinoline Yellow - -    58 18 Saccharin - -    59 19 Tartrazine - -    60 20 Sodium Glutamate NA NA      EMULSIFIERS & ADDITIVES   No Substance 2 days 4 days remarks   61 1 Polyethylene Glycol-400 - -    62 2 Cocamidopropyl Betaine - -    63 3 Amerchol L101 - -    64 4 Propylene Glycol - -    65 5 Triethanolamine - -    66 6 Sorbitane Sesquiolate - -    67 7 Isopropylmyristate - -    68 8 Polysorbate 80  - -    69 9 Amidoamine   (Stearamidopropyl Dimethylamine) - -    70 10 Oleamidopropyl Dimethylamine - -    71 11 Lauryl Glucoside - -    72 12 Coconut Diethanolamide  - -    73 13 2-Hydroxy-4-Methoxy Benzophenone (Oxybenzone) - -    74 14 Benzophenone-4 (Sulisobenzon) - -    75 15 Propolis - -    76 16 Dexpanthenol - -    77 17 Carboxymethyl Cellulose Sodium NA NA    78 18 Abitol - -    79 19 Tert-Butylhydroquinone - -    80 20 Benzyl Salicylate - -      Antioxidant      81 21 Dodecyl Gallate - -    82 22 Butylhydroxyanisole (BHA) - -    83 23 Butylhydroxytoluene (BHT) - -    84 24 Di-Alpha-Tocopherol (Vit E) - -    85 25 Propyl Gallate - -    86 26 Zinc Pyrithione NA NA    87 27 Dimethylaminopropylamin (DMAPA) - -      PRESERVATIVES & ANTIMICROBIALS     No Substance 2 days 4 days remarks   88 1  1,2-Benzisothiazoline-3-One, Sodium Salt - -    89 2  1,3,5-Waqar (2-Hydroxyethyl) - Hexahydrotriazine (Grotan BK) - -    90 3 8-Rkmiqkaoimrej-7-Nitro-1, 3-Propanediol - -    91 4  3, 4, 4' - Triclocarban - -     92 5 4 - Chloro - 3 - Cresol - -    93 6 4 - Chloro - 4 - Xylenol (PCMX) - -    94 7 7-Ethylbicyclooxazolidine (Bioban NC8687) - -    95 8 Benzalkonium Chloride NA NA    96 9 Benzyl Alcohol - -    97 10 Cetalkonium Chloride - -    98 11 Cetylpyrimidine Chloride  - -    99 12 Chloroacetamide - -    100 13 DMDM Hydantoin - -    101 14 Glutaraldehyde - -    102 15 Triclosan - -    103 16 Glyoxal Trimeric Dihydrate - -    104 17 Iodopropynyl Butylcarbamate - -    105 18 Octylisothiazoline - -    106 19 Iodoform NA NA    107 20 (Nitrobutyl) Morpholine/(Ethylnitro-Trimethylene) Dimorpholine (Bioban P 1487) - -    108 21 Phenoxyethanol - -    109 22 Phenyl Salicylate - -    110 23 Povidone Iodine - -    111 24 Sodium Benzoate - -    112 25 Sodium Disulfite - -    113 26 Sorbic Acid - -    114 27 Thimerosal - -    115 28 Melamine Formeladyde Resin      116 29 Ethylenediamine Dihydrochloride        Parabens      117 30 Butyl-P-Hydroxybenzoate - -    118 31 Ethyl-P-Hydroxybenzoate - -    119 32 Methyl-P-Hydroxybenzoate - -    120 33 Propyl-P-Hydroxybenzoate - -      OTHER PRODUCTS       No Substance Conc  % solv 2 days 4 days remarks   121 D. farinae   - -    122 D. pteronyssinus   - -    123 Aspergillius   - -    124 penicillium   - -    125 Dog   - -    126 9 Tree Mix - ALK   - -    127 [place] Mix 2630 - HS   - -    128 Tree Mix 5- HS   - -    129 5 Grass Mix - ALK   - -    130           Results of patch tests:                         Interpretation:    - Negative                    A    = Allergic      (+) Erythema    TI   = Toxic/irritant   + E + Infiltration    RaP = Relevance at Present     ++ E/I + Papulovesicle   Rpr  = Relevance Previously     +++ E/I/P + Blister     nR   = No Relevance    Atopy Screen (Placed 11/xx/20 )    No Substance Readings (15 min) Evaluation   POS Histamine 1mg/ml -    NEG NaCl 0.9% -      No Substance Readings (15 min) Evaluation   1 Alternaria alternata (tenuis)  -    2 Cladosporium  herbarum -    3 Aspergillus fumigatus -    4 Penicillium notatum -    5 Dermatophagoides pteronyssinus -    6 Dermatophagoides farinae -      Intradermal Testing (Placed 11/xx/20 )    No Substance Conc.  Readings (15min) Evaluation   - NaCl  0.9% -    + Histamine (prick) 0.1mg / ml -    DF Standard Dust Mite - D. Farinae 1:10 -    DP Standard Dust Mite - D. Pteronyssinus 1:10 -    A Aspergillus fumigatus  1:10 -    P Penicillium notatum 1:10 -      Conclusion:   [x] No relevant allergic reaction observed: was not under perfect conditions, because patient developed SARS-Cov2 infection on 1st day, but according to photos from Thursday 5th November and patients info from 6th November no itchy spot on back. Not sure about possible reactions with the inhalant allergens around 120! Skin there red!    Interpretation/ remarks:   No signs for contact sensitization. Some erythema over test site of inhalant allergens ==> might have to repeat them later    ==> final Diagnosis:    # delayed type reaction to black tattoo on certain areas and aggravation during pollen season and irritant dermatitis genital    Unlikely allergic contact dermatitis (patch tests negatives, but under unfavorable conditions    probably more likely atopic dermatitis with irritant component    # perennial rhinoconjunctivitis and sinusitis/otitis with Asthma and seasonal aggravation (spring/summer)    Atopic predisposition    Perennial = house dust mite and molds (unlikely dog) = immediate and delayed?    Seasonal = tree pollens, maybe grass pollens = immediate and delayed?    Recurrent dermatitis (nummular)    # acute Urticaria    Try Allegra 180mg if recurrences    These conclusions are made at the best of one's knowledge and belief based on the provided evidence such as patient's history and allergy test results and they can change over time or can be incomplete because of missing information's.    ==> Treatment prescribed/Plan:  - try Loratadine 10mg  every evening for 1 month (for sinus problems and hives)  - try Protopic oint 0.03% for about 1 month on the tattoo and on other irritated skin  - maybe in reserve Mometasone for acute lesions  - put on skin (also on genitals) for moisturizing and prevention Vanicream Lotion and wash genitals with Vanicream facial wash    If not better, then maybe perform prick and intradermal tests with delayed readings    Follow-up: in Derm-Allergy clinic virtually in 4-5 weeks    Thank you for the opportunity be involved in the care of this patient.     Staff: Delisa Granados LPN & Yaima Sanchez and Cheyenne Lipscomb RN    _____________________________________________________________________________    Teledermatology information:  - Location of patient: Home  - Patient presented in referral from: other  - Location of teledermatologist:  [facility] [place] (, [place], [place])  - Reason teledermatology is appropriate:  of National Emergency Regarding Coronavirus disease (COVID 19) Outbreak  - Method of transmission:  Store and Forward and Video ( Invitation sent by:  Elvi and send to e-mail at: [email or web address] )  - Date of images: during video  - Service start time:2:30pm  - Service end time: 3:04pm  - Date of report: 12/15/20      I spent a total of 34 minutes for telemedicine consult with Abimael Martinez during today s video meeting. Over 50% of this time was spent counseling the patient and/or coordinating care. Please see Assessment and Plan for details.

## 2020-12-15 NOTE — LETTER
"    12/15/2020         RE: Abimael Martinez  [address]  [facility] [address]        Dear Colleague,    Thank you for referring your patient, Abimael Martinze, to the [facility]. Please see a copy of my visit note below.    Abimael Martinez is a 30 year old male who is being evaluated via a billable video visit.      The patient has been notified of following:     \"This video visit will be conducted via a call between you and your physician/provider. We have found that certain health care needs can be provided without the need for an in-person physical exam.  This service lets us provide the care you need with a video conversation.  If a prescription is necessary we can send it directly to your pharmacy.  If lab work is needed we can place an order for that and you can then stop by our lab to have the test done at a later time.    Video visits are billed at different rates depending on your insurance coverage.  Please reach out to your insurance provider with any questions.    If during the course of the call the physician/provider feels a video visit is not appropriate, you will not be charged for this service.\"    Patient has given verbal consent for Video visit? Yes  How would you like to obtain your AVS? MyChart  If you are dropped from the video visit, the video invite should be resent to: Send to e-mail at: [email or web address]  Will anyone else be joining your video visit? No        Video-Visit Details    Type of service:  Video Visit    Originating Location (pt. Location): Home    Distant Location (provider location):  [facility]     Platform used for Video Visit: Ary Granados LPN          Note for return visit for Dermato-Allergy       Encounter Date: Dec 15, 2020    History of Present Illness:  I have reviewed the teledermatology  information and the nursing intake corresponding to this issue. Abimael" NITIN Martinez is a 30 year old male who presents as a teledermatology consult for the following information take directly from the prior notes and video call performed by myself:     Patient recovered from COVID, but had more stuffy nose  Had enlarged spleen (had that before as child already) --> probably infectious    Patient had also urticarial reactions on frontal part of face and after 6 days gone    Moreover, patient develops on penis after sex irriations and dermatitis that lasts for a few days and on and off the area over the tattoos becomes eczematous      Additional comments and observations from review of the patient s chart including the following:    See notes    ROS: Patient generally feeling well today   Physical Examination:  General: Well-appearing, appropriately-developed individual.  - Skin: Examination of the visible skin within the teledermatology was performed.   No active eczemas, but according to patient on and off over tattoo reactions and on penis eczematous lesions that stay days after sex  According to patient on and off urticarial rash that stays 6h  As above in HPI. No additional skin concerns.  - upper respiratory tract: currently no obvious Rhinitis  - lungs: no signs for active and present Asthma/Wheezing or coughing  - eyes: currently no active conjunctivits  - GI system/digestion: currently no problems, no bloating or Diarrhoea      Earlier History and Allergy exams:    Patient had a tattoo more than 10 years. Never problems until 2 years ago with raised outline of the tattoo. It is mostly itchy and inflamed during the allergy season. Big black tattoo on left upper arm. It scabs and is very itchy.    Has all over the body black tattoos, but only that on the arm makes troubles.    Patient has seasonal allergies. As a child less problems, but [place] patient gets stuffy, runny nose with postnasal drip and sinusitis and chronic ear infections --> got 4-6  months allergy shots and then problems with ordering of the allergens. After 3rd shot patient had generalized reactions. Afterwards patient had to dilute and no issues. Got few days after IT always some eczematous lesions on trunk.  Patient has year around allergies with aggravation in spring/summer, but not fall. Patient has pets with small dog and no issues.    Patient has Asthma (more cold induced and some wheezing at exhalation during season) --> treatment with proair    Never eczema as child. In the past year random eczematous spots on trunk and extremities      Past Medical History:   Patient Active Problem List   Diagnosis     Anxiety     CARDIOVASCULAR SCREENING; LDL GOAL LESS THAN 160     High risk sexual behavior     Tobacco use disorder     Low back pain     Essential hypertension     Internal hemorrhoids which bleed     Obesity, Class I, BMI 30-34.9     Attention deficit hyperactivity disorder (ADHD), combined type     AYALA (generalized anxiety disorder)     OCD (obsessive compulsive disorder)     Drug-induced erectile dysfunction     Mild persistent asthma without complication     Lumbosacral radiculopathy     Dyslipidemia     Elevated serum creatinine     Past Medical History:   Diagnosis Date     Acute right otitis media 1/8/2013     Anxiety      Depression      Drug abuse (H)      Gonococcal urethritis 02/05/2016     Urethritis 5/20/2013     Problem list name updated by automated process. Provider to review       Allergy History:     Allergies   Allergen Reactions     Cymbalta      Weight gain and fatigue     Duloxetine Other (See Comments) and Fatigue     enlarged spleen and weight gain     Paroxetine Other (See Comments), Fatigue and GI Disturbance     Weight gain, Spleen issues     Seasonal Allergies      Vicodin [Hydrocodone-Acetaminophen]        Social History:  The patient works as a . Patient has the following hobbies or non-occupational exposure: tattoos     reports that he  quit smoking about 2 years ago. His smoking use included cigarettes. He has a 3.00 pack-year smoking history. He has never used smokeless tobacco. He reports current alcohol use. He reports current drug use. Drug: Marijuana.      Family History:  Family History   Problem Relation Age of Onset     Unknown/Adopted Mother      Unknown/Adopted Father      Unknown/Adopted Maternal Grandmother      Unknown/Adopted Maternal Grandfather      Unknown/Adopted Paternal Grandmother      Unknown/Adopted Paternal Grandfather      Unknown/Adopted Brother        Medications:  Current Outpatient Medications   Medication Sig Dispense Refill     albuterol (PROAIR HFA/PROVENTIL HFA/VENTOLIN HFA) 108 (90 Base) MCG/ACT inhaler Inhale 2 puffs into the lungs every 4 hours as needed for wheezing 1 Inhaler 0     ALPRAZolam (XANAX) 2 MG tablet Take 1 tablet (2 mg) by mouth 3 times daily as needed for anxiety 30 tablet 0     amLODIPine (NORVASC) 10 MG tablet Take 1 tablet (10 mg) by mouth daily 30 tablet 1     amphetamine-dextroamphetamine (ADDERALL) 20 MG tablet Take 1 tablet (20 mg) by mouth 3 times daily 90 tablet 0     azelastine (ASTELIN) 0.1 % nasal spray Spray 2 sprays into both nostrils 2 times daily 30 mL 11     famotidine (PEPCID) 40 MG tablet Take 1 tablet (40 mg) by mouth At Bedtime 90 tablet 1     fluticasone (FLOVENT HFA) 220 MCG/ACT Inhaler Inhale 2 puffs into the lungs 2 times daily Please give patient spacer 1 Inhaler 3     Hyoscyamine Sulfate 0.375 MG TBCR Take 1 capful by mouth 3 times daily as needed 90 tablet 1     mometasone (ELOCON) 0.1 % external ointment If necessary use 3-4 days in a row or 2xweekly on eczematous lesions 45 g 2     montelukast (SINGULAIR) 10 MG tablet Take 1 tablet (10 mg) by mouth At Bedtime 90 tablet 0     oxyCODONE-acetaminophen (PERCOCET) 5-325 MG per tablet Take 1 tablet by mouth every 6 hours as needed for pain 12 tablet 0     pimecrolimus (ELIDEL) 1 % external cream Apply topically 2 times  daily 30 g 0     sildenafil (REVATIO) 20 MG tablet Take 1-2 tablets (20-40 mg) by mouth daily as needed As needed for prevention of erectile dysfunction 30 tablet 2     tacrolimus (PROTOPIC) 0.1 % external ointment Apply twice daily as needed for rash on penis. 30 g 11     triamcinolone (KENALOG) 0.025 % external ointment Apply topically 2 times daily Apply twice a day to sites of irritation 80 g 1       Allergy Tests:    Past Allergy Test: prick tests positive to pollens, cockroach, dust mites, trees, ragweed, grass pollens ==> had for some time IT and eczema got worse = delayed type reactions to allergens?    Future Allergy Test:     Order for PATCH TESTS    [] Outpatient  [] Inpatient: [facility]..../ Bed ....      Skin Atopy (atopic dermatitis) [x] Yes   [] No  Rhinitis/Sinusitis:   [x] Yes   [] No  Allergic Asthma:   [x] Yes   [] No  Food Allergy:   [] Yes   [x] No  Leg ulcers:   [] Yes   [x] No  Hand eczema:   [] Yes   [x] No   Leading hand:   [] R   [] L       [] Ambidextrous                        Reason for tests (suspected allergy): allergic contact dermatitis to tattoo and seasonal aggravation to pollens  Known previous allergies: see below    Standardized panels  [x] Standard panel (40 tests)  [x] Preservatives & Antimicrobials (31 tests)  [x] Emulsifiers & Additives (25 tests)   [] Perfumes/Flavours & Plants (25 tests)  [] Hairdresser panel (12 tests)  [] Rubber Chemicals (22 tests)  [] Plastics (26 tests)  [x] Colorants/Dyes/Food additives (20 tests)  [] Metals (implants/dental) (24 tests)  [] Local anaesthetics/NSAIDs (13 tests)  [] Antibiotics & Antimycotics (14 tests)   [] Corticosteroids (15 tests)   [] Photopatch test (62 tests)   [x] others: house dust mites, molds, tree and grass pollens and dog from prick tests      [] Patient's own products: ...    DO NOT test if chemical or biological identity is unknown!     always ask from patient the product information and safety sheets (MSDS)   [] Atopy screen  prick test (Atopic predisposition): and if possible IDT with delayed readings --> patient had recently a prick test (see above) and therefore do not repeat that except we would like to perform on Friday IDT.  RESULTS & EVALUATION of PATCH TESTS    Patch test readings after     [x] 2 days, [] 3 days [x] 4 days, [] 5 days,    Applied patch tests with results (import here the list of patch tests):    STANDARD Series      No Substance 2 days 4 days remarks   1 Kyle Mix [C] - -    2 Colophony - -    3  2-Mercaptobenzothiazole  - -     4 Methylisothiazolinone - -    5 Carba Mix - -    6 Thiuram Mix [A] - -    7 Bisphenol A Epoxy Resin - -    8 C-Wwts-Lzmppspythj-Formaldehyde Resin - -    9 Mercapto Mix [A] (+) -    10 Black Rubber Mix- PPD [B] - -    11 Potassium Dichromate  -  -    12 Balsam of [place] (Myroxylon Pereirae Resin) - -    13 Nickel Sulphate Hexahydrate - -    14 Mixed Dialkyl Thiourea - -    15 Paraben Mix [B] - -    16 Methyldibromo Glutaronitrile - -    17 Fragrance Mix - -    18 2-Bromo-2-Nitropropane-1,3-Diol (Bronopol) - -    19 Lyral - -    20 Tixocortol-21- Pivalate - -    21 Diazolidiyl Urea (Germall II) - -    22 Methyl Methacrylate - -    23 Cobalt (II) Chloride Hexahydrate - -    24 Fragrance Mix II  - -    25 Compositae Mix - -    26 Benzoyl Peroxide - -    27 Bacitracin - -    28 Formaldehyde - -    29 Methylchloroisothiazolinone / Methylisothiazolinone - -    30 Corticosteroid Mix - -    31 Sodium Lauryl Sulfate - -    32 Lanolin Alcohol - -    33 Turpentine - -    34 Cetylstearylalcohol - -    35 Chlorhexidine Dicluconate - -    36 Budenoside - -    37 Imidazolidinyl Urea  - -    38 Ethyl-2 Cyanoacrylate - -    39 Quaternium 15 (Dowicil 200) - -    40 Decyl Glucoside - -      COLORANTS, DYES, FOOD ADDITIVES   No Substance 2 days 4 days remarks   41 1 Aniline - -    42 2 Aj Brown R - -    43 3 Textile Dye Mix [A] - -    44 4 Colerain  - -    45 5 Disperse Blue-3 - -    46 6 Disperse Orange-3  - -    47 7 Disperse Red-11 NA NA    48 8 Disperse Yellow-9 - -    49 9 Erythrosine-B - -    50 10 Naphthol AS - -       Food additives      51 11 Aspartame - -    52 12 Azorubine - -    53 13 Brilliant Black - -    54 14 Cochineal Red - -    55 15 Patent Blue-VF NA NA    56 16 Pectin - -    57 17 Quinoline Yellow - -    58 18 Saccharin - -    59 19 Tartrazine - -    60 20 Sodium Glutamate NA NA      EMULSIFIERS & ADDITIVES   No Substance 2 days 4 days remarks   61 1 Polyethylene Glycol-400 - -    62 2 Cocamidopropyl Betaine - -    63 3 Amerchol L101 - -    64 4 Propylene Glycol - -    65 5 Triethanolamine - -    66 6 Sorbitane Sesquiolate - -    67 7 Isopropylmyristate - -    68 8 Polysorbate 80  - -    69 9 Amidoamine   (Stearamidopropyl Dimethylamine) - -    70 10 Oleamidopropyl Dimethylamine - -    71 11 Lauryl Glucoside - -    72 12 Coconut Diethanolamide  - -    73 13 2-Hydroxy-4-Methoxy Benzophenone (Oxybenzone) - -    74 14 Benzophenone-4 (Sulisobenzon) - -    75 15 Propolis - -    76 16 Dexpanthenol - -    77 17 Carboxymethyl Cellulose Sodium NA NA    78 18 Abitol - -    79 19 Tert-Butylhydroquinone - -    80 20 Benzyl Salicylate - -      Antioxidant      81 21 Dodecyl Gallate - -    82 22 Butylhydroxyanisole (BHA) - -    83 23 Butylhydroxytoluene (BHT) - -    84 24 Di-Alpha-Tocopherol (Vit E) - -    85 25 Propyl Gallate - -    86 26 Zinc Pyrithione NA NA    87 27 Dimethylaminopropylamin (DMAPA) - -      PRESERVATIVES & ANTIMICROBIALS     No Substance 2 days 4 days remarks   88 1  1,2-Benzisothiazoline-3-One, Sodium Salt - -    89 2  1,3,5-Waqar (2-Hydroxyethyl) - Hexahydrotriazine (Grotan BK) - -    90 3 4-Hlohiydljfsza-5-Nitro-1, 3-Propanediol - -    91 4  3, 4, 4' - Triclocarban - -    92 5 4 - Chloro - 3 - Cresol - -    93 6 4 - Chloro - 4 - Xylenol (PCMX) - -    94 7 7-Ethylbicyclooxazolidine ([facility] [id number]) - -    95 8 Benzalkonium Chloride NA NA    96 9 Benzyl Alcohol - -    97 10 Cetalkonium Chloride  - -    98 11 Cetylpyrimidine Chloride  - -    99 12 Chloroacetamide - -    100 13 DMDM Hydantoin - -    101 14 Glutaraldehyde - -    102 15 Triclosan - -    103 16 Glyoxal Trimeric Dihydrate - -    104 17 Iodopropynyl Butylcarbamate - -    105 18 Octylisothiazoline - -    106 19 Iodoform NA NA    107 20 (Nitrobutyl) Morpholine/(Ethylnitro-Trimethylene) Dimorpholine (Bioban P 1487) - -    108 21 Phenoxyethanol - -    109 22 Phenyl Salicylate - -    110 23 Povidone Iodine - -    111 24 Sodium Benzoate - -    112 25 Sodium Disulfite - -    113 26 Sorbic Acid - -    114 27 Thimerosal - -    115 28 Melamine Formeladyde Resin      116 29 Ethylenediamine Dihydrochloride        Parabens      117 30 Butyl-P-Hydroxybenzoate - -    118 31 Ethyl-P-Hydroxybenzoate - -    119 32 Methyl-P-Hydroxybenzoate - -    120 33 Propyl-P-Hydroxybenzoate - -      OTHER PRODUCTS       No Substance Conc  % solv 2 days 4 days remarks   121 D. farinae   - -    122 D. pteronyssinus   - -    123 Aspergillius   - -    124 penicillium   - -    125 Dog   - -    126 9 Tree Mix - ALK   - -    127 Thendara Mix 2630 - HS   - -    128 Tree Mix 5- HS   - -    129 5 Grass Mix - ALK   - -    130           Results of patch tests:                         Interpretation:    - Negative                    A    = Allergic      (+) Erythema    TI   = Toxic/irritant   + E + Infiltration    RaP = Relevance at Present     ++ E/I + Papulovesicle   Rpr  = Relevance Previously     +++ E/I/P + Blister     nR   = No Relevance    Atopy Screen (Placed 11/xx/20 )    No Substance Readings (15 min) Evaluation   POS Histamine 1mg/ml -    NEG NaCl 0.9% -      No Substance Readings (15 min) Evaluation   1 Alternaria alternata (tenuis)  -    2 Cladosporium herbarum -    3 Aspergillus fumigatus -    4 Penicillium notatum -    5 Dermatophagoides pteronyssinus -    6 Dermatophagoides farinae -      Intradermal Testing (Placed 11/xx/20 )    No Substance Conc.  Readings (15min) Evaluation    - NaCl  0.9% -    + Histamine (prick) 0.1mg / ml -    DF Standard Dust Mite - D. Farinae 1:10 -    DP Standard Dust Mite - D. Pteronyssinus 1:10 -    A Aspergillus fumigatus  1:10 -    P Penicillium notatum 1:10 -      Conclusion:   [x] No relevant allergic reaction observed: was not under perfect conditions, because patient developed SARS-Cov2 infection on 1st day, but according to photos from Thursday 5th November and patients info from 6th November no itchy spot on back. Not sure about possible reactions with the inhalant allergens around 120! Skin there red!    Interpretation/ remarks:   No signs for contact sensitization. Some erythema over test site of inhalant allergens ==> might have to repeat them later    ==> final Diagnosis:    # delayed type reaction to black tattoo on certain areas and aggravation during pollen season and irritant dermatitis genital    Unlikely allergic contact dermatitis (patch tests negatives, but under unfavorable conditions    probably more likely atopic dermatitis with irritant component    # perennial rhinoconjunctivitis and sinusitis/otitis with Asthma and seasonal aggravation (spring/summer)    Atopic predisposition    Perennial = house dust mite and molds (unlikely dog) = immediate and delayed?    Seasonal = tree pollens, maybe grass pollens = immediate and delayed?    Recurrent dermatitis (nummular)    # acute Urticaria    Try Allegra 180mg if recurrences    These conclusions are made at the best of one's knowledge and belief based on the provided evidence such as patient's history and allergy test results and they can change over time or can be incomplete because of missing information's.    ==> Treatment prescribed/Plan:  - try Loratadine 10mg every evening for 1 month (for sinus problems and hives)  - try Protopic oint 0.03% for about 1 month on the tattoo and on other irritated skin  - maybe in reserve Mometasone for acute lesions  - put on skin (also on genitals) for  moisturizing and prevention Vanicream Lotion and wash genitals with Vanicream facial wash    If not better, then maybe perform prick and intradermal tests with delayed readings    Follow-up: in Derm-Allergy clinic virtually in 4-5 weeks    Thank you for the opportunity be involved in the care of this patient.     Staff: Delisa Granados LPN & Yaima Sanchez and Cheyenne Lipscomb RN    _____________________________________________________________________________    Teledermatology information:  - Location of patient: Home  - Patient presented in referral from: other  - Location of teledermatologist:  [facility] [place] (, [place], [place])  - Reason teledermatology is appropriate:  of National Emergency Regarding Coronavirus disease (COVID 19) Outbreak  - Method of transmission:  Store and Forward and Video ( Invitation sent by:  Elvi and send to e-mail at: [email or web address] )  - Date of images: during video  - Service start time:2:30pm  - Service end time: 3:04pm  - Date of report: 12/15/20      I spent a total of 34 minutes for telemedicine consult with Abimael Martinez during today s video meeting. Over 50% of this time was spent counseling the patient and/or coordinating care. Please see Assessment and Plan for details.       Again, thank you for allowing me to participate in the care of your patient.        Sincerely,        James Harris MD

## 2020-12-15 NOTE — PATIENT INSTRUCTIONS
==> Treatment prescribed/Plan:  - try Loratadine 10mg every evening for 1 month (for sinus problems and hives)  - try Protopic oint 0.03% for about 1 month on the tattoo and on other irritated skin  - maybe in reserve Mometasone for acute lesions  - put on skin (also on genitals) for moisturizing and prevention Vanicream Lotion and wash genitals with Vanicream facial wash

## 2020-12-15 NOTE — NURSING NOTE
Allergy Rooming Note    Abimael Martinez's goals for this visit include:   Chief Complaint   Patient presents with     RECHECK     Abimael is here for a follow up he states that things have been improving but hes still having issues with the tattoo, eyes, and genitals.      Delisa Granados LPN

## 2020-12-21 ENCOUNTER — NURSE TRIAGE (OUTPATIENT)
Dept: FAMILY MEDICINE | Facility: CLINIC | Age: 30
End: 2020-12-21

## 2020-12-21 NOTE — TELEPHONE ENCOUNTER
"  31 y/o M with gradually worsening abdominal pain over the last few weeks. Was seen in ED and had imaging + labs, which were all reassuring. Did note splenomegaly and has hematology appointment scheduled in January. Pt states that now he has been feeling similar pains in RUQ of abdomen, under ribs, sometimes radiates to R shoulder. Reports pain as achy and sometimes sharp. Accompanied with bloating and excess gas. Eating large meals + food makes pain worse, better when eating smaller more frequent meals. Pt has not taken new medications and does not drink much alcohol. Also has hx of hemorrhoids and has noticed some blood in his stools for the past few weeks, small amount. Stools are hard and painful. No vomiting or fever, but does have some nausea. Able to walk around normally, but would like to have an assessment done.     Disposition: I recommend pt be seen for F/U with PCP or provider who is available in next 1-2 days. Can he be seen earlier than Wednesday? He is currently scheduled for this Wednesday, 12/23. Also, anything Toya Doty would like to do before hand (order imaging, labs, etc)? Pt asked that I send message over to care team.     Nancy Marcos, RN, IBCLC   [facility] RN  *This was a call that was routed to me as high priority      Additional Information    Abdominal pain is a chronic symptom (recurrent or ongoing AND lasting > 4 weeks)    Answer Assessment - Initial Assessment Questions  1. LOCATION: \"Where does it hurt?\"       Pain initially was on L side, but was found to have enlarged spleen. Pain since migrated to R side, upper abdomen.   2. RADIATION: \"Does the pain shoot anywhere else?\" (e.g., chest, back)      Sometimes pain is achy, but sometimes sharp.   3. ONSET: \"When did the pain begin?\" (Minutes, hours or days ago)       Was seen in ED last week, but pain on R side started a few days ago.   4. SUDDEN: \"Gradual or sudden onset?\"      Gradually over the last few weeks   5. " "PATTERN \"Does the pain come and go, or is it constant?\"     - If constant: \"Is it getting better, staying the same, or worsening?\"       (Note: Constant means the pain never goes away completely; most serious pain is constant and it progresses)      - If intermittent: \"How long does it last?\" \"Do you have pain now?\"      (Note: Intermittent means the pain goes away completely between bouts)      Seems to be intermittent, but spleen pain is worse  6. SEVERITY: \"How bad is the pain?\"  (e.g., Scale 1-10; mild, moderate, or severe)     - MILD (1-3): doesn't interfere with normal activities, abdomen soft and not tender to touch      - MODERATE (4-7): interferes with normal activities or awakens from sleep, tender to touch      - SEVERE (8-10): excruciating pain, doubled over, unable to do any normal activities        Mild-moderate pain   7. RECURRENT SYMPTOM: \"Have you ever had this type of abdominal pain before?\" If so, ask: \"When was the last time?\" and \"What happened that time?\"       No abdominal pain like this before-   8. CAUSE: \"What do you think is causing the abdominal pain?\"      Unsure  9. RELIEVING/AGGRAVATING FACTORS: \"What makes it better or worse?\" (e.g., movement, antacids, bowel movement)      Worse with eating large meals, burping more, feels bloated  10. OTHER SYMPTOMS: \"Has there been any vomiting, diarrhea, constipation, or urine problems?\"        No vomiting, but has had constipation and blood in stools (sometimes throughout the stool). Has hx of hemorrhoids. Does have some nausea.    Protocols used: ABDOMINAL PAIN - MALE-A-OH      "

## 2020-12-21 NOTE — TELEPHONE ENCOUNTER
Patient calling.  Says he has 3 missed calls from this number and asks what I want.  I can find no notes for why they were trying to contact him.  Then he asked to be transferred to scheduling.    Sadia Roberson RN  [facility] Nurse Advisor

## 2020-12-23 ENCOUNTER — OFFICE VISIT (OUTPATIENT)
Dept: FAMILY MEDICINE | Facility: CLINIC | Age: 30
End: 2020-12-23
Payer: COMMERCIAL

## 2020-12-23 VITALS
TEMPERATURE: 97.9 F | BODY MASS INDEX: 32.6 KG/M2 | SYSTOLIC BLOOD PRESSURE: 135 MMHG | HEART RATE: 85 BPM | OXYGEN SATURATION: 97 % | RESPIRATION RATE: 18 BRPM | DIASTOLIC BLOOD PRESSURE: 86 MMHG | WEIGHT: 246 LBS | HEIGHT: 73 IN

## 2020-12-23 DIAGNOSIS — R10.84 ABDOMINAL PAIN, GENERALIZED: ICD-10-CM

## 2020-12-23 DIAGNOSIS — B35.4 TINEA CORPORIS: Primary | ICD-10-CM

## 2020-12-23 DIAGNOSIS — K92.1 BLOOD IN STOOL: ICD-10-CM

## 2020-12-23 PROCEDURE — 99214 OFFICE O/P EST MOD 30 MIN: CPT | Performed by: NURSE PRACTITIONER

## 2020-12-23 PROCEDURE — 81003 URINALYSIS AUTO W/O SCOPE: CPT | Performed by: NURSE PRACTITIONER

## 2020-12-23 RX ORDER — POLYETHYLENE GLYCOL 3350 17 G/17G
1 POWDER, FOR SOLUTION ORAL DAILY
Qty: 119 G | Refills: 1 | Status: SHIPPED | OUTPATIENT
Start: 2020-12-23 | End: 2020-12-30

## 2020-12-23 RX ORDER — TERBINAFINE HYDROCHLORIDE 250 MG/1
250 TABLET ORAL DAILY
Qty: 14 TABLET | Refills: 0 | Status: SHIPPED | OUTPATIENT
Start: 2020-12-23 | End: 2021-09-20

## 2020-12-23 RX ORDER — CLOTRIMAZOLE 1 %
CREAM (GRAM) TOPICAL 2 TIMES DAILY
Qty: 60 G | Refills: 1 | Status: SHIPPED | OUTPATIENT
Start: 2020-12-23 | End: 2022-06-13

## 2020-12-23 ASSESSMENT — PAIN SCALES - GENERAL: PAINLEVEL: NO PAIN (0)

## 2020-12-23 ASSESSMENT — MIFFLIN-ST. JEOR: SCORE: 2126.79

## 2020-12-23 NOTE — PROGRESS NOTES
"Subjective     Abimael Martinez is a 30 year old male who presents to clinic today for the following health issues:    HPI         Concern - Ringworm  Onset: beginning of November 2020  Description: Patient complains of multiple ringworm on the mid back and bilateral axillary. The back improving but the axillary have not subsided.  Intensity: moderate  Progression of Symptoms:  worsening  Accompanying Signs & Symptoms: red marks improving to brown marks, itching  Previous history of similar problem: yes  Precipitating factors:        Worsened by: none  Alleviating factors:        Improved by: Rx cream  Therapies tried and outcome: Rx cream    Pain left shoulder and axillary.  Now, patient noticed new symptoms of pain onto the right shoulder and axillary and also right sided between lower to mid back pain. Patient is wondering if related or other issue. He also feel full fast after eating.  He did go to emergency room on 12/15/20 for this.  Had blood work done which was all normal.  He is just worried that the new pains could be related to his spleen.        Feels nausea after eating.   Blood in stool - history of hemorrhoids and banding for those hemorrhoids.  Has had normal bowel movements up until a few days ago when started to have some constipation.  That is when the blood in the stool started.        Review of Systems   Constitutional, HEENT, cardiovascular, pulmonary, GI, , musculoskeletal, neuro, skin, endocrine and psych systems are negative, except as otherwise noted.      Objective    /86 (BP Location: Right arm, Patient Position: Chair, Cuff Size: Adult Large)   Pulse 85   Temp 97.9  F (36.6  C) (Oral)   Resp 18   Ht 1.842 m (6' 0.5\")   Wt 111.6 kg (246 lb)   SpO2 97%   BMI 32.90 kg/m    Body mass index is 32.9 kg/m .  Physical Exam   GENERAL: healthy, alert and no distress  EYES: Eyes grossly normal to inspection, PERRL and conjunctivae and sclerae normal  HENT: ear canals and TM's " normal, nose and mouth without ulcers or lesions  NECK: no adenopathy, no asymmetry, masses, or scars and thyroid normal to palpation  RESP: lungs clear to auscultation - no rales, rhonchi or wheezes  CV: regular rate and rhythm, normal S1 S2, no S3 or S4, no murmur, click or rub, no peripheral edema and peripheral pulses strong  ABDOMEN: tenderness epigastric and soft, no organomegaly or masses and bowel sounds normal  MS: no gross musculoskeletal defects noted, no edema  SKIN: tinea corporis lesions on back mostly resolved, under bilateral underarms, no change  NEURO: Normal strength and tone, mentation intact and speech normal  PSYCH: mentation appears normal and anxious    Results for orders placed or performed in visit on 12/23/20 (from the past 24 hour(s))   *UA reflex to Microscopic and Culture ([place] and [facility] (except [place] and [place])    Specimen: Midstream Urine   Result Value Ref Range    Color Urine Yellow     Appearance Urine Clear     Glucose Urine Negative NEG^Negative mg/dL    Bilirubin Urine Negative NEG^Negative    Ketones Urine Negative NEG^Negative mg/dL    Specific Gravity Urine 1.025 1.003 - 1.035    Blood Urine Negative NEG^Negative    pH Urine 6.0 5.0 - 7.0 pH    Protein Albumin Urine Negative NEG^Negative mg/dL    Urobilinogen Urine 0.2 0.2 - 1.0 EU/dL    Nitrite Urine Negative NEG^Negative    Leukocyte Esterase Urine Negative NEG^Negative    Source Midstream Urine            Assessment & Plan     Tinea corporis  Only mildly improved with topical x 3 weeks.  Can try topicals for 2 more weeks and if no improvement, trial of terbinafine daily for two weeks.   - terbinafine (LAMISIL) 250 MG tablet; Take 1 tablet (250 mg) by mouth daily for 14 days  - clotrimazole (LOTRIMIN) 1 % external cream; Apply topically 2 times daily    Abdominal pain, generalized  Given normal recent labs, and normal UA today, I do suspect this could be related to stool burden versus gas pains.  We will  try miralax daily for a week and follow up if pain continues.    - polyethylene glycol (MIRALAX) 17 GM/Dose powder; Take 17 g (1 capful) by mouth daily for 7 days  - *UA reflex to Microscopic and Culture ([place] and [facility] (except [place] and [place])    Blood in stool  Similar to chronic issues with hemorrhoids.  Reviewed warning signs and emergency room precautions.              Return in about 1 week (around 12/30/2020), or if symptoms worsen or fail to improve.    STEFFEN [facility]

## 2020-12-29 ENCOUNTER — MYC REFILL (OUTPATIENT)
Dept: FAMILY MEDICINE | Facility: CLINIC | Age: 30
End: 2020-12-29

## 2020-12-29 DIAGNOSIS — F90.2 ATTENTION DEFICIT HYPERACTIVITY DISORDER (ADHD), COMBINED TYPE: ICD-10-CM

## 2020-12-29 RX ORDER — DEXTROAMPHETAMINE SACCHARATE, AMPHETAMINE ASPARTATE, DEXTROAMPHETAMINE SULFATE AND AMPHETAMINE SULFATE 5; 5; 5; 5 MG/1; MG/1; MG/1; MG/1
20 TABLET ORAL 3 TIMES DAILY
Qty: 90 TABLET | Refills: 0 | Status: SHIPPED | OUTPATIENT
Start: 2020-12-29 | End: 2021-01-31

## 2020-12-29 NOTE — TELEPHONE ENCOUNTER
Controlled Substance Refill Request for Adderall 20 mg  Problem List Complete:  Yes  Patient is followed by MICHAEL OROPEZA for ongoing prescription of stimulants.  All refills should be approved by this provider, or covering partner.    Medication(s): adderall 20 mg.   Maximum quantity per month: 90  Clinic visit frequency required: Q 6  months     Controlled substance agreement on file: 07/31/17    Neuropsych evaluation for ADD completed:  Yes, completed 7-2008 at [facility], on file and diagnosis confirmed    Last [facility] website verification:   checked in past 3 months?  Yes Per review of  on 12/29/20, last filled on 11/30/20.    Gisela Cullen, ADRYN, RN

## 2021-01-06 ENCOUNTER — MYC REFILL (OUTPATIENT)
Dept: FAMILY MEDICINE | Facility: CLINIC | Age: 31
End: 2021-01-06

## 2021-01-06 DIAGNOSIS — F41.9 ANXIETY: ICD-10-CM

## 2021-01-06 RX ORDER — ALPRAZOLAM 2 MG
2 TABLET ORAL 3 TIMES DAILY PRN
Qty: 30 TABLET | Refills: 0 | Status: SHIPPED | OUTPATIENT
Start: 2021-01-06 | End: 2021-02-07

## 2021-01-06 NOTE — TELEPHONE ENCOUNTER
Routing refill request to provider for review/approval because:  Drug not on the [facility] refill protocol     Heather RIDERN, RN

## 2021-01-13 ENCOUNTER — VIRTUAL VISIT (OUTPATIENT)
Dept: ONCOLOGY | Facility: CLINIC | Age: 31
End: 2021-01-13
Attending: NURSE PRACTITIONER
Payer: COMMERCIAL

## 2021-01-13 ENCOUNTER — PRE VISIT (OUTPATIENT)
Dept: ONCOLOGY | Facility: CLINIC | Age: 31
End: 2021-01-13

## 2021-01-13 VITALS — BODY MASS INDEX: 33.32 KG/M2 | WEIGHT: 246 LBS | HEIGHT: 72 IN

## 2021-01-13 DIAGNOSIS — R16.1 SPLENOMEGALY: ICD-10-CM

## 2021-01-13 DIAGNOSIS — R59.1 LYMPHADENOPATHY: Primary | ICD-10-CM

## 2021-01-13 PROCEDURE — 99204 OFFICE O/P NEW MOD 45 MIN: CPT | Mod: 95 | Performed by: INTERNAL MEDICINE

## 2021-01-13 ASSESSMENT — PAIN SCALES - GENERAL: PAINLEVEL: MODERATE PAIN (4)

## 2021-01-13 ASSESSMENT — MIFFLIN-ST. JEOR: SCORE: 2113.85

## 2021-01-13 NOTE — NURSING NOTE
Abimael is a 31 year old who is being evaluated via a billable video visit.      How would you like to obtain your AVS? MyChart  If the video visit is dropped, the invitation should be resent by: Text to cell phone: [phone]  Will anyone else be joining your video visit? No    Video-Visit Details    Type of service:  Video Visit      Originating Location (pt. Location): Home    Distant Location (provider location):  [facility]     Platform used for Video Visit: AmWell     SYMPTOM QUESTIONNAIRE    Pain: YES, 4/10 Left side under left rib and under his left armpit    Nausea/Vomiting: YES, nausea    Mouth Sores: no    Shortness of Breath: YES, pain when taking a deep breath, Left side of his chest    Smoking: no    Fever or Chills: YES, chills    Hard Stools: YES, blood in stool    Soft Stools: YES, blood in stool, stools can be of normal consistency and there is still blood in stool    Weight Loss: no    Weakness: YES, feeling very lethargic, sleeping a lot    Burning, numbness or tingling in hands or feet: YES, random pain in his hands    Problems with skin or swelling: YES, Rash, diagnosed with ringworm, applying Clotrimazole 1% cream, seems to be helping except for some spots under his armpits    Memory Loss: YES, feels foggy all the time    Anxiety or Depression: YES, anxiety, takes Xanax    Trouble Sleeping: YES, exhausted every am    Concerns:  Why is he sick all the time?    Sasha Chacon CMA

## 2021-01-13 NOTE — LETTER
1/13/2021         RE: Abimael Martinez  [address]  [place] [address]        Dear Colleague,    Thank you for referring your patient, Abimael Martinez, to the [facility]. Please see a copy of my visit note below.    Hematology initial visit:  Date on this visit: 1/13/2021    Abimael Martinez  is referred by Dr.Brianna Victoria Doty for a hematology consultation. He requires evaluation for new diagnosis of splenomegaly.      Primary Physician: Jamison Lora     History Of Present Illness:  Mr. Martinez is a 31 year old male who presents with new diagnosis of splenomegaly  I reviewed records from Victoria Bailey PA-C from interventional pain physicians.  I have also reviewed records from Brandi Doty.    This is a video visit.    Abimael has a history of obesity, chronic back pain and has underwent epidural injections in the back, mild splenomegaly in 2012, hypertension.  In March 2012 he was noted to have mild splenomegaly with spleen size 13.7 cm.  At that time he was complaining of off-and-on left upper quadrant discomfort.  Repeat ultrasound on 12/4/2020 showed spleen to be 14 x 13.5 x 5.3 cm.    He mentions to me that for several years he has had issues where he feels that he is coming down with something with some soreness in the throat and prominence of lymph nodes of the neck with some tenderness.  In 2012 he also had left upper quadrant pain and he was noted to have mild splenomegaly.  Then in November 2020 he had COVID-19 and since then he has noticed pressure in the left upper quadrant and at times going into the left shoulder blade and shoulder region.  At that time he was sick with high-grade fevers and nausea and weakness and a little cough but no shortness of breath.  He recovered and slowly the pain in the left upper quadrant and the shoulder has improved a little bit in the sense that he does not feel the pain in the shoulder  that often but he still has pressure in the left upper quadrant.  He also mentions that now he is also feeling a little bit of similar kind of pressure on the right side of his abdomen.  When he had the Covid he also had a ringworm which resolved.  Over the last 1-1/2 months he has noticed off-and-on blood in the stools.  He has a history of hemorrhoidal bleeding in 2014 which was banded and he had a colonoscopy at that time which only showed hemorrhoids.  He has had issues with acid reflux.  He denies any B symptoms.  In between the episodes where he feels rundown and body aches and sometimes swelling of the lymph nodes/tenderness, he feels really well and remains fully functional.  He denies any other swellings.  He has chronic back pain and there is a plan to do surgery to the back in the future.  He also mentions that he has anxiety issues.      ROS:  A comprehensive ROS was otherwise neg      Past Medical/Surgical History:  Past Medical History:   Diagnosis Date     Acute right otitis media 1/8/2013     Anxiety      Depression      Drug abuse (H)      Gonococcal urethritis 02/05/2016     Urethritis 5/20/2013     Problem list name updated by automated process. Provider to review     Past Surgical History:   Procedure Laterality Date     BACK SURGERY  04/05/2018     Allergies:  Allergies as of 01/13/2021 - Reviewed 12/23/2020   Allergen Reaction Noted     Cymbalta  10/25/2012     Duloxetine Other (See Comments) and Fatigue 11/26/2013     Paroxetine Other (See Comments), Fatigue, and GI Disturbance 11/26/2013     Seasonal allergies  07/30/2019     Vicodin [hydrocodone-acetaminophen]  01/31/2018     Current Medications:  Current Outpatient Medications   Medication Sig Dispense Refill     albuterol (PROAIR HFA/PROVENTIL HFA/VENTOLIN HFA) 108 (90 Base) MCG/ACT inhaler Inhale 2 puffs into the lungs every 4 hours as needed for wheezing 1 Inhaler 0     ALPRAZolam (XANAX) 2 MG tablet Take 1 tablet (2 mg) by mouth 3 times  daily as needed for anxiety 30 tablet 0     amphetamine-dextroamphetamine (ADDERALL) 20 MG tablet Take 1 tablet (20 mg) by mouth 3 times daily 90 tablet 0     azelastine (ASTELIN) 0.1 % nasal spray Spray 2 sprays into both nostrils 2 times daily 30 mL 11     clotrimazole (LOTRIMIN) 1 % external cream Apply topically 2 times daily 60 g 1     famotidine (PEPCID) 40 MG tablet Take 1 tablet (40 mg) by mouth At Bedtime 90 tablet 1     fluticasone (FLOVENT HFA) 220 MCG/ACT Inhaler Inhale 2 puffs into the lungs 2 times daily Please give patient spacer 1 Inhaler 3     Hyoscyamine Sulfate 0.375 MG TBCR Take 1 capful by mouth 3 times daily as needed 90 tablet 1     loratadine (CLARITIN) 10 MG tablet Take 1 tablet (10 mg) by mouth every evening 30 tablet 1     mometasone (ELOCON) 0.1 % external ointment If necessary use 3-4 days in a row or 2xweekly on eczematous lesions 45 g 2     montelukast (SINGULAIR) 10 MG tablet Take 1 tablet (10 mg) by mouth At Bedtime 90 tablet 0     oxyCODONE-acetaminophen (PERCOCET) 5-325 MG per tablet Take 1 tablet by mouth every 6 hours as needed for pain 12 tablet 0     pimecrolimus (ELIDEL) 1 % external cream Apply topically 2 times daily 30 g 0     sildenafil (REVATIO) 20 MG tablet Take 1-2 tablets (20-40 mg) by mouth daily as needed As needed for prevention of erectile dysfunction 30 tablet 2     tacrolimus (PROTOPIC) 0.03 % external ointment Apply topically 2 times daily Put on skin lesions 2x daily 60 g 1     tacrolimus (PROTOPIC) 0.1 % external ointment Apply twice daily as needed for rash on penis. 30 g 11     triamcinolone (KENALOG) 0.025 % external ointment Apply topically 2 times daily Apply twice a day to sites of irritation 80 g 1      Family History:  Family History   Problem Relation Age of Onset     Unknown/Adopted Mother      Unknown/Adopted Father      Unknown/Adopted Maternal Grandmother      Unknown/Adopted Maternal Grandfather      Unknown/Adopted Paternal Grandmother       Unknown/Adopted Paternal Grandfather      Unknown/Adopted Brother      Adopted      Social History:  Social History     Socioeconomic History     Marital status: Single     Spouse name: Not on file     Number of children: Not on file     Years of education: Not on file     Highest education level: Not on file   Occupational History     Not on file   Social Needs     Financial resource strain: Not on file     Food insecurity     Worry: Not on file     Inability: Not on file     Transportation needs     Medical: Not on file     Non-medical: Not on file   Tobacco Use     Smoking status: Former Smoker     Packs/day: 0.50     Years: 6.00     Pack years: 3.00     Types: Cigarettes     Quit date: 3/12/2018     Years since quittin.8     Smokeless tobacco: Never Used     Tobacco comment: second hand smoke exposure   Substance and Sexual Activity     Alcohol use: Yes     Comment: once a week     Drug use: Yes     Types: Marijuana     Comment: pot once a month, ectasy  As of 2016 he only smokes pot occasionally     Sexual activity: Yes     Partners: Female     Birth control/protection: Condom   Lifestyle     Physical activity     Days per week: Not on file     Minutes per session: Not on file     Stress: Not on file   Relationships     Social connections     Talks on phone: Not on file     Gets together: Not on file     Attends Hinduism service: Not on file     Active member of club or organization: Not on file     Attends meetings of clubs or organizations: Not on file     Relationship status: Not on file     Intimate partner violence     Fear of current or ex partner: Not on file     Emotionally abused: Not on file     Physically abused: Not on file     Forced sexual activity: Not on file   Other Topics Concern     Parent/sibling w/ CABG, MI or angioplasty before 65F 55M? No   Social History Narrative     Not on file     Quit smoking 3 years ago. Drinks occasional etoh. Off and on smokes marijuana.    Physical  Exam:  There were no vitals taken for this visit.   Wt Readings from Last 4 Encounters:   12/23/20 111.6 kg (246 lb)   12/04/20 113.4 kg (250 lb)   11/30/20 113.1 kg (249 lb 6.4 oz)   09/08/20 116.2 kg (256 lb 3.2 oz)     Constitutional.  Does not seem to be in any acute distress.  Eyes.  No redness or discharge noted.  Respiratory.  Speaking in full sentences.  Breathing seems comfortable without any accessory use of muscles.    Skin.  Visualized his skin does not show any obvious rashes.  Musculoskeletal.  Range of motion for visualized areas is intact.  Neurological.  Alert and oriented x3.  Psychiatric.  Mood, mentation and affect are normal.  Decision making capacity is intact.      The rest of a comprehensive physical examination is deferred due to Public Health Emergency video visit restrictions.    Laboratory/Imaging Studies      Reviewed  10/25/2020.  CBC was unremarkable.  CMP was unremarkable.    In March 2012 he was noted to have mild splenomegaly with spleen size 13.7 cm.      Hepatitis C and HIV screen were negative.      Repeat ultrasound on 12/4/2020 showed spleen to be 14 x 13.5 x 5.3 cm.    ASSESSMENT/PLAN:    Splenomegaly.  He has had issues with mild splenomegaly since 2012.  He also tells me at times he has noticed swelling of his lymph nodes and there were episodes which he experiences quite frequently when he feels very rundown as if he is going to come down with an infection although he usually does not have fevers during those episodes.  These resolve on their own.  In November 2020, he had a COVID-19 from which he recovered but he continues to have issues with left upper quadrant discomfort going into the left shoulder/left shoulder blade.    I would like to do further testing to rule out underlying hematological problems including lymphoma and we will check peripheral blood smear, repeat CMP and LDH.  I would also like to do peripheral blood flow cytometry.  I discussed with him that I  would also like to do a CT scan to assess the lymph nodes and liver and other structures but at this time he wants to avoid radiation and wants to hold off on getting a CT scan.  He also has noticed blood in the stools and I believe CT scan would be helpful in that regard as well.    Because of these episodes which have been going on for several years, I wonder if he has an underlying problem with immunity or underlying rheumatological condition.  I would check KATHRYN as well as immunoglobulin levels.  I would also consider referral to rheumatology and immunology in the future.    Blood in the stools.  In 2014 he was having hemorrhoidal bleeding and had a colonoscopy and had bleeding but this time he mentions that over the last 1-1/2 months the blood is mixed in the stools.  He also has issues with acid reflux and abdominal discomfort.  As mentioned above I had recommended doing a CT scan but he wants to hold off on that but he is agreeable to see a GI doctor.  I gave him a referral for that.    For now he will hold off on the back surgery till the above-mentioned work-up is completed.    I would like to see him back in 3 to 4 weeks in follow-up.    All questions were answered to his satisfaction.  He is agreeable and comfortable with the plan.    Telly Christianson MD      Video start time. 12:38 PM  Video stop time. 1:05 PM    I spent 46 minutes on this visit including the video time, reviewing records and labs and imaging and placing new orders as well as documentation.          Again, thank you for allowing me to participate in the care of your patient.        Sincerely,        Telly Christianson MD

## 2021-01-13 NOTE — PROGRESS NOTES
Hematology initial visit:  Date on this visit: 1/13/2021    Abimael Martinez  is referred by Dr.Brianna Victoria Doty for a hematology consultation. He requires evaluation for new diagnosis of splenomegaly.      Primary Physician: Jamison Lora     History Of Present Illness:  Mr. Martinez is a 31 year old male who presents with new diagnosis of splenomegaly  I reviewed records from Victoria Bailey PA-C from [facility].  I have also reviewed records from Brandi Doty.    This is a video visit.    Abimael has a history of obesity, chronic back pain and has underwent epidural injections in the back, mild splenomegaly in 2012, hypertension.  In March 2012 he was noted to have mild splenomegaly with spleen size 13.7 cm.  At that time he was complaining of off-and-on left upper quadrant discomfort.  Repeat ultrasound on 12/4/2020 showed spleen to be 14 x 13.5 x 5.3 cm.    He mentions to me that for several years he has had issues where he feels that he is coming down with something with some soreness in the throat and prominence of lymph nodes of the neck with some tenderness.  In 2012 he also had left upper quadrant pain and he was noted to have mild splenomegaly.  Then in November 2020 he had COVID-19 and since then he has noticed pressure in the left upper quadrant and at times going into the left shoulder blade and shoulder region.  At that time he was sick with high-grade fevers and nausea and weakness and a little cough but no shortness of breath.  He recovered and slowly the pain in the left upper quadrant and the shoulder has improved a little bit in the sense that he does not feel the pain in the shoulder that often but he still has pressure in the left upper quadrant.  He also mentions that now he is also feeling a little bit of similar kind of pressure on the right side of his abdomen.  When he had the Covid he also had a ringworm which resolved.  Over the last 1-1/2 months he  has noticed off-and-on blood in the stools.  He has a history of hemorrhoidal bleeding in 2014 which was banded and he had a colonoscopy at that time which only showed hemorrhoids.  He has had issues with acid reflux.  He denies any B symptoms.  In between the episodes where he feels rundown and body aches and sometimes swelling of the lymph nodes/tenderness, he feels really well and remains fully functional.  He denies any other swellings.  He has chronic back pain and there is a plan to do surgery to the back in the future.  He also mentions that he has anxiety issues.      ROS:  A comprehensive ROS was otherwise neg      Past Medical/Surgical History:  Past Medical History:   Diagnosis Date     Acute right otitis media 1/8/2013     Anxiety      Depression      Drug abuse (H)      Gonococcal urethritis 02/05/2016     Urethritis 5/20/2013     Problem list name updated by automated process. Provider to review     Past Surgical History:   Procedure Laterality Date     BACK SURGERY  04/05/2018     Allergies:  Allergies as of 01/13/2021 - Reviewed 12/23/2020   Allergen Reaction Noted     Cymbalta  10/25/2012     Duloxetine Other (See Comments) and Fatigue 11/26/2013     Paroxetine Other (See Comments), Fatigue, and GI Disturbance 11/26/2013     Seasonal allergies  07/30/2019     Vicodin [hydrocodone-acetaminophen]  01/31/2018     Current Medications:  Current Outpatient Medications   Medication Sig Dispense Refill     albuterol (PROAIR HFA/PROVENTIL HFA/VENTOLIN HFA) 108 (90 Base) MCG/ACT inhaler Inhale 2 puffs into the lungs every 4 hours as needed for wheezing 1 Inhaler 0     ALPRAZolam (XANAX) 2 MG tablet Take 1 tablet (2 mg) by mouth 3 times daily as needed for anxiety 30 tablet 0     amphetamine-dextroamphetamine (ADDERALL) 20 MG tablet Take 1 tablet (20 mg) by mouth 3 times daily 90 tablet 0     azelastine (ASTELIN) 0.1 % nasal spray Spray 2 sprays into both nostrils 2 times daily 30 mL 11     clotrimazole  (LOTRIMIN) 1 % external cream Apply topically 2 times daily 60 g 1     famotidine (PEPCID) 40 MG tablet Take 1 tablet (40 mg) by mouth At Bedtime 90 tablet 1     fluticasone (FLOVENT HFA) 220 MCG/ACT Inhaler Inhale 2 puffs into the lungs 2 times daily Please give patient spacer 1 Inhaler 3     Hyoscyamine Sulfate 0.375 MG TBCR Take 1 capful by mouth 3 times daily as needed 90 tablet 1     loratadine (CLARITIN) 10 MG tablet Take 1 tablet (10 mg) by mouth every evening 30 tablet 1     mometasone (ELOCON) 0.1 % external ointment If necessary use 3-4 days in a row or 2xweekly on eczematous lesions 45 g 2     montelukast (SINGULAIR) 10 MG tablet Take 1 tablet (10 mg) by mouth At Bedtime 90 tablet 0     oxyCODONE-acetaminophen (PERCOCET) 5-325 MG per tablet Take 1 tablet by mouth every 6 hours as needed for pain 12 tablet 0     pimecrolimus (ELIDEL) 1 % external cream Apply topically 2 times daily 30 g 0     sildenafil (REVATIO) 20 MG tablet Take 1-2 tablets (20-40 mg) by mouth daily as needed As needed for prevention of erectile dysfunction 30 tablet 2     tacrolimus (PROTOPIC) 0.03 % external ointment Apply topically 2 times daily Put on skin lesions 2x daily 60 g 1     tacrolimus (PROTOPIC) 0.1 % external ointment Apply twice daily as needed for rash on penis. 30 g 11     triamcinolone (KENALOG) 0.025 % external ointment Apply topically 2 times daily Apply twice a day to sites of irritation 80 g 1      Family History:  Family History   Problem Relation Age of Onset     Unknown/Adopted Mother      Unknown/Adopted Father      Unknown/Adopted Maternal Grandmother      Unknown/Adopted Maternal Grandfather      Unknown/Adopted Paternal Grandmother      Unknown/Adopted Paternal Grandfather      Unknown/Adopted Brother      Adopted      Social History:  Social History     Socioeconomic History     Marital status: Single     Spouse name: Not on file     Number of children: Not on file     Years of education: Not on file      Highest education level: Not on file   Occupational History     Not on file   Social Needs     Financial resource strain: Not on file     Food insecurity     Worry: Not on file     Inability: Not on file     Transportation needs     Medical: Not on file     Non-medical: Not on file   Tobacco Use     Smoking status: Former Smoker     Packs/day: 0.50     Years: 6.00     Pack years: 3.00     Types: Cigarettes     Quit date: 3/12/2018     Years since quittin.8     Smokeless tobacco: Never Used     Tobacco comment: second hand smoke exposure   Substance and Sexual Activity     Alcohol use: Yes     Comment: once a week     Drug use: Yes     Types: Marijuana     Comment: pot once a month, ectasy  As of 2016 he only smokes pot occasionally     Sexual activity: Yes     Partners: Female     Birth control/protection: Condom   Lifestyle     Physical activity     Days per week: Not on file     Minutes per session: Not on file     Stress: Not on file   Relationships     Social connections     Talks on phone: Not on file     Gets together: Not on file     Attends [employer] service: Not on file     Active member of club or organization: Not on file     Attends meetings of clubs or organizations: Not on file     Relationship status: Not on file     Intimate partner violence     Fear of current or ex partner: Not on file     Emotionally abused: Not on file     Physically abused: Not on file     Forced sexual activity: Not on file   Other Topics Concern     Parent/sibling w/ CABG, MI or angioplasty before 65F 55M? No   Social History Narrative     Not on file     Quit smoking 3 years ago. Drinks occasional etoh. Off and on smokes marijuana.    Physical Exam:  There were no vitals taken for this visit.   Wt Readings from Last 4 Encounters:   20 111.6 kg (246 lb)   20 113.4 kg (250 lb)   20 113.1 kg (249 lb 6.4 oz)   20 116.2 kg (256 lb 3.2 oz)     Constitutional.  Does not seem to be in any acute  distress.  Eyes.  No redness or discharge noted.  Respiratory.  Speaking in full sentences.  Breathing seems comfortable without any accessory use of muscles.    Skin.  Visualized his skin does not show any obvious rashes.  Musculoskeletal.  Range of motion for visualized areas is intact.  Neurological.  Alert and oriented x3.  Psychiatric.  Mood, mentation and affect are normal.  Decision making capacity is intact.      The rest of a comprehensive physical examination is deferred due to Public Health Emergency video visit restrictions.    Laboratory/Imaging Studies      Reviewed  10/25/2020.  CBC was unremarkable.  CMP was unremarkable.    In March 2012 he was noted to have mild splenomegaly with spleen size 13.7 cm.      Hepatitis C and HIV screen were negative.      Repeat ultrasound on 12/4/2020 showed spleen to be 14 x 13.5 x 5.3 cm.    ASSESSMENT/PLAN:    Splenomegaly.  He has had issues with mild splenomegaly since 2012.  He also tells me at times he has noticed swelling of his lymph nodes and there were episodes which he experiences quite frequently when he feels very rundown as if he is going to come down with an infection although he usually does not have fevers during those episodes.  These resolve on their own.  In November 2020, he had a COVID-19 from which he recovered but he continues to have issues with left upper quadrant discomfort going into the left shoulder/left shoulder blade.    I would like to do further testing to rule out underlying hematological problems including lymphoma and we will check peripheral blood smear, repeat CMP and LDH.  I would also like to do peripheral blood flow cytometry.  I discussed with him that I would also like to do a CT scan to assess the lymph nodes and liver and other structures but at this time he wants to avoid radiation and wants to hold off on getting a CT scan.  He also has noticed blood in the stools and I believe CT scan would be helpful in that regard as  well.    Because of these episodes which have been going on for several years, I wonder if he has an underlying problem with immunity or underlying rheumatological condition.  I would check KATHRYN as well as immunoglobulin levels.  I would also consider referral to rheumatology and immunology in the future.    Blood in the stools.  In 2014 he was having hemorrhoidal bleeding and had a colonoscopy and had bleeding but this time he mentions that over the last 1-1/2 months the blood is mixed in the stools.  He also has issues with acid reflux and abdominal discomfort.  As mentioned above I had recommended doing a CT scan but he wants to hold off on that but he is agreeable to see a GI doctor.  I gave him a referral for that.    For now he will hold off on the back surgery till the above-mentioned work-up is completed.    I would like to see him back in 3 to 4 weeks in follow-up.    All questions were answered to his satisfaction.  He is agreeable and comfortable with the plan.    Telly Christianson MD      Video start time. 12:38 PM  Video stop time. 1:05 PM    I spent 46 minutes on this visit including the video time, reviewing records and labs and imaging and placing new orders as well as documentation.

## 2021-01-14 ENCOUNTER — TELEPHONE (OUTPATIENT)
Dept: GASTROENTEROLOGY | Facility: CLINIC | Age: 31
End: 2021-01-14

## 2021-01-14 DIAGNOSIS — R16.1 SPLENOMEGALY: ICD-10-CM

## 2021-01-14 DIAGNOSIS — R59.1 LYMPHADENOPATHY: ICD-10-CM

## 2021-01-14 LAB
ALBUMIN SERPL-MCNC: 4.1 G/DL (ref 3.4–5)
ALP SERPL-CCNC: 96 U/L (ref 40–150)
ALT SERPL W P-5'-P-CCNC: 32 U/L (ref 0–70)
ANION GAP SERPL CALCULATED.3IONS-SCNC: 2 MMOL/L (ref 3–14)
AST SERPL W P-5'-P-CCNC: 13 U/L (ref 0–45)
BASOPHILS # BLD AUTO: 0 10E9/L (ref 0–0.2)
BASOPHILS NFR BLD AUTO: 0.5 %
BILIRUB SERPL-MCNC: 0.8 MG/DL (ref 0.2–1.3)
BUN SERPL-MCNC: 10 MG/DL (ref 7–30)
CALCIUM SERPL-MCNC: 9.6 MG/DL (ref 8.5–10.1)
CHLORIDE SERPL-SCNC: 106 MMOL/L (ref 94–109)
CO2 SERPL-SCNC: 31 MMOL/L (ref 20–32)
CREAT SERPL-MCNC: 1.16 MG/DL (ref 0.66–1.25)
DIFFERENTIAL METHOD BLD: NORMAL
EOSINOPHIL # BLD AUTO: 0.4 10E9/L (ref 0–0.7)
EOSINOPHIL NFR BLD AUTO: 6.2 %
ERYTHROCYTE [DISTWIDTH] IN BLOOD BY AUTOMATED COUNT: 12.8 % (ref 10–15)
GFR SERPL CREATININE-BSD FRML MDRD: 84 ML/MIN/{1.73_M2}
GLUCOSE SERPL-MCNC: 87 MG/DL (ref 70–99)
HCT VFR BLD AUTO: 44.6 % (ref 40–53)
HGB BLD-MCNC: 15.1 G/DL (ref 13.3–17.7)
IMM GRANULOCYTES # BLD: 0 10E9/L (ref 0–0.4)
IMM GRANULOCYTES NFR BLD: 0.2 %
LDH SERPL L TO P-CCNC: 147 U/L (ref 85–227)
LYMPHOCYTES # BLD AUTO: 2.2 10E9/L (ref 0.8–5.3)
LYMPHOCYTES NFR BLD AUTO: 36.7 %
MCH RBC QN AUTO: 28.3 PG (ref 26.5–33)
MCHC RBC AUTO-ENTMCNC: 33.9 G/DL (ref 31.5–36.5)
MCV RBC AUTO: 84 FL (ref 78–100)
MONOCYTES # BLD AUTO: 0.3 10E9/L (ref 0–1.3)
MONOCYTES NFR BLD AUTO: 5.5 %
NEUTROPHILS # BLD AUTO: 3 10E9/L (ref 1.6–8.3)
NEUTROPHILS NFR BLD AUTO: 50.9 %
PLATELET # BLD AUTO: 289 10E9/L (ref 150–450)
POTASSIUM SERPL-SCNC: 4 MMOL/L (ref 3.4–5.3)
PROT SERPL-MCNC: 8.5 G/DL (ref 6.8–8.8)
RBC # BLD AUTO: 5.33 10E12/L (ref 4.4–5.9)
RETICS # AUTO: 89 10E9/L (ref 25–95)
RETICS/RBC NFR AUTO: 1.7 % (ref 0.5–2)
SODIUM SERPL-SCNC: 139 MMOL/L (ref 133–144)
WBC # BLD AUTO: 6 10E9/L (ref 4–11)

## 2021-01-14 PROCEDURE — 83615 LACTATE (LD) (LDH) ENZYME: CPT | Performed by: INTERNAL MEDICINE

## 2021-01-14 PROCEDURE — 82784 ASSAY IGA/IGD/IGG/IGM EACH: CPT | Performed by: INTERNAL MEDICINE

## 2021-01-14 PROCEDURE — 80053 COMPREHEN METABOLIC PANEL: CPT | Performed by: INTERNAL MEDICINE

## 2021-01-14 PROCEDURE — 88189 FLOWCYTOMETRY/READ 16 & >: CPT | Performed by: PATHOLOGY

## 2021-01-14 PROCEDURE — 85025 COMPLETE CBC W/AUTO DIFF WBC: CPT | Performed by: INTERNAL MEDICINE

## 2021-01-14 PROCEDURE — 99N1137 PR STATISTIC FLOW >15 ABY TC 88189: Performed by: INTERNAL MEDICINE

## 2021-01-14 PROCEDURE — 99N1086 PR STATISTIC MORPHOLOGY W/INTERP HEMEPATH TC 85060: Performed by: INTERNAL MEDICINE

## 2021-01-14 PROCEDURE — 86038 ANTINUCLEAR ANTIBODIES: CPT | Performed by: INTERNAL MEDICINE

## 2021-01-14 PROCEDURE — 36415 COLL VENOUS BLD VENIPUNCTURE: CPT | Performed by: INTERNAL MEDICINE

## 2021-01-14 PROCEDURE — 85045 AUTOMATED RETICULOCYTE COUNT: CPT | Performed by: INTERNAL MEDICINE

## 2021-01-14 NOTE — TELEPHONE ENCOUNTER
LPN spoke with patient and notified him that a GI provider is going to review his chart to verify where he would be most appropriately scheduled. Patient verbalized understanding and was thankful for the call back.     Fely Zamora LPN

## 2021-01-14 NOTE — TELEPHONE ENCOUNTER
[facility] Call Center    Phone Message    May a detailed message be left on voicemail: yes     Reason for Call: Pt was referred to us for Splenomegaly and Lymphadenopathy.  Neither of these dianosis are on the protocols so I was unsure if we see here in  for this.  Please call Pt back and let him know if he can be seen here or if he needs to be seen at the [facility].  Thanks.    Action Taken: Message routed to:  Adult Clinics: Gastroenterology (GI) p [id number]    Travel Screening: Not Applicable

## 2021-01-15 LAB
ANA SER QL IF: NEGATIVE
COPATH REPORT: NORMAL
COPATH REPORT: NORMAL
IGA SERPL-MCNC: 426 MG/DL (ref 84–499)
IGG SERPL-MCNC: 1593 MG/DL (ref 610–1616)
IGM SERPL-MCNC: 139 MG/DL (ref 35–242)

## 2021-01-18 NOTE — TELEPHONE ENCOUNTER
LPN left message for patient requesting a return call to scheduled a video visit with Dr. Elizabeth, non-urgent.     Fely Zamora LPN

## 2021-01-19 ENCOUNTER — PATIENT OUTREACH (OUTPATIENT)
Dept: ONCOLOGY | Facility: CLINIC | Age: 31
End: 2021-01-19

## 2021-01-21 ENCOUNTER — TELEPHONE (OUTPATIENT)
Dept: ONCOLOGY | Facility: CLINIC | Age: 31
End: 2021-01-21

## 2021-01-21 NOTE — TELEPHONE ENCOUNTER
Patient calling again to inquire about lab results and their meaning in his care.   Together reviewed the normal results.   Patient is inquiring about IGg level being on the high end of normal.  Is this concerning?   He also has questions, that I am unable to answer with results to L/L evaluation.     msg to Dr Christianson.   Patient oK with response being sent via resmio.     Claudia Armando RN

## 2021-01-31 ENCOUNTER — MYC REFILL (OUTPATIENT)
Dept: FAMILY MEDICINE | Facility: CLINIC | Age: 31
End: 2021-01-31

## 2021-01-31 DIAGNOSIS — F90.2 ATTENTION DEFICIT HYPERACTIVITY DISORDER (ADHD), COMBINED TYPE: ICD-10-CM

## 2021-02-01 RX ORDER — DEXTROAMPHETAMINE SACCHARATE, AMPHETAMINE ASPARTATE, DEXTROAMPHETAMINE SULFATE AND AMPHETAMINE SULFATE 5; 5; 5; 5 MG/1; MG/1; MG/1; MG/1
20 TABLET ORAL 3 TIMES DAILY
Qty: 90 TABLET | Refills: 0 | Status: SHIPPED | OUTPATIENT
Start: 2021-02-01 | End: 2021-03-01

## 2021-02-02 ENCOUNTER — VIRTUAL VISIT (OUTPATIENT)
Dept: GASTROENTEROLOGY | Facility: CLINIC | Age: 31
End: 2021-02-02
Payer: COMMERCIAL

## 2021-02-02 DIAGNOSIS — K64.8 INTERNAL HEMORRHOID, BLEEDING: ICD-10-CM

## 2021-02-02 DIAGNOSIS — R10.13 DYSPEPSIA: ICD-10-CM

## 2021-02-02 DIAGNOSIS — R16.1 SPLENOMEGALY: Primary | ICD-10-CM

## 2021-02-02 PROCEDURE — 99204 OFFICE O/P NEW MOD 45 MIN: CPT | Mod: 95 | Performed by: INTERNAL MEDICINE

## 2021-02-02 RX ORDER — HYDROCORTISONE ACETATE 25 MG/1
25 SUPPOSITORY RECTAL DAILY
Qty: 14 SUPPOSITORY | Refills: 0 | Status: SHIPPED | OUTPATIENT
Start: 2021-02-02 | End: 2021-02-16

## 2021-02-02 NOTE — PROGRESS NOTES
Abimael is a 31 year old who is being evaluated via a billable video visit.      How would you like to obtain your AVS? MyChart  If the video visit is dropped, the invitation should be resent by: Send to e-mail at: [email or web address]  Will anyone else be joining your video visit? No  Video Start Time:   Video-Visit Details    Type of service:  Video Visit    Video End Time:    Originating Location (pt. Location):     Distant Location (provider location):  [facility]     Platform used for Video Visit:   Jose Fajardo CMA

## 2021-02-03 NOTE — PATIENT INSTRUCTIONS
It was nice to talk to you today.  As we discussed, I have ordered the upper endoscopy and I have sent the prescription for the Anusol suppositories to your pharmacy.  I will send a message to your hematologist to help coordinate the CT scan.  Please let me know if you have any questions.  Randolph Elizabeth MD

## 2021-02-03 NOTE — PROGRESS NOTES
GASTROENTEROLOGY NEW PATIENT VIDEO VISIT    CC/REFERRING MD:    Jamison Lora  No ref. provider found    REASON FOR CONSULTATION:   No ref. provider found for   Chief Complaint   Patient presents with     New Patient     Splenomegaly,  Lymphadenopathy       HISTORY OF PRESENT ILLNESS:    Abimael Martinez is a 31 year old male who is being evaluated via a billable video visit.      We evaluated Abimael Martinez for splenomegaly, hematochezia and postprandial nausea and bloating.  He notes that his main concern is that he has had new postprandial nausea and bloating since November 2020.  He reports that he had Covid around that time.  Since that time, he finds that he has a low appetite and he gets the nausea and bloating symptoms.  These occur with all foods.  He reports that he lost some weight because of this but he has then gained it back.  He also has reflux symptoms.  He does take Pepcid a few times per week.  He also notes that he has had hematochezia.  He reports that he has had issues with ongoing hematochezia since he was approximately 21 years old.  He did undergo a colonoscopy in 2014 which noted internal hemorrhoids.  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  He has had hemorrhoidal banding twice in the past.  He reports that the first hemorrhoidal banding did provide some relief of symptoms but the second banding did not.  He has tried using Preparation H suppositories which provide some relief.  He notes he is also used Anusol in the past with good relief of symptoms.  He does not use fiber daily.  More recently, he has been noted to have an enlarged spleen on ultrasound.  It has measured 14 cm x 13.5 cm x 5.3 cm.  This was checked because he was having left upper quadrant pain/fullness.  He reports that he has been diagnosed with an enlarged spleen since he was a child at age 9.  He reports that he has had frequent infections with URIs in the past.  He has  seen hematology for this issue and CT scan has been recommended but he has not done this yet.      I have reviewed and updated the patient's Past Medical History, Social History, Family History and Medication List.      Exam:    General appearance:  Healthy appearing adult, in no acute distress  Eyes:  Sclera anicteric, Pupils round and reactive to light  Ears, nose, mouth and throat:  No obvious external lesions of ears and nose.  Hearing intact  Neck:  Symmetric, No obvious external lesions  Respiratory:  Normal respiration, no use of accessory muscles   MSK:  No visual upper extremity, neck or facial muscle atrophy  ABD:  No visual abdominal distention, no audible borborygmi  Skin:  No rashes or jaundice   Psychiatric:  Oriented to person, place and time, Appropriate mood and affect.   Neurologic:  Peripheral muscle function and dexterity appear to be intact      PERTINENT STUDIES have been reviewed.    ASSESSMENT/PLAN:    Abimael Martinez is a 31 year old male who presents for evaluation of splenomegaly, hematochezia and dyspepsia.  In regard to his splenomegaly, he has a spleen size of maximum 14 cm.  He reports that he was originally diagnosed with splenomegaly at age 9.  Given its chronicity, the chances of this representing a serious pathology or less.  There is no indication of any kind of underlying liver disease with normal liver tests, no significant risk factors for fibrosis or cirrhosis.  Would like to cirrhotic meeting with CT scan or MRI in order to better evaluate the liver, reassess the spleen and look for lymphadenopathy and this has been suggested by his hematologist.  We will coordinate with his hematologist regarding the proper scanning.  I have suggested that he use fiber with Metamucil daily for the hematochezia which is most likely from the internal hemorrhoids.  We will also give him a prescription for Anusol suppositories.  Finally, he has some dyspeptic symptoms which are ongoing since  November.  This sounds most likely postinfectious in nature.  He reports that they are severely impacting his life and he has tried conservative therapy so it is reasonable to pursue an upper endoscopy at this point and this will be scheduled at [place].    Video-Visit Details    Type of service:  Video Visit    Video Start Time: 4:06pm  Video End Time:  4:41pm  Total Video Time: 35 min     Originating Location (pt. Location): Home    Distant Location (provider location):  [facility]     Mode of Communication:  Video Conference via [employer]    Randolph Elizabeth MD    RTC 3 months    Thank you for this consultation.  It was a pleasure to participate in the care of this patient; please contact us with any further questions.      This note was created with voice recognition software, and while reviewed for accuracy, typos may remain.     Randolph Elizabeth MD  Division of Gastroenterology, Hepatology and Nutrition  [facility]  [phone]

## 2021-02-05 ENCOUNTER — TELEPHONE (OUTPATIENT)
Dept: GASTROENTEROLOGY | Facility: CLINIC | Age: 31
End: 2021-02-05

## 2021-02-05 NOTE — TELEPHONE ENCOUNTER
Prior Authorization Retail Medication Request    Medication/Dose: Anucort- HC 25 mg Supp.  ICD code (if different than what is on RX):    Previously Tried and Failed:    Rationale:      Insurance Name:    Insurance ID:        Pharmacy Information (if different than what is on RX)  Name:    Phone:      Fely Zamora LPN

## 2021-02-07 ENCOUNTER — MYC REFILL (OUTPATIENT)
Dept: FAMILY MEDICINE | Facility: CLINIC | Age: 31
End: 2021-02-07

## 2021-02-07 DIAGNOSIS — F41.9 ANXIETY: ICD-10-CM

## 2021-02-08 ENCOUNTER — TELEPHONE (OUTPATIENT)
Dept: GASTROENTEROLOGY | Facility: CLINIC | Age: 31
End: 2021-02-08

## 2021-02-08 RX ORDER — ALPRAZOLAM 2 MG
2 TABLET ORAL 3 TIMES DAILY PRN
Qty: 30 TABLET | Refills: 0 | Status: SHIPPED | OUTPATIENT
Start: 2021-02-08 | End: 2021-03-05

## 2021-02-08 NOTE — TELEPHONE ENCOUNTER
[facility] Call Center    Phone Message    May a detailed message be left on voicemail: yes     Reason for Call:pt calling for order for CT scan      Action Taken: Message routed to:  Adult Clinics: Gastroenterology (GI) p [id number]    Travel Screening: Not Applicable

## 2021-02-08 NOTE — TELEPHONE ENCOUNTER
Central Prior Authorization Team   Phone: [phone]      PA Initiation via fax    Medication: Anucort- HC Supp.  Insurance Company: [employer] - Phone [phone] Fax [phone]  Pharmacy Filling the Rx: [id number] [address] [address]  Filling Pharmacy Phone: [phone]  Filling Pharmacy Fax:    Start Date: 2/8/2021

## 2021-02-09 ENCOUNTER — VIRTUAL VISIT (OUTPATIENT)
Dept: ONCOLOGY | Facility: CLINIC | Age: 31
End: 2021-02-09
Attending: INTERNAL MEDICINE
Payer: COMMERCIAL

## 2021-02-09 ENCOUNTER — TRANSFERRED RECORDS (OUTPATIENT)
Dept: HEALTH INFORMATION MANAGEMENT | Facility: CLINIC | Age: 31
End: 2021-02-09

## 2021-02-09 VITALS — HEIGHT: 73 IN | WEIGHT: 246 LBS | BODY MASS INDEX: 32.6 KG/M2

## 2021-02-09 DIAGNOSIS — R59.1 LYMPHADENOPATHY: ICD-10-CM

## 2021-02-09 DIAGNOSIS — R16.1 SPLENOMEGALY: Primary | ICD-10-CM

## 2021-02-09 PROCEDURE — 99214 OFFICE O/P EST MOD 30 MIN: CPT | Mod: 95 | Performed by: INTERNAL MEDICINE

## 2021-02-09 ASSESSMENT — MIFFLIN-ST. JEOR: SCORE: 2121.79

## 2021-02-09 NOTE — PROGRESS NOTES
Hematology follow up visit:  Date on this visit: 2/9/2021     Abimael Martinez  is referred by Dr.Brianna Victoria Doty for a hematology consultation. He requires evaluation for new diagnosis of splenomegaly.      Primary Physician: Jamison Lora     History Of Present Illness:    Please see my previous note for details. I have copied and updated from prior note.      Mr. Martinez is a 31 year old male who presents with new diagnosis of splenomegaly  I reviewed records from Victoria Bailey PA-C from [facility].  I have also reviewed records from Brandi Doty.    Abimael has a history of obesity, chronic back pain and has underwent epidural injections in the back, mild splenomegaly in 2012, hypertension.  In March 2012 he was noted to have mild splenomegaly with spleen size 13.7 cm.  At that time he was complaining of off-and-on left upper quadrant discomfort.  Repeat ultrasound on 12/4/2020 showed spleen to be 14 x 13.5 x 5.3 cm.    He mentions to me that for several years he has had issues where he feels that he is coming down with something with some soreness in the throat and prominence of lymph nodes of the neck with some tenderness.  In 2012 he also had left upper quadrant pain and he was noted to have mild splenomegaly.  Then in November 2020 he had COVID-19 and since then he has noticed pressure in the left upper quadrant and at times going into the left shoulder blade and shoulder region.  At that time he was sick with high-grade fevers and nausea and weakness and a little cough but no shortness of breath.  He recovered and slowly the pain in the left upper quadrant and the shoulder has improved a little bit in the sense that he does not feel the pain in the shoulder that often but he still has pressure in the left upper quadrant.  He also mentions that now he is also feeling a little bit of similar kind of pressure on the right side of his abdomen.  When he had the Covid he  also had a ringworm which resolved.  Over the last 1-1/2 months he has noticed off-and-on blood in the stools.  He has a history of hemorrhoidal bleeding in 2014 which was banded and he had a colonoscopy at that time which only showed hemorrhoids.  He has had issues with acid reflux.  He denies any B symptoms.  In between the episodes where he feels rundown and body aches and sometimes swelling of the lymph nodes/tenderness, he feels really well and remains fully functional.  He denies any other swellings.  He has chronic back pain and there is a plan to do surgery to the back in the future.  He also mentions that he has anxiety issues.    Interval history.  This is a video visit.  On the last visit I did some testing and peripheral blood smear, peripheral blood flow cytometry, LDH, immunoglobulin levels, KATHRYN was unremarkable.  He feels about the same as before although tells me that he has been trying to be more active so he has noticed that the chronic pains have been somewhat better.  He still has off-and-on bleeding from the rectum which he thinks is from hemorrhoids.  He discussed with GI and he was prescribed Anusol ointment but at this time insurance is not covering this so he has talked to GI to see if anything else would be prescribed.         ROS:  Rest of the ROS was otherwise neg    I reviewed other hx in Epic as below.      Past Medical/Surgical History:  Past Medical History:   Diagnosis Date     Acute right otitis media 1/8/2013     Anxiety      Depression      Drug abuse (H)      Gonococcal urethritis 02/05/2016     Urethritis 5/20/2013     Problem list name updated by automated process. Provider to review     Past Surgical History:   Procedure Laterality Date     BACK SURGERY  04/05/2018     Allergies:  Allergies as of 02/09/2021 - Reviewed 02/02/2021   Allergen Reaction Noted     Cymbalta  10/25/2012     Duloxetine Other (See Comments) and Fatigue 11/26/2013     Paroxetine Other (See Comments),  Fatigue, and GI Disturbance 11/26/2013     Seasonal allergies  07/30/2019     Vicodin [hydrocodone-acetaminophen]  01/31/2018     Current Medications:  Current Outpatient Medications   Medication Sig Dispense Refill     albuterol (PROAIR HFA/PROVENTIL HFA/VENTOLIN HFA) 108 (90 Base) MCG/ACT inhaler Inhale 2 puffs into the lungs every 4 hours as needed for wheezing 1 Inhaler 0     ALPRAZolam (XANAX) 2 MG tablet Take 1 tablet (2 mg) by mouth 3 times daily as needed for anxiety 30 tablet 0     amphetamine-dextroamphetamine (ADDERALL) 20 MG tablet Take 1 tablet (20 mg) by mouth 3 times daily 90 tablet 0     azelastine (ASTELIN) 0.1 % nasal spray Spray 2 sprays into both nostrils 2 times daily 30 mL 11     clotrimazole (LOTRIMIN) 1 % external cream Apply topically 2 times daily 60 g 1     famotidine (PEPCID) 40 MG tablet Take 1 tablet (40 mg) by mouth At Bedtime 90 tablet 1     fluticasone (FLOVENT HFA) 220 MCG/ACT Inhaler Inhale 2 puffs into the lungs 2 times daily Please give patient spacer 1 Inhaler 3     hydrocortisone (ANUSOL-HC) 25 MG suppository Place 1 suppository (25 mg) rectally daily for 14 days At bedtime 14 suppository 0     Hyoscyamine Sulfate 0.375 MG TBCR Take 1 capful by mouth 3 times daily as needed 90 tablet 1     mometasone (ELOCON) 0.1 % external ointment If necessary use 3-4 days in a row or 2xweekly on eczematous lesions 45 g 2     montelukast (SINGULAIR) 10 MG tablet Take 1 tablet (10 mg) by mouth At Bedtime 90 tablet 0     oxyCODONE-acetaminophen (PERCOCET) 5-325 MG per tablet Take 1 tablet by mouth every 6 hours as needed for pain 12 tablet 0     pimecrolimus (ELIDEL) 1 % external cream Apply topically 2 times daily 30 g 0     sildenafil (REVATIO) 20 MG tablet Take 1-2 tablets (20-40 mg) by mouth daily as needed As needed for prevention of erectile dysfunction 30 tablet 2     tacrolimus (PROTOPIC) 0.03 % external ointment Apply topically 2 times daily Put on skin lesions 2x daily 60 g 1      tacrolimus (PROTOPIC) 0.1 % external ointment Apply twice daily as needed for rash on penis. 30 g 11     triamcinolone (KENALOG) 0.025 % external ointment Apply topically 2 times daily Apply twice a day to sites of irritation 80 g 1      Family History:  Family History   Problem Relation Age of Onset     Unknown/Adopted Mother      Unknown/Adopted Father      Unknown/Adopted Maternal Grandmother      Unknown/Adopted Maternal Grandfather      Unknown/Adopted Paternal Grandmother      Unknown/Adopted Paternal Grandfather      Unknown/Adopted Brother      Adopted      Social History:  Social History     Socioeconomic History     Marital status: Single     Spouse name: Not on file     Number of children: Not on file     Years of education: Not on file     Highest education level: Not on file   Occupational History     Not on file   Social Needs     Financial resource strain: Not on file     Food insecurity     Worry: Not on file     Inability: Not on file     Transportation needs     Medical: Not on file     Non-medical: Not on file   Tobacco Use     Smoking status: Former Smoker     Packs/day: 0.50     Years: 6.00     Pack years: 3.00     Types: Cigarettes     Quit date: 3/12/2018     Years since quittin.9     Smokeless tobacco: Never Used     Tobacco comment: second hand smoke exposure   Substance and Sexual Activity     Alcohol use: Yes     Comment: once a week     Drug use: Yes     Types: Marijuana     Comment: pot once a month, ectasy  As of 2016 he only smokes pot occasionally     Sexual activity: Yes     Partners: Female     Birth control/protection: Condom   Lifestyle     Physical activity     Days per week: Not on file     Minutes per session: Not on file     Stress: Not on file   Relationships     Social connections     Talks on phone: Not on file     Gets together: Not on file     Attends Zoroastrian service: Not on file     Active member of club or organization: Not on file     Attends meetings of  clubs or organizations: Not on file     Relationship status: Not on file     Intimate partner violence     Fear of current or ex partner: Not on file     Emotionally abused: Not on file     Physically abused: Not on file     Forced sexual activity: Not on file   Other Topics Concern     Parent/sibling w/ CABG, MI or angioplasty before 65F 55M? No   Social History Narrative     Not on file     Quit smoking 3 years ago. Drinks occasional etoh. Off and on smokes marijuana.    Physical Exam:  There were no vitals taken for this visit.   Wt Readings from Last 4 Encounters:   01/13/21 111.6 kg (246 lb)   12/23/20 111.6 kg (246 lb)   12/04/20 113.4 kg (250 lb)   11/30/20 113.1 kg (249 lb 6.4 oz)       Constitutional.  Looks well and in no apparent distress.   Eyes.  Without eye redness or apparent jaundice.   Respiratory.  Non labored breathing. Speaking in full sentences.    Skin.  No concerning skin rashes on the skin visualized.   Neurological.  Is alert and oriented.  Psychiatric.  Mood and affect seem appropriate.      The rest of a comprehensive physical examination is deferred due to Public Health Emergency video visit restrictions.    Laboratory/Imaging Studies      Reviewed  1/14/2021.  Peripheral blood smear and CBC was unremarkable.  CMP was unremarkable.  LDH was normal.    Serum immunoglobulin levels were normal.    KATHRYN was negative.    Peripheral blood flow cytometry was also unremarkable.      In March 2012 he was noted to have mild splenomegaly with spleen size 13.7 cm.      Hepatitis C and HIV screen were negative.      Repeat ultrasound on 12/4/2020 showed spleen to be 14 x 13.5 x 5.3 cm.    ASSESSMENT/PLAN:    Splenomegaly.  He has had issues with mild splenomegaly since 2012.  He also tells me at times he has noticed swelling of his lymph nodes and there were episodes which he experiences quite frequently when he feels very rundown as if he is going to come down with an infection although he usually  does not have fevers during those episodes.  These resolve on their own.  In November 2020, he had a COVID-19 from which he recovered but he continues to have issues with left upper quadrant discomfort going into the left shoulder/left shoulder blade.    The work-up up until now which I have been include an unremarkable peripheral blood smear, peripheral blood flow cytometry, CMP, LDH, serum immunoglobulin levels and KATHRYN.    We discussed that we can do a CT scan of the neck chest abdomen and pelvis to assess the lymphadenopathy to see if there is any evidence of enlarged lymph nodes as off-and-on he has noticed enlarged cervical lymph nodes.    Depending on the CT scan report, we will decide if any further hematological work-up is needed.  I would consider referral to immunologist as well as rheumatologist because of his chronic issues and it makes me wonder if he has an underlying problem with immunity or rheumatological problem.      Blood in the stools.  In 2014 he was having hemorrhoidal bleeding and had a colonoscopy and had bleeding but this time he mentions that over the last 1-1/2 months the blood is mixed in the stools.  He also has issues with acid reflux and abdominal discomfort.  We will check CT scan as mentioned above.  He was prescribed Anusol by gastroenterologist but because of insurance issue, he has not started taking it and has discussed with GI to prescribe something else.  Follow-up with GI regarding this.      For now he will hold off on the back surgery till the above-mentioned work-up is completed.      We will determine the follow-up accordingly.    All questions were answered to his satisfaction.  He is agreeable and comfortable with the plan.    Telly Christianson MD    I spent 30 minutes on this visit including the video time, reviewing records and labs and imaging and placing new orders as well as coordination of care and documentation.        Video start time. 12:58 PM  Video stop time.  1:07 PM

## 2021-02-09 NOTE — NURSING NOTE
Abimael is a 31 year old who is being evaluated via a billable video visit.      How would you like to obtain your AVS? MyChart  If the video visit is dropped, the invitation should be resent by: Text to cell phone: [phone]  Will anyone else be joining your video visit? No      Video-Visit Details    Type of service:  Video Visit      Originating Location (pt. Location): Home    Distant Location (provider location):  [facility]     Platform used for Video Visit: [employer]     SYMPTOM QUESTIONNAIRE    Pain: YES, 3/10 PRESSURE FEELING NEAR HIS SPLEEN AREA    Nausea/Vomiting: YES    Mouth Sores: NO    Shortness of Breath: NO    Smoking: NO    Fever or Chills: YES, HOT AND COLD FEELINGS, HAS FELT WARMER THAN USUAL BUT HAS NOT TAKEN HIS TEMPERATURE.  ADVISED PATIENT IF HE CAN HE SHOULD TAKE HIS TEMPERATURE    Hard Stools: NO    Soft Stools: NO    Weight Loss: NO    Weakness: YES, MORE LETHARGIC THAN USUAL    Burning, numbness or tingling in hands or feet: YES, SOMETIMES    Problems with skin or swelling: NO    Memory Loss: NO    Anxiety or Depression: YES, ANXIETY LOTS OF IT LATELY TAKES ALPRAZOLAM WHEN NEEDED    Trouble Sleeping: YES, SOME SLEEP ISSUES, WILL SOMETIMES TAKE MELATONIN IF NEEDED.    CONCERNS:  PATIENT WOULD LIKE A CT SCAN     Sasha Chacon CMA

## 2021-02-09 NOTE — PATIENT INSTRUCTIONS
Schedule CT neck- CAP and then we will determine the follow up accordingly    I will also consider referral to Rheumatology/Immunology

## 2021-02-09 NOTE — LETTER
2/9/2021         RE: Abimael Martinez  [address]  [facility] [address]        Dear Colleague,    Thank you for referring your patient, Abimael Martinez, to the [facility]. Please see a copy of my visit note below.    Hematology follow up visit:  Date on this visit: 2/9/2021     Abimael Martinez  is referred by Dr.Brianna Victoria Doty for a hematology consultation. He requires evaluation for new diagnosis of splenomegaly.      Primary Physician: Jamison Lora     History Of Present Illness:    Please see my previous note for details. I have copied and updated from prior note.      Mr. Martinez is a 31 year old male who presents with new diagnosis of splenomegaly  I reviewed records from Victoria Bailey PA-C from interventional pain physicians.  I have also reviewed records from Brandi Doty.    Abimael has a history of obesity, chronic back pain and has underwent epidural injections in the back, mild splenomegaly in 2012, hypertension.  In March 2012 he was noted to have mild splenomegaly with spleen size 13.7 cm.  At that time he was complaining of off-and-on left upper quadrant discomfort.  Repeat ultrasound on 12/4/2020 showed spleen to be 14 x 13.5 x 5.3 cm.    He mentions to me that for several years he has had issues where he feels that he is coming down with something with some soreness in the throat and prominence of lymph nodes of the neck with some tenderness.  In 2012 he also had left upper quadrant pain and he was noted to have mild splenomegaly.  Then in November 2020 he had COVID-19 and since then he has noticed pressure in the left upper quadrant and at times going into the left shoulder blade and shoulder region.  At that time he was sick with high-grade fevers and nausea and weakness and a little cough but no shortness of breath.  He recovered and slowly the pain in the left upper quadrant and the shoulder has improved a  little bit in the sense that he does not feel the pain in the shoulder that often but he still has pressure in the left upper quadrant.  He also mentions that now he is also feeling a little bit of similar kind of pressure on the right side of his abdomen.  When he had the Covid he also had a ringworm which resolved.  Over the last 1-1/2 months he has noticed off-and-on blood in the stools.  He has a history of hemorrhoidal bleeding in 2014 which was banded and he had a colonoscopy at that time which only showed hemorrhoids.  He has had issues with acid reflux.  He denies any B symptoms.  In between the episodes where he feels rundown and body aches and sometimes swelling of the lymph nodes/tenderness, he feels really well and remains fully functional.  He denies any other swellings.  He has chronic back pain and there is a plan to do surgery to the back in the future.  He also mentions that he has anxiety issues.    Interval history.  This is a video visit.  On the last visit I did some testing and peripheral blood smear, peripheral blood flow cytometry, LDH, immunoglobulin levels, KATHRYN was unremarkable.  He feels about the same as before although tells me that he has been trying to be more active so he has noticed that the chronic pains have been somewhat better.  He still has off-and-on bleeding from the rectum which he thinks is from hemorrhoids.  He discussed with GI and he was prescribed Anusol ointment but at this time insurance is not covering this so he has talked to GI to see if anything else would be prescribed.         ROS:  Rest of the ROS was otherwise neg    I reviewed other hx in Epic as below.      Past Medical/Surgical History:  Past Medical History:   Diagnosis Date     Acute right otitis media 1/8/2013     Anxiety      Depression      Drug abuse (H)      Gonococcal urethritis 02/05/2016     Urethritis 5/20/2013     Problem list name updated by automated process. Provider to review     Past  Surgical History:   Procedure Laterality Date     BACK SURGERY  04/05/2018     Allergies:  Allergies as of 02/09/2021 - Reviewed 02/02/2021   Allergen Reaction Noted     Cymbalta  10/25/2012     Duloxetine Other (See Comments) and Fatigue 11/26/2013     Paroxetine Other (See Comments), Fatigue, and GI Disturbance 11/26/2013     Seasonal allergies  07/30/2019     Vicodin [hydrocodone-acetaminophen]  01/31/2018     Current Medications:  Current Outpatient Medications   Medication Sig Dispense Refill     albuterol (PROAIR HFA/PROVENTIL HFA/VENTOLIN HFA) 108 (90 Base) MCG/ACT inhaler Inhale 2 puffs into the lungs every 4 hours as needed for wheezing 1 Inhaler 0     ALPRAZolam (XANAX) 2 MG tablet Take 1 tablet (2 mg) by mouth 3 times daily as needed for anxiety 30 tablet 0     amphetamine-dextroamphetamine (ADDERALL) 20 MG tablet Take 1 tablet (20 mg) by mouth 3 times daily 90 tablet 0     azelastine (ASTELIN) 0.1 % nasal spray Spray 2 sprays into both nostrils 2 times daily 30 mL 11     clotrimazole (LOTRIMIN) 1 % external cream Apply topically 2 times daily 60 g 1     famotidine (PEPCID) 40 MG tablet Take 1 tablet (40 mg) by mouth At Bedtime 90 tablet 1     fluticasone (FLOVENT HFA) 220 MCG/ACT Inhaler Inhale 2 puffs into the lungs 2 times daily Please give patient spacer 1 Inhaler 3     hydrocortisone (ANUSOL-HC) 25 MG suppository Place 1 suppository (25 mg) rectally daily for 14 days At bedtime 14 suppository 0     Hyoscyamine Sulfate 0.375 MG TBCR Take 1 capful by mouth 3 times daily as needed 90 tablet 1     mometasone (ELOCON) 0.1 % external ointment If necessary use 3-4 days in a row or 2xweekly on eczematous lesions 45 g 2     montelukast (SINGULAIR) 10 MG tablet Take 1 tablet (10 mg) by mouth At Bedtime 90 tablet 0     oxyCODONE-acetaminophen (PERCOCET) 5-325 MG per tablet Take 1 tablet by mouth every 6 hours as needed for pain 12 tablet 0     pimecrolimus (ELIDEL) 1 % external cream Apply topically 2 times  daily 30 g 0     sildenafil (REVATIO) 20 MG tablet Take 1-2 tablets (20-40 mg) by mouth daily as needed As needed for prevention of erectile dysfunction 30 tablet 2     tacrolimus (PROTOPIC) 0.03 % external ointment Apply topically 2 times daily Put on skin lesions 2x daily 60 g 1     tacrolimus (PROTOPIC) 0.1 % external ointment Apply twice daily as needed for rash on penis. 30 g 11     triamcinolone (KENALOG) 0.025 % external ointment Apply topically 2 times daily Apply twice a day to sites of irritation 80 g 1      Family History:  Family History   Problem Relation Age of Onset     Unknown/Adopted Mother      Unknown/Adopted Father      Unknown/Adopted Maternal Grandmother      Unknown/Adopted Maternal Grandfather      Unknown/Adopted Paternal Grandmother      Unknown/Adopted Paternal Grandfather      Unknown/Adopted Brother      Adopted      Social History:  Social History     Socioeconomic History     Marital status: Single     Spouse name: Not on file     Number of children: Not on file     Years of education: Not on file     Highest education level: Not on file   Occupational History     Not on file   Social Needs     Financial resource strain: Not on file     Food insecurity     Worry: Not on file     Inability: Not on file     Transportation needs     Medical: Not on file     Non-medical: Not on file   Tobacco Use     Smoking status: Former Smoker     Packs/day: 0.50     Years: 6.00     Pack years: 3.00     Types: Cigarettes     Quit date: 3/12/2018     Years since quittin.9     Smokeless tobacco: Never Used     Tobacco comment: second hand smoke exposure   Substance and Sexual Activity     Alcohol use: Yes     Comment: once a week     Drug use: Yes     Types: Marijuana     Comment: pot once a month, ectasy  As of 2016 he only smokes pot occasionally     Sexual activity: Yes     Partners: Female     Birth control/protection: Condom   Lifestyle     Physical activity     Days per week: Not on file      Minutes per session: Not on file     Stress: Not on file   Relationships     Social connections     Talks on phone: Not on file     Gets together: Not on file     Attends Yazdanism service: Not on file     Active member of club or organization: Not on file     Attends meetings of clubs or organizations: Not on file     Relationship status: Not on file     Intimate partner violence     Fear of current or ex partner: Not on file     Emotionally abused: Not on file     Physically abused: Not on file     Forced sexual activity: Not on file   Other Topics Concern     Parent/sibling w/ CABG, MI or angioplasty before 65F 55M? No   Social History Narrative     Not on file     Quit smoking 3 years ago. Drinks occasional etoh. Off and on smokes marijuana.    Physical Exam:  There were no vitals taken for this visit.   Wt Readings from Last 4 Encounters:   01/13/21 111.6 kg (246 lb)   12/23/20 111.6 kg (246 lb)   12/04/20 113.4 kg (250 lb)   11/30/20 113.1 kg (249 lb 6.4 oz)       Constitutional.  Looks well and in no apparent distress.   Eyes.  Without eye redness or apparent jaundice.   Respiratory.  Non labored breathing. Speaking in full sentences.    Skin.  No concerning skin rashes on the skin visualized.   Neurological.  Is alert and oriented.  Psychiatric.  Mood and affect seem appropriate.      The rest of a comprehensive physical examination is deferred due to Public Health Emergency video visit restrictions.    Laboratory/Imaging Studies      Reviewed  1/14/2021.  Peripheral blood smear and CBC was unremarkable.  CMP was unremarkable.  LDH was normal.    Serum immunoglobulin levels were normal.    KATHRYN was negative.    Peripheral blood flow cytometry was also unremarkable.      In March 2012 he was noted to have mild splenomegaly with spleen size 13.7 cm.      Hepatitis C and HIV screen were negative.      Repeat ultrasound on 12/4/2020 showed spleen to be 14 x 13.5 x 5.3 cm.    ASSESSMENT/PLAN:    Splenomegaly.  He  has had issues with mild splenomegaly since 2012.  He also tells me at times he has noticed swelling of his lymph nodes and there were episodes which he experiences quite frequently when he feels very rundown as if he is going to come down with an infection although he usually does not have fevers during those episodes.  These resolve on their own.  In November 2020, he had a COVID-19 from which he recovered but he continues to have issues with left upper quadrant discomfort going into the left shoulder/left shoulder blade.    The work-up up until now which I have been include an unremarkable peripheral blood smear, peripheral blood flow cytometry, CMP, LDH, serum immunoglobulin levels and KATHRYN.    We discussed that we can do a CT scan of the neck chest abdomen and pelvis to assess the lymphadenopathy to see if there is any evidence of enlarged lymph nodes as off-and-on he has noticed enlarged cervical lymph nodes.    Depending on the CT scan report, we will decide if any further hematological work-up is needed.  I would consider referral to immunologist as well as rheumatologist because of his chronic issues and it makes me wonder if he has an underlying problem with immunity or rheumatological problem.      Blood in the stools.  In 2014 he was having hemorrhoidal bleeding and had a colonoscopy and had bleeding but this time he mentions that over the last 1-1/2 months the blood is mixed in the stools.  He also has issues with acid reflux and abdominal discomfort.  We will check CT scan as mentioned above.  He was prescribed Anusol by gastroenterologist but because of insurance issue, he has not started taking it and has discussed with GI to prescribe something else.  Follow-up with GI regarding this.      For now he will hold off on the back surgery till the above-mentioned work-up is completed.      We will determine the follow-up accordingly.    All questions were answered to his satisfaction.  He is agreeable and  comfortable with the plan.    Telly Christianson MD    I spent 30 minutes on this visit including the video time, reviewing records and labs and imaging and placing new orders as well as coordination of care and documentation.        Video start time. 12:58 PM  Video stop time. 1:07 PM        Again, thank you for allowing me to participate in the care of your patient.        Sincerely,        Telly Christianson MD

## 2021-02-09 NOTE — TELEPHONE ENCOUNTER
Pharmacy called back and stated they are still getting a rejection. Will send PA along with claim from the pharmacy to insurance.

## 2021-02-09 NOTE — TELEPHONE ENCOUNTER
Prior Authorization Not Needed per Insurance    Medication: hydrocortisone (ANUSOL-HC) 25 MG suppository-PA Not Needed  Insurance Company: [employer] - Phone [phone] Fax [phone]  Expected CoPay:      Pharmacy Filling the Rx: [id number] [address] [address]  Pharmacy Notified: Yes-Left message for pharmacy to notify that PA is not needed per insurance and to call back if they are still having issues.  Patient Notified: No

## 2021-02-10 ENCOUNTER — MYC MEDICAL ADVICE (OUTPATIENT)
Dept: GASTROENTEROLOGY | Facility: CLINIC | Age: 31
End: 2021-02-10

## 2021-02-10 NOTE — TELEPHONE ENCOUNTER
Imaging has been ordered by heme/onc at patient's visit yesterday.  My Chart message to patient with imaging scheduling number.    Yari Navarro RN

## 2021-02-11 NOTE — TELEPHONE ENCOUNTER
Patient sent My Chart inquiring on status - patient updated on PA status.  Contacted [employer] at [phone].  Transferred to designated PA team at [phone].  Spoke with representative Jaky.  Advised that pt has tried Preparation H in the past without adequate relief of symptoms.  The request will be forwarded for review and a result should be issued within 12 hours.  Case # [id number].     Yari Navarro RN

## 2021-02-11 NOTE — TELEPHONE ENCOUNTER
Randolph Elizabeth MD  You; [facility] 1 hour ago (9:09 AM)     Are they giving a reason why it is rejected?     The patient should be using fiber with Metamucil 1 teaspoon/day.  If symptoms are not improving then increase to 2 teaspoons/day.     Also, did he schedule his upper endoscopy?  If bleeding is ongoing, we can do a colonoscopy at the same time in order to reevaluate if this is hemorrhoidal if there is anything else going on.  Thanks.     Randolph Elizabeth MD      Patient informed of provider advice and recommendation to do colonoscopy with endoscopy, which is not yet scheduled.  Patient advised to respond if interested in pursuing colonoscopy.     Yari Navarro RN

## 2021-02-11 NOTE — TELEPHONE ENCOUNTER
Prior Authorization Approval    Authorization Effective Date: 1/12/2021  Authorization Expiration Date: 2/11/2022  Medication: hydrocortisone (ANUSOL-HC) 25 MG suppository-APPROVED  Approved Dose/Quantity:   Reference #:     Insurance Company: [employer] - Phone [phone] Fax [phone]  Expected CoPay:       CoPay Card Available:      Foundation Assistance Needed:    Which Pharmacy is filling the prescription (Not needed for infusion/clinic administered): [id number] [address] [place] [address]  Pharmacy Notified: Yes-Pharmacy will notify patient when ready.  Patient Notified: No

## 2021-02-11 NOTE — TELEPHONE ENCOUNTER
Called [employer] to check status, received automatic response using Case # [id number] that PA is still under review.

## 2021-02-11 NOTE — TELEPHONE ENCOUNTER
Per reivew of chart, PA for Anusol-HC suppository submitted, insurance stated was covered benefit.  Pharmacy still receiving rejection, another PA has been initiated with the pharmacy rejection attached.      Patient inquiring on status.  Contacted [employer] at [phone].  Transferred to designated PA team at [phone].  Spoke with representative Jaky.  Advised that pt has tried Preparation H in the past without adequate relief of symptoms.  The request will be forwarded for review and a result should be issued within 12 hours.  Case # [id number].     Yari Navarro RN

## 2021-02-15 ENCOUNTER — ANCILLARY PROCEDURE (OUTPATIENT)
Dept: CT IMAGING | Facility: CLINIC | Age: 31
End: 2021-02-15
Attending: INTERNAL MEDICINE
Payer: COMMERCIAL

## 2021-02-15 DIAGNOSIS — R16.1 SPLENOMEGALY: ICD-10-CM

## 2021-02-15 DIAGNOSIS — R59.1 LYMPHADENOPATHY: ICD-10-CM

## 2021-02-15 PROCEDURE — 71260 CT THORAX DX C+: CPT | Performed by: RADIOLOGY

## 2021-02-15 PROCEDURE — 74177 CT ABD & PELVIS W/CONTRAST: CPT | Performed by: RADIOLOGY

## 2021-02-15 PROCEDURE — 70491 CT SOFT TISSUE NECK W/DYE: CPT | Mod: GC | Performed by: RADIOLOGY

## 2021-02-15 RX ORDER — IOPAMIDOL 755 MG/ML
200 INJECTION, SOLUTION INTRAVASCULAR ONCE
Status: COMPLETED | OUTPATIENT
Start: 2021-02-15 | End: 2021-02-15

## 2021-02-15 RX ADMIN — IOPAMIDOL 135 ML: 755 INJECTION, SOLUTION INTRAVASCULAR at 15:18

## 2021-02-16 ENCOUNTER — PATIENT OUTREACH (OUTPATIENT)
Dept: ONCOLOGY | Facility: CLINIC | Age: 31
End: 2021-02-16

## 2021-02-16 NOTE — PROGRESS NOTES
Message left on patient's voicemail with the following message from Dr. Christianson:  CT neck, chest abdomen and pelvis is fairly unremarkable so at this time I would not do any further hematological work-up.  I would recommend follow-up with immunology/rheumatology as we had discussed.  Follow with Dr. Christianson as needed.

## 2021-02-23 ENCOUNTER — OFFICE VISIT (OUTPATIENT)
Dept: ALLERGY | Facility: CLINIC | Age: 31
End: 2021-02-23
Payer: COMMERCIAL

## 2021-02-23 DIAGNOSIS — N48.0 LICHEN SCLEROSUS OF PENIS: ICD-10-CM

## 2021-02-23 DIAGNOSIS — N48.89 SEBACEOUS CYST OF PENIS: Primary | ICD-10-CM

## 2021-02-23 DIAGNOSIS — Z88.9 ATOPY: ICD-10-CM

## 2021-02-23 DIAGNOSIS — L24.9 IRRITANT DERMATITIS: ICD-10-CM

## 2021-02-23 DIAGNOSIS — L30.9 DERMATITIS: ICD-10-CM

## 2021-02-23 PROCEDURE — 99213 OFFICE O/P EST LOW 20 MIN: CPT | Performed by: DERMATOLOGY

## 2021-02-23 RX ORDER — PIMECROLIMUS 10 MG/G
CREAM TOPICAL 2 TIMES DAILY
Qty: 60 G | Refills: 4 | Status: SHIPPED | OUTPATIENT
Start: 2021-02-23 | End: 2021-04-23

## 2021-02-23 NOTE — LETTER
2/23/2021         RE: Abimael Martinez  [address]  [facility] [address]        Dear Colleague,    Thank you for referring your patient, Abimael Martinez, to the [facility] [place]. Please see a copy of my visit note below.    Note for return visit for Dermato-Allergy       Encounter Date: Feb 23, 2021    History of Present Illness:  Abimael Martinez is a 31 year old male who presents in person to the consult following information has been take directly from the prior notes, patients informations, Epic and/or other sources and exam/history performed by myself:     Last seen 12/15/2020    >> on the glans penis typical ectopic sebaceous and on the glans some slightly atrophic appearing lesions      Additional comments and observations from review of the patient s chart including the following:    See notes for more details    ROS: Patient generally feeling well today   Physical Examination:  General: Well-appearing, appropriately-developed individual.  - Skin: Focused examination of the skin on penis within the consultation was performed.   As above in HPI. No additional skin concerns.  See above  - upper respiratory tract: currently no obvious Rhinitis  - lungs: no signs for active and present Asthma/Wheezing or coughing  - eyes: currently no active conjunctivits  - GI system/digestion: currently no problems, no bloating or Diarrhoea    Earlier History and Allergy exams:    Patient recovered from COVID, but had more stuffy nose  Had enlarged spleen (had that before as child already) --> probably infectious      Earlier History and Allergy exams:    Patient had a tattoo more than 10 years. Never problems until 2 years ago with raised outline of the tattoo. It is mostly itchy and inflamed during the allergy season. Big black tattoo on left upper arm. It scabs and is very itchy.    Has all over the body black tattoos, but only that on the arm makes  troubles.    Patient has seasonal allergies. As a child less problems, but [place] patient gets stuffy, runny nose with postnasal drip and sinusitis and chronic ear infections --> got 4-6 months allergy shots and then problems with ordering of the allergens. After 3rd shot patient had generalized reactions. Afterwards patient had to dilute and no issues. Got few days after IT always some eczematous lesions on trunk.  Patient has year around allergies with aggravation in spring/summer, but not fall. Patient has pets with small dog and no issues.    Patient has Asthma (more cold induced and some wheezing at exhalation during season) --> treatment with proair    Never eczema as child. In the past year random eczematous spots on trunk and extremities      Past Medical History:   Patient Active Problem List   Diagnosis     Anxiety     CARDIOVASCULAR SCREENING; LDL GOAL LESS THAN 160     High risk sexual behavior     Tobacco use disorder     Low back pain     Essential hypertension     Internal hemorrhoids which bleed     Obesity, Class I, BMI 30-34.9     Attention deficit hyperactivity disorder (ADHD), combined type     AYALA (generalized anxiety disorder)     OCD (obsessive compulsive disorder)     Drug-induced erectile dysfunction     Mild persistent asthma without complication     Lumbosacral radiculopathy     Dyslipidemia     Elevated serum creatinine     Past Medical History:   Diagnosis Date     Acute right otitis media 1/8/2013     Anxiety      Depression      Drug abuse (H)      Gonococcal urethritis 02/05/2016     Urethritis 5/20/2013     Problem list name updated by automated process. Provider to review       Allergy History:     Allergies   Allergen Reactions     Cymbalta      Weight gain and fatigue     Duloxetine Other (See Comments) and Fatigue     enlarged spleen and weight gain     Paroxetine Other (See Comments), Fatigue and GI Disturbance     Weight gain, Spleen issues      Seasonal Allergies      Vicodin [Hydrocodone-Acetaminophen]        Social History:  The patient works as a . Patient has the following hobbies or non-occupational exposure: tatcisco     reports that he quit smoking about 2 years ago. His smoking use included cigarettes. He has a 3.00 pack-year smoking history. He has never used smokeless tobacco. He reports current alcohol use. He reports current drug use. Drug: Marijuana.      Family History:  Family History   Problem Relation Age of Onset     Unknown/Adopted Mother      Unknown/Adopted Father      Unknown/Adopted Maternal Grandmother      Unknown/Adopted Maternal Grandfather      Unknown/Adopted Paternal Grandmother      Unknown/Adopted Paternal Grandfather      Unknown/Adopted Brother        Medications:  Current Outpatient Medications   Medication Sig Dispense Refill     albuterol (PROAIR HFA/PROVENTIL HFA/VENTOLIN HFA) 108 (90 Base) MCG/ACT inhaler Inhale 2 puffs into the lungs every 4 hours as needed for wheezing 1 Inhaler 0     ALPRAZolam (XANAX) 2 MG tablet Take 1 tablet (2 mg) by mouth 3 times daily as needed for anxiety 30 tablet 0     amphetamine-dextroamphetamine (ADDERALL) 20 MG tablet Take 1 tablet (20 mg) by mouth 3 times daily 90 tablet 0     azelastine (ASTELIN) 0.1 % nasal spray Spray 2 sprays into both nostrils 2 times daily 30 mL 11     clotrimazole (LOTRIMIN) 1 % external cream Apply topically 2 times daily 60 g 1     famotidine (PEPCID) 40 MG tablet Take 1 tablet (40 mg) by mouth At Bedtime 90 tablet 1     fluticasone (FLOVENT HFA) 220 MCG/ACT Inhaler Inhale 2 puffs into the lungs 2 times daily Please give patient spacer 1 Inhaler 3     Hyoscyamine Sulfate 0.375 MG TBCR Take 1 capful by mouth 3 times daily as needed 90 tablet 1     mometasone (ELOCON) 0.1 % external ointment If necessary use 3-4 days in a row or 2xweekly on eczematous lesions 45 g 2     montelukast (SINGULAIR) 10 MG tablet Take 1 tablet (10 mg) by mouth At  Bedtime 90 tablet 0     oxyCODONE-acetaminophen (PERCOCET) 5-325 MG per tablet Take 1 tablet by mouth every 6 hours as needed for pain 12 tablet 0     pimecrolimus (ELIDEL) 1 % external cream Apply topically 2 times daily 30 g 0     sildenafil (REVATIO) 20 MG tablet Take 1-2 tablets (20-40 mg) by mouth daily as needed As needed for prevention of erectile dysfunction 30 tablet 2     tacrolimus (PROTOPIC) 0.03 % external ointment Apply topically 2 times daily Put on skin lesions 2x daily 60 g 1     tacrolimus (PROTOPIC) 0.1 % external ointment Apply twice daily as needed for rash on penis. 30 g 11     triamcinolone (KENALOG) 0.025 % external ointment Apply topically 2 times daily Apply twice a day to sites of irritation 80 g 1       Allergy Tests:    Past Allergy Test: prick tests positive to pollens, cockroach, dust mites, trees, ragweed, grass pollens ==> had for some time IT and eczema got worse = delayed type reactions to allergens?    Future Allergy Test:     Order for PATCH TESTS    [] Outpatient  [] Inpatient: [facility]..../ Bed ....      Skin Atopy (atopic dermatitis) [x] Yes   [] No  Rhinitis/Sinusitis:   [x] Yes   [] No  Allergic Asthma:   [x] Yes   [] No  Food Allergy:   [] Yes   [x] No  Leg ulcers:   [] Yes   [x] No  Hand eczema:   [] Yes   [x] No   Leading hand:   [] R   [] L       [] Ambidextrous                        Reason for tests (suspected allergy): allergic contact dermatitis to tattoo and seasonal aggravation to pollens  Known previous allergies: see below    Standardized panels  [x] Standard panel (40 tests)  [x] Preservatives & Antimicrobials (31 tests)  [x] Emulsifiers & Additives (25 tests)   [] Perfumes/Flavours & Plants (25 tests)  [] Hairdresser panel (12 tests)  [] Rubber Chemicals (22 tests)  [] Plastics (26 tests)  [x] Colorants/Dyes/Food additives (20 tests)  [] Metals (implants/dental) (24 tests)  [] Local anaesthetics/NSAIDs (13 tests)  [] Antibiotics & Antimycotics (14 tests)   []  Corticosteroids (15 tests)   [] Photopatch test (62 tests)   [x] others: house dust mites, molds, tree and grass pollens and dog from prick tests      [] Patient's own products: ...    DO NOT test if chemical or biological identity is unknown!     always ask from patient the product information and safety sheets (MSDS)   [] Atopy screen prick test (Atopic predisposition): and if possible IDT with delayed readings --> patient had recently a prick test (see above) and therefore do not repeat that except we would like to perform on Friday IDT.  RESULTS & EVALUATION of PATCH TESTS    Patch test readings after     [x] 2 days, [] 3 days [x] 4 days, [] 5 days,    Applied patch tests with results (import here the list of patch tests):    STANDARD Series      No Substance 2 days 4 days remarks   1 Kyle Mix [C] - -    2 Colophony - -    3  2-Mercaptobenzothiazole  - -     4 Methylisothiazolinone - -    5 Carba Mix - -    6 Thiuram Mix [A] - -    7 Bisphenol A Epoxy Resin - -    8 T-Mdmb-Ieuqagjsvvd-Formaldehyde Resin - -    9 Mercapto Mix [A] (+) -    10 Black Rubber Mix- PPD [B] - -    11 Potassium Dichromate  -  -    12 Balsam of [place] (Myroxylon Pereirae Resin) - -    13 Nickel Sulphate Hexahydrate - -    14 Mixed Dialkyl Thiourea - -    15 Paraben Mix [B] - -    16 Methyldibromo Glutaronitrile - -    17 Fragrance Mix - -    18 2-Bromo-2-Nitropropane-1,3-Diol (Bronopol) - -    19 Lyral - -    20 Tixocortol-21- Pivalate - -    21 Diazolidiyl Urea (Germall II) - -    22 Methyl Methacrylate - -    23 Cobalt (II) Chloride Hexahydrate - -    24 Fragrance Mix II  - -    25 Compositae Mix - -    26 Benzoyl Peroxide - -    27 Bacitracin - -    28 Formaldehyde - -    29 Methylchloroisothiazolinone / Methylisothiazolinone - -    30 Corticosteroid Mix - -    31 Sodium Lauryl Sulfate - -    32 Lanolin Alcohol - -    33 Turpentine - -    34 Cetylstearylalcohol - -    35 Chlorhexidine Dicluconate - -    36 Budenoside - -    37  Imidazolidinyl Urea  - -    38 Ethyl-2 Cyanoacrylate - -    39 Quaternium 15 (Dowicil 200) - -    40 Decyl Glucoside - -      COLORANTS, DYES, FOOD ADDITIVES   No Substance 2 days 4 days remarks   41 1 Aniline - -    42 2 Aj Brown R - -    43 3 Textile Dye Mix [A] - -    44 4 [place]  - -    45 5 Disperse Blue-3 - -    46 6 Disperse [place]-3 - -    47 7 Disperse Red-11 NA NA    48 8 Disperse Yellow-9 - -    49 9 Erythrosine-B - -    50 10 Naphthol AS - -       Food additives      51 11 Aspartame - -    52 12 Azorubine - -    53 13 Brilliant Black - -    54 14 Cochineal Red - -    55 15 Patent Blue-VF NA NA    56 16 Pectin - -    57 17 Quinoline Yellow - -    58 18 Saccharin - -    59 19 Tartrazine - -    60 20 Sodium Glutamate NA NA      EMULSIFIERS & ADDITIVES   No Substance 2 days 4 days remarks   61 1 Polyethylene Glycol-400 - -    62 2 Cocamidopropyl Betaine - -    63 3 Amerchol L101 - -    64 4 Propylene Glycol - -    65 5 Triethanolamine - -    66 6 Sorbitane Sesquiolate - -    67 7 Isopropylmyristate - -    68 8 Polysorbate 80  - -    69 9 Amidoamine   (Stearamidopropyl Dimethylamine) - -    70 10 Oleamidopropyl Dimethylamine - -    71 11 Lauryl Glucoside - -    72 12 Coconut Diethanolamide  - -    73 13 2-Hydroxy-4-Methoxy Benzophenone (Oxybenzone) - -    74 14 Benzophenone-4 (Sulisobenzon) - -    75 15 Propolis - -    76 16 Dexpanthenol - -    77 17 Carboxymethyl Cellulose Sodium NA NA    78 18 Abitol - -    79 19 Tert-Butylhydroquinone - -    80 20 Benzyl Salicylate - -      Antioxidant      81 21 Dodecyl Gallate - -    82 22 Butylhydroxyanisole (BHA) - -    83 23 Butylhydroxytoluene (BHT) - -    84 24 Di-Alpha-Tocopherol (Vit E) - -    85 25 Propyl Gallate - -    86 26 Zinc Pyrithione NA NA    87 27 Dimethylaminopropylamin (DMAPA) - -      PRESERVATIVES & ANTIMICROBIALS     No Substance 2 days 4 days remarks   88 1  1,2-Benzisothiazoline-3-One, Sodium Salt - -    89 2  1,3,5-Waqar (2-Hydroxyethyl) -  Hexahydrotriazine (Grotan BK) - -    90 3 5-Mdocgeezjkrrt-8-Nitro-1, 3-Propanediol - -    91 4  3, 4, 4' - Triclocarban - -    92 5 4 - Chloro - 3 - Cresol - -    93 6 4 - Chloro - 4 - Xylenol (PCMX) - -    94 7 7-Ethylbicyclooxazolidine (Bioban GK2591) - -    95 8 Benzalkonium Chloride NA NA    96 9 Benzyl Alcohol - -    97 10 Cetalkonium Chloride - -    98 11 Cetylpyrimidine Chloride  - -    99 12 Chloroacetamide - -    100 13 DMDM Hydantoin - -    101 14 Glutaraldehyde - -    102 15 Triclosan - -    103 16 Glyoxal Trimeric Dihydrate - -    104 17 Iodopropynyl Butylcarbamate - -    105 18 Octylisothiazoline - -    106 19 Iodoform NA NA    107 20 (Nitrobutyl) Morpholine/(Ethylnitro-Trimethylene) Dimorpholine (Bioban P 1487) - -    108 21 Phenoxyethanol - -    109 22 Phenyl Salicylate - -    110 23 Povidone Iodine - -    111 24 Sodium Benzoate - -    112 25 Sodium Disulfite - -    113 26 Sorbic Acid - -    114 27 Thimerosal - -    115 28 Melamine Formeladyde Resin      116 29 Ethylenediamine Dihydrochloride        Parabens      117 30 Butyl-P-Hydroxybenzoate - -    118 31 Ethyl-P-Hydroxybenzoate - -    119 32 Methyl-P-Hydroxybenzoate - -    120 33 Propyl-P-Hydroxybenzoate - -      OTHER PRODUCTS       No Substance Conc  % solv 2 days 4 days remarks   121 D. farinae   - -    122 D. pteronyssinus   - -    123 Aspergillius   - -    124 penicillium   - -    125 Dog   - -    126 9 Tree Mix - ALK   - -    127 [place] Mix 2630 - HS   - -    128 Tree Mix 5- HS   - -    129 5 Grass Mix - ALK   - -    130           Results of patch tests:                         Interpretation:    - Negative                    A    = Allergic      (+) Erythema    TI   = Toxic/irritant   + E + Infiltration    RaP = Relevance at Present     ++ E/I + Papulovesicle   Rpr  = Relevance Previously     +++ E/I/P + Blister     nR   = No Relevance    Atopy Screen (Placed 11/xx/20 )    No Substance Readings (15 min) Evaluation   POS Histamine 1mg/ml -     NEG NaCl 0.9% -      No Substance Readings (15 min) Evaluation   1 Alternaria alternata (tenuis)  -    2 Cladosporium herbarum -    3 Aspergillus fumigatus -    4 Penicillium notatum -    5 Dermatophagoides pteronyssinus -    6 Dermatophagoides farinae -      Intradermal Testing (Placed 11/xx/20 )    No Substance Conc.  Readings (15min) Evaluation   - NaCl  0.9% -    + Histamine (prick) 0.1mg / ml -    DF Standard Dust Mite - D. Farinae 1:10 -    DP Standard Dust Mite - D. Pteronyssinus 1:10 -    A Aspergillus fumigatus  1:10 -    P Penicillium notatum 1:10 -      Conclusion:   [x] No relevant allergic reaction observed: was not under perfect conditions, because patient developed SARS-Cov2 infection on 1st day, but according to photos from Thursday 5th November and patients info from 6th November no itchy spot on back. Not sure about possible reactions with the inhalant allergens around 120! Skin there red!    Interpretation/ remarks:   No signs for contact sensitization. Some erythema over test site of inhalant allergens ==> might have to repeat them later    ==> final Diagnosis:    # delayed type reaction to black tattoo on certain areas and aggravation during pollen season and irritant dermatitis genital    Unlikely allergic contact dermatitis (patch tests negatives, but under unfavorable conditions    probably more likely atopic dermatitis with irritant component    # perennial rhinoconjunctivitis and sinusitis/otitis with Asthma and seasonal aggravation (spring/summer)    Atopic predisposition    Perennial = house dust mite and molds (unlikely dog) = immediate and delayed?    Seasonal = tree pollens, maybe grass pollens = immediate and delayed?    Recurrent dermatitis (nummular)    Lichen sclerosus et atrophicus (initial) and ectopic penile sebaceous glands    Treatment with Protopic 0.1% and for 2 weeks daily Mometasone cream    # acute Urticaria    Try Allegra 180mg if recurrences    These conclusions are  made at the best of one's knowledge and belief based on the provided evidence such as patient's history and allergy test results and they can change over time or can be incomplete because of missing information's.    ==> Treatment prescribed/Plan:  - try Loratadine 10mg every evening for 1 month (for sinus problems and hives)  -  Protopic oint 0.1% for about 2-3 months on the tattoo and on other irritated skin and penis  - on penis for about 2 weeks daily Mometasone oint  - put on skin (also on genitals) for moisturizing and prevention Vanicream Lotion and wash genitals with Vanicream facial wash    If not better, then maybe perform prick and intradermal tests with delayed readings      Follow-up: in Derm-Allergy clinic if necessary      Thank you for the opportunity be involved in the care of this patient.     Staff: Yaima Sanchez and Cheyenne Lipscomb RN    I spent a total of 20 minutes face to face with Abimael Martinez during today s office visit. Over 50% of this time was spent discussing all the individual test results, correlating them to the clinical relevance, counseling the patient and/or coordinating care. Please see Assessment and Plan for details.        Again, thank you for allowing me to participate in the care of your patient.        Sincerely,        James Harris MD

## 2021-02-23 NOTE — PROGRESS NOTES
Note for return visit for Dermato-Allergy       Encounter Date: Feb 23, 2021    History of Present Illness:  Abimael Martinez is a 31 year old male who presents in person to the consult following information has been take directly from the prior notes, patients informations, Epic and/or other sources and exam/history performed by myself:     Last seen 12/15/2020    >> on the glans penis typical ectopic sebaceous and on the glans some slightly atrophic appearing lesions      Additional comments and observations from review of the patient s chart including the following:    See notes for more details    ROS: Patient generally feeling well today   Physical Examination:  General: Well-appearing, appropriately-developed individual.  - Skin: Focused examination of the skin on penis within the consultation was performed.   As above in HPI. No additional skin concerns.  See above  - upper respiratory tract: currently no obvious Rhinitis  - lungs: no signs for active and present Asthma/Wheezing or coughing  - eyes: currently no active conjunctivits  - GI system/digestion: currently no problems, no bloating or Diarrhoea    Earlier History and Allergy exams:    Patient recovered from COVID, but had more stuffy nose  Had enlarged spleen (had that before as child already) --> probably infectious      Earlier History and Allergy exams:    Patient had a tattoo more than 10 years. Never problems until 2 years ago with raised outline of the tattoo. It is mostly itchy and inflamed during the allergy season. Big black tattoo on left upper arm. It scabs and is very itchy.    Has all over the body black tattoos, but only that on the arm makes troubles.    Patient has seasonal allergies. As a child less problems, but since [place] patient gets stuffy, runny nose with postnasal drip and sinusitis and chronic ear infections --> got 4-6 months allergy shots and then problems with ordering of the allergens. After  3rd shot patient had generalized reactions. Afterwards patient had to dilute and no issues. Got few days after IT always some eczematous lesions on trunk.  Patient has year around allergies with aggravation in spring/summer, but not fall. Patient has pets with small dog and no issues.    Patient has Asthma (more cold induced and some wheezing at exhalation during season) --> treatment with proair    Never eczema as child. In the past year random eczematous spots on trunk and extremities      Past Medical History:   Patient Active Problem List   Diagnosis     Anxiety     CARDIOVASCULAR SCREENING; LDL GOAL LESS THAN 160     High risk sexual behavior     Tobacco use disorder     Low back pain     Essential hypertension     Internal hemorrhoids which bleed     Obesity, Class I, BMI 30-34.9     Attention deficit hyperactivity disorder (ADHD), combined type     AYALA (generalized anxiety disorder)     OCD (obsessive compulsive disorder)     Drug-induced erectile dysfunction     Mild persistent asthma without complication     Lumbosacral radiculopathy     Dyslipidemia     Elevated serum creatinine     Past Medical History:   Diagnosis Date     Acute right otitis media 1/8/2013     Anxiety      Depression      Drug abuse (H)      Gonococcal urethritis 02/05/2016     Urethritis 5/20/2013     Problem list name updated by automated process. Provider to review       Allergy History:     Allergies   Allergen Reactions     Cymbalta      Weight gain and fatigue     Duloxetine Other (See Comments) and Fatigue     enlarged spleen and weight gain     Paroxetine Other (See Comments), Fatigue and GI Disturbance     Weight gain, Spleen issues     Seasonal Allergies      Vicodin [Hydrocodone-Acetaminophen]        Social History:  The patient works as a . Patient has the following hobbies or non-occupational exposure: tattoos     reports that he quit smoking about 2 years ago. His smoking use included cigarettes. He has a  3.00 pack-year smoking history. He has never used smokeless tobacco. He reports current alcohol use. He reports current drug use. Drug: Marijuana.      Family History:  Family History   Problem Relation Age of Onset     Unknown/Adopted Mother      Unknown/Adopted Father      Unknown/Adopted Maternal Grandmother      Unknown/Adopted Maternal Grandfather      Unknown/Adopted Paternal Grandmother      Unknown/Adopted Paternal Grandfather      Unknown/Adopted Brother        Medications:  Current Outpatient Medications   Medication Sig Dispense Refill     albuterol (PROAIR HFA/PROVENTIL HFA/VENTOLIN HFA) 108 (90 Base) MCG/ACT inhaler Inhale 2 puffs into the lungs every 4 hours as needed for wheezing 1 Inhaler 0     ALPRAZolam (XANAX) 2 MG tablet Take 1 tablet (2 mg) by mouth 3 times daily as needed for anxiety 30 tablet 0     amphetamine-dextroamphetamine (ADDERALL) 20 MG tablet Take 1 tablet (20 mg) by mouth 3 times daily 90 tablet 0     azelastine (ASTELIN) 0.1 % nasal spray Spray 2 sprays into both nostrils 2 times daily 30 mL 11     clotrimazole (LOTRIMIN) 1 % external cream Apply topically 2 times daily 60 g 1     famotidine (PEPCID) 40 MG tablet Take 1 tablet (40 mg) by mouth At Bedtime 90 tablet 1     fluticasone (FLOVENT HFA) 220 MCG/ACT Inhaler Inhale 2 puffs into the lungs 2 times daily Please give patient spacer 1 Inhaler 3     Hyoscyamine Sulfate 0.375 MG TBCR Take 1 capful by mouth 3 times daily as needed 90 tablet 1     mometasone (ELOCON) 0.1 % external ointment If necessary use 3-4 days in a row or 2xweekly on eczematous lesions 45 g 2     montelukast (SINGULAIR) 10 MG tablet Take 1 tablet (10 mg) by mouth At Bedtime 90 tablet 0     oxyCODONE-acetaminophen (PERCOCET) 5-325 MG per tablet Take 1 tablet by mouth every 6 hours as needed for pain 12 tablet 0     pimecrolimus (ELIDEL) 1 % external cream Apply topically 2 times daily 30 g 0     sildenafil (REVATIO) 20 MG tablet Take 1-2 tablets (20-40 mg) by  mouth daily as needed As needed for prevention of erectile dysfunction 30 tablet 2     tacrolimus (PROTOPIC) 0.03 % external ointment Apply topically 2 times daily Put on skin lesions 2x daily 60 g 1     tacrolimus (PROTOPIC) 0.1 % external ointment Apply twice daily as needed for rash on penis. 30 g 11     triamcinolone (KENALOG) 0.025 % external ointment Apply topically 2 times daily Apply twice a day to sites of irritation 80 g 1       Allergy Tests:    Past Allergy Test: prick tests positive to pollens, cockroach, dust mites, trees, ragweed, grass pollens ==> had for some time IT and eczema got worse = delayed type reactions to allergens?    Future Allergy Test:     Order for PATCH TESTS    [] Outpatient  [] Inpatient: [facility]..../ Bed ....      Skin Atopy (atopic dermatitis) [x] Yes   [] No  Rhinitis/Sinusitis:   [x] Yes   [] No  Allergic Asthma:   [x] Yes   [] No  Food Allergy:   [] Yes   [x] No  Leg ulcers:   [] Yes   [x] No  Hand eczema:   [] Yes   [x] No   Leading hand:   [] R   [] L       [] Ambidextrous                        Reason for tests (suspected allergy): allergic contact dermatitis to tattoo and seasonal aggravation to pollens  Known previous allergies: see below    Standardized panels  [x] Standard panel (40 tests)  [x] Preservatives & Antimicrobials (31 tests)  [x] Emulsifiers & Additives (25 tests)   [] Perfumes/Flavours & Plants (25 tests)  [] Hairdresser panel (12 tests)  [] Rubber Chemicals (22 tests)  [] Plastics (26 tests)  [x] Colorants/Dyes/Food additives (20 tests)  [] Metals (implants/dental) (24 tests)  [] Local anaesthetics/NSAIDs (13 tests)  [] Antibiotics & Antimycotics (14 tests)   [] Corticosteroids (15 tests)   [] Photopatch test (62 tests)   [x] others: house dust mites, molds, tree and grass pollens and dog from prick tests      [] Patient's own products: ...    DO NOT test if chemical or biological identity is unknown!     always ask from patient the product information and  safety sheets (MSDS)   [] Atopy screen prick test (Atopic predisposition): and if possible IDT with delayed readings --> patient had recently a prick test (see above) and therefore do not repeat that except we would like to perform on Friday IDT.  RESULTS & EVALUATION of PATCH TESTS    Patch test readings after     [x] 2 days, [] 3 days [x] 4 days, [] 5 days,    Applied patch tests with results (import here the list of patch tests):    STANDARD Series      No Substance 2 days 4 days remarks   1 Kyle Mix [C] - -    2 Colophony - -    3  2-Mercaptobenzothiazole  - -     4 Methylisothiazolinone - -    5 Carba Mix - -    6 Thiuram Mix [A] - -    7 Bisphenol A Epoxy Resin - -    8 V-Zawl-Kqqpdjsguwf-Formaldehyde Resin - -    9 Mercapto Mix [A] (+) -    10 Black Rubber Mix- PPD [B] - -    11 Potassium Dichromate  -  -    12 Balsam of [place] (Myroxylon Pereirae Resin) - -    13 Nickel Sulphate Hexahydrate - -    14 Mixed Dialkyl Thiourea - -    15 Paraben Mix [B] - -    16 Methyldibromo Glutaronitrile - -    17 Fragrance Mix - -    18 2-Bromo-2-Nitropropane-1,3-Diol (Bronopol) - -    19 Lyral - -    20 Tixocortol-21- Pivalate - -    21 Diazolidiyl Urea (Germall II) - -    22 Methyl Methacrylate - -    23 Cobalt (II) Chloride Hexahydrate - -    24 Fragrance Mix II  - -    25 Compositae Mix - -    26 Benzoyl Peroxide - -    27 Bacitracin - -    28 Formaldehyde - -    29 Methylchloroisothiazolinone / Methylisothiazolinone - -    30 Corticosteroid Mix - -    31 Sodium Lauryl Sulfate - -    32 Lanolin Alcohol - -    33 Turpentine - -    34 Cetylstearylalcohol - -    35 Chlorhexidine Dicluconate - -    36 Budenoside - -    37 Imidazolidinyl Urea  - -    38 Ethyl-2 Cyanoacrylate - -    39 Quaternium 15 (Dowicil 200) - -    40 Decyl Glucoside - -      COLORANTS, DYES, FOOD ADDITIVES   No Substance 2 days 4 days remarks   41 1 Aniline - -    42 2 Aj Brown R - -    43 3 Textile Dye Mix [A] - -    44 4 [place]  - -    45 5  Disperse Blue-3 - -    46 6 Disperse Morrow-3 - -    47 7 Disperse Red-11 NA NA    48 8 Disperse Yellow-9 - -    49 9 Erythrosine-B - -    50 10 Naphthol AS - -       Food additives      51 11 Aspartame - -    52 12 Azorubine - -    53 13 Brilliant Black - -    54 14 Cochineal Red - -    55 15 Patent Blue-VF NA NA    56 16 Pectin - -    57 17 Quinoline Yellow - -    58 18 Saccharin - -    59 19 Tartrazine - -    60 20 Sodium Glutamate NA NA      EMULSIFIERS & ADDITIVES   No Substance 2 days 4 days remarks   61 1 Polyethylene Glycol-400 - -    62 2 Cocamidopropyl Betaine - -    63 3 Amerchol L101 - -    64 4 Propylene Glycol - -    65 5 Triethanolamine - -    66 6 Sorbitane Sesquiolate - -    67 7 Isopropylmyristate - -    68 8 Polysorbate 80  - -    69 9 Amidoamine   (Stearamidopropyl Dimethylamine) - -    70 10 Oleamidopropyl Dimethylamine - -    71 11 Lauryl Glucoside - -    72 12 Coconut Diethanolamide  - -    73 13 2-Hydroxy-4-Methoxy Benzophenone (Oxybenzone) - -    74 14 Benzophenone-4 (Sulisobenzon) - -    75 15 Propolis - -    76 16 Dexpanthenol - -    77 17 Carboxymethyl Cellulose Sodium NA NA    78 18 Abitol - -    79 19 Tert-Butylhydroquinone - -    80 20 Benzyl Salicylate - -      Antioxidant      81 21 Dodecyl Gallate - -    82 22 Butylhydroxyanisole (BHA) - -    83 23 Butylhydroxytoluene (BHT) - -    84 24 Di-Alpha-Tocopherol (Vit E) - -    85 25 Propyl Gallate - -    86 26 Zinc Pyrithione NA NA    87 27 Dimethylaminopropylamin (DMAPA) - -      PRESERVATIVES & ANTIMICROBIALS     No Substance 2 days 4 days remarks   88 1  1,2-Benzisothiazoline-3-One, Sodium Salt - -    89 2  1,3,5-Waqar (2-Hydroxyethyl) - Hexahydrotriazine (Grotan BK) - -    90 3 0-Bucxxfqewuhff-7-Nitro-1, 3-Propanediol - -    91 4  3, 4, 4' - Triclocarban - -    92 5 4 - Chloro - 3 - Cresol - -    93 6 4 - Chloro - 4 - Xylenol (PCMX) - -    94 7 7-Ethylbicyclooxazolidine ([facility] [id number]) - -    95 8 Benzalkonium Chloride NA NA    96 9  Benzyl Alcohol - -    97 10 Cetalkonium Chloride - -    98 11 Cetylpyrimidine Chloride  - -    99 12 Chloroacetamide - -    100 13 DMDM Hydantoin - -    101 14 Glutaraldehyde - -    102 15 Triclosan - -    103 16 Glyoxal Trimeric Dihydrate - -    104 17 Iodopropynyl Butylcarbamate - -    105 18 Octylisothiazoline - -    106 19 Iodoform NA NA    107 20 (Nitrobutyl) Morpholine/(Ethylnitro-Trimethylene) Dimorpholine (Bioban P 1487) - -    108 21 Phenoxyethanol - -    109 22 Phenyl Salicylate - -    110 23 Povidone Iodine - -    111 24 Sodium Benzoate - -    112 25 Sodium Disulfite - -    113 26 Sorbic Acid - -    114 27 Thimerosal - -    115 28 Melamine Formeladyde Resin      116 29 Ethylenediamine Dihydrochloride        Parabens      117 30 Butyl-P-Hydroxybenzoate - -    118 31 Ethyl-P-Hydroxybenzoate - -    119 32 Methyl-P-Hydroxybenzoate - -    120 33 Propyl-P-Hydroxybenzoate - -      OTHER PRODUCTS       No Substance Conc  % solv 2 days 4 days remarks   121 D. farinae   - -    122 D. pteronyssinus   - -    123 Aspergillius   - -    124 penicillium   - -    125 Dog   - -    126 9 Tree Mix - ALK   - -    127 [place] Mix 2630 - HS   - -    128 Tree Mix 5- HS   - -    129 5 Grass Mix - ALK   - -    130           Results of patch tests:                         Interpretation:    - Negative                    A    = Allergic      (+) Erythema    TI   = Toxic/irritant   + E + Infiltration    RaP = Relevance at Present     ++ E/I + Papulovesicle   Rpr  = Relevance Previously     +++ E/I/P + Blister     nR   = No Relevance    Atopy Screen (Placed 11/xx/20 )    No Substance Readings (15 min) Evaluation   POS Histamine 1mg/ml -    NEG NaCl 0.9% -      No Substance Readings (15 min) Evaluation   1 Alternaria alternata (tenuis)  -    2 Cladosporium herbarum -    3 Aspergillus fumigatus -    4 Penicillium notatum -    5 Dermatophagoides pteronyssinus -    6 Dermatophagoides farinae -      Intradermal Testing (Placed 11/xx/20  )    No Substance Conc.  Readings (15min) Evaluation   - NaCl  0.9% -    + Histamine (prick) 0.1mg / ml -    DF Standard Dust Mite - D. Farinae 1:10 -    DP Standard Dust Mite - D. Pteronyssinus 1:10 -    A Aspergillus fumigatus  1:10 -    P Penicillium notatum 1:10 -      Conclusion:   [x] No relevant allergic reaction observed: was not under perfect conditions, because patient developed SARS-Cov2 infection on 1st day, but according to photos from Thursday 5th November and patients info from 6th November no itchy spot on back. Not sure about possible reactions with the inhalant allergens around 120! Skin there red!    Interpretation/ remarks:   No signs for contact sensitization. Some erythema over test site of inhalant allergens ==> might have to repeat them later    ==> final Diagnosis:    # delayed type reaction to black tattoo on certain areas and aggravation during pollen season and irritant dermatitis genital    Unlikely allergic contact dermatitis (patch tests negatives, but under unfavorable conditions    probably more likely atopic dermatitis with irritant component    # perennial rhinoconjunctivitis and sinusitis/otitis with Asthma and seasonal aggravation (spring/summer)    Atopic predisposition    Perennial = house dust mite and molds (unlikely dog) = immediate and delayed?    Seasonal = tree pollens, maybe grass pollens = immediate and delayed?    Recurrent dermatitis (nummular)    Lichen sclerosus et atrophicus (initial) and ectopic penile sebaceous glands    Treatment with Protopic 0.1% and for 2 weeks daily Mometasone cream    # acute Urticaria    Try Allegra 180mg if recurrences    These conclusions are made at the best of one's knowledge and belief based on the provided evidence such as patient's history and allergy test results and they can change over time or can be incomplete because of missing information's.    ==> Treatment prescribed/Plan:  - try Loratadine 10mg every evening for 1 month  (for sinus problems and hives)  -  Protopic oint 0.1% for about 2-3 months on the tattoo and on other irritated skin and penis  - on penis for about 2 weeks daily Mometasone oint  - put on skin (also on genitals) for moisturizing and prevention Vanicream Lotion and wash genitals with Vanicream facial wash    If not better, then maybe perform prick and intradermal tests with delayed readings      Follow-up: in Derm-Allergy clinic if necessary      Thank you for the opportunity be involved in the care of this patient.     Staff: Yaima Sanchez and Cheyenne Lipscomb RN    I spent a total of 20 minutes face to face with Abimael Martinez during today s office visit. Over 50% of this time was spent discussing all the individual test results, correlating them to the clinical relevance, counseling the patient and/or coordinating care. Please see Assessment and Plan for details.

## 2021-03-01 ENCOUNTER — MYC REFILL (OUTPATIENT)
Dept: FAMILY MEDICINE | Facility: CLINIC | Age: 31
End: 2021-03-01

## 2021-03-01 DIAGNOSIS — F90.2 ATTENTION DEFICIT HYPERACTIVITY DISORDER (ADHD), COMBINED TYPE: ICD-10-CM

## 2021-03-01 RX ORDER — DEXTROAMPHETAMINE SACCHARATE, AMPHETAMINE ASPARTATE, DEXTROAMPHETAMINE SULFATE AND AMPHETAMINE SULFATE 5; 5; 5; 5 MG/1; MG/1; MG/1; MG/1
20 TABLET ORAL 3 TIMES DAILY
Qty: 90 TABLET | Refills: 0 | Status: SHIPPED | OUTPATIENT
Start: 2021-03-01 | End: 2021-03-31

## 2021-03-05 ENCOUNTER — MYC REFILL (OUTPATIENT)
Dept: FAMILY MEDICINE | Facility: CLINIC | Age: 31
End: 2021-03-05

## 2021-03-05 DIAGNOSIS — F41.9 ANXIETY: ICD-10-CM

## 2021-03-05 RX ORDER — ALPRAZOLAM 2 MG
2 TABLET ORAL 3 TIMES DAILY PRN
Qty: 30 TABLET | Refills: 0 | Status: SHIPPED | OUTPATIENT
Start: 2021-03-05 | End: 2021-04-06

## 2021-03-12 ENCOUNTER — OFFICE VISIT (OUTPATIENT)
Dept: FAMILY MEDICINE | Facility: CLINIC | Age: 31
End: 2021-03-12
Payer: COMMERCIAL

## 2021-03-12 DIAGNOSIS — Z11.3 ROUTINE SCREENING FOR STI (SEXUALLY TRANSMITTED INFECTION): ICD-10-CM

## 2021-03-12 DIAGNOSIS — Z87.891 FORMER SMOKER: ICD-10-CM

## 2021-03-12 DIAGNOSIS — Z72.51 HIGH RISK SEXUAL BEHAVIOR, UNSPECIFIED TYPE: ICD-10-CM

## 2021-03-12 DIAGNOSIS — Z11.3 ROUTINE SCREENING FOR STI (SEXUALLY TRANSMITTED INFECTION): Primary | ICD-10-CM

## 2021-03-12 LAB
ALBUMIN SERPL-MCNC: 4.1 G/DL (ref 3.4–5)
ALBUMIN UR-MCNC: NEGATIVE MG/DL
ALP SERPL-CCNC: 102 U/L (ref 40–150)
ALT SERPL W P-5'-P-CCNC: 38 U/L (ref 0–70)
APPEARANCE UR: CLEAR
AST SERPL W P-5'-P-CCNC: 16 U/L (ref 0–45)
BILIRUB DIRECT SERPL-MCNC: 0.2 MG/DL (ref 0–0.2)
BILIRUB SERPL-MCNC: 0.6 MG/DL (ref 0.2–1.3)
BILIRUB UR QL STRIP: NEGATIVE
COLOR UR AUTO: NORMAL
GLUCOSE UR STRIP-MCNC: NEGATIVE MG/DL
HGB UR QL STRIP: NEGATIVE
KETONES UR STRIP-MCNC: NEGATIVE MG/DL
LEUKOCYTE ESTERASE UR QL STRIP: NEGATIVE
NITRATE UR QL: NEGATIVE
PH UR STRIP: 6 PH (ref 5–7)
PROT SERPL-MCNC: 8.9 G/DL (ref 6.8–8.8)
RBC #/AREA URNS AUTO: NORMAL /HPF
SOURCE: NORMAL
SP GR UR STRIP: 1.01 (ref 1–1.03)
UROBILINOGEN UR STRIP-MCNC: NORMAL MG/DL (ref 0–2)
WBC #/AREA URNS AUTO: NORMAL /HPF

## 2021-03-12 PROCEDURE — 81001 URINALYSIS AUTO W/SCOPE: CPT | Performed by: NURSE PRACTITIONER

## 2021-03-12 PROCEDURE — 87491 CHLMYD TRACH DNA AMP PROBE: CPT | Performed by: NURSE PRACTITIONER

## 2021-03-12 PROCEDURE — 99213 OFFICE O/P EST LOW 20 MIN: CPT | Performed by: NURSE PRACTITIONER

## 2021-03-12 PROCEDURE — 80076 HEPATIC FUNCTION PANEL: CPT | Performed by: NURSE PRACTITIONER

## 2021-03-12 PROCEDURE — 87591 N.GONORRHOEAE DNA AMP PROB: CPT | Performed by: NURSE PRACTITIONER

## 2021-03-12 PROCEDURE — 86706 HEP B SURFACE ANTIBODY: CPT | Performed by: NURSE PRACTITIONER

## 2021-03-12 PROCEDURE — 86704 HEP B CORE ANTIBODY TOTAL: CPT | Performed by: NURSE PRACTITIONER

## 2021-03-12 PROCEDURE — 87340 HEPATITIS B SURFACE AG IA: CPT | Performed by: NURSE PRACTITIONER

## 2021-03-12 PROCEDURE — 36415 COLL VENOUS BLD VENIPUNCTURE: CPT | Performed by: NURSE PRACTITIONER

## 2021-03-12 PROCEDURE — 99000 SPECIMEN HANDLING OFFICE-LAB: CPT | Performed by: NURSE PRACTITIONER

## 2021-03-12 PROCEDURE — 86780 TREPONEMA PALLIDUM: CPT | Mod: 90 | Performed by: NURSE PRACTITIONER

## 2021-03-12 PROCEDURE — 86803 HEPATITIS C AB TEST: CPT | Performed by: NURSE PRACTITIONER

## 2021-03-12 PROCEDURE — 87389 HIV-1 AG W/HIV-1&-2 AB AG IA: CPT | Performed by: NURSE PRACTITIONER

## 2021-03-12 RX ORDER — TACROLIMUS 0.1 %
OINTMENT (GRAM) TOPICAL
COMMUNITY
Start: 2020-09-03 | End: 2022-10-10

## 2021-03-12 NOTE — PATIENT INSTRUCTIONS
Patient Education     If You Think You Have an STI (STD)  Treating a sexually transmitted infection (STI) early limits the problems it can cause and helps prevent its spread to others. An STI is also known as a sexually transmitted disease (STD). If you have an STI, get treated right away. Ask your partner to be tested, too. Then avoid sex until you ve finished treatment and your healthcare provider says it s OK to have sex again.   Follow your treatment plan  Treatment depends on the type of STI you have. Common treatments include injections and oral pills or liquids. Creams and gels can be applied to sores or warts caused by certain STIs. Follow the tips below:     Get new treatment for each new STI.    Don t use old medicine, even for the same STI. Use medicines as directed.    Don t share medicine unless instructed to do so by your healthcare provider or clinic.  Talk to your partner  If you have an STI, it s your duty to tell all your recent partners so they can be tested and treated. This is one important way to prevent the disease from being spread. Telling a partner that you have an STI can be hard. You may be embarrassed, angry, or afraid. It s often unclear who had the STI first. So try not to place blame. Your healthcare provider may offer some advice on how to start.     Prevent future problems  Even after you ve been treated, you can still be infected again. This is a common problem. It can happen if a partner passes the STI back to you. To prevent this, your partner or partners must be tested. He or she may also need treatment. After treatment, go to any scheduled follow-up visits. Then prevent future problems with safer sex. Limit your number of partners and always use a latex condom.   Diagnosing STIs  Your healthcare provider will take a health history and examine you. During your health history, you will be asked about your sex habits and health history. You may also be asked about drug use. Give  honest answers. Your healthcare provider will then check your body for signs of STIs. He or she also may do one or more of the following tests:     Fluid may be swabbed from sores. Samples also may be taken from the vagina, penis, mouth, or rectum. The samples are then tested for STIs.    Blood or urine samples may be taken. They are checked for viruses or bacteria that cause STIs.    For women, cells from the cervix are checked for signs of cancer and the genital wart virus (human papillomavirus, or HPV). This is called a Pap smear, and is often now done along with HPV testing. If cell changes are found, or a high-risk type of HPV is found, a magnifying scope may be used to take a closer look (colposcopy). In men and women, a Pap smear may be done on the anus to check for HPV-linked cancer or precancerous changes. This is done by a healthcare provider gently swabbing cells from the lining of the anus, and then sending the sample to be looked at in the lab. If there are signs of abnormality, you may need more testing.  [employer] last reviewed this educational content on 2/1/2019 2000-2020 [employer]. All rights reserved. This information is not intended as a substitute for professional medical care. Always follow your healthcare professional's instructions.

## 2021-03-12 NOTE — PROGRESS NOTES
"    Assessment & Plan     Routine screening for STI (sexually transmitted infection)  Getting screening labs, advised regular condom use  - Hepatic panel  - HIV Antigen Antibody Combo  - UA with Microscopic reflex to Culture  - Chlamydia trachomatis PCR  - Neisseria gonorrhoeae PCR  - Treponema Abs w Reflex to RPR and Titer  - **Hepatitis C Screen Reflex to RNA FUTURE anytime  - Hepatitis B surface antigen  - Hepatitis B Surface Antibody  - Hepatitis B core antibody    High risk sexual behavior, unspecified type  Multiple partners.    Recommended he schedule PE to address his other  Health/wellness issues.  :[id number]}     Regular exercise  See Patient Instructions    No follow-ups on file.    STEFFEN Kelly CNP  [facility]ASHLEY Varghese is a 31 year old who presents for the following health issues   HPI   Patient  is requesting STI screening.  He had  A sexual encounter about a month ago and has been \"feeling under the weather/fatigued since\". He had COVID in November and has had enlarged spleen since, has had some intermittent nausea, no weight loss, was seen by Hematology for this.  No fevers or chills but his skin feels hot intermittently. No  vomiting, constipation, or diarrhea.  No ill contacts.  He denies urinary freqeuecy, urgency, dysuria, penile discharge/ lesions, testicular/scrotal pain, no pelvic, abdominal, flank, or back pain.         Review of Systems   Constitutional, HEENT, cardiovascular, pulmonary, gi and gu systems are negative, except as otherwise noted.      Objective    There were no vitals taken for this visit. due to phone visit  There is no height or weight on file to calculate BMI.  Physical Exam   GENERAL: healthy, alert and no distress  PSYCH: mentation appears normal, affect normal/bright    No results found for this or any previous visit (from the past 24 hour(s)).    Phone visit duration 15 min          "

## 2021-03-13 LAB
C TRACH DNA SPEC QL NAA+PROBE: NEGATIVE
N GONORRHOEA DNA SPEC QL NAA+PROBE: NEGATIVE
SPECIMEN SOURCE: NORMAL
SPECIMEN SOURCE: NORMAL
T PALLIDUM AB SER QL: NONREACTIVE

## 2021-03-14 LAB
HBV CORE AB SERPL QL IA: NONREACTIVE
HBV SURFACE AB SERPL IA-ACNC: 153.04 M[IU]/ML
HBV SURFACE AG SERPL QL IA: NONREACTIVE
HCV AB SERPL QL IA: NONREACTIVE
HIV 1+2 AB+HIV1 P24 AG SERPL QL IA: NONREACTIVE

## 2021-03-24 ENCOUNTER — PATIENT OUTREACH (OUTPATIENT)
Dept: ONCOLOGY | Facility: CLINIC | Age: 31
End: 2021-03-24

## 2021-03-24 DIAGNOSIS — R59.1 LYMPHADENOPATHY: Primary | ICD-10-CM

## 2021-03-24 DIAGNOSIS — R16.1 SPLENOMEGALY: ICD-10-CM

## 2021-03-24 NOTE — PROGRESS NOTES
RN CARE COORDINATION NOTE    Patient called to request referrals to Rheumatology and Immunology as recommended by Dr Christianson.     Rheumatology referral placed and provide contact information for scheduling.   Patient is checking to see of Dr Harris sees patients for immunology. He is already established with Dr. Harris. He will call [place] office and speak to [facility] if he needs a referral for immunology.        Eirca Parada MSN, RN, OCN  RN Care Coordinator  [facility]  [phone]

## 2021-03-30 ENCOUNTER — MYC MEDICAL ADVICE (OUTPATIENT)
Dept: FAMILY MEDICINE | Facility: CLINIC | Age: 31
End: 2021-03-30

## 2021-03-30 NOTE — TELEPHONE ENCOUNTER
April 22, 2021      On 3/30/21 pt reported that ACT score is 18 via my chart. Response has been recorded in pt chart.      Isabel Rebolledo [facility]     Patient Quality Outreach      Summary:    Patient has the following on his problem list/HM:     Asthma review       ACT Total Scores 11/30/2020   ACT TOTAL SCORE -   ASTHMA ER VISITS -   ASTHMA HOSPITALIZATIONS -   ACT TOTAL SCORE (Goal Greater than or Equal to 20) 19   In the past 12 months, how many times did you visit the emergency room for your asthma without being admitted to the hospital? 0   In the past 12 months, how many times were you hospitalized overnight because of your asthma? 0          Patient is due/failing the following:   ACT needed    Type of outreach:    Sent [employer] message.    Questions for provider review:    None                                                                                                                                     Isabel Rebolledo [facility]        Chart routed to Care Team.

## 2021-03-31 ENCOUNTER — MYC REFILL (OUTPATIENT)
Dept: FAMILY MEDICINE | Facility: CLINIC | Age: 31
End: 2021-03-31

## 2021-03-31 DIAGNOSIS — F90.2 ATTENTION DEFICIT HYPERACTIVITY DISORDER (ADHD), COMBINED TYPE: ICD-10-CM

## 2021-03-31 RX ORDER — DEXTROAMPHETAMINE SACCHARATE, AMPHETAMINE ASPARTATE, DEXTROAMPHETAMINE SULFATE AND AMPHETAMINE SULFATE 5; 5; 5; 5 MG/1; MG/1; MG/1; MG/1
20 TABLET ORAL 3 TIMES DAILY
Qty: 90 TABLET | Refills: 0 | Status: SHIPPED | OUTPATIENT
Start: 2021-03-31 | End: 2021-04-29

## 2021-03-31 NOTE — TELEPHONE ENCOUNTER
Routing refill request to provider for review/approval because:  Drug not on the [facility] refill protocol   Caro Quintanilla BSN, RN

## 2021-04-01 ENCOUNTER — TRANSFERRED RECORDS (OUTPATIENT)
Dept: HEALTH INFORMATION MANAGEMENT | Facility: CLINIC | Age: 31
End: 2021-04-01

## 2021-04-06 ENCOUNTER — MYC REFILL (OUTPATIENT)
Dept: FAMILY MEDICINE | Facility: CLINIC | Age: 31
End: 2021-04-06

## 2021-04-06 DIAGNOSIS — F41.9 ANXIETY: ICD-10-CM

## 2021-04-06 RX ORDER — ALPRAZOLAM 2 MG
2 TABLET ORAL 3 TIMES DAILY PRN
Qty: 30 TABLET | Refills: 0 | Status: SHIPPED | OUTPATIENT
Start: 2021-04-06 | End: 2021-05-06

## 2021-04-14 ENCOUNTER — OFFICE VISIT (OUTPATIENT)
Dept: ALLERGY | Facility: CLINIC | Age: 31
End: 2021-04-14
Payer: COMMERCIAL

## 2021-04-14 DIAGNOSIS — L20.89 OTHER ATOPIC DERMATITIS: ICD-10-CM

## 2021-04-14 DIAGNOSIS — R16.1 SPLENOMEGALY: Primary | ICD-10-CM

## 2021-04-14 DIAGNOSIS — N48.0 LICHEN SCLEROSUS OF PENIS: ICD-10-CM

## 2021-04-14 DIAGNOSIS — R16.1 SPLENOMEGALY: ICD-10-CM

## 2021-04-14 DIAGNOSIS — Z88.9 ATOPY: ICD-10-CM

## 2021-04-14 PROCEDURE — 82784 ASSAY IGA/IGD/IGG/IGM EACH: CPT | Mod: 90 | Performed by: PATHOLOGY

## 2021-04-14 PROCEDURE — 36415 COLL VENOUS BLD VENIPUNCTURE: CPT | Performed by: PATHOLOGY

## 2021-04-14 PROCEDURE — 99214 OFFICE O/P EST MOD 30 MIN: CPT | Performed by: DERMATOLOGY

## 2021-04-14 PROCEDURE — 82785 ASSAY OF IGE: CPT | Mod: 90 | Performed by: PATHOLOGY

## 2021-04-14 PROCEDURE — 99000 SPECIMEN HANDLING OFFICE-LAB: CPT | Performed by: PATHOLOGY

## 2021-04-14 PROCEDURE — 82787 IGG 1 2 3 OR 4 EACH: CPT | Mod: 90 | Performed by: PATHOLOGY

## 2021-04-14 PROCEDURE — 86334 IMMUNOFIX E-PHORESIS SERUM: CPT | Mod: 90 | Performed by: PATHOLOGY

## 2021-04-14 RX ORDER — PIMECROLIMUS 10 MG/G
CREAM TOPICAL 2 TIMES DAILY
Qty: 30 G | Refills: 1 | Status: SHIPPED | OUTPATIENT
Start: 2021-04-14 | End: 2021-04-23

## 2021-04-14 RX ORDER — TACROLIMUS 1 MG/G
OINTMENT TOPICAL 2 TIMES DAILY
Qty: 30 G | Refills: 1 | Status: SHIPPED | OUTPATIENT
Start: 2021-04-14 | End: 2021-04-14

## 2021-04-14 NOTE — LETTER
4/14/2021         RE: Abimael Martinez  [id number]  [facility] [address]        Dear Colleague,    Thank you for referring your patient, Abimael Martinez, to the [facility] [place]. Please see a copy of my visit note below.    [facility] Dermato-allergology Note  Office visit  Encounter Date: Apr 14, 2021  ____________________________________________    CC: No chief complaint on file.      HPI:  Mr. Abimael Martinez is a(n) 31 year old male who presents today as a return patient for allergy consultation  - patient sent for recurrent Spenomegaly, first one age 8-10, second one around [age] and now recently again (maybe more after COVID)  - otherwise feeling well in usual state of health    Physical exam:  General: In no acute distress, well-developed, well-nourished  Eyes: no conjunctivitis  ENT: no signs of rhinitis   Pulmonary: no wheezing or coughing  Skin: NA    Earlier History and Allergy exams:    Last seen 12/15/2020    >> on the glans penis typical ectopic sebaceous and on the glans some slightly atrophic appearing lesions    Earlier History and Allergy exams:    Patient recovered from COVID, but had more stuffy nose  Had enlarged spleen (had that before as child already) --> probably infectious      Earlier History and Allergy exams:    Patient had a tattoo more than 10 years. Never problems until 2 years ago with raised outline of the tattoo. It is mostly itchy and inflamed during the allergy season. Big black tattoo on left upper arm. It scabs and is very itchy.    Has all over the body black tattoos, but only that on the arm makes troubles.    Patient has seasonal allergies. As a child less problems, but since [place] patient gets stuffy, runny nose with postnasal drip and sinusitis and chronic ear infections --> got 4-6 months allergy shots and then problems with ordering of the allergens. After 3rd shot  patient had generalized reactions. Afterwards patient had to dilute and no issues. Got few days after IT always some eczematous lesions on trunk.  Patient has year around allergies with aggravation in spring/summer, but not fall. Patient has pets with small dog and no issues.    Patient has Asthma (more cold induced and some wheezing at exhalation during season) --> treatment with proair    Never eczema as child. In the past year random eczematous spots on trunk and extremities      Past Medical History:   Patient Active Problem List   Diagnosis     Anxiety     CARDIOVASCULAR SCREENING; LDL GOAL LESS THAN 160     High risk sexual behavior     Low back pain     Essential hypertension     Internal hemorrhoids which bleed     Obesity, Class I, BMI 30-34.9     Attention deficit hyperactivity disorder (ADHD), combined type     AYALA (generalized anxiety disorder)     OCD (obsessive compulsive disorder)     Drug-induced erectile dysfunction     Mild persistent asthma without complication     Lumbosacral radiculopathy     Dyslipidemia     Elevated serum creatinine     Former smoker     Past Medical History:   Diagnosis Date     Acute right otitis media 1/8/2013     Anxiety      Depression      Drug abuse (H)      Gonococcal urethritis 02/05/2016     Urethritis 5/20/2013     Problem list name updated by automated process. Provider to review       Allergy History:     Allergies   Allergen Reactions     Cymbalta      Weight gain and fatigue     Duloxetine Other (See Comments) and Fatigue     enlarged spleen and weight gain     Paroxetine Other (See Comments), Fatigue and GI Disturbance     Weight gain, Spleen issues     Seasonal Allergies      Vicodin [Hydrocodone-Acetaminophen]        Social History:  The patient works as a . Patient has the following hobbies or non-occupational exposure: tattoos     reports that he quit smoking about 3 years ago. His smoking use included cigarettes. He has a 3.00 pack-year  smoking history. He has never used smokeless tobacco. He reports current alcohol use. He reports current drug use. Drug: Marijuana.      Family History:  Family History   Problem Relation Age of Onset     Unknown/Adopted Mother      Unknown/Adopted Father      Unknown/Adopted Maternal Grandmother      Unknown/Adopted Maternal Grandfather      Unknown/Adopted Paternal Grandmother      Unknown/Adopted Paternal Grandfather      Unknown/Adopted Brother        Medications:  Current Outpatient Medications   Medication Sig Dispense Refill     albuterol (PROAIR HFA/PROVENTIL HFA/VENTOLIN HFA) 108 (90 Base) MCG/ACT inhaler Inhale 2 puffs into the lungs every 4 hours as needed for wheezing 1 Inhaler 0     ALPRAZolam (XANAX) 2 MG tablet Take 1 tablet (2 mg) by mouth 3 times daily as needed for anxiety 30 tablet 0     amphetamine-dextroamphetamine (ADDERALL) 20 MG tablet Take 1 tablet (20 mg) by mouth 3 times daily 90 tablet 0     azelastine (ASTELIN) 0.1 % nasal spray Spray 2 sprays into both nostrils 2 times daily 30 mL 11     clotrimazole (LOTRIMIN) 1 % external cream Apply topically 2 times daily 60 g 1     famotidine (PEPCID) 40 MG tablet Take 1 tablet (40 mg) by mouth At Bedtime 90 tablet 1     fluticasone (FLOVENT HFA) 220 MCG/ACT Inhaler Inhale 2 puffs into the lungs 2 times daily Please give patient spacer 1 Inhaler 3     Hyoscyamine Sulfate 0.375 MG TBCR Take 1 capful by mouth 3 times daily as needed 90 tablet 1     mometasone (ELOCON) 0.1 % external ointment If necessary use 3-4 days in a row or 2xweekly on eczematous lesions 45 g 2     montelukast (SINGULAIR) 10 MG tablet Take 1 tablet (10 mg) by mouth At Bedtime 90 tablet 0     pimecrolimus (ELIDEL) 1 % external cream Apply topically 2 times daily To penis 60 g 0     pimecrolimus (ELIDEL) 1 % external cream Apply topically 2 times daily 60 g 4     pimecrolimus (ELIDEL) 1 % external cream Apply topically 2 times daily 30 g 0     PROTOPIC 0.1 % external ointment  APPLY TWICE DAILY AS NEEDED FOR RASH ON PENIS.       sildenafil (REVATIO) 20 MG tablet Take 1-2 tablets (20-40 mg) by mouth daily as needed As needed for prevention of erectile dysfunction 30 tablet 2     tacrolimus (PROTOPIC) 0.03 % external ointment Apply topically 2 times daily Put on skin lesions 2x daily 60 g 1     triamcinolone (KENALOG) 0.025 % external ointment Apply topically 2 times daily Apply twice a day to sites of irritation 80 g 1       Allergy Tests:    Past Allergy Test: prick tests positive to pollens, cockroach, dust mites, trees, ragweed, grass pollens ==> had for some time IT and eczema got worse = delayed type reactions to allergens?    Future Allergy Test:     Order for PATCH TESTS    [] Outpatient  [] Inpatient: [facility]..../ Bed ....      Skin Atopy (atopic dermatitis) [x] Yes   [] No  Rhinitis/Sinusitis:   [x] Yes   [] No  Allergic Asthma:   [x] Yes   [] No  Food Allergy:   [] Yes   [x] No  Leg ulcers:   [] Yes   [x] No  Hand eczema:   [] Yes   [x] No   Leading hand:   [] R   [] L       [] Ambidextrous                        Reason for tests (suspected allergy): allergic contact dermatitis to tattoo and seasonal aggravation to pollens  Known previous allergies: see below    Standardized panels  [x] Standard panel (40 tests)  [x] Preservatives & Antimicrobials (31 tests)  [x] Emulsifiers & Additives (25 tests)   [] Perfumes/Flavours & Plants (25 tests)  [] Hairdresser panel (12 tests)  [] Rubber Chemicals (22 tests)  [] Plastics (26 tests)  [x] Colorants/Dyes/Food additives (20 tests)  [] Metals (implants/dental) (24 tests)  [] Local anaesthetics/NSAIDs (13 tests)  [] Antibiotics & Antimycotics (14 tests)   [] Corticosteroids (15 tests)   [] Photopatch test (62 tests)   [x] others: house dust mites, molds, tree and grass pollens and dog from prick tests      [] Patient's own products: ...    DO NOT test if chemical or biological identity is unknown!     always ask from patient the product  information and safety sheets (MSDS)   [] Atopy screen prick test (Atopic predisposition): and if possible IDT with delayed readings --> patient had recently a prick test (see above) and therefore do not repeat that except we would like to perform on Friday IDT.  RESULTS & EVALUATION of PATCH TESTS    Patch test readings after     [x] 2 days, [] 3 days [x] 4 days, [] 5 days,    Applied patch tests with results (import here the list of patch tests):    STANDARD Series      No Substance 2 days 4 days remarks   1 Kyle Mix [C] - -    2 Colophony - -    3  2-Mercaptobenzothiazole  - -     4 Methylisothiazolinone - -    5 Carba Mix - -    6 Thiuram Mix [A] - -    7 Bisphenol A Epoxy Resin - -    8 O-Cqxm-Pgdozilxigv-Formaldehyde Resin - -    9 Mercapto Mix [A] (+) -    10 Black Rubber Mix- PPD [B] - -    11 Potassium Dichromate  -  -    12 Balsam of [place] (Myroxylon Pereirae Resin) - -    13 Nickel Sulphate Hexahydrate - -    14 Mixed Dialkyl Thiourea - -    15 Paraben Mix [B] - -    16 Methyldibromo Glutaronitrile - -    17 Fragrance Mix - -    18 2-Bromo-2-Nitropropane-1,3-Diol (Bronopol) - -    19 Lyral - -    20 Tixocortol-21- Pivalate - -    21 Diazolidiyl Urea (Germall II) - -    22 Methyl Methacrylate - -    23 Cobalt (II) Chloride Hexahydrate - -    24 Fragrance Mix II  - -    25 Compositae Mix - -    26 Benzoyl Peroxide - -    27 Bacitracin - -    28 Formaldehyde - -    29 Methylchloroisothiazolinone / Methylisothiazolinone - -    30 Corticosteroid Mix - -    31 Sodium Lauryl Sulfate - -    32 Lanolin Alcohol - -    33 Turpentine - -    34 Cetylstearylalcohol - -    35 Chlorhexidine Dicluconate - -    36 Budenoside - -    37 Imidazolidinyl Urea  - -    38 Ethyl-2 Cyanoacrylate - -    39 Quaternium 15 (Dowicil 200) - -    40 Decyl Glucoside - -      COLORANTS, DYES, FOOD ADDITIVES   No Substance 2 days 4 days remarks   41 1 Aniline - -    42 2 Aj Brown R - -    43 3 Textile Dye Mix [A] - -    44 4 [place]  -  -    45 5 Disperse Blue-3 - -    46 6 Disperse [place]-3 - -    47 7 Disperse Red-11 NA NA    48 8 Disperse Yellow-9 - -    49 9 Erythrosine-B - -    50 10 Naphthol AS - -       Food additives      51 11 Aspartame - -    52 12 Azorubine - -    53 13 Brilliant Black - -    54 14 Cochineal Red - -    55 15 Patent Blue-VF NA NA    56 16 Pectin - -    57 17 Quinoline Yellow - -    58 18 Saccharin - -    59 19 Tartrazine - -    60 20 Sodium Glutamate NA NA      EMULSIFIERS & ADDITIVES   No Substance 2 days 4 days remarks   61 1 Polyethylene Glycol-400 - -    62 2 Cocamidopropyl Betaine - -    63 3 Amerchol L101 - -    64 4 Propylene Glycol - -    65 5 Triethanolamine - -    66 6 Sorbitane Sesquiolate - -    67 7 Isopropylmyristate - -    68 8 Polysorbate 80  - -    69 9 Amidoamine   (Stearamidopropyl Dimethylamine) - -    70 10 Oleamidopropyl Dimethylamine - -    71 11 Lauryl Glucoside - -    72 12 Coconut Diethanolamide  - -    73 13 2-Hydroxy-4-Methoxy Benzophenone (Oxybenzone) - -    74 14 Benzophenone-4 (Sulisobenzon) - -    75 15 Propolis - -    76 16 Dexpanthenol - -    77 17 Carboxymethyl Cellulose Sodium NA NA    78 18 Abitol - -    79 19 Tert-Butylhydroquinone - -    80 20 Benzyl Salicylate - -      Antioxidant      81 21 Dodecyl Gallate - -    82 22 Butylhydroxyanisole (BHA) - -    83 23 Butylhydroxytoluene (BHT) - -    84 24 Di-Alpha-Tocopherol (Vit E) - -    85 25 Propyl Gallate - -    86 26 Zinc Pyrithione NA NA    87 27 Dimethylaminopropylamin (DMAPA) - -      PRESERVATIVES & ANTIMICROBIALS     No Substance 2 days 4 days remarks   88 1  1,2-Benzisothiazoline-3-One, Sodium Salt - -    89 2  1,3,5-Waqar (2-Hydroxyethyl) - Hexahydrotriazine (Grotan BK) - -    90 3 1-Csmzzczybodql-0-Nitro-1, 3-Propanediol - -    91 4  3, 4, 4' - Triclocarban - -    92 5 4 - Chloro - 3 - Cresol - -    93 6 4 - Chloro - 4 - Xylenol (PCMX) - -    94 7 7-Ethylbicyclooxazolidine ([facility] [id number]) - -    95 8 Benzalkonium Chloride NA NA     96 9 Benzyl Alcohol - -    97 10 Cetalkonium Chloride - -    98 11 Cetylpyrimidine Chloride  - -    99 12 Chloroacetamide - -    100 13 DMDM Hydantoin - -    101 14 Glutaraldehyde - -    102 15 Triclosan - -    103 16 Glyoxal Trimeric Dihydrate - -    104 17 Iodopropynyl Butylcarbamate - -    105 18 Octylisothiazoline - -    106 19 Iodoform NA NA    107 20 (Nitrobutyl) Morpholine/(Ethylnitro-Trimethylene) Dimorpholine (Bioban P 1487) - -    108 21 Phenoxyethanol - -    109 22 Phenyl Salicylate - -    110 23 Povidone Iodine - -    111 24 Sodium Benzoate - -    112 25 Sodium Disulfite - -    113 26 Sorbic Acid - -    114 27 Thimerosal - -    115 28 Melamine Formeladyde Resin      116 29 Ethylenediamine Dihydrochloride        Parabens      117 30 Butyl-P-Hydroxybenzoate - -    118 31 Ethyl-P-Hydroxybenzoate - -    119 32 Methyl-P-Hydroxybenzoate - -    120 33 Propyl-P-Hydroxybenzoate - -      OTHER PRODUCTS       No Substance Conc  % solv 2 days 4 days remarks   121 D. farinae   - -    122 D. pteronyssinus   - -    123 Aspergillius   - -    124 penicillium   - -    125 Dog   - -    126 9 Tree Mix - ALK   - -    127 [place] Mix 2630 - HS   - -    128 Tree Mix 5- HS   - -    129 5 Grass Mix - ALK   - -    130           Results of patch tests:                         Interpretation:    - Negative                    A    = Allergic      (+) Erythema    TI   = Toxic/irritant   + E + Infiltration    RaP = Relevance at Present     ++ E/I + Papulovesicle   Rpr  = Relevance Previously     +++ E/I/P + Blister     nR   = No Relevance    Atopy Screen (Placed 11/xx/20 )    No Substance Readings (15 min) Evaluation   POS Histamine 1mg/ml -    NEG NaCl 0.9% -      No Substance Readings (15 min) Evaluation   1 Alternaria alternata (tenuis)  -    2 Cladosporium herbarum -    3 Aspergillus fumigatus -    4 Penicillium notatum -    5 Dermatophagoides pteronyssinus -    6 Dermatophagoides farinae -      Intradermal Testing (Placed 11/xx/20  )    No Substance Conc.  Readings (15min) Evaluation   - NaCl  0.9% -    + Histamine (prick) 0.1mg / ml -    DF Standard Dust Mite - D. Farinae 1:10 -    DP Standard Dust Mite - D. Pteronyssinus 1:10 -    A Aspergillus fumigatus  1:10 -    P Penicillium notatum 1:10 -      Conclusion:   [x] No relevant allergic reaction observed: was not under perfect conditions, because patient developed SARS-Cov2 infection on 1st day, but according to photos from Thursday 5th November and patients info from 6th November no itchy spot on back. Not sure about possible reactions with the inhalant allergens around 120! Skin there red!    Interpretation/ remarks:   No signs for contact sensitization. Some erythema over test site of inhalant allergens ==> might have to repeat them later    Assessment & Plan:    ==> Final Diagnosis:     # delayed type reaction to black tattoo on certain areas and aggravation during pollen season and irritant dermatitis genital    Unlikely allergic contact dermatitis (patch tests negatives, but under unfavorable conditions    probably more likely atopic dermatitis with irritant component    # perennial rhinoconjunctivitis and sinusitis/otitis with Asthma and seasonal aggravation (spring/summer)    Atopic predisposition    Perennial = house dust mite and molds (unlikely dog) = immediate and delayed?    Seasonal = tree pollens, maybe grass pollens = immediate and delayed?    Recurrent dermatitis (nummular)    Lichen sclerosus et atrophicus (initial) and ectopic penile sebaceous glands    Treatment with Protopic 0.1% or Elidel cream and for 2 weeks daily Mometasone cream    # acute Urticaria    Try Allegra 180mg if recurrences    # recurrent Splenomegaly = needs immunologic evaluation    Until now all blood tests from Hematooncology normal    Will have rheumatology appointment    Maybe send patient for hemato-immunology evaluation to Prof Sebastián Lowery = sent an Email to Hemato-Oncologist   [place]    These conclusions are made at the best of one's knowledge and belief based on the provided evidence such as patient's history and allergy test results and they can change over time or can be incomplete because of missing information's.    ==> Treatment prescribed/Plan:  - try Loratadine 10mg every evening for 1 month (for sinus problems and hives)  -  Protopic oint 0.1% for about 2-3 months on the tattoo and on other irritated skin and penis  - on penis for about 2 weeks daily Mometasone oint  - put on skin (also on genitals) for moisturizing and prevention Vanicream Lotion and wash genitals with Vanicream facial wash    If not better, then maybe perform prick and intradermal tests with delayed readings    Patient had recently extensive blood testing for the Splenomegaly and basically all blood tests, including histology of blood smear, IgA, IgM, IgG normal and CBC normal, KATHRYN normal     Staff:     * independently interpreted results of a test performed by another physician/other qualified health care professional (not separately reported): see evaluation of blood results in text    Follow-up: in Derm-Allergy clinic if necessary  ___________________________    Start time: 12:30am  End time: 1:00 pm    I spent a total of 30 minutes with Abimael aMrtinez during today s  visit. This time was spent discussing all the individual test results, correlating them to the clinical relevance, counseling the patient and/or coordinating care and performing allergy tests or procedures.           Again, thank you for allowing me to participate in the care of your patient.        Sincerely,        James Harris MD

## 2021-04-14 NOTE — PROGRESS NOTES
[facility] Dermato-allergology Note  Office visit  Encounter Date: Apr 14, 2021  ____________________________________________    CC: No chief complaint on file.      HPI:  Mr. Abimael Martinez is a(n) 31 year old male who presents today as a return patient for allergy consultation  - patient sent for recurrent Spenomegaly, first one age 8-10, second one around [age] and now recently again (maybe more after COVID)  - otherwise feeling well in usual state of health    Physical exam:  General: In no acute distress, well-developed, well-nourished  Eyes: no conjunctivitis  ENT: no signs of rhinitis   Pulmonary: no wheezing or coughing  Skin: NA    Earlier History and Allergy exams:    Last seen 12/15/2020    >> on the glans penis typical ectopic sebaceous and on the glans some slightly atrophic appearing lesions    Earlier History and Allergy exams:    Patient recovered from COVID, but had more stuffy nose  Had enlarged spleen (had that before as child already) --> probably infectious      Earlier History and Allergy exams:    Patient had a tattoo more than 10 years. Never problems until 2 years ago with raised outline of the tattoo. It is mostly itchy and inflamed during the allergy season. Big black tattoo on left upper arm. It scabs and is very itchy.    Has all over the body black tattoos, but only that on the arm makes troubles.    Patient has seasonal allergies. As a child less problems, but since [place] patient gets stuffy, runny nose with postnasal drip and sinusitis and chronic ear infections --> got 4-6 months allergy shots and then problems with ordering of the allergens. After 3rd shot patient had generalized reactions. Afterwards patient had to dilute and no issues. Got few days after IT always some eczematous lesions on trunk.  Patient has year around allergies with aggravation in spring/summer, but not fall. Patient has pets with small dog and no  issues.    Patient has Asthma (more cold induced and some wheezing at exhalation during season) --> treatment with proair    Never eczema as child. In the past year random eczematous spots on trunk and extremities      Past Medical History:   Patient Active Problem List   Diagnosis     Anxiety     CARDIOVASCULAR SCREENING; LDL GOAL LESS THAN 160     High risk sexual behavior     Low back pain     Essential hypertension     Internal hemorrhoids which bleed     Obesity, Class I, BMI 30-34.9     Attention deficit hyperactivity disorder (ADHD), combined type     AYALA (generalized anxiety disorder)     OCD (obsessive compulsive disorder)     Drug-induced erectile dysfunction     Mild persistent asthma without complication     Lumbosacral radiculopathy     Dyslipidemia     Elevated serum creatinine     Former smoker     Past Medical History:   Diagnosis Date     Acute right otitis media 1/8/2013     Anxiety      Depression      Drug abuse (H)      Gonococcal urethritis 02/05/2016     Urethritis 5/20/2013     Problem list name updated by automated process. Provider to review       Allergy History:     Allergies   Allergen Reactions     Cymbalta      Weight gain and fatigue     Duloxetine Other (See Comments) and Fatigue     enlarged spleen and weight gain     Paroxetine Other (See Comments), Fatigue and GI Disturbance     Weight gain, Spleen issues     Seasonal Allergies      Vicodin [Hydrocodone-Acetaminophen]        Social History:  The patient works as a . Patient has the following hobbies or non-occupational exposure: [employer]     reports that he quit smoking about 3 years ago. His smoking use included cigarettes. He has a 3.00 pack-year smoking history. He has never used smokeless tobacco. He reports current alcohol use. He reports current drug use. Drug: Marijuana.      Family History:  Family History   Problem Relation Age of Onset     Unknown/Adopted Mother      Unknown/Adopted Father       Unknown/Adopted Maternal Grandmother      Unknown/Adopted Maternal Grandfather      Unknown/Adopted Paternal Grandmother      Unknown/Adopted Paternal Grandfather      Unknown/Adopted Brother        Medications:  Current Outpatient Medications   Medication Sig Dispense Refill     albuterol (PROAIR HFA/PROVENTIL HFA/VENTOLIN HFA) 108 (90 Base) MCG/ACT inhaler Inhale 2 puffs into the lungs every 4 hours as needed for wheezing 1 Inhaler 0     ALPRAZolam (XANAX) 2 MG tablet Take 1 tablet (2 mg) by mouth 3 times daily as needed for anxiety 30 tablet 0     amphetamine-dextroamphetamine (ADDERALL) 20 MG tablet Take 1 tablet (20 mg) by mouth 3 times daily 90 tablet 0     azelastine (ASTELIN) 0.1 % nasal spray Spray 2 sprays into both nostrils 2 times daily 30 mL 11     clotrimazole (LOTRIMIN) 1 % external cream Apply topically 2 times daily 60 g 1     famotidine (PEPCID) 40 MG tablet Take 1 tablet (40 mg) by mouth At Bedtime 90 tablet 1     fluticasone (FLOVENT HFA) 220 MCG/ACT Inhaler Inhale 2 puffs into the lungs 2 times daily Please give patient spacer 1 Inhaler 3     Hyoscyamine Sulfate 0.375 MG TBCR Take 1 capful by mouth 3 times daily as needed 90 tablet 1     mometasone (ELOCON) 0.1 % external ointment If necessary use 3-4 days in a row or 2xweekly on eczematous lesions 45 g 2     montelukast (SINGULAIR) 10 MG tablet Take 1 tablet (10 mg) by mouth At Bedtime 90 tablet 0     pimecrolimus (ELIDEL) 1 % external cream Apply topically 2 times daily To penis 60 g 0     pimecrolimus (ELIDEL) 1 % external cream Apply topically 2 times daily 60 g 4     pimecrolimus (ELIDEL) 1 % external cream Apply topically 2 times daily 30 g 0     PROTOPIC 0.1 % external ointment APPLY TWICE DAILY AS NEEDED FOR RASH ON PENIS.       sildenafil (REVATIO) 20 MG tablet Take 1-2 tablets (20-40 mg) by mouth daily as needed As needed for prevention of erectile dysfunction 30 tablet 2     tacrolimus (PROTOPIC) 0.03 % external ointment Apply  topically 2 times daily Put on skin lesions 2x daily 60 g 1     triamcinolone (KENALOG) 0.025 % external ointment Apply topically 2 times daily Apply twice a day to sites of irritation 80 g 1       Allergy Tests:    Past Allergy Test: prick tests positive to pollens, cockroach, dust mites, trees, ragweed, grass pollens ==> had for some time IT and eczema got worse = delayed type reactions to allergens?    Future Allergy Test:     Order for PATCH TESTS    [] Outpatient  [] Inpatient: [facility]..../ Bed ....      Skin Atopy (atopic dermatitis) [x] Yes   [] No  Rhinitis/Sinusitis:   [x] Yes   [] No  Allergic Asthma:   [x] Yes   [] No  Food Allergy:   [] Yes   [x] No  Leg ulcers:   [] Yes   [x] No  Hand eczema:   [] Yes   [x] No   Leading hand:   [] R   [] L       [] Ambidextrous                        Reason for tests (suspected allergy): allergic contact dermatitis to tattoo and seasonal aggravation to pollens  Known previous allergies: see below    Standardized panels  [x] Standard panel (40 tests)  [x] Preservatives & Antimicrobials (31 tests)  [x] Emulsifiers & Additives (25 tests)   [] Perfumes/Flavours & Plants (25 tests)  [] Hairdresser panel (12 tests)  [] Rubber Chemicals (22 tests)  [] Plastics (26 tests)  [x] Colorants/Dyes/Food additives (20 tests)  [] Metals (implants/dental) (24 tests)  [] Local anaesthetics/NSAIDs (13 tests)  [] Antibiotics & Antimycotics (14 tests)   [] Corticosteroids (15 tests)   [] Photopatch test (62 tests)   [x] others: house dust mites, molds, tree and grass pollens and dog from prick tests      [] Patient's own products: ...    DO NOT test if chemical or biological identity is unknown!     always ask from patient the product information and safety sheets (MSDS)   [] Atopy screen prick test (Atopic predisposition): and if possible IDT with delayed readings --> patient had recently a prick test (see above) and therefore do not repeat that except we would like to perform on Friday  IDT.  RESULTS & EVALUATION of PATCH TESTS    Patch test readings after     [x] 2 days, [] 3 days [x] 4 days, [] 5 days,    Applied patch tests with results (import here the list of patch tests):    STANDARD Series      No Substance 2 days 4 days remarks   1 Kyle Mix [C] - -    2 Colophony - -    3  2-Mercaptobenzothiazole  - -     4 Methylisothiazolinone - -    5 Carba Mix - -    6 Thiuram Mix [A] - -    7 Bisphenol A Epoxy Resin - -    8 M-Ywgc-Rzvopptvawn-Formaldehyde Resin - -    9 Mercapto Mix [A] (+) -    10 Black Rubber Mix- PPD [B] - -    11 Potassium Dichromate  -  -    12 Balsam of [place] (Myroxylon Pereirae Resin) - -    13 Nickel Sulphate Hexahydrate - -    14 Mixed Dialkyl Thiourea - -    15 Paraben Mix [B] - -    16 Methyldibromo Glutaronitrile - -    17 Fragrance Mix - -    18 2-Bromo-2-Nitropropane-1,3-Diol (Bronopol) - -    19 Lyral - -    20 Tixocortol-21- Pivalate - -    21 Diazolidiyl Urea (Germall II) - -    22 Methyl Methacrylate - -    23 Cobalt (II) Chloride Hexahydrate - -    24 Fragrance Mix II  - -    25 Compositae Mix - -    26 Benzoyl Peroxide - -    27 Bacitracin - -    28 Formaldehyde - -    29 Methylchloroisothiazolinone / Methylisothiazolinone - -    30 Corticosteroid Mix - -    31 Sodium Lauryl Sulfate - -    32 Lanolin Alcohol - -    33 Turpentine - -    34 Cetylstearylalcohol - -    35 Chlorhexidine Dicluconate - -    36 Budenoside - -    37 Imidazolidinyl Urea  - -    38 Ethyl-2 Cyanoacrylate - -    39 Quaternium 15 (Dowicil 200) - -    40 Decyl Glucoside - -      COLORANTS, DYES, FOOD ADDITIVES   No Substance 2 days 4 days remarks   41 1 Aniline - -    42 2 Aj Brown R - -    43 3 Textile Dye Mix [A] - -    44 4 [place]  - -    45 5 Disperse Blue-3 - -    46 6 Disperse [place]-3 - -    47 7 Disperse Red-11 NA NA    48 8 Disperse Yellow-9 - -    49 9 Erythrosine-B - -    50 10 Naphthol AS - -       Food additives      51 11 Aspartame - -    52 12 Azorubine - -    53 13 Brilliant  Black - -    54 14 Cochineal Red - -    55 15 Patent Blue-VF NA NA    56 16 Pectin - -    57 17 Quinoline Yellow - -    58 18 Saccharin - -    59 19 Tartrazine - -    60 20 Sodium Glutamate NA NA      EMULSIFIERS & ADDITIVES   No Substance 2 days 4 days remarks   61 1 Polyethylene Glycol-400 - -    62 2 Cocamidopropyl Betaine - -    63 3 Amerchol L101 - -    64 4 Propylene Glycol - -    65 5 Triethanolamine - -    66 6 Sorbitane Sesquiolate - -    67 7 Isopropylmyristate - -    68 8 Polysorbate 80  - -    69 9 Amidoamine   (Stearamidopropyl Dimethylamine) - -    70 10 Oleamidopropyl Dimethylamine - -    71 11 Lauryl Glucoside - -    72 12 Coconut Diethanolamide  - -    73 13 2-Hydroxy-4-Methoxy Benzophenone (Oxybenzone) - -    74 14 Benzophenone-4 (Sulisobenzon) - -    75 15 Propolis - -    76 16 Dexpanthenol - -    77 17 Carboxymethyl Cellulose Sodium NA NA    78 18 Abitol - -    79 19 Tert-Butylhydroquinone - -    80 20 Benzyl Salicylate - -      Antioxidant      81 21 Dodecyl Gallate - -    82 22 Butylhydroxyanisole (BHA) - -    83 23 Butylhydroxytoluene (BHT) - -    84 24 Di-Alpha-Tocopherol (Vit E) - -    85 25 Propyl Gallate - -    86 26 Zinc Pyrithione NA NA    87 27 Dimethylaminopropylamin (DMAPA) - -      PRESERVATIVES & ANTIMICROBIALS     No Substance 2 days 4 days remarks   88 1  1,2-Benzisothiazoline-3-One, Sodium Salt - -    89 2  1,3,5-Waqar (2-Hydroxyethyl) - Hexahydrotriazine (Grotan BK) - -    90 3 1-Npqgelqkiqjhd-5-Nitro-1, 3-Propanediol - -    91 4  3, 4, 4' - Triclocarban - -    92 5 4 - Chloro - 3 - Cresol - -    93 6 4 - Chloro - 4 - Xylenol (PCMX) - -    94 7 7-Ethylbicyclooxazolidine (Bioban CV5658) - -    95 8 Benzalkonium Chloride NA NA    96 9 Benzyl Alcohol - -    97 10 Cetalkonium Chloride - -    98 11 Cetylpyrimidine Chloride  - -    99 12 Chloroacetamide - -    100 13 DMDM Hydantoin - -    101 14 Glutaraldehyde - -    102 15 Triclosan - -    103 16 Glyoxal Trimeric Dihydrate - -    104  17 Iodopropynyl Butylcarbamate - -    105 18 Octylisothiazoline - -    106 19 Iodoform NA NA    107 20 (Nitrobutyl) Morpholine/(Ethylnitro-Trimethylene) Dimorpholine (Bioban P 1487) - -    108 21 Phenoxyethanol - -    109 22 Phenyl Salicylate - -    110 23 Povidone Iodine - -    111 24 Sodium Benzoate - -    112 25 Sodium Disulfite - -    113 26 Sorbic Acid - -    114 27 Thimerosal - -    115 28 Melamine Formeladyde Resin      116 29 Ethylenediamine Dihydrochloride        Parabens      117 30 Butyl-P-Hydroxybenzoate - -    118 31 Ethyl-P-Hydroxybenzoate - -    119 32 Methyl-P-Hydroxybenzoate - -    120 33 Propyl-P-Hydroxybenzoate - -      OTHER PRODUCTS       No Substance Conc  % solv 2 days 4 days remarks   121 D. farinae   - -    122 D. pteronyssinus   - -    123 Aspergillius   - -    124 penicillium   - -    125 Dog   - -    126 9 Tree Mix - ALK   - -    127 [place] Mix 2630 - HS   - -    128 Tree Mix 5- HS   - -    129 5 Grass Mix - ALK   - -    130           Results of patch tests:                         Interpretation:    - Negative                    A    = Allergic      (+) Erythema    TI   = Toxic/irritant   + E + Infiltration    RaP = Relevance at Present     ++ E/I + Papulovesicle   Rpr  = Relevance Previously     +++ E/I/P + Blister     nR   = No Relevance    Atopy Screen (Placed 11/xx/20 )    No Substance Readings (15 min) Evaluation   POS Histamine 1mg/ml -    NEG NaCl 0.9% -      No Substance Readings (15 min) Evaluation   1 Alternaria alternata (tenuis)  -    2 Cladosporium herbarum -    3 Aspergillus fumigatus -    4 Penicillium notatum -    5 Dermatophagoides pteronyssinus -    6 Dermatophagoides farinae -      Intradermal Testing (Placed 11/xx/20 )    No Substance Conc.  Readings (15min) Evaluation   - NaCl  0.9% -    + Histamine (prick) 0.1mg / ml -    DF Standard Dust Mite - D. Farinae 1:10 -    DP Standard Dust Mite - D. Pteronyssinus 1:10 -    A Aspergillus fumigatus  1:10 -    P Penicillium  notatum 1:10 -      Conclusion:   [x] No relevant allergic reaction observed: was not under perfect conditions, because patient developed SARS-Cov2 infection on 1st day, but according to photos from Thursday 5th November and patients info from 6th November no itchy spot on back. Not sure about possible reactions with the inhalant allergens around 120! Skin there red!    Interpretation/ remarks:   No signs for contact sensitization. Some erythema over test site of inhalant allergens ==> might have to repeat them later    Assessment & Plan:    ==> Final Diagnosis:     # delayed type reaction to black tattoo on certain areas and aggravation during pollen season and irritant dermatitis genital    Unlikely allergic contact dermatitis (patch tests negatives, but under unfavorable conditions    probably more likely atopic dermatitis with irritant component    # perennial rhinoconjunctivitis and sinusitis/otitis with Asthma and seasonal aggravation (spring/summer)    Atopic predisposition    Perennial = house dust mite and molds (unlikely dog) = immediate and delayed?    Seasonal = tree pollens, maybe grass pollens = immediate and delayed?    Recurrent dermatitis (nummular)    Lichen sclerosus et atrophicus (initial) and ectopic penile sebaceous glands    Treatment with Protopic 0.1% or Elidel cream and for 2 weeks daily Mometasone cream    # acute Urticaria    Try Allegra 180mg if recurrences    # recurrent Splenomegaly = needs immunologic evaluation    Until now all blood tests from Hematooncology normal    Will have rheumatology appointment    Maybe send patient for hemato-immunology evaluation to Prof Sebastián Lowery = sent an Email to Hemato-Oncologist Dr. Christianson    These conclusions are made at the best of one's knowledge and belief based on the provided evidence such as patient's history and allergy test results and they can change over time or can be incomplete because of missing information's.    ==> Treatment  prescribed/Plan:  - try Loratadine 10mg every evening for 1 month (for sinus problems and hives)  -  Protopic oint 0.1% for about 2-3 months on the tattoo and on other irritated skin and penis  - on penis for about 2 weeks daily Mometasone oint  - put on skin (also on genitals) for moisturizing and prevention Vanicream Lotion and wash genitals with Vanicream facial wash    If not better, then maybe perform prick and intradermal tests with delayed readings    Patient had recently extensive blood testing for the Splenomegaly and basically all blood tests, including histology of blood smear, IgA, IgM, IgG normal and CBC normal, KATHRYN normal    Reply from Hematology:  Telly    Not sure why he has large spleen.No other nodes noted on scans. No liver disease no hepatitis. ?EBV. 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 This could explain the Splenomegaly!    Paper:   SATISH Ramos. [email or web address] [employer]  INTRODUCTION: Some patients with IgG4-related disease (IgG4-RD) exhibit elevated serum interleukin (IL)-6 with excessive inflammatory reactions or with repeating relapse. To date few reports pertaining to clinical implications of elevated serum IL-6 in IgG4-RD patients have been published. The aims of the current retrospective study were to investigate the clinical implications of elevated serum IL-6 in IgG4-RD patients, and to examine whether IL-6 can predict the activity and/or relapse of the disease. MATERIALS AND METHODS: We examined the clinical picture at the onset of 43  patients who were diagnosed with IgG4-RD in our hospital and were able to measure serum IL-6 before steroid treatment. RESULTS: The median level of serum IL-6 was 2.2 pg/mL. There was a significant correlation between IL-6 and C-reactive protein (CRP) level (r = 0.397, p = 0.008), hemoglobin level (r = -0.390, p = 0.010) and albumin level (r = -0.556, p < 0.001). When 43 patients were divided into two groups by using a cut-off IL-6 of 4 pg/mL, the high IL-6 group showed higher age, lower albumin, higher CRP and higher aspartate aminotransferase (AST) (age p = 0.014, albumin p = 0.006, CRP p <0.001, AST p = 0.009). Hepatic swelling and splenomegaly were significantly more prevalent in the high IL-6 group than it was in the low IL-6 group (liver p < 0.001, spleen p = 0.020). Biliary tract involvement tended to admit more in the high IL-6 group (p = 0.060). CONCLUSION: Serum IL-6 level at the onset of IgG4-RD may be significantly correlated with clinical inflammatory parameters and it may also be associated with involvement of the bile duct, liver, and spleen.       Staff:     * independently interpreted results of a test performed by another physician/other qualified health care professional (not separately reported): see evaluation of blood results in text    Follow-up: in Derm-Allergy clinic if necessary  ___________________________    Start time: 12:30am  End time: 1:00 pm    I spent a total of 30 minutes with Abimael Martinez during today s  visit. This time was spent discussing all the individual test results, correlating them to the clinical relevance, counseling the patient and/or coordinating care and performing allergy tests or procedures.

## 2021-04-15 LAB — IGE SERPL-ACNC: 59 KIU/L (ref 0–114)

## 2021-04-16 LAB
IGA SERPL-MCNC: 408 MG/DL (ref 84–499)
IGG SERPL-MCNC: 1631 MG/DL (ref 610–1616)
IGG SERPL-MCNC: 1658 MG/DL (ref 610–1616)
IGG1 SER-MCNC: 863 MG/DL (ref 382–929)
IGG2 SER-MCNC: 407 MG/DL (ref 242–700)
IGG3 SER-MCNC: 90 MG/DL (ref 22–176)
IGG4 SER-MCNC: 260 MG/DL (ref 4–86)
IGM SERPL-MCNC: 142 MG/DL (ref 35–242)
PROT PATTERN SERPL IFE-IMP: ABNORMAL

## 2021-04-23 ENCOUNTER — VIRTUAL VISIT (OUTPATIENT)
Dept: FAMILY MEDICINE | Facility: CLINIC | Age: 31
End: 2021-04-23
Payer: COMMERCIAL

## 2021-04-23 DIAGNOSIS — I10 ESSENTIAL HYPERTENSION: ICD-10-CM

## 2021-04-23 DIAGNOSIS — R30.0 DYSURIA: Primary | ICD-10-CM

## 2021-04-23 DIAGNOSIS — E78.5 DYSLIPIDEMIA: ICD-10-CM

## 2021-04-23 PROCEDURE — 99213 OFFICE O/P EST LOW 20 MIN: CPT | Mod: 95 | Performed by: FAMILY MEDICINE

## 2021-04-23 ASSESSMENT — ASTHMA QUESTIONNAIRES: ACT_TOTALSCORE: 18

## 2021-04-23 NOTE — PROGRESS NOTES
Abimael is a 31 year old who is being evaluated via a billable telephone visit.      What phone number would you like to be contacted at? [phone]  How would you like to obtain your AVS? Johnie Munoz   Abimael is a 31 year old who presents for the following health issues     HPI     Discuss strong smelling urine, burning     Animal Emotional support letter          Review of Systems         Objective           Vitals:  No vitals were obtained today due to virtual visit.    Physical Exam   healthy, alert and no distress  PSYCH: Alert and oriented times 3; coherent speech, normal   rate and volume, able to articulate logical thoughts, able   to abstract reason, no tangential thoughts, no hallucinations   or delusions  His affect is normal  RESP: No cough, no audible wheezing, able to talk in full sentences  Remainder of exam unable to be completed due to telephone visits                Phone call duration: 7 minutes  --------------------------------------------------------------------------------------------------------------------------------------  SUBJECTIVE:   Abimael Martinez is a 31 year old male who presents today for symptom of dysuria. He started having these symptoms 1week(s) ago. He  denies hematuria, denies new back pain,  denies nausea or vomiting,  denies fever or chills.  He reports a history of penile discharge. He has noticed a  few wet spots occasionally on his underwear.   This patient does  have a history of frequent urinary tract infections.     He saw dermatology and was diagnosed with atopic dermatitis  He uses a steroid cream as needed on the end of the penis   He got a little in the urethral area  He stopped using it but the symptom(s) did not resolve  He describes a burning sensation in the tip of his penis with and without urinating    He has been sexually active recently    No history of UTI  Positive history of chlamydia    His urine is strong smelling         Past  Medical History:   Diagnosis Date     Acute right otitis media 1/8/2013     Anxiety      Depression      Drug abuse (H)      Gonococcal urethritis 02/05/2016     Urethritis 5/20/2013     Problem list name updated by automated process. Provider to review     Current Outpatient Medications   Medication Sig Dispense Refill     albuterol (PROAIR HFA/PROVENTIL HFA/VENTOLIN HFA) 108 (90 Base) MCG/ACT inhaler Inhale 2 puffs into the lungs every 4 hours as needed for wheezing 1 Inhaler 0     ALPRAZolam (XANAX) 2 MG tablet Take 1 tablet (2 mg) by mouth 3 times daily as needed for anxiety 30 tablet 0     amphetamine-dextroamphetamine (ADDERALL) 20 MG tablet Take 1 tablet (20 mg) by mouth 3 times daily 90 tablet 0     azelastine (ASTELIN) 0.1 % nasal spray Spray 2 sprays into both nostrils 2 times daily 30 mL 11     clotrimazole (LOTRIMIN) 1 % external cream Apply topically 2 times daily 60 g 1     famotidine (PEPCID) 40 MG tablet Take 1 tablet (40 mg) by mouth At Bedtime 90 tablet 1     fluticasone (FLOVENT HFA) 220 MCG/ACT Inhaler Inhale 2 puffs into the lungs 2 times daily Please give patient spacer 1 Inhaler 3     montelukast (SINGULAIR) 10 MG tablet Take 1 tablet (10 mg) by mouth At Bedtime 90 tablet 0     pimecrolimus (ELIDEL) 1 % external cream Apply topically 2 times daily 30 g 0     PROTOPIC 0.1 % external ointment APPLY TWICE DAILY AS NEEDED FOR RASH ON PENIS.       sildenafil (REVATIO) 20 MG tablet Take 1-2 tablets (20-40 mg) by mouth daily as needed As needed for prevention of erectile dysfunction 30 tablet 2     tacrolimus (PROTOPIC) 0.03 % external ointment Apply topically 2 times daily Put on skin lesions 2x daily 60 g 1     Hyoscyamine Sulfate 0.375 MG TBCR Take 1 capful by mouth 3 times daily as needed 90 tablet 1     mometasone (ELOCON) 0.1 % external ointment If necessary use 3-4 days in a row or 2xweekly on eczematous lesions 45 g 2     Social History     Tobacco Use     Smoking status: Former Smoker      Packs/day: 0.50     Years: 6.00     Pack years: 3.00     Types: Cigarettes     Quit date: 3/12/2018     Years since quitting: 3.1     Smokeless tobacco: Never Used     Tobacco comment: second hand smoke exposure   Substance Use Topics     Alcohol use: Yes     Comment: once a week       ROS:       OBJECTIVE:  There were no vitals taken for this visit.  GENERAL APPEARANCE: healthy, alert and no distress  RESP: lungs clear to auscultation - no rales, rhonchi or wheezes  CV: regular rates and rhythm, normal S1 S2, no murmur noted  ABDOMEN:  soft, nontender, no HSM or masses and bowel sounds normal  BACK: No CVA tenderness  SKIN: no suspicious lesions or rashes    No results found for any visits on 04/23/21.     ASSESSMENT:   Possible(ji) urinary tract infection.  Urinary tract infection versus sexually transmitted infection(s)    PLAN:  GC and chlamydia, Urinalysis and urine culture ordered  Drink plenty of fluids.  Prevention and treatment of UTI's discussed.Signs and symptoms of pyelonephritis mentioned.  Follow up with primary care physician if not improving    A letter regarding his pet was given

## 2021-04-23 NOTE — LETTER
21            Abimael Martinez   1990  [id number] [address] [place] [id number]              To whom it may concern:     This patient has been treated for anxiety and the presence of his dog in his life has significantly helped this. I would advise for medical reasons that he be able to keep the dog in his apartment,        Please contact me for questions or concerns.           Sincerely,           Jamison Lora MD

## 2021-04-24 DIAGNOSIS — R30.0 DYSURIA: ICD-10-CM

## 2021-04-24 LAB
ALBUMIN UR-MCNC: NEGATIVE MG/DL
APPEARANCE UR: CLEAR
BILIRUB UR QL STRIP: NEGATIVE
COLOR UR AUTO: YELLOW
GLUCOSE UR STRIP-MCNC: NEGATIVE MG/DL
HGB UR QL STRIP: NEGATIVE
KETONES UR STRIP-MCNC: NEGATIVE MG/DL
LEUKOCYTE ESTERASE UR QL STRIP: NEGATIVE
NITRATE UR QL: NEGATIVE
PH UR STRIP: 5 PH (ref 5–7)
RBC #/AREA URNS AUTO: NORMAL /HPF
SOURCE: NORMAL
SP GR UR STRIP: <=1.005 (ref 1–1.03)
UROBILINOGEN UR STRIP-ACNC: 0.2 EU/DL (ref 0.2–1)
WBC #/AREA URNS AUTO: NORMAL /HPF

## 2021-04-24 PROCEDURE — 87491 CHLMYD TRACH DNA AMP PROBE: CPT | Performed by: FAMILY MEDICINE

## 2021-04-24 PROCEDURE — 87591 N.GONORRHOEAE DNA AMP PROB: CPT | Performed by: FAMILY MEDICINE

## 2021-04-24 PROCEDURE — 81001 URINALYSIS AUTO W/SCOPE: CPT | Performed by: FAMILY MEDICINE

## 2021-04-24 PROCEDURE — 87086 URINE CULTURE/COLONY COUNT: CPT | Performed by: FAMILY MEDICINE

## 2021-04-25 LAB
BACTERIA SPEC CULT: NO GROWTH
C TRACH DNA SPEC QL NAA+PROBE: NEGATIVE
Lab: NORMAL
N GONORRHOEA DNA SPEC QL NAA+PROBE: NEGATIVE
SPECIMEN SOURCE: NORMAL

## 2021-04-29 ENCOUNTER — MYC REFILL (OUTPATIENT)
Dept: FAMILY MEDICINE | Facility: CLINIC | Age: 31
End: 2021-04-29

## 2021-04-29 DIAGNOSIS — F90.2 ATTENTION DEFICIT HYPERACTIVITY DISORDER (ADHD), COMBINED TYPE: ICD-10-CM

## 2021-04-29 RX ORDER — DEXTROAMPHETAMINE SACCHARATE, AMPHETAMINE ASPARTATE, DEXTROAMPHETAMINE SULFATE AND AMPHETAMINE SULFATE 5; 5; 5; 5 MG/1; MG/1; MG/1; MG/1
20 TABLET ORAL 3 TIMES DAILY
Qty: 90 TABLET | Refills: 0 | Status: SHIPPED | OUTPATIENT
Start: 2021-04-29 | End: 2021-05-27

## 2021-04-30 ENCOUNTER — TRANSFERRED RECORDS (OUTPATIENT)
Dept: HEALTH INFORMATION MANAGEMENT | Facility: CLINIC | Age: 31
End: 2021-04-30

## 2021-05-06 ENCOUNTER — OFFICE VISIT (OUTPATIENT)
Dept: RHEUMATOLOGY | Facility: CLINIC | Age: 31
End: 2021-05-06
Attending: INTERNAL MEDICINE
Payer: COMMERCIAL

## 2021-05-06 ENCOUNTER — MYC REFILL (OUTPATIENT)
Dept: FAMILY MEDICINE | Facility: CLINIC | Age: 31
End: 2021-05-06

## 2021-05-06 VITALS
HEART RATE: 83 BPM | BODY MASS INDEX: 35.33 KG/M2 | SYSTOLIC BLOOD PRESSURE: 139 MMHG | WEIGHT: 260.8 LBS | OXYGEN SATURATION: 99 % | TEMPERATURE: 98 F | RESPIRATION RATE: 18 BRPM | DIASTOLIC BLOOD PRESSURE: 98 MMHG | HEIGHT: 72 IN

## 2021-05-06 DIAGNOSIS — E66.01 MORBID OBESITY (H): ICD-10-CM

## 2021-05-06 DIAGNOSIS — R16.1 SPLENOMEGALY: ICD-10-CM

## 2021-05-06 DIAGNOSIS — R59.1 LYMPHADENOPATHY: ICD-10-CM

## 2021-05-06 DIAGNOSIS — F41.9 ANXIETY: ICD-10-CM

## 2021-05-06 LAB
CRP SERPL-MCNC: <2.9 MG/L (ref 0–8)
ERYTHROCYTE [SEDIMENTATION RATE] IN BLOOD BY WESTERGREN METHOD: 10 MM/H (ref 0–15)
FERRITIN SERPL-MCNC: 87 NG/ML (ref 26–388)

## 2021-05-06 PROCEDURE — 36415 COLL VENOUS BLD VENIPUNCTURE: CPT | Performed by: STUDENT IN AN ORGANIZED HEALTH CARE EDUCATION/TRAINING PROGRAM

## 2021-05-06 PROCEDURE — 86140 C-REACTIVE PROTEIN: CPT | Performed by: STUDENT IN AN ORGANIZED HEALTH CARE EDUCATION/TRAINING PROGRAM

## 2021-05-06 PROCEDURE — 86200 CCP ANTIBODY: CPT | Performed by: STUDENT IN AN ORGANIZED HEALTH CARE EDUCATION/TRAINING PROGRAM

## 2021-05-06 PROCEDURE — 86038 ANTINUCLEAR ANTIBODIES: CPT | Performed by: STUDENT IN AN ORGANIZED HEALTH CARE EDUCATION/TRAINING PROGRAM

## 2021-05-06 PROCEDURE — 86255 FLUORESCENT ANTIBODY SCREEN: CPT | Performed by: STUDENT IN AN ORGANIZED HEALTH CARE EDUCATION/TRAINING PROGRAM

## 2021-05-06 PROCEDURE — 86235 NUCLEAR ANTIGEN ANTIBODY: CPT | Performed by: STUDENT IN AN ORGANIZED HEALTH CARE EDUCATION/TRAINING PROGRAM

## 2021-05-06 PROCEDURE — 86431 RHEUMATOID FACTOR QUANT: CPT | Performed by: STUDENT IN AN ORGANIZED HEALTH CARE EDUCATION/TRAINING PROGRAM

## 2021-05-06 PROCEDURE — 99244 OFF/OP CNSLTJ NEW/EST MOD 40: CPT | Performed by: STUDENT IN AN ORGANIZED HEALTH CARE EDUCATION/TRAINING PROGRAM

## 2021-05-06 PROCEDURE — 82728 ASSAY OF FERRITIN: CPT | Performed by: STUDENT IN AN ORGANIZED HEALTH CARE EDUCATION/TRAINING PROGRAM

## 2021-05-06 PROCEDURE — 85652 RBC SED RATE AUTOMATED: CPT | Performed by: STUDENT IN AN ORGANIZED HEALTH CARE EDUCATION/TRAINING PROGRAM

## 2021-05-06 RX ORDER — OXYCODONE AND ACETAMINOPHEN 10; 325 MG/1; MG/1
TABLET ORAL
Status: ON HOLD | COMMUNITY
Start: 2021-04-30 | End: 2021-10-28

## 2021-05-06 RX ORDER — ALPRAZOLAM 2 MG
2 TABLET ORAL 3 TIMES DAILY PRN
Qty: 30 TABLET | Refills: 0 | Status: SHIPPED | OUTPATIENT
Start: 2021-05-06 | End: 2021-06-08

## 2021-05-06 ASSESSMENT — PAIN SCALES - GENERAL: PAINLEVEL: MODERATE PAIN (4)

## 2021-05-06 ASSESSMENT — MIFFLIN-ST. JEOR: SCORE: 2180.98

## 2021-05-06 NOTE — PROGRESS NOTES
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  He has hypergammaglobinemia with elevated IgG4 levels of 260.  He has a mild splenomegaly but other than that CT soft tissue neck, chest abdomen and pelvis did not show any lymphadenopathy, retroperitoneal fibrosis or occult malignancy.  He reports episodes of flulike symptoms with body aches and the lymph node enlargement every 1 to 2-month but today on exam no lymphadenopathy, skin rash, and synovitis noted.     Elevated IgG4 levels raise concern about IgG4 related disease but elevated serum levels of IgG4 is not sensitive or specific for IgG4 related disease.  He has no history of pancreatitis, orbital mass, salivary gland enlargement etc. Elevated IgG4 levels can be seen in patients with asthma and chronic allergies.  He does report having chronic allergies.     IgG4 levels can also be elevated in patients with underlying autoimmune diseases particularly rheumatoid arthritis, Sjogren's, ANCA vasculitis.  On exam he has no synovitis or tenderness over his joints to suggest rheumatoid arthritis.  His KATHRYN is negative which decreases likelihood of systemic lupus or other KATHRYN associated autoimmune connective tissue disease.  I will repeat KATHRYN levels.  IgG4 can be elevated in patients with eosinophilic granulomatosis polyangiitis (EGPA).  I will get ANCA titers.     Sarcoidosis can cause elevated IgG4 levels.  He does not have any hilar or mediastinal lymphadenopathy on recent CT chest.   Occasionally sarcoid can present as cutaneous rash in tattoos. He reports skin rash on the tattoos. If skin rash worsens can be evaluated by dermatology.    Patients with adult onset stills disease can have recurrent episodes of fever, serositis, lymphadenopathy, joint inflammation but during his episodes he had no fever, denies history of pleurisy, joint effusion or swelling or skin rash.  I will check ferritin level which can be elevated in patients with auto inflammatory diseases.    Chronic infections can be associated with elevated IgG4 levels.  His recent HIV, hepatitis B, hep C are negative.  He has history of high risk sexual behavior.  His recent chlamydia gonorrhea test test were negative.    Plan    Blood tests ordered-KATHRYN, rheumatoid factor, anti-CCP, SSA/SSB, ANCA titers, ESR, CRP C3/C4    Based on benign physical exam and normal CT chest abdomen pelvis likelihood of IgG4 related disease is low.  IgG4 related disease diagnosis can be made based on the tissue biopsy.  Serum levels of elevated IgG4 are not sensitive or specific for IgG4 related disease.    Follow-up based on the blood test results.        TIME SPENT:   A total of 60 minutes was spent on the patient today, greater than 50% of that time was spent on face to face counseling and care coordination regarding diagnoses and treatment options as mentioned above.                    Active Problem List:     Patient Active Problem List    Diagnosis Date Noted     Morbid obesity (H) 05/06/2021     Priority: Medium     Former smoker 03/12/2021     Priority: Medium     Lumbosacral radiculopathy 03/12/2018     Priority: Medium     Dyslipidemia 03/12/2018     Priority: Medium     Elevated serum creatinine 03/12/2018     Priority: Medium     Mild persistent asthma without complication 07/31/2017     Priority: Medium     Drug-induced erectile dysfunction 11/07/2016     Priority: Medium     OCD (obsessive compulsive disorder) 09/16/2016     Priority: Medium      AYALA (generalized anxiety disorder) 12/04/2015     Priority: Medium     Patient is followed by MICHAEL OROPEZA for ongoing prescription of benzodiazepines.  All refills should be approved by this provider, or covering partner.    Medication(s): alprazolam 2 mg .   Maximum quantity per month: 36  Clinic visit frequency required: Q 6  months     Controlled substance agreement on file: No  Benzodiazepine use reviewed by psychiatry:  No    Last [facility] website verification:  none   [email or web address]           Attention deficit hyperactivity disorder (ADHD), combined type 10/14/2015     Priority: Medium     Patient is followed by MICHAEL OROPEZA for ongoing prescription of stimulants.  All refills should be approved by this provider, or covering partner.    Medication(s): adderall 20 mg.   Maximum quantity per month: 90  Clinic visit frequency required: Q 6  months     Controlled substance agreement on file: 07/31/17    Neuropsych evaluation for ADD completed:  Yes, completed 7-2008 at [facility], on file and diagnosis confirmed    Last [facility] website verification:  3/14/16, no concerns   [email or web address]           Obesity, Class I, BMI 30-34.9 02/20/2015     Priority: Medium     Internal hemorrhoids which bleed 10/02/2014     Priority: Medium     Essential hypertension 08/17/2014     Priority: Medium     Low back pain 04/07/2013     Priority: Medium     High risk sexual behavior 11/06/2011     Priority: Medium     CARDIOVASCULAR SCREENING; LDL GOAL LESS THAN 160 05/24/2011     Priority: Medium     Anxiety      Priority: Medium            History of Present Illness:   Abimael Martinez is a 31 year old male with PMH of mild splenomegaly, anxiety, obesity, ADHD, mild persistent asthma, lumbar degenerative disc disease seen in the clinic in consultation at request of Dr Christianson for evaluation of splenomegaly in the setting of elevated IgG4 levels.    He has mild splenomegaly, detected at age 18.  He  occasionally gets left-sided abdominal pain and pressure feeling.  He had COVID-19 infection in 11/2020 and since then pain in the left upper quadrant has increased.  If he is sitting in recliner he feels pain radiating from the left upper quadrant to his arm and shoulder area.  He was evaluated by Dr. Christianson in oncology for splenomegaly.  Lab work showed normal CBC, peripheral smear, LDH, CMP.  His IgG levels were elevated.  CT chest abdomen pelvis and soft tissue neck was done which did not reveal any abnormal lymphadenopathy.  He has seen Dr. Gordon in Derm allergy clinic for chronic allergies.  His blood work showed elevated IgG level of 1658 along with markedly elevated IgG4 level of 260.     In addition he also reports episodes of generalized fatigue, malaise, body aches, swollen glands, flulike symptoms every 1 to 2 months.  Denies any chest pain, shortness of breath, skin rashes, oral/nasal mucosal sores during those episodes.  Episodes will last for 2 to 3 days and then improve on its own.    He has lumbar degenerative disc disease and had microdiscectomy surgery done.  He gets stiffness in his back in the morning.    Denies any weight loss, fever, chills.  He has night sweats.    No history of psoriasis, ulcerative colitis or chron's disease. No h/o iritis, enthesitis, finger or toe swelling like dactylitis, plantar fascitis or heel pain. Denies any raynauds, malar rash, photosensitivity, recurrent mouth/genital ulcers, sicca symptoms, pleuritic chest pains, swallowing difficulty, hearing or visual changes recently. No h/o arterial/venous thrombosis in the past. Denies any tick bite, recent GI/ infection.             Review of Systems:     Review Of Systems  Constitutional: denies fever, chills, night sweats and weight loss.  Skin: No skin rash.  Eyes: No dryness or irritation in eyes. No episode of eye inflammation or redness.   Ears/Nose/Throat: no recurrent sinus infections.  Respiratory: No  shortness of breath, dyspnea on exertion, cough, or hemoptysis  Cardiovascular: no chest pain or palpitations.  Gastrointestinal: no nausea, vomiting, abdominal pain.  Normal bowel movements.  Genitourinary: no dysuria, frequency  or hematuria.  Musculoskeletal: as in HPI  Neurologic: no numbness, tingling.  Psychiatric: no mood disorders.  Hematologic/Lymphatic/Immunologic: no history of easy bruising, petechia or purpura.  No abnormal bleeding.   Endocrine: no h/o thyroid disease or Diabetes.                  Past Medical History:     Past Medical History:   Diagnosis Date     Acute right otitis media 1/8/2013     Anxiety      Depression      Drug abuse (H)      Gonococcal urethritis 02/05/2016     Urethritis 5/20/2013     Problem list name updated by automated process. Provider to review     Past Surgical History:   Procedure Laterality Date     BACK SURGERY  04/05/2018            Social History:     Social History     Occupational History     Not on file   Tobacco Use     Smoking status: Former Smoker     Packs/day: 0.50     Years: 6.00     Pack years: 3.00     Types: Cigarettes     Quit date: 3/12/2018     Years since quitting: 3.1     Smokeless tobacco: Never Used     Tobacco comment: second hand smoke exposure   Substance and Sexual Activity     Alcohol use: Yes     Comment: once a week     Drug use: Yes     Types: Marijuana     Comment: pot once a month, ectasy  As of 11/7/2016 he only smokes pot occasionally     Sexual activity: Yes     Partners: Female     Birth control/protection: Condom            Family History:     Family History   Problem Relation Age of Onset     Unknown/Adopted Mother      Unknown/Adopted Father      Unknown/Adopted Maternal Grandmother      Unknown/Adopted Maternal Grandfather      Unknown/Adopted Paternal Grandmother      Unknown/Adopted Paternal Grandfather      Unknown/Adopted Brother             Allergies:     Allergies   Allergen Reactions     Cymbalta      Weight gain and  fatigue     Duloxetine Other (See Comments) and Fatigue     enlarged spleen and weight gain     Paroxetine Other (See Comments), Fatigue and GI Disturbance     Weight gain, Spleen issues     Seasonal Allergies      Vicodin [Hydrocodone-Acetaminophen]             Medications:     Current Outpatient Medications   Medication Sig Dispense Refill     albuterol (PROAIR HFA/PROVENTIL HFA/VENTOLIN HFA) 108 (90 Base) MCG/ACT inhaler Inhale 2 puffs into the lungs every 4 hours as needed for wheezing 1 Inhaler 0     amphetamine-dextroamphetamine (ADDERALL) 20 MG tablet Take 1 tablet (20 mg) by mouth 3 times daily 90 tablet 0     azelastine (ASTELIN) 0.1 % nasal spray Spray 2 sprays into both nostrils 2 times daily 30 mL 11     clotrimazole (LOTRIMIN) 1 % external cream Apply topically 2 times daily 60 g 1     famotidine (PEPCID) 40 MG tablet TAKE 1 TABLET(40 MG) BY MOUTH AT BEDTIME 90 tablet 3     fluticasone (FLOVENT HFA) 220 MCG/ACT Inhaler Inhale 2 puffs into the lungs 2 times daily Please give patient spacer 1 Inhaler 3     Hyoscyamine Sulfate 0.375 MG TBCR Take 1 capful by mouth 3 times daily as needed 90 tablet 1     mometasone (ELOCON) 0.1 % external ointment If necessary use 3-4 days in a row or 2xweekly on eczematous lesions 45 g 2     montelukast (SINGULAIR) 10 MG tablet Take 1 tablet (10 mg) by mouth At Bedtime 90 tablet 0     oxyCODONE-acetaminophen (PERCOCET)  MG per tablet TAKE 1/2 TO 1 TABLET BY MOUTH THREE TIMES DAILY AS NEEDED FOR SEVERE PAIN       pimecrolimus (ELIDEL) 1 % external cream Apply topically 2 times daily 30 g 0     PROTOPIC 0.1 % external ointment APPLY TWICE DAILY AS NEEDED FOR RASH ON PENIS.       sildenafil (REVATIO) 20 MG tablet Take 1-2 tablets (20-40 mg) by mouth daily as needed As needed for prevention of erectile dysfunction 30 tablet 2     tacrolimus (PROTOPIC) 0.03 % external ointment Apply topically 2 times daily Put on skin lesions 2x daily 60 g 1     ALPRAZolam (XANAX) 2 MG  tablet Take 1 tablet (2 mg) by mouth 3 times daily as needed for anxiety 30 tablet 0            Physical Exam:   Blood pressure (!) 139/98, pulse 83, temperature 98  F (36.7  C), resp. rate 18, height 1.829 m (6'), weight 118.3 kg (260 lb 12.8 oz), SpO2 99 %.  Wt Readings from Last 4 Encounters:   05/06/21 118.3 kg (260 lb 12.8 oz)   02/09/21 111.6 kg (246 lb)   01/13/21 111.6 kg (246 lb)   12/23/20 111.6 kg (246 lb)       Constitutional: obese, appearing stated age; cooperative  Eyes: nl EOM, PERRLA, vision, conjunctiva, sclera  ENT: nl external ears, nose, hearing, lips, teeth, gums, throat  No mucous membrane lesions, normal saliva pool  Neck: no mass or thyroid enlargement  Resp: lungs clear to auscultation, nl to palpation  CV: RRR, no murmurs, rubs or gallops, no edema  GI: no ABD mass or tenderness, no HSM  : not tested  Lymph: no cervical, supraclavicular, inguinal or epitrochlear nodes    MS: All TMJ, neck, shoulder, elbow, wrist, MCP/PIP/DIP, spine, hip, knee, ankle, and foot MTP/IP joints were examined.   -- No active synovitis or deformity. Full ROM.  Normal  strength.   -- No dactylitis,  tenosynovitis, enthesopathy.    Skin: no nail pitting, alopecia, rash, nodules or lesions  Neuro: nl cranial nerves, strength, sensation, DTRs.   Psych: nl judgement, orientation, memory, affect.         Data:     Results for orders placed or performed in visit on 05/06/21   Ferritin     Status: None   Result Value Ref Range    Ferritin 87 26 - 388 ng/mL   CRP inflammation     Status: None   Result Value Ref Range    CRP Inflammation <2.9 0.0 - 8.0 mg/L   Erythrocyte sedimentation rate auto     Status: None   Result Value Ref Range    Sed Rate 10 0 - 15 mm/h       Recent Labs   Lab Test 03/12/21  1706 01/14/21  1345 10/25/20  1706 03/12/18  1145   WBC  --  6.0 6.6 6.0   RBC  --  5.33 5.48 5.28   HGB  --  15.1 15.7 15.2   HCT  --  44.6 45.8 43.6   MCV  --  84 84 83   RDW  --  12.8 12.9 12.7   PLT  --  289 265 237    ALBUMIN 4.1 4.1 4.1  --    BUN  --  10 15 15      Recent Labs   Lab Test 03/06/14  1218   TSH 1.51     Hemoglobin   Date Value Ref Range Status   01/14/2021 15.1 13.3 - 17.7 g/dL Final   10/25/2020 15.7 13.3 - 17.7 g/dL Final   03/12/2018 15.2 13.3 - 17.7 g/dL Final     Urea Nitrogen   Date Value Ref Range Status   01/14/2021 10 7 - 30 mg/dL Final   10/25/2020 15 7 - 30 mg/dL Final   03/12/2018 15 7 - 30 mg/dL Final     Sed Rate   Date Value Ref Range Status   05/06/2021 10 0 - 15 mm/h Final     CRP Inflammation   Date Value Ref Range Status   05/06/2021 <2.9 0.0 - 8.0 mg/L Final     AST   Date Value Ref Range Status   03/12/2021 16 0 - 45 U/L Final   01/14/2021 13 0 - 45 U/L Final   10/25/2020 19 0 - 45 U/L Final     Albumin   Date Value Ref Range Status   03/12/2021 4.1 3.4 - 5.0 g/dL Final   01/14/2021 4.1 3.4 - 5.0 g/dL Final   10/25/2020 4.1 3.4 - 5.0 g/dL Final     Alkaline Phosphatase   Date Value Ref Range Status   03/12/2021 102 40 - 150 U/L Final   01/14/2021 96 40 - 150 U/L Final   10/25/2020 104 40 - 150 U/L Final     ALT   Date Value Ref Range Status   03/12/2021 38 0 - 70 U/L Final   01/14/2021 32 0 - 70 U/L Final   10/25/2020 58 0 - 70 U/L Final     Recent Labs   Lab Test 03/12/21  1706 01/14/21  1345 10/25/20  1706 03/12/18  1145 03/06/14  1218 03/06/14  1218   WBC  --  6.0 6.6 6.0   < > 5.8   HGB  --  15.1 15.7 15.2   < > 14.9   HCT  --  44.6 45.8 43.6   < > 45.5   MCV  --  84 84 83   < > 88   PLT  --  289 265 237   < > 254   BUN  --  10 15 15   < > 15   TSH  --   --   --   --   --  1.51   AST 16 13 19  --    < >  --    ALT 38 32 58  --    < >  --    ALKPHOS 102 96 104  --    < >  --     < > = values in this interval not displayed.       Reviewed Rheumatology lab flowsheet    Franki Mccain MD  [facility]  Department of Rheumatology & Autoimmune Disorders  [facility]: [phone]   Pager - [phone]

## 2021-05-06 NOTE — RESULT ENCOUNTER NOTE
Jonas Varghese,     Your inflammatory markers, serum ferritin levels are normal which is reassuring. Usually these levels are high in patients with systemic inflammation.     Franki Mccain MD

## 2021-05-06 NOTE — PROGRESS NOTES
Abimael Martinez's goals for this visit include: Consult  He requests these members of his care team be copied on today's visit information: PCP    PCP: Jamison Lora    Referring Provider:  Telly Christianson MD  [address] [place] [id number]    BP (!) 139/98   Pulse 83   Temp 98  F (36.7  C)   Resp 18   Ht 1.829 m (6')   Wt 118.3 kg (260 lb 12.8 oz)   SpO2 99%   BMI 35.37 kg/m      Do you need any medication refills at today's visit? N      Kiersten Garay LPN  Pulmonary Medicine:  [facility]  Phone: [phone] Fax: [phone]

## 2021-05-07 ENCOUNTER — MYC MEDICAL ADVICE (OUTPATIENT)
Dept: FAMILY MEDICINE | Facility: CLINIC | Age: 31
End: 2021-05-07

## 2021-05-07 DIAGNOSIS — J45.20 INTERMITTENT ASTHMA, UNCOMPLICATED: ICD-10-CM

## 2021-05-07 DIAGNOSIS — R06.2 WHEEZING: ICD-10-CM

## 2021-05-07 DIAGNOSIS — J30.2 SEASONAL ALLERGIC RHINITIS, UNSPECIFIED TRIGGER: Primary | ICD-10-CM

## 2021-05-07 LAB
ANA SER QL IF: NEGATIVE
ANCA AB PATTERN SER IF-IMP: NORMAL
C-ANCA TITR SER IF: NORMAL {TITER}
CCP AB SER IA-ACNC: 1 U/ML
ENA SS-A IGG SER IA-ACNC: <0.2 AI (ref 0–0.9)
ENA SS-B IGG SER IA-ACNC: <0.2 AI (ref 0–0.9)
RHEUMATOID FACT SER NEPH-ACNC: <7 IU/ML (ref 0–20)

## 2021-05-10 DIAGNOSIS — L20.89 OTHER ATOPIC DERMATITIS: ICD-10-CM

## 2021-05-10 DIAGNOSIS — L23.89 ALLERGIC CONTACT DERMATITIS DUE TO OTHER AGENTS: ICD-10-CM

## 2021-05-10 DIAGNOSIS — H10.10 ALLERGIC RHINOCONJUNCTIVITIS: ICD-10-CM

## 2021-05-10 DIAGNOSIS — J30.9 ALLERGIC RHINOCONJUNCTIVITIS: ICD-10-CM

## 2021-05-10 RX ORDER — CETIRIZINE HYDROCHLORIDE 10 MG/1
10 TABLET ORAL DAILY PRN
Qty: 90 TABLET | Refills: 3 | Status: SHIPPED | OUTPATIENT
Start: 2021-05-10

## 2021-05-10 RX ORDER — ALBUTEROL SULFATE 90 UG/1
2 AEROSOL, METERED RESPIRATORY (INHALATION) EVERY 4 HOURS PRN
Qty: 54 G | Refills: 1 | Status: SHIPPED | OUTPATIENT
Start: 2021-05-10 | End: 2023-10-05

## 2021-05-10 NOTE — TELEPHONE ENCOUNTER
loratadine (CLARITIN) 10 MG tablet      Last Written Prescription Date:  12/15/20  Last Fill Quantity: 30,   # refills: 1  Last Office Visit : 4/14/21  Future Office visit:  None scheduled    Routing refill request to provider for review/approval because:  Drug not active on patient's medication list

## 2021-05-10 NOTE — TELEPHONE ENCOUNTER
See patient's message below about also filling his Zyrtec and Claritin.    Routing to provider to advise.  Heather RIDERN, RN

## 2021-05-12 NOTE — RESULT ENCOUNTER NOTE
Jonas Varghese,     Your autoimmune blood tests for Rheumatoid arthritis, Lupus, Sjogren's, vasculitis are negative. Inflammation markers, ferritin levels are normal which argues against chronic inflammation. Based on normal blood tests no evidence of autoimmune connective tissue disease is found. Likelihood of IgG4 Related disease is low.     Franki Mccain MD

## 2021-05-13 RX ORDER — LORATADINE 10 MG/1
10 TABLET ORAL EVERY EVENING
Qty: 30 TABLET | OUTPATIENT
Start: 2021-05-13

## 2021-05-13 NOTE — TELEPHONE ENCOUNTER
Call to [facility] pharmacy after received denial of refill request as came in by paper fax. Message left of refill being declined per resident on call for allergy

## 2021-05-13 NOTE — TELEPHONE ENCOUNTER
I received this refill request as the dermatology resident on call. Chart reviewed. Patient last seen 4/14/2021 and no prescription was written for loratadine at that time. Also not active on medication list. This is an over the counter medication dimitrios patient can purchase on their own.      Robby Stock MD, PhD  Med-Derm, PGY-5

## 2021-05-19 DIAGNOSIS — L20.89 OTHER ATOPIC DERMATITIS: Primary | ICD-10-CM

## 2021-05-19 RX ORDER — LORATADINE 10 MG/1
10 TABLET ORAL DAILY
Qty: 60 TABLET | Refills: 0 | Status: SHIPPED | OUTPATIENT
Start: 2021-05-19 | End: 2021-07-18

## 2021-05-25 ENCOUNTER — VIRTUAL VISIT (OUTPATIENT)
Dept: ONCOLOGY | Facility: CLINIC | Age: 31
End: 2021-05-25
Payer: COMMERCIAL

## 2021-05-25 VITALS — HEIGHT: 72 IN | BODY MASS INDEX: 35.21 KG/M2 | WEIGHT: 260 LBS

## 2021-05-25 DIAGNOSIS — R16.1 SPLENOMEGALY: Primary | ICD-10-CM

## 2021-05-25 PROCEDURE — 99214 OFFICE O/P EST MOD 30 MIN: CPT | Mod: 95 | Performed by: INTERNAL MEDICINE

## 2021-05-25 ASSESSMENT — MIFFLIN-ST. JEOR: SCORE: 2177.35

## 2021-05-25 NOTE — LETTER
5/25/2021         RE: Abimael Martinez  [address]  [facility] [address]        Dear Colleague,    Thank you for referring your patient, Abimael Martinez, to the [facility]. Please see a copy of my visit note below.    Hematology follow up visit:  Date on this visit: 5/25/2021     Abimael Martinez  is referred by Dr.Brianna Victoria Doty for a hematology consultation. He requires evaluation for new diagnosis of splenomegaly.      Primary Physician: Jamison Lora     History Of Present Illness:    Please see my previous note for details. I have copied and updated from prior note.      Mr. Martinez is a 31 year old male who presents with new diagnosis of splenomegaly  I reviewed records from Victoria Bailey PA-C from interventional pain physicians.  I have also reviewed records from Brandi Doty.    Abimael has a history of obesity, chronic back pain and has underwent epidural injections in the back, mild splenomegaly in 2012, hypertension.  In March 2012 he was noted to have mild splenomegaly with spleen size 13.7 cm.  At that time he was complaining of off-and-on left upper quadrant discomfort.  Repeat ultrasound on 12/4/2020 showed spleen to be 14 x 13.5 x 5.3 cm.    He mentions to me that for several years he has had issues where he feels that he is coming down with something with some soreness in the throat and prominence of lymph nodes of the neck with some tenderness.  In 2012 he also had left upper quadrant pain and he was noted to have mild splenomegaly.  Then in November 2020 he had COVID-19 and since then he has noticed pressure in the left upper quadrant and at times going into the left shoulder blade and shoulder region.  At that time he was sick with high-grade fevers and nausea and weakness and a little cough but no shortness of breath.  He recovered and slowly the pain in the left upper quadrant and the shoulder has improved a  little bit in the sense that he does not feel the pain in the shoulder that often but he still has pressure in the left upper quadrant.  He also mentions that now he is also feeling a little bit of similar kind of pressure on the right side of his abdomen.  When he had the Covid he also had a ringworm which resolved.  Over the last 1-1/2 months he has noticed off-and-on blood in the stools.  He has a history of hemorrhoidal bleeding in 2014 which was banded and he had a colonoscopy at that time which only showed hemorrhoids.  He has had issues with acid reflux.  He denies any B symptoms.  In between the episodes where he feels rundown and body aches and sometimes swelling of the lymph nodes/tenderness, he feels really well and remains fully functional.  He denies any other swellings.  He has chronic back pain and there is a plan to do surgery to the back in the future.  He also mentions that he has anxiety issues.    Peripheral blood smear, peripheral blood flow cytometry, LDH, immunoglobulin levels, KATHRYN was unremarkable.        Interval history.  This is a video visit.     I also reviewed notes from Immunologist Dr. Harris.  The work-up showed slightly elevated total IgG levels but IgG4 level was 260.  CT neck chest abdomen and pelvis did not show any suspicious lymphadenopathy.  It showed borderline splenomegaly.    I also reviewed notes from her rheumatologist Dr. Mccain.    Overall he feels well.  He tells me that the discomfort in the left upper quadrant is slightly better.  He has not noticed any new swellings.  He has not noticed any other new changes over the last few months.        ROS:  Rest of the ROS was otherwise neg    I reviewed other hx in Epic as below.      Past Medical/Surgical History:  Past Medical History:   Diagnosis Date     Acute right otitis media 1/8/2013     Anxiety      Depression      Drug abuse (H)      Gonococcal urethritis 02/05/2016     Urethritis 5/20/2013     Problem list  name updated by automated process. Provider to review     Past Surgical History:   Procedure Laterality Date     BACK SURGERY  04/05/2018     Allergies:  Allergies as of 05/25/2021 - Reviewed 05/25/2021   Allergen Reaction Noted     Cymbalta  10/25/2012     Duloxetine Other (See Comments) and Fatigue 11/26/2013     Paroxetine Other (See Comments), Fatigue, and GI Disturbance 11/26/2013     Seasonal allergies  07/30/2019     Vicodin [hydrocodone-acetaminophen]  01/31/2018     Current Medications:  Current Outpatient Medications   Medication Sig Dispense Refill     albuterol (PROAIR HFA/PROVENTIL HFA/VENTOLIN HFA) 108 (90 Base) MCG/ACT inhaler Inhale 2 puffs into the lungs every 4 hours as needed for wheezing 54 g 1     ALPRAZolam (XANAX) 2 MG tablet Take 1 tablet (2 mg) by mouth 3 times daily as needed for anxiety 30 tablet 0     amphetamine-dextroamphetamine (ADDERALL) 20 MG tablet Take 1 tablet (20 mg) by mouth 3 times daily 90 tablet 0     azelastine (ASTELIN) 0.1 % nasal spray Spray 2 sprays into both nostrils 2 times daily 30 mL 11     cetirizine (ZYRTEC) 10 MG tablet Take 1 tablet (10 mg) by mouth daily as needed for allergies 90 tablet 3     clotrimazole (LOTRIMIN) 1 % external cream Apply topically 2 times daily 60 g 1     famotidine (PEPCID) 40 MG tablet TAKE 1 TABLET(40 MG) BY MOUTH AT BEDTIME 90 tablet 3     fluticasone (FLOVENT HFA) 220 MCG/ACT Inhaler Inhale 2 puffs into the lungs 2 times daily Please give patient spacer 1 Inhaler 3     Hyoscyamine Sulfate 0.375 MG TBCR Take 1 capful by mouth 3 times daily as needed 90 tablet 1     loratadine (CLARITIN) 10 MG tablet Take 1 tablet (10 mg) by mouth daily 60 tablet 0     mometasone (ELOCON) 0.1 % external ointment If necessary use 3-4 days in a row or 2xweekly on eczematous lesions 45 g 2     montelukast (SINGULAIR) 10 MG tablet Take 1 tablet (10 mg) by mouth At Bedtime 90 tablet 0     oxyCODONE-acetaminophen (PERCOCET)  MG per tablet TAKE 1/2 TO 1  TABLET BY MOUTH THREE TIMES DAILY AS NEEDED FOR SEVERE PAIN       pimecrolimus (ELIDEL) 1 % external cream Apply topically 2 times daily 30 g 0     PROTOPIC 0.1 % external ointment APPLY TWICE DAILY AS NEEDED FOR RASH ON PENIS.       sildenafil (REVATIO) 20 MG tablet Take 1-2 tablets (20-40 mg) by mouth daily as needed As needed for prevention of erectile dysfunction 30 tablet 2     tacrolimus (PROTOPIC) 0.03 % external ointment Apply topically 2 times daily Put on skin lesions 2x daily 60 g 1      Family History:  Family History   Problem Relation Age of Onset     Unknown/Adopted Mother      Unknown/Adopted Father      Unknown/Adopted Maternal Grandmother      Unknown/Adopted Maternal Grandfather      Unknown/Adopted Paternal Grandmother      Unknown/Adopted Paternal Grandfather      Unknown/Adopted Brother      Adopted      Social History:  Social History     Socioeconomic History     Marital status: Single     Spouse name: Not on file     Number of children: Not on file     Years of education: Not on file     Highest education level: Not on file   Occupational History     Not on file   Social Needs     Financial resource strain: Not on file     Food insecurity     Worry: Not on file     Inability: Not on file     Transportation needs     Medical: Not on file     Non-medical: Not on file   Tobacco Use     Smoking status: Former Smoker     Packs/day: 0.50     Years: 6.00     Pack years: 3.00     Types: Cigarettes     Quit date: 3/12/2018     Years since quitting: 3.2     Smokeless tobacco: Never Used     Tobacco comment: second hand smoke exposure   Substance and Sexual Activity     Alcohol use: Yes     Comment: once a week     Drug use: Yes     Types: Marijuana     Comment: pot once a month, ectasy  As of 11/7/2016 he only smokes pot occasionally     Sexual activity: Yes     Partners: Female     Birth control/protection: Condom   Lifestyle     Physical activity     Days per week: Not on file     Minutes per  session: Not on file     Stress: Not on file   Relationships     Social connections     Talks on phone: Not on file     Gets together: Not on file     Attends Anabaptism service: Not on file     Active member of club or organization: Not on file     Attends meetings of clubs or organizations: Not on file     Relationship status: Not on file     Intimate partner violence     Fear of current or ex partner: Not on file     Emotionally abused: Not on file     Physically abused: Not on file     Forced sexual activity: Not on file   Other Topics Concern     Parent/sibling w/ CABG, MI or angioplasty before 65F 55M? No   Social History Narrative     Not on file     Quit smoking 3 years ago. Drinks occasional etoh. Off and on smokes marijuana.    Physical Exam:  There were no vitals taken for this visit.   Wt Readings from Last 4 Encounters:   05/06/21 118.3 kg (260 lb 12.8 oz)   02/09/21 111.6 kg (246 lb)   01/13/21 111.6 kg (246 lb)   12/23/20 111.6 kg (246 lb)         Constitutional.  Does not seem to be in any acute distress.  Eyes.  No redness or discharge noted.  Respiratory.  Speaking in full sentences.  Breathing seems comfortable without any accessory use of muscles.    Skin.  Visualized his skin does not show any obvious rashes.  Musculoskeletal.  Range of motion for visualized areas is intact.  Neurological.  Alert and oriented x3.  Psychiatric.  Mood, mentation and affect are normal.  Decision making capacity is intact.      The rest of a comprehensive physical examination is deferred due to Public Health Emergency video visit restrictions.      Laboratory/Imaging Studies      Reviewed    CBC, CMP and LDH unremarkable.  He has slightly elevated total IgG levels but IgG4 level was 260.  KATHRYN, rheumatoid factor, anti-CCP antibody, anti-SSA, anti-SSB unremarkable.  ANCA IgG negative.  Ferritin normal.  Serum immunofixation unremarkable.  Peripheral blood flow cytometry unremarkable.    ESR and CRP unremarkable.    He  is immune to hepatitis B through previous vaccination.  Hepatitis C and HIV screen were negative.    2/15/2021.  CT neck chest abdomen and pelvis did not show any suspicious lymphadenopathy.  It showed borderline splenomegaly.      Ultrasound on 12/4/2020 showed spleen to be 14 x 13.5 x 5.3 cm.  In March 2012 he was noted to have mild splenomegaly with spleen size 13.7 cm.        ASSESSMENT/PLAN:    Splenomegaly.  He has had issues with mild splenomegaly since 2012.   The work-up only revealed elevated IgG4 levels but no other evidence of IgG4 related disease.     I appreciate the recommendations from Dr. Harris and Dr Mccain.  Elevated IgG4 levels are nonspecific and at this time I do not know what is the exact significance of this in his case.    At this time I cannot think of anything else to do in terms of further work-up.  Overall he is feeling well and the discomfort in the left upper quadrant is mild.  I believe it would be reasonable to observe for now.    I also asked him to see Dr Lowery in Hem-Immunology to see if he would recommend any further work-up.  He is agreeable to that.    Going forward he will continue to follow with his PCP but he knows to contact me if he has any questions or concerns.    We did not address the following today.      Blood in the stools.  In 2014 he was having hemorrhoidal bleeding and had a colonoscopy and had bleeding but this time he mentions that over the last 1-1/2 months the blood is mixed in the stools.  He also has issues with acid reflux and abdominal discomfort.    He was prescribed Anusol by gastroenterologist but because of insurance issue, he has not started taking it and has discussed with GI to prescribe something else.  Follow-up with GI regarding this.      I answered his questions to his satisfaction.  He is agreeable and comfortable with the plan.    Telly Christianson MD    I spent 30 minutes on this visit including the video time, reviewing records  and labs and imaging as well as coordination of care and documentation.        Video start time. 10:54 AM  Video stop time. 11:02 AM        Again, thank you for allowing me to participate in the care of your patient.        Sincerely,        Telly Christianson MD

## 2021-05-25 NOTE — PROGRESS NOTES
Hematology follow up visit:  Date on this visit: 5/25/2021     Abimael Martinez  is referred by Dr.Brianna Victoria Doty for a hematology consultation. He requires evaluation for new diagnosis of splenomegaly.      Primary Physician: Jamison Lora     History Of Present Illness:    Please see my previous note for details. I have copied and updated from prior note.      Mr. Martinez is a 31 year old male who presents with new diagnosis of splenomegaly  I reviewed records from Victoria Bailey PA-C from interventional pain physicians.  I have also reviewed records from Brandi Doty.    Abimael has a history of obesity, chronic back pain and has underwent epidural injections in the back, mild splenomegaly in 2012, hypertension.  In March 2012 he was noted to have mild splenomegaly with spleen size 13.7 cm.  At that time he was complaining of off-and-on left upper quadrant discomfort.  Repeat ultrasound on 12/4/2020 showed spleen to be 14 x 13.5 x 5.3 cm.    He mentions to me that for several years he has had issues where he feels that he is coming down with something with some soreness in the throat and prominence of lymph nodes of the neck with some tenderness.  In 2012 he also had left upper quadrant pain and he was noted to have mild splenomegaly.  Then in November 2020 he had COVID-19 and since then he has noticed pressure in the left upper quadrant and at times going into the left shoulder blade and shoulder region.  At that time he was sick with high-grade fevers and nausea and weakness and a little cough but no shortness of breath.  He recovered and slowly the pain in the left upper quadrant and the shoulder has improved a little bit in the sense that he does not feel the pain in the shoulder that often but he still has pressure in the left upper quadrant.  He also mentions that now he is also feeling a little bit of similar kind of pressure on the right side of his abdomen.  When he had the Covid he  also had a ringworm which resolved.  Over the last 1-1/2 months he has noticed off-and-on blood in the stools.  He has a history of hemorrhoidal bleeding in 2014 which was banded and he had a colonoscopy at that time which only showed hemorrhoids.  He has had issues with acid reflux.  He denies any B symptoms.  In between the episodes where he feels rundown and body aches and sometimes swelling of the lymph nodes/tenderness, he feels really well and remains fully functional.  He denies any other swellings.  He has chronic back pain and there is a plan to do surgery to the back in the future.  He also mentions that he has anxiety issues.    Peripheral blood smear, peripheral blood flow cytometry, LDH, immunoglobulin levels, KATHRYN was unremarkable.        Interval history.  This is a video visit.     I also reviewed notes from Immunologist Dr. Harris.  The work-up showed slightly elevated total IgG levels but IgG4 level was 260.  CT neck chest abdomen and pelvis did not show any suspicious lymphadenopathy.  It showed borderline splenomegaly.    I also reviewed notes from her rheumatologist Dr. Mccain.    Overall he feels well.  He tells me that the discomfort in the left upper quadrant is slightly better.  He has not noticed any new swellings.  He has not noticed any other new changes over the last few months.        ROS:  Rest of the ROS was otherwise neg    I reviewed other hx in Epic as below.      Past Medical/Surgical History:  Past Medical History:   Diagnosis Date     Acute right otitis media 1/8/2013     Anxiety      Depression      Drug abuse (H)      Gonococcal urethritis 02/05/2016     Urethritis 5/20/2013     Problem list name updated by automated process. Provider to review     Past Surgical History:   Procedure Laterality Date     BACK SURGERY  04/05/2018     Allergies:  Allergies as of 05/25/2021 - Reviewed 05/25/2021   Allergen Reaction Noted     Cymbalta  10/25/2012     Duloxetine Other (See  Comments) and Fatigue 11/26/2013     Paroxetine Other (See Comments), Fatigue, and GI Disturbance 11/26/2013     Seasonal allergies  07/30/2019     Vicodin [hydrocodone-acetaminophen]  01/31/2018     Current Medications:  Current Outpatient Medications   Medication Sig Dispense Refill     albuterol (PROAIR HFA/PROVENTIL HFA/VENTOLIN HFA) 108 (90 Base) MCG/ACT inhaler Inhale 2 puffs into the lungs every 4 hours as needed for wheezing 54 g 1     ALPRAZolam (XANAX) 2 MG tablet Take 1 tablet (2 mg) by mouth 3 times daily as needed for anxiety 30 tablet 0     amphetamine-dextroamphetamine (ADDERALL) 20 MG tablet Take 1 tablet (20 mg) by mouth 3 times daily 90 tablet 0     azelastine (ASTELIN) 0.1 % nasal spray Spray 2 sprays into both nostrils 2 times daily 30 mL 11     cetirizine (ZYRTEC) 10 MG tablet Take 1 tablet (10 mg) by mouth daily as needed for allergies 90 tablet 3     clotrimazole (LOTRIMIN) 1 % external cream Apply topically 2 times daily 60 g 1     famotidine (PEPCID) 40 MG tablet TAKE 1 TABLET(40 MG) BY MOUTH AT BEDTIME 90 tablet 3     fluticasone (FLOVENT HFA) 220 MCG/ACT Inhaler Inhale 2 puffs into the lungs 2 times daily Please give patient spacer 1 Inhaler 3     Hyoscyamine Sulfate 0.375 MG TBCR Take 1 capful by mouth 3 times daily as needed 90 tablet 1     loratadine (CLARITIN) 10 MG tablet Take 1 tablet (10 mg) by mouth daily 60 tablet 0     mometasone (ELOCON) 0.1 % external ointment If necessary use 3-4 days in a row or 2xweekly on eczematous lesions 45 g 2     montelukast (SINGULAIR) 10 MG tablet Take 1 tablet (10 mg) by mouth At Bedtime 90 tablet 0     oxyCODONE-acetaminophen (PERCOCET)  MG per tablet TAKE 1/2 TO 1 TABLET BY MOUTH THREE TIMES DAILY AS NEEDED FOR SEVERE PAIN       pimecrolimus (ELIDEL) 1 % external cream Apply topically 2 times daily 30 g 0     PROTOPIC 0.1 % external ointment APPLY TWICE DAILY AS NEEDED FOR RASH ON PENIS.       sildenafil (REVATIO) 20 MG tablet Take 1-2  tablets (20-40 mg) by mouth daily as needed As needed for prevention of erectile dysfunction 30 tablet 2     tacrolimus (PROTOPIC) 0.03 % external ointment Apply topically 2 times daily Put on skin lesions 2x daily 60 g 1      Family History:  Family History   Problem Relation Age of Onset     Unknown/Adopted Mother      Unknown/Adopted Father      Unknown/Adopted Maternal Grandmother      Unknown/Adopted Maternal Grandfather      Unknown/Adopted Paternal Grandmother      Unknown/Adopted Paternal Grandfather      Unknown/Adopted Brother      Adopted      Social History:  Social History     Socioeconomic History     Marital status: Single     Spouse name: Not on file     Number of children: Not on file     Years of education: Not on file     Highest education level: Not on file   Occupational History     Not on file   Social Needs     Financial resource strain: Not on file     Food insecurity     Worry: Not on file     Inability: Not on file     Transportation needs     Medical: Not on file     Non-medical: Not on file   Tobacco Use     Smoking status: Former Smoker     Packs/day: 0.50     Years: 6.00     Pack years: 3.00     Types: Cigarettes     Quit date: 3/12/2018     Years since quitting: 3.2     Smokeless tobacco: Never Used     Tobacco comment: second hand smoke exposure   Substance and Sexual Activity     Alcohol use: Yes     Comment: once a week     Drug use: Yes     Types: Marijuana     Comment: pot once a month, ectasy  As of 11/7/2016 he only smokes pot occasionally     Sexual activity: Yes     Partners: Female     Birth control/protection: Condom   Lifestyle     Physical activity     Days per week: Not on file     Minutes per session: Not on file     Stress: Not on file   Relationships     Social connections     Talks on phone: Not on file     Gets together: Not on file     Attends Caodaism service: Not on file     Active member of club or organization: Not on file     Attends meetings of clubs or  organizations: Not on file     Relationship status: Not on file     Intimate partner violence     Fear of current or ex partner: Not on file     Emotionally abused: Not on file     Physically abused: Not on file     Forced sexual activity: Not on file   Other Topics Concern     Parent/sibling w/ CABG, MI or angioplasty before 65F 55M? No   Social History Narrative     Not on file     Quit smoking 3 years ago. Drinks occasional etoh. Off and on smokes marijuana.    Physical Exam:  There were no vitals taken for this visit.   Wt Readings from Last 4 Encounters:   05/06/21 118.3 kg (260 lb 12.8 oz)   02/09/21 111.6 kg (246 lb)   01/13/21 111.6 kg (246 lb)   12/23/20 111.6 kg (246 lb)         Constitutional.  Does not seem to be in any acute distress.  Eyes.  No redness or discharge noted.  Respiratory.  Speaking in full sentences.  Breathing seems comfortable without any accessory use of muscles.    Skin.  Visualized his skin does not show any obvious rashes.  Musculoskeletal.  Range of motion for visualized areas is intact.  Neurological.  Alert and oriented x3.  Psychiatric.  Mood, mentation and affect are normal.  Decision making capacity is intact.      The rest of a comprehensive physical examination is deferred due to Public Health Emergency video visit restrictions.      Laboratory/Imaging Studies      Reviewed    CBC, CMP and LDH unremarkable.  He has slightly elevated total IgG levels but IgG4 level was 260.  KATHRYN, rheumatoid factor, anti-CCP antibody, anti-SSA, anti-SSB unremarkable.  ANCA IgG negative.  Ferritin normal.  Serum immunofixation unremarkable.  Peripheral blood flow cytometry unremarkable.    ESR and CRP unremarkable.    He is immune to hepatitis B through previous vaccination.  Hepatitis C and HIV screen were negative.    2/15/2021.  CT neck chest abdomen and pelvis did not show any suspicious lymphadenopathy.  It showed borderline splenomegaly.      Ultrasound on 12/4/2020 showed spleen to be 14  x 13.5 x 5.3 cm.  In March 2012 he was noted to have mild splenomegaly with spleen size 13.7 cm.        ASSESSMENT/PLAN:    Splenomegaly.  He has had issues with mild splenomegaly since 2012.   The work-up only revealed elevated IgG4 levels but no other evidence of IgG4 related disease.     I appreciate the recommendations from Dr. Harris and Dr Mccain.  Elevated IgG4 levels are nonspecific and at this time I do not know what is the exact significance of this in his case.    At this time I cannot think of anything else to do in terms of further work-up.  Overall he is feeling well and the discomfort in the left upper quadrant is mild.  I believe it would be reasonable to observe for now.    I also asked him to see Dr Lowery in Hem-Immunology to see if he would recommend any further work-up.  He is agreeable to that.    Going forward he will continue to follow with his PCP but he knows to contact me if he has any questions or concerns.    We did not address the following today.      Blood in the stools.  In 2014 he was having hemorrhoidal bleeding and had a colonoscopy and had bleeding but this time he mentions that over the last 1-1/2 months the blood is mixed in the stools.  He also has issues with acid reflux and abdominal discomfort.    He was prescribed Anusol by gastroenterologist but because of insurance issue, he has not started taking it and has discussed with GI to prescribe something else.  Follow-up with GI regarding this.      I answered his questions to his satisfaction.  He is agreeable and comfortable with the plan.    Telly Christianson MD    I spent 30 minutes on this visit including the video time, reviewing records and labs and imaging as well as coordination of care and documentation.        Video start time. 10:54 AM  Video stop time. 11:02 AM

## 2021-05-25 NOTE — NURSING NOTE
Abimael is a 31 year old who is being evaluated via a billable video visit.      How would you like to obtain your AVS? MyChart  If the video visit is dropped, the invitation should be resent by: Text to cell phone: [phone]  Will anyone else be joining your video visit? No      Video-Visit Details    Type of service:  Video Visit      Originating Location (pt. Location): Home in [place]    Distant Location (provider location):  [facility]     Platform used for Video Visit: [employer]     SYMPTOM QUESTIONNAIRE    Pain: YES, spleen area 3-4/10    Nausea/Vomiting: Yes, nausea at times    Mouth Sores: no    Shortness of Breath: YES, has asthma    Smoking: no    Fever or Chills: YES, randomly has spells of feeling hot, but no fever, and chills    Hard Stools: YES, if needed will take a gentle laxative 1-2 times a month    Loose Stools: no    Weight Loss: no    Weakness: YES, some fatigue, more than usual    Burning, numbness or tingling in hands or feet: no    Problems with skin or swelling: no    Memory Loss: no    Anxiety or Depression: YES, anxiety today scale 5-6/10, day to day basis can be 8-10/10, patient states he tries to deal with the anxiety on his own because he has tried anxiety medications in the past and the side effects are too much.  Declined to speak with counseling.    Trouble Sleeping: YES, wakes up 1-2 times a night, 4-5 hours a sleep nightly but can fall right back to sleep.    No refills needed    Concerns:  Seen by multiple Specialists in regards to his enlarged spleen, wants to know why his spleen is enlarged?    Sasha Chacon CMA

## 2021-05-27 ENCOUNTER — MYC REFILL (OUTPATIENT)
Dept: FAMILY MEDICINE | Facility: CLINIC | Age: 31
End: 2021-05-27

## 2021-05-27 DIAGNOSIS — F90.2 ATTENTION DEFICIT HYPERACTIVITY DISORDER (ADHD), COMBINED TYPE: ICD-10-CM

## 2021-05-28 RX ORDER — DEXTROAMPHETAMINE SACCHARATE, AMPHETAMINE ASPARTATE, DEXTROAMPHETAMINE SULFATE AND AMPHETAMINE SULFATE 5; 5; 5; 5 MG/1; MG/1; MG/1; MG/1
20 TABLET ORAL 3 TIMES DAILY
Qty: 90 TABLET | Refills: 0 | Status: SHIPPED | OUTPATIENT
Start: 2021-05-28 | End: 2021-06-29

## 2021-05-28 NOTE — TELEPHONE ENCOUNTER
Routing refill request to provider for review/approval because:  Drug not on the [facility] refill protocol     Poonam RIDERN, RN

## 2021-06-02 ENCOUNTER — TRANSCRIBE ORDERS (OUTPATIENT)
Dept: OTHER | Age: 31
End: 2021-06-02

## 2021-06-02 DIAGNOSIS — R16.1 SPLENOMEGALY: Primary | ICD-10-CM

## 2021-06-08 ENCOUNTER — MYC REFILL (OUTPATIENT)
Dept: FAMILY MEDICINE | Facility: CLINIC | Age: 31
End: 2021-06-08

## 2021-06-08 DIAGNOSIS — F41.9 ANXIETY: ICD-10-CM

## 2021-06-10 NOTE — TELEPHONE ENCOUNTER
Routing refill request to provider for review/approval because:  Drug not on the [facility] refill protocol.    Poonam Lu RN, [facility] Triage

## 2021-06-11 RX ORDER — ALPRAZOLAM 2 MG
2 TABLET ORAL 3 TIMES DAILY PRN
Qty: 30 TABLET | Refills: 0 | Status: SHIPPED | OUTPATIENT
Start: 2021-06-11 | End: 2021-07-07

## 2021-06-21 ENCOUNTER — OFFICE VISIT (OUTPATIENT)
Dept: DERMATOLOGY | Facility: CLINIC | Age: 31
End: 2021-06-21
Payer: COMMERCIAL

## 2021-06-21 DIAGNOSIS — N48.89 PEARLY PENILE PAPULES: Primary | ICD-10-CM

## 2021-06-21 DIAGNOSIS — L24.89 IRRITANT CONTACT DERMATITIS DUE TO OTHER AGENTS: ICD-10-CM

## 2021-06-21 PROCEDURE — 99213 OFFICE O/P EST LOW 20 MIN: CPT | Performed by: DERMATOLOGY

## 2021-06-21 RX ORDER — PIMECROLIMUS 10 MG/G
CREAM TOPICAL 2 TIMES DAILY
Qty: 30 G | Refills: 11 | Status: SHIPPED | OUTPATIENT
Start: 2021-06-21

## 2021-06-21 ASSESSMENT — PAIN SCALES - GENERAL: PAINLEVEL: MILD PAIN (2)

## 2021-06-21 NOTE — PROGRESS NOTES
[facility] Dermatology Note  Encounter Date: Jun 21, 2021  Office Visit     Dermatology Problem List:  1. Dermatitis, groin area. Suspected irritant versus contact dermatitis.  -has seasonal allergies with asthma per Dr. Harris  - path testing with delayed reaction to black tattoo  - vaseline; elidel  - prior tx: triam 0.025% ointment BID, protopic 0.1% ointment BID PRN  2. Allergic contact dermatitis  - s/p punch biopsy 11/19/19  - patch test negative  - triam 0.1% ointment BID  3.COVID-19 Nov-2020  4. Pearly penile papules - referral to urology    ____________________________________________    Assessment & Plan:    # Penile dermatitis - irritant dermatitis, negative patch testing. - s/p TAC and tacrolimus. Rash is mostly resolved today  - Continue Cetaphil gentle cleanser.  - If rash recurs, apply mometasone to the area for up to 1 weeks, then transition to protopic 0.1% ointment thereafter.    # Pearly penile papules  - Painful to patient, referral sent to urology for possible removal with electrocautery.    Procedures Performed:   None.    Follow-up: 6 months, or sooner for new or changing lesions.    Staff and Scribe:     Scribe Disclosure:   I, Anne Miner, am serving as a scribe to document services personally performed by this physician, Dr. Rivera Lisa, based on data collection and the provider's statements to me.     Provider Disclosure:   The documentation recorded by the scribe accurately reflects the services I personally performed and the decisions made by me.    Rivera Lisa MD    Department of Dermatology  [facility]: Phone: [phone], Fax:[phone]  [facility]: Phone: [phone] Fax: [phone]    ____________________________________________    CC: Derm Problem (skin issues on penis of irritatin and painful with sexual activity  -previously used a steroid cream on the area with no improvement)    HPI:  Mr. Abimael Martinez is a(n) 31 year old male who presents today as a return patient for dermatitis.    Last seen 11/17/2020 by a virtual visit with Dr. Arvizu. At that time, patient was to do PATCH testing with Dr. Harris. Patient was to use triamcinolone 0.1% cream for five days then switch to tacrolimus.     Today, patient notes concern of irritation on the penis. Painful with sexual activity around the marks of previous circumcision. Has been ongoing. He has previously used steroid creams with no improvement. Has recently been using vaseline on the area. The rash is still present. Not as red and scaly as before. Has drained.    The patient otherwise denies any new or concerning lesions. No bleeding, painful, pruritic, or changing lesions. Health otherwise stable. No other skin concerns.    Labs Reviewed:  N/A    Physical Exam:  Vitals: There were no vitals taken for this visit.  SKIN: Focused examination of the genitals was performed.  - Numerous small, firm 1 mm pink papules on corona of penis and few scattered on penile shaft / frenulum.   - Otherwise, penile shaft relatively clear of active dermatitis.   - No other lesions of concern on areas examined.     Medications:  Current Outpatient Medications   Medication     albuterol (PROAIR HFA/PROVENTIL HFA/VENTOLIN HFA) 108 (90 Base) MCG/ACT inhaler     ALPRAZolam (XANAX) 2 MG tablet     amphetamine-dextroamphetamine (ADDERALL) 20 MG tablet     azelastine (ASTELIN) 0.1 % nasal spray     cetirizine (ZYRTEC) 10 MG tablet     clotrimazole (LOTRIMIN) 1 % external cream     famotidine (PEPCID) 40 MG tablet     fluticasone (FLOVENT HFA) 220 MCG/ACT Inhaler     Hyoscyamine Sulfate 0.375 MG TBCR     loratadine (CLARITIN) 10 MG tablet     mometasone (ELOCON) 0.1 % external ointment     montelukast (SINGULAIR) 10 MG tablet     oxyCODONE-acetaminophen (PERCOCET)  MG per tablet      pimecrolimus (ELIDEL) 1 % external cream     PROTOPIC 0.1 % external ointment     sildenafil (REVATIO) 20 MG tablet     tacrolimus (PROTOPIC) 0.03 % external ointment     No current facility-administered medications for this visit.       Past Medical History:   Patient Active Problem List   Diagnosis     Anxiety     CARDIOVASCULAR SCREENING; LDL GOAL LESS THAN 160     High risk sexual behavior     Low back pain     Essential hypertension     Internal hemorrhoids which bleed     Obesity, Class I, BMI 30-34.9     Attention deficit hyperactivity disorder (ADHD), combined type     AYALA (generalized anxiety disorder)     OCD (obsessive compulsive disorder)     Drug-induced erectile dysfunction     Mild persistent asthma without complication     Lumbosacral radiculopathy     Dyslipidemia     Elevated serum creatinine     Former smoker     Morbid obesity (H)     Past Medical History:   Diagnosis Date     Acute right otitis media 1/8/2013     Anxiety      Depression      Drug abuse (H)      Gonococcal urethritis 02/05/2016     Urethritis 5/20/2013     Problem list name updated by automated process. Provider to review

## 2021-06-21 NOTE — NURSING NOTE
Abimael Martinez's goals for this visit include:   Chief Complaint   Patient presents with     Derm Problem     skin issues on penis of irritatin and painful with sexual activity -previously used a steroid cream on the area with no improvement       He requests these members of his care team be copied on today's visit information:     PCP: Jamison Lora    Referring Provider:  Referred Self, MD  No address on file    There were no vitals taken for this visit.    Do you need any medication refills at today's visit? Kala Salazar LPN

## 2021-06-21 NOTE — LETTER
6/21/2021         RE: Abimael Martinez  [address]  [facility] [address]        Dear Colleague,    Thank you for referring your patient, Abimael Martinez, to the [facility]. Please see a copy of my visit note below.    [facility] Dermatology Note  Encounter Date: Jun 21, 2021  Office Visit     Dermatology Problem List:  1. Dermatitis, groin area. Suspected irritant versus contact dermatitis.  -has seasonal allergies with asthma per Dr. Harris  - path testing with delayed reaction to black tattoo  - vaseline; elidel  - prior tx: triam 0.025% ointment BID, protopic 0.1% ointment BID PRN  2. Allergic contact dermatitis  - s/p punch biopsy 11/19/19  - patch test negative  - triam 0.1% ointment BID  3.COVID-19 Nov-2020  4. Pearly penile papules - referral to urology    ____________________________________________    Assessment & Plan:    # Penile dermatitis - irritant dermatitis, negative patch testing. - s/p TAC and tacrolimus. Rash is mostly resolved today  - Continue Cetaphil gentle cleanser.  - If rash recurs, apply mometasone to the area for up to 1 weeks, then transition to protopic 0.1% ointment thereafter.    # Pearly penile papules  - Painful to patient, referral sent to urology for possible removal with electrocautery.    Procedures Performed:   None.    Follow-up: 6 months, or sooner for new or changing lesions.    Staff and Scribe:     Scribe Disclosure:   I, Anne Miner, am serving as a scribe to document services personally performed by this physician, Dr. Rivera Lisa, based on data collection and the provider's statements to me.     Provider Disclosure:   The documentation recorded by the scribe accurately reflects the services I personally performed and the decisions made by me.    Rivera Lisa MD    Department of Dermatology  [facility]: Phone:  [phone], Fax:[phone]  [facility]: Phone: [phone] Fax: [phone]    ____________________________________________    CC: Derm Problem (skin issues on penis of irritatin and painful with sexual activity -previously used a steroid cream on the area with no improvement)    HPI:  Mr. Abimael Martinez is a(n) 31 year old male who presents today as a return patient for dermatitis.    Last seen 11/17/2020 by a virtual visit with Dr. Arvizu. At that time, patient was to do PATCH testing with Dr. Harris. Patient was to use triamcinolone 0.1% cream for five days then switch to tacrolimus.     Today, patient notes concern of irritation on the penis. Painful with sexual activity around the marks of previous circumcision. Has been ongoing. He has previously used steroid creams with no improvement. Has recently been using vaseline on the area. The rash is still present. Not as red and scaly as before. Has drained.    The patient otherwise denies any new or concerning lesions. No bleeding, painful, pruritic, or changing lesions. Health otherwise stable. No other skin concerns.    Labs Reviewed:  N/A    Physical Exam:  Vitals: There were no vitals taken for this visit.  SKIN: Focused examination of the genitals was performed.  - Numerous small, firm 1 mm pink papules on corona of penis and few scattered on penile shaft / frenulum.   - Otherwise, penile shaft relatively clear of active dermatitis.   - No other lesions of concern on areas examined.     Medications:  Current Outpatient Medications   Medication     albuterol (PROAIR HFA/PROVENTIL HFA/VENTOLIN HFA) 108 (90 Base) MCG/ACT inhaler     ALPRAZolam (XANAX) 2 MG tablet     amphetamine-dextroamphetamine (ADDERALL) 20 MG tablet     azelastine (ASTELIN) 0.1 % nasal spray     cetirizine (ZYRTEC) 10 MG tablet     clotrimazole (LOTRIMIN) 1 % external cream     famotidine (PEPCID) 40 MG tablet     fluticasone  (FLOVENT HFA) 220 MCG/ACT Inhaler     Hyoscyamine Sulfate 0.375 MG TBCR     loratadine (CLARITIN) 10 MG tablet     mometasone (ELOCON) 0.1 % external ointment     montelukast (SINGULAIR) 10 MG tablet     oxyCODONE-acetaminophen (PERCOCET)  MG per tablet     pimecrolimus (ELIDEL) 1 % external cream     PROTOPIC 0.1 % external ointment     sildenafil (REVATIO) 20 MG tablet     tacrolimus (PROTOPIC) 0.03 % external ointment     No current facility-administered medications for this visit.       Past Medical History:   Patient Active Problem List   Diagnosis     Anxiety     CARDIOVASCULAR SCREENING; LDL GOAL LESS THAN 160     High risk sexual behavior     Low back pain     Essential hypertension     Internal hemorrhoids which bleed     Obesity, Class I, BMI 30-34.9     Attention deficit hyperactivity disorder (ADHD), combined type     AYALA (generalized anxiety disorder)     OCD (obsessive compulsive disorder)     Drug-induced erectile dysfunction     Mild persistent asthma without complication     Lumbosacral radiculopathy     Dyslipidemia     Elevated serum creatinine     Former smoker     Morbid obesity (H)     Past Medical History:   Diagnosis Date     Acute right otitis media 1/8/2013     Anxiety      Depression      Drug abuse (H)      Gonococcal urethritis 02/05/2016     Urethritis 5/20/2013     Problem list name updated by automated process. Provider to review           Again, thank you for allowing me to participate in the care of your patient.        Sincerely,        Rivera Lisa MD

## 2021-06-29 ENCOUNTER — MYC REFILL (OUTPATIENT)
Dept: FAMILY MEDICINE | Facility: CLINIC | Age: 31
End: 2021-06-29

## 2021-06-29 DIAGNOSIS — F90.2 ATTENTION DEFICIT HYPERACTIVITY DISORDER (ADHD), COMBINED TYPE: ICD-10-CM

## 2021-06-29 RX ORDER — DEXTROAMPHETAMINE SACCHARATE, AMPHETAMINE ASPARTATE, DEXTROAMPHETAMINE SULFATE AND AMPHETAMINE SULFATE 5; 5; 5; 5 MG/1; MG/1; MG/1; MG/1
20 TABLET ORAL 3 TIMES DAILY
Qty: 90 TABLET | Refills: 0 | Status: SHIPPED | OUTPATIENT
Start: 2021-06-29 | End: 2021-07-29

## 2021-06-29 NOTE — TELEPHONE ENCOUNTER
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

## 2021-07-02 ENCOUNTER — TELEPHONE (OUTPATIENT)
Dept: FAMILY MEDICINE | Facility: CLINIC | Age: 31
End: 2021-07-02

## 2021-07-02 NOTE — CONFIDENTIAL NOTE
Referral in place for diagnosis of pearly penile papules. Message sent to Dr. Sarah with request to review and advise if he sees for this diagnosis.    Poonam Stewart RN, BSN

## 2021-07-02 NOTE — TELEPHONE ENCOUNTER
Reason for Call:  Other appointment    Detailed comments: PT stated had an urology referral and needs appt    Phone Number Patient can be reached at: Home number on file [phone] (home)    Best Time: Anytime    Can we leave a detailed message on this number? YES    Call taken on 7/2/2021 at 3:28 PM by Edith Guerrero

## 2021-07-05 NOTE — CONFIDENTIAL NOTE
Received message from Dr. Sarah who reports that he can see patient for consult and may recommend observation. Message sent to scheduling team with request to contact patient to offer and assist patient in scheduling consult with Dr. Sarah.    Poonam Stewart RN, BSN

## 2021-07-06 NOTE — TELEPHONE ENCOUNTER
7/6 Called and was unable to leave voicemail. I sent patient a [employer] message to schedule a in person consult with Dr. Sarah.     Candy linares Procedure   Orthopedics, Podiatry, Sports Medicine, ENT/Eye Specialties  [facility]   [phone]

## 2021-07-07 ENCOUNTER — MYC REFILL (OUTPATIENT)
Dept: FAMILY MEDICINE | Facility: CLINIC | Age: 31
End: 2021-07-07

## 2021-07-07 DIAGNOSIS — F41.9 ANXIETY: ICD-10-CM

## 2021-07-07 NOTE — TELEPHONE ENCOUNTER
Patient was scheduled with Dr Smith on 8/19 in error.  Called and left message and requested a call back to reschedule to Dr Sarah.    Lilia Neely  Surgical Specialties Procedure   [facility]  7/7/2021 9:39 AM

## 2021-07-08 RX ORDER — ALPRAZOLAM 2 MG
2 TABLET ORAL 3 TIMES DAILY PRN
Qty: 30 TABLET | Refills: 0 | Status: SHIPPED | OUTPATIENT
Start: 2021-07-08 | End: 2021-09-06

## 2021-07-16 ENCOUNTER — VIRTUAL VISIT (OUTPATIENT)
Dept: FAMILY MEDICINE | Facility: CLINIC | Age: 31
End: 2021-07-16
Payer: COMMERCIAL

## 2021-07-16 DIAGNOSIS — M25.572 BILATERAL ANKLE PAIN, UNSPECIFIED CHRONICITY: Primary | ICD-10-CM

## 2021-07-16 DIAGNOSIS — M25.571 BILATERAL ANKLE PAIN, UNSPECIFIED CHRONICITY: Primary | ICD-10-CM

## 2021-07-16 PROCEDURE — 99213 OFFICE O/P EST LOW 20 MIN: CPT | Mod: 95 | Performed by: PHYSICIAN ASSISTANT

## 2021-07-16 NOTE — PROGRESS NOTES
Abimael is a 31 year old who is being evaluated via a billable video visit.      How would you like to obtain your AVS? MyChart  If the video visit is dropped, the invitation should be resent by:   Will anyone else be joining your video visit? No      Video Start Time: 9:05 AM    Assessment & Plan     Bilateral ankle pain, unspecified chronicity  History of previous right ankle fracture.  Rolled both ankles couple weeks ago- although pain improving I agree that we should xray and follow up with sports medicine. Return urgently if any change in symptoms.      - XR Ankle Left G/E 3 Views  - XR Ankle Right G/E 3 Views  - Orthopedic  Referral        24 minutes spent on the date of the encounter doing chart review, history and exam, documentation and further activities per the note       BMI:   Estimated body mass index is 35.26 kg/m  as calculated from the following:    Height as of 5/25/21: 1.829 m (6').    Weight as of 5/25/21: 117.9 kg (260 lb).   Weight management plan: Discussed healthy diet and exercise guidelines    Patient Instructions   Schedule appointment for bilateral ankle xrays at any [facility].  Follow up with sports medicine at [facility] ([phone]) formerly called [facility].   Return urgently if any change in symptoms like increasing pain, swelling or other change in symptoms.       Return in about 1 week (around 7/23/2021), or if symptoms worsen or fail to improve, for in person.    MIGUEL Simms [facility]   Abimael is a 31 year old who presents for the following health issues     Complains of bilateral ankle pain for approximately 2 weeks.  Was doing the [employer] in  and rolled both ankles as walking through brush and divots in ground.  History of right ankle fracture approximately 3 yrs ago and wore boot and used kneeling scooter.  Pain was never 100% relieved. Would like  imaging to make sure healed correctly- ongoing issues  Had seen podiatry and advised had Bone growing over another bone.  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   Nasty spill- right one bothering me the most.  Bend foot to right still get pains.     HPI           Review of Systems   Constitutional, HEENT, cardiovascular, pulmonary, gi and gu systems are negative, except as otherwise noted.      Objective           Vitals:  No vitals were obtained today due to virtual visit.    Physical Exam   GENERAL: Healthy, alert and no distress  EYES: Eyes grossly normal to inspection.  No discharge or erythema, or obvious scleral/conjunctival abnormalities.  RESP: No audible wheeze, cough, or visible cyanosis.  No visible retractions or increased work of breathing.    SKIN: Visible skin clear. No significant rash, abnormal pigmentation or lesions.  NEURO: Cranial nerves grossly intact.  Mentation and speech appropriate for age.  PSYCH: Mentation appears normal, affect normal/bright, judgement and insight intact, normal speech and appearance well-groomed.                Video-Visit Details    Type of service:  Video Visit    Video End Time:9:12 AM    Originating Location (pt. Location): Other work    Distant Location (provider location):  [facility]     Platform used for Video Visit: [employer]

## 2021-07-16 NOTE — PATIENT INSTRUCTIONS
Schedule appointment for bilateral ankle xrays at any [facility] clinic.  Follow up with sports medicine at [facility] ([phone]) formerly called [facility].   Return urgently if any change in symptoms like increasing pain, swelling or other change in symptoms.

## 2021-07-26 ENCOUNTER — ANCILLARY PROCEDURE (OUTPATIENT)
Dept: GENERAL RADIOLOGY | Facility: CLINIC | Age: 31
End: 2021-07-26
Attending: PHYSICIAN ASSISTANT
Payer: COMMERCIAL

## 2021-07-26 DIAGNOSIS — M25.572 BILATERAL ANKLE PAIN, UNSPECIFIED CHRONICITY: ICD-10-CM

## 2021-07-26 DIAGNOSIS — M25.571 BILATERAL ANKLE PAIN, UNSPECIFIED CHRONICITY: ICD-10-CM

## 2021-07-26 PROCEDURE — 73610 X-RAY EXAM OF ANKLE: CPT | Mod: LT | Performed by: RADIOLOGY

## 2021-07-26 PROCEDURE — 73610 X-RAY EXAM OF ANKLE: CPT | Mod: RT | Performed by: RADIOLOGY

## 2021-07-27 NOTE — RESULT ENCOUNTER NOTE
Dear Abimael  Your left ankle xray was normal.   Please call or MyChart my office with any questions or concerns.    Ana Cristina Lee, PAC

## 2021-07-27 NOTE — RESULT ENCOUNTER NOTE
Dear Abimael  Your right ankle xray was normal.   Please call or MyChart my office with any questions or concerns.    Ana Cristina Lee, PAC

## 2021-07-29 ENCOUNTER — MYC REFILL (OUTPATIENT)
Dept: FAMILY MEDICINE | Facility: CLINIC | Age: 31
End: 2021-07-29

## 2021-07-29 DIAGNOSIS — F90.2 ATTENTION DEFICIT HYPERACTIVITY DISORDER (ADHD), COMBINED TYPE: ICD-10-CM

## 2021-07-29 RX ORDER — DEXTROAMPHETAMINE SACCHARATE, AMPHETAMINE ASPARTATE, DEXTROAMPHETAMINE SULFATE AND AMPHETAMINE SULFATE 5; 5; 5; 5 MG/1; MG/1; MG/1; MG/1
20 TABLET ORAL 3 TIMES DAILY
Qty: 90 TABLET | Refills: 0 | Status: SHIPPED | OUTPATIENT
Start: 2021-07-29 | End: 2021-08-29

## 2021-07-29 NOTE — TELEPHONE ENCOUNTER
Routing refill request to provider for review/approval because:  Drug not on the [facility] refill protocol       Isidro Benedict RN  [facility]

## 2021-07-30 ENCOUNTER — TRANSFERRED RECORDS (OUTPATIENT)
Dept: HEALTH INFORMATION MANAGEMENT | Facility: CLINIC | Age: 31
End: 2021-07-30

## 2021-08-02 ENCOUNTER — PRE VISIT (OUTPATIENT)
Dept: UROLOGY | Facility: CLINIC | Age: 31
End: 2021-08-02

## 2021-08-02 NOTE — TELEPHONE ENCOUNTER
Reason for visit: Consult     Relevant information: Penile papules    Records/imaging/labs/orders: in EPIC    Pt called: no    At Rooming: normal

## 2021-08-10 ENCOUNTER — ONCOLOGY VISIT (OUTPATIENT)
Dept: TRANSPLANT | Facility: CLINIC | Age: 31
End: 2021-08-10
Attending: INTERNAL MEDICINE
Payer: COMMERCIAL

## 2021-08-10 ENCOUNTER — TELEPHONE (OUTPATIENT)
Dept: GASTROENTEROLOGY | Facility: CLINIC | Age: 31
End: 2021-08-10

## 2021-08-10 ENCOUNTER — PRE VISIT (OUTPATIENT)
Dept: TRANSPLANT | Facility: CLINIC | Age: 31
End: 2021-08-10

## 2021-08-10 VITALS
BODY MASS INDEX: 34.54 KG/M2 | HEART RATE: 66 BPM | OXYGEN SATURATION: 98 % | TEMPERATURE: 98 F | SYSTOLIC BLOOD PRESSURE: 148 MMHG | RESPIRATION RATE: 16 BRPM | WEIGHT: 255 LBS | DIASTOLIC BLOOD PRESSURE: 97 MMHG | HEIGHT: 72 IN

## 2021-08-10 DIAGNOSIS — Z11.59 ENCOUNTER FOR SCREENING FOR OTHER VIRAL DISEASES: ICD-10-CM

## 2021-08-10 DIAGNOSIS — K21.00 GASTROESOPHAGEAL REFLUX DISEASE WITH ESOPHAGITIS, UNSPECIFIED WHETHER HEMORRHAGE: Primary | ICD-10-CM

## 2021-08-10 DIAGNOSIS — R16.1 SPLENOMEGALY: ICD-10-CM

## 2021-08-10 LAB
AMYLASE SERPL-CCNC: 44 U/L (ref 30–110)
CD3 CELLS # BLD: 1622 CELLS/UL (ref 603–2990)
CD3 CELLS NFR BLD: 72 % (ref 49–84)
CD3+CD4+ CELLS # BLD: 669 CELLS/UL (ref 441–2156)
CD3+CD4+ CELLS NFR BLD: 30 % (ref 28–63)
CD3+CD4+ CELLS/CD3+CD8+ CLL BLD: 0.72 % (ref 1.4–2.6)
CD3+CD8+ CELLS # BLD: 932 CELLS/UL (ref 125–1312)
CD3+CD8+ CELLS NFR BLD: 41 % (ref 10–40)
IGG SERPL-MCNC: 1626 MG/DL (ref 610–1616)
IGG1 SER-MCNC: 891 MG/DL (ref 382–929)
IGG2 SER-MCNC: 447 MG/DL (ref 242–700)
IGG3 SER-MCNC: 85 MG/DL (ref 22–176)
IGG4 SER-MCNC: 268 MG/DL (ref 4–86)
LIPASE SERPL-CCNC: 159 U/L (ref 73–393)
SUBCLASSES, PERCENT: 104 %
T CELL COMMENT: ABNORMAL
TOTAL PROTEIN SERUM FOR ELP: 8.2 G/DL (ref 6.8–8.8)

## 2021-08-10 PROCEDURE — 86359 T CELLS TOTAL COUNT: CPT | Performed by: INTERNAL MEDICINE

## 2021-08-10 PROCEDURE — 86769 SARS-COV-2 COVID-19 ANTIBODY: CPT | Performed by: INTERNAL MEDICINE

## 2021-08-10 PROCEDURE — 82150 ASSAY OF AMYLASE: CPT | Performed by: INTERNAL MEDICINE

## 2021-08-10 PROCEDURE — 84165 PROTEIN E-PHORESIS SERUM: CPT | Mod: TC | Performed by: PATHOLOGY

## 2021-08-10 PROCEDURE — 84155 ASSAY OF PROTEIN SERUM: CPT | Performed by: INTERNAL MEDICINE

## 2021-08-10 PROCEDURE — 87799 DETECT AGENT NOS DNA QUANT: CPT | Performed by: INTERNAL MEDICINE

## 2021-08-10 PROCEDURE — 36415 COLL VENOUS BLD VENIPUNCTURE: CPT | Performed by: INTERNAL MEDICINE

## 2021-08-10 PROCEDURE — 82784 ASSAY IGA/IGD/IGG/IGM EACH: CPT | Performed by: INTERNAL MEDICINE

## 2021-08-10 PROCEDURE — 99205 OFFICE O/P NEW HI 60 MIN: CPT | Mod: GC | Performed by: INTERNAL MEDICINE

## 2021-08-10 PROCEDURE — 83690 ASSAY OF LIPASE: CPT | Performed by: INTERNAL MEDICINE

## 2021-08-10 PROCEDURE — G0463 HOSPITAL OUTPT CLINIC VISIT: HCPCS

## 2021-08-10 PROCEDURE — 84165 PROTEIN E-PHORESIS SERUM: CPT | Mod: 26 | Performed by: PATHOLOGY

## 2021-08-10 ASSESSMENT — MIFFLIN-ST. JEOR: SCORE: 2154.8

## 2021-08-10 ASSESSMENT — PAIN SCALES - GENERAL: PAINLEVEL: NO PAIN (0)

## 2021-08-10 NOTE — NURSING NOTE
"Oncology Rooming Note    August 10, 2021 8:08 AM   Abimael Martinez is a 31 year old male who presents for:    Chief Complaint   Patient presents with     Oncology Clinic Visit     [facility] RETURN - ELEVATED IgG4 LEVELS     Initial Vitals: BP (!) 148/97 (BP Location: Right arm, Patient Position: Chair, Cuff Size: Adult Large)   Pulse 66   Temp 98  F (36.7  C)   Resp 16   Ht 1.829 m (6' 0.01\")   Wt 115.7 kg (255 lb)   SpO2 98%   BMI 34.58 kg/m   Estimated body mass index is 34.58 kg/m  as calculated from the following:    Height as of this encounter: 1.829 m (6' 0.01\").    Weight as of this encounter: 115.7 kg (255 lb). Body surface area is 2.42 meters squared.  No Pain (0) Comment: Data Unavailable   No LMP for male patient.  Allergies reviewed: Yes  Medications reviewed: Yes    Medications: Medication refills not needed today.  Pharmacy name entered into EPIC:    WRITTEN PRESCRIPTION REQUESTED  [id number] - [place], [place] - [address] [address]  [place] [address] [address] [place] [address] PHARMACY [place], [place] - [address] [address]  [id number] - [place], [place] - [address]    Clinical concerns: No new concerns. Sebastián was notified.      Shelton Valladares LPN            "

## 2021-08-10 NOTE — TELEPHONE ENCOUNTER
Screening Questions  1. Are you active on mychart? YES    2. What insurance is in the chart? [employer]    2.  Ordering/Referring Provider: Milton Lowery MD    3. BMI 35.3    4. Are you on daily home oxygen? n    5. Do you have a history of difficult airway? n    6. Have you had a heart, lung, or liver transplant? n    7. Are you currently on dialysis? n    8. Have you had a stroke or Transient ischemic atttack (TIA) within 6 months? n    9. In the past 6 months, have you had any heart related issues including cardiomyopathy or heart attack?         If yes, did it require cardiac stenting or other implantable device?n    10. Do you have any implantable devices in your body (pacemaker, defib, LVAD)? n    11. Do you take nitroglycerin? If yes, how often? n    12. Are you currently taking any blood thinners?n    13. Are you a diabetic? n    14. (Females) Are you currently pregnant? n  If yes, how many weeks?    15. Have you had a procedure in the past that was difficult to tolerate with conscious sedation? Any allergies to Fentanyl or Versed n    16. Are you taking any scheduled prescription narcotics more than once daily? n    17. Do you have any chemical dependencies such as alcohol, street drugs, or methadone? n    18. Do you have any history of post-traumatic stress syndrome or mental health issues? n    19. Do you transfer independently? y    20.  Do you have any issues with constipation? sometimes    21. Preferred Pharmacy for Pre Prescription     Scheduling Details    Which Colonoscopy Prep was Sent?:   Procedure Scheduled: EGD  Provider/Surgeon: Ar  Date of Procedure: 8/16  [place] [facility]  Caller (Please ask for phone number if not scheduled by patient): [facility]      Sedation Type: CS  Conscious Sedation- Needs  for 6 hours after the procedure  MAC/General-Needs  for 24 hours after procedure    Pre-op Required at [facility] [place] and OR for MAC sedation:   (if yes advise  patient they will need a pre-op prior to procedure)      Is patient on blood thinners? -n (If yes- inform patient to follow up with PCP or provider for follow up instructions)     Informed patient they will need an adult  y  Cannot take any type of public or medical transportation alone    Informed Patient of COVID Test Requirement y    Confirmed Nurse will call to complete assessment y    Additional comments:

## 2021-08-10 NOTE — NURSING NOTE
Patient labs drawn in clinic via venipuncture. Patient tolerated well. See flowsheet for details.      Jesi Chandler, IVANNA on 8/10/2021 at 9:46 AM

## 2021-08-10 NOTE — PROGRESS NOTES
HEMATOLOGY CONSULTATION    Abimael Martinez   : 1990   MRN: [id number]  Date of service: Aug 10, 2021     REASON FOR CONSULTATION:  We are asked by Sammy to evaluate Abimael Martinez for concern for splenomegaly.    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 During childhood, he reports frequent infections with flulike symptoms for usually 3-5 days which would self resolve. He also had multiple ear, throat, and sinus infections. He has had a history of splenomegaly at age 8-9 years which normalized after some time. He again developed some pains and splenomegaly after starting anxiety medications in the summer prior to starting college. In his 20s he developed testicular and pelvis pain and reports having  evaluation and eventually finding a protruded disk, for which he has had a diskectomy. He was offered a spinal fusion but has deferred that. His testicular and pelvis pain has now recurred and he says his spinal issues have also recurred causing the referred pain to the testicle and pelvis. He has been using medical cannabis for chronic pain.    He developed COVID-19 in 2020, with high-grade fevers for 7 days, nausea, weakness, a little cough, no shortness of breath. Treated supportively as an outpatient, then recovered, finally after 4 weeks. During the illness he had LUQ pressure sometimes radiating into the left shoulder region. Conitnued to have some LUQ  pressure after recovery, and continues to have this and some RUQ/R lower chest pressure on a daily basis. It seems worse with deep breathing, with lying down. It is relatively constant. It is not sharp.    He has had negative HIV, hepatitis B, and hepatitis C testing. KATHRYN, RF, anti-CCP, SSA/SSB, ANCA titers, ESR, CRP, C3/C4 are all normal. IgG slightly elevated at 1631, 1593 earlier., IgM, IgA, IgE normal. CT chest abdomen pelvis with no adenopathy and only a borderline enlarged spleen (12.9cm). Peripheral flow was normal with polytypic B cells, no T cell abnormalities. UA, chlamydia and gonorrhea were negative.     He has not had COVID antibody testing. He has not gotten vaccinated. He gets ill with the flu shot so he has been hesitant.    He denies fevers, chills, night sweats, weight loss    REVIEW OF SYSTEMS  A 10 point review of systems was performed and was otherwise negative except as mentioned in the HPI and as below:  Eyes: Blurry and red eyes that he denies are related to the marijuana. No dry eyes. Multiple visits to the eye doctor have not yielded a diagnosis.  Ears: Normal hearing.  Throat: no swallowing issues, does have some post nasal gtt. He had mildly decreased taste and smell after COVID which have resolved.   Neck: Possible salivary gland issues.   Lymph: Some swelling in the inguinal and axillary areas intermittently, not caught on imaging in past.  Resp: no shortness of breath on exertion, mild asthma, PFTs are relatively normal. He is a former smoker. Still smokes marijuana primarily. History of vaping marijuana.  Allergies: allergy symptoms and post nasal gtt year round, worse in summer. Saw Dr. Harris and about 1/3 things tested were reactive. He gets hives on his face intermittently. Also has multiple reactions to medications.  CV: recent Ziopatch with no events and normal recent echo. Does have some hypertension.  GI: he has had terrible acid reflux and is unable to eat acidic and hot  foods. He takes famotidine prn for this. He has not had an endoscopy. Denies jaundice. He has had hemorrhoids and hemorrhoidal bleeding s/p banding.  Rheum: He does get intermittent joint pains including in his fingers. He notices swelling in his fingers at times. Because of his back issues he is only able to walk with his dog. He is unable to go golfing or more high impact activities.  Neuro: Mild headache which he thinks is related to sinuses and allergies. No seizures. He gets numbness and tingling often in his hands and feet. There may be some anxiety component to this.  : No dysuria, nocturia. ED is better after addressing his spinal issues.  Psych: He continues to be anxious, no depression.  Skin: Tattoes, no rashes right now    PAST MEDICAL HISTORY  Past Medical History:   Diagnosis Date     Acute right otitis media 1/8/2013     Anxiety      Depression      Drug abuse (H)      Gonococcal urethritis 02/05/2016     Urethritis 5/20/2013     Problem list name updated by automated process. Provider to review     PAST SURGICAL HISTORY  Past Surgical History:   Procedure Laterality Date     BACK SURGERY  04/05/2018     SOCIAL HISTORY  Social History     Socioeconomic History     Marital status: Single     Spouse name: Not on file     Number of children: Not on file     Years of education: Not on file     Highest education level: Not on file   Occupational History     Not on file   Tobacco Use     Smoking status: Former Smoker     Packs/day: 0.50     Years: 6.00     Pack years: 3.00     Types: Cigarettes     Quit date: 3/12/2018     Years since quitting: 3.4     Smokeless tobacco: Never Used     Tobacco comment: second hand smoke exposure   Substance and Sexual Activity     Alcohol use: Yes     Comment: once a week     Drug use: Yes     Types: Marijuana     Comment: pot once a month, ectasy  As of 11/7/2016 he only smokes pot occasionally     Sexual activity: Yes     Partners: Female     Birth control/protection:  Condom   Other Topics Concern     Parent/sibling w/ CABG, MI or angioplasty before 65F 55M? No   Social History Narrative     Not on file     Social Determinants of Health     Financial Resource Strain:      Difficulty of Paying Living Expenses:    Food Insecurity:      Worried About Running Out of Food in the Last Year:      Ran Out of Food in the Last Year:    Transportation Needs:      Lack of Transportation (Medical):      Lack of Transportation (Non-Medical):    Physical Activity:      Days of Exercise per Week:      Minutes of Exercise per Session:    Stress:      Feeling of Stress :    Social Connections:      Frequency of Communication with Friends and Family:      Frequency of Social Gatherings with Friends and Family:      Attends Voodoo Services:      Active Member of Clubs or Organizations:      Attends Club or Organization Meetings:      Marital Status:    Intimate Partner Violence:      Fear of Current or Ex-Partner:      Emotionally Abused:      Physically Abused:      Sexually Abused:      FAMILY HISTORY  Family History   Problem Relation Age of Onset     Unknown/Adopted Mother      Unknown/Adopted Father      Unknown/Adopted Maternal Grandmother      Unknown/Adopted Maternal Grandfather      Unknown/Adopted Paternal Grandmother      Unknown/Adopted Paternal Grandfather      Unknown/Adopted Brother    He was adopted from [facility].    MEDICATIONS  Current Outpatient Medications   Medication     albuterol (PROAIR HFA/PROVENTIL HFA/VENTOLIN HFA) 108 (90 Base) MCG/ACT inhaler     ALPRAZolam (XANAX) 2 MG tablet     amphetamine-dextroamphetamine (ADDERALL) 20 MG tablet     azelastine (ASTELIN) 0.1 % nasal spray     cetirizine (ZYRTEC) 10 MG tablet     clotrimazole (LOTRIMIN) 1 % external cream     famotidine (PEPCID) 40 MG tablet     fluticasone (FLOVENT HFA) 220 MCG/ACT Inhaler     Hyoscyamine Sulfate 0.375 MG TBCR     mometasone (ELOCON) 0.1 % external ointment     montelukast (SINGULAIR) 10  MG tablet     oxyCODONE-acetaminophen (PERCOCET)  MG per tablet     pimecrolimus (ELIDEL) 1 % external cream     PROTOPIC 0.1 % external ointment     sildenafil (REVATIO) 20 MG tablet     tacrolimus (PROTOPIC) 0.03 % external ointment     No current facility-administered medications for this visit.     ALLERGIES  Allergies   Allergen Reactions     Cymbalta      Weight gain and fatigue     Duloxetine Other (See Comments) and Fatigue     enlarged spleen and weight gain     Paroxetine Other (See Comments), Fatigue and GI Disturbance     Weight gain, Spleen issues     Seasonal Allergies      Vicodin [Hydrocodone-Acetaminophen]      PHYSICAL EXAM  There were no vitals taken for this visit.   Wt Readings from Last 10 Encounters:   05/25/21 117.9 kg (260 lb)   05/06/21 118.3 kg (260 lb 12.8 oz)   02/09/21 111.6 kg (246 lb)   01/13/21 111.6 kg (246 lb)   12/23/20 111.6 kg (246 lb)   12/04/20 113.4 kg (250 lb)   11/30/20 113.1 kg (249 lb 6.4 oz)   09/08/20 116.2 kg (256 lb 3.2 oz)   02/11/20 120.2 kg (265 lb)   02/06/20 121.2 kg (267 lb 3.2 oz)     Constitutional: Awake, alert, cooperative, in NAD.  Eyes: PERRL, EOMI, sclera clear, conjunctiva normal.  ENT: Normocephalic, without obvious abnormality, oral pharynx with moist mucus membranes, normal tonsils  Lymph: shotty lymph nodes in neck, some tenderness under angle of jaw, No axillary LN or easily palpable inguinal LAD.  Respiratory: Non-labored breathing, good air exchange, clear to auscultation bilaterally, no crackles or wheezing.  Cardiovascular: RRR, no murmur noted.  GI: + bowel sounds, soft, non-distended, non-tender, no masses palpated, no hepatosplenomegaly.  Skin: No concerning lesions or rash on exposed areas.  Musculoskeletal: No edema darrell LEs.  Neurologic: Awake, alert & oriented x3.  Normal strength and sensation throughout.  Psych: appropriate affect    LABS  Lab Results   Component Value Date    WBC 6.0 01/14/2021    WBC 6.6 10/25/2020    WBC 6.0  03/12/2018    HGB 15.1 01/14/2021    HGB 15.7 10/25/2020    HGB 15.2 03/12/2018     01/14/2021     10/25/2020     03/12/2018    ANEU 3.0 01/14/2021    ANEU 2.5 03/12/2018    ANEU 3.8 10/27/2016    ALYM 2.2 01/14/2021    ALYM 2.7 03/12/2018    ALYM 2.7 10/27/2016    MCV 84 01/14/2021    MCV 84 10/25/2020    MCV 83 03/12/2018     Lab Results   Component Value Date     01/14/2021     10/25/2020     03/12/2018    POTASSIUM 4.0 01/14/2021    POTASSIUM 3.7 10/25/2020    POTASSIUM 4.0 03/12/2018    CO2 31 01/14/2021    CO2 29 10/25/2020    CO2 25 03/12/2018    BUN 10 01/14/2021    BUN 15 10/25/2020    BUN 15 03/12/2018    CR 1.16 01/14/2021    CR 1.11 10/25/2020    CR 1.15 03/19/2018    LAUREN 9.6 01/14/2021    LAUREN 9.5 10/25/2020    LAUREN 9.5 03/12/2018    PHOS 4.0 03/13/2014     01/14/2021     Lab Results   Component Value Date    AST 16 03/12/2021    AST 13 01/14/2021    AST 19 10/25/2020    ALT 38 03/12/2021    ALT 32 01/14/2021    ALT 58 10/25/2020    ALKPHOS 102 03/12/2021    ALKPHOS 96 01/14/2021    ALKPHOS 104 10/25/2020     IMAGING  As mentioned above in HPI    IMPRESSION AND PLAN  Abimael Martinez is a 31 year old man with a history of obesity, lumbar degenerative disc disease s/p surgery, hemorrhoids s/p banding, recurrent episodes of fatigue, body aches, and flulike symptoms, mild splenomegaly, and 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    1. LUQ/RUQ pressure, body aches. Reviewed CT imaging and we don't have a good anatomical cause for this. His spleen is normal for his size and is normal in consistency on the CT. His liver is normal appearing, and he does not have any abnormal features in his lungs. We wonder if this may be a manifestation of long COVID, perhaps a long term exacerbation of his autoimmune/allergic phenotype.  2. Eye symptoms (blurry, red)  3. Intermittent  "swelling in cervical, axillary, and inguinal areas without documented lymphadenopathy  4. Severe acid reflux symptoms  5. Allergies and allergic symptoms  6. Mild asthma  7. IgG4 subclass elevation (260), with borderline high IgG. IgG4 related disease has multiple protean manifestations which can include eye symptoms, salivary gland symptoms, pancreatitis, neuritis, and many other manifestations. Diagnosis is usually made via biopsy, and he has not had anything to biopsy thus far. IgG4 elevation otherwise is nonspecific with multiple other causes which can relate to multiple chronic inflammatory conditions.  8. Splenomegaly in the past but most recent imaging and exam are normal    Recommend:  - Check amylase and lipase to screen for possible inflammation from IgG4 related disease  - Check T and B cell subsets to screen for cellular immunodeficiencies  - Recheck IgG subclasses to see if this is still persistent  - Check EBV, CMV PCR and COVID antibodies  - Referral to GI for upper endoscopy. Perhaps something like H. Pylori could lead to some of his symptoms.   - Recommended COVID vaccination, not just for better protection from COVID reinfection but also potential treatment for a \"long COVID\"-type issue here.    We will have him return in a few weeks via virtual visit.    We had a long discussion with the patient about the diagnostic possibilities and workup. All questions were answered to his satisfaction.    Patient seen and discussed with Dr. Lowery who agrees with the assessment and plan above.     Thank you for allowing us to participate in the care of this patient. Please do not hesitate to contact us if there are any concerns or questions.     Jose De Jesus Torres MD  Hematology/Oncology Fellow  Attending  The patient was seen and examined by me separate from the resident/fellow provider.The note above reflects my assessment and plan. I have personally reviewed today's labs,vital and radiology results. The points of " care that were added by me are:  Royer is very interesting. I cannot really pin a unifying diagnosis on him but do not think he had IgG4 disease.Spleen is not that big, no cytopenia or elevated CRP/ESR,Still wonder if hyergammaglobulinemia is viral induced possibly COVID.Agree with work up especially the endoscopy to r/o H pylori.  I spent a total of 60 minutes face to face with Abimael Martinez during today's office visit. Over 50% of this time was spent counseling the patient and coordinating the care regarding IgG4 and 15 minutes preparing to see the patient and  care coordination   Milton Lowery M.D.  [phone]

## 2021-08-10 NOTE — LETTER
8/10/2021         RE: Abimael Martinez  [address]  [facility] [address]        Dear Colleague,    Thank you for referring your patient, Abimael Martinez, to the [facility] [place]. Please see a copy of my visit note below.    HEMATOLOGY CONSULTATION    Abimael Martinez   : 1990   MRN: [id number]  Date of service: Aug 10, 2021     REASON FOR CONSULTATION:  We are asked by Sammy to evaluate Abimael Martinez for concern for splenomegaly.    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 During childhood, he reports frequent infections with flulike symptoms for usually 3-5 days which would self resolve. He also had multiple ear, throat, and sinus infections. He has had a history of splenomegaly at age 8-9 years which normalized after some time. He again developed some pains and splenomegaly after starting anxiety medications in the summer prior to starting college. In his 20s he developed testicular and pelvis pain and reports having  evaluation and eventually finding a protruded disk, for which he has had a diskectomy. He was offered a spinal fusion but has deferred that. His testicular and pelvis pain has now recurred and he says his spinal issues have also recurred causing the referred pain to the testicle and pelvis. He has been using medical cannabis for chronic pain.    He developed COVID-19 in November  2020, with high-grade fevers for 7 days, nausea, weakness, a little cough, no shortness of breath. Treated supportively as an outpatient, then recovered, finally after 4 weeks. During the illness he had LUQ pressure sometimes radiating into the left shoulder region. Conitnued to have some LUQ pressure after recovery, and continues to have this and some RUQ/R lower chest pressure on a daily basis. It seems worse with deep breathing, with lying down. It is relatively constant. It is not sharp.    He has had negative HIV, hepatitis B, and hepatitis C testing. KATHRYN, RF, anti-CCP, SSA/SSB, ANCA titers, ESR, CRP, C3/C4 are all normal. IgG slightly elevated at 1631, 1593 earlier., IgM, IgA, IgE normal. CT chest abdomen pelvis with no adenopathy and only a borderline enlarged spleen (12.9cm). Peripheral flow was normal with polytypic B cells, no T cell abnormalities. UA, chlamydia and gonorrhea were negative.     He has not had COVID antibody testing. He has not gotten vaccinated. He gets ill with the flu shot so he has been hesitant.    He denies fevers, chills, night sweats, weight loss    REVIEW OF SYSTEMS  A 10 point review of systems was performed and was otherwise negative except as mentioned in the HPI and as below:  Eyes: Blurry and red eyes that he denies are related to the marijuana. No dry eyes. Multiple visits to the eye doctor have not yielded a diagnosis.  Ears: Normal hearing.  Throat: no swallowing issues, does have some post nasal gtt. He had mildly decreased taste and smell after COVID which have resolved.   Neck: Possible salivary gland issues.   Lymph: Some swelling in the inguinal and axillary areas intermittently, not caught on imaging in past.  Resp: no shortness of breath on exertion, mild asthma, PFTs are relatively normal. He is a former smoker. Still smokes marijuana primarily. History of vaping marijuana.  Allergies: allergy symptoms and post nasal gtt year round, worse in summer. Saw   Bigliardi and about 1/3 things tested were reactive. He gets hives on his face intermittently. Also has multiple reactions to medications.  CV: recent Ziopatch with no events and normal recent echo. Does have some hypertension.  GI: he has had terrible acid reflux and is unable to eat acidic and hot foods. He takes famotidine prn for this. He has not had an endoscopy. Denies jaundice. He has had hemorrhoids and hemorrhoidal bleeding s/p banding.  Rheum: He does get intermittent joint pains including in his fingers. He notices swelling in his fingers at times. Because of his back issues he is only able to walk with his dog. He is unable to go golfing or more high impact activities.  Neuro: Mild headache which he thinks is related to sinuses and allergies. No seizures. He gets numbness and tingling often in his hands and feet. There may be some anxiety component to this.  : No dysuria, nocturia. ED is better after addressing his spinal issues.  Psych: He continues to be anxious, no depression.  Skin: Tattoes, no rashes right now    PAST MEDICAL HISTORY  Past Medical History:   Diagnosis Date     Acute right otitis media 1/8/2013     Anxiety      Depression      Drug abuse (H)      Gonococcal urethritis 02/05/2016     Urethritis 5/20/2013     Problem list name updated by automated process. Provider to review     PAST SURGICAL HISTORY  Past Surgical History:   Procedure Laterality Date     BACK SURGERY  04/05/2018     SOCIAL HISTORY  Social History     Socioeconomic History     Marital status: Single     Spouse name: Not on file     Number of children: Not on file     Years of education: Not on file     Highest education level: Not on file   Occupational History     Not on file   Tobacco Use     Smoking status: Former Smoker     Packs/day: 0.50     Years: 6.00     Pack years: 3.00     Types: Cigarettes     Quit date: 3/12/2018     Years since quitting: 3.4     Smokeless tobacco: Never Used     Tobacco comment: second  hand smoke exposure   Substance and Sexual Activity     Alcohol use: Yes     Comment: once a week     Drug use: Yes     Types: Marijuana     Comment: pot once a month, ectasy  As of 11/7/2016 he only smokes pot occasionally     Sexual activity: Yes     Partners: Female     Birth control/protection: Condom   Other Topics Concern     Parent/sibling w/ CABG, MI or angioplasty before 65F 55M? No   Social History Narrative     Not on file     Social Determinants of Health     Financial Resource Strain:      Difficulty of Paying Living Expenses:    Food Insecurity:      Worried About Running Out of Food in the Last Year:      Ran Out of Food in the Last Year:    Transportation Needs:      Lack of Transportation (Medical):      Lack of Transportation (Non-Medical):    Physical Activity:      Days of Exercise per Week:      Minutes of Exercise per Session:    Stress:      Feeling of Stress :    Social Connections:      Frequency of Communication with Friends and Family:      Frequency of Social Gatherings with Friends and Family:      Attends Sikh Services:      Active Member of Clubs or Organizations:      Attends Club or Organization Meetings:      Marital Status:    Intimate Partner Violence:      Fear of Current or Ex-Partner:      Emotionally Abused:      Physically Abused:      Sexually Abused:      FAMILY HISTORY  Family History   Problem Relation Age of Onset     Unknown/Adopted Mother      Unknown/Adopted Father      Unknown/Adopted Maternal Grandmother      Unknown/Adopted Maternal Grandfather      Unknown/Adopted Paternal Grandmother      Unknown/Adopted Paternal Grandfather      Unknown/Adopted Brother    He was adopted from [facility].    MEDICATIONS  Current Outpatient Medications   Medication     albuterol (PROAIR HFA/PROVENTIL HFA/VENTOLIN HFA) 108 (90 Base) MCG/ACT inhaler     ALPRAZolam (XANAX) 2 MG tablet     amphetamine-dextroamphetamine (ADDERALL) 20 MG tablet     azelastine (ASTELIN) 0.1 %  nasal spray     cetirizine (ZYRTEC) 10 MG tablet     clotrimazole (LOTRIMIN) 1 % external cream     famotidine (PEPCID) 40 MG tablet     fluticasone (FLOVENT HFA) 220 MCG/ACT Inhaler     Hyoscyamine Sulfate 0.375 MG TBCR     mometasone (ELOCON) 0.1 % external ointment     montelukast (SINGULAIR) 10 MG tablet     oxyCODONE-acetaminophen (PERCOCET)  MG per tablet     pimecrolimus (ELIDEL) 1 % external cream     PROTOPIC 0.1 % external ointment     sildenafil (REVATIO) 20 MG tablet     tacrolimus (PROTOPIC) 0.03 % external ointment     No current facility-administered medications for this visit.     ALLERGIES  Allergies   Allergen Reactions     Cymbalta      Weight gain and fatigue     Duloxetine Other (See Comments) and Fatigue     enlarged spleen and weight gain     Paroxetine Other (See Comments), Fatigue and GI Disturbance     Weight gain, Spleen issues     Seasonal Allergies      Vicodin [Hydrocodone-Acetaminophen]      PHYSICAL EXAM  There were no vitals taken for this visit.   Wt Readings from Last 10 Encounters:   05/25/21 117.9 kg (260 lb)   05/06/21 118.3 kg (260 lb 12.8 oz)   02/09/21 111.6 kg (246 lb)   01/13/21 111.6 kg (246 lb)   12/23/20 111.6 kg (246 lb)   12/04/20 113.4 kg (250 lb)   11/30/20 113.1 kg (249 lb 6.4 oz)   09/08/20 116.2 kg (256 lb 3.2 oz)   02/11/20 120.2 kg (265 lb)   02/06/20 121.2 kg (267 lb 3.2 oz)     Constitutional: Awake, alert, cooperative, in NAD.  Eyes: PERRL, EOMI, sclera clear, conjunctiva normal.  ENT: Normocephalic, without obvious abnormality, oral pharynx with moist mucus membranes, normal tonsils  Lymph: shotty lymph nodes in neck, some tenderness under angle of jaw, No axillary LN or easily palpable inguinal LAD.  Respiratory: Non-labored breathing, good air exchange, clear to auscultation bilaterally, no crackles or wheezing.  Cardiovascular: RRR, no murmur noted.  GI: + bowel sounds, soft, non-distended, non-tender, no masses palpated, no  hepatosplenomegaly.  Skin: No concerning lesions or rash on exposed areas.  Musculoskeletal: No edema darrell LEs.  Neurologic: Awake, alert & oriented x3.  Normal strength and sensation throughout.  Psych: appropriate affect    LABS  Lab Results   Component Value Date    WBC 6.0 01/14/2021    WBC 6.6 10/25/2020    WBC 6.0 03/12/2018    HGB 15.1 01/14/2021    HGB 15.7 10/25/2020    HGB 15.2 03/12/2018     01/14/2021     10/25/2020     03/12/2018    ANEU 3.0 01/14/2021    ANEU 2.5 03/12/2018    ANEU 3.8 10/27/2016    ALYM 2.2 01/14/2021    ALYM 2.7 03/12/2018    ALYM 2.7 10/27/2016    MCV 84 01/14/2021    MCV 84 10/25/2020    MCV 83 03/12/2018     Lab Results   Component Value Date     01/14/2021     10/25/2020     03/12/2018    POTASSIUM 4.0 01/14/2021    POTASSIUM 3.7 10/25/2020    POTASSIUM 4.0 03/12/2018    CO2 31 01/14/2021    CO2 29 10/25/2020    CO2 25 03/12/2018    BUN 10 01/14/2021    BUN 15 10/25/2020    BUN 15 03/12/2018    CR 1.16 01/14/2021    CR 1.11 10/25/2020    CR 1.15 03/19/2018    LAUREN 9.6 01/14/2021    LAUREN 9.5 10/25/2020    LAUREN 9.5 03/12/2018    PHOS 4.0 03/13/2014     01/14/2021     Lab Results   Component Value Date    AST 16 03/12/2021    AST 13 01/14/2021    AST 19 10/25/2020    ALT 38 03/12/2021    ALT 32 01/14/2021    ALT 58 10/25/2020    ALKPHOS 102 03/12/2021    ALKPHOS 96 01/14/2021    ALKPHOS 104 10/25/2020     IMAGING  As mentioned above in HPI    IMPRESSION AND PLAN  Abimael Martinez is a 31 year old man with a history of obesity, lumbar degenerative disc disease s/p surgery, hemorrhoids s/p banding, recurrent episodes of fatigue, body aches, and flulike symptoms, mild splenomegaly, and 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    1. LUQ/RUQ pressure, body aches. Reviewed CT imaging and we don't have a good anatomical cause for this. His spleen is  "normal for his size and is normal in consistency on the CT. His liver is normal appearing, and he does not have any abnormal features in his lungs. We wonder if this may be a manifestation of long COVID, perhaps a long term exacerbation of his autoimmune/allergic phenotype.  2. Eye symptoms (blurry, red)  3. Intermittent swelling in cervical, axillary, and inguinal areas without documented lymphadenopathy  4. Severe acid reflux symptoms  5. Allergies and allergic symptoms  6. Mild asthma  7. IgG4 subclass elevation (260), with borderline high IgG. IgG4 related disease has multiple protean manifestations which can include eye symptoms, salivary gland symptoms, pancreatitis, neuritis, and many other manifestations. Diagnosis is usually made via biopsy, and he has not had anything to biopsy thus far. IgG4 elevation otherwise is nonspecific with multiple other causes which can relate to multiple chronic inflammatory conditions.  8. Splenomegaly in the past but most recent imaging and exam are normal    Recommend:  - Check amylase and lipase to screen for possible inflammation from IgG4 related disease  - Check T and B cell subsets to screen for cellular immunodeficiencies  - Recheck IgG subclasses to see if this is still persistent  - Check EBV, CMV PCR and COVID antibodies  - Referral to GI for upper endoscopy. Perhaps something like H. Pylori could lead to some of his symptoms.   - Recommended COVID vaccination, not just for better protection from COVID reinfection but also potential treatment for a \"long COVID\"-type issue here.    We will have him return in a few weeks via virtual visit.    We had a long discussion with the patient about the diagnostic possibilities and workup. All questions were answered to his satisfaction.    Patient seen and discussed with Dr. Lowery who agrees with the assessment and plan above.     Thank you for allowing us to participate in the care of this patient. Please do not hesitate " to contact us if there are any concerns or questions.     Jose De Jesus Torres MD  Hematology/Oncology Fellow  Attending  The patient was seen and examined by me separate from the resident/fellow provider.The note above reflects my assessment and plan. I have personally reviewed today's labs,vital and radiology results. The points of care that were added by me are:  Royer is very interesting. I cannot really pin a unifying diagnosis on him but do not think he had IgG4 disease.Spleen is not that big, no cytopenia or elevated CRP/ESR,Still wonder if hyergammaglobulinemia is viral induced possibly COVID.Agree with work up especially the endoscopy to r/o H pylori.  I spent a total of 60 minutes face to face with Abimael Martinez during today's office visit. Over 50% of this time was spent counseling the patient and coordinating the care regarding IgG4 and 15 minutes preparing to see the patient and  care coordination   Milton Lowery M.D.  [phone]        Again, thank you for allowing me to participate in the care of your patient.        Sincerely,        Milton Lowery MD

## 2021-08-11 ENCOUNTER — TELEPHONE (OUTPATIENT)
Dept: GASTROENTEROLOGY | Facility: CLINIC | Age: 31
End: 2021-08-11

## 2021-08-11 ENCOUNTER — PATIENT OUTREACH (OUTPATIENT)
Dept: ONCOLOGY | Facility: CLINIC | Age: 31
End: 2021-08-11

## 2021-08-11 LAB
ALBUMIN SERPL ELPH-MCNC: 4.5 G/DL (ref 3.7–5.1)
ALPHA1 GLOB SERPL ELPH-MCNC: 0.3 G/DL (ref 0.2–0.4)
ALPHA2 GLOB SERPL ELPH-MCNC: 0.8 G/DL (ref 0.5–0.9)
B-GLOBULIN SERPL ELPH-MCNC: 1.1 G/DL (ref 0.6–1)
CMV DNA SPEC NAA+PROBE-ACNC: NOT DETECTED IU/ML
EBV DNA # SPEC NAA+PROBE: NOT DETECTED COPIES/ML
GAMMA GLOB SERPL ELPH-MCNC: 1.5 G/DL (ref 0.7–1.6)
M PROTEIN SERPL ELPH-MCNC: 0 G/DL
PROT PATTERN SERPL ELPH-IMP: ABNORMAL
SARS-COV-2 AB SERPL IA-ACNC: 181 U/ML
SARS-COV-2 AB SERPL-IMP: POSITIVE

## 2021-08-11 NOTE — PROGRESS NOTES
"Received voice mail message from pt asking about lab results he received via [employer].  He stated some were really low and some were really high so he is concerned.  When spoke with pt explained that there are additional tests still pending.  Stated Dr. Lowery may want to wait until those results are back so he can look at all results together.  Pt stated that he understood that.  Reported that he did \"google\" CD4:CD8 Ratio and was concerned about some of the information that he read (such as possible viral infection).  RN to call him back if any additional information.     Introduced self and role of RN Care Coordinator at [facility]. Provided my contact information, [facility] phone number (which has options to talk with a Nurse available 24/7 - triage and RNCC via this option during business hours).      Reviewed appropriate use of [employer], not to be used for symptom reporting.     Reviewed Auth to Discuss PHI form.  At this time, pt does not want to complete form.     Learning assessment: completed.    Warm transferred pt to clinic coordinator to schedule return visit with Dr. Sebastián Lowery.     "

## 2021-08-11 NOTE — TELEPHONE ENCOUNTER
Writer reviewed pre-assessment questions with patient prior to upcoming EGD on 8.16.2021.      Covid test scheduled: 8.13.2021    Arrival time: 0800    Facility location: [facility]    Sedation type: CS.  Pt has had conscious sedation in the past with a colonoscopy.  Pt states that he remembers being more awake and watching the procedure.  Pt did verbalize that he is concerned with his anxiety that he may have an issue with the conscious sedation with EGD.  RN informed pt that perhaps procedure should be changed to MAC.  Pt states I'll just talk to them when I get there and if we need to change things we can.  RN informed pt that if he did not tolerate CS that he would not be able to proceed with MAC they would r/s pt's procedure. Pt was also offered an alternative appt time for the same day with Dr. Nayak for a EGD MAC appt.  Pt declined and would like to stay with his current EGD with conscious sedation appointment.    Implantable devices? No    Blood thinners/Antiplatelet medication? No    Reviewed EGD prep instructions with patient.      Designated  policy reviewed with patient.     Patient verbalized understanding.  No further questions or concerns.    Mary Jane Ellis RN

## 2021-08-13 ENCOUNTER — LAB (OUTPATIENT)
Dept: LAB | Facility: CLINIC | Age: 31
End: 2021-08-13

## 2021-08-13 ENCOUNTER — OFFICE VISIT (OUTPATIENT)
Dept: UROLOGY | Facility: CLINIC | Age: 31
End: 2021-08-13
Attending: DERMATOLOGY
Payer: COMMERCIAL

## 2021-08-13 VITALS
DIASTOLIC BLOOD PRESSURE: 93 MMHG | HEART RATE: 77 BPM | WEIGHT: 255 LBS | HEIGHT: 72 IN | SYSTOLIC BLOOD PRESSURE: 148 MMHG | BODY MASS INDEX: 34.54 KG/M2

## 2021-08-13 DIAGNOSIS — T81.89XD SUTURE SINUS, SUBSEQUENT ENCOUNTER: Primary | ICD-10-CM

## 2021-08-13 DIAGNOSIS — Z11.59 ENCOUNTER FOR SCREENING FOR OTHER VIRAL DISEASES: ICD-10-CM

## 2021-08-13 DIAGNOSIS — N48.89 PEARLY PENILE PAPULES: ICD-10-CM

## 2021-08-13 LAB — SARS-COV-2 RNA RESP QL NAA+PROBE: NEGATIVE

## 2021-08-13 PROCEDURE — 99214 OFFICE O/P EST MOD 30 MIN: CPT | Performed by: UROLOGY

## 2021-08-13 PROCEDURE — U0005 INFEC AGEN DETEC AMPLI PROBE: HCPCS | Performed by: INTERNAL MEDICINE

## 2021-08-13 ASSESSMENT — MIFFLIN-ST. JEOR: SCORE: 2154.67

## 2021-08-13 ASSESSMENT — PAIN SCALES - GENERAL: PAINLEVEL: MILD PAIN (3)

## 2021-08-13 NOTE — PROGRESS NOTES
"Chief Complaint   Patient presents with     Consult For     \"skin issues for a while on penis, thought it was dermatitis, on the underside there are tiny little bumps, which get hard and painful during erection, tiny hole on skin where circumcison was done\"     - Rosaline HERNANDEZ, EMT  [facility]    "

## 2021-08-13 NOTE — LETTER
"8/13/2021       RE: Abimael Martinez  [address]  [facility] [address]     Dear Colleague,    Thank you for referring your patient, Abimael Martinez, to the [facility] [place] [facility]. Please see a copy of my visit note below.    Chief Complaint   Patient presents with     Consult For     \"skin issues for a while on penis, thought it was dermatitis, on the underside there are tiny little bumps, which get hard and painful during erection, tiny hole on skin where circumcison was done\"     - Rosaline HERNANDEZ, EMT  Urology Clinic      HPI:  Abimael Martinez is a 31 year old male being seen for follow-up penile concerns.  He saw Dr. Smith in our clinic in October 2019.    Patient has some pearly penile papules, these are uncomfortable with an erection.  He has seen dermatology, who recommended referral back to urology for fulguration.    Patient also has some suture tracts on the shaft skin, from previous circumcision, and these are uncomfortable and unsightly for him.    Past Medical History:   Diagnosis Date     Acute right otitis media 1/8/2013     Anxiety      Depression      Drug abuse (H)      Gonococcal urethritis 02/05/2016     Urethritis 5/20/2013     Problem list name updated by automated process. Provider to review      Patient Active Problem List   Diagnosis     Anxiety     CARDIOVASCULAR SCREENING; LDL GOAL LESS THAN 160     High risk sexual behavior     Low back pain     Essential hypertension     Internal hemorrhoids which bleed     Obesity, Class I, BMI 30-34.9     Attention deficit hyperactivity disorder (ADHD), combined type     AYALA (generalized anxiety disorder)     OCD (obsessive compulsive disorder)     Drug-induced erectile dysfunction     Mild persistent asthma without complication     Lumbosacral radiculopathy     Dyslipidemia     Elevated serum creatinine     Former smoker     Morbid obesity (H)    "     Current Outpatient Medications   Medication     albuterol (PROAIR HFA/PROVENTIL HFA/VENTOLIN HFA) 108 (90 Base) MCG/ACT inhaler     ALPRAZolam (XANAX) 2 MG tablet     amphetamine-dextroamphetamine (ADDERALL) 20 MG tablet     azelastine (ASTELIN) 0.1 % nasal spray     cetirizine (ZYRTEC) 10 MG tablet     clotrimazole (LOTRIMIN) 1 % external cream     famotidine (PEPCID) 40 MG tablet     fluticasone (FLOVENT HFA) 220 MCG/ACT Inhaler     Hyoscyamine Sulfate 0.375 MG TBCR     mometasone (ELOCON) 0.1 % external ointment     montelukast (SINGULAIR) 10 MG tablet     oxyCODONE-acetaminophen (PERCOCET)  MG per tablet     pimecrolimus (ELIDEL) 1 % external cream     PROTOPIC 0.1 % external ointment     sildenafil (REVATIO) 20 MG tablet     tacrolimus (PROTOPIC) 0.03 % external ointment     No current facility-administered medications for this visit.        Exam:  Physical Exam  BP (!) 148/93   Pulse 77   Ht 1.829 m (6')   Wt 115.7 kg (255 lb)   BMI 34.58 kg/m      General: Alert, oriented, nad.  Pleasant and conversant.  Eyes: anicteric, EOMI.  Pulse: regular  Resps: normal, non-labored.  Abdomen:  Nondistended.  Neurological - no tremors  Skin - no discoloration/ lesions noted   exam   Phallus circumcised.  There are 5-7 pearly penile papules at the ventral glans area.  Nontender.  There are 2-3 suture tracks seen on the ventral shaft skin also.  Testes ++, anodular, nontender.  Vas and epididymis present and normal bilaterally    Review of Imaging:  The following imaging exams were independently viewed and interpreted by me and discussed with patient:  N/A     Review of Labs:  The following labs were reviewed by me:  Recent Results (from the past 720 hour(s))   T cell subset profile    Collection Time: 08/10/21  9:45 AM   Result Value Ref Range    CD3% Total T Cells 72 49 - 84 %    Absolute CD3, Total T Cells 1,622 603-2,990 cells/uL    CD4% Ridge T Cells 30 28 - 63 %    Absolute CD4, [place] T Cells 669  441-2,156 cells/uL    CD8% Suppressor T Cells 41 (H) 10 - 40 %    Absolute CD8, Suppressor T Cells 932 125-1,312 cells/uL    CD4:CD8 Ratio 0.72 (L) 1.40 - 2.60    T Cell Comment     IgG Subclasses    Collection Time: 08/10/21  9:45 AM   Result Value Ref Range    Immunoglobulin G 1,626 (H) 610-1,616 mg/dL    IgG1 891 382 - 929 mg/dL    IgG2 447 242 - 700 mg/dL    IgG3 85 22 - 176 mg/dL    IgG4 268 (H) 4 - 86 mg/dL    Subclasses Percent 104 %   Amylase    Collection Time: 08/10/21  9:45 AM   Result Value Ref Range    Amylase 44 30 - 110 U/L   Lipase    Collection Time: 08/10/21  9:45 AM   Result Value Ref Range    Lipase 159 73 - 393 U/L   CMV DNA quantification    Collection Time: 08/10/21  9:45 AM    Specimen: Arm, Right; Blood   Result Value Ref Range    CMV DNA IU/mL Not Detected Not Detected IU/mL   EBV DNA PCR Quantitative Whole Blood    Collection Time: 08/10/21  9:45 AM   Result Value Ref Range    EBV DNA Copies/mL Not Detected Not Detected copies/mL   COVID-19 Marlon RBD Autumn & Titer Reflex    Collection Time: 08/10/21  9:45 AM   Result Value Ref Range    SARS-CoV-2 Marlon Ab, Interp, S Positive (A) Negative    SARS-CoV-2 Marlon Ab, Quant, S 181 (H) <0.80 U/mL    Patient's Race White     Patient's Ethnicity Not     Total Protein, Serum    Collection Time: 08/10/21  9:45 AM   Result Value Ref Range    Total Protein Serum 8.2 6.8 - 8.8 g/dL   Protein Electrophoresis, Serum    Collection Time: 08/10/21  9:45 AM   Result Value Ref Range    Albumin 4.5 3.7 - 5.1 g/dL    Alpha 1 0.3 0.2 - 0.4 g/dL    Alpha 2 0.8 0.5 - 0.9 g/dL    Beta Globulin 1.1 (H) 0.6 - 1.0 g/dL    Gamma Globulin 1.5 0.7 - 1.6 g/dL    Monoclonal Peak 0.0 <=0.0 g/dL    ELP Interpretation       Essentially normal electrophoretic pattern. No obvious monoclonal proteins seen. Pathologic significance requires clinical correlation. REN Velázquez M.D., Ph.D., Pathologist ().   SARS-COV2 (COVID-19) Virus RT-PCR    Collection Time:  08/13/21  7:48 AM    Specimen: Nasopharyngeal; Swab   Result Value Ref Range    SARS CoV2 PCR Negative Negative         Assessment & Plan   1. Pearly penile papules  a. Discussed these can be cauterized, but YAG or CO2 laser is the most modern method of ablating these.  I do not use topical lasers, recommended he check back with dermatology on this.  2. Suture tracts on the penile shaft  a. Discussed these would be managed with surgical excision if he would like.    Zeus Sarah MD  [facility] [place]      ==========================      Additional Coding Information:    Problems:  3 -- one acute uncomplicated illness or injury    Data Reviewed  Labs listed were reviewed.    Level of risk:  3 -- low risk (e.g., OTC medication or observation, minor surgery without risks)    Time spent:  30 minutes spent on the date of the encounter doing chart review, history and exam, documentation and further activities per the note

## 2021-08-14 NOTE — PROGRESS NOTES
HPI:  Abimael Martinez is a 31 year old male being seen for follow-up penile concerns.  He saw Dr. Smith in our clinic in October 2019.    Patient has some pearly penile papules, these are uncomfortable with an erection.  He has seen dermatology, who recommended referral back to urology for fulguration.    Patient also has some suture tracts on the shaft skin, from previous circumcision, and these are uncomfortable and unsightly for him.    Past Medical History:   Diagnosis Date     Acute right otitis media 1/8/2013     Anxiety      Depression      Drug abuse (H)      Gonococcal urethritis 02/05/2016     Urethritis 5/20/2013     Problem list name updated by automated process. Provider to review      Patient Active Problem List   Diagnosis     Anxiety     CARDIOVASCULAR SCREENING; LDL GOAL LESS THAN 160     High risk sexual behavior     Low back pain     Essential hypertension     Internal hemorrhoids which bleed     Obesity, Class I, BMI 30-34.9     Attention deficit hyperactivity disorder (ADHD), combined type     AYALA (generalized anxiety disorder)     OCD (obsessive compulsive disorder)     Drug-induced erectile dysfunction     Mild persistent asthma without complication     Lumbosacral radiculopathy     Dyslipidemia     Elevated serum creatinine     Former smoker     Morbid obesity (H)        Current Outpatient Medications   Medication     albuterol (PROAIR HFA/PROVENTIL HFA/VENTOLIN HFA) 108 (90 Base) MCG/ACT inhaler     ALPRAZolam (XANAX) 2 MG tablet     amphetamine-dextroamphetamine (ADDERALL) 20 MG tablet     azelastine (ASTELIN) 0.1 % nasal spray     cetirizine (ZYRTEC) 10 MG tablet     clotrimazole (LOTRIMIN) 1 % external cream     famotidine (PEPCID) 40 MG tablet     fluticasone (FLOVENT HFA) 220 MCG/ACT Inhaler     Hyoscyamine Sulfate 0.375 MG TBCR     mometasone (ELOCON) 0.1 % external ointment     montelukast (SINGULAIR) 10 MG tablet     oxyCODONE-acetaminophen (PERCOCET)  MG per tablet      pimecrolimus (ELIDEL) 1 % external cream     PROTOPIC 0.1 % external ointment     sildenafil (REVATIO) 20 MG tablet     tacrolimus (PROTOPIC) 0.03 % external ointment     No current facility-administered medications for this visit.        Exam:  Physical Exam  BP (!) 148/93   Pulse 77   Ht 1.829 m (6')   Wt 115.7 kg (255 lb)   BMI 34.58 kg/m      General: Alert, oriented, nad.  Pleasant and conversant.  Eyes: anicteric, EOMI.  Pulse: regular  Resps: normal, non-labored.  Abdomen:  Nondistended.  Neurological - no tremors  Skin - no discoloration/ lesions noted   exam   Phallus circumcised.  There are 5-7 pearly penile papules at the ventral glans area.  Nontender.  There are 2-3 suture tracks seen on the ventral shaft skin also.  Testes ++, anodular, nontender.  Vas and epididymis present and normal bilaterally    Review of Imaging:  The following imaging exams were independently viewed and interpreted by me and discussed with patient:  N/A     Review of Labs:  The following labs were reviewed by me:  Recent Results (from the past 720 hour(s))   T cell subset profile    Collection Time: 08/10/21  9:45 AM   Result Value Ref Range    CD3% Total T Cells 72 49 - 84 %    Absolute CD3, Total T Cells 1,622 603-2,990 cells/uL    CD4% [place] T Cells 30 28 - 63 %    Absolute CD4, [place] T Cells 669 441-2,156 cells/uL    CD8% Suppressor T Cells 41 (H) 10 - 40 %    Absolute CD8, Suppressor T Cells 932 125-1,312 cells/uL    CD4:CD8 Ratio 0.72 (L) 1.40 - 2.60    T Cell Comment     IgG Subclasses    Collection Time: 08/10/21  9:45 AM   Result Value Ref Range    Immunoglobulin G 1,626 (H) 610-1,616 mg/dL    IgG1 891 382 - 929 mg/dL    IgG2 447 242 - 700 mg/dL    IgG3 85 22 - 176 mg/dL    IgG4 268 (H) 4 - 86 mg/dL    Subclasses Percent 104 %   Amylase    Collection Time: 08/10/21  9:45 AM   Result Value Ref Range    Amylase 44 30 - 110 U/L   Lipase    Collection Time: 08/10/21  9:45 AM   Result Value Ref Range    Lipase 159 73  - 393 U/L   CMV DNA quantification    Collection Time: 08/10/21  9:45 AM    Specimen: Arm, Right; Blood   Result Value Ref Range    CMV DNA IU/mL Not Detected Not Detected IU/mL   EBV DNA PCR Quantitative Whole Blood    Collection Time: 08/10/21  9:45 AM   Result Value Ref Range    EBV DNA Copies/mL Not Detected Not Detected copies/mL   COVID-19 Marlon RBD Autumn & Titer Reflex    Collection Time: 08/10/21  9:45 AM   Result Value Ref Range    SARS-CoV-2 Marlon Ab, Interp, S Positive (A) Negative    SARS-CoV-2 Marlon Ab, Quant, S 181 (H) <0.80 U/mL    Patient's Race White     Patient's Ethnicity Not     Total Protein, Serum    Collection Time: 08/10/21  9:45 AM   Result Value Ref Range    Total Protein Serum 8.2 6.8 - 8.8 g/dL   Protein Electrophoresis, Serum    Collection Time: 08/10/21  9:45 AM   Result Value Ref Range    Albumin 4.5 3.7 - 5.1 g/dL    Alpha 1 0.3 0.2 - 0.4 g/dL    Alpha 2 0.8 0.5 - 0.9 g/dL    Beta Globulin 1.1 (H) 0.6 - 1.0 g/dL    Gamma Globulin 1.5 0.7 - 1.6 g/dL    Monoclonal Peak 0.0 <=0.0 g/dL    ELP Interpretation       Essentially normal electrophoretic pattern. No obvious monoclonal proteins seen. Pathologic significance requires clinical correlation. REN Velázquez M.D., Ph.D., Pathologist ().   SARS-COV2 (COVID-19) Virus RT-PCR    Collection Time: 08/13/21  7:48 AM    Specimen: Nasopharyngeal; Swab   Result Value Ref Range    SARS CoV2 PCR Negative Negative         Assessment & Plan   1. Pearly penile papules  a. Discussed these can be cauterized, but YAG or CO2 laser is the most modern method of ablating these.  I do not use topical lasers, recommended he check back with dermatology on this.  2. Suture tracts on the penile shaft  a. Discussed these would be managed with surgical excision if he would like.    Zeus Sarah MD  [facility] UROLOGY [facility]      ==========================      Additional Coding Information:    Problems:  3 -- one acute  uncomplicated illness or injury    Data Reviewed  Labs listed were reviewed.    Level of risk:  3 -- low risk (e.g., OTC medication or observation, minor surgery without risks)    Time spent:  30 minutes spent on the date of the encounter doing chart review, history and exam, documentation and further activities per the note

## 2021-08-16 ENCOUNTER — CARE COORDINATION (OUTPATIENT)
Dept: GASTROENTEROLOGY | Facility: CLINIC | Age: 31
End: 2021-08-16

## 2021-08-16 ENCOUNTER — HOSPITAL ENCOUNTER (OUTPATIENT)
Facility: AMBULATORY SURGERY CENTER | Age: 31
Discharge: HOME | End: 2021-08-16
Attending: INTERNAL MEDICINE | Admitting: INTERNAL MEDICINE
Payer: COMMERCIAL

## 2021-08-16 ENCOUNTER — NURSE TRIAGE (OUTPATIENT)
Dept: NURSING | Facility: CLINIC | Age: 31
End: 2021-08-16

## 2021-08-16 VITALS
BODY MASS INDEX: 35.21 KG/M2 | HEART RATE: 76 BPM | HEIGHT: 72 IN | SYSTOLIC BLOOD PRESSURE: 144 MMHG | OXYGEN SATURATION: 95 % | RESPIRATION RATE: 14 BRPM | DIASTOLIC BLOOD PRESSURE: 88 MMHG | WEIGHT: 260 LBS | TEMPERATURE: 97.5 F

## 2021-08-16 DIAGNOSIS — K21.00 GASTROESOPHAGEAL REFLUX DISEASE WITH ESOPHAGITIS WITHOUT HEMORRHAGE: Primary | ICD-10-CM

## 2021-08-16 DIAGNOSIS — K21.00 GASTROESOPHAGEAL REFLUX DISEASE WITH ESOPHAGITIS WITHOUT HEMORRHAGE: ICD-10-CM

## 2021-08-16 LAB — UPPER GI ENDOSCOPY: NORMAL

## 2021-08-16 PROCEDURE — 43251 EGD REMOVE LESION SNARE: CPT | Mod: 59

## 2021-08-16 PROCEDURE — 43254 EGD ENDO MUCOSAL RESECTION: CPT

## 2021-08-16 PROCEDURE — 88305 TISSUE EXAM BY PATHOLOGIST: CPT | Mod: 26 | Performed by: PATHOLOGY

## 2021-08-16 PROCEDURE — 43239 EGD BIOPSY SINGLE/MULTIPLE: CPT | Mod: 59

## 2021-08-16 PROCEDURE — 88342 IMHCHEM/IMCYTCHM 1ST ANTB: CPT | Mod: 26 | Performed by: PATHOLOGY

## 2021-08-16 RX ORDER — FENTANYL CITRATE 50 UG/ML
INJECTION, SOLUTION INTRAMUSCULAR; INTRAVENOUS PRN
Status: DISCONTINUED | OUTPATIENT
Start: 2021-08-16 | End: 2021-08-16 | Stop reason: HOSPADM

## 2021-08-16 RX ORDER — OMEPRAZOLE 40 MG/1
40 CAPSULE, DELAYED RELEASE ORAL
Qty: 60 CAPSULE | Refills: 3 | Status: SHIPPED | OUTPATIENT
Start: 2021-08-16 | End: 2022-06-28

## 2021-08-16 RX ORDER — PROCHLORPERAZINE MALEATE 10 MG
10 TABLET ORAL EVERY 6 HOURS PRN
Status: CANCELLED | OUTPATIENT
Start: 2021-08-16

## 2021-08-16 RX ORDER — ONDANSETRON 2 MG/ML
4 INJECTION INTRAMUSCULAR; INTRAVENOUS EVERY 6 HOURS PRN
Status: CANCELLED | OUTPATIENT
Start: 2021-08-16

## 2021-08-16 RX ORDER — NALOXONE HYDROCHLORIDE 0.4 MG/ML
0.2 INJECTION, SOLUTION INTRAMUSCULAR; INTRAVENOUS; SUBCUTANEOUS
Status: CANCELLED | OUTPATIENT
Start: 2021-08-16

## 2021-08-16 RX ORDER — NALOXONE HYDROCHLORIDE 0.4 MG/ML
0.4 INJECTION, SOLUTION INTRAMUSCULAR; INTRAVENOUS; SUBCUTANEOUS
Status: CANCELLED | OUTPATIENT
Start: 2021-08-16

## 2021-08-16 RX ORDER — LIDOCAINE 40 MG/G
CREAM TOPICAL
Status: DISCONTINUED | OUTPATIENT
Start: 2021-08-16 | End: 2021-08-17 | Stop reason: HOSPADM

## 2021-08-16 RX ORDER — FLUMAZENIL 0.1 MG/ML
0.2 INJECTION, SOLUTION INTRAVENOUS
Status: CANCELLED | OUTPATIENT
Start: 2021-08-16 | End: 2021-08-16

## 2021-08-16 RX ORDER — SIMETHICONE
LIQUID (ML) MISCELLANEOUS PRN
Status: DISCONTINUED | OUTPATIENT
Start: 2021-08-16 | End: 2021-08-16 | Stop reason: HOSPADM

## 2021-08-16 RX ORDER — ONDANSETRON 4 MG/1
4 TABLET, ORALLY DISINTEGRATING ORAL EVERY 6 HOURS PRN
Status: CANCELLED | OUTPATIENT
Start: 2021-08-16

## 2021-08-16 RX ORDER — ONDANSETRON 2 MG/ML
4 INJECTION INTRAMUSCULAR; INTRAVENOUS
Status: DISCONTINUED | OUTPATIENT
Start: 2021-08-16 | End: 2021-08-17 | Stop reason: HOSPADM

## 2021-08-16 RX ORDER — OMEPRAZOLE 40 MG/1
40 CAPSULE, DELAYED RELEASE ORAL
Qty: 60 CAPSULE | Refills: 3 | Status: SHIPPED | OUTPATIENT
Start: 2021-08-16 | End: 2021-08-16

## 2021-08-16 ASSESSMENT — MIFFLIN-ST. JEOR: SCORE: 2177.35

## 2021-08-16 NOTE — H&P
Abimael Martinez  [id number]  male  31 year old      Reason for procedure/surgery: LUQ pain    Patient Active Problem List   Diagnosis     Anxiety     CARDIOVASCULAR SCREENING; LDL GOAL LESS THAN 160     High risk sexual behavior     Low back pain     Essential hypertension     Internal hemorrhoids which bleed     Obesity, Class I, BMI 30-34.9     Attention deficit hyperactivity disorder (ADHD), combined type     AYALA (generalized anxiety disorder)     OCD (obsessive compulsive disorder)     Drug-induced erectile dysfunction     Mild persistent asthma without complication     Lumbosacral radiculopathy     Dyslipidemia     Elevated serum creatinine     Former smoker     Morbid obesity (H)       Past Surgical History:    Past Surgical History:   Procedure Laterality Date     BACK SURGERY  04/05/2018       Past Medical History:   Past Medical History:   Diagnosis Date     Acute right otitis media 1/8/2013     Anxiety      Depression      Drug abuse (H)      Gonococcal urethritis 02/05/2016     Urethritis 5/20/2013     Problem list name updated by automated process. Provider to review       Social History:   Social History     Tobacco Use     Smoking status: Former Smoker     Packs/day: 0.50     Years: 6.00     Pack years: 3.00     Types: Cigarettes     Quit date: 3/12/2018     Years since quitting: 3.4     Smokeless tobacco: Never Used     Tobacco comment: second hand smoke exposure   Substance Use Topics     Alcohol use: Yes     Comment: once a week       Family History:   Family History   Problem Relation Age of Onset     Unknown/Adopted Mother      Unknown/Adopted Father      Unknown/Adopted Maternal Grandmother      Unknown/Adopted Maternal Grandfather      Unknown/Adopted Paternal Grandmother      Unknown/Adopted Paternal Grandfather      Unknown/Adopted Brother        Allergies:   Allergies   Allergen Reactions     Cymbalta      Weight gain and fatigue     Duloxetine Other (See Comments) and Fatigue      enlarged spleen and weight gain     Paroxetine Other (See Comments), Fatigue and GI Disturbance     Weight gain, Spleen issues     Seasonal Allergies      Vicodin [Hydrocodone-Acetaminophen]        Active Medications:   Current Outpatient Medications   Medication Sig Dispense Refill     albuterol (PROAIR HFA/PROVENTIL HFA/VENTOLIN HFA) 108 (90 Base) MCG/ACT inhaler Inhale 2 puffs into the lungs every 4 hours as needed for wheezing 54 g 1     ALPRAZolam (XANAX) 2 MG tablet Take 1 tablet (2 mg) by mouth 3 times daily as needed for anxiety 30 tablet 0     amphetamine-dextroamphetamine (ADDERALL) 20 MG tablet Take 1 tablet (20 mg) by mouth 3 times daily 90 tablet 0     azelastine (ASTELIN) 0.1 % nasal spray Spray 2 sprays into both nostrils 2 times daily 30 mL 11     cetirizine (ZYRTEC) 10 MG tablet Take 1 tablet (10 mg) by mouth daily as needed for allergies 90 tablet 3     clotrimazole (LOTRIMIN) 1 % external cream Apply topically 2 times daily 60 g 1     famotidine (PEPCID) 40 MG tablet TAKE 1 TABLET(40 MG) BY MOUTH AT BEDTIME 90 tablet 3     fluticasone (FLOVENT HFA) 220 MCG/ACT Inhaler Inhale 2 puffs into the lungs 2 times daily Please give patient spacer 1 Inhaler 3     Hyoscyamine Sulfate 0.375 MG TBCR Take 1 capful by mouth 3 times daily as needed 90 tablet 1     mometasone (ELOCON) 0.1 % external ointment If necessary use 3-4 days in a row or 2xweekly on eczematous lesions 45 g 2     montelukast (SINGULAIR) 10 MG tablet Take 1 tablet (10 mg) by mouth At Bedtime 90 tablet 0     oxyCODONE-acetaminophen (PERCOCET)  MG per tablet TAKE 1/2 TO 1 TABLET BY MOUTH THREE TIMES DAILY AS NEEDED FOR SEVERE PAIN       pimecrolimus (ELIDEL) 1 % external cream Apply topically 2 times daily 30 g 11     PROTOPIC 0.1 % external ointment APPLY TWICE DAILY AS NEEDED FOR RASH ON PENIS.       sildenafil (REVATIO) 20 MG tablet Take 1-2 tablets (20-40 mg) by mouth daily as needed As needed for prevention of erectile dysfunction  30 tablet 2     tacrolimus (PROTOPIC) 0.03 % external ointment Apply topically 2 times daily Put on skin lesions 2x daily 60 g 1       Systemic Review:   CONSTITUTIONAL: NEGATIVE for fever, chills, change in weight  ENT/MOUTH: NEGATIVE for ear, mouth and throat problems  RESP: NEGATIVE for significant cough or SOB  CV: NEGATIVE for chest pain, palpitations or peripheral edema    Physical Examination:   Vital Signs: BP (!) 150/90   Temp 97.1  F (36.2  C) (Temporal)   Resp 16   Ht 1.829 m (6')   Wt 117.9 kg (260 lb)   SpO2 100%   BMI 35.26 kg/m    GENERAL: healthy, alert and no distress  NECK: no adenopathy, no asymmetry, masses, or scars  RESP: lungs clear to auscultation - no rales, rhonchi or wheezes  CV: regular rate and rhythm, normal S1 S2, no S3 or S4, no murmur, click or rub, no peripheral edema and peripheral pulses strong  ABDOMEN: soft, nontender, no hepatosplenomegaly, no masses and bowel sounds normal  MS: no gross musculoskeletal defects noted, no edema    Plan: Appropriate to proceed as scheduled.      Phillip Jyoner MD  8/16/2021    PCP:  Jamison Lora

## 2021-08-16 NOTE — TELEPHONE ENCOUNTER
today Pt had  ESOPHAGOGASTRODUODENOSCOPY, WITH BIOPSY AND POLYPECTOMY      Pt wanting to know if it is okay to take an Oxycodone after having the Procedure for his back.      He does not want to be an interaction with the medications they gave him this morning for his Endoscopy.     Please call the Pt back @ [phone] for further assistance.     Thank you     Maya Peña RN  Central Triage Red Flags/Med Refills        Reason for Disposition    Caller has NON-URGENT medication question about med that PCP prescribed and triager unable to answer question    Additional Information    Negative: Drug overdose and triager unable to answer question    Negative: Caller requesting information unrelated to medicine    Negative: Caller requesting a prescription for Strep throat and has a positive culture result    Negative: Rash while taking a medication or within 3 days of stopping it    Negative: Immunization reaction suspected    Negative: Asthma and having symptoms of asthma (cough, wheezing, etc.)    Negative: Breastfeeding questions about mother's medicines and diet    Negative: MORE THAN A DOUBLE DOSE of a prescription or over-the-counter (OTC) drug    Negative: DOUBLE DOSE (an extra dose or lesser amount) of over-the-counter (OTC) drug and any symptoms (e.g., dizziness, nausea, pain, sleepiness)    Negative: DOUBLE DOSE (an extra dose or lesser amount) of prescription drug and any symptoms (e.g., dizziness, nausea, pain, sleepiness)    Negative: Took another person's prescription drug    Negative: DOUBLE DOSE (an extra dose or lesser amount) of prescription drug and NO symptoms (Exception: a double dose of antibiotics)    Negative: Diabetes drug error or overdose (e.g., took wrong type of insulin or took extra dose)    Negative: Caller has medication question about med not prescribed by PCP and triager unable to answer question (e.g., compatibility with other med, storage)    Negative: Request for URGENT new  prescription or refill of 'essential' medication (i.e., likelihood of harm to patient if not taken) and triager unable to fill per department policy    Negative: Prescription not at pharmacy and was prescribed today by PCP    Negative: Pharmacy calling with prescription questions and triager unable to answer question    Negative: Caller has urgent medication question about med that PCP prescribed and triager unable to answer question    Protocols used: MEDICATION QUESTION CALL-A-OH

## 2021-08-18 LAB
PATH REPORT.COMMENTS IMP SPEC: NORMAL
PATH REPORT.FINAL DX SPEC: NORMAL
PATH REPORT.GROSS SPEC: NORMAL
PATH REPORT.MICROSCOPIC SPEC OTHER STN: NORMAL
PATH REPORT.RELEVANT HX SPEC: NORMAL
PHOTO IMAGE: NORMAL

## 2021-08-20 ENCOUNTER — CARE COORDINATION (OUTPATIENT)
Dept: GASTROENTEROLOGY | Facility: CLINIC | Age: 31
End: 2021-08-20

## 2021-08-20 DIAGNOSIS — K31.7 GASTRIC POLYPS: Primary | ICD-10-CM

## 2021-08-29 ENCOUNTER — MYC REFILL (OUTPATIENT)
Dept: FAMILY MEDICINE | Facility: CLINIC | Age: 31
End: 2021-08-29

## 2021-08-29 DIAGNOSIS — F90.2 ATTENTION DEFICIT HYPERACTIVITY DISORDER (ADHD), COMBINED TYPE: ICD-10-CM

## 2021-08-30 RX ORDER — DEXTROAMPHETAMINE SACCHARATE, AMPHETAMINE ASPARTATE, DEXTROAMPHETAMINE SULFATE AND AMPHETAMINE SULFATE 5; 5; 5; 5 MG/1; MG/1; MG/1; MG/1
20 TABLET ORAL 3 TIMES DAILY
Qty: 90 TABLET | Refills: 0 | Status: SHIPPED | OUTPATIENT
Start: 2021-08-30 | End: 2021-09-28

## 2021-09-01 ENCOUNTER — VIRTUAL VISIT (OUTPATIENT)
Dept: TRANSPLANT | Facility: CLINIC | Age: 31
End: 2021-09-01
Attending: INTERNAL MEDICINE
Payer: COMMERCIAL

## 2021-09-01 DIAGNOSIS — R76.8 HIGH TOTAL IGG: ICD-10-CM

## 2021-09-01 DIAGNOSIS — K29.80 DUODENITIS DETERMINED BY BIOPSY: ICD-10-CM

## 2021-09-01 DIAGNOSIS — G93.31 POST VIRAL SYNDROME: Primary | ICD-10-CM

## 2021-09-01 PROCEDURE — 99213 OFFICE O/P EST LOW 20 MIN: CPT | Mod: GC | Performed by: INTERNAL MEDICINE

## 2021-09-01 NOTE — LETTER
2021         RE: Abimael Martinez  [address]  [facility] [address]        Dear Colleague,    Thank you for referring your patient, Abimael Martinez, to the [facility] [employer]. Please see a copy of my visit note below.    Abimael is a 31 year old who is being evaluated via a billable video visit.      How would you like to obtain your AVS? MyChart  If the video visit is dropped, the invitation should be resent by: Text to cell phone: [phone]   Will anyone else be joining your video visit? No      Video Start Time: 3:15 PM    Video-Visit Details    Type of service:  Video Visit    Video End Time: 3:35 PM    Originating Location (pt. 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 During childhood, he reports frequent infections with flulike symptoms for usually 3-5 days which would self resolve. He also had multiple ear, throat, and sinus infections. He has had a history of splenomegaly at age 8-9 years which normalized after some time. He again developed some pains and splenomegaly after starting anxiety medications in the summer  prior to starting college. In his 20s he developed testicular and pelvis pain and reports having  evaluation and eventually finding a protruded disk, for which he has had a diskectomy. He was offered a spinal fusion but has deferred that. His testicular and pelvis pain has now recurred and he says his spinal issues have also recurred causing the referred pain to the testicle and pelvis. He has been using medical cannabis for chronic pain.    He developed COVID-19 in November 2020, with high-grade fevers for 7 days, nausea, weakness, a little cough, no shortness of breath. Treated supportively as an outpatient, then recovered, finally after 4 weeks. During the illness he had LUQ pressure sometimes radiating into the left shoulder region. Conitnued to have some LUQ pressure after recovery, and continues to have this and some RUQ/R lower chest pressure on a daily basis.     He has had negative HIV, hepatitis B, and hepatitis C testing. KATHRYN, RF, anti-CCP, SSA/SSB, ANCA titers, ESR, CRP, C3/C4 are all normal. IgG slightly elevated at 1631, 1593 earlier., IgM, IgA, IgE normal. CT chest abdomen pelvis with no adenopathy and only a borderline enlarged spleen (12.9cm). Peripheral flow was normal with polytypic B cells, no T cell abnormalities. UA, chlamydia and gonorrhea were negative.     INTERVAL HISTORY:  He continues to have some intermittent pain in the spleen in LUQ radiating to shoulder. Has pain also in RUQ . No fever or chills.Working regularly  Has considerable anxiety. He had an UGI endoscopy showing duodenitis and prescribed protonix, H Pylori was negative. Has not started protonix but taking famotidine PRN. Acid reflux not as bad as it used to be.    He endorses a strong atopic history with asthma, seasonal allergies, multiple medication allergies, and hiveds. He underwent patch testing with Dr. Harris on 4/14/21, which was largely inconclusive.  Thought to be related to atopic predisposition    REVIEW OF  SYSTEMS  See HPI    PAST MEDICAL HISTORY  Past Medical History:   Diagnosis Date     Acute right otitis media 1/8/2013     Anxiety      Depression      Drug abuse (H)      Gonococcal urethritis 02/05/2016     Urethritis 5/20/2013     Problem list name updated by automated process. Provider to review     PAST SURGICAL HISTORY  Past Surgical History:   Procedure Laterality Date     BACK SURGERY  04/05/2018     ESOPHAGOSCOPY, GASTROSCOPY, DUODENOSCOPY (EGD), COMBINED N/A 8/16/2021    Procedure: ESOPHAGOGASTRODUODENOSCOPY, WITH BIOPSY AND POLYPECTOMY;  Surgeon: Phillip Joyner MD;  Location: [facility] OR     SOCIAL HISTORY: Reviewed for relevant changes.  He was adopted from [facility].    FAMILY HISTORY: Reviewed No family Hx available due to adoption    MEDICATIONS: Reviewed    ALLERGIES  Allergies   Allergen Reactions     Cymbalta      Weight gain and fatigue     Duloxetine Other (See Comments) and Fatigue     enlarged spleen and weight gain     Paroxetine Other (See Comments), Fatigue and GI Disturbance     Weight gain, Spleen issues     Seasonal Allergies      Vicodin [Hydrocodone-Acetaminophen]      PHYSICAL EXAM  There were no vitals taken for this visit.   Wt Readings from Last 10 Encounters:   08/16/21 117.9 kg (260 lb)   08/13/21 115.7 kg (255 lb)   08/10/21 115.7 kg (255 lb)   05/25/21 117.9 kg (260 lb)   05/06/21 118.3 kg (260 lb 12.8 oz)   02/09/21 111.6 kg (246 lb)   01/13/21 111.6 kg (246 lb)   12/23/20 111.6 kg (246 lb)   12/04/20 113.4 kg (250 lb)   11/30/20 113.1 kg (249 lb 6.4 oz)     Gen: well appearing male, No apparent distress  HEENT: NCAT, MMM  RESP: No increased WOB, equal bilateral chest rise  ABD: non-distended  EXT: moving all 4 extremities spontaneously  NEURO: AOx3 no focal neuro deficits    Full exam not performed as this was a video visit    LABS    Component      Latest Ref Rng & Units 8/10/2021   CD3 Mature T      49 - 84 % 72   Absolute CD3      603-2,990 cells/uL 1,622    CD4 [place] T      28 - 63 % 30   Absolute CD4      441-2,156 cells/uL 669   CD8 Suppressor T      10 - 40 % 41 (H)   Absolute CD8      125-1,312 cells/uL 932   CD4:CD8 Ratio      1.40 - 2.60 0.72 (L)   Albumin Fraction      3.7 - 5.1 g/dL 4.5   Alpha 1 Fraction      0.2 - 0.4 g/dL 0.3   Alpha 2 Fraction      0.5 - 0.9 g/dL 0.8   Beta Fraction      0.6 - 1.0 g/dL 1.1 (H)   Gamma Fraction      0.7 - 1.6 g/dL 1.5   Monoclonal Peak      <=0.0 g/dL 0.0   ELP Interpretation:       Essentially normal electrophoretic pattern. No obvious monoclonal proteins seen. Pathologic significance requires clinical correlation. REN Velázquez M.D., Ph.D., Pathologist ().   IGG      610-1,616 mg/dL 1,626 (H)   IgG1      382 - 929 mg/dL 891   IgG2      242 - 700 mg/dL 447   IgG3      22 - 176 mg/dL 85   IgG4      4 - 86 mg/dL 268 (H)   Subclasses, Percent      % 104   SARS-CoV-2 Marlon Ab, Interp, S      Negative Positive (A)   SARS-CoV-2 Marlon Ab, Quant, S      <0.80 U/mL 181 (H)   Patient's Race       White   Patient's Ethnicity       Not    Amylase      30 - 110 U/L 44   Lipase      73 - 393 U/L 159   CMV Quant IU/mL      Not Detected IU/mL Not Detected   EBV DNA Copies/mL      Not Detected copies/mL Not Detected   Total Protein Serum      6.8 - 8.8 g/dL 8.2       IMAGING/PROCEDURES:    EGD 9/1/21  Final Diagnosis   A. DUODENAL BIOPSY:  - Mild chronic duodenitis, see comment  - Negative for giardia and celiac disease    B. GASTRIC, GASTRIC ANTRUM NODULE, BIOPSY:  - Gastric oxyntic/antral mucosa with mild chronic inflammation and reactive antral gastropathy  - Intestinal (goblet cell) metaplasia  - Negative for H. Pylori on immunohistochemical staining  - Negative for dysplasia     C. GASTRIC BODY POLYP X2:   - Hyperplastic polyps (2)  - Negative for intestinal metaplasia and dysplasia      D. GASTRIC BIOPSY:  - Gastric oxyntic/antral mucosa with reactive antral gastropathy  - Negative for H. 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 Referred due to concerns for possible IgG4 disease.    1. Chronic intermittent LUQ/RUQ abdominal pressure, body aches  2. Eye symptoms (blurry, red)  3. Intermittent swelling in cervical, axillary, and inguinal areas without documented lymphadenopathy  4. Chronic duodenitis related to acid reflux  5. Seasonal allergies/urticaria  6. Mild asthma  7. History of splenomegaly  7. IgG4 subclass elevation (260), with borderline high IgG      Patient has undergone extensive workup. Unlikely his symptoms are explained by IgG4 disease. CT imaging demonstrated normal spleen for his size without abnormal texture/echogenicity. Liver is within normal limits. No cytopenia, elevated ESR/CRP, amylase or lipase.  No evidence of lymphoma. No evidence of immunodeficiency on lab testing, T cell subset tests largely within normal limits.  SPEP within normal limits, EBV/CMV PCR negative.  The patient underwent upper endoscopy on 8/16/2021, revealing mild chronic duodenitis without evidence of H.  Pylori, Giardia, or celiac disease.    At this time, we believe his symptoms may be a manifestation of long COVID, perhaps a long term exacerbation of his autoimmune/allergic phenotype. Discussed results ands suspicion in detail with the patient.    Recommendations:  - Start PPI course for biopsy confirmed duodenitis - patient has this prescription at home. Recommend follow up with GI following PPI course  - Patient has high SARS-CoV Marlon Ab titers from prior infection.  Will confer with ID specialists about recommendations to vaccinate given evidence of current immunity in this patient. Patient is hesitant given his severe reaction to prior vaccines and the COVID-19 infection itself. We did recommend vaccination to reduce risk of mortality and hospitalization from the infection  - Follow up in 3 months for virtual visit    We had a long discussion with the patient about the diagnostic possibilities and workup. All questions were answered to his satisfaction.    Patient seen and discussed with Dr. Lowery who agrees with the assessment and plan above.     Jackie Elliott MD  PGY-5 Medicine-Dermatology  Pager [phone]      Attending  The patient was seen and examined by me separate from the resident/fellow provider.The note above reflects my assessment and plan. I have personally reviewed today's labs,vital and radiology results. The points of care that were added by me are:  The IgG 4 could be increased due to COVID in my opinion.The pain in LUQ could be related to gastropathy and duodenitis.He should take the protonix daily Will ask COVID experts about whether he needs COVID vaccine with high titer antibody    I spent a total of 20 minutes face to face with Abimael Martinez during today's office visit. Over 50% of this time was spent counseling the patient and coordinating the care regarding IGG4 and COVID  and 10 minutes preparing to see the patient and  care coordination   Milton Lowery M.D.  [phone]              Again, thank you for allowing me to participate in the care of your patient.        Sincerely,        Milton Lowery MD

## 2021-09-01 NOTE — PROGRESS NOTES
Abimael is a 31 year old who is being evaluated via a billable video visit.      How would you like to obtain your AVS? MyChart  If the video visit is dropped, the invitation should be resent by: Text to cell phone: [phone]   Will anyone else be joining your video visit? No      Video Start Time: 3:15 PM    Video-Visit Details    Type of service:  Video Visit    Video End Time: 3:35 PM    Originating Location (pt. 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     Per chart review and discussion with the patient, he reports that he was adopted from [facility] and was treated for TB exposure. During childhood, he reports frequent infections with flulike symptoms for usually 3-5 days which would self resolve. He also had multiple ear, throat, and sinus infections. He has had a history of splenomegaly at age 8-9 years which normalized after some time. He again developed some pains and splenomegaly after starting anxiety medications in the summer prior to starting college. In his 20s he developed testicular and pelvis pain and reports having  evaluation and eventually finding a protruded disk, for which he has had a diskectomy. He was offered a spinal fusion but has deferred that. His testicular and pelvis pain has now recurred and he says  his spinal issues have also recurred causing the referred pain to the testicle and pelvis. He has been using medical cannabis for chronic pain.    He developed COVID-19 in November 2020, with high-grade fevers for 7 days, nausea, weakness, a little cough, no shortness of breath. Treated supportively as an outpatient, then recovered, finally after 4 weeks. During the illness he had LUQ pressure sometimes radiating into the left shoulder region. Conitnued to have some LUQ pressure after recovery, and continues to have this and some RUQ/R lower chest pressure on a daily basis.     He has had negative HIV, hepatitis B, and hepatitis C testing. KATHRYN, RF, anti-CCP, SSA/SSB, ANCA titers, ESR, CRP, C3/C4 are all normal. IgG slightly elevated at 1631, 1593 earlier., IgM, IgA, IgE normal. CT chest abdomen pelvis with no adenopathy and only a borderline enlarged spleen (12.9cm). Peripheral flow was normal with polytypic B cells, no T cell abnormalities. UA, chlamydia and gonorrhea were negative.     INTERVAL HISTORY:  He continues to have some intermittent pain in the spleen in LUQ radiating to shoulder. Has pain also in RUQ . No fever or chills.Working regularly  Has considerable anxiety. He had an UGI endoscopy showing duodenitis and prescribed protonix, H Pylori was negative. Has not started protonix but taking famotidine PRN. Acid reflux not as bad as it used to be.    He endorses a strong atopic history with asthma, seasonal allergies, multiple medication allergies, and hiveds. He underwent patch testing with Dr. Harris on 4/14/21, which was largely inconclusive.  Thought to be related to atopic predisposition    REVIEW OF SYSTEMS  See HPI    PAST MEDICAL HISTORY  Past Medical History:   Diagnosis Date     Acute right otitis media 1/8/2013     Anxiety      Depression      Drug abuse (H)      Gonococcal urethritis 02/05/2016     Urethritis 5/20/2013     Problem list name updated by automated process. Provider to review      PAST SURGICAL HISTORY  Past Surgical History:   Procedure Laterality Date     BACK SURGERY  04/05/2018     ESOPHAGOSCOPY, GASTROSCOPY, DUODENOSCOPY (EGD), COMBINED N/A 8/16/2021    Procedure: ESOPHAGOGASTRODUODENOSCOPY, WITH BIOPSY AND POLYPECTOMY;  Surgeon: Phillip Joyner MD;  Location: [facility] OR     SOCIAL HISTORY: Reviewed for relevant changes.  He was adopted from [facility].    FAMILY HISTORY: Reviewed No family Hx available due to adoption    MEDICATIONS: Reviewed    ALLERGIES  Allergies   Allergen Reactions     Cymbalta      Weight gain and fatigue     Duloxetine Other (See Comments) and Fatigue     enlarged spleen and weight gain     Paroxetine Other (See Comments), Fatigue and GI Disturbance     Weight gain, Spleen issues     Seasonal Allergies      Vicodin [Hydrocodone-Acetaminophen]      PHYSICAL EXAM  There were no vitals taken for this visit.   Wt Readings from Last 10 Encounters:   08/16/21 117.9 kg (260 lb)   08/13/21 115.7 kg (255 lb)   08/10/21 115.7 kg (255 lb)   05/25/21 117.9 kg (260 lb)   05/06/21 118.3 kg (260 lb 12.8 oz)   02/09/21 111.6 kg (246 lb)   01/13/21 111.6 kg (246 lb)   12/23/20 111.6 kg (246 lb)   12/04/20 113.4 kg (250 lb)   11/30/20 113.1 kg (249 lb 6.4 oz)     Gen: well appearing male, No apparent distress  HEENT: NCAT, MMM  RESP: No increased WOB, equal bilateral chest rise  ABD: non-distended  EXT: moving all 4 extremities spontaneously  NEURO: AOx3 no focal neuro deficits    Full exam not performed as this was a video visit    LABS    Component      Latest Ref Rng & Units 8/10/2021   CD3 Mature T      49 - 84 % 72   Absolute CD3      603-2,990 cells/uL 1,622   CD4 [place] T      28 - 63 % 30   Absolute CD4      441-2,156 cells/uL 669   CD8 Suppressor T      10 - 40 % 41 (H)   Absolute CD8      125-1,312 cells/uL 932   CD4:CD8 Ratio      1.40 - 2.60 0.72 (L)   Albumin Fraction      3.7 - 5.1 g/dL 4.5   Alpha 1 Fraction      0.2 - 0.4 g/dL 0.3   Alpha 2  Fraction      0.5 - 0.9 g/dL 0.8   Beta Fraction      0.6 - 1.0 g/dL 1.1 (H)   Gamma Fraction      0.7 - 1.6 g/dL 1.5   Monoclonal Peak      <=0.0 g/dL 0.0   ELP Interpretation:       Essentially normal electrophoretic pattern. No obvious monoclonal proteins seen. Pathologic significance requires clinical correlation. REN Velázquez M.D., Ph.D., Pathologist ().   IGG      610-1,616 mg/dL 1,626 (H)   IgG1      382 - 929 mg/dL 891   IgG2      242 - 700 mg/dL 447   IgG3      22 - 176 mg/dL 85   IgG4      4 - 86 mg/dL 268 (H)   Subclasses, Percent      % 104   SARS-CoV-2 Marlon Ab, Interp, S      Negative Positive (A)   SARS-CoV-2 Marlon Ab, Quant, S      <0.80 U/mL 181 (H)   Patient's Race       White   Patient's Ethnicity       Not    Amylase      30 - 110 U/L 44   Lipase      73 - 393 U/L 159   CMV Quant IU/mL      Not Detected IU/mL Not Detected   EBV DNA Copies/mL      Not Detected copies/mL Not Detected   Total Protein Serum      6.8 - 8.8 g/dL 8.2       IMAGING/PROCEDURES:    EGD 9/1/21  Final Diagnosis   A. DUODENAL BIOPSY:  - Mild chronic duodenitis, see comment  - Negative for giardia and celiac disease    B. GASTRIC, GASTRIC ANTRUM NODULE, BIOPSY:  - Gastric oxyntic/antral mucosa with mild chronic inflammation and reactive antral gastropathy  - Intestinal (goblet cell) metaplasia  - Negative for H. Pylori on immunohistochemical staining  - Negative for dysplasia     C. GASTRIC BODY POLYP X2:   - Hyperplastic polyps (2)  - Negative for intestinal metaplasia and dysplasia      D. GASTRIC BIOPSY:  - Gastric oxyntic/antral mucosa with reactive antral gastropathy  - Negative for H. 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 Referred due to concerns for possible IgG4 disease.    1. Chronic intermittent LUQ/RUQ abdominal pressure, body aches  2. Eye symptoms (blurry, red)  3. Intermittent swelling in cervical, axillary, and inguinal areas without documented lymphadenopathy  4. Chronic duodenitis related to acid reflux  5. Seasonal allergies/urticaria  6. Mild asthma  7. History of splenomegaly  7. IgG4 subclass elevation (260), with borderline high IgG      Patient has undergone extensive workup. Unlikely his symptoms are explained by IgG4 disease. CT imaging demonstrated normal spleen for his size without abnormal texture/echogenicity. Liver is within normal limits. No cytopenia, elevated ESR/CRP, amylase or lipase.  No evidence of lymphoma. No evidence of immunodeficiency on lab testing, T cell subset tests largely within normal limits.  SPEP within normal limits, EBV/CMV PCR negative.  The patient underwent upper endoscopy on 8/16/2021, revealing mild chronic duodenitis without evidence of H.  Pylori, Giardia, or celiac disease.    At this time, we believe his symptoms may be a manifestation of long COVID, perhaps a long term exacerbation of his autoimmune/allergic phenotype. Discussed results ands suspicion in detail with the patient.    Recommendations:  - Start PPI course for biopsy confirmed duodenitis - patient has this prescription at home. Recommend follow up with GI following PPI course  - Patient has high SARS-CoV Marlon Ab titers from prior infection. Will confer with ID specialists about recommendations to vaccinate given evidence of current immunity in this patient. Patient is hesitant given his severe reaction to prior vaccines and the COVID-19 infection itself. We did recommend vaccination to reduce risk of mortality and hospitalization from the  infection  - Follow up in 3 months for virtual visit    We had a long discussion with the patient about the diagnostic possibilities and workup. All questions were answered to his satisfaction.    Patient seen and discussed with Dr. Lowery who agrees with the assessment and plan above.     Jackie Elliott MD  PGY-5 Medicine-Dermatology  Pager [phone]      Attending  The patient was seen and examined by me separate from the resident/fellow provider.The note above reflects my assessment and plan. I have personally reviewed today's labs,vital and radiology results. The points of care that were added by me are:  The IgG 4 could be increased due to COVID in my opinion.The pain in LUQ could be related to gastropathy and duodenitis.He should take the protonix daily Will ask COVID experts about whether he needs COVID vaccine with high titer antibody    I spent a total of 20 minutes face to face with Abimael Martinez during today's office visit. Over 50% of this time was spent counseling the patient and coordinating the care regarding IGG4 and COVID  and 10 minutes preparing to see the patient and  care coordination   Milton Lowery M.D.  [phone]

## 2021-09-01 NOTE — PROGRESS NOTES
Abimael is a 31 year old who is being evaluated via a billable video visit.      How would you like to obtain your AVS? MyChart  If the video visit is dropped, the invitation should be resent by: Text to cell phone: [phone]   Will anyone else be joining your video visit? No  {If patient encounters technical issues they should call [phone] :[id number]}    Video Start Time: 3:15 PM    Video-Visit Details    Type of service:  Video Visit    Video End Time: 3:35 PM    Originating Location (pt. 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 During childhood, he reports frequent infections with flulike symptoms for usually 3-5 days which would self resolve. He also had multiple ear, throat, and sinus infections. He has had a history of splenomegaly at age 8-9 years which normalized after some time. He again developed some pains and splenomegaly after starting anxiety medications in the summer prior to starting college. In his 20s he developed testicular and pelvis pain and reports having  evaluation and eventually finding a protruded disk, for which he has had a diskectomy. He was offered a spinal fusion but  has deferred that. His testicular and pelvis pain has now recurred and he says his spinal issues have also recurred causing the referred pain to the testicle and pelvis. He has been using medical cannabis for chronic pain.    He developed COVID-19 in November 2020, with high-grade fevers for 7 days, nausea, weakness, a little cough, no shortness of breath. Treated supportively as an outpatient, then recovered, finally after 4 weeks. During the illness he had LUQ pressure sometimes radiating into the left shoulder region. Conitnued to have some LUQ pressure after recovery, and continues to have this and some RUQ/R lower chest pressure on a daily basis.     He has had negative HIV, hepatitis B, and hepatitis C testing. KATHRYN, RF, anti-CCP, SSA/SSB, ANCA titers, ESR, CRP, C3/C4 are all normal. IgG slightly elevated at 1631, 1593 earlier., IgM, IgA, IgE normal. CT chest abdomen pelvis with no adenopathy and only a borderline enlarged spleen (12.9cm). Peripheral flow was normal with polytypic B cells, no T cell abnormalities. UA, chlamydia and gonorrhea were negative.     INTERVAL HISTORY:  He continues to have some intermittent pain in the spleen in LUQ radiating to shoulder. Has pain also in RUQ . No fever or chills.Working regularly  Has considerable anxiety. He had an UGI endoscopy showing duodenitis and prescribed protonix, H Pylori was negative. Has not started protonix but taking famotidine PRN. Acid reflux not as bad as it used to be.    He endorses a strong atopic history with asthma, seasonal allergies, multiple medication allergies, and hiveds. He underwent patch testing with Dr. Harris on 4/14/21, which was largely inconclusive.  Thought to be related to atopic predisposition    REVIEW OF SYSTEMS  See HPI    PAST MEDICAL HISTORY  Past Medical History:   Diagnosis Date     Acute right otitis media 1/8/2013     Anxiety      Depression      Drug abuse (H)      Gonococcal urethritis 02/05/2016     Urethritis  5/20/2013     Problem list name updated by automated process. Provider to review     PAST SURGICAL HISTORY  Past Surgical History:   Procedure Laterality Date     BACK SURGERY  04/05/2018     ESOPHAGOSCOPY, GASTROSCOPY, DUODENOSCOPY (EGD), COMBINED N/A 8/16/2021    Procedure: ESOPHAGOGASTRODUODENOSCOPY, WITH BIOPSY AND POLYPECTOMY;  Surgeon: Phillip Joyner MD;  Location: [facility] [place] [facility] OR     SOCIAL HISTORY: Reviewed for relevant changes.  He was adopted from [facility].    FAMILY HISTORY: Reviewed No family Hx available due to adoption    MEDICATIONS: Reviewed    ALLERGIES  Allergies   Allergen Reactions     Cymbalta      Weight gain and fatigue     Duloxetine Other (See Comments) and Fatigue     enlarged spleen and weight gain     Paroxetine Other (See Comments), Fatigue and GI Disturbance     Weight gain, Spleen issues     Seasonal Allergies      Vicodin [Hydrocodone-Acetaminophen]      PHYSICAL EXAM  There were no vitals taken for this visit.   Wt Readings from Last 10 Encounters:   08/16/21 117.9 kg (260 lb)   08/13/21 115.7 kg (255 lb)   08/10/21 115.7 kg (255 lb)   05/25/21 117.9 kg (260 lb)   05/06/21 118.3 kg (260 lb 12.8 oz)   02/09/21 111.6 kg (246 lb)   01/13/21 111.6 kg (246 lb)   12/23/20 111.6 kg (246 lb)   12/04/20 113.4 kg (250 lb)   11/30/20 113.1 kg (249 lb 6.4 oz)     Gen: well appearing male, No apparent distress  HEENT: NCAT, MMM  RESP: No increased WOB, equal bilateral chest rise  ABD: non-distended  EXT: moving all 4 extremities spontaneously  NEURO: AOx3 no focal neuro deficits    Full exam not performed as this was a video visit    LABS    Component      Latest Ref Rng & Units 8/10/2021   CD3 Mature T      49 - 84 % 72   Absolute CD3      603-2,990 cells/uL 1,622   CD4 [place] T      28 - 63 % 30   Absolute CD4      441-2,156 cells/uL 669   CD8 Suppressor T      10 - 40 % 41 (H)   Absolute CD8      125-1,312 cells/uL 932   CD4:CD8 Ratio      1.40 - 2.60 0.72 (L)   Albumin Fraction       3.7 - 5.1 g/dL 4.5   Alpha 1 Fraction      0.2 - 0.4 g/dL 0.3   Alpha 2 Fraction      0.5 - 0.9 g/dL 0.8   Beta Fraction      0.6 - 1.0 g/dL 1.1 (H)   Gamma Fraction      0.7 - 1.6 g/dL 1.5   Monoclonal Peak      <=0.0 g/dL 0.0   ELP Interpretation:       Essentially normal electrophoretic pattern. No obvious monoclonal proteins seen. Pathologic significance requires clinical correlation. REN Velázquez M.D., Ph.D., Pathologist ().   IGG      610-1,616 mg/dL 1,626 (H)   IgG1      382 - 929 mg/dL 891   IgG2      242 - 700 mg/dL 447   IgG3      22 - 176 mg/dL 85   IgG4      4 - 86 mg/dL 268 (H)   Subclasses, Percent      % 104   SARS-CoV-2 Marlon Ab, Interp, S      Negative Positive (A)   SARS-CoV-2 Marlon Ab, Quant, S      <0.80 U/mL 181 (H)   Patient's Race       White   Patient's Ethnicity       Not    Amylase      30 - 110 U/L 44   Lipase      73 - 393 U/L 159   CMV Quant IU/mL      Not Detected IU/mL Not Detected   EBV DNA Copies/mL      Not Detected copies/mL Not Detected   Total Protein Serum      6.8 - 8.8 g/dL 8.2       IMAGING/PROCEDURES:    EGD 9/1/21  Final Diagnosis   A. DUODENAL BIOPSY:  - Mild chronic duodenitis, see comment  - Negative for giardia and celiac disease    B. GASTRIC, GASTRIC ANTRUM NODULE, BIOPSY:  - Gastric oxyntic/antral mucosa with mild chronic inflammation and reactive antral gastropathy  - Intestinal (goblet cell) metaplasia  - Negative for H. Pylori on immunohistochemical staining  - Negative for dysplasia     C. GASTRIC BODY POLYP X2:   - Hyperplastic polyps (2)  - Negative for intestinal metaplasia and dysplasia      D. GASTRIC BIOPSY:  - Gastric oxyntic/antral mucosa with reactive antral gastropathy  - Negative for H. 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 Referred due to concerns for possible IgG4 disease.    1. Chronic intermittent LUQ/RUQ abdominal pressure, body aches  2. Eye symptoms (blurry, red)  3. Intermittent swelling in cervical, axillary, and inguinal areas without documented lymphadenopathy  4. Chronic duodenitis related to acid reflux  5. Seasonal allergies/urticaria  6. Mild asthma  7. History of splenomegaly  7. IgG4 subclass elevation (260), with borderline high IgG      Patient has undergone extensive workup. Unlikely his symptoms are explained by IgG4 disease. CT imaging demonstrated normal spleen for his size without abnormal texture/echogenicity. Liver is within normal limits. No cytopenia, elevated ESR/CRP, amylase or lipase.  No evidence of lymphoma. No evidence of immunodeficiency on lab testing, T cell subset tests largely within normal limits.  SPEP within normal limits, EBV/CMV PCR negative.  The patient underwent upper endoscopy on 8/16/2021, revealing mild chronic duodenitis without evidence of H.  Pylori, Giardia, or celiac disease.    At this time, we believe his symptoms may be a manifestation of long COVID, perhaps a long term exacerbation of his autoimmune/allergic phenotype. Discussed results ands suspicion in detail with the patient.    Recommendations:  - Start PPI course for biopsy confirmed duodenitis  - Patient has high SARS-CoV Marlon Ab titers from prior infection. Will confer with ID specialists about recommendations to vaccinate given evidence of current immunity in this patient. Patient is hesitant given his severe reaction to prior vaccines and the COVID-19 infection itself. We did recommend vaccination to reduce risk of mortality and hospitalization from the infection  - Follow up  in 3 months    We will have him return in 3 weeks for virtual visit.    We had a long discussion with the patient about the diagnostic possibilities and workup. All questions were answered to his satisfaction.    Patient seen and discussed with Dr. Lowery who agrees with the assessment and plan above.     Jackie Elliott MD  PGY-5 Medicine-Dermatology  Pager [phone]

## 2021-09-02 ENCOUNTER — PATIENT OUTREACH (OUTPATIENT)
Dept: CARE COORDINATION | Facility: CLINIC | Age: 31
End: 2021-09-02

## 2021-09-02 NOTE — PROGRESS NOTES
Oncology Distress Screening Follow-up  Clinical Social Work  [facility]    Identified Concern and Score From Distress Screenin. How concerned are you about your ability to eat?   0           2. How concerned are you about unintended weight loss or your current weight?   0           3. How concerned are you about feeling depressed or very sad?   0           4. How concerned are you about feeling anxious or very scared?   8Abnormal            5. Do you struggle with the loss of meaning and max in your life?   Not at all           6. How concerned are you about work and home life issues that may be affected by your cancer?   5           7. How concerned are you about knowing what resources are available to help you?   0           8. Do you currently have what you would describe as Jain or spiritual struggles?              Not at all             Date of Distress Screenin21    Intervention:   Abimael is a 31-year-old gentleman who came to see Dr. Lowery at [facility] with concerns for possible IgG4. This clinician called Abimael today with goal of introducing psychosocial resources and support, and responding to elevated distress.     Abimael reported that he was anxious prior to visit with Dr. Lowery in anticipation of receiving results from multiple tests. Abimael reported that he is feeling much more reassured knowing these results, and feels comfortable with plan for monitoring moving forward. Abimael denies resource or emotional need at present time, and is appreciative to know of social work availability in future if needed.     Follow-up Required:   Abimael knows to reach out in the case of psychosocial distress or need. No SW outreach planned at present time.     MARTHA Zapata, LICSW  Phone: [phone]  [facility]: M, Thu  *every other Tue, 8am-4:30pm  [facility]: W, F, *every other Tue, 8am-4:30pm

## 2021-09-06 ENCOUNTER — MYC REFILL (OUTPATIENT)
Dept: FAMILY MEDICINE | Facility: CLINIC | Age: 31
End: 2021-09-06

## 2021-09-06 DIAGNOSIS — F41.9 ANXIETY: ICD-10-CM

## 2021-09-07 RX ORDER — ALPRAZOLAM 2 MG
2 TABLET ORAL 3 TIMES DAILY PRN
Qty: 30 TABLET | Refills: 0 | Status: SHIPPED | OUTPATIENT
Start: 2021-09-07 | End: 2021-10-10

## 2021-09-08 ENCOUNTER — TELEPHONE (OUTPATIENT)
Dept: DERMATOLOGY | Facility: CLINIC | Age: 31
End: 2021-09-08

## 2021-09-08 NOTE — TELEPHONE ENCOUNTER
Health Call Center    Phone Message    May a detailed message be left on voicemail: yes     Reason for Call: Other: Pt states he has been trying to schedule a visit to have his penile papules removed and keeps getting referred to Urology who then referrs him back to Dermatology. Pt saw Dr. Lisa on 6/21/21 and Dr Sarah in Urology on 8/13/21.     Pt would like a call back so he can find out for sure which department he should schedule the procedure with.     Please call Pt back to discuss/schedule. Thank you.     Action Taken: Message routed to:  [facility] ([facility] Derm    Travel Screening: Not Applicable

## 2021-09-09 ENCOUNTER — TELEPHONE (OUTPATIENT)
Dept: ONCOLOGY | Facility: CLINIC | Age: 31
End: 2021-09-09

## 2021-09-09 NOTE — TELEPHONE ENCOUNTER
I called pt and recommend he get an mRNA COVID vaccine Study in Science this weeks suggest one shot in post infected pts gives amazing protections here is response from Dr Kim in ID  He will think about it  Hello,     I would encourage him to go ahead and get vaccinated. There is good evidence that people who have had previous COVID-19 benefit from getting vaccinated in that they have subsequent increase in neutralizing abx and ability to prevent re-infection from variants of concern (I.e Delta).     Current recommendations suggest getting vaccinated when initial COVID-19 symptoms have resolved or if concerned about effects from vaccines then to wait 90 days. Either way he is way past that since he was last infected in Nov.     There is no contraindication to getting the COVID-19 vaccine even with previous history of vaccine reactions. Which really just saying vaccine reactions is very vague as having local and systemic side effects to vaccines is common.     I would also tell him that he will have local and systemic side effects to the vaccine and if they are bad, then he can get one dose and not the second.     Is that helpful?     Thank you,   Rita

## 2021-09-10 NOTE — TELEPHONE ENCOUNTER
Spoke with patient. Relayed information from Dr. Lisa. Patient says that he would contact Dr. Sarah's office and do some research about another dermatologist in the area that can perform laser or surgical removal of his papules. Patient says he will call back next week to let us know what he intends to do.

## 2021-09-20 ENCOUNTER — TELEPHONE (OUTPATIENT)
Dept: FAMILY MEDICINE | Facility: CLINIC | Age: 31
End: 2021-09-20

## 2021-09-20 ENCOUNTER — VIRTUAL VISIT (OUTPATIENT)
Dept: FAMILY MEDICINE | Facility: CLINIC | Age: 31
End: 2021-09-20
Payer: COMMERCIAL

## 2021-09-20 DIAGNOSIS — M54.17 LUMBOSACRAL RADICULOPATHY: Primary | ICD-10-CM

## 2021-09-20 DIAGNOSIS — B35.4 TINEA CORPORIS: ICD-10-CM

## 2021-09-20 PROCEDURE — 99214 OFFICE O/P EST MOD 30 MIN: CPT | Mod: 95 | Performed by: FAMILY MEDICINE

## 2021-09-20 RX ORDER — TERBINAFINE HYDROCHLORIDE 250 MG/1
250 TABLET ORAL DAILY
Qty: 14 TABLET | Refills: 0 | Status: SHIPPED | OUTPATIENT
Start: 2021-09-20 | End: 2021-10-13

## 2021-09-20 RX ORDER — PRENATAL VIT 91/IRON/FOLIC/DHA 28-975-200
COMBINATION PACKAGE (EA) ORAL 2 TIMES DAILY
Qty: 30 G | Refills: 1 | Status: SHIPPED | OUTPATIENT
Start: 2021-09-20 | End: 2022-06-13

## 2021-09-20 NOTE — PROGRESS NOTES
Abimael is a 31 year old who is being evaluated via a billable telephone visit.      What phone number would you like to be contacted at? cell  How would you like to obtain your AVS? MyChart    Assessment & Plan     Lumbosacral radiculopathy  My first visit with patient but previous history is reviewed with him and through his chart.  History of back surgery and recurrent back issues.  Is seen through [facility] and has put in a call to his spine surgery team.  But starting a few days ago without any specific injury he has had severe worsening of low back pain with radiation down both legs and a sensation of instability.  He said it is not actually weakness per se.  But we did discuss issues of reherniation.  So we will get him going to help set up an MRI to take another look at some of this can be directed by his spine surgery team as well.  - MR Lumbar Spine w/o & w Contrast; Future    Tinea corporis  Has had an issue of recurrent ringworm on his trunk.  Discussed that there are actually different types of fungal infections and it may not be classic ringworm.  In any case I think we should use an approach to work both internally and externally with Lamisil for couple of weeks and that should do the trick.  - terbinafine (LAMISIL) 250 MG tablet; Take 1 tablet (250 mg) by mouth daily  - terbinafine (LAMISIL) 1 % external cream; Apply topically 2 times daily       See Patient Instructions    Return in about 2 weeks (around 10/4/2021) for Based upon test results.    Fely Nieves MD  [facility]   Abimael is a 31 year old who presents for the following health issues     HPI     Visit with patient today to discuss worsening low back pain.  Reviewed his previous history and things have suddenly worsened with no particular injury.  But he is in significant pain and talking to his spine surgeons as well.  Has had a recurrence of ringworm on his trunk as well.    Review of  Systems   Constitutional, HEENT, cardiovascular, pulmonary, gi and gu systems are negative, except as otherwise noted.      Objective           Vitals:  No vitals were obtained today due to virtual visit.    Physical Exam   healthy, alert and no distress  PSYCH: Alert and oriented times 3; coherent speech, normal   rate and volume, able to articulate logical thoughts, able   to abstract reason, no tangential thoughts, no hallucinations   or delusions  His affect is normal  RESP: No cough, no audible wheezing, able to talk in full sentences  Remainder of exam unable to be completed due to telephone visits    Past labs reviewed with the patient.   Reviewed previous imaging            Phone call duration: 13 minutes

## 2021-09-20 NOTE — TELEPHONE ENCOUNTER
Patient called asking if he could have the MRI order for both within [facility] and with [facility], he would like to see where he could get in quicker.

## 2021-09-21 ENCOUNTER — TELEPHONE (OUTPATIENT)
Dept: FAMILY MEDICINE | Facility: CLINIC | Age: 31
End: 2021-09-21

## 2021-09-21 ENCOUNTER — TRANSFERRED RECORDS (OUTPATIENT)
Dept: HEALTH INFORMATION MANAGEMENT | Facility: CLINIC | Age: 31
End: 2021-09-21
Payer: COMMERCIAL

## 2021-09-21 NOTE — TELEPHONE ENCOUNTER
Referral/order for LUMBAR SPINE W/O & W CONTRAST faxed to [facility] # [phone] & [facility] # [phone] per patient's request.  Lionel Cook

## 2021-09-26 ENCOUNTER — HEALTH MAINTENANCE LETTER (OUTPATIENT)
Age: 31
End: 2021-09-26

## 2021-09-27 ENCOUNTER — OFFICE VISIT (OUTPATIENT)
Dept: NEUROSURGERY | Facility: CLINIC | Age: 31
End: 2021-09-27
Attending: NEUROLOGICAL SURGERY
Payer: COMMERCIAL

## 2021-09-27 VITALS
HEART RATE: 79 BPM | BODY MASS INDEX: 35.18 KG/M2 | TEMPERATURE: 98.2 F | WEIGHT: 259.4 LBS | DIASTOLIC BLOOD PRESSURE: 81 MMHG | SYSTOLIC BLOOD PRESSURE: 127 MMHG | OXYGEN SATURATION: 97 %

## 2021-09-27 DIAGNOSIS — M51.9 DISC DISORDER OF LUMBAR REGION: Primary | ICD-10-CM

## 2021-09-27 PROCEDURE — 99214 OFFICE O/P EST MOD 30 MIN: CPT | Performed by: NEUROLOGICAL SURGERY

## 2021-09-27 PROCEDURE — G0463 HOSPITAL OUTPT CLINIC VISIT: HCPCS

## 2021-09-27 ASSESSMENT — PAIN SCALES - GENERAL: PAINLEVEL: EXTREME PAIN (8)

## 2021-09-27 NOTE — PROGRESS NOTES
I was asked by Dr. Bailey to see this patient in consultation    31M w/ hx left L4-5 discectomy, p/w back and leg pain.  Prior surgery with Dr. Johnson in 2018.  Over a year of worsening, throbbing, low back pain, radiating side to side, with paresthesias into the thighs and calves.  Worse with activity.  MR shows persistent L4-5 disc herniation with bilateral lateral recess stenosis, and left L5-S1 disc herniation.       Past Medical History:   Diagnosis Date     Acute right otitis media 1/8/2013     Anxiety      Depression      Drug abuse (H)      Gonococcal urethritis 02/05/2016     Urethritis 5/20/2013     Problem list name updated by automated process. Provider to review     Past Surgical History:   Procedure Laterality Date     BACK SURGERY  04/05/2018     ESOPHAGOSCOPY, GASTROSCOPY, DUODENOSCOPY (EGD), COMBINED N/A 8/16/2021    Procedure: ESOPHAGOGASTRODUODENOSCOPY, WITH BIOPSY AND POLYPECTOMY;  Surgeon: Phillip Joyner MD;  Location: [facility] [place] OR     Social History     Socioeconomic History     Marital status: Single     Spouse name: Not on file     Number of children: Not on file     Years of education: Not on file     Highest education level: Not on file   Occupational History     Not on file   Tobacco Use     Smoking status: Former Smoker     Packs/day: 0.50     Years: 6.00     Pack years: 3.00     Types: Cigarettes     Quit date: 3/12/2018     Years since quitting: 3.5     Smokeless tobacco: Never Used     Tobacco comment: second hand smoke exposure   Substance and Sexual Activity     Alcohol use: Yes     Comment: once a week     Drug use: Yes     Types: Marijuana     Comment: pot once a month, ectasy  As of 11/7/2016 he only smokes pot occasionally     Sexual activity: Yes     Partners: Female     Birth control/protection: Condom   Other Topics Concern     Parent/sibling w/ CABG, MI or angioplasty before 65F 55M? No   Social History Narrative     Not on file     Social Determinants of Health      Financial Resource Strain:      Difficulty of Paying Living Expenses:    Food Insecurity:      Worried About Running Out of Food in the Last Year:      Ran Out of Food in the Last Year:    Transportation Needs:      Lack of Transportation (Medical):      Lack of Transportation (Non-Medical):    Physical Activity:      Days of Exercise per Week:      Minutes of Exercise per Session:    Stress:      Feeling of Stress :    Social Connections:      Frequency of Communication with Friends and Family:      Frequency of Social Gatherings with Friends and Family:      Attends Pentecostal[employer] Services:      Active Member of Clubs or Organizations:      Attends Club or Organization Meetings:      Marital Status:    Intimate Partner Violence: Not At Risk     Fear of Current or Ex-Partner: No     Emotionally Abused: No     Physically Abused: No     Sexually Abused: No     Family History   Problem Relation Age of Onset     Unknown/Adopted Mother      Unknown/Adopted Father      Unknown/Adopted Maternal Grandmother      Unknown/Adopted Maternal Grandfather      Unknown/Adopted Paternal Grandmother      Unknown/Adopted Paternal Grandfather      Unknown/Adopted Brother         ROS: 10 point ROS neg other than the symptoms noted above in the HPI.    Physical Exam  /81   Pulse 79   Temp 98.2  F (36.8  C)   Wt 117.7 kg (259 lb 6.4 oz)   SpO2 97%   BMI 35.18 kg/m    HEENT:  Normocephalic, atraumatic.  PERRLA.  EOM s intact.  Visual fields full to gross exam  Neck:  Supple, non-tender, without lymphadenopathy.  Heart:  No peripheral edema  Lungs:  No SOB  Abdomen:  Non-distended.   Skin:  Warm and dry.  Extremities:  No edema, cyanosis or clubbing.  Psychiatric:  No apparent distress  Musculoskeletal:  Normal bulk and tone    NEUROLOGICAL EXAMINATION:     Mental status:  Alert and Oriented x 3, speech is fluent.  Cranial nerves:  II-XII intact.   Motor:    Shoulder Abduction:  Right:  5/5   Left:  5/5  Biceps:                       Right:  5/5   Left:  5/5  Triceps:                     Right:  5/5   Left:  5/5  Wrist Extensors:       Right:  5/5   Left:  5/5  Wrist Flexors:           Right:  5/5   Left:  5/5  interosseus :            Right:  5/5   Left:  5/5  Hip Flexion:                Right: 5/5  Left:  5/5  Quadriceps:             Right:  5/5  Left:  5/5  Hamstrings:             Right:  5/5  Left:  5/5  Gastroc Soleus:        Right:  5/5  Left:  5/5  Tib/Ant:                      Right:  5/5  Left:  5/5  EHL:                     Right:  5/5  Left:  5/5  Sensation:  Intact  Reflexes:  Negative Babinski.  Negative Clonus.  Negative Denny's.  Coordination:  Smooth finger to nose testing.   Negative pronator drift.  Smooth tandem walking.    A/P:  31M w/ hx left L4-5 discectomy, p/w back and leg pain    I had a discussion with the patient, reviewing the history, symptoms, and imaging  Discussed surgical option of L4-5 bilateral midline sparing lami/discectomy and left L5-S1 microdiscectomy  He will consider and call us if he decides to schedule

## 2021-09-27 NOTE — LETTER
9/27/2021         RE: Abimael Martinez  [address]  [facility] [address]        Dear Colleague,    Thank you for referring your patient, Abimael Martinez, to the [facility] [place]. Please see a copy of my visit note below.    I was asked by Dr. Bailey to see this patient in consultation    31M w/ hx left L4-5 discectomy, p/w back and leg pain.  Prior surgery with Dr. Johnson in 2018.  Over a year of worsening, throbbing, low back pain, radiating side to side, with paresthesias into the thighs and calves.  Worse with activity.  MR shows persistent L4-5 disc herniation with bilateral lateral recess stenosis, and left L5-S1 disc herniation.       Past Medical History:   Diagnosis Date     Acute right otitis media 1/8/2013     Anxiety      Depression      Drug abuse (H)      Gonococcal urethritis 02/05/2016     Urethritis 5/20/2013     Problem list name updated by automated process. Provider to review     Past Surgical History:   Procedure Laterality Date     BACK SURGERY  04/05/2018     ESOPHAGOSCOPY, GASTROSCOPY, DUODENOSCOPY (EGD), COMBINED N/A 8/16/2021    Procedure: ESOPHAGOGASTRODUODENOSCOPY, WITH BIOPSY AND POLYPECTOMY;  Surgeon: Phillip Joyner MD;  Location: [facility] [place]     Social History     Socioeconomic History     Marital status: Single     Spouse name: Not on file     Number of children: Not on file     Years of education: Not on file     Highest education level: Not on file   Occupational History     Not on file   Tobacco Use     Smoking status: Former Smoker     Packs/day: 0.50     Years: 6.00     Pack years: 3.00     Types: Cigarettes     Quit date: 3/12/2018     Years since quitting: 3.5     Smokeless tobacco: Never Used     Tobacco comment: second hand smoke exposure   Substance and Sexual Activity     Alcohol use: Yes     Comment: once a week     Drug use: Yes     Types: Marijuana     Comment: pot once a month, ectasy  As of 11/7/2016 he only  smokes pot occasionally     Sexual activity: Yes     Partners: Female     Birth control/protection: Condom   Other Topics Concern     Parent/sibling w/ CABG, MI or angioplasty before 65F 55M? No   Social History Narrative     Not on file     Social Determinants of Health     Financial Resource Strain:      Difficulty of Paying Living Expenses:    Food Insecurity:      Worried About Running Out of Food in the Last Year:      Ran Out of Food in the Last Year:    Transportation Needs:      Lack of Transportation (Medical):      Lack of Transportation (Non-Medical):    Physical Activity:      Days of Exercise per Week:      Minutes of Exercise per Session:    Stress:      Feeling of Stress :    Social Connections:      Frequency of Communication with Friends and Family:      Frequency of Social Gatherings with Friends and Family:      Attends Sikhism Services:      Active Member of Clubs or Organizations:      Attends Club or Organization Meetings:      Marital Status:    Intimate Partner Violence: Not At Risk     Fear of Current or Ex-Partner: No     Emotionally Abused: No     Physically Abused: No     Sexually Abused: No     Family History   Problem Relation Age of Onset     Unknown/Adopted Mother      Unknown/Adopted Father      Unknown/Adopted Maternal Grandmother      Unknown/Adopted Maternal Grandfather      Unknown/Adopted Paternal Grandmother      Unknown/Adopted Paternal Grandfather      Unknown/Adopted Brother         ROS: 10 point ROS neg other than the symptoms noted above in the HPI.    Physical Exam  /81   Pulse 79   Temp 98.2  F (36.8  C)   Wt 117.7 kg (259 lb 6.4 oz)   SpO2 97%   BMI 35.18 kg/m    HEENT:  Normocephalic, atraumatic.  PERRLA.  EOM s intact.  Visual fields full to gross exam  Neck:  Supple, non-tender, without lymphadenopathy.  Heart:  No peripheral edema  Lungs:  No SOB  Abdomen:  Non-distended.   Skin:  Warm and dry.  Extremities:  No edema, cyanosis or  clubbing.  Psychiatric:  No apparent distress  Musculoskeletal:  Normal bulk and tone    NEUROLOGICAL EXAMINATION:     Mental status:  Alert and Oriented x 3, speech is fluent.  Cranial nerves:  II-XII intact.   Motor:    Shoulder Abduction:  Right:  5/5   Left:  5/5  Biceps:                      Right:  5/5   Left:  5/5  Triceps:                     Right:  5/5   Left:  5/5  Wrist Extensors:       Right:  5/5   Left:  5/5  Wrist Flexors:           Right:  5/5   Left:  5/5  interosseus :            Right:  5/5   Left:  5/5  Hip Flexion:                Right: 5/5  Left:  5/5  Quadriceps:             Right:  5/5  Left:  5/5  Hamstrings:             Right:  5/5  Left:  5/5  Gastroc Soleus:        Right:  5/5  Left:  5/5  Tib/Ant:                      Right:  5/5  Left:  5/5  EHL:                     Right:  5/5  Left:  5/5  Sensation:  Intact  Reflexes:  Negative Babinski.  Negative Clonus.  Negative [employer].  Coordination:  Smooth finger to nose testing.   Negative pronator drift.  Smooth tandem walking.    A/P:  31M w/ hx left L4-5 discectomy, p/w back and leg pain    I had a discussion with the patient, reviewing the history, symptoms, and imaging  Discussed surgical option of L4-5 bilateral midline sparing lami/discectomy and left L5-S1 microdiscectomy  He will consider and call us if he decides to schedule         Again, thank you for allowing me to participate in the care of your patient.        Sincerely,        Steffen Chu MD

## 2021-09-27 NOTE — NURSING NOTE
Abimael Martinez is a 31 year old male who presents for:  Chief Complaint   Patient presents with     Consult     Discuss surgical options; Lbp        Initial Vitals:  /81   Pulse 79   Temp 98.2  F (36.8  C)   Wt 259 lb 6.4 oz (117.7 kg)   SpO2 97%   BMI 35.18 kg/m   Estimated body mass index is 35.18 kg/m  as calculated from the following:    Height as of 8/16/21: 6' (1.829 m).    Weight as of this encounter: 259 lb 6.4 oz (117.7 kg).. Body surface area is 2.45 meters squared. BP completed using cuff size: large  Extreme Pain (8)    Buddy Bonilla MA

## 2021-09-28 ENCOUNTER — OFFICE VISIT (OUTPATIENT)
Dept: NEUROSURGERY | Facility: CLINIC | Age: 31
End: 2021-09-28
Attending: NEUROLOGICAL SURGERY
Payer: COMMERCIAL

## 2021-09-28 ENCOUNTER — MYC REFILL (OUTPATIENT)
Dept: FAMILY MEDICINE | Facility: CLINIC | Age: 31
End: 2021-09-28

## 2021-09-28 ENCOUNTER — MYC MEDICAL ADVICE (OUTPATIENT)
Dept: NEUROSURGERY | Facility: CLINIC | Age: 31
End: 2021-09-28

## 2021-09-28 VITALS — DIASTOLIC BLOOD PRESSURE: 85 MMHG | SYSTOLIC BLOOD PRESSURE: 134 MMHG

## 2021-09-28 DIAGNOSIS — F90.2 ATTENTION DEFICIT HYPERACTIVITY DISORDER (ADHD), COMBINED TYPE: ICD-10-CM

## 2021-09-28 DIAGNOSIS — Z11.52 ENCOUNTER FOR PREOPERATIVE SCREENING LABORATORY TESTING FOR COVID-19 VIRUS: Primary | ICD-10-CM

## 2021-09-28 DIAGNOSIS — M51.16 RADICULOPATHY DUE TO LUMBAR INTERVERTEBRAL DISC DISORDER: ICD-10-CM

## 2021-09-28 DIAGNOSIS — Z01.812 ENCOUNTER FOR PREOPERATIVE SCREENING LABORATORY TESTING FOR COVID-19 VIRUS: Primary | ICD-10-CM

## 2021-09-28 PROCEDURE — 99214 OFFICE O/P EST MOD 30 MIN: CPT | Performed by: NEUROLOGICAL SURGERY

## 2021-09-28 PROCEDURE — G0463 HOSPITAL OUTPT CLINIC VISIT: HCPCS

## 2021-09-28 RX ORDER — CEFAZOLIN SODIUM 2 G/100ML
2 INJECTION, SOLUTION INTRAVENOUS SEE ADMIN INSTRUCTIONS
Status: CANCELLED | OUTPATIENT
Start: 2021-09-28

## 2021-09-28 RX ORDER — CEFAZOLIN SODIUM 2 G/100ML
2 INJECTION, SOLUTION INTRAVENOUS
Status: CANCELLED | OUTPATIENT
Start: 2021-09-28

## 2021-09-28 ASSESSMENT — PAIN SCALES - GENERAL: PAINLEVEL: EXTREME PAIN (8)

## 2021-09-28 NOTE — LETTER
9/28/2021         RE: Abimael Martinez  [address]  [facility] [address]        Dear Colleague,    Thank you for referring your patient, Abimael Martinez, to the [facility] [place]. Please see a copy of my visit note below.    Mr. Martinez returns with a complaint of increasing low back, left leg pain as well as pain into his left inguinal region which has grown substantially worse over the past several months despite repeated programs of physical therapy and repeated epidural steroid injections.  I have previously seen him in the spring 2020 for this problem.  At that time his symptoms were under reasonable control and further conservative management was recommended following a discussion of options.  He reports however that he had a very problematic summer and that his symptoms have increased in severity causing substantial difficulty with sleeping, walking, Valsalva maneuver, or participating in virtually any activity.  He notes an area of numbness into his left leg mostly in an S1 dermatomal pattern as well.  He has intermittent complaints of pain toward the right side but these are not as prominent into his leg.  He has no complaint of changes in bowel or bladder control.    On repeat examination he is noted to have a markedly antalgic gait toward the left side.  He has evidence of slight weakness with both plantar and dorsiflexion at the left ankle rated at 4/5.  The remainder of his bilateral lower extremity strength is 5/5 throughout.  Sensory examination suggests a left S1 dermatomal sensory disturbance.  Deep tendon reflexes are trace at the knees bilaterally and trace at the ankles bilaterally and symmetrically.  There are no long tract findings noted.  Straight leg raising is positive in the seated position at 30 degrees on the left, negative on the right with a positive cross straight leg raising sign.    A repeat lumbar MRI scan obtained last week is  compared to a similar study obtained in December 2020, prior to my last evaluation of this gentleman.  The recurrent or residual disc herniation at L4-5 appears slightly less prominent but still sufficient to produce impingement of the ventral thecal sac as well as the transiting left L5 nerve root.  Prior surgical scar is visualized at this level on the left side as well.  However, at the L5-S1 level what was a small annular tear has substantially increased into a focal disc protrusion with obvious contact and deformation of the transiting left S1 nerve root.    Assessment: intractable left lower extremity radicular pain likely related to left L5 and left S1 radiculopathy.  I reviewed the clinical and radiographic findings in detail with the patient today in the office and discussed management alternatives including both surgical and nonsurgical means of management once again.  Patient is quite interested in surgical intervention given the chronicity and severity of his symptoms with significant functional limitation.  I discussed the details of the proposed surgery including potential risks and benefits.  I told him that the risks of the procedure include failure to improve his symptoms, increased pain weakness or numbness, injury to the nerve root or dural covering particularly given his prior surgery, infection, bleeding, cerebrospinal fluid leakage, recurrent or residual disc herniation, etc.  He appeared to have a good understanding at the conclusion of our discussion and wished to proceed with surgery as soon as practical.    Approximately 35 minutes was spent direct contact with the patient the majority reviewing the clinical and radiographic findings, management alternatives, risks and benefits.    Reviewed pre- and post-operative instructions as outlined in the After Visit Summary/Patient Instructions with patient.   Surgery folder was given to patient    Patient Education Topic: Procedure with Dr. Wharton      Learner(s): Patient    Knowledge Level: Basic    Readiness to Learn: Ready    Method:  Verbal explanation    Outcome: Able to verbalize instructions    Barriers to Learning: No barrier    Covid Testing: patient educated they will need to have a test for the novel Coronavirus, COVID-19.The test needs to be completed within 4 days (96) hours of surgery. Order Placed.    Scheduling Number: [phone]    Patient had the opportunity for questions to be answered. Advised Patient to call clinic with any questions/concerns. Gave patient antibacterial soap for pre-surgery skin preparation.     Pt prefers zanaflex over all other muscle relaxants post-operatively.      Nubia Arevalo RN       Again, thank you for allowing me to participate in the care of your patient.        Sincerely,        Troy Wharton MD

## 2021-09-28 NOTE — PATIENT INSTRUCTIONS
Patient Instructions    Surgery will be scheduled at [facility] with Dr. Wharton for a Left L4-5 and Left L5-S1 hemilaminectomy and microdiscectomy   Pre-Operative    Surgical risks: blood clots in the leg or lung, problems urinating, nerve damage, drainage from the incision, infection,stiffness    Pre-operative physical with primary care physician within 30 days of surgical date.     Likely same day procedure with discharge home day of surgery, may stay for 23 hour observation hospitalization for monitoring.       Shower procedure  o Please shower with antimicrobial soap the night before surgery and morning of surgery. Please refer to showering instruction sheet in folder.    Eating/Drinking  o Stop all solid foods 8 hours before surgery.  o Keep drinking clear liquids until 4 hours before surgery  - Clear liquids include water, clear juice, black coffee, or clear tea without milk, Gatorade, clear soda.     Medications  o Discontinue Aspirin, NSAIDs (Advil/Ibuprofen, Indocin, Naproxen,Nuprin,Relafen/Nabumetone, Diclofenac,Meloxicam, Aleve, Celebrex) x 7 days prior to surgical date  o You can take Tylenol (Acetaminophen) for pain, 1000 mg  - Do not exceed 3,000 mg per day   o Any other medications prescribed, please discuss with your primary care provider at your pre-operative physical     As part of preparation for your upcoming procedure you are required to have a test for the novel Coronavirus, COVID-19  o Please call the drive-up testing number to schedule your appointment. The test needs to be completed within 4 days (96) hours of surgery.   o Scheduling Number: [phone]   o You may NOT receive the COVID-19 vaccine 72 hours before or after surgery.    Pain Management    Dealing with pain  o As your body heals, you might feel a stabbing, burning, or aching pain. You may also have some numbness.  o Everyone feels pain differently, we may ask you to rate your pain using a pain scale. This  will let us know how much pain you feel.   o Keep in mind that medicine won't take away all of your pain. It helps to try other ways to relax and ease pain.     Things to help with pain  o After surgery, we will give you medicine for your pain. These medications work well, but they can make you drowsy, itchy, or sick to your stomach. If we give you narcotics for pain, try to take the pills with food.   o For mild to moderate pain, you can take medication such as Tylenol. These can be used with narcotics, but make sure that your narcotic does not contain Tylenol.   - Do NOT drive while taking narcotic pain medication  - Do NOT drink alcohol while using any pain medication  o You can utilize ice as needed (no longer than 20 minutes at one time)    You may resume taking NSAIDs (ex. Ibuprofen, aleve, naproxen) 14 days post op     Incision Care    No submerging incision in water such as pools, hot tubs, baths for at least 8 weeks or until incision is healed    It is okay to shower, just pat the incision dry     Remove dressing as instructed upon discharge    Watch for signs of infection  o Redness, swelling, warmth, drainage, and fever of 101 degrees or higher  o Notify clinic [phone].  Activity Restrictions    For the first 6-8 weeks, no lifting > 10 pounds, limited bending, twisting, or overhead reaching.    Take stairs in moderation     Ok to walk as tolerated, take short frequent walks. You may gradually increase the distance as tolerated.     Avoid bed rest and prolonged sitting for longer than 30 minutes (change positions frequently while awake)    No contact sports until after follow up visit    No high impact activities such as; running/jogging, snowmobile or 4 alcazar riding or any other recreational vehicles.  Post-Op Follow Up Appointments    2 week incision check with RN    6 week post op follow up visit with Physician Assistant or Nurse Practitioner     Please call to schedule follow up appointment at  [phone]  Resources    If you are currently employed, you will need to be off work for 2-4 weeks for post op recovery and healing.  Please fax any FMLA/short term disability paperwork to [phone]. You may call our clinic when you'd like to return to work and we can provide a work letter.     Video: [email or web address]    (video for lumbar disc herniation)

## 2021-09-28 NOTE — PROGRESS NOTES
Mr. Martinez returns with a complaint of increasing low back, left leg pain as well as pain into his left inguinal region which has grown substantially worse over the past several months despite repeated programs of physical therapy and repeated epidural steroid injections.  I have previously seen him in the spring 2020 for this problem.  At that time his symptoms were under reasonable control and further conservative management was recommended following a discussion of options.  He reports however that he had a very problematic summer and that his symptoms have increased in severity causing substantial difficulty with sleeping, walking, Valsalva maneuver, or participating in virtually any activity.  He notes an area of numbness into his left leg mostly in an S1 dermatomal pattern as well.  He has intermittent complaints of pain toward the right side but these are not as prominent into his leg.  He has no complaint of changes in bowel or bladder control.    On repeat examination he is noted to have a markedly antalgic gait toward the left side.  He has evidence of slight weakness with both plantar and dorsiflexion at the left ankle rated at 4/5.  The remainder of his bilateral lower extremity strength is 5/5 throughout.  Sensory examination suggests a left S1 dermatomal sensory disturbance.  Deep tendon reflexes are trace at the knees bilaterally and trace at the ankles bilaterally and symmetrically.  There are no long tract findings noted.  Straight leg raising is positive in the seated position at 30 degrees on the left, negative on the right with a positive cross straight leg raising sign.    A repeat lumbar MRI scan obtained last week is compared to a similar study obtained in December 2020, prior to my last evaluation of this gentleman.  The recurrent or residual disc herniation at L4-5 appears slightly less prominent but still sufficient to produce impingement of the ventral thecal sac as well as the transiting  left L5 nerve root.  Prior surgical scar is visualized at this level on the left side as well.  However, at the L5-S1 level what was a small annular tear has substantially increased into a focal disc protrusion with obvious contact and deformation of the transiting left S1 nerve root.    Assessment: intractable left lower extremity radicular pain likely related to left L5 and left S1 radiculopathy.  I reviewed the clinical and radiographic findings in detail with the patient today in the office and discussed management alternatives including both surgical and nonsurgical means of management once again.  Patient is quite interested in surgical intervention given the chronicity and severity of his symptoms with significant functional limitation.  I discussed the details of the proposed surgery including potential risks and benefits.  I told him that the risks of the procedure include failure to improve his symptoms, increased pain weakness or numbness, injury to the nerve root or dural covering particularly given his prior surgery, infection, bleeding, cerebrospinal fluid leakage, recurrent or residual disc herniation, etc.  He appeared to have a good understanding at the conclusion of our discussion and wished to proceed with surgery as soon as practical.    Approximately 35 minutes was spent direct contact with the patient the majority reviewing the clinical and radiographic findings, management alternatives, risks and benefits.

## 2021-09-28 NOTE — NURSING NOTE
Abimael Martinez is a 31 year old male who presents for:  Chief Complaint   Patient presents with     RECHECK     surgery discussion        Initial Vitals:  /85  Estimated body mass index is 35.18 kg/m  as calculated from the following:    Height as of 8/16/21: 6' (1.829 m).    Weight as of 9/27/21: 259 lb 6.4 oz (117.7 kg).. There is no height or weight on file to calculate BSA. BP completed using cuff size: large  Extreme Pain (8)    Buddy Bonilla MA

## 2021-09-28 NOTE — PROGRESS NOTES
Reviewed pre- and post-operative instructions as outlined in the After Visit Summary/Patient Instructions with patient.   Surgery folder was given to patient    Patient Education Topic: Procedure with Dr. Wharton     Learner(s): Patient    Knowledge Level: Basic    Readiness to Learn: Ready    Method:  Verbal explanation    Outcome: Able to verbalize instructions    Barriers to Learning: No barrier    Covid Testing: patient educated they will need to have a test for the novel Coronavirus, COVID-19.The test needs to be completed within 4 days (96) hours of surgery. Order Placed.    Scheduling Number: [phone]    Patient had the opportunity for questions to be answered. Advised Patient to call clinic with any questions/concerns. Gave patient antibacterial soap for pre-surgery skin preparation.     Pt prefers zanaflex over all other muscle relaxants post-operatively.      Nubia Arevalo RN

## 2021-09-29 RX ORDER — DEXTROAMPHETAMINE SACCHARATE, AMPHETAMINE ASPARTATE, DEXTROAMPHETAMINE SULFATE AND AMPHETAMINE SULFATE 5; 5; 5; 5 MG/1; MG/1; MG/1; MG/1
20 TABLET ORAL 3 TIMES DAILY
Qty: 90 TABLET | Refills: 0 | Status: SHIPPED | OUTPATIENT
Start: 2021-09-29 | End: 2021-10-25

## 2021-10-10 ENCOUNTER — MYC REFILL (OUTPATIENT)
Dept: FAMILY MEDICINE | Facility: CLINIC | Age: 31
End: 2021-10-10

## 2021-10-10 DIAGNOSIS — F41.9 ANXIETY: ICD-10-CM

## 2021-10-10 NOTE — PROGRESS NOTES
[address] [address]  Phone: [phone]  Primary Provider: Jamison Lora  Pre-op Performing Provider: AIDAN SHINE      PREOPERATIVE EVALUATION:  Today's date: 10/13/2021    Abimael Martinez is a 31 year old male who presents for a preoperative evaluation.    Surgical Information:  Surgery/Procedure: Microdiskectomy  Surgery Location: [facility]  Surgeon:   Surgery Date: TBD  Time of Surgery: TBD  Where patient plans to recover: At home with family  Fax number for surgical facility: Note does not need to be faxed, will be available electronically in Epic.    Type of Anesthesia Anticipated: General    Assessment & Plan     The proposed surgical procedure is considered INTERMEDIATE risk.    Preop general physical exam  Is in good physical shape  Excess tolerance is very good at least 6 METS or more  No history of any CAD or CVA  No history of any diabetes  Cleared for surgery without any further testing  He had some lab work done recently at [facility] on 17 September and that was good so I did not repeat any lab work    Lumbosacral radiculopathy  He is going to have microdiscectomy           Risks and Recommendations:  The patient has the following additional risks and recommendations for perioperative complications:   - No identified additional risk factors other than previously addressed    Medication Instructions:  Patient is to take all scheduled medications on the day of surgery    RECOMMENDATION:  APPROVAL GIVEN to proceed with proposed procedure, without further diagnostic evaluation.    I understand this patient was recently treated for ringworm infection by his primary care with terbinafine  The use of this medication should not be considered a barrier to his safety for surgery.  Note was amended on 10/26/2021 at 1:43 PM          25 minutes spent on the date of the encounter doing chart review, history  and exam, documentation and further activities per the note        Subjective     HPI related to upcoming procedure: Chronic low back pain  Has injections in the past  Pain not getting better  Going for microdiscectomy    Preop Questions 10/8/2021   1. Have you ever had a heart attack or stroke? No   2. Have you ever had surgery on your heart or blood vessels, such as a stent placement, a coronary artery bypass, or surgery on an artery in your head, neck, heart, or legs? No   3. Do you have chest pain with activity? No   4. Do you have a history of  heart failure? No   5. Do you currently have a cold, bronchitis or symptoms of other infection? No   6. Do you have a cough, shortness of breath, or wheezing? No   7. Do you or anyone in your family have previous history of blood clots? UNKNOWN -    8. Do you or does anyone in your family have a serious bleeding problem such as prolonged bleeding following surgeries or cuts? No   9. Have you ever had problems with anemia or been told to take iron pills? No   10. Have you had any abnormal blood loss such as black, tarry or bloody stools? YES -    11. Have you ever had a blood transfusion? No   12. Are you willing to have a blood transfusion if it is medically needed before, during, or after your surgery? Yes   13. Have you or any of your relatives ever had problems with anesthesia? UNKNOWN -    14. Do you have sleep apnea, excessive snoring or daytime drowsiness? No   15. Do you have any artifical heart valves or other implanted medical devices like a pacemaker, defibrillator, or continuous glucose monitor? No   16. Do you have artificial joints? No   17. Are you allergic to latex? No       Health Care Directive:  Patient does not have a Health Care Directive or Living Will: Discussed advance care planning with patient; however, patient declined at this time.    Preoperative Review of :   reviewed - controlled substances reflected in medication list.      Status of  Chronic Conditions:  See problem list for active medical problems.  Problems all longstanding and stable, except as noted/documented.  See ROS for pertinent symptoms related to these conditions.      Review of Systems  CONSTITUTIONAL: NEGATIVE for fever, chills, change in weight  INTEGUMENTARY/SKIN: NEGATIVE for worrisome rashes, moles or lesions  EYES: NEGATIVE for vision changes or irritation  ENT/MOUTH: NEGATIVE for ear, mouth and throat problems  RESP: NEGATIVE for significant cough or SOB  CV: NEGATIVE for chest pain, palpitations or peripheral edema  GI: NEGATIVE for nausea, abdominal pain, heartburn, or change in bowel habits  : NEGATIVE for frequency, dysuria, or hematuria  MUSCULOSKELETAL:CHRONIC BACK PAIN  NEURO: NEGATIVE for weakness, dizziness or paresthesias  ENDOCRINE: NEGATIVE for temperature intolerance, skin/hair changes  HEME: NEGATIVE for bleeding problems  PSYCHIATRIC: NEGATIVE for changes in mood or affect    Patient Active Problem List    Diagnosis Date Noted     Morbid obesity (H) 05/06/2021     Priority: Medium     Former smoker 03/12/2021     Priority: Medium     Lumbosacral radiculopathy 03/12/2018     Priority: Medium     Dyslipidemia 03/12/2018     Priority: Medium     Elevated serum creatinine 03/12/2018     Priority: Medium     Mild persistent asthma without complication 07/31/2017     Priority: Medium     Drug-induced erectile dysfunction 11/07/2016     Priority: Medium     OCD (obsessive compulsive disorder) 09/16/2016     Priority: Medium     AYALA (generalized anxiety disorder) 12/04/2015     Priority: Medium     Patient is followed by MICHAEL OROPEZA for ongoing prescription of benzodiazepines.  All refills should be approved by this provider, or covering partner.    Medication(s): alprazolam 2 mg .   Maximum quantity per month: 36  Clinic visit frequency required: Q 6  months     Controlled substance agreement on file: No  Benzodiazepine use reviewed by psychiatry:  No    Last  [facility] website verification:  none   [email or web address]           Attention deficit hyperactivity disorder (ADHD), combined type 10/14/2015     Priority: Medium     Patient is followed by MICHAEL OROPEZA for ongoing prescription of stimulants.  All refills should be approved by this provider, or covering partner.    Medication(s): adderall 20 mg.   Maximum quantity per month: 90  Clinic visit frequency required: Q 6  months     Controlled substance agreement on file: 07/31/17    Neuropsych evaluation for ADD completed:  Yes, completed 7-2008 at [facility], on file and diagnosis confirmed    Last [facility] website verification:  3/14/16, no concerns   [email or web address]           Obesity, Class I, BMI 30-34.9 02/20/2015     Priority: Medium     Internal hemorrhoids which bleed 10/02/2014     Priority: Medium     Essential hypertension 08/17/2014     Priority: Medium     Low back pain 04/07/2013     Priority: Medium     High risk sexual behavior 11/06/2011     Priority: Medium     CARDIOVASCULAR SCREENING; LDL GOAL LESS THAN 160 05/24/2011     Priority: Medium     Anxiety      Priority: Medium      Past Medical History:   Diagnosis Date     Acute right otitis media 1/8/2013     Anxiety      Depression      Drug abuse (H)      Gonococcal urethritis 02/05/2016     Urethritis 5/20/2013     Problem list name updated by automated process. Provider to review     Past Surgical History:   Procedure Laterality Date     BACK SURGERY  04/05/2018     ESOPHAGOSCOPY, GASTROSCOPY, DUODENOSCOPY (EGD), COMBINED N/A 8/16/2021    Procedure: ESOPHAGOGASTRODUODENOSCOPY, WITH BIOPSY AND POLYPECTOMY;  Surgeon: Phillip Joyner MD;  Location: [facility] OR     Current Outpatient Medications   Medication Sig Dispense Refill     albuterol (PROAIR HFA/PROVENTIL HFA/VENTOLIN HFA) 108 (90 Base) MCG/ACT inhaler Inhale 2 puffs into the lungs every 4 hours as needed for wheezing 54 g 1     ALPRAZolam (XANAX) 2 MG tablet Take 1  tablet (2 mg) by mouth 3 times daily as needed for anxiety 30 tablet 0     amphetamine-dextroamphetamine (ADDERALL) 20 MG tablet Take 1 tablet (20 mg) by mouth 3 times daily 90 tablet 0     azelastine (ASTELIN) 0.1 % nasal spray Spray 2 sprays into both nostrils 2 times daily 30 mL 11     cetirizine (ZYRTEC) 10 MG tablet Take 1 tablet (10 mg) by mouth daily as needed for allergies 90 tablet 3     clotrimazole (LOTRIMIN) 1 % external cream Apply topically 2 times daily 60 g 1     famotidine (PEPCID) 40 MG tablet TAKE 1 TABLET(40 MG) BY MOUTH AT BEDTIME 90 tablet 3     fluticasone (FLOVENT HFA) 220 MCG/ACT Inhaler Inhale 2 puffs into the lungs 2 times daily Please give patient spacer 1 Inhaler 3     Hyoscyamine Sulfate 0.375 MG TBCR Take 1 capful by mouth 3 times daily as needed 90 tablet 1     mometasone (ELOCON) 0.1 % external ointment If necessary use 3-4 days in a row or 2xweekly on eczematous lesions 45 g 2     montelukast (SINGULAIR) 10 MG tablet Take 1 tablet (10 mg) by mouth At Bedtime 90 tablet 0     omeprazole (PRILOSEC) 40 MG DR capsule Take 1 capsule (40 mg) by mouth 2 times daily (before meals) 60 capsule 3     oxyCODONE-acetaminophen (PERCOCET)  MG per tablet TAKE 1/2 TO 1 TABLET BY MOUTH THREE TIMES DAILY AS NEEDED FOR SEVERE PAIN       pimecrolimus (ELIDEL) 1 % external cream Apply topically 2 times daily 30 g 11     PROTOPIC 0.1 % external ointment APPLY TWICE DAILY AS NEEDED FOR RASH ON PENIS.       sildenafil (REVATIO) 20 MG tablet Take 1-2 tablets (20-40 mg) by mouth daily as needed As needed for prevention of erectile dysfunction 30 tablet 2     tacrolimus (PROTOPIC) 0.03 % external ointment Apply topically 2 times daily Put on skin lesions 2x daily 60 g 1     terbinafine (LAMISIL) 1 % external cream Apply topically 2 times daily 30 g 1       Allergies   Allergen Reactions     Cymbalta      Weight gain and fatigue     Duloxetine Other (See Comments) and Fatigue     enlarged spleen and weight  "gain     Paroxetine Other (See Comments), Fatigue and GI Disturbance     Weight gain, Spleen issues     Seasonal Allergies      Vicodin [Hydrocodone-Acetaminophen]         Social History     Tobacco Use     Smoking status: Former Smoker     Packs/day: 0.50     Years: 6.00     Pack years: 3.00     Types: Cigarettes     Quit date: 3/12/2018     Years since quitting: 3.5     Smokeless tobacco: Never Used     Tobacco comment: second hand smoke exposure   Substance Use Topics     Alcohol use: Yes     Comment: once a week     Family History   Problem Relation Age of Onset     Unknown/Adopted Mother      Unknown/Adopted Father      Unknown/Adopted Maternal Grandmother      Unknown/Adopted Maternal Grandfather      Unknown/Adopted Paternal Grandmother      Unknown/Adopted Paternal Grandfather      Unknown/Adopted Brother      History   Drug Use     Types: Marijuana     Comment: pot once a month, ectasy  As of 11/7/2016 he only smokes pot occasionally         Objective     BP (!) 142/95 (BP Location: Right arm, Patient Position: Sitting, Cuff Size: Adult Large)   Pulse 96   Temp 98.6  F (37  C) (Oral)   Resp 20   Ht 1.822 m (5' 11.75\")   Wt 120.9 kg (266 lb 9 oz)   SpO2 95%   BMI 36.40 kg/m      Physical Exam    GENERAL APPEARANCE: healthy, alert and no distress     EYES: EOMI,  PERRL     NECK: no adenopathy, no asymmetry, masses, or scars and thyroid normal to palpation     RESP: lungs clear to auscultation - no rales, rhonchi or wheezes     CV: regular rates and rhythm, normal S1 S2, no S3 or S4 and no murmur, click or rub     ABDOMEN:  soft, nontender, no HSM or masses and bowel sounds normal     MS: extremities normal- no gross deformities noted, no evidence of inflammation in joints, FROM in all extremities.     SKIN: no suspicious lesions or rashes     NEURO: Normal strength and tone, sensory exam grossly normal, mentation intact and speech normal     PSYCH: mentation appears normal. and affect normal/bright     " LYMPHATICS: No cervical adenopathy    Recent Labs   Lab Test 01/14/21  1345 10/25/20  1706   HGB 15.1 15.7    265    137   POTASSIUM 4.0 3.7   CR 1.16 1.11        Diagnostics:  No labs were ordered during this visit.   No EKG required, no history of coronary heart disease, significant arrhythmia, peripheral arterial disease or other structural heart disease.    Revised Cardiac Risk Index (RCRI):  The patient has the following serious cardiovascular risks for perioperative complications:   - No serious cardiac risks = 0 points     RCRI Interpretation: 0 points: Class I (very low risk - 0.4% complication rate)           Signed Electronically by: Kishore Holland MD  Copy of this evaluation report is provided to requesting physician.

## 2021-10-10 NOTE — PATIENT INSTRUCTIONS

## 2021-10-10 NOTE — H&P (VIEW-ONLY)
[address] [address]  Phone: [phone]  Primary Provider: Jamison Lora  Pre-op Performing Provider: AIDAN SHINE      PREOPERATIVE EVALUATION:  Today's date: 10/13/2021    Abimael Martinez is a 31 year old male who presents for a preoperative evaluation.    Surgical Information:  Surgery/Procedure: Microdiskectomy  Surgery Location: [facility]  Surgeon:   Surgery Date: TBD  Time of Surgery: TBD  Where patient plans to recover: At home with family  Fax number for surgical facility: Note does not need to be faxed, will be available electronically in Epic.    Type of Anesthesia Anticipated: General    Assessment & Plan     The proposed surgical procedure is considered INTERMEDIATE risk.    Preop general physical exam  Is in good physical shape  Excess tolerance is very good at least 6 METS or more  No history of any CAD or CVA  No history of any diabetes  Cleared for surgery without any further testing  He had some lab work done recently at [facility] on 17 September and that was good so I did not repeat any lab work    Lumbosacral radiculopathy  He is going to have microdiscectomy           Risks and Recommendations:  The patient has the following additional risks and recommendations for perioperative complications:   - No identified additional risk factors other than previously addressed    Medication Instructions:  Patient is to take all scheduled medications on the day of surgery    RECOMMENDATION:  APPROVAL GIVEN to proceed with proposed procedure, without further diagnostic evaluation.    I understand this patient was recently treated for ringworm infection by his primary care with terbinafine  The use of this medication should not be considered a barrier to his safety for surgery.  Note was amended on 10/26/2021 at 1:43 PM          25 minutes spent on the date of the encounter doing chart review, history  and exam, documentation and further activities per the note        Subjective     HPI related to upcoming procedure: Chronic low back pain  Has injections in the past  Pain not getting better  Going for microdiscectomy    Preop Questions 10/8/2021   1. Have you ever had a heart attack or stroke? No   2. Have you ever had surgery on your heart or blood vessels, such as a stent placement, a coronary artery bypass, or surgery on an artery in your head, neck, heart, or legs? No   3. Do you have chest pain with activity? No   4. Do you have a history of  heart failure? No   5. Do you currently have a cold, bronchitis or symptoms of other infection? No   6. Do you have a cough, shortness of breath, or wheezing? No   7. Do you or anyone in your family have previous history of blood clots? UNKNOWN -    8. Do you or does anyone in your family have a serious bleeding problem such as prolonged bleeding following surgeries or cuts? No   9. Have you ever had problems with anemia or been told to take iron pills? No   10. Have you had any abnormal blood loss such as black, tarry or bloody stools? YES -    11. Have you ever had a blood transfusion? No   12. Are you willing to have a blood transfusion if it is medically needed before, during, or after your surgery? Yes   13. Have you or any of your relatives ever had problems with anesthesia? UNKNOWN -    14. Do you have sleep apnea, excessive snoring or daytime drowsiness? No   15. Do you have any artifical heart valves or other implanted medical devices like a pacemaker, defibrillator, or continuous glucose monitor? No   16. Do you have artificial joints? No   17. Are you allergic to latex? No       Health Care Directive:  Patient does not have a Health Care Directive or Living Will: Discussed advance care planning with patient; however, patient declined at this time.    Preoperative Review of :   reviewed - controlled substances reflected in medication list.      Status of  Chronic Conditions:  See problem list for active medical problems.  Problems all longstanding and stable, except as noted/documented.  See ROS for pertinent symptoms related to these conditions.      Review of Systems  CONSTITUTIONAL: NEGATIVE for fever, chills, change in weight  INTEGUMENTARY/SKIN: NEGATIVE for worrisome rashes, moles or lesions  EYES: NEGATIVE for vision changes or irritation  ENT/MOUTH: NEGATIVE for ear, mouth and throat problems  RESP: NEGATIVE for significant cough or SOB  CV: NEGATIVE for chest pain, palpitations or peripheral edema  GI: NEGATIVE for nausea, abdominal pain, heartburn, or change in bowel habits  : NEGATIVE for frequency, dysuria, or hematuria  MUSCULOSKELETAL:CHRONIC BACK PAIN  NEURO: NEGATIVE for weakness, dizziness or paresthesias  ENDOCRINE: NEGATIVE for temperature intolerance, skin/hair changes  HEME: NEGATIVE for bleeding problems  PSYCHIATRIC: NEGATIVE for changes in mood or affect    Patient Active Problem List    Diagnosis Date Noted     Morbid obesity (H) 05/06/2021     Priority: Medium     Former smoker 03/12/2021     Priority: Medium     Lumbosacral radiculopathy 03/12/2018     Priority: Medium     Dyslipidemia 03/12/2018     Priority: Medium     Elevated serum creatinine 03/12/2018     Priority: Medium     Mild persistent asthma without complication 07/31/2017     Priority: Medium     Drug-induced erectile dysfunction 11/07/2016     Priority: Medium     OCD (obsessive compulsive disorder) 09/16/2016     Priority: Medium     AYALA (generalized anxiety disorder) 12/04/2015     Priority: Medium     Patient is followed by MICHAEL OROPEZA for ongoing prescription of benzodiazepines.  All refills should be approved by this provider, or covering partner.    Medication(s): alprazolam 2 mg .   Maximum quantity per month: 36  Clinic visit frequency required: Q 6  months     Controlled substance agreement on file: No  Benzodiazepine use reviewed by psychiatry:  No    Last  [facility] website verification:  none   [email or web address]           Attention deficit hyperactivity disorder (ADHD), combined type 10/14/2015     Priority: Medium     Patient is followed by MICHAEL OROPEZA for ongoing prescription of stimulants.  All refills should be approved by this provider, or covering partner.    Medication(s): adderall 20 mg.   Maximum quantity per month: 90  Clinic visit frequency required: Q 6  months     Controlled substance agreement on file: 07/31/17    Neuropsych evaluation for ADD completed:  Yes, completed 7-2008 at [facility], on file and diagnosis confirmed    Last [facility] website verification:  3/14/16, no concerns   [email or web address]           Obesity, Class I, BMI 30-34.9 02/20/2015     Priority: Medium     Internal hemorrhoids which bleed 10/02/2014     Priority: Medium     Essential hypertension 08/17/2014     Priority: Medium     Low back pain 04/07/2013     Priority: Medium     High risk sexual behavior 11/06/2011     Priority: Medium     CARDIOVASCULAR SCREENING; LDL GOAL LESS THAN 160 05/24/2011     Priority: Medium     Anxiety      Priority: Medium      Past Medical History:   Diagnosis Date     Acute right otitis media 1/8/2013     Anxiety      Depression      Drug abuse (H)      Gonococcal urethritis 02/05/2016     Urethritis 5/20/2013     Problem list name updated by automated process. Provider to review     Past Surgical History:   Procedure Laterality Date     BACK SURGERY  04/05/2018     ESOPHAGOSCOPY, GASTROSCOPY, DUODENOSCOPY (EGD), COMBINED N/A 8/16/2021    Procedure: ESOPHAGOGASTRODUODENOSCOPY, WITH BIOPSY AND POLYPECTOMY;  Surgeon: Phillip Joyner MD;  Location: [facility] OR     Current Outpatient Medications   Medication Sig Dispense Refill     albuterol (PROAIR HFA/PROVENTIL HFA/VENTOLIN HFA) 108 (90 Base) MCG/ACT inhaler Inhale 2 puffs into the lungs every 4 hours as needed for wheezing 54 g 1     ALPRAZolam (XANAX) 2 MG tablet Take 1  tablet (2 mg) by mouth 3 times daily as needed for anxiety 30 tablet 0     amphetamine-dextroamphetamine (ADDERALL) 20 MG tablet Take 1 tablet (20 mg) by mouth 3 times daily 90 tablet 0     azelastine (ASTELIN) 0.1 % nasal spray Spray 2 sprays into both nostrils 2 times daily 30 mL 11     cetirizine (ZYRTEC) 10 MG tablet Take 1 tablet (10 mg) by mouth daily as needed for allergies 90 tablet 3     clotrimazole (LOTRIMIN) 1 % external cream Apply topically 2 times daily 60 g 1     famotidine (PEPCID) 40 MG tablet TAKE 1 TABLET(40 MG) BY MOUTH AT BEDTIME 90 tablet 3     fluticasone (FLOVENT HFA) 220 MCG/ACT Inhaler Inhale 2 puffs into the lungs 2 times daily Please give patient spacer 1 Inhaler 3     Hyoscyamine Sulfate 0.375 MG TBCR Take 1 capful by mouth 3 times daily as needed 90 tablet 1     mometasone (ELOCON) 0.1 % external ointment If necessary use 3-4 days in a row or 2xweekly on eczematous lesions 45 g 2     montelukast (SINGULAIR) 10 MG tablet Take 1 tablet (10 mg) by mouth At Bedtime 90 tablet 0     omeprazole (PRILOSEC) 40 MG DR capsule Take 1 capsule (40 mg) by mouth 2 times daily (before meals) 60 capsule 3     oxyCODONE-acetaminophen (PERCOCET)  MG per tablet TAKE 1/2 TO 1 TABLET BY MOUTH THREE TIMES DAILY AS NEEDED FOR SEVERE PAIN       pimecrolimus (ELIDEL) 1 % external cream Apply topically 2 times daily 30 g 11     PROTOPIC 0.1 % external ointment APPLY TWICE DAILY AS NEEDED FOR RASH ON PENIS.       sildenafil (REVATIO) 20 MG tablet Take 1-2 tablets (20-40 mg) by mouth daily as needed As needed for prevention of erectile dysfunction 30 tablet 2     tacrolimus (PROTOPIC) 0.03 % external ointment Apply topically 2 times daily Put on skin lesions 2x daily 60 g 1     terbinafine (LAMISIL) 1 % external cream Apply topically 2 times daily 30 g 1       Allergies   Allergen Reactions     Cymbalta      Weight gain and fatigue     Duloxetine Other (See Comments) and Fatigue     enlarged spleen and weight  "gain     Paroxetine Other (See Comments), Fatigue and GI Disturbance     Weight gain, Spleen issues     Seasonal Allergies      Vicodin [Hydrocodone-Acetaminophen]         Social History     Tobacco Use     Smoking status: Former Smoker     Packs/day: 0.50     Years: 6.00     Pack years: 3.00     Types: Cigarettes     Quit date: 3/12/2018     Years since quitting: 3.5     Smokeless tobacco: Never Used     Tobacco comment: second hand smoke exposure   Substance Use Topics     Alcohol use: Yes     Comment: once a week     Family History   Problem Relation Age of Onset     Unknown/Adopted Mother      Unknown/Adopted Father      Unknown/Adopted Maternal Grandmother      Unknown/Adopted Maternal Grandfather      Unknown/Adopted Paternal Grandmother      Unknown/Adopted Paternal Grandfather      Unknown/Adopted Brother      History   Drug Use     Types: Marijuana     Comment: pot once a month, ectasy  As of 11/7/2016 he only smokes pot occasionally         Objective     BP (!) 142/95 (BP Location: Right arm, Patient Position: Sitting, Cuff Size: Adult Large)   Pulse 96   Temp 98.6  F (37  C) (Oral)   Resp 20   Ht 1.822 m (5' 11.75\")   Wt 120.9 kg (266 lb 9 oz)   SpO2 95%   BMI 36.40 kg/m      Physical Exam    GENERAL APPEARANCE: healthy, alert and no distress     EYES: EOMI,  PERRL     NECK: no adenopathy, no asymmetry, masses, or scars and thyroid normal to palpation     RESP: lungs clear to auscultation - no rales, rhonchi or wheezes     CV: regular rates and rhythm, normal S1 S2, no S3 or S4 and no murmur, click or rub     ABDOMEN:  soft, nontender, no HSM or masses and bowel sounds normal     MS: extremities normal- no gross deformities noted, no evidence of inflammation in joints, FROM in all extremities.     SKIN: no suspicious lesions or rashes     NEURO: Normal strength and tone, sensory exam grossly normal, mentation intact and speech normal     PSYCH: mentation appears normal. and affect normal/bright     " LYMPHATICS: No cervical adenopathy    Recent Labs   Lab Test 01/14/21  1345 10/25/20  1706   HGB 15.1 15.7    265    137   POTASSIUM 4.0 3.7   CR 1.16 1.11        Diagnostics:  No labs were ordered during this visit.   No EKG required, no history of coronary heart disease, significant arrhythmia, peripheral arterial disease or other structural heart disease.    Revised Cardiac Risk Index (RCRI):  The patient has the following serious cardiovascular risks for perioperative complications:   - No serious cardiac risks = 0 points     RCRI Interpretation: 0 points: Class I (very low risk - 0.4% complication rate)           Signed Electronically by: Kishore Holland MD  Copy of this evaluation report is provided to requesting physician.

## 2021-10-11 RX ORDER — ALPRAZOLAM 2 MG
2 TABLET ORAL 3 TIMES DAILY PRN
Qty: 30 TABLET | Refills: 0 | Status: SHIPPED | OUTPATIENT
Start: 2021-10-11 | End: 2021-10-25

## 2021-10-12 ENCOUNTER — TELEPHONE (OUTPATIENT)
Dept: NEUROSURGERY | Facility: CLINIC | Age: 31
End: 2021-10-12

## 2021-10-12 DIAGNOSIS — M51.16 RADICULOPATHY DUE TO LUMBAR INTERVERTEBRAL DISC DISORDER: Primary | ICD-10-CM

## 2021-10-12 DIAGNOSIS — M51.9 DISC DISORDER OF LUMBAR REGION: ICD-10-CM

## 2021-10-12 NOTE — TELEPHONE ENCOUNTER
Called and spoke to the patient. Saw Dr Wahrton on 9/28 for increasing bilateral low back and left leg pain. Surgery not yet scheduled.     Pain that was centralized in his low back has somewhat gotten better. Still has pain down the left leg but now has moved to the right side. This has started within the last week or so. Pain goes down to right calf and foot. Has numbness and tingling. Pain is worse when he gets up and walks around.      Will route message to Shazia Dunbar PA-C to advise on next steps.

## 2021-10-12 NOTE — TELEPHONE ENCOUNTER
Reason for call: Pt would like a call back to discuss possibly having another MRI before surgery because he is having issues on the side that is not slated for surgery, right side is getting numb and tingly now. Please call him back at [phone]. Thank you.

## 2021-10-13 ENCOUNTER — OFFICE VISIT (OUTPATIENT)
Dept: FAMILY MEDICINE | Facility: CLINIC | Age: 31
End: 2021-10-13
Payer: COMMERCIAL

## 2021-10-13 VITALS
DIASTOLIC BLOOD PRESSURE: 95 MMHG | BODY MASS INDEX: 36.1 KG/M2 | SYSTOLIC BLOOD PRESSURE: 142 MMHG | TEMPERATURE: 98.6 F | WEIGHT: 266.56 LBS | RESPIRATION RATE: 20 BRPM | HEIGHT: 72 IN | OXYGEN SATURATION: 95 % | HEART RATE: 96 BPM

## 2021-10-13 DIAGNOSIS — Z01.818 PREOP GENERAL PHYSICAL EXAM: Primary | ICD-10-CM

## 2021-10-13 DIAGNOSIS — M54.17 LUMBOSACRAL RADICULOPATHY: ICD-10-CM

## 2021-10-13 PROCEDURE — 99214 OFFICE O/P EST MOD 30 MIN: CPT | Performed by: INTERNAL MEDICINE

## 2021-10-13 ASSESSMENT — MIFFLIN-ST. JEOR: SCORE: 2203.15

## 2021-10-13 NOTE — TELEPHONE ENCOUNTER
Attempted to reach out to patient, no answer. Left detailed voice message for patient to call clinic back to further discuss his current symptoms to confirm that another Lumbar MRI is needed.

## 2021-10-14 ENCOUNTER — NURSE TRIAGE (OUTPATIENT)
Dept: NURSING | Facility: CLINIC | Age: 31
End: 2021-10-14

## 2021-10-14 DIAGNOSIS — Z11.59 ENCOUNTER FOR SCREENING FOR OTHER VIRAL DISEASES: ICD-10-CM

## 2021-10-14 ASSESSMENT — ASTHMA QUESTIONNAIRES
QUESTION_1 LAST FOUR WEEKS HOW MUCH OF THE TIME DID YOUR ASTHMA KEEP YOU FROM GETTING AS MUCH DONE AT WORK, SCHOOL OR AT HOME: A LITTLE OF THE TIME
QUESTION_2 LAST FOUR WEEKS HOW OFTEN HAVE YOU HAD SHORTNESS OF BREATH: ONCE OR TWICE A WEEK
ACT_TOTALSCORE: 21
QUESTION_5 LAST FOUR WEEKS HOW WOULD YOU RATE YOUR ASTHMA CONTROL: WELL CONTROLLED
QUESTION_3 LAST FOUR WEEKS HOW OFTEN DID YOUR ASTHMA SYMPTOMS (WHEEZING, COUGHING, SHORTNESS OF BREATH, CHEST TIGHTNESS OR PAIN) WAKE YOU UP AT NIGHT OR EARLIER THAN USUAL IN THE MORNING: NOT AT ALL
QUESTION_4 LAST FOUR WEEKS HOW OFTEN HAVE YOU USED YOUR RESCUE INHALER OR NEBULIZER MEDICATION (SUCH AS ALBUTEROL): ONCE A WEEK OR LESS

## 2021-10-14 NOTE — TELEPHONE ENCOUNTER
"Pt has question about date of most recent labs...  States \"While having pre-op visit yesterday, the provider told me a pre-op is good for only 30 days.\"  \"He said if my procedure had to be cancelled/rescheduled, I would have to do another pre-op.\"    Pt's QUESTION:  \"If pre-op date must be within 30 days of procedure, what about date of labs?\"  \"Wouldn't labs also have to be done within 30 days of procedure date?\"    Pt's most recent labs done 21 which will be > 30 days prior to procedure date.  Should labs be re-done?    Pt states \"The provider just probed my abdomen, etc, but didn't re-order the labs.\"  Pt expresses concern about his labs being considered possibly  by the time of procedure.    Best phone # for pt -> [phone]     Thank you-    Amy GRULLON [facility] Nurse Advisor     Reason for Disposition    [1] Follow-up call from patient regarding patient's clinical status AND [2] information urgent    Protocols used: PCP CALL - NO TRIAGE-A-    ___________________    COVID 19 Nurse Triage Plan/Patient Instructions    Please be aware that novel coronavirus (COVID-19) may be circulating in the community. If you develop symptoms such as fever, cough, or SOB or if you have concerns about the presence of another infection including coronavirus (COVID-19), please contact your health care provider or visit [email or web address].     Disposition/Instructions    Additional COVID19 information to add for patients.   How can I protect others?  If you have symptoms (fever, cough, body aches or trouble breathing): Stay home and away from others (self-isolate) until:    At least 10 days have passed since your symptoms started, And     You ve had no fever--and no medicine that reduces fever--for 1 full day (24 hours), And      Your other symptoms have resolved (gotten better).     If you don t have symptoms, but a test showed that you have COVID-19 (you tested positive):    Stay home and away from " "others (self-isolate). Follow the tips under \"How do I self-isolate?\" below for 10 days (20 days if you have a weak immune system).    You don't need to be retested for COVID-19 before going back to school or work. As long as you're fever-free and feeling better, you can go back to school, work and other activities after waiting the 10 or 20 days.     How do I self-isolate?    Stay in your own room, even for meals. Use your own bathroom if you can.     Stay away from others in your home. No hugging, kissing or shaking hands. No visitors.    Don t go to work, school or anywhere else.     Clean  high touch  surfaces often (doorknobs, counters, handles, etc.). Use a household cleaning spray or wipes. You ll find a full list on the EPA website:  www.epa.gov/pesticide-registration/list-n-disinfectants-use-against-sars-cov-2.    Cover your mouth and nose with a mask, tissue or washcloth to avoid spreading germs.    Wash your hands and face often. Use soap and water.    Caregivers in these groups are at risk for severe illness due to COVID-19:  o People 65 years and older  o People who live in a nursing home or long-term care facility  o People with chronic disease (lung, heart, cancer, diabetes, kidney, liver, immunologic)  o People who have a weakened immune system, including those who:  - Are in cancer treatment  - Take medicine that weakens the immune system, such as corticosteroids  - Had a bone marrow or organ transplant  - Have an immune deficiency  - Have poorly controlled HIV or AIDS  - Are obese (body mass index of 40 or higher)  - Smoke regularly    Caregivers should wear gloves while washing dishes, handling laundry and cleaning bedrooms and bathrooms.    Use caution when washing and drying laundry: Don t shake dirty laundry, and use the warmest water setting that you can.    For more tips, go to www.cdc.gov/coronavirus/2019-ncov/downloads/10Things.pdf.    How can I take care of myself?  1. Get lots of rest. " Drink extra fluids (unless a doctor has told you not to).     2. Take Tylenol (acetaminophen) for fever or pain. If you have liver or kidney problems, ask your family doctor if it s okay to take Tylenol.     Adults can take either:     650 mg (two 325 mg pills) every 4 to 6 hours, or     1,000 mg (two 500 mg pills) every 8 hours as needed.     Note: Don t take more than 3,000 mg in one day.   Acetaminophen is found in many medicines (both prescribed and over-the-counter medicines). Read all labels to be sure you don t take too much.     For children, check the Tylenol bottle for the right dose. The dose is based on the child s age or weight.    3. If you have other health problems (like cancer, heart failure, an organ transplant or severe kidney disease): Call your specialty clinic if you don t feel better in the next 2 days.    4. Know when to call 911: Emergency warning signs include:    Trouble breathing or shortness of breath    Pain or pressure in the chest that doesn t go away    Feeling confused like you haven t felt before, or not being able to wake up    Bluish-colored lips or face    What are the symptoms of COVID-19?     The most common symptoms are cough, fever and trouble breathing.     Less common symptoms include body aches, chills, diarrhea (loose, watery poops), fatigue (feeling very tired), headache, runny nose, sore throat and loss of smell.    COVID-19 can cause severe coughing (bronchitis) and lung infection (pneumonia).    How does it spread?     The virus may spread when a person coughs or sneezes into the air. The virus can travel about 6 feet this way, and it can live on surfaces.      Common  (household disinfectants) will kill the virus.    Who is at risk?  Anyone can catch COVID-19 if they re around someone who has the virus.    How can others protect themselves?     Stay away from people who have COVID-19 (or symptoms of COVID-19).    Wash hands often with soap and water. Or, use  hand  with at least 60% alcohol.    Avoid touching the eyes, nose or mouth.     Wear a face mask when you go out in public, when sick or when caring for a sick person.    Where can I get more information?    [facility]: About COVID-19: [email or web address]    CDC: What to Do If You re Sick: www.cdc.gov/coronavirus/2019-ncov/about/steps-when-sick.html    CDC: Ending Home Isolation: www.cdc.gov/coronavirus/2019-ncov/hcp/disposition-in-home-patients.html     CDC: Caring for Someone: www.cdc.gov/coronavirus/2019-ncov/if-you-are-sick/care-for-someone.html     MD: Interim Guidance for Hospital Discharge to Home: www.[email or web address].[place]./diseases/coronavirus/hcp/hospdischarge.pdf    [facility] clinical trials (COVID-19 research studies): clinicalaffairs.[facility]-clinical-trials     Below are the COVID-19 hotlines at the [place] Department of Health ([facility]). Interpreters are available.   o For health questions: Call 061-522-3277 or 1-548.713.7543 (7 a.m. to 7 p.m.)  o For questions about schools and childcare: Call 436-444-9392 or 1-279.478.5123 (7 a.m. to 7 p.m.)          Thank you for taking steps to prevent the spread of this virus.  o Limit your contact with others.  o Wear a simple mask to cover your cough.  o Wash your hands well and often.    Resources    [facility]: About COVID-19: [email or web address]    CDC: What to Do If You're Sick: www.cdc.gov/coronavirus/2019-ncov/about/steps-when-sick.html    CDC: Ending Home Isolation: www.cdc.gov/coronavirus/2019-ncov/hcp/disposition-in-home-patients.html     CDC: Caring for Someone: www.cdc.gov/coronavirus/2019-ncov/if-you-are-sick/care-for-someone.html     [facility]: Interim Guidance for Hospital Discharge to Home: www.health.[facility].[place].us/diseases/coronavirus/hcp/hospdischarge.pdf    [facility] clinical trials (COVID-19 research studies): clinicalaffairs.[facility]-clinical-trials     Below are the  COVID-19 hotlines at the [place] Department of Health ([facility]). Interpreters are available.   o For health questions: Call 913-117-9300 or 1-103.790.8031 (7 a.m. to 7 p.m.)  o For questions about schools and childcare: Call 612-111-1909 or 1-532.991.3980 (7 a.m. to 7 p.m.)

## 2021-10-14 NOTE — TELEPHONE ENCOUNTER
Faxed MRI order to [facility] on October 14, 2021 to fax number [phone]    Right Fax confirmed at 1110 AM    Bryce Hanna RN

## 2021-10-14 NOTE — TELEPHONE ENCOUNTER
Returned call to patient to discuss new symptoms. Prior to MRI on 9/20/21, patient's pain and n/t was in lower back and down left leg. Since then, he has noticed the pain going down right leg as well some n/t in right calf and foot. No weakness reported. Pain is worse with activity. Pain can get as bad as 9/10. Manages pain with ice/heat, as well as prescribed Percocet and Tylenol and Ibuprofen.   Per Shazia Dunbar, recommended Lumbar MRI placed. Patient recommending [facility]

## 2021-10-14 NOTE — TELEPHONE ENCOUNTER
No the labs do not need to be repeated. The labs performed are done at the discretion of the physician who did the preop.  If his surgery is proposed postponed his surgeon may want a another Covid test performed in a certain amount of time before the procedure but that is up to the surgeon.  Jamison Lora MD

## 2021-10-15 ASSESSMENT — ASTHMA QUESTIONNAIRES: ACT_TOTALSCORE: 21

## 2021-10-18 NOTE — TELEPHONE ENCOUNTER
Left a message for patient to call back.  If he calls back, please give him Dr. Lora's message.  Jana BALDWIN - Lionel

## 2021-10-19 ENCOUNTER — VIRTUAL VISIT (OUTPATIENT)
Dept: FAMILY MEDICINE | Facility: CLINIC | Age: 31
End: 2021-10-19
Payer: COMMERCIAL

## 2021-10-19 ENCOUNTER — TELEPHONE (OUTPATIENT)
Dept: NEUROSURGERY | Facility: CLINIC | Age: 31
End: 2021-10-19

## 2021-10-19 DIAGNOSIS — M25.519 SHOULDER PAIN, UNSPECIFIED CHRONICITY, UNSPECIFIED LATERALITY: ICD-10-CM

## 2021-10-19 DIAGNOSIS — M51.9 DISC DISORDER OF LUMBAR REGION: ICD-10-CM

## 2021-10-19 DIAGNOSIS — Z13.220 SCREENING FOR HYPERLIPIDEMIA: Primary | ICD-10-CM

## 2021-10-19 PROCEDURE — 99213 OFFICE O/P EST LOW 20 MIN: CPT | Mod: 95 | Performed by: FAMILY MEDICINE

## 2021-10-19 NOTE — PROGRESS NOTES
Abimael is a 31 year old who is being evaluated via a billable telephone visit.      What phone number would you like to be contacted at? [phone]  How would you like to obtain your AVS? MyChart    Assessment & Plan       Shoulder pain, unspecified chronicity, unspecified laterality  Patient has what sounds to be impingement syndrome.  I copied and pasted some exercises for him to do into the MyChart and sent it to him.  Asked that he follow-up in clinic if it continues to bother him    Disc disorder of lumbar region  Reviewed the patient's labs and he is on no medications and affect potassium and he has no history of anemia so he should not need repeat labs if his surgery is delayed.                   No follow-ups on file.    Jamison Lora MD  [facility] [place]    Subjective   Abimael is a 31 year old who presents for the following health issues     HPI     Surgery, 10/28/21 wondering blood work done.  Shoulder pain, right wondering if it is from sleeping on his side more with the back pain, getting worse. wondering your thoughts    Patient is planned to have spine surgery on 28 October which is an 9 days.    He had a preoperative physical exam at [place] in the 13th   Reports that he had blood work done on 9-17.  He is concerned if that blood work is sufficient and he is also concerned if his surgery is postponed will that need to be repeated    He is concerned about pain in his right shoulder has been going on for weeks to months  He has been sleeping on his right side unintentionally and when he does that his arm is really sore in the morning.  He has no major injuries to this shoulder   He has been icing it.   Reports that it hurts when he raises it up         Review of Systems         Objective           Vitals:  No vitals were obtained today due to virtual visit.    Physical Exam   healthy, alert and no distress  PSYCH: Alert and oriented times 3; coherent speech, normal   rate  and volume, able to articulate logical thoughts, able   to abstract reason, no tangential thoughts, no hallucinations   or delusions  His affect is normal  RESP: No cough, no audible wheezing, able to talk in full sentences  Remainder of exam unable to be completed due to telephone visits     Ref Range & Units 1 mo ago   WBC 4.3 - 10.8 K/uL 6.2    RBC 4.60 - 6.20 M/uL 5.48    HEMOGLOBIN 14.0 - 18.0 gm/dL 15.5    HEMATOCRIT 40.0 - 54.0 % 44.4    MCV 80 - 100 fl 81    MCH 27 - 33 pg 28    MCHC 33 - 36 gm/dL 35    RDW 11.5 - 14.5 % 12.5    PLATELET COUNT 150 - 400 K/    MPV 6.5 - 12  9.7    PMN %  % 57.6    IG% <=1.0 % 0.2    LYMPH %  % 32.9    MONO %  % 5.7    EOS %  % 3.1    BASO %  % 0.5    PMN ABSOLUTE 1.80 - 7.80 K/uL 3.56    IG ABSOLUTE  K/uL 0.01    LYMPH ABSOLUTE 1.00 - 4.00 K/uL 2.03    MONO ABSOLUTE 0.00 - 1.00 K/uL 0.35    EOS ABSOLUTE 0.00 - 0.45 K/uL 0.19    BASO ABSOLUTE 0.00 - 0.20 K/uL 0.03    NUCL RBC % 0.0 - 0.0 /100 WBC 0.0    NUCL RBC ABSOLUTE 0.00 - 0.00 K/uL 0.00    Resulting Agency  [facility]   Specimen Collected: 09/17/21  9:19 AM Last Resulted: 09/17/21  9:34 AM   Received From: [facility]  Result Received: 09/20/21  8:20 AM     SODIUM 136 - 145 mmol/L 139    POTASSIUM 3.5 - 5.1 mmol/L 4.3    CHLORIDE 98 - 107 mmol/L 104    CARBON DIOXIDE 21 - 32 mmol/L 25    BUN (UREA NITRO) 7 - 18 mg/dL 15    CREATININE 0.70 - 1.30 mg/dL 1.38High     EST GFR (CKD-EPI) >60 mL/min >60    EST GFR IF AFRICAN AM >60 mL/min >60    GLUCOSE 70 - 110 mg/dL 119High     CALCIUM, SERUM 8.5 - 10.1 mg/dL 9.0    ANION GAP 0.0 - 15.0 mmol/L 10.0    Resulting Agency  [facility]   Specimen Collected: 09/17/21  9:19 AM Last Resulted: 09/17/21 10:13 AM   Received From: [facility]  Result Received: 09/20/21  8:20 AM                       Phone call duration: 8 minutes

## 2021-10-19 NOTE — TELEPHONE ENCOUNTER
Writer called Ryan to have lumbar MRI pushed to  PACs and report faxed as well.     Image attached in PACs along with report. Message routed to provider for review.

## 2021-10-19 NOTE — TELEPHONE ENCOUNTER
Reason for call: Pt called to advise that he had an MRI last night. He would like a call back to discuss. Please call him at [phone]. Thank you.

## 2021-10-19 NOTE — TELEPHONE ENCOUNTER
Please schedule the patient with me in clinic next Tuesday at 2 PM to review everything again.  Thank you

## 2021-10-19 NOTE — LETTER
My Asthma Action Plan    Name: Abimael Martinez   YOB: 1990  Date: 10/19/2021   My doctor: Jamison Lora MD   My clinic: [facility]        My Control Medicine: Fluticasone propionate (Flovent HFA) - 220 mcg 2 puffs twice a day  Montelukast (Singulair) -  10 mg daily  My Rescue Medicine: Albuterol (Proair/Ventolin/Proventil HFA) 2-4 puffs EVERY 4 HOURS as needed. Use a spacer if recommended by your provider.   My Asthma Severity:   Moderate Persistent  Know your asthma triggers: Patient is unaware of triggers  upper respiratory infections            GREEN ZONE   Good Control    I feel good    No cough or wheeze    Can work, sleep and play without asthma symptoms       Take your asthma control medicine every day.     1. If exercise triggers your asthma, take your rescue medication    15 minutes before exercise or sports, and    During exercise if you have asthma symptoms  2. Spacer to use with inhaler: If you have a spacer, make sure to use it with your inhaler             YELLOW ZONE Getting Worse  I have ANY of these:    I do not feel good    Cough or wheeze    Chest feels tight    Wake up at night   1. Keep taking your Green Zone medications  2. Start taking your rescue medicine:    every 20 minutes for up to 1 hour. Then every 4 hours for 24-48 hours.  3. If you stay in the Yellow Zone for more than 12-24 hours, contact your doctor.  4. If you do not return to the Green Zone in 12-24 hours or you get worse, start taking your oral steroid medicine if prescribed by your provider.           RED ZONE Medical Alert - Get Help  I have ANY of these:    I feel awful    Medicine is not helping    Breathing getting harder    Trouble walking or talking    Nose opens wide to breathe       1. Take your rescue medicine NOW  2. If your provider has prescribed an oral steroid medicine, start taking it NOW  3. Call your doctor NOW  4. If you are still in the Red Zone after 20 minutes  and you have not reached your doctor:    Take your rescue medicine again and    Call 911 or go to the emergency room right away    See your regular doctor within 2 weeks of an Emergency Room or Urgent Care visit for follow-up treatment.          Annual Reminders:  Meet with Asthma Educator,  Flu Shot in the Fall, consider Pneumonia Vaccination for patients with asthma (aged 19 and older).    Pharmacy:    WRITTEN PRESCRIPTION REQUESTED  [facility] #[id number] [place], [place] - [address] [address]  [facility], [place] - [address] [address] [place] [address] [address] [facility]  [id number] [place], [place] - [address] [facility] #[id number] - [place], [place] - [address] AT [facility]    Electronically signed by Jamison Lora MD   Date: 10/19/21                      Asthma Triggers  How To Control Things That Make Your Asthma Worse    Triggers are things that make your asthma worse.  Look at the list below to help you find your triggers and what you can do about them.  You can help prevent asthma flare-ups by staying away from your triggers.      Trigger                                                          What you can do   Cigarette Smoke  Tobacco smoke can make asthma worse. Do not allow smoking in your home, car or around you.  Be sure no one smokes at a child s day care or school.  If you smoke, ask your health care provider for ways to help you quit.  Ask family members to quit too.  Ask your health care provider for a referral to Quit Plan to help you quit smoking, or call 6-935-545-PLAN.     Colds, Flu, Bronchitis  These are common triggers of asthma. Wash your hands often.  Don t touch your eyes, nose or mouth.  Get a flu shot every year.     Dust Mites  These are tiny bugs that live in cloth or carpet. They are too small to see. Wash sheets and blankets in hot water every week.    Encase pillows and mattress in dust mite proof covers.  Avoid having carpet if you can. If you have carpet, vacuum weekly.   Use a dust mask and HEPA vacuum.   Pollen and Outdoor Mold  Some people are allergic to trees, grass, or weed pollen, or molds. Try to keep your windows closed.  Limit time out doors when pollen count is high.   Ask you health care provider about taking medicine during allergy season.     Animal Dander  Some people are allergic to skin flakes, urine or saliva from pets with fur or feathers. Keep pets with fur or feathers out of your home.    If you can t keep the pet outdoors, then keep the pet out of your bedroom.  Keep the bedroom door closed.  Keep pets off cloth furniture and away from stuffed toys.     Mice, Rats, and Cockroaches   Some people are allergic to the waste from these pests.   Cover food and garbage.  Clean up spills and food crumbs.  Store grease in the refrigerator.   Keep food out of the bedroom.   Indoor Mold  This can be a trigger if your home has high moisture. Fix leaking faucets, pipes, or other sources of water.   Clean moldy surfaces.  Dehumidify basement if it is damp and smelly.   Smoke, Strong Odors, and Sprays  These can reduce air quality. Stay away from strong odors and sprays, such as perfume, powder, hair spray, paints, smoke incense, paint, cleaning products, candles and new carpet.   Exercise or Sports  Some people with asthma have this trigger. Be active!  Ask your doctor about taking medicine before sports or exercise to prevent symptoms.    Warm up for 5-10 minutes before and after sports or exercise.     Other Triggers of Asthma  Cold air:  Cover your nose and mouth with a scarf.  Sometimes laughing or crying can be a trigger.  Some medicines and food can trigger asthma.

## 2021-10-19 NOTE — PATIENT INSTRUCTIONS
Patient Education     Pendulum (Flexibility)    1. Lean over next to a table, with your left arm supporting your weight on the table.  2. Relax your right arm and let it hang straight down.  3. Slowly begin to swing your right arm in a small [place]. Gradually make the [place] bigger if you can. Change direction after 1 minute of motion.  4. Next, swing your right arm backward and forward. Then move it side to side. Change direction after 1 minute of motion.  5. Repeat these movements for about 5 minutes.  6. Switch sides and repeat if instructed.  7. Do this exercise 3 times a day, or as often as instructed.  [employer] last reviewed this educational content on 2/1/2020 2000-2021 [employer]. All rights reserved. This information is not intended as a substitute for professional medical care. Always follow your healthcare professional's instructions.           Patient Education     Shoulder External Rotation (Flexibility)    8. Lie on your back on a bed or the floor, with your arms at your side. Bend your arms at a 90-degree angle. Hold a stick or cane in front of you with both hands, palms down. Keep your upper arms close to your body.  9. Slowly push the stick or cane to the right with your left hand. Keep holding onto the stick or cane with both hands. Keep your elbows close to your body. Feel your right shoulder stretch. Stop at the point of discomfort. Hold for 5 seconds.  10. Slowly move your arms back. Relax.  11. Repeat on left side if instructed.  12. Repeat 5 times, or as instructed.  [employer] last reviewed this educational content on 2/1/2020 2000-2021 [employer]. All rights reserved. This information is not intended as a substitute for professional medical care. Always follow your healthcare professional's instructions.           Patient Education     Shoulder Impingement Syndrome   The rotator cuff is a group of muscles and tendons that surround the shoulder joint. These  muscles and tendons hold the arm in its joint. They help the shoulder move. The rotator cuff muscles and tendons can become irritated from repeated rubbing against the shoulder bone. This is called shoulder impingement syndrome or rotator cuff tendonitis.  If your case is mild, you may only need to rest the shoulder and then do certain exercises to strengthen the muscles. You can also take anti-inflammatory medicines. Steroid injections into the shoulder can ease inflammation. But you can have only a limited number of these. If the condition gets worse, your shoulder muscles may become thin and weak. This can lead to a rotator cuff tear.  Symptoms of shoulder impingement syndrome may include:    Shoulder pain that gets worse when you raise your arm overhead    Weakness of the shoulder muscles when you use your arm overhead    Popping and clicking when you move your shoulder    Shoulder pain that wakes you up at night, especially when you sleep on the affected shoulder    Sudden pain in your shoulder when you lift or reach    Pain that spreads from the front of the shoulder to the side of the arm  Home care  Follow these tips to take care of yourself at home:    Don't do activities that make your pain worse. These include raising your arms overhead, repeating the same motion over and over, or lifting heavy objects.    Don t hold your arm in one position for a long time. Keep it moving.    Put an ice pack on the sore area for 20 minutes every 1 to 2 hours for the first day. You can make an ice pack by putting ice cubes in a plastic bag. Wrap the bag in a thin towel before putting it on your shoulder. A frozen bag of peas or something similar can also be used as an ice pack. Don't place the ice pack directly on your skin. Place a towel between it and your skin. Use the ice packs 3 to 4 times a day for the next 2 days. Continue using the ice to relieve the pain and swelling as needed.    You may take acetaminophen or  ibuprofen to control pain, unless another medicine was prescribed. If prednisone was prescribed, don t take anti-inflammatory medicines. If you have chronic liver or kidney disease or ever had a stomach ulcer or gastrointestinal bleeding, talk with your doctor before using these medicines.    After your symptoms ease, you may get physical therapy or start a home exercise program. This can strengthen your shoulder muscles and help your range of motion. Talk with your doctor about what is best for your condition.  Follow-up care  Follow up with your healthcare provider, or as advised.  When to seek medical advice  Call your healthcare provider right away if any of these occur:    Shoulder pain that gets worse and wakes you up at night    Pain and weakness that gets worse when you reach up or raise your arms over your shoulders    Your shoulder or arm swells    Numbness, tingling, or pain that travels down the arm to the hand    Loss of shoulder strength    Fever or chills  Yanna last reviewed this educational content on 11/1/2019 2000-2021 [employer]. All rights reserved. This information is not intended as a substitute for professional medical care. Always follow your healthcare professional's instructions.

## 2021-10-20 ENCOUNTER — TELEPHONE (OUTPATIENT)
Dept: NEUROSURGERY | Facility: CLINIC | Age: 31
End: 2021-10-20

## 2021-10-20 ENCOUNTER — TELEPHONE (OUTPATIENT)
Dept: FAMILY MEDICINE | Facility: CLINIC | Age: 31
End: 2021-10-20

## 2021-10-20 ENCOUNTER — VIRTUAL VISIT (OUTPATIENT)
Dept: FAMILY MEDICINE | Facility: CLINIC | Age: 31
End: 2021-10-20
Payer: COMMERCIAL

## 2021-10-20 DIAGNOSIS — B35.4 TINEA CORPORIS: Primary | ICD-10-CM

## 2021-10-20 PROCEDURE — 99212 OFFICE O/P EST SF 10 MIN: CPT | Mod: 95 | Performed by: FAMILY MEDICINE

## 2021-10-20 RX ORDER — PRENATAL VIT 91/IRON/FOLIC/DHA 28-975-200
1 COMBINATION PACKAGE (EA) ORAL
COMMUNITY
Start: 2021-10-20 | End: 2022-06-13

## 2021-10-20 NOTE — TELEPHONE ENCOUNTER
Pt calling to schedule visit with provider to discuss upcoming surgery and fungal infection.   Pt noted surgery team advised should not be a problem due to proximity of surgery to location of infection but will need clearance by PCP.  Pt scheduled.  Future Appointments   Date Time Provider Department [employer]   10/20/2021  4:00 PM [facility]   10/25/2021  3:00 PM [facility] [place]   10/26/2021  2:00 PM Shazia Dunbar PA-C [facility]   11/12/2021 12:45 PM Shazia Dunbar PA-C [facility] [place]   12/3/2021 11:00 AM Shazia Dunbar PA-C [facility] [place]   12/7/2021 11:00 AM [facility]   12/7/2021 11:30 AM Mitlon Lowery MD [facility]     Zeus RAMIREZ RN   [facility]

## 2021-10-20 NOTE — TELEPHONE ENCOUNTER
Abimael called back to update us that his primary provider ok'ed to go ahead with his scheduled surgery. Please reach him, if needed: [phone]

## 2021-10-20 NOTE — TELEPHONE ENCOUNTER
Patient called in to report suspected ring-worm and asked if he can proceed with his scheduled surgery. Please reach him at: [phone].

## 2021-10-20 NOTE — PROGRESS NOTES
Abimael is a 31 year old who is being evaluated via a billable video visit.      How would you like to obtain your AVS? MyChart  If the video visit is dropped, the invitation should be resent by: Text to cell phone: [phone]   Will anyone else be joining your video visit? No    Video Start Time: 4:00 PM    Assessment & Plan     Tinea corporis    Continue present management     The use of this medication should not be considered a barrier to his safety for surgery.                   No follow-ups on file.    Jamison Lora MD  [facility]   Abimael is a 31 year old who presents for the following health issues   HPI     Needs to get ok to continue on with surgery since he has another ring worm spot on inner thigh. Started on  lamisil cream today but was told to get approval from primary if ok to continue with upcoming surgery      He has been battling ringworm off and on a few times recently    He noticed a spot on his shoulder   He was put on terbenifine cream plus an oral medication       He noticed some Itching earlier today and he has a spot on his left medial thigh  He went to urgent care today and was given terbinifine cream    He called the surgeon and they said to check with us if ok to proceed with surgery on 10-28        Review of Systems         Objective           Vitals:  No vitals were obtained today due to virtual visit.    Physical Exam   GENERAL: Healthy, alert and no distress  EYES: Eyes grossly normal to inspection.  No discharge or erythema, or obvious scleral/conjunctival abnormalities.  RESP: No audible wheeze, cough, or visible cyanosis.  No visible retractions or increased work of breathing.    SKIN: erythema - on l medial thigh  NEURO: Cranial nerves grossly intact.  Mentation and speech appropriate for age.  PSYCH: Mentation appears normal, affect normal/bright, judgement and insight intact, normal speech and appearance well-groomed.  }           Video-Visit Details    Type of service:  Video Visit    Video End Time:4:07PM    Originating Location (pt. Location): Home    Distant Location (provider location):  [facility] [place]     Platform used for Video Visit: [employer]

## 2021-10-20 NOTE — TELEPHONE ENCOUNTER
Returned call to patient. He has upcoming surgery with Dr. Wharton on 10/28/21. He has suspected ringworm on his inner left thigh. He just finished treatment for this last week (had ringworm previously on right shoulder).     He is planning to see Urgent Care today. Will send update to Dr. Wharton's team as well.

## 2021-10-25 ENCOUNTER — LAB (OUTPATIENT)
Dept: LAB | Facility: CLINIC | Age: 31
End: 2021-10-25
Attending: NEUROLOGICAL SURGERY
Payer: COMMERCIAL

## 2021-10-25 ENCOUNTER — MYC REFILL (OUTPATIENT)
Dept: FAMILY MEDICINE | Facility: CLINIC | Age: 31
End: 2021-10-25

## 2021-10-25 DIAGNOSIS — F90.2 ATTENTION DEFICIT HYPERACTIVITY DISORDER (ADHD), COMBINED TYPE: ICD-10-CM

## 2021-10-25 DIAGNOSIS — F41.9 ANXIETY: ICD-10-CM

## 2021-10-25 DIAGNOSIS — Z11.59 ENCOUNTER FOR SCREENING FOR OTHER VIRAL DISEASES: ICD-10-CM

## 2021-10-25 PROCEDURE — U0005 INFEC AGEN DETEC AMPLI PROBE: HCPCS

## 2021-10-25 PROCEDURE — U0003 INFECTIOUS AGENT DETECTION BY NUCLEIC ACID (DNA OR RNA); SEVERE ACUTE RESPIRATORY SYNDROME CORONAVIRUS 2 (SARS-COV-2) (CORONAVIRUS DISEASE [COVID-19]), AMPLIFIED PROBE TECHNIQUE, MAKING USE OF HIGH THROUGHPUT TECHNOLOGIES AS DESCRIBED BY CMS-2020-01-R: HCPCS

## 2021-10-25 RX ORDER — DEXTROAMPHETAMINE SACCHARATE, AMPHETAMINE ASPARTATE, DEXTROAMPHETAMINE SULFATE AND AMPHETAMINE SULFATE 5; 5; 5; 5 MG/1; MG/1; MG/1; MG/1
20 TABLET ORAL 3 TIMES DAILY
Qty: 90 TABLET | Refills: 0 | Status: SHIPPED | OUTPATIENT
Start: 2021-10-25 | End: 2021-11-29

## 2021-10-25 RX ORDER — ALPRAZOLAM 2 MG
2 TABLET ORAL 3 TIMES DAILY PRN
Qty: 30 TABLET | Refills: 0 | Status: SHIPPED | OUTPATIENT
Start: 2021-10-25 | End: 2021-11-29

## 2021-10-26 ENCOUNTER — OFFICE VISIT (OUTPATIENT)
Dept: NEUROSURGERY | Facility: CLINIC | Age: 31
End: 2021-10-26
Attending: PHYSICIAN ASSISTANT
Payer: COMMERCIAL

## 2021-10-26 ENCOUNTER — TELEPHONE (OUTPATIENT)
Dept: FAMILY MEDICINE | Facility: CLINIC | Age: 31
End: 2021-10-26

## 2021-10-26 ENCOUNTER — VIRTUAL VISIT (OUTPATIENT)
Dept: DERMATOLOGY | Facility: CLINIC | Age: 31
End: 2021-10-26
Payer: COMMERCIAL

## 2021-10-26 VITALS
SYSTOLIC BLOOD PRESSURE: 149 MMHG | DIASTOLIC BLOOD PRESSURE: 83 MMHG | HEIGHT: 71 IN | WEIGHT: 266 LBS | OXYGEN SATURATION: 97 % | HEART RATE: 85 BPM | BODY MASS INDEX: 37.24 KG/M2

## 2021-10-26 DIAGNOSIS — M54.16 LUMBAR RADICULOPATHY: ICD-10-CM

## 2021-10-26 DIAGNOSIS — R21 RASH AND NONSPECIFIC SKIN ERUPTION: Primary | ICD-10-CM

## 2021-10-26 DIAGNOSIS — Z01.818 PREOP TESTING: Primary | ICD-10-CM

## 2021-10-26 DIAGNOSIS — L30.9 DERMATITIS: Primary | ICD-10-CM

## 2021-10-26 LAB — SARS-COV-2 RNA RESP QL NAA+PROBE: NEGATIVE

## 2021-10-26 PROCEDURE — 99213 OFFICE O/P EST LOW 20 MIN: CPT | Performed by: PHYSICIAN ASSISTANT

## 2021-10-26 PROCEDURE — G0463 HOSPITAL OUTPT CLINIC VISIT: HCPCS

## 2021-10-26 PROCEDURE — 99214 OFFICE O/P EST MOD 30 MIN: CPT | Mod: 95 | Performed by: DERMATOLOGY

## 2021-10-26 RX ORDER — CEFAZOLIN SODIUM IN 0.9 % NACL 3 G/100 ML
3 INTRAVENOUS SOLUTION, PIGGYBACK (ML) INTRAVENOUS
Status: CANCELLED | OUTPATIENT
Start: 2021-10-26

## 2021-10-26 ASSESSMENT — PAIN SCALES - GENERAL
PAINLEVEL: NO PAIN (0)
PAINLEVEL: SEVERE PAIN (6)

## 2021-10-26 ASSESSMENT — MIFFLIN-ST. JEOR: SCORE: 2188.7

## 2021-10-26 NOTE — TELEPHONE ENCOUNTER
"Patient calling to find out from pcp if he can still have his back surgery this Thursday.  States L4-L5 and L5-S1.  He states surgeon's office said to ask PCP.  He was seen 10/20/21 [facility] in [place] urgent care with what was diagnosed as ring worm on his left inner thigh and right shoulder.  Had been on an antifungal prior (2-3 weeks before urgent care appointment) which helped but then the spots recurred.  Was started on terbinafine for antifungal coverage.  He then did virtual visit with Dr. Jamison Lora on same day.  States needed PCP clearance at that time for surgery because of rash on inner thigh and shoulder.  Dr. Jamison Lora had felt surgery would be fine to proceed; wasn't in area where surgery being done.  He is calling now because the \"ringworm\" has now spread to his right hip (2-3 quarter sized red scaley rash) and mid back area (raised skin that's red and about size of 1 quarter)  He is starting the terbinafine cream to these areas today.  States the area on shoulder is greatly improved; area left thigh still present but appears to be helping.    1.  He is wondering if it is still ok to proceed with his microdiscectomy this Thursday with the ringworm now on his mid back; closer proximity to surgery area.  2.  He is wondering if there is something else he can use for the ringworm that may be more effective?    Of note, he has not been in a public hot tub since starting treatment for ring worm (was doing this multiple times per week prior)   Melva Mcpherson RN    "

## 2021-10-26 NOTE — NURSING NOTE
Reviewed pre- and post-operative instructions as outlined in the After Visit Summary/Patient Instructions with patient.   Surgery folder was given to patient    Patient Education Topic: Procedure with Dr. Wharton     Learner(s): Patient    Knowledge Level: Basic    Readiness to Learn: Ready    Method:  Verbal explanation    Outcome: Able to verbalize instructions    Barriers to Learning: No barrier    Covid Testing: patient educated they will need to have a test for the novel Coronavirus, COVID-19.The test needs to be completed within 4 days (96) hours of surgery. Order Placed.    Scheduling Number: [phone]    Patient had the opportunity for questions to be answered. Advised Patient to call clinic with any questions/concerns. Gave patient antibacterial soap for pre-surgery skin preparation.

## 2021-10-26 NOTE — PROGRESS NOTES
[facility] Dermatology Note  Encounter Date: Oct 26, 2021  Store-and-Forward and Telephone ([phone] ). Location of teledermatologist: [facility] [place].  Start time: ***. End time: ***.    Dermatology Problem List:  1. ***    ____________________________________________    Assessment & Plan:     # {Diagnosesderm:585271}.   {kkplans:902598}   - ***     # {Diagnosesderm:191795}.   {kkplans:724078}   - ***     Procedures Performed:    None    Follow-up: {kkfollowup:034598}    {kkstaffinvolved:698924}    ***  ____________________________________________    CC: Derm Problem (pt states he has ring worm, right hip, and back. X 1 year off and on. )    HPI:  Mr. Abimael Martinez is a(n) 31 year old male who presents today {kknew/return:596644} for ***    Patient is otherwise feeling well, without additional skin concerns.    Labs Reviewed:  ***N/A    Physical Exam:  Vitals: There were no vitals taken for this visit.  SKIN: Teledermatology photos were reviewed; image quality and interpretability: ***acceptable. Image date: ***.  - ***  - No other lesions of concern on areas examined.     Medications:  Current Outpatient Medications   Medication     albuterol (PROAIR HFA/PROVENTIL HFA/VENTOLIN HFA) 108 (90 Base) MCG/ACT inhaler     ALPRAZolam (XANAX) 2 MG tablet     amphetamine-dextroamphetamine (ADDERALL) 20 MG tablet     azelastine (ASTELIN) 0.1 % nasal spray     cetirizine (ZYRTEC) 10 MG tablet     clotrimazole (LOTRIMIN) 1 % external cream     famotidine (PEPCID) 40 MG tablet     fluticasone (FLOVENT HFA) 220 MCG/ACT Inhaler     Hyoscyamine Sulfate 0.375 MG TBCR     mometasone (ELOCON) 0.1 % external ointment     montelukast (SINGULAIR) 10 MG tablet     omeprazole (PRILOSEC) 40 MG DR capsule     oxyCODONE-acetaminophen (PERCOCET)  MG per tablet     pimecrolimus (ELIDEL) 1 % external cream     PROTOPIC 0.1 % external ointment     sildenafil (REVATIO) 20 MG  tablet     tacrolimus (PROTOPIC) 0.03 % external ointment     terbinafine (LAMISIL) 1 % external cream     terbinafine (LAMISIL) 1 % external cream     No current facility-administered medications for this visit.      Past Medical/Surgical History:   Patient Active Problem List   Diagnosis     Anxiety     CARDIOVASCULAR SCREENING; LDL GOAL LESS THAN 160     High risk sexual behavior     Low back pain     Essential hypertension     Internal hemorrhoids which bleed     Obesity, Class I, BMI 30-34.9     Attention deficit hyperactivity disorder (ADHD), combined type     AYALA (generalized anxiety disorder)     OCD (obsessive compulsive disorder)     Drug-induced erectile dysfunction     Mild persistent asthma without complication     Lumbosacral radiculopathy     Dyslipidemia     Elevated serum creatinine     Former smoker     Morbid obesity (H)     Past Medical History:   Diagnosis Date     Acute right otitis media 1/8/2013     Anxiety      Depression      Drug abuse (H)      Gonococcal urethritis 02/05/2016     Hypertension      Uncomplicated asthma      Urethritis 5/20/2013     Problem list name updated by automated process. Provider to review       CC Referred Self, MD  No address on file on close of this encounter.

## 2021-10-26 NOTE — PROGRESS NOTES
[facility] Dermatology Note  Encounter Date: Oct 26, 2021  Telephone Visit with Photos  Start Time: 4:39 PM; End Time:     Dermatology Problem List:  1. Dermatitis, groin area. Suspected irritant versus contact dermatitis.  -has seasonal allergies with asthma per Dr. Harris  - path testing with delayed reaction to black tattoo  - vaseline; elidel  - prior tx: triam 0.025% ointment BID, protopic 0.1% ointment BID PRN  2. Allergic contact dermatitis  - s/p punch biopsy 11/19/19  - patch test negative  - triam 0.1% ointment BID  3.COVID-19 Nov-2020  4. 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   Mixture of scaly papules on the upper shoulders/back, few more annular appearing scaly plaques on the thigh. Ddx include seborrheic dermatitis, tinea versicolor, other papulosquamous eruptions such as eczematous dermatitis or psoriasis.   Recommended in-person examination to perform KOH and consider biopsy of area if negative.   - Will arrange for in-person visit tomorrow at 11:15 AM  - Hold topical therapies for now    Procedures Performed:   None    Follow-up: 1 day(s) in-person, or earlier for new or changing lesions    Staff:     Rivera Lisa MD  Pronouns: he/him/his    Department of Dermatology  [facility]: Phone: [phone], Fax:[phone]  [facility]: Phone: [phone] Fax: [phone]  ____________________________________________    CC: Derm Problem (pt states he has ring worm, right hip, and back. X 1 year off and on. )    HPI:  Mr. Abimael Martinez is a(n) 31 year old male who presents today as a return patient for evaluation of a new rash.     Patient reports intermittent rash on his groin/thigh area and upper  "back and shoulders. States this first started about a year ago and was diagnosed with \"ringworm\" but a provider that was treated with topical clotrimazole. This improved, but he subsequenntly developed a new lesion on his right shoulder and was treated with oral terbinafine. He recently noticed a new lesion on his thigh, as well as his upper back and has been using topical terbinafine. He reports the rash is itchy. He does have a history of asthma, seasonal allergies, sensitive skin. No other new topical exposures.     Patient has spinal surgery scheduled for Thursday and wants to have this resolved prior to this. Patient is otherwise feeling well, without additional skin concerns.     Labs Reviewed:  N/A    Physical Exam:  Vitals: There were no vitals taken for this visit.  SKIN: Teledermatology photos were reviewed; image quality and interpretability: acceptable.   - Subtle pink thin scaly papules clusters on the upper back  - Scaly, slightly annular plaque on the thigh.  - No other lesions of concern on areas examined.                             Medications:  Current Outpatient Medications   Medication     albuterol (PROAIR HFA/PROVENTIL HFA/VENTOLIN HFA) 108 (90 Base) MCG/ACT inhaler     ALPRAZolam (XANAX) 2 MG tablet     amphetamine-dextroamphetamine (ADDERALL) 20 MG tablet     azelastine (ASTELIN) 0.1 % nasal spray     cetirizine (ZYRTEC) 10 MG tablet     clotrimazole (LOTRIMIN) 1 % external cream     famotidine (PEPCID) 40 MG tablet     fluticasone (FLOVENT HFA) 220 MCG/ACT Inhaler     Hyoscyamine Sulfate 0.375 MG TBCR     mometasone (ELOCON) 0.1 % external ointment     montelukast (SINGULAIR) 10 MG tablet     omeprazole (PRILOSEC) 40 MG DR capsule     oxyCODONE-acetaminophen (PERCOCET)  MG per tablet     pimecrolimus (ELIDEL) 1 % external cream     PROTOPIC 0.1 % external ointment     sildenafil (REVATIO) 20 MG tablet     tacrolimus (PROTOPIC) 0.03 % external ointment     terbinafine (LAMISIL) 1 % " external cream     terbinafine (LAMISIL) 1 % external cream     No current facility-administered medications for this visit.      Past Medical History:   Patient Active Problem List   Diagnosis     Anxiety     CARDIOVASCULAR SCREENING; LDL GOAL LESS THAN 160     High risk sexual behavior     Low back pain     Essential hypertension     Internal hemorrhoids which bleed     Obesity, Class I, BMI 30-34.9     Attention deficit hyperactivity disorder (ADHD), combined type     AYALA (generalized anxiety disorder)     OCD (obsessive compulsive disorder)     Drug-induced erectile dysfunction     Mild persistent asthma without complication     Lumbosacral radiculopathy     Dyslipidemia     Elevated serum creatinine     Former smoker     Morbid obesity (H)     Past Medical History:   Diagnosis Date     Acute right otitis media 1/8/2013     Anxiety      Depression      Drug abuse (H)      Gonococcal urethritis 02/05/2016     Hypertension      Uncomplicated asthma      Urethritis 5/20/2013     Problem list name updated by automated process. Provider to review

## 2021-10-26 NOTE — NURSING NOTE
"Abimael Martinez is a 31 year old male who presents for:  Chief Complaint   Patient presents with     Consult     Pre Op consulutation w/ possible surgical plan revision        Initial Vitals:  BP (!) 149/83   Pulse 85   Ht 5' 11\" (1.803 m)   Wt 266 lb (120.7 kg)   SpO2 97%   BMI 37.10 kg/m   Estimated body mass index is 37.1 kg/m  as calculated from the following:    Height as of this encounter: 5' 11\" (1.803 m).    Weight as of this encounter: 266 lb (120.7 kg).. Body surface area is 2.46 meters squared. BP completed using cuff size: regular  Severe Pain (6)    Abimael López MA    "

## 2021-10-26 NOTE — PATIENT INSTRUCTIONS
Patient Instructions    Surgery scheduled at [facility] for Right Lumbar 4-5 Hemilaminectomy and Microdiscectomy with Dr. Wharton  Pre-Operative    Surgical risks: blood clots in the leg or lung, problems urinating, nerve damage, drainage from the incision, infection,stiffness    Pre-operative physical with primary care physician within 30 days of surgical date.     Likely same day procedure with discharge home day of surgery, may stay for 23 hour observation hospitalization for monitoring.       Shower procedure  o Please shower with antimicrobial soap the night before surgery and morning of surgery. Please refer to showering instruction sheet in folder.    Eating/Drinking  o Stop all solid foods 8 hours before surgery.  o Keep drinking clear liquids until 4 hours before surgery  - Clear liquids include water, clear juice, black coffee, or clear tea without milk, Gatorade, clear soda.     Medications  o Discontinue Aspirin, NSAIDs (Advil/Ibuprofen, Indocin, Naproxen,Nuprin,Relafen/Nabumetone, Diclofenac,Meloxicam, Aleve, Celebrex) x 7 days prior to surgical date  o You can take Tylenol (Acetaminophen) for pain, 1000 mg  - Do not exceed 3,000 mg per day   o Any other medications prescribed, please discuss with your primary care provider at your pre-operative physical     As part of preparation for your upcoming procedure you are required to have a test for the novel Coronavirus, COVID-19  o Please call the drive-up testing number to schedule your appointment. The test needs to be completed within 4 days (96) hours of surgery.   o Scheduling Number: [phone]   o You may NOT receive the COVID-19 vaccine 72 hours before or after surgery.    Pain Management    Dealing with pain  o As your body heals, you might feel a stabbing, burning, or aching pain. You may also have some numbness.  o Everyone feels pain differently, we may ask you to rate your pain using a pain scale. This will let us know how much pain  you feel.   o Keep in mind that medicine won't take away all of your pain. It helps to try other ways to relax and ease pain.     Things to help with pain  o After surgery, we will give you medicine for your pain. These medications work well, but they can make you drowsy, itchy, or sick to your stomach. If we give you narcotics for pain, try to take the pills with food.   o For mild to moderate pain, you can take medication such as Tylenol. These can be used with narcotics, but make sure that your narcotic does not contain Tylenol.   - Do NOT drive while taking narcotic pain medication  - Do NOT drink alcohol while using any pain medication  o You can utilize ice as needed (no longer than 20 minutes at one time)    You may resume taking NSAIDs (ex. Ibuprofen, aleve, naproxen) 14 days post op     Incision Care    No submerging incision in water such as pools, hot tubs, baths for at least 8 weeks or until incision is healed    It is okay to shower, just pat the incision dry     Remove dressing as instructed upon discharge    Watch for signs of infection  o Redness, swelling, warmth, drainage, and fever of 101 degrees or higher  o Notify clinic [phone].  Activity Restrictions    For the first 6-8 weeks, no lifting > 10 pounds, limited bending, twisting, or overhead reaching.    Take stairs in moderation     Ok to walk as tolerated, take short frequent walks. You may gradually increase the distance as tolerated.     Avoid bed rest and prolonged sitting for longer than 30 minutes (change positions frequently while awake)    No contact sports until after follow up visit    No high impact activities such as; running/jogging, snowmobile or 4 alcazar riding or any other recreational vehicles.  Post-Op Follow Up Appointments    2 week incision check with RN    6 week post op follow up visit with Physician Assistant or Nurse Practitioner     Please call to schedule follow up appointment at [phone]  Resources    If  you are currently employed, you will need to be off work for 2-4 weeks for post op recovery and healing.  Please fax any FMLA/short term disability paperwork to [phone]. You may call our clinic when you'd like to return to work and we can provide a work letter.     Video: [email or web address].com/[email or web address]     (video for lumbar stenosis/decompression )

## 2021-10-26 NOTE — LETTER
10/26/2021       RE: Abimael Martinez  [address]  [place] [address]     Dear Colleague,    Thank you for referring your patient, Abimael Martinez, to the [facility] [place] at [facility]. Please see a copy of my visit note below.    [facility] Dermatology Note  Encounter Date: Oct 26, 2021  Telephone Visit with Photos  Start Time: 4:39 PM; End Time:     Dermatology Problem List:  1. Dermatitis, groin area. Suspected irritant versus contact dermatitis.  -has seasonal allergies with asthma per Dr. Harris  - path testing with delayed reaction to black tattoo  - vaseline; elidel  - prior tx: triam 0.025% ointment BID, protopic 0.1% ointment BID PRN  2. Allergic contact dermatitis  - s/p punch biopsy 11/19/19  - patch test negative  - triam 0.1% ointment BID  3.COVID-19 Nov-2020  4. 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   Mixture of scaly papules on the upper shoulders/back, few more annular appearing scaly plaques on the thigh. Ddx include seborrheic dermatitis, tinea versicolor, other papulosquamous eruptions such as eczematous dermatitis or psoriasis.   Recommended in-person examination to perform KOH and consider biopsy of area if negative.   - Will arrange for in-person visit tomorrow at 11:15 AM  - Hold topical therapies for now    Procedures Performed:   None    Follow-up: 1 day(s) in-person, or earlier for new or changing lesions    Staff:     Rivera Lisa MD  Pronouns: he/him/his    Department of Dermatology  [facility]: Phone: [phone], Fax:[phone]  [facility]: Phone: [phone] Fax:  "[phone]  ____________________________________________    CC: Derm Problem (pt states he has ring worm, right hip, and back. X 1 year off and on. )    HPI:  Mr. Abimael Martinez is a(n) 31 year old male who presents today as a return patient for evaluation of a new rash.     Patient reports intermittent rash on his groin/thigh area and upper back and shoulders. States this first started about a year ago and was diagnosed with \"ringworm\" but a provider that was treated with topical clotrimazole. This improved, but he subsequenntly developed a new lesion on his right shoulder and was treated with oral terbinafine. He recently noticed a new lesion on his thigh, as well as his upper back and has been using topical terbinafine. He reports the rash is itchy. He does have a history of asthma, seasonal allergies, sensitive skin. No other new topical exposures.     Patient has spinal surgery scheduled for Thursday and wants to have this resolved prior to this. Patient is otherwise feeling well, without additional skin concerns.     Labs Reviewed:  N/A    Physical Exam:  Vitals: There were no vitals taken for this visit.  SKIN: Teledermatology photos were reviewed; image quality and interpretability: acceptable.   - Subtle pink thin scaly papules clusters on the upper back  - Scaly, slightly annular plaque on the thigh.  - No other lesions of concern on areas examined.                             Medications:  Current Outpatient Medications   Medication     albuterol (PROAIR HFA/PROVENTIL HFA/VENTOLIN HFA) 108 (90 Base) MCG/ACT inhaler     ALPRAZolam (XANAX) 2 MG tablet     amphetamine-dextroamphetamine (ADDERALL) 20 MG tablet     azelastine (ASTELIN) 0.1 % nasal spray     cetirizine (ZYRTEC) 10 MG tablet     clotrimazole (LOTRIMIN) 1 % external cream     famotidine (PEPCID) 40 MG tablet     fluticasone (FLOVENT HFA) 220 MCG/ACT Inhaler     Hyoscyamine Sulfate 0.375 MG TBCR     mometasone (ELOCON) 0.1 % external " ointment     montelukast (SINGULAIR) 10 MG tablet     omeprazole (PRILOSEC) 40 MG DR capsule     oxyCODONE-acetaminophen (PERCOCET)  MG per tablet     pimecrolimus (ELIDEL) 1 % external cream     PROTOPIC 0.1 % external ointment     sildenafil (REVATIO) 20 MG tablet     tacrolimus (PROTOPIC) 0.03 % external ointment     terbinafine (LAMISIL) 1 % external cream     terbinafine (LAMISIL) 1 % external cream     No current facility-administered medications for this visit.      Past Medical History:   Patient Active Problem List   Diagnosis     Anxiety     CARDIOVASCULAR SCREENING; LDL GOAL LESS THAN 160     High risk sexual behavior     Low back pain     Essential hypertension     Internal hemorrhoids which bleed     Obesity, Class I, BMI 30-34.9     Attention deficit hyperactivity disorder (ADHD), combined type     AYALA (generalized anxiety disorder)     OCD (obsessive compulsive disorder)     Drug-induced erectile dysfunction     Mild persistent asthma without complication     Lumbosacral radiculopathy     Dyslipidemia     Elevated serum creatinine     Former smoker     Morbid obesity (H)     Past Medical History:   Diagnosis Date     Acute right otitis media 1/8/2013     Anxiety      Depression      Drug abuse (H)      Gonococcal urethritis 02/05/2016     Hypertension      Uncomplicated asthma      Urethritis 5/20/2013     Problem list name updated by automated process. Provider to review

## 2021-10-26 NOTE — LETTER
10/26/2021         RE: Abimael Martinez  [address]  [place] [address]        Dear Colleague,    Thank you for referring your patient, Abimael Martinez, to the [facility] [place]. Please see a copy of my visit note below.    Neurosurgery follow-up    Mr. Martinez is a 31-year-old male who presents to clinic to discuss a change in his symptoms prior to his upcoming lumbar surgery.  In his last visit with Dr. Wharton he was recommended a left L4-5 and left L5-S1 microdiscectomy.  The patient called a few days ago and stated that his symptoms are now more on the right side and not much in the left S1 distribution.  Today he states he primarily gets pain in the right leg in an L 4/5 distribution and only occasionally related symptoms in his left leg which mostly calm on the side of his leg.  He states if his symptoms were only in the left leg it would not be particularly bothersome compared to what he is experiencing his right leg.    He also has been dealing with ringworm (tenia corporis) for quite some time he is meeting with dermatology soon for further evaluation.    Patient underwent a repeat MRI and is here for further review today    Exam    B/P: 149/83, T: Data Unavailable, P: 85, R: Data Unavailable     Alert and oriented no acute distress  Moving all extremities appropriately  Patient has no obvious ringworm lesions in the area that will be operated on.    Imaging    Lumbar MRI is unchanged from prior demonstrates a broad-based disc bulge at L4-5 with postoperative changes at L4-5, and a left-sided disc bulge at L5-S1.    Assessment    Primarily right leg radiculopathy with broad-based disc bulge at L4-5      Plan    Dr. Wharton with the patient today reviewed his imaging and would recommend the plan for surgery changed to only a right L4-5 lumbar hemilaminectomy and microdiscectomy.  Patient is in agreement the plan will proceed with surgery on Thursday as  recommended.      Total time of 30 minutes spent with the patient today in counseling and coordination of care.        Again, thank you for allowing me to participate in the care of your patient.        Sincerely,        Shazia Dunbar PA-C

## 2021-10-26 NOTE — PROGRESS NOTES
Neurosurgery follow-up    Mr. Martinez is a 31-year-old male who presents to clinic to discuss a change in his symptoms prior to his upcoming lumbar surgery.  In his last visit with Dr. Wharton he was recommended a left L4-5 and left L5-S1 microdiscectomy.  The patient called a few days ago and stated that his symptoms are now more on the right side and not much in the left S1 distribution.  Today he states he primarily gets pain in the right leg in an L 4/5 distribution and only occasionally related symptoms in his left leg which mostly calm on the side of his leg.  He states if his symptoms were only in the left leg it would not be particularly bothersome compared to what he is experiencing his right leg.    He also has been dealing with ringworm (tenia corporis) for quite some time he is meeting with dermatology soon for further evaluation.    Patient underwent a repeat MRI and is here for further review today    Exam    B/P: 149/83, T: Data Unavailable, P: 85, R: Data Unavailable     Alert and oriented no acute distress  Moving all extremities appropriately  Patient has no obvious ringworm lesions in the area that will be operated on.    Imaging    Lumbar MRI is unchanged from prior demonstrates a broad-based disc bulge at L4-5 with postoperative changes at L4-5, and a left-sided disc bulge at L5-S1.    Assessment    Primarily right leg radiculopathy with broad-based disc bulge at L4-5      Plan    Dr. Wharton with the patient today reviewed his imaging and would recommend the plan for surgery changed to only a right L4-5 lumbar hemilaminectomy and microdiscectomy.  Patient is in agreement the plan will proceed with surgery on Thursday as recommended.      Total time of 30 minutes spent with the patient today in counseling and coordination of care.

## 2021-10-27 ENCOUNTER — OFFICE VISIT (OUTPATIENT)
Dept: DERMATOLOGY | Facility: CLINIC | Age: 31
End: 2021-10-27
Payer: COMMERCIAL

## 2021-10-27 ENCOUNTER — TELEPHONE (OUTPATIENT)
Dept: NEUROSURGERY | Facility: CLINIC | Age: 31
End: 2021-10-27

## 2021-10-27 DIAGNOSIS — B35.4 TINEA CORPORIS: Primary | ICD-10-CM

## 2021-10-27 DIAGNOSIS — B36.0 TINEA VERSICOLOR DUE TO MALASSEZIA FURFUR: ICD-10-CM

## 2021-10-27 PROCEDURE — 99213 OFFICE O/P EST LOW 20 MIN: CPT | Performed by: DERMATOLOGY

## 2021-10-27 RX ORDER — KETOCONAZOLE 20 MG/G
CREAM TOPICAL
Qty: 60 G | Refills: 11 | Status: SHIPPED | OUTPATIENT
Start: 2021-10-27 | End: 2022-06-13

## 2021-10-27 RX ORDER — KETOCONAZOLE 20 MG/ML
SHAMPOO TOPICAL
Qty: 120 ML | Refills: 11 | Status: SHIPPED | OUTPATIENT
Start: 2021-10-27 | End: 2023-04-13

## 2021-10-27 ASSESSMENT — PAIN SCALES - GENERAL: PAINLEVEL: NO PAIN (0)

## 2021-10-27 NOTE — TELEPHONE ENCOUNTER
Abimael called in to speak with Dr. Wharton or his PA prior to his surgery tomorrow. Please reach him at [phone].

## 2021-10-27 NOTE — LETTER
10/27/2021       RE: Abimael Martinez  [address]  [facility] [address]     Dear Colleague,    Thank you for referring your patient, Abimael Martinez, to the [facility] [place] at [facility]. Please see a copy of my visit note below.    [facility] Dermatology Note  Encounter Date: Oct 27, 2021  Office Visit     Dermatology Problem List:  1. Dermatitis, groin area. Suspected irritant versus contact dermatitis.  -has seasonal allergies with asthma per Dr. Harris  - path testing with delayed reaction to black tattoo  - vaseline; elidel  - prior tx: triam 0.025% ointment BID, protopic 0.1% ointment BID PRN  2. Allergic contact dermatitis  - s/p punch biopsy 11/19/19  - patch test negative  - triam 0.1% ointment BID  3.COVID-19 Nov-2020  4. Pearly penile papules - referral to urology  5. Tinea corporis   - Past tx: terbinafine    - Current tx: ketoconazole 2% cream and shampoo  ____________________________________________    Assessment & Plan:    # Tinea corporis   # Tinea versicolor  Discussed the etiology and how this appears to be most likely yeast and not fungal related based on positive KOH. Recommended stopping terbinafine and instead switching to ketoconazole.   - Start ketoconazole 2% cream BID prn  - Start ketoconazole 2% shampoo once weekly to prevent    Procedures Performed:   YUE was performed and interpreted as positive    Follow-up: prn for new or changing lesions    Staff and Scribe:     Scribe Disclosure:  I, Adelina Lowe, am serving as a scribe to document services personally performed by Rivera Lisa MD based on data collection and the provider's statements to me.     Provider Disclosure:   The documentation recorded by the scribe accurately reflects the services I personally performed and the decisions made by me.    Rivera Lisa MD    Department of  Dermatology  [facility]: Phone: [phone], Fax:[phone]  [facility]: Phone: [phone] Fax: [phone]  ____________________________________________    CC: Derm Problem (pt states he is here for a follow up on rash on hip, mid back )    HPI:  Mr. Abimael Martinez is a(n) 31 year old male who presents today as a return patient for evaluation of a rash. The patient was last seen in dermatology by myself on 10/26/21 via telemedicine at which time he was recommended to schedule an in-person examination to perform KOH and consider a biopsy if negative for treatment of a papulosquamous eruption.     Today, the patient reports concern regarding a rash on his hip and mid-back, and shoulders. He states that he has been applying terbinafine cream with some relief.     Patient is otherwise feeling well, without additional skin concerns.    Labs Reviewed:  N/A    Physical Exam:  Vitals: There were no vitals taken for this visit.  SKIN: Focused examination of the back, shoulders, and extremities was performed.  - Anular scaly plaque on the left thigh  - A few scattered pink to hypopigmented minimally scaly papules on the back and shoulders  - No other lesions of concern on areas examined.     Medications:  Current Outpatient Medications   Medication     albuterol (PROAIR HFA/PROVENTIL HFA/VENTOLIN HFA) 108 (90 Base) MCG/ACT inhaler     ALPRAZolam (XANAX) 2 MG tablet     amphetamine-dextroamphetamine (ADDERALL) 20 MG tablet     azelastine (ASTELIN) 0.1 % nasal spray     cetirizine (ZYRTEC) 10 MG tablet     clotrimazole (LOTRIMIN) 1 % external cream     famotidine (PEPCID) 40 MG tablet     fluticasone (FLOVENT HFA) 220 MCG/ACT Inhaler     Hyoscyamine Sulfate 0.375 MG TBCR     mometasone (ELOCON) 0.1 % external ointment     montelukast (SINGULAIR) 10 MG tablet     omeprazole (PRILOSEC) 40 MG DR capsule      oxyCODONE-acetaminophen (PERCOCET)  MG per tablet     pimecrolimus (ELIDEL) 1 % external cream     PROTOPIC 0.1 % external ointment     sildenafil (REVATIO) 20 MG tablet     tacrolimus (PROTOPIC) 0.03 % external ointment     terbinafine (LAMISIL) 1 % external cream     terbinafine (LAMISIL) 1 % external cream     No current facility-administered medications for this visit.      Past Medical History:   Patient Active Problem List   Diagnosis     Anxiety     CARDIOVASCULAR SCREENING; LDL GOAL LESS THAN 160     High risk sexual behavior     Low back pain     Essential hypertension     Internal hemorrhoids which bleed     Obesity, Class I, BMI 30-34.9     Attention deficit hyperactivity disorder (ADHD), combined type     AYALA (generalized anxiety disorder)     OCD (obsessive compulsive disorder)     Drug-induced erectile dysfunction     Mild persistent asthma without complication     Lumbosacral radiculopathy     Dyslipidemia     Elevated serum creatinine     Former smoker     Morbid obesity (H)     Past Medical History:   Diagnosis Date     Acute right otitis media 1/8/2013     ADHD (attention deficit hyperactivity disorder), combined type      Anxiety      Depression      Drug abuse (H)      Dyslipidemia      Gonococcal urethritis 02/05/2016     Hypertension      Internal hemorrhoids     which bleed     Lumbosacral radiculopathy      Obese      OCD (obsessive compulsive disorder)      Other chronic pain     Low Back Pain     Uncomplicated asthma      Urethritis 5/20/2013     Problem list name updated by automated process. Provider to review

## 2021-10-27 NOTE — PATIENT INSTRUCTIONS
[facility] Dermatology Visit    Thank you for allowing us to participate in your care. Your findings, instructions and follow-up plan are as follows:     Dermatitis.   1. Follow-up tomorrow in-person at the [facility] ([facility]) at 11:15 AM.     When should I call my doctor?    If you are worsening or not improving, please, contact us or seek urgent care as noted below.     Who should I call with questions (adults)?    [facility] (adult and pediatric): [phone]    [facility] (adult): [phone]    For urgent needs outside of business hours call the [facility] at [phone] and ask for the dermatology resident on call    If this is a medical emergency and you are unable to reach an ER, Call [phone]    Who should I call with questions (pediatric)?  [facility]- Pediatric Dermatology  Dr. Madison Sierra, Dr. Gertrudis Wild, Dr. Pari Lezama, Amarilys Huynh, PA  Dr. Rosa Retana, Dr. Daniela Faye & Dr. Davon Avendano  Non Urgent  Nurse Triage Line; [phone]- Barbara and Kenisha RN Care Coordinators   [place] (/Complex ) [phone]    If you need a prescription refill, please contact your pharmacy. Refills are approved or denied by our physicians during normal business hours, Monday through Fridays  Per office policy, refills will not be granted if you have not been seen within the past year (or sooner depending on your child's condition).    Scheduling Information:  Pediatric Appointment Scheduling and Call Center [phone]  Radiology Scheduling- [phone]  Sedation Unit Scheduling- [phone]  [place] Scheduling- General [phone]; Pediatric Dermatology [phone]  Main  Services: [phone]  Tuvaluan: [phone]  Mauritanian: [phone]  Hmong/Icelandic/Santos: [phone]  Preadmission Nursing Department Fax Number: [phone] (fax all  pre-operative paperwork to this number)    For urgent matters arising during evenings, weekends, or holidays that cannot wait for normal business hours please call [phone] and ask for the dermatology resident on call to be paged.

## 2021-10-27 NOTE — PROGRESS NOTES
[facility] Dermatology Note  Encounter Date: Oct 27, 2021  Office Visit     Dermatology Problem List:  1. Dermatitis, groin area. Suspected irritant versus contact dermatitis.  -has seasonal allergies with asthma per Dr. Harris  - path testing with delayed reaction to black tattoo  - vaseline; elidel  - prior tx: triam 0.025% ointment BID, protopic 0.1% ointment BID PRN  2. Allergic contact dermatitis  - s/p punch biopsy 11/19/19  - patch test negative  - triam 0.1% ointment BID  3.COVID-19 Nov-2020  4. Pearly penile papules - referral to urology  5. Tinea corporis   - Past tx: terbinafine    - Current tx: ketoconazole 2% cream and shampoo  ____________________________________________    Assessment & Plan:    # Tinea corporis   # Tinea versicolor  Discussed the etiology and how this appears to be most likely yeast and not fungal related based on positive KOH. Recommended stopping terbinafine and instead switching to ketoconazole.   - Start ketoconazole 2% cream BID prn  - Start ketoconazole 2% shampoo once weekly to prevent    Procedures Performed:   YUE was performed and interpreted as positive    Follow-up: prn for new or changing lesions    Staff and Scribe:     Scribe Disclosure:  I, Adelina Lowe, am serving as a scribe to document services personally performed by Rivera Lisa MD based on data collection and the provider's statements to me.     Provider Disclosure:   The documentation recorded by the scribe accurately reflects the services I personally performed and the decisions made by me.    Rivera Lisa MD    Department of Dermatology  [facility]: Phone: [phone], Fax:[phone]  [facility]: Phone: [phone] Fax: [phone]  ____________________________________________    CC: Derm Problem (pt states he is here for a follow up on rash on hip,  mid back )    HPI:  Mr. Abimael Martinez is a(n) 31 year old male who presents today as a return patient for evaluation of a rash. The patient was last seen in dermatology by myself on 10/26/21 via telemedicine at which time he was recommended to schedule an in-person examination to perform KOH and consider a biopsy if negative for treatment of a papulosquamous eruption.     Today, the patient reports concern regarding a rash on his hip and mid-back, and shoulders. He states that he has been applying terbinafine cream with some relief.     Patient is otherwise feeling well, without additional skin concerns.    Labs Reviewed:  N/A    Physical Exam:  Vitals: There were no vitals taken for this visit.  SKIN: Focused examination of the back, shoulders, and extremities was performed.  - Anular scaly plaque on the left thigh  - A few scattered pink to hypopigmented minimally scaly papules on the back and shoulders  - No other lesions of concern on areas examined.     Medications:  Current Outpatient Medications   Medication     albuterol (PROAIR HFA/PROVENTIL HFA/VENTOLIN HFA) 108 (90 Base) MCG/ACT inhaler     ALPRAZolam (XANAX) 2 MG tablet     amphetamine-dextroamphetamine (ADDERALL) 20 MG tablet     azelastine (ASTELIN) 0.1 % nasal spray     cetirizine (ZYRTEC) 10 MG tablet     clotrimazole (LOTRIMIN) 1 % external cream     famotidine (PEPCID) 40 MG tablet     fluticasone (FLOVENT HFA) 220 MCG/ACT Inhaler     Hyoscyamine Sulfate 0.375 MG TBCR     mometasone (ELOCON) 0.1 % external ointment     montelukast (SINGULAIR) 10 MG tablet     omeprazole (PRILOSEC) 40 MG DR capsule     oxyCODONE-acetaminophen (PERCOCET)  MG per tablet     pimecrolimus (ELIDEL) 1 % external cream     PROTOPIC 0.1 % external ointment     sildenafil (REVATIO) 20 MG tablet     tacrolimus (PROTOPIC) 0.03 % external ointment     terbinafine (LAMISIL) 1 % external cream     terbinafine (LAMISIL) 1 % external cream     No current  facility-administered medications for this visit.      Past Medical History:   Patient Active Problem List   Diagnosis     Anxiety     CARDIOVASCULAR SCREENING; LDL GOAL LESS THAN 160     High risk sexual behavior     Low back pain     Essential hypertension     Internal hemorrhoids which bleed     Obesity, Class I, BMI 30-34.9     Attention deficit hyperactivity disorder (ADHD), combined type     AYALA (generalized anxiety disorder)     OCD (obsessive compulsive disorder)     Drug-induced erectile dysfunction     Mild persistent asthma without complication     Lumbosacral radiculopathy     Dyslipidemia     Elevated serum creatinine     Former smoker     Morbid obesity (H)     Past Medical History:   Diagnosis Date     Acute right otitis media 1/8/2013     ADHD (attention deficit hyperactivity disorder), combined type      Anxiety      Depression      Drug abuse (H)      Dyslipidemia      Gonococcal urethritis 02/05/2016     Hypertension      Internal hemorrhoids     which bleed     Lumbosacral radiculopathy      Obese      OCD (obsessive compulsive disorder)      Other chronic pain     Low Back Pain     Uncomplicated asthma      Urethritis 5/20/2013     Problem list name updated by automated process. Provider to review

## 2021-10-27 NOTE — TELEPHONE ENCOUNTER
Last Visit: 10/27/21    Next Visit: tomorrow 10/28/21 Surgery     Name of Provider:  Dr. Wharton/Shazia Dunbar PA-C    Assessment: Spoke to patient. He is anxious about the proposed surgery he will be having tomorrow with Dr. Wharton. He had further questions pertaining to surgical plan being changed from 2 levels to one level and the need for potential surgery down the road if only doing the one. Patient would like to speak with Shazia or Dr. Wharton. Message routed to Shweta Borja PA-C to review and contact patient.

## 2021-10-28 ENCOUNTER — ANESTHESIA (OUTPATIENT)
Dept: SURGERY | Facility: CLINIC | Age: 31
End: 2021-10-28
Payer: COMMERCIAL

## 2021-10-28 ENCOUNTER — HOSPITAL ENCOUNTER (OUTPATIENT)
Facility: CLINIC | Age: 31
Discharge: HOME | End: 2021-10-28
Attending: NEUROLOGICAL SURGERY | Admitting: NEUROLOGICAL SURGERY
Payer: COMMERCIAL

## 2021-10-28 ENCOUNTER — NURSE TRIAGE (OUTPATIENT)
Dept: NURSING | Facility: CLINIC | Age: 31
End: 2021-10-28

## 2021-10-28 ENCOUNTER — APPOINTMENT (OUTPATIENT)
Dept: GENERAL RADIOLOGY | Facility: CLINIC | Age: 31
End: 2021-10-28
Attending: NEUROLOGICAL SURGERY
Payer: COMMERCIAL

## 2021-10-28 ENCOUNTER — ANESTHESIA EVENT (OUTPATIENT)
Dept: SURGERY | Facility: CLINIC | Age: 31
End: 2021-10-28
Payer: COMMERCIAL

## 2021-10-28 VITALS
TEMPERATURE: 97.8 F | SYSTOLIC BLOOD PRESSURE: 145 MMHG | HEIGHT: 74 IN | BODY MASS INDEX: 34.37 KG/M2 | OXYGEN SATURATION: 99 % | HEART RATE: 67 BPM | WEIGHT: 267.8 LBS | RESPIRATION RATE: 16 BRPM | DIASTOLIC BLOOD PRESSURE: 95 MMHG

## 2021-10-28 DIAGNOSIS — M51.16 RADICULOPATHY DUE TO LUMBAR INTERVERTEBRAL DISC DISORDER: ICD-10-CM

## 2021-10-28 PROCEDURE — 999N000179 XR SURGERY CARM FLUORO LESS THAN 5 MIN W STILLS

## 2021-10-28 PROCEDURE — 710N000009 HC RECOVERY PHASE 1, LEVEL 1, PER MIN: Performed by: NEUROLOGICAL SURGERY

## 2021-10-28 PROCEDURE — 250N000013 HC RX MED GY IP 250 OP 250 PS 637: Performed by: ANESTHESIOLOGY

## 2021-10-28 PROCEDURE — 250N000025 HC SEVOFLURANE, PER MIN: Performed by: NEUROLOGICAL SURGERY

## 2021-10-28 PROCEDURE — 272N000001 HC OR GENERAL SUPPLY STERILE: Performed by: NEUROLOGICAL SURGERY

## 2021-10-28 PROCEDURE — 250N000011 HC RX IP 250 OP 636: Performed by: NURSE ANESTHETIST, CERTIFIED REGISTERED

## 2021-10-28 PROCEDURE — 360N000084 HC SURGERY LEVEL 4 W/ FLUORO, PER MIN: Performed by: NEUROLOGICAL SURGERY

## 2021-10-28 PROCEDURE — 250N000009 HC RX 250: Performed by: NEUROLOGICAL SURGERY

## 2021-10-28 PROCEDURE — 250N000009 HC RX 250: Performed by: NURSE ANESTHETIST, CERTIFIED REGISTERED

## 2021-10-28 PROCEDURE — 370N000017 HC ANESTHESIA TECHNICAL FEE, PER MIN: Performed by: NEUROLOGICAL SURGERY

## 2021-10-28 PROCEDURE — 63030 LAMOT DCMPRN NRV RT 1 LMBR: CPT | Mod: RT | Performed by: NEUROLOGICAL SURGERY

## 2021-10-28 PROCEDURE — 999N000141 HC STATISTIC PRE-PROCEDURE NURSING ASSESSMENT: Performed by: NEUROLOGICAL SURGERY

## 2021-10-28 PROCEDURE — 258N000003 HC RX IP 258 OP 636: Performed by: ANESTHESIOLOGY

## 2021-10-28 PROCEDURE — 250N000011 HC RX IP 250 OP 636: Performed by: ANESTHESIOLOGY

## 2021-10-28 PROCEDURE — 63035 LAMOT DCMPRN NRV RT EA ADDL: CPT | Mod: LT | Performed by: NEUROLOGICAL SURGERY

## 2021-10-28 PROCEDURE — 710N000012 HC RECOVERY PHASE 2, PER MINUTE: Performed by: NEUROLOGICAL SURGERY

## 2021-10-28 PROCEDURE — 250N000011 HC RX IP 250 OP 636: Performed by: PHYSICIAN ASSISTANT

## 2021-10-28 PROCEDURE — 250N000013 HC RX MED GY IP 250 OP 250 PS 637: Performed by: PHYSICIAN ASSISTANT

## 2021-10-28 PROCEDURE — 250N000011 HC RX IP 250 OP 636: Performed by: NEUROLOGICAL SURGERY

## 2021-10-28 RX ORDER — ONDANSETRON 2 MG/ML
4 INJECTION INTRAMUSCULAR; INTRAVENOUS EVERY 30 MIN PRN
Status: DISCONTINUED | OUTPATIENT
Start: 2021-10-28 | End: 2021-10-28 | Stop reason: HOSPADM

## 2021-10-28 RX ORDER — ONDANSETRON 2 MG/ML
4 INJECTION INTRAMUSCULAR; INTRAVENOUS EVERY 6 HOURS PRN
Status: DISCONTINUED | OUTPATIENT
Start: 2021-10-28 | End: 2021-10-28 | Stop reason: HOSPADM

## 2021-10-28 RX ORDER — NEOSTIGMINE METHYLSULFATE 1 MG/ML
VIAL (ML) INJECTION PRN
Status: DISCONTINUED | OUTPATIENT
Start: 2021-10-28 | End: 2021-10-28

## 2021-10-28 RX ORDER — NALOXONE HYDROCHLORIDE 0.4 MG/ML
0.4 INJECTION, SOLUTION INTRAMUSCULAR; INTRAVENOUS; SUBCUTANEOUS
Status: DISCONTINUED | OUTPATIENT
Start: 2021-10-28 | End: 2021-10-28 | Stop reason: HOSPADM

## 2021-10-28 RX ORDER — NALOXONE HYDROCHLORIDE 0.4 MG/ML
0.2 INJECTION, SOLUTION INTRAMUSCULAR; INTRAVENOUS; SUBCUTANEOUS
Status: DISCONTINUED | OUTPATIENT
Start: 2021-10-28 | End: 2021-10-28 | Stop reason: HOSPADM

## 2021-10-28 RX ORDER — SODIUM CHLORIDE, SODIUM LACTATE, POTASSIUM CHLORIDE, CALCIUM CHLORIDE 600; 310; 30; 20 MG/100ML; MG/100ML; MG/100ML; MG/100ML
INJECTION, SOLUTION INTRAVENOUS CONTINUOUS
Status: DISCONTINUED | OUTPATIENT
Start: 2021-10-28 | End: 2021-10-28 | Stop reason: HOSPADM

## 2021-10-28 RX ORDER — ONDANSETRON 4 MG/1
4 TABLET, ORALLY DISINTEGRATING ORAL EVERY 30 MIN PRN
Status: DISCONTINUED | OUTPATIENT
Start: 2021-10-28 | End: 2021-10-28 | Stop reason: HOSPADM

## 2021-10-28 RX ORDER — CEFAZOLIN SODIUM IN 0.9 % NACL 3 G/100 ML
3 INTRAVENOUS SOLUTION, PIGGYBACK (ML) INTRAVENOUS
Status: COMPLETED | OUTPATIENT
Start: 2021-10-28 | End: 2021-10-28

## 2021-10-28 RX ORDER — OXYCODONE HYDROCHLORIDE 5 MG/1
5-10 TABLET ORAL EVERY 4 HOURS PRN
Qty: 20 TABLET | Refills: 0 | Status: SHIPPED | OUTPATIENT
Start: 2021-10-28 | End: 2021-10-29

## 2021-10-28 RX ORDER — DEXAMETHASONE SODIUM PHOSPHATE 4 MG/ML
INJECTION, SOLUTION INTRA-ARTICULAR; INTRALESIONAL; INTRAMUSCULAR; INTRAVENOUS; SOFT TISSUE PRN
Status: DISCONTINUED | OUTPATIENT
Start: 2021-10-28 | End: 2021-10-28

## 2021-10-28 RX ORDER — FENTANYL CITRATE 50 UG/ML
25 INJECTION, SOLUTION INTRAMUSCULAR; INTRAVENOUS
Status: DISCONTINUED | OUTPATIENT
Start: 2021-10-28 | End: 2021-10-28 | Stop reason: HOSPADM

## 2021-10-28 RX ORDER — FENTANYL CITRATE 50 UG/ML
25 INJECTION, SOLUTION INTRAMUSCULAR; INTRAVENOUS EVERY 5 MIN PRN
Status: DISCONTINUED | OUTPATIENT
Start: 2021-10-28 | End: 2021-10-28 | Stop reason: HOSPADM

## 2021-10-28 RX ORDER — FENTANYL CITRATE 50 UG/ML
INJECTION, SOLUTION INTRAMUSCULAR; INTRAVENOUS PRN
Status: DISCONTINUED | OUTPATIENT
Start: 2021-10-28 | End: 2021-10-28

## 2021-10-28 RX ORDER — FLUMAZENIL 0.1 MG/ML
0.2 INJECTION, SOLUTION INTRAVENOUS
Status: DISCONTINUED | OUTPATIENT
Start: 2021-10-28 | End: 2021-10-28 | Stop reason: HOSPADM

## 2021-10-28 RX ORDER — PROPOFOL 10 MG/ML
INJECTION, EMULSION INTRAVENOUS CONTINUOUS PRN
Status: DISCONTINUED | OUTPATIENT
Start: 2021-10-28 | End: 2021-10-28

## 2021-10-28 RX ORDER — PROCHLORPERAZINE MALEATE 10 MG
10 TABLET ORAL EVERY 6 HOURS PRN
Status: DISCONTINUED | OUTPATIENT
Start: 2021-10-28 | End: 2021-10-28 | Stop reason: HOSPADM

## 2021-10-28 RX ORDER — LIDOCAINE HYDROCHLORIDE 20 MG/ML
INJECTION, SOLUTION INFILTRATION; PERINEURAL PRN
Status: DISCONTINUED | OUTPATIENT
Start: 2021-10-28 | End: 2021-10-28

## 2021-10-28 RX ORDER — AMOXICILLIN 250 MG
1-2 CAPSULE ORAL 2 TIMES DAILY PRN
Qty: 60 TABLET | Refills: 0 | Status: SHIPPED | OUTPATIENT
Start: 2021-10-28 | End: 2022-06-13

## 2021-10-28 RX ORDER — ACETAMINOPHEN 325 MG/1
650 TABLET ORAL
Status: DISCONTINUED | OUTPATIENT
Start: 2021-10-28 | End: 2021-10-28 | Stop reason: HOSPADM

## 2021-10-28 RX ORDER — PROPOFOL 10 MG/ML
INJECTION, EMULSION INTRAVENOUS PRN
Status: DISCONTINUED | OUTPATIENT
Start: 2021-10-28 | End: 2021-10-28

## 2021-10-28 RX ORDER — ONDANSETRON 4 MG/1
4 TABLET, ORALLY DISINTEGRATING ORAL EVERY 6 HOURS PRN
Status: DISCONTINUED | OUTPATIENT
Start: 2021-10-28 | End: 2021-10-28 | Stop reason: HOSPADM

## 2021-10-28 RX ORDER — OXYCODONE HYDROCHLORIDE 5 MG/1
5 TABLET ORAL EVERY 4 HOURS PRN
Status: DISCONTINUED | OUTPATIENT
Start: 2021-10-28 | End: 2021-10-28 | Stop reason: HOSPADM

## 2021-10-28 RX ORDER — ONDANSETRON 2 MG/ML
INJECTION INTRAMUSCULAR; INTRAVENOUS PRN
Status: DISCONTINUED | OUTPATIENT
Start: 2021-10-28 | End: 2021-10-28

## 2021-10-28 RX ORDER — OXYCODONE AND ACETAMINOPHEN 5; 325 MG/1; MG/1
1 TABLET ORAL EVERY 4 HOURS PRN
Status: DISCONTINUED | OUTPATIENT
Start: 2021-10-28 | End: 2021-10-28 | Stop reason: HOSPADM

## 2021-10-28 RX ORDER — HYDROMORPHONE HCL IN WATER/PF 6 MG/30 ML
0.2 PATIENT CONTROLLED ANALGESIA SYRINGE INTRAVENOUS EVERY 5 MIN PRN
Status: DISCONTINUED | OUTPATIENT
Start: 2021-10-28 | End: 2021-10-28 | Stop reason: HOSPADM

## 2021-10-28 RX ORDER — ONDANSETRON 4 MG/1
4 TABLET, ORALLY DISINTEGRATING ORAL EVERY 4 HOURS PRN
Status: DISCONTINUED | OUTPATIENT
Start: 2021-10-28 | End: 2021-10-28 | Stop reason: HOSPADM

## 2021-10-28 RX ORDER — METHOCARBAMOL 750 MG/1
750 TABLET, FILM COATED ORAL
Status: COMPLETED | OUTPATIENT
Start: 2021-10-28 | End: 2021-10-28

## 2021-10-28 RX ORDER — MEPERIDINE HYDROCHLORIDE 25 MG/ML
12.5 INJECTION INTRAMUSCULAR; INTRAVENOUS; SUBCUTANEOUS
Status: DISCONTINUED | OUTPATIENT
Start: 2021-10-28 | End: 2021-10-28 | Stop reason: HOSPADM

## 2021-10-28 RX ORDER — GLYCOPYRROLATE 0.2 MG/ML
INJECTION, SOLUTION INTRAMUSCULAR; INTRAVENOUS PRN
Status: DISCONTINUED | OUTPATIENT
Start: 2021-10-28 | End: 2021-10-28

## 2021-10-28 RX ADMIN — Medication 8 MCG: at 14:31

## 2021-10-28 RX ADMIN — METHOCARBAMOL 750 MG: 750 TABLET ORAL at 17:46

## 2021-10-28 RX ADMIN — ONDANSETRON 4 MG: 2 INJECTION INTRAMUSCULAR; INTRAVENOUS at 16:09

## 2021-10-28 RX ADMIN — HYDROMORPHONE HYDROCHLORIDE 0.5 MG: 1 INJECTION, SOLUTION INTRAMUSCULAR; INTRAVENOUS; SUBCUTANEOUS at 14:47

## 2021-10-28 RX ADMIN — NEOSTIGMINE METHYLSULFATE 5 MG: 1 INJECTION, SOLUTION INTRAVENOUS at 16:29

## 2021-10-28 RX ADMIN — ROCURONIUM BROMIDE 50 MG: 50 INJECTION, SOLUTION INTRAVENOUS at 14:04

## 2021-10-28 RX ADMIN — ROCURONIUM BROMIDE 10 MG: 50 INJECTION, SOLUTION INTRAVENOUS at 15:25

## 2021-10-28 RX ADMIN — Medication 12 MCG: at 14:25

## 2021-10-28 RX ADMIN — PROPOFOL 100 MG: 10 INJECTION, EMULSION INTRAVENOUS at 14:06

## 2021-10-28 RX ADMIN — MIDAZOLAM 2 MG: 1 INJECTION INTRAMUSCULAR; INTRAVENOUS at 14:00

## 2021-10-28 RX ADMIN — LIDOCAINE HYDROCHLORIDE 80 MG: 20 INJECTION, SOLUTION INFILTRATION; PERINEURAL at 14:04

## 2021-10-28 RX ADMIN — SODIUM CHLORIDE, POTASSIUM CHLORIDE, SODIUM LACTATE AND CALCIUM CHLORIDE: 600; 310; 30; 20 INJECTION, SOLUTION INTRAVENOUS at 16:29

## 2021-10-28 RX ADMIN — HYDROMORPHONE HYDROCHLORIDE 0.2 MG: 0.2 INJECTION, SOLUTION INTRAMUSCULAR; INTRAVENOUS; SUBCUTANEOUS at 17:45

## 2021-10-28 RX ADMIN — HYDROMORPHONE HYDROCHLORIDE 0.2 MG: 0.2 INJECTION, SOLUTION INTRAMUSCULAR; INTRAVENOUS; SUBCUTANEOUS at 17:22

## 2021-10-28 RX ADMIN — DEXAMETHASONE SODIUM PHOSPHATE 8 MG: 4 INJECTION, SOLUTION INTRA-ARTICULAR; INTRALESIONAL; INTRAMUSCULAR; INTRAVENOUS; SOFT TISSUE at 14:15

## 2021-10-28 RX ADMIN — HYDROMORPHONE HYDROCHLORIDE 0.2 MG: 0.2 INJECTION, SOLUTION INTRAMUSCULAR; INTRAVENOUS; SUBCUTANEOUS at 17:34

## 2021-10-28 RX ADMIN — PROPOFOL 300 MG: 10 INJECTION, EMULSION INTRAVENOUS at 14:04

## 2021-10-28 RX ADMIN — FENTANYL CITRATE 100 MCG: 50 INJECTION, SOLUTION INTRAMUSCULAR; INTRAVENOUS at 14:04

## 2021-10-28 RX ADMIN — GLYCOPYRROLATE 0.8 MG: 0.2 INJECTION, SOLUTION INTRAMUSCULAR; INTRAVENOUS at 16:29

## 2021-10-28 RX ADMIN — PROPOFOL 50 MCG/KG/MIN: 10 INJECTION, EMULSION INTRAVENOUS at 14:15

## 2021-10-28 RX ADMIN — Medication 3 G: at 14:00

## 2021-10-28 RX ADMIN — SODIUM CHLORIDE, POTASSIUM CHLORIDE, SODIUM LACTATE AND CALCIUM CHLORIDE: 600; 310; 30; 20 INJECTION, SOLUTION INTRAVENOUS at 14:00

## 2021-10-28 RX ADMIN — OXYCODONE HYDROCHLORIDE 5 MG: 5 TABLET ORAL at 17:28

## 2021-10-28 RX ADMIN — ROCURONIUM BROMIDE 10 MG: 50 INJECTION, SOLUTION INTRAVENOUS at 14:46

## 2021-10-28 ASSESSMENT — MIFFLIN-ST. JEOR: SCORE: 2244.48

## 2021-10-28 ASSESSMENT — LIFESTYLE VARIABLES: TOBACCO_USE: 1

## 2021-10-28 ASSESSMENT — ENCOUNTER SYMPTOMS: SEIZURES: 0

## 2021-10-28 NOTE — PROGRESS NOTES
Pt had questions about consent.  Dr. Wharton in room and discussed concerns/questions pt had.  Consent modified and new consent signed on paper.

## 2021-10-28 NOTE — ANESTHESIA PROCEDURE NOTES
Airway       Patient location during procedure: OR ([facility] - Operating Room or Procedural Area)       Procedure Start/Stop Times: 10/28/2021 2:07 PM  Staff -        Anesthesiologist:  Wilson Augustin MD       CRNA: Victoria He APRN CRNA       Performed By: CRNA  Consent for Airway        Urgency: elective  Indications and Patient Condition       Indications for airway management: kehinde-procedural       Induction type:intravenous       Mask difficulty assessment: 2 - vent by mask + OA or adjuvant +/- NMBA    Final Airway Details       Final airway type: endotracheal airway       Successful airway: ETT - single  Endotracheal Airway Details        ETT size (mm): 8.0       Cuffed: yes       Successful intubation technique: video laryngoscopy       VL Blade Size: Glidescope 4       Grade View of Cords: 1       Adjucts: stylet       Position: Right       Measured from: gums/teeth       Secured at (cm): 24       Bite block used: None    Post intubation assessment        Number of attempts at approach: 1       Number of other approaches attempted: 0       Secured with: pink tape       Ease of procedure: easy       Dentition: Intact and Unchanged

## 2021-10-28 NOTE — OP NOTE
Name of procedure: Right L4-5 hemilaminectomy and microdiscectomy; use the operating microscope; left L5-S1 hemilaminectomy and microdiscectomy; use the operating microscope    Preoperative diagnosis: #1 right L5 radiculopathy secondary to focal disc protrusion at right L4-5; #2 residual left leg pain possibly related to disc abnormality at L5-S1    Postoperative diagnosis: #1 same, #2 minimal disc bulge at left L5-S1    Surgeon: Troy Wharton MD    First Assistant: None    Material for the laboratory for examination: None    EBL: 20 cc    Description of the procedure: Patient was brought to the operating room where is placed under suitable general endotracheal anesthesia and subsequently positioned on lumbar frame with his lower back prepped and draped in the usual fashion.  Initially a right L4-5 level incision was made using fluoroscopic guidance 1.8 cm in length.  Standard Metrix technique was used to place a 1.8 x 5 cm Metrix retractor tube at the right L4 hemilamina.  Tube was secured to the table using the articulated mechanical arm and final placement was verified with lateral fluoroscopy.  Operating microscope was brought into use and used throughout the remainder of the procedure for visualization, illumination and microsurgical technique.  Midas Willard drill was used to perform a minimal right L4 hemilaminectomy and minimal medial facetectomy.  Ligamentum flavum underlying was fenestrated and removed in the descending right L5 nerve root shoulder was dissected free.  The structures found to be compressed by a contained but fairly prominent disc herniation in the subligamentous space immediately ventral to the nerve root.  Nerve root was gently retracted medially and the residual annulus was opened sharply with #15 blade with immediate expression of markedly degenerated disc material.  Several significant size disc fragments were removed from the preligamentous space and subsequently from the disc base  itself.  At the conclusion of the procedure there is no evidence for residual neurologic impingement and no additional nucleus pulposus could be removed from within the disc space or the epidural space.  Meticulous hemostasis was assured and the wound was keisha irrigated with Ancef containing irrigation.  Metrix retractor tube was withdrawn and the wound was closed in layers using interrupted inverted 3-0 Vicryl suture with a running undyed 4-0 Vicryl in a subcuticular stitch.    Attention was then directed toward the left side where a separate incision was made at the left L5-S1 level utilizing the same technique as described above.  Preoperatively, I discussed this with the patient who was quite anxious regarding intermittent residual left leg pain and was concerned that if the predominant problem at right L4-5 was the only problem addressed that he would be left with functionally limiting left leg pain.  We had a lengthy discussion regarding indications for this procedure as well as potential risks and benefits.  When I saw him in the office I advised him that I would not recommend surgery at the left L5-S1 level because I was not convinced that he had significant or functionally limiting symptoms and the radiographic findings appear to be slightly better and or not clearly showing significant compression of the nerve.  However, following a complete discussion he indicated that he would prefer exploration and discectomy at this level if indicated.  I again informed him of the potential risks and benefits and he wished to proceed.    At the L5-S1 level the transiting left S1 nerve root was dissected free.  There was substantial dorsal fat overlying this nerve.  It was retracted medially and a very small disc bulge was evident immediately ventral to the nerve.  This was opened with a #15 blade and a small amount of disc material was removed from the periligamentous space.  Posterior portion of this space appeared  to be largely empty.  A small amount of disc material was removed from the more medial aspect.  However there was no convincing evidence of focal nerve impingement during my exploration of this level.  Wound was copiously irrigated with Ancef containing irrigation and meticulous hemostasis was again achieved with bipolar cautery and small pledgets of thrombin-soaked Gelfoam.  Metrix retractor tube was then withdrawn and the wound was closed in layers using interrupted inverted 3-0 Vicryl suture with a running undyed 4-0 Vicryl in a subcuticular stitch for skin.  Sterile dressing was applied and the patient was subsequently returned to the supine position where he was awakened extubated and transported to recovery room in stable condition.

## 2021-10-28 NOTE — ANESTHESIA CARE TRANSFER NOTE
Patient: Abimael Martinez    Procedure: Procedure(s):  LEFT LUMBAR L5-S1 MINIMALLY INVASIVE MICRODISCECTOMY  RIGHT LUMBAR L4-5 MINIMALLY INVASIVE MICRODISCECTOMY       Diagnosis: Radiculopathy due to lumbar intervertebral disc disorder [M51.16]  Diagnosis Additional Information: No value filed.    Anesthesia Type:   General     Note:    Oropharynx: oropharynx clear of all foreign objects and spontaneously breathing  Level of Consciousness: drowsy  Oxygen Supplementation: face mask  Level of Supplemental Oxygen (L/min / FiO2): 6  Independent Airway: airway patency satisfactory and stable  Dentition: dentition unchanged  Vital Signs Stable: post-procedure vital signs reviewed and stable  Report to RN Given: handoff report given  Patient transferred to: PACU  Comments: Neuromuscular blockade reversed after TOF 4/4, spontaneous respirations, adequate tidal volumes, followed commands to voice, oropharynx suctioned with soft flexible catheter, extubated atraumatically, extubated with suction, airway patent after extubation.  Oxygen via facemask at 6 liters per minute to PACU. Oxygen tubing connected to wall O2 in PACU, SpO2, NiBP, and EKG monitors and alarms on and functioning, report on patient's clinical status given to PACU RN, RN questions answered.     Handoff Report: Identifed the Patient, Identified the Reponsible Provider, Reviewed the pertinent medical history, Discussed the surgical course, Reviewed Intra-OP anesthesia mangement and issues during anesthesia, Set expectations for post-procedure period and Allowed opportunity for questions and acknowledgement of understanding      Vitals:  Vitals Value Taken Time   /116 10/28/21 1649   Temp     Pulse 75 10/28/21 1651   Resp 19 10/28/21 1651   SpO2 100 % 10/28/21 1651   Vitals shown include unvalidated device data.    Electronically Signed By: STEFFEN Benoit CRNA  October 28, 2021  4:52 PM

## 2021-10-28 NOTE — TELEPHONE ENCOUNTER
Pt is having surgery tomorrow, using Chlorhexidine Gluconate to was body per instructions for surgery.    Read on bottle - potential for side effects and is feeling slightly itchy -  No hives or swelling     Per protocol - home care     Care advice given per protocol and when to call back. Pt verbalized understanding and agrees to plan of care.    Radha Meza RN  [place] Nurse Advisor  2:25 AM 10/28/2021      COVID 19 Nurse Triage Plan/Patient Instructions    Please be aware that novel coronavirus (COVID-19) may be circulating in the community. If you develop symptoms such as fever, cough, or SOB or if you have concerns about the presence of another infection including coronavirus (COVID-19), please contact your health care provider or visit [email or web address].     Disposition/Instructions    Home care recommended. Follow home care protocol based instructions.    Thank you for taking steps to prevent the spread of this virus.  o Limit your contact with others.  o Wear a simple mask to cover your cough.  o Wash your hands well and often.    Resources    [facility]: About COVID-19: [email or web address]    CDC: What to Do If You're Sick: www.cdc.gov/coronavirus/2019-ncov/about/steps-when-sick.html    CDC: Ending Home Isolation: www.cdc.gov/coronavirus/2019-ncov/hcp/disposition-in-home-patients.html     CDC: Caring for Someone: www.cdc.gov/coronavirus/2019-ncov/if-you-are-sick/care-for-someone.html     [facility]: Interim Guidance for Hospital Discharge to Home: www.health.[facility].mn.us/diseases/coronavirus/hcp/hospdischarge.pdf    [facility] clinical trials (COVID-19 research studies): [email or web address]     Below are the COVID-19 hotlines at the [place] Department of Health ([facility]). Interpreters are available.   o For health questions: Call 616-567-2709 or 1-991.612.5649 (7 a.m. to 7 p.m.)  o For questions about schools and childcare: Call 040-626-7474  or 1-433.929.1964 (7 a.m. to 7 p.m.)                      Reason for Disposition    Mild localized itching    Additional Information    Negative: Athlete's foot suspected (e.g., itchy rash of feet, especially 3rd-4th web spaces)    Negative: Head lice suspected (e.g., head itching and lice or nits are seen)    Negative: Insect bites suspected    Negative: Jock Itch suspected (e.g., itchy rash on upper inner thigh)    Negative: Poison ivy, oak, or sumac suspected (e.g., itchy rash after contact with poison ivy)    Negative: Public lice suspected (e.g., genital itching and lice or nits are seen)    Negative: Ringworm suspected (e.g., round ring-shaped pink patch on skin, scaly border, clearing of the center as the patch grows)    Negative: [1] Eye itching AND [2] contact with allergic substance    Negative: [1] Eye itching AND [2] cause is unclear    Negative: Genital itching - female    Negative: Genital itching - male    Negative: Lip itching    Negative: Rectal (anus) itching    Negative: Localized rash also present    Negative: Patient sounds very sick or weak to the triager    Negative: Looks infected (redness, red streak, pus)    Negative: [1] MODERATE-SEVERE local itching (i.e., interferes with work, school, activities) AND [2] not improved after 24 hours of hydrocortisone cream    Negative: [1] Cause unknown AND [2] present > 7 days    Protocols used: ITCHING - [id number]

## 2021-10-28 NOTE — DISCHARGE INSTRUCTIONS
Same Day Surgery Discharge Instructions for  Sedation and General Anesthesia       It's not unusual to feel dizzy, light-headed or faint for up to 24 hours after surgery or while taking pain medication.  If you have these symptoms: sit for a few minutes before standing and have someone assist you when you get up to walk or use the bathroom.      You should rest and relax for the next 24 hours. We recommend you make arrangements to have an adult stay with you for at least 24 hours after your discharge.  Avoid hazardous and strenuous activity.      DO NOT DRIVE any vehicle or operate mechanical equipment for 24 hours following the end of your surgery.  Even though you may feel normal, your reactions may be affected by the medication you have received.      Do not drink alcoholic beverages for 24 hours following surgery.       Slowly progress to your regular diet as you feel able. It's not unusual to feel nauseated and/or vomit after receiving anesthesia.  If you develop these symptoms, drink clear liquids (apple juice, ginger ale, broth, 7-up, etc. ) until you feel better.  If your nausea and vomiting persists for 24 hours, please notify your surgeon.        All narcotic pain medications, along with inactivity and anesthesia, can cause constipation. Drinking plenty of liquids and increasing fiber intake will help.      For any questions of a medical nature, call your surgeon.      Do not make important decisions for 24 hours.      If you had general anesthesia, you may have a sore throat for a couple of days related to the breathing tube used during surgery.  You may use Cepacol lozenges to help with this discomfort.  If it worsens or if you develop a fever, contact your surgeon.       If you feel your pain is not well managed with the pain medications prescribed by your surgeon, please contact your surgeon's office to let them know so they can address your concerns.       CoVid 19 Information    We want to give you  information regarding Covid. Please consult your primary care provider with any questions you might have.     Patient who have symptoms (cough, fever, or shortness of breath), need to isolate for 7 days from when symptoms started OR 72 hours after fever resolves (without fever reducing medications) AND improvement of respiratory symptoms (whichever is longer).      Isolate yourself at home (in own room/own bathroom if possible)    Do Not allow any visitors    Do Not go to work or school    Do Not go to [employer],  centers, shopping, or other public places.    Do Not shake hands.    Avoid close and intimate contact with others (hugging, kissing).    Follow CDC recommendations for household cleaning of frequently touched services.     After the initial 7 days, continue to isolate yourself from household members as much as possible. To continue decrease the risk of community spread and exposure, you and any members of your household should limit activities in public for 14 days after starting home isolation.     You can reference the following CDC link for helpful home isolation/care tips:  https://www.cdc.gov/coronavirus/2019-ncov/downloads/10Things.pdf    Protect Others:    Cover Your Mouth and Nose with a mask, disposable tissue or wash cloth to avoid spreading germs to others.    Wash your hands and face frequently with soap and water    Call Your Primary Doctor If: Breathing difficulty develops or you become worse.    For more information about COVID19 and options for caring for yourself at home, please visit the CDC website at https://www.cdc.gov/coronavirus/2019-ncov/about/steps-when-sick.html  For more options for care at [facility], please visit our website at [email or web address]    [facility] at [facility]  Dr. Wharton Discharge Instructions Following Spine Surgery  [phone]  Monday - Friday; 8:00 AM - 4:00 PM    In General:   After  you have had surgery on your spine, remember do not twist, or excessively flex or extend the area that you had surgery.  These activities can prevent healing.  Pain is normal and to be expected following surgery.  Please call our office to schedule your appointment follow up appointment.      Bowel Care:  Many people have constipation (hard stools) after surgery.  To help prevent constipation: Drink plenty of fluid (8-10 glasses/day); Eat more fiber, such as whole grain bread, bran cereal, and fruits and vegetables; Stay active by walking; Over the counter stool softener may also help.      Medications:  Spine surgery and pain management is unique to all patients.  You will generally be given medications for pain, muscle spasms or tightness, and for constipation during the immediate post op period.  It is important that you use these as prescribed.  Please remember to bring your pill bottles to all of your appointments. Avoid driving while taking narcotic pain medications.  Avoid alcoholic beverages while taking narcotic pain medications. You can use ice to areas of pain as needed, 20 minutes at a time.  Changing positions and walking will help loosen your muscles as well.  No NSAIDs (Ibuprofen, Advil, Motrin, Aleve, Naproxen) for 14 days.    Driving:  No driving while on narcotic pain medications.  It is state law not to drive while under the influence of a drug to a degree which renders you incapable of safely driving.  The narcotic medication you will be taking after surgery falls under this category.     Activity:   After surgery, most people feel less pain than they have had in a long time.  Walking and light activities will help you regain the use of your muscles.  You are encouraged to walk: start with short walks 5-10 minutes at a time for 4-5 times per day and increase as tolerated.  Stair climbing as tolerated, we recommend you use the railing. No lifting greater than 10 pounds: approximately equal to one  gallon of milk. No twisting, bending in the area you have had surgery. No housework, vacuuming, laundry, leaf raking, lawn mowing, or snow removal. Wear your brace (if ordered) as directed.    Showers:  If you have sutures or staples you may shower two days after surgery. It is ok to let water run over your incision but do not touch or scrub on the incision. Pat dry immediately after showering. If there is a dressing in place, you may remove it 2 days after surgery. If you were closed with Derma inman (glue), you may shower without covering the incision. No baths, hot tubs, or pool activity for at least 6 weeks.     Nutrition:  In general, your diet restrictions will not change with your surgery.  You may need to eat small frequent meals initially until your appetite returns.  Eat plenty of high fiber foods and drink plenty of fluids. If you do not have a fluid restriction from or prior to surgery, we recommend 6-8 (8oz) glasses of water per day. Other fluids are fine, but water is best. Nausea is not uncommon; it is a common side effect to many pain medications.  We recommend that you take the pain medications with food, if this does not improve your symptoms, please call us.     Follow-Up Appointment  Neurosurgery Appointment with in 2 weeks and the MIGUEL at the clinic in 6 weeks.  Call [phone] for appointment.  Call Dr. Wharton at [phone] if these occur: Drainage from your incision, increased pain/redness/swelling, temperatures greater than 101.5, increased leg pain or swelling or unrelieved headaches    Go to the nearest Emergency Room if you experience: chest pain, shortness of breath, neck swelling or swallowing problems    **If you have questions or concerns about your procedure,   call Dr. Wharton at [phone]**

## 2021-10-28 NOTE — ANESTHESIA PREPROCEDURE EVALUATION
Anesthesia Pre-Procedure Evaluation    Patient: Abimael Martinez   MRN: [id number] : 1990        Preoperative Diagnosis: Radiculopathy due to lumbar intervertebral disc disorder [M51.16]    Procedure : Procedure(s):  Right lumbar 4-5 hemilaminectomy and microdiscectomy          Past Medical History:   Diagnosis Date     Acute right otitis media 2013     ADHD (attention deficit hyperactivity disorder), combined type      Anxiety      Depression      Drug abuse (H)      Dyslipidemia      Gonococcal urethritis 2016     Hypertension      Internal hemorrhoids     which bleed     Lumbosacral radiculopathy      Obese      OCD (obsessive compulsive disorder)      Other chronic pain     Low Back Pain     Uncomplicated asthma      Urethritis 2013     Problem list name updated by automated process. Provider to review      Past Surgical History:   Procedure Laterality Date     BACK SURGERY  2018     COLONOSCOPY       ESOPHAGOSCOPY, GASTROSCOPY, DUODENOSCOPY (EGD), COMBINED N/A 2021    Procedure: ESOPHAGOGASTRODUODENOSCOPY, WITH BIOPSY AND POLYPECTOMY;  Surgeon: Phillip Joyner MD;  Location: [facility] OR      Allergies   Allergen Reactions     Cymbalta      Weight gain and fatigue     Duloxetine Other (See Comments) and Fatigue     enlarged spleen and weight gain     Paroxetine Other (See Comments), Fatigue and GI Disturbance     Weight gain, Spleen issues     Seasonal Allergies      Vicodin [Hydrocodone-Acetaminophen]       Social History     Tobacco Use     Smoking status: Former Smoker     Packs/day: 0.50     Years: 6.00     Pack years: 3.00     Types: Cigarettes     Quit date: 3/12/2018     Years since quitting: 3.6     Smokeless tobacco: Never Used     Tobacco comment: second hand smoke exposure   Substance Use Topics     Alcohol use: Yes     Comment: once a week      Wt Readings from Last 1 Encounters:   10/28/21 121.5 kg (267 lb 12.8 oz)        Anesthesia Evaluation   Pt has had  prior anesthetic.     No history of anesthetic complications       ROS/MED HX  ENT/Pulmonary:     (+) tobacco use, Past use, asthma  (-) sleep apnea   Neurologic:    (-) no seizures and no CVA   Cardiovascular:     (+) hypertension-----    METS/Exercise Tolerance:     Hematologic:       Musculoskeletal:       GI/Hepatic:    (-) GERD and liver disease   Renal/Genitourinary:    (-) renal disease   Endo:     (+) Obesity,  (-) Type II DM and thyroid disease   Psychiatric/Substance Use:     (+) psychiatric history anxiety     Infectious Disease:       Malignancy:       Other:      (+) , H/O Chronic Pain,        Physical Exam    Airway        Mallampati: II   TM distance: > 3 FB   Neck ROM: full   Mouth opening: > 3 cm    Respiratory Devices and Support         Dental  no notable dental history         Cardiovascular   cardiovascular exam normal          Pulmonary   pulmonary exam normal                OUTSIDE LABS:  CBC:   Lab Results   Component Value Date    WBC 6.0 01/14/2021    WBC 6.6 10/25/2020    HGB 15.1 01/14/2021    HGB 15.7 10/25/2020    HCT 44.6 01/14/2021    HCT 45.8 10/25/2020     01/14/2021     10/25/2020     BMP:   Lab Results   Component Value Date     01/14/2021     10/25/2020    POTASSIUM 4.0 01/14/2021    POTASSIUM 3.7 10/25/2020    CHLORIDE 106 01/14/2021    CHLORIDE 103 10/25/2020    CO2 31 01/14/2021    CO2 29 10/25/2020    BUN 10 01/14/2021    BUN 15 10/25/2020    CR 1.16 01/14/2021    CR 1.11 10/25/2020    GLC 87 01/14/2021     (H) 10/25/2020     COAGS: No results found for: PTT, INR, FIBR  POC: No results found for: BGM, HCG, HCGS  HEPATIC:   Lab Results   Component Value Date    ALBUMIN 4.1 03/12/2021    PROTTOTAL 8.9 (H) 03/12/2021    ALT 38 03/12/2021    AST 16 03/12/2021    ALKPHOS 102 03/12/2021    BILITOTAL 0.6 03/12/2021     OTHER:   Lab Results   Component Value Date    LAUREN 9.6 01/14/2021    PHOS 4.0 03/13/2014    LIPASE 159 08/10/2021    AMYLASE 44  08/10/2021    TSH 1.51 03/06/2014    CRP <2.9 05/06/2021    SED 10 05/06/2021       Anesthesia Plan    ASA Status:  2   NPO Status:  NPO Appropriate    Anesthesia Type: General.     - Airway: ETT   Induction: Intravenous.   Maintenance: Balanced.   Techniques and Equipment:     - Airway: Video-Laryngoscope         Consents    Anesthesia Plan(s) and associated risks, benefits, and realistic alternatives discussed. Questions answered and patient/representative(s) expressed understanding.     - Discussed with:  Patient         Postoperative Care    Pain management: Multi-modal analgesia.   PONV prophylaxis: Ondansetron (or other 5HT-3), Dexamethasone or Solumedrol, Background Propofol Infusion     Comments:                Wilson Augustin MD

## 2021-10-29 ENCOUNTER — TELEPHONE (OUTPATIENT)
Dept: NEUROSURGERY | Facility: CLINIC | Age: 31
End: 2021-10-29

## 2021-10-29 DIAGNOSIS — Z98.890 S/P LUMBAR MICRODISCECTOMY: ICD-10-CM

## 2021-10-29 DIAGNOSIS — M51.16 RADICULOPATHY DUE TO LUMBAR INTERVERTEBRAL DISC DISORDER: ICD-10-CM

## 2021-10-29 DIAGNOSIS — M62.838 MUSCLE SPASM: Primary | ICD-10-CM

## 2021-10-29 RX ORDER — OXYCODONE HYDROCHLORIDE 5 MG/1
5-10 TABLET ORAL EVERY 4 HOURS PRN
Qty: 20 TABLET | Refills: 0 | Status: SHIPPED | OUTPATIENT
Start: 2021-10-29 | End: 2021-10-29

## 2021-10-29 RX ORDER — OXYCODONE HYDROCHLORIDE 5 MG/1
5-10 TABLET ORAL EVERY 4 HOURS PRN
Qty: 20 TABLET | Refills: 0 | Status: SHIPPED | OUTPATIENT
Start: 2021-10-29 | End: 2022-01-26

## 2021-10-29 NOTE — TELEPHONE ENCOUNTER
Reason for call: Pt called to ask if his prescription for Oxycodone can be sent to [facility] [address] [facility] [place]. His regular pharmacy is out and advised him that this [facility] has it. Please call him back at [phone]. Thank you

## 2021-10-29 NOTE — TELEPHONE ENCOUNTER
Post-Op Call    DOS: 10/28/2021  Surgery: RIGHT LUMBAR L4-5 MINIMALLY INVASIVE MICRODISCECTOMY  Surgeon: Dr Wharton    Called patient for post-operative follow-up. Patient c/o LBP, left leg pain. 6-8/10. No new n/t, weakness, foot drag or drop. Patient taking the 2 tabs of Oxycodone every 4 hours. Complained of muscle spasms. No muscle relaxer on board at this time. States he has tried many different muscle relaxers in past and Zanaflex has worked best. Will request from provider. Encouraged to add Tylenol and ice to the regimen.     Patient is walking with some difficulty. Encouraged short, frequent walks as tolerated.     Patient has not had a bowel movement since surgery. Discussed reasons of constipation after surgery and reinforced treatment options. Encouraged continue use of senna as prescribed. Encouraged fluids and fiber.     Patient has complaints of difficulty emptying his bladder. Told patient that this can be expected with anesthesia and as it wears off, it should improve. Told patient to monitor.    Patient's dressing has not been removed. Patient denies any signs or symptoms of infection. Incision care reinforced.     Reviewed follow up appointments and clinic contact information. Patient will call with any questions or concerns.

## 2021-10-31 ENCOUNTER — NURSE TRIAGE (OUTPATIENT)
Dept: NURSING | Facility: CLINIC | Age: 31
End: 2021-10-31

## 2021-10-31 NOTE — TELEPHONE ENCOUNTER
Patient is concerned as to whether or not he should re-apply the dressing to his incision after his shower. Patient says he was advised that he can take a shower, however he is not sure if he needs to re-apply another dressing. Patient says sutures are internal, and he is no longer having drainage.   Triager advised caller that he can leave it open to air if no further drainage noted. Caller verbalized and understands directives.  COVID 19 Nurse Triage Plan/Patient Instructions    Please be aware that novel coronavirus (COVID-19) may be circulating in the community. If you develop symptoms such as fever, cough, or SOB or if you have concerns about the presence of another infection including coronavirus (COVID-19), please contact your health care provider or visit [email or web address].     Disposition/Instructions    Home care recommended. Follow home care protocol based instructions.    Thank you for taking steps to prevent the spread of this virus.  o Limit your contact with others.  o Wear a simple mask to cover your cough.  o Wash your hands well and often.    Resources    [facility]: About COVID-19: [email or web address]    CDC: What to Do If You're Sick: www.cdc.gov/coronavirus/2019-ncov/about/steps-when-sick.html    CDC: Ending Home Isolation: www.cdc.gov/coronavirus/2019-ncov/hcp/disposition-in-home-patients.html     CDC: Caring for Someone: www.cdc.gov/coronavirus/2019-ncov/if-you-are-sick/care-for-someone.html     [facility]: Interim Guidance for Hospital Discharge to Home: www.health.[facility].mn.us/diseases/coronavirus/hcp/hospdischarge.pdf    [facility] clinical trials (COVID-19 research studies): [email or web address]/um-[email or web address]-trials     Below are the COVID-19 hotlines at the [place] Department of Health ([facility]). Interpreters are available.   o For health questions: Call 639-681-9857 or 1-555.958.1866 (7 a.m. to 7 p.m.)  o For questions about schools and childcare: Call  [phone] or 1-679.871.9089 (7 a.m. to 7 p.m.)                     Reason for Disposition    Skin tape (e.g., Steri-strips) removal, questions about    Additional Information    Negative: [1] Major abdominal surgical incision AND [2] wound gaping open AND [3] visible internal organs    Negative: Sounds like a life-threatening emergency to the triager    Negative: [1] Bleeding from incision AND [2] won't stop after 10 minutes of direct pressure    Negative: [1] Widespread rash AND [2] bright red, sunburn-like    Negative: Severe pain in the incision    Negative: [1] Incision gaping open AND [2] < 48 hours since wound re-opened    Negative: [1] Incision gaping open AND [2] length of opening > 2 inches (5 cm)    Negative: Patient sounds very sick or weak to the triager    Negative: Sounds like a serious complication to the triager    Negative: Fever > 100.4 F (38.0 C)    Negative: [1] Incision looks infected (spreading redness, pain) AND [2] fever > 99.5 F (37.5 C)    Negative: [1] Incision looks infected (spreading redness, pain) AND [2] large red area (> 2 in. or 5 cm)    Negative: [1] Incision looks infected (spreading redness, pain) AND [2] face wound    Negative: [1] Red streak runs from the incision AND [2] longer than 1 inch (2.5 cm)    Negative: [1] Pus or bad-smelling fluid draining from incision AND [2] no fever    Negative: [1] Post-op pain AND [2] not controlled with pain medications    Negative: Dressing soaked with blood or body fluid (e.g., drainage)    Negative: [1] Raised bruise and [2] size > 2 inches (5 cm) and expanding    Negative: [1] Caller has URGENT question AND [2] triager unable to answer question    Negative: [1] INCREASING pain in incision AND [2] > 2 days (48 hours) since surgery    Negative: [1] Small red area or streak AND [2] no fever    Negative: [1] Clear or blood-tinged fluid draining from wound AND [2] no fever    Negative: [1] Incision gaping open AND [2] > 48 hours since wound  re-opened AND [3] length of opening > 1/2 inch (12 mm)    Negative: [1] Incision on face gaping open after skin glue AND [2] > 48 hours since wound re-opened AND [3] length of opening > 1/4 inch (6 mm)    Negative: Suture or staple removal is overdue    Negative: [1] Suture or staple came out early AND [2] caller wants wound checked    Negative: [1] Caller has NON-URGENT question AND [2] triager unable to answer question    Negative: Pimple where a stitch comes through the skin    Negative: Suture (or staple) came out early    Negative: Mild bruising near incision site    Negative: Suture removal date, questions about    Negative: Sutured or stapled surgical incision, questions about    Negative: Surgical incision after sutures or staples removed, questions about    Negative: Numbness at incision site, questions about    Protocols used: POST-OP INCISION SYMPTOMS AND [id number]

## 2021-11-01 ENCOUNTER — TELEPHONE (OUTPATIENT)
Dept: NEUROSURGERY | Facility: CLINIC | Age: 31
End: 2021-11-01

## 2021-11-01 ENCOUNTER — MYC MEDICAL ADVICE (OUTPATIENT)
Dept: FAMILY MEDICINE | Facility: CLINIC | Age: 31
End: 2021-11-01

## 2021-11-01 DIAGNOSIS — M62.838 MUSCLE SPASM: ICD-10-CM

## 2021-11-01 DIAGNOSIS — Z98.890 S/P LUMBAR MICRODISCECTOMY: Primary | ICD-10-CM

## 2021-11-01 RX ORDER — OXYCODONE AND ACETAMINOPHEN 10; 325 MG/1; MG/1
1 TABLET ORAL EVERY 6 HOURS PRN
Qty: 30 TABLET | Refills: 0 | Status: SHIPPED | OUTPATIENT
Start: 2021-11-01 | End: 2021-11-08

## 2021-11-01 NOTE — TELEPHONE ENCOUNTER
Patient called back with more pain on the right hip to posterior thigh, down to calf and foot; 6-7/10.  Feeling different pains in different locations overall, seemingly aggravated by certain movements. On earlier assessment, pain L>R, but now R>L. No red flag symptoms. Reviewed what they were and to go to ED if any occur.   Will reach out to Provider again for recommendations.

## 2021-11-01 NOTE — TELEPHONE ENCOUNTER
Last Visit: 10/28/2021 RIGHT LUMBAR L4-5 MINIMALLY INVASIVE MICRODISCECTOMY    Next Visit: 11/12/2021    Name of Provider:  Dr Wharton    Assessment: Patient called with complaints of continuing constant  LBP 8/10 radiating to posterior Left posterior thigh, lateral calf primarily. Also feels pain intermittently in Right thigh, calf and foot. Similar to pre op. He states that it feels like it has gotten worse over weekend. He has a 12 or 13 lb dog who he moved off his lap 10/31/2021 and states he feels like that aggravated something.  No new weakness, no new n/t, no foot drop/drag.  Currently taking 2 tabs of Oxy q 4, staggered with Tylenol and Zanaflex. No relief from the Zanaflex. Too painful at this point to use ice. States pain meds give some relief for 2 hours max, but he is in a lot of pain until next dose.   Patient was taking Percocet 10/325 up until surgery.   It has improved somewhat since surgery, but patient is still having some difficulty fully emptying his bladder.      Recommendation given: Continue with current pain regimen. Maintain activity restrictions.    Notified provider. Awaiting recommendations.

## 2021-11-01 NOTE — TELEPHONE ENCOUNTER
Pt called because he is wanting to ask question about pain in his pelvis and down legs after using restroom today. Please call him back at [phone]. Thank you

## 2021-11-01 NOTE — TELEPHONE ENCOUNTER
Abimael called in to speak with a nurse about his meds, he may like other options. He would also like to speak about new symptoms. Please call him at [phone]

## 2021-11-02 NOTE — TELEPHONE ENCOUNTER
Pt would like a call back regarding new symptoms he is having this morning. Please call him at [phone]

## 2021-11-02 NOTE — TELEPHONE ENCOUNTER
"Last Visit: 10/28/2021 RIGHT LUMBAR L4-5 MINIMALLY INVASIVE MICRODISCECTOMY     Next Visit: 11/12/2021     Name of Provider:  Dr Wharton    Assessment: Patient states that he was laying supine in bed and lifted his Right leg to start to get out of bed and heard a \"snapping/popping\" sound, not accompanied by pain, but since then  Right lateral outside calf, ankle, foot on RLE has felt \"tingly\". No other symptoms. LBP moderately managed. No new weakness, drop or drag, no Bowel/bladder issues. Routed to Provider for any recommendations.         "

## 2021-11-02 NOTE — TELEPHONE ENCOUNTER
As per Shazia, patient to continue to monitor. To follow up Post op instructions. Review red flag symptoms and go to ED if any occur.     Called patient and communicated above. Patient understands and is in agreement.

## 2021-11-08 DIAGNOSIS — Z98.890 S/P LUMBAR MICRODISCECTOMY: ICD-10-CM

## 2021-11-08 DIAGNOSIS — M62.838 MUSCLE SPASM: ICD-10-CM

## 2021-11-08 RX ORDER — OXYCODONE AND ACETAMINOPHEN 10; 325 MG/1; MG/1
1 TABLET ORAL EVERY 6 HOURS PRN
Qty: 30 TABLET | Refills: 0 | Status: SHIPPED | OUTPATIENT
Start: 2021-11-08 | End: 2021-11-16

## 2021-11-08 NOTE — TELEPHONE ENCOUNTER
Reason for call: Pt called for pain medication refill. Please call him back at [phone]. Thank you

## 2021-11-08 NOTE — TELEPHONE ENCOUNTER
Patient calling for a refill of Percocet 10/325 and Tizanidine 4mg.     DOS:10/28/2021  Procedure:Right L4-5 hemilaminectomy and microdiscectomy  Surgeon:Dr Wharton    Current symptom(s): Patient with BL pelvic pain that goes down back of legs to BL outside calves to feet, L>R. Patient states he notices pain in general increases when he turns his head in either direction. The right sided pain had subsided mostly prior to surgery, but is now returning. Also with numbness on outside of right foot, which had resolved, now returning. Denies bowel/bladder issues, new weakness.     Current pain management: Percocet 1 tab every 6 hours. Tizanidine 4mg 1 tab TID. Ice.  Encouraged patient to follow restrictions.     Out of Percocet and has a few tabs of Tizanidine left.     Last fill: 11/1/2021  Next visit: 11/12/2021    Medication pended for your approval, if appropriate. Pharmacy verified.     Any patient questions or concerns: none really. He'll just monito symptoms and speak with KORIN at 2 week followup on Friday 11/12    Informed patient request will be forwarded to care team.

## 2021-11-09 ENCOUNTER — TELEPHONE (OUTPATIENT)
Dept: NEUROSURGERY | Facility: CLINIC | Age: 31
End: 2021-11-09
Payer: COMMERCIAL

## 2021-11-09 DIAGNOSIS — Z98.890 S/P LUMBAR MICRODISCECTOMY: Primary | ICD-10-CM

## 2021-11-09 RX ORDER — METHYLPREDNISOLONE 4 MG
TABLET, DOSE PACK ORAL
Qty: 21 TABLET | Refills: 0 | Status: SHIPPED | OUTPATIENT
Start: 2021-11-09 | End: 2022-06-13

## 2021-11-09 NOTE — TELEPHONE ENCOUNTER
"Called [facility] to inquire about pain medication denial.     They said patient's insurance only allows 14 pills in a 60 days supply and he has reached this.    Patient can pay OOP or we can submit a PA.     Spoke to patient and reviewed above. Writer will submit PA. Patient had further symptoms to report see below.     Last Visit: 10/28/21 surgery     Next Visit: 11/12/21 with Shazia Dunbar PA-C    Name of Provider:  Dr. Wharton    Assessment:  Patient is s/Right L4-5 hemilaminectomy and microdiscectomy 10/28/21 with Dr. Wharton.  He reports this morning he was startled by something, moved wrong and \"tensed up \" which caused him to jump a little then felt Pain shooting to his pelvis, bilateral thighs and legs, tender in low back. Denies any LE weakness. Continues to have paresthesias in LEs. He is up and walking as tolerated , ambulating without difficulty.  Continues to have issues with urinary retention and not able to fully void. Had issues immediately post op but has gotten better but sill has concerns. He has to really try hard to void and a couple of occasions thought he was done and urine involuntary dribbled out. Patient is scheduled to see Shazia Dunbar PA-C 11/12/21.    Recommendation given: Message routed to Shazia to review and advise. Recommend patient continue to take his medications as directed and apply ice. Will contact patient with any further recommendations from Care team.               " fall precautions

## 2021-11-09 NOTE — TELEPHONE ENCOUNTER
Patient went to  script at pharmacy, and insurance denied this refill. Please call patient with update, as he is out of meds.

## 2021-11-09 NOTE — TELEPHONE ENCOUNTER
Called patient to update him that Shazia Dunbar PA-C would recommend MDP at this time. Patient is seeing Shazia 11/12 for 2 wk p/o follow-up. Reviewed red flag symptoms, should he develop any  advised to go to ED. Patient verbalized understanding. MDP sent to [facility] pharmacy.

## 2021-11-09 NOTE — TELEPHONE ENCOUNTER
Prior Authorization Retail Medication Request    Medication/Dose: Percocet     ICD code:  S/P lumbar microdiscectomy [Z98.890], Radiculopathy due to lumbar intervertebral disc disorder [M51.16]     Previously Tried and Failed:  N/A    Rationale:  Percocet prescribed for acute post operative pain. Per insurance only allows 14 pills in a 60 day supply and patient has reached this.     Insurance Name:  ALL MA  Insurance ID:  [id number]   Group #: [id number]      Pharmacy Information   Name:  [facility]   [address] [place] [address]    Phone:  [phone]    Fax:[phone]

## 2021-11-10 NOTE — TELEPHONE ENCOUNTER
Central Prior Authorization Team   Phone: [phone]    PA Initiation    Medication:   Insurance Company:    Pharmacy Filling the Rx: [id number] - [place], [place] - [address]  Filling Pharmacy Phone: [phone]  Filling Pharmacy Fax: [phone]  Start Date: 11/10/2021

## 2021-11-12 ENCOUNTER — OFFICE VISIT (OUTPATIENT)
Dept: NEUROSURGERY | Facility: CLINIC | Age: 31
End: 2021-11-12
Payer: COMMERCIAL

## 2021-11-12 VITALS
BODY MASS INDEX: 34.47 KG/M2 | HEART RATE: 76 BPM | SYSTOLIC BLOOD PRESSURE: 143 MMHG | DIASTOLIC BLOOD PRESSURE: 86 MMHG | WEIGHT: 268.5 LBS

## 2021-11-12 DIAGNOSIS — Z98.890 S/P LUMBAR MICRODISCECTOMY: Primary | ICD-10-CM

## 2021-11-12 PROCEDURE — 99024 POSTOP FOLLOW-UP VISIT: CPT | Performed by: PHYSICIAN ASSISTANT

## 2021-11-12 ASSESSMENT — PAIN SCALES - GENERAL: PAINLEVEL: SEVERE PAIN (7)

## 2021-11-12 NOTE — LETTER
11/12/2021         RE: Abimael Martinez  [address]  [facility] [place] [address]        Dear Colleague,    Thank you for referring your patient, Abimael Martinez, to the [facility]UROS[facility]GERY CLINIC [place]. Please see a copy of my visit note below.    Neurosurgery 2-week follow-up    Mr. Martinez is a 31-year-old male who is 2 weeks status post right L4-5 hemilaminectomy microdiscectomy and left L5-S1 hemilaminectomy microdiscectomy.  Patient states some of his preoperative symptoms are improved and he also has some new postoperative aches and pains in his buttocks and hips but overall feels like he is moving in the right direction.  He is standing and walking independently and frequently changing positions and following postoperative restrictions.  He denies any issues with his incision.    Exam     Alert and oriented no acute distress  Bilateral lower extremities appropriate strength  Gait is normal  Incisions are healing well    Assessment    Right L4-5 hemilaminectomy and microdiscectomy; use the operating microscope; left L5-S1 hemilaminectomy and microdiscectomy; use the operating microscope      Plan    Gradually increase activity as tolerated.   Follow up as scheduled in 4 weeks.       Again, thank you for allowing me to participate in the care of your patient.        Sincerely,        Shazia Dunbar PA-C

## 2021-11-12 NOTE — PROGRESS NOTES
Neurosurgery 2-week follow-up    Mr. Martinez is a 31-year-old male who is 2 weeks status post right L4-5 hemilaminectomy microdiscectomy and left L5-S1 hemilaminectomy microdiscectomy.  Patient states some of his preoperative symptoms are improved and he also has some new postoperative aches and pains in his buttocks and hips but overall feels like he is moving in the right direction.  He is standing and walking independently and frequently changing positions and following postoperative restrictions.  He denies any issues with his incision.    Exam     Alert and oriented no acute distress  Bilateral lower extremities appropriate strength  Gait is normal  Incisions are healing well    Assessment    Right L4-5 hemilaminectomy and microdiscectomy; use the operating microscope; left L5-S1 hemilaminectomy and microdiscectomy; use the operating microscope      Plan    Gradually increase activity as tolerated.   Follow up as scheduled in 4 weeks.

## 2021-11-16 ENCOUNTER — TELEPHONE (OUTPATIENT)
Dept: NEUROSURGERY | Facility: CLINIC | Age: 31
End: 2021-11-16
Payer: COMMERCIAL

## 2021-11-16 DIAGNOSIS — Z98.890 S/P LUMBAR MICRODISCECTOMY: ICD-10-CM

## 2021-11-16 DIAGNOSIS — M62.838 MUSCLE SPASM: ICD-10-CM

## 2021-11-16 RX ORDER — OXYCODONE AND ACETAMINOPHEN 10; 325 MG/1; MG/1
1 TABLET ORAL EVERY 6 HOURS PRN
Qty: 30 TABLET | Refills: 0 | Status: SHIPPED | OUTPATIENT
Start: 2021-11-16 | End: 2021-11-23

## 2021-11-16 NOTE — TELEPHONE ENCOUNTER
"Spoke with patient: Per Shazia Dunbar PA-C, \"is too early for an MRI, continue with monitoring and routine post op care\"    Patient in agreement and will call the clinic if any symptoms worsen.     Elizabeth Gayle RN    "

## 2021-11-16 NOTE — TELEPHONE ENCOUNTER
Patient requesting medication refill, and also has questions regarding the nerve pain, and if he can have an MRI ordered.

## 2021-11-16 NOTE — TELEPHONE ENCOUNTER
Patient requesting a refill of percocet and tizanidine    DOS:10/28/2021  Procedure:Right L4-5 hemilaminectomy and microdiscectomy  Surgeon:Dr Wharton     Current symptom(s):   He reports nerve pain is the same or worse since surgery. He shopped for recliners (electric) this weekend and feels it was repetitious motion aggravated his back  Concerned with the fact that he has nerve pain a the level where the were going to do the surgery but ended up not and has asked for a new MRI.   He reports waking up in the night with the severe urge to void. Is able to void but not empty his bladder. Also a burning in his groin and butt. And a few times has felt numbness when wiping after a BM.    He says the numbness is about the same as before surgery.  The MDP helped but numbness is coming back  Denies new weakness    Pain assessment from 11/8 refill:   Patient with BL pelvic pain that goes down back of legs to BL outside calves to feet, L>R. The right sided pain had subsided mostly prior to surgery, but is now returning. Also with numbness on outside of right foot, which had resolved, now returning. Denies bowel/bladder issues, new weakness.        Current pain management: Percocet 1 tab every 4-6 hours.  Wears off aftge 3-4 hours  Tizanidine 4mg 1 tab 2-3. Ice.  Encouraged patient to follow restrictions.      Out of Percocet and has a few tabs of Tizanidine left.      Last fill: 11/8/2021 percocet #30                 11/8/21   Tizanidine #30  Next visit: 12/3/2021     Medication pended for your approval, if appropriate. Pharmacy verified.      Any patient questions or concerns: Asking for MRI     Informed patient request will be forwarded to care team.

## 2021-11-17 ENCOUNTER — TELEPHONE (OUTPATIENT)
Dept: NEUROSURGERY | Facility: CLINIC | Age: 31
End: 2021-11-17
Payer: COMMERCIAL

## 2021-11-17 NOTE — TELEPHONE ENCOUNTER
Spoke to patient. He reports [facility] did not fill his percocet as they were saying it needed a PA. PA completed on 11/10/21 frpm previous refill, and no PA was needed and per notes in chart Drug is covered by current benefit plan. No further PA activity needed.     Called [facility], no cash options per pharmacist. They said he has HP as primary and [employer] secondary and HP denied refill because patient hit 14 day supply limit within a 60 day time frame. Writer updated Pharmacist that we do not show HP on file for his insurance just [employer] and that no PA was needed.Informed patient that we would need HP info to add to his chart then can submit PA for his percocet. Patient was going to call to follow-up with the pharmacy and contact writer after.     Patient had additional questions. He said he completed his MDP yesterday notes he had good relief with it. Said today he was sitting on his bed and bent down to get pants on and experienced some pain. Discussed with patient the importance of maintaining his post op restrictions and reviewed them with patient. Advised to CTM, apply ice, take medication as directed, he stopped taking the muscle relaxer during the day . Encouraged to resume the muscle relaxer to help with current pain. Discussed that he can resume NSAIDs at this point and since that is an antiinflammatory this may be helpful for his pain. Reviewed red flags symptoms and when to seek medical attention.Patient verbalized understanding and in agreement with plan.

## 2021-11-17 NOTE — TELEPHONE ENCOUNTER
Abimael called-in for an authorization for his medication. He also has questions on ramping up pain. Please reach him at [phone]

## 2021-11-18 NOTE — TELEPHONE ENCOUNTER
Spoke to patient. He was able to get his refill from [email or web address]. Patient said he updated his insurance info by adding HP in his mychart.

## 2021-11-22 NOTE — ANESTHESIA POSTPROCEDURE EVALUATION
Patient: Abimael Martinez    Procedure: Procedure(s):  LEFT LUMBAR L5-S1 MINIMALLY INVASIVE MICRODISCECTOMY  RIGHT LUMBAR L4-5 MINIMALLY INVASIVE MICRODISCECTOMY       Diagnosis:Radiculopathy due to lumbar intervertebral disc disorder [M51.16]  Diagnosis Additional Information: No value filed.    Anesthesia Type:  General    Note:     Postop Pain Control: Uneventful            Sign Out: Well controlled pain   PONV: No   Neuro/Psych: Uneventful            Sign Out: Acceptable/Baseline neuro status   Airway/Respiratory: Uneventful            Sign Out: Acceptable/Baseline resp. status   CV/Hemodynamics: Uneventful            Sign Out: Acceptable CV status; No obvious hypovolemia; No obvious fluid overload   Other NRE: NONE   DID A NON-ROUTINE EVENT OCCUR? No           Last vitals:  Vitals Value Taken Time   /97 10/28/21 1734   Temp     Pulse 67 10/28/21 1745   Resp 16 10/28/21 1745   SpO2 96 % 10/28/21 1745       Electronically Signed By: Wilson Augustin MD  November 22, 2021  2:20 PM

## 2021-11-23 ENCOUNTER — TELEPHONE (OUTPATIENT)
Dept: NEUROSURGERY | Facility: CLINIC | Age: 31
End: 2021-11-23
Payer: COMMERCIAL

## 2021-11-23 DIAGNOSIS — Z98.890 S/P LUMBAR MICRODISCECTOMY: ICD-10-CM

## 2021-11-23 DIAGNOSIS — M62.838 MUSCLE SPASM: ICD-10-CM

## 2021-11-23 RX ORDER — OXYCODONE AND ACETAMINOPHEN 10; 325 MG/1; MG/1
1 TABLET ORAL EVERY 6 HOURS PRN
Qty: 30 TABLET | Refills: 0 | Status: SHIPPED | OUTPATIENT
Start: 2021-11-23 | End: 2021-12-02

## 2021-11-23 NOTE — TELEPHONE ENCOUNTER
Reason for call: Pt called to request a medication refill and also has a question for the nurse, please call him back at [phone]. Thank you~

## 2021-11-23 NOTE — TELEPHONE ENCOUNTER
Patient calling for a refill of Oxycodone and Tizanidine.     DOS: 10/28/2021  Procedure:Right L4-5 hemilaminectomy and microdiscectomy  Surgeon:Dr Wharton    Current symptom(s): Still complaining of LBP, as bad as 7-8/10, radiating to BL hips and down legs at different time; lately L>R. Also complains of continuing nerve pain, numbness and tingling that affects different LE at different times. Also complaining of difficulty emptying bladder at times. Stating sometimes it takes awhile. Got temporarily relief from MDP prescribed 11/9.    Current pain management: 1 tab of Percocet every 6 hours.   1 tab Tizanidine TID  Ice.  Reminded patient to follow restrictions.    Last fill: 11/16/2021  Next visit: 12/3/2021    Medication pended for your approval, if appropriate. Pharmacy verified.     Any patient questions or concerns: continuing symptoms    Informed patient request will be forwarded to care team.

## 2021-11-29 ENCOUNTER — TELEPHONE (OUTPATIENT)
Dept: NEUROSURGERY | Facility: CLINIC | Age: 31
End: 2021-11-29
Payer: COMMERCIAL

## 2021-11-29 ENCOUNTER — MYC REFILL (OUTPATIENT)
Dept: FAMILY MEDICINE | Facility: CLINIC | Age: 31
End: 2021-11-29
Payer: COMMERCIAL

## 2021-11-29 DIAGNOSIS — F41.9 ANXIETY: ICD-10-CM

## 2021-11-29 DIAGNOSIS — Z98.890 S/P LUMBAR MICRODISCECTOMY: Primary | ICD-10-CM

## 2021-11-29 DIAGNOSIS — F90.2 ATTENTION DEFICIT HYPERACTIVITY DISORDER (ADHD), COMBINED TYPE: ICD-10-CM

## 2021-11-29 DIAGNOSIS — M51.16 RADICULOPATHY DUE TO LUMBAR INTERVERTEBRAL DISC DISORDER: ICD-10-CM

## 2021-11-29 NOTE — TELEPHONE ENCOUNTER
Abimael called-in for Claribel to inform her of new symptoms and would like an MRI prior to his visit with Shazia on Friday. Please reach him at [phone]

## 2021-11-30 RX ORDER — ALPRAZOLAM 2 MG
2 TABLET ORAL 3 TIMES DAILY PRN
Qty: 30 TABLET | Refills: 0 | Status: SHIPPED | OUTPATIENT
Start: 2021-11-30 | End: 2021-12-29

## 2021-11-30 RX ORDER — DEXTROAMPHETAMINE SACCHARATE, AMPHETAMINE ASPARTATE, DEXTROAMPHETAMINE SULFATE AND AMPHETAMINE SULFATE 5; 5; 5; 5 MG/1; MG/1; MG/1; MG/1
20 TABLET ORAL 3 TIMES DAILY
Qty: 90 TABLET | Refills: 0 | Status: SHIPPED | OUTPATIENT
Start: 2021-11-30 | End: 2021-12-29

## 2021-11-30 NOTE — TELEPHONE ENCOUNTER
Per Shazia Dunbar PA-C ok yo order Lumbar MRI w/wo contrast to be done prior to his appt on 12/3/21. Per Shazia, if not able to schedule the MRI prior then delay the appt until after.    Called and reviewed with patient. He agrees with above plan. Patient prefers [facility] for his imaging. Advised patient to call us immediately when he is done with the imaging as we will need to call [facility] to have it pushed and report faxed. Also advised patient if unable to get MRI prior to 12/3 to let us know so we can r/s schedule his follow-up after the MRI is completed.     Faxed MRI order to [facility]  November 30, 2021 to fax number [phone]    Right Fax confirmed at 1035 AM    Claribel Lowery RN

## 2021-11-30 NOTE — TELEPHONE ENCOUNTER
Ok to order lumbar Mri w/wo contrast, should be done prior to his next appt, if not able to schedule prior then delay the appt until after. Thanks

## 2021-12-01 DIAGNOSIS — M62.838 MUSCLE SPASM: ICD-10-CM

## 2021-12-01 DIAGNOSIS — Z98.890 S/P LUMBAR MICRODISCECTOMY: ICD-10-CM

## 2021-12-02 RX ORDER — OXYCODONE AND ACETAMINOPHEN 10; 325 MG/1; MG/1
1 TABLET ORAL EVERY 6 HOURS PRN
Qty: 30 TABLET | Refills: 0 | Status: SHIPPED | OUTPATIENT
Start: 2021-12-02 | End: 2021-12-10

## 2021-12-02 NOTE — NURSING NOTE
Patient verified meds and allergies are correct via patients echeck in.    [place], Virtual Facilitator       -doing well on NC  -CT chest with LLL PNA, procal 0.37, will also treat for superimposed bacterial pna, c/w IV ceftriaxone and flagyl per ID  -discussed with renal and pharmacy, d/c remdesivir given low crcl <15  -c/w IV dexa 6mg daily, start FS and monitor FSS  -c/w albuterol inhaler q6hrs  -c/w NC, wean 02 as tolerated  -GOC: DNR/DNI MOLST in chart   - ID Alfred following  - d-dimer downtrending, trend inflam markers q48 hours  - wbc 11 up from 8 but on steroids, trend cbc  - afebrile,

## 2021-12-02 NOTE — TELEPHONE ENCOUNTER
Patient calling for a refill of percocet and Zanaflex.     DOS:10/28/21  Procedure:Right L4-5 hemilaminectomy and microdiscectomy; left L5-S1 hemilaminectomy and microdiscectomy  Surgeon:Dr. Wharton     Current symptom(s): 7/10, low back pain, nerve pain in both hips and legs. Pressure type pain in bilateral feet. Pelvic and testicular pain reported.    Current pain management: percocet 1 tab q 6-8 hrs prn, Zanaflex 1 tab q BID-TID prn, ibuprofen, ice/heat.     Last fill: 11/23/21 for both  Next visit: 12/8/21     Medication pended for your approval, if appropriate. [facility] [place].    Any patient questions or concerns: patient is scheduled for updated Lumbar MRI at [facility] 12/7/21. He had to r/s his follow-up to 12/8/21.     Tripped over a cord last night, caught himself so he did not fall, reports slight increase in pain. Discussed to continue to monitor but sounds like more muscular in nature. Encouraged to continue with above pain management regimen and to let us know if anything changes. Understanding verbalized.     Informed patient request will be forwarded to care team.

## 2021-12-06 ENCOUNTER — MYC MEDICAL ADVICE (OUTPATIENT)
Dept: FAMILY MEDICINE | Facility: CLINIC | Age: 31
End: 2021-12-06
Payer: COMMERCIAL

## 2021-12-06 ENCOUNTER — TELEPHONE (OUTPATIENT)
Dept: NEUROSURGERY | Facility: CLINIC | Age: 31
End: 2021-12-06
Payer: COMMERCIAL

## 2021-12-06 DIAGNOSIS — N52.2 DRUG-INDUCED ERECTILE DYSFUNCTION: ICD-10-CM

## 2021-12-06 RX ORDER — SILDENAFIL CITRATE 20 MG/1
20-40 TABLET ORAL DAILY PRN
Qty: 30 TABLET | Refills: 2 | Status: SHIPPED | OUTPATIENT
Start: 2021-12-06 | End: 2023-10-05

## 2021-12-06 NOTE — TELEPHONE ENCOUNTER
Returned patient call regarding his wish to have an MRI w/o contrast as he has done some research regarding his body processing the contrast. Explained that the MRI with contrast is the most appropriate test to order post op. He agrees and it is scheduled at [facility] 12/7  and will see Sadia Caballero CNP on 12/8

## 2021-12-06 NOTE — TELEPHONE ENCOUNTER
To provider to advise on refill request for sildenafil; potential interaction with ketoconazole is flagging.  Med is pended.   Melva Mcpherson RN

## 2021-12-08 ENCOUNTER — OFFICE VISIT (OUTPATIENT)
Dept: NEUROSURGERY | Facility: CLINIC | Age: 31
End: 2021-12-08
Attending: NURSE PRACTITIONER
Payer: COMMERCIAL

## 2021-12-08 VITALS — OXYGEN SATURATION: 96 % | DIASTOLIC BLOOD PRESSURE: 90 MMHG | HEART RATE: 93 BPM | SYSTOLIC BLOOD PRESSURE: 134 MMHG

## 2021-12-08 DIAGNOSIS — Z98.890 S/P LUMBAR MICRODISCECTOMY: Primary | ICD-10-CM

## 2021-12-08 PROCEDURE — 99024 POSTOP FOLLOW-UP VISIT: CPT | Performed by: NURSE PRACTITIONER

## 2021-12-08 PROCEDURE — G0463 HOSPITAL OUTPT CLINIC VISIT: HCPCS

## 2021-12-08 NOTE — LETTER
12/8/2021         RE: Abimael Martinez  [address]  [place] [address]        Dear Colleague,    Thank you for referring your patient, Abimael Martinez, to the [facility]OSURGERY [facility] [place]. Please see a copy of my visit note below.    [facility]  Neurosurgery Follow Up:    HPI: 6 weeks s/p right L4-5 microdiscectomy and left L5-S1 microdiscectomy with Dr. Wharton on 10/28/2021. Reports improvement in right leg symptoms and now describes as a dull, ache pain in the right thigh. Left thigh pain worsened following surgery which has returned to baseline left thigh pain. He does note increased left back pain as well. He denies paresthesias and overt weakness. He had some difficulty with urination following surgery which too has improved. Incisions intact, no drainage, edema, or erythema. He is interested in starting PT prior to returning to work.       Medical, surgical, family, and social history unchanged since prior exam.  Exam:  Constitutional:  Alert, well nourished, NAD.  HEENT: Normocephalic, atraumatic.   Pulm:  Without shortness of breath   CV:  No pitting edema of BLE.      Vital Signs:  BP (!) 134/90   Pulse 93   SpO2 96%     Neurological:  Awake  Alert  Oriented x 3  Motor exam:        IP Q DF PF EHL  R   5  5   5   5    5  L   5  5   5   5    5     Able to spontaneously move L/E bilaterally  Sensation intact throughout all L/E dermatomes     Incisions:  Healing nicely.  Imaging:   Lumbar MRI 12/7/2021  CONCLUSION:  1. Dorsal decompression defect on the left at L5-S1 with a residual 3 mm left posterolateral disc protrusion and a superimposed 2-3 mm high signal intensity disc fragment or postop fluid collection abutting the left S1 nerve root.  2. Dorsal decompression defects bilaterally at L4-5 with a 3 mm right posterolateral disc protrusion mildly encroaching on the transversing right L5 nerve root.   3. Moderate L5-S1 and L4-5 disc  marino.   4. Comparison with 10/18/2021 shows that the patient has undergone discectomies on the left at L5-S1 and right at L4-5 in the interval.    A/P: s/p lumbar microdiscectomy. Reviewed lumbar MRI. Plan for PT and increase activities. He will follow up in 6 weeks. He will contact the clinic with new or worsening symptoms. He verbalized understanding and agreement.  Patient Instructions   - Physical therapy ordered. They will contact you to schedule.   - May increase lifting restriction to 20 pounds   - Follow up in 4-6 weeks.    - Call the clinic at [phone] for increased pain or any other questions and concerns.    Sadia Caballero CNP  [facility]  [address] [place] [address]  Tel [phone]  Fax [phone]        Again, thank you for allowing me to participate in the care of your patient.        Sincerely,        Sadia Caballero, RENITA

## 2021-12-08 NOTE — NURSING NOTE
"Abimael Martinez is a 31 year old male who presents for:  Chief Complaint   Patient presents with     Surgical Followup     Lumbar 6 wk post op follow up; DOS 10/28/21        Initial Vitals:  BP (!) 134/90   Pulse 93   SpO2 96%  Estimated body mass index is 34.47 kg/m  as calculated from the following:    Height as of 10/28/21: 6' 2\" (1.88 m).    Weight as of 11/12/21: 268 lb 8 oz (121.8 kg).. There is no height or weight on file to calculate BSA. BP completed using cuff size: regular  Data Unavailable    Abimael López MA    "

## 2021-12-08 NOTE — PATIENT INSTRUCTIONS
- Physical therapy ordered. They will contact you to schedule.   - May increase lifting restriction to 20 pounds   - Follow up in 4-6 weeks.    - Call the clinic at [phone] for increased pain or any other questions and concerns.

## 2021-12-08 NOTE — PROGRESS NOTES
[facility]  Neurosurgery Follow Up:    HPI: 6 weeks s/p right L4-5 microdiscectomy and left L5-S1 microdiscectomy with Dr. Wharton on 10/28/2021. Reports improvement in right leg symptoms and now describes as a dull, ache pain in the right thigh. Left thigh pain worsened following surgery which has returned to baseline left thigh pain. He does note increased left back pain as well. He denies paresthesias and overt weakness. He had some difficulty with urination following surgery which too has improved. Incisions intact, no drainage, edema, or erythema. He is interested in starting PT prior to returning to work.       Medical, surgical, family, and social history unchanged since prior exam.  Exam:  Constitutional:  Alert, well nourished, NAD.  HEENT: Normocephalic, atraumatic.   Pulm:  Without shortness of breath   CV:  No pitting edema of BLE.      Vital Signs:  BP (!) 134/90   Pulse 93   SpO2 96%     Neurological:  Awake  Alert  Oriented x 3  Motor exam:        IP Q DF PF EHL  R   5  5   5   5    5  L   5  5   5   5    5     Able to spontaneously move L/E bilaterally  Sensation intact throughout all L/E dermatomes     Incisions:  Healing nicely.  Imaging:   Lumbar MRI 12/7/2021  CONCLUSION:  1. Dorsal decompression defect on the left at L5-S1 with a residual 3 mm left posterolateral disc protrusion and a superimposed 2-3 mm high signal intensity disc fragment or postop fluid collection abutting the left S1 nerve root.  2. Dorsal decompression defects bilaterally at L4-5 with a 3 mm right posterolateral disc protrusion mildly encroaching on the transversing right L5 nerve root.   3. Moderate L5-S1 and L4-5 disc degeneratiton.   4. Comparison with 10/18/2021 shows that the patient has undergone discectomies on the left at L5-S1 and right at L4-5 in the interval.    A/P: s/p lumbar microdiscectomy. Reviewed lumbar MRI. Plan for PT and increase activities. He will follow up in 6 weeks. He will  contact the clinic with new or worsening symptoms. He verbalized understanding and agreement.  Patient Instructions   - Physical therapy ordered. They will contact you to schedule.   - May increase lifting restriction to 20 pounds   - Follow up in 4-6 weeks.    - Call the clinic at [phone] for increased pain or any other questions and concerns.    Sadia Caballero, [address] [address]  Tel [phone]  Fax [phone]

## 2021-12-09 ENCOUNTER — THERAPY VISIT (OUTPATIENT)
Dept: PHYSICAL THERAPY | Facility: CLINIC | Age: 31
End: 2021-12-09
Attending: NURSE PRACTITIONER
Payer: COMMERCIAL

## 2021-12-09 ENCOUNTER — TELEPHONE (OUTPATIENT)
Dept: NEUROSURGERY | Facility: CLINIC | Age: 31
End: 2021-12-09

## 2021-12-09 DIAGNOSIS — Z98.890 S/P LUMBAR MICRODISCECTOMY: ICD-10-CM

## 2021-12-09 DIAGNOSIS — M62.838 MUSCLE SPASM: ICD-10-CM

## 2021-12-09 DIAGNOSIS — M54.41 BILATERAL LOW BACK PAIN WITH RIGHT-SIDED SCIATICA: ICD-10-CM

## 2021-12-09 PROCEDURE — 97110 THERAPEUTIC EXERCISES: CPT | Mod: GP | Performed by: PHYSICAL THERAPIST

## 2021-12-09 PROCEDURE — 97161 PT EVAL LOW COMPLEX 20 MIN: CPT | Mod: GP | Performed by: PHYSICAL THERAPIST

## 2021-12-09 NOTE — PROGRESS NOTES
Physical Therapy Initial Evaluation  Subjective:  The history is provided by the patient. No  was used.   Patient Health History  Abimael Martinez being seen for S/P discectomy, LBP.     Date of Onset: 12/8/21 MD order for PT.   Problem occurred: reports onset of LBP/L LE pain in 2017 with surgery- L4-5 discectomy done. He had some improvement after surgery however still had pain. He reports increased LB/B LE pain in 2019 - injections were somewhat helpful however pain continued.  MRI in 2021 showed disc herniations L4-5 and L5-S1. PT had not been helpful. He had L4-5, L5-S1 disectomy on 10/28/21. Currently, c/o LBP, L buttock pain/ R LE pain.        Pain is reported as 3/10 on pain scale.  General health as reported by patient is good.  Pertinent medical history includes: asthma, high blood pressure, numbness/tingling and overweight.   Red flags:  Incontinence and other (1 episode of urinary incontinence, Pt contacted surgeon regarding this).  Medical allergies: none.   Surgeries include:  Orthopedic surgery and other (2017- L4-5 discectomy, 10/28/21 L45, L5-S1 discectomy).    Current medications:  Anti-inflammatory, muscle relaxants and pain medication.    Current occupation is .   Primary job tasks include:  Computer work, driving, lifting/carrying, prolonged sitting, prolonged standing, pushing/pulling and repetitive tasks.                  Therapist Generated HPI Evaluation         Type of problem:  Lumbar.    This is a chronic condition.  Condition occurred with:  Insidious onset.  Where condition occurred: for unknown reasons.  Patient reports pain:  Lower lumbar spine.  Pain is described as aching and sharp and is constant.  Pain radiates to:  Gluteals left, gluteals right, thigh right and lower leg right. Pain is the same all the time.  Since onset symptoms are unchanged.  Associated symptoms:  Incontinence and loss of motion/stiffness. Symptoms are exacerbated by  sitting, standing, walking and bending  and relieved by nothing.  Special tests included:  MRI.  Previous treatment includes physical therapy. There was none improvement following previous treatment.  Restrictions due to condition include:  Currently not working due to present treatment.  Barriers include:  None as reported by patient.                        Objective:        Flexibility/Screens:       Lower Extremity:  Decreased left lower extremity flexibility:Piriformis; Hamstrings and Gastroc    Decreased right lower extremity flexibility:  Piriformis; Hamstrings and Gastroc               Lumbar/SI Evaluation  ROM:  AROM Lumbar: not assessed (secondary to surgery 10/28/21)      Lumbar Myotomes:  normal                    Lumbar Palpation:    Tenderness present at Left:    Erector Spinae  Tenderness present at Right: Erector Spinae                                                       Jose C Lumbar Evaluation    Posture:  Sitting: fair  Standing: fair                                                         ROS    Assessment/Plan:    Patient is a 31 year old male with lumbar complaints.    Patient has the following significant findings with corresponding treatment plan.                Diagnosis 1:  S/P discectomy, LBP  Pain -  hot/cold therapy, self management, education, directional preference exercise and home program  Decreased ROM/flexibility - manual therapy, therapeutic exercise and home program  Impaired muscle performance - neuro re-education and home program  Decreased function - therapeutic activities and home program  Impaired posture - neuro re-education, therapeutic activities and home program    Therapy Evaluation Codes:   1) History comprised of:   Personal factors that impact the plan of care:      None.    Comorbidity factors that impact the plan of care are:      None.     Medications impacting care: None.  2) Examination of Body Systems comprised of:   Body structures and functions that impact  the plan of care:      Lumbar spine.   Activity limitations that impact the plan of care are:      Bending, Driving, Lifting, Sitting, Stairs, Standing, Walking, Working, Sleeping and Laying down.  3) Clinical presentation characteristics are:   Stable/Uncomplicated.  4) Decision-Making    Low complexity using standardized patient assessment instrument and/or measureable assessment of functional outcome.  Cumulative Therapy Evaluation is: Low complexity.    Previous and current functional limitations:  (See Goal Flow Sheet for this information)    Short term and Long term goals: (See Goal Flow Sheet for this information)     Communication ability:  Patient appears to be able to clearly communicate and understand verbal and written communication and follow directions correctly.  Treatment Explanation - The following has been discussed with the patient:   RX ordered/plan of care  Anticipated outcomes  Possible risks and side effects  This patient would benefit from PT intervention to resume normal activities.   Rehab potential is good.    Frequency:  1 X week, once daily  Duration:  for 8 weeks  Discharge Plan:  Achieve all LTG.  Independent in home treatment program.  Reach maximal therapeutic benefit.    Please refer to the daily flowsheet for treatment today, total treatment time and time spent performing 1:1 timed codes.

## 2021-12-09 NOTE — TELEPHONE ENCOUNTER
Reason for call: Pt would like a call back because he has some pain and symptoms and some urinary issues that are of concern. Please call him at [phone]. Thank you

## 2021-12-09 NOTE — TELEPHONE ENCOUNTER
Patient calling for a refill of Oxycodone and Zanaflex.     DOS:10/28/21  Procedure:right L4-5 microdiscectomy on 10/28/21  Surgeon:Dr Wharton    Current symptom(s):Patient states that last night(12/8/21) he opened his front door to a stack of packages and a 30lb box fell towards him and he had to grab it and redirect it away from him. Since then his LBP, BL hip and right leg pain has gotten a little worse. 5-6/10. Also states that overnight he woke up to an episode of bladder incontinence. Since then has had sensation of needing to urinate but continues to have the  urgency that he has had since after surgery. Denies painful urination, fowl odor. Also complained of some tingling in his right foot for a short duration last night. Hasn't felt it since.   No other new symptoms at this time. No new weakness, no other new numbness and tingling.     Current pain management: Oxycodone 1 tab every 6 hours. Zanaflex 1 tab TID.    Last fill: 12/2/21  Next visit: none scheduled    Medication pended for your approval, if appropriate. Pharmacy verified.     Any patient questions or concerns: Just the overnight bladder accident and continuing urinary symptoms.     Informed patient request will be forwarded to care team.

## 2021-12-10 RX ORDER — OXYCODONE AND ACETAMINOPHEN 10; 325 MG/1; MG/1
1 TABLET ORAL EVERY 6 HOURS PRN
Qty: 30 TABLET | Refills: 0 | Status: SHIPPED | OUTPATIENT
Start: 2021-12-10 | End: 2021-12-20

## 2021-12-13 ENCOUNTER — MYC MEDICAL ADVICE (OUTPATIENT)
Dept: TRANSPLANT | Facility: CLINIC | Age: 31
End: 2021-12-13
Payer: COMMERCIAL

## 2021-12-13 DIAGNOSIS — R16.1 SPLENOMEGALY: Primary | ICD-10-CM

## 2021-12-14 ENCOUNTER — THERAPY VISIT (OUTPATIENT)
Dept: PHYSICAL THERAPY | Facility: CLINIC | Age: 31
End: 2021-12-14
Payer: COMMERCIAL

## 2021-12-14 DIAGNOSIS — M54.41 BILATERAL LOW BACK PAIN WITH RIGHT-SIDED SCIATICA: ICD-10-CM

## 2021-12-14 PROCEDURE — 97530 THERAPEUTIC ACTIVITIES: CPT | Mod: GP | Performed by: PHYSICAL THERAPIST

## 2021-12-14 PROCEDURE — 97110 THERAPEUTIC EXERCISES: CPT | Mod: GP | Performed by: PHYSICAL THERAPIST

## 2021-12-14 NOTE — PROGRESS NOTES
[facility]    OUTPATIENT Physical Therapy ORTHOPEDIC EVALUATION  PLAN OF TREATMENT FOR OUTPATIENT REHABILITATION  (COMPLETE FOR INITIAL CLAIMS ONLY)  Patient's Last Name, First Name, M.I.  YOB: 1990  Abimael Martinez    Provider s Name:  [facility]   Medical Record No.  [id number]   Start of Care Date:      Onset Date:  10/28/21    Type:     _X__PT   ___OT Medical Diagnosis:    Encounter Diagnoses   Name Primary?     S/P lumbar microdiscectomy      Bilateral low back pain with right-sided sciatica         Treatment Diagnosis:  S/P L4-5, L5-S1 discectomy        Goals:     12/09/21 0500   Body Part   Goals listed below are for lumbar   Goal #1   Goal #1 sitting   Previous Functional Level No restrictions   Current Functional Level Minutes patient can sit   Performance level 15 PL 6   STG Target Performance Minutes patient will be able to sit   Performance level 30 PL 1-2   Rationale for community transportation;for job requirements in their work place   Due date 01/06/22   LTG Target Performance Hours patient will be able to sit   Performance Level 1 PL 1-2   Rationale for community transportation;for job requirements in their work place   Due date 02/03/22       Therapy Frequency:     Predicted Duration of Therapy Intervention:       Susan Bennett, PT                 I CERTIFY THE NEED FOR THESE SERVICES FURNISHED UNDER        THIS PLAN OF TREATMENT AND WHILE UNDER MY CARE     (Physician attestation of this document indicates review and certification of the therapy plan).                       Certification Date From:      Certification Date To:       Referring Provider:  Sadia Caballero    Initial Assessment        See Epic Evaluation

## 2021-12-16 ENCOUNTER — TELEPHONE (OUTPATIENT)
Dept: NEUROSURGERY | Facility: CLINIC | Age: 31
End: 2021-12-16
Payer: COMMERCIAL

## 2021-12-16 NOTE — TELEPHONE ENCOUNTER
Reason for call: Pt would like a call back to go over his MRI results. Please call him back at [phone]. Thank you~

## 2021-12-17 ENCOUNTER — THERAPY VISIT (OUTPATIENT)
Dept: PHYSICAL THERAPY | Facility: CLINIC | Age: 31
End: 2021-12-17
Payer: COMMERCIAL

## 2021-12-17 DIAGNOSIS — M54.41 BILATERAL LOW BACK PAIN WITH RIGHT-SIDED SCIATICA: ICD-10-CM

## 2021-12-17 PROCEDURE — 97530 THERAPEUTIC ACTIVITIES: CPT | Mod: GP | Performed by: PHYSICAL THERAPIST

## 2021-12-17 PROCEDURE — 97110 THERAPEUTIC EXERCISES: CPT | Mod: GP | Performed by: PHYSICAL THERAPIST

## 2021-12-17 NOTE — TELEPHONE ENCOUNTER
MRI from 12/7 is  in PACs.     The MRI was reviewed with the patient at the clinic appointment 12/8.  He thought the MRI was going to reviewed again with the care team.

## 2021-12-17 NOTE — PROGRESS NOTES
Subjective:  HPI  Physical Exam                    Objective:  System    Physical Exam      Jose C Lumbar Evaluation      Movement Loss:  Flexion (Flex): major and pain  Extension (EXT): mod and pain  Side [place] R (SG R): mod and pain  Side Glide L (SG L): mod and pain                                               ROS    Assessment/Plan:    SUBJECTIVE  Subjective changes as noted by pt:  Patient reports pain symptoms in general are increasing as he is increasing activity levels and increased PT exercises.       Current pain level: 6/10 Current Pain level: 5/10   Changes in function:  Yes (See Goal flowsheet attached for changes in current functional level)     Adverse reaction to treatment or activity:  None    OBJECTIVE  Changes in objective findings:  Continued restriction in lumbar AROM, although slight increase in lumbar flexion (painful).        ASSESSMENT  Muslim continues to require intervention to meet STG and LTG's: PT  Patient is progressing as expected.  Response to therapy has shown an improvement in  ROM   Progress made towards STG/LTG?  Yes (See Goal flowsheet attached for updates on achievement of STG and LTG)    PLAN  Continue current treatment plan until patient demonstrates readiness to progress to higher level exercises.    PTA/ATC plan:  N/A    Please refer to the daily flowsheet for treatment today, total treatment time and time spent performing 1:1 timed codes.

## 2021-12-20 DIAGNOSIS — Z98.890 S/P LUMBAR MICRODISCECTOMY: ICD-10-CM

## 2021-12-20 RX ORDER — OXYCODONE AND ACETAMINOPHEN 10; 325 MG/1; MG/1
1 TABLET ORAL EVERY 6 HOURS PRN
Qty: 30 TABLET | Refills: 0 | Status: SHIPPED | OUTPATIENT
Start: 2021-12-20 | End: 2021-12-30

## 2021-12-20 NOTE — TELEPHONE ENCOUNTER
Patient calling for a refill of Percocet.     DOS:10/28/21  Procedure:right L4-5 microdiscectomy on 10/28/21  Surgeon:Dr Wharton    Current symptom(s): LBP in addition BL hips to upper thigh pain; worse Lower back. Pain exacerbated with activity.  Numbness in BL [place] feet somewhat improved, as is the urinary symptoms patient had been complaining about.    Current pain management: Percocet 3 tabs/day.  Ibuprofen 2-3 tabs a day.  Physical therapy,  ice     Last fill: 12/10/21    Next visit: 3-4 weeks    Medication pended for your approval, if appropriate. Pharmacy verified.     Any patient questions or concerns: none    Informed patient request will be forwarded to care team.

## 2021-12-20 NOTE — TELEPHONE ENCOUNTER
As per Sadia Caballero NP, Reviewed with Dr. Wharton. MRI looks good. He should continue with current plan of care.  Discussed results of MRI with patient in refill encounter. No questions at this point Patient states that Sadia had mentioned a return clinic visit in 4 weeks or so. He will call the schedulers to coordinate.

## 2021-12-21 ENCOUNTER — THERAPY VISIT (OUTPATIENT)
Dept: PHYSICAL THERAPY | Facility: CLINIC | Age: 31
End: 2021-12-21
Payer: COMMERCIAL

## 2021-12-21 DIAGNOSIS — M54.41 BILATERAL LOW BACK PAIN WITH RIGHT-SIDED SCIATICA: ICD-10-CM

## 2021-12-21 PROCEDURE — 97110 THERAPEUTIC EXERCISES: CPT | Mod: GP | Performed by: PHYSICAL THERAPIST

## 2021-12-21 PROCEDURE — 97530 THERAPEUTIC ACTIVITIES: CPT | Mod: GP | Performed by: PHYSICAL THERAPIST

## 2021-12-28 ENCOUNTER — PATIENT OUTREACH (OUTPATIENT)
Dept: ONCOLOGY | Facility: CLINIC | Age: 31
End: 2021-12-28

## 2021-12-28 ENCOUNTER — ONCOLOGY VISIT (OUTPATIENT)
Dept: TRANSPLANT | Facility: CLINIC | Age: 31
End: 2021-12-28
Attending: INTERNAL MEDICINE
Payer: COMMERCIAL

## 2021-12-28 ENCOUNTER — APPOINTMENT (OUTPATIENT)
Dept: LAB | Facility: CLINIC | Age: 31
End: 2021-12-28
Attending: INTERNAL MEDICINE
Payer: COMMERCIAL

## 2021-12-28 VITALS
OXYGEN SATURATION: 97 % | DIASTOLIC BLOOD PRESSURE: 92 MMHG | BODY MASS INDEX: 35.89 KG/M2 | WEIGHT: 279.5 LBS | SYSTOLIC BLOOD PRESSURE: 129 MMHG | HEART RATE: 88 BPM | RESPIRATION RATE: 16 BRPM

## 2021-12-28 DIAGNOSIS — R59.1 LYMPHADENOPATHY: Primary | ICD-10-CM

## 2021-12-28 DIAGNOSIS — R16.1 SPLENOMEGALY: ICD-10-CM

## 2021-12-28 DIAGNOSIS — R59.1 LYMPHADENOPATHY: ICD-10-CM

## 2021-12-28 DIAGNOSIS — G93.31 POST VIRAL SYNDROME: ICD-10-CM

## 2021-12-28 LAB
ALBUMIN SERPL-MCNC: 3.8 G/DL (ref 3.4–5)
ALP SERPL-CCNC: 116 U/L (ref 40–150)
ALT SERPL W P-5'-P-CCNC: 61 U/L (ref 0–70)
ANION GAP SERPL CALCULATED.3IONS-SCNC: 6 MMOL/L (ref 3–14)
AST SERPL W P-5'-P-CCNC: 32 U/L (ref 0–45)
BASOPHILS # BLD AUTO: 0 10E3/UL (ref 0–0.2)
BASOPHILS NFR BLD AUTO: 0 %
BILIRUB SERPL-MCNC: 0.5 MG/DL (ref 0.2–1.3)
BUN SERPL-MCNC: 14 MG/DL (ref 7–30)
CALCIUM SERPL-MCNC: 9.7 MG/DL (ref 8.5–10.1)
CHLORIDE BLD-SCNC: 106 MMOL/L (ref 94–109)
CO2 SERPL-SCNC: 28 MMOL/L (ref 20–32)
CREAT SERPL-MCNC: 1.01 MG/DL (ref 0.66–1.25)
CRP SERPL-MCNC: <2.9 MG/L (ref 0–8)
EOSINOPHIL # BLD AUTO: 0.3 10E3/UL (ref 0–0.7)
EOSINOPHIL NFR BLD AUTO: 4 %
ERYTHROCYTE [DISTWIDTH] IN BLOOD BY AUTOMATED COUNT: 12.7 % (ref 10–15)
ERYTHROCYTE [SEDIMENTATION RATE] IN BLOOD BY WESTERGREN METHOD: 23 MM/HR (ref 0–15)
GFR SERPL CREATININE-BSD FRML MDRD: >90 ML/MIN/1.73M2
GLUCOSE BLD-MCNC: 95 MG/DL (ref 70–99)
HCT VFR BLD AUTO: 43.6 % (ref 40–53)
HGB BLD-MCNC: 14.8 G/DL (ref 13.3–17.7)
HOLD SPECIMEN: NORMAL
IMM GRANULOCYTES # BLD: 0 10E3/UL
IMM GRANULOCYTES NFR BLD: 0 %
LYMPHOCYTES # BLD AUTO: 2.2 10E3/UL (ref 0.8–5.3)
LYMPHOCYTES NFR BLD AUTO: 28 %
MCH RBC QN AUTO: 27.5 PG (ref 26.5–33)
MCHC RBC AUTO-ENTMCNC: 33.9 G/DL (ref 31.5–36.5)
MCV RBC AUTO: 81 FL (ref 78–100)
MONOCYTES # BLD AUTO: 0.4 10E3/UL (ref 0–1.3)
MONOCYTES NFR BLD AUTO: 4 %
NEUTROPHILS # BLD AUTO: 5 10E3/UL (ref 1.6–8.3)
NEUTROPHILS NFR BLD AUTO: 64 %
NRBC # BLD AUTO: 0 10E3/UL
NRBC BLD AUTO-RTO: 0 /100
PLATELET # BLD AUTO: 279 10E3/UL (ref 150–450)
POTASSIUM BLD-SCNC: 4.6 MMOL/L (ref 3.4–5.3)
PROT SERPL-MCNC: 8.5 G/DL (ref 6.8–8.8)
RBC # BLD AUTO: 5.38 10E6/UL (ref 4.4–5.9)
SODIUM SERPL-SCNC: 140 MMOL/L (ref 133–144)
WBC # BLD AUTO: 7.9 10E3/UL (ref 4–11)

## 2021-12-28 PROCEDURE — 86140 C-REACTIVE PROTEIN: CPT | Performed by: PATHOLOGY

## 2021-12-28 PROCEDURE — 99000 SPECIMEN HANDLING OFFICE-LAB: CPT | Performed by: PATHOLOGY

## 2021-12-28 PROCEDURE — 80053 COMPREHEN METABOLIC PANEL: CPT | Performed by: PATHOLOGY

## 2021-12-28 PROCEDURE — 86769 SARS-COV-2 COVID-19 ANTIBODY: CPT | Performed by: INTERNAL MEDICINE

## 2021-12-28 PROCEDURE — G0463 HOSPITAL OUTPT CLINIC VISIT: HCPCS

## 2021-12-28 PROCEDURE — 85652 RBC SED RATE AUTOMATED: CPT | Performed by: PATHOLOGY

## 2021-12-28 PROCEDURE — 82787 IGG 1 2 3 OR 4 EACH: CPT | Mod: 90 | Performed by: PATHOLOGY

## 2021-12-28 PROCEDURE — 82784 ASSAY IGA/IGD/IGG/IGM EACH: CPT | Mod: 90 | Performed by: PATHOLOGY

## 2021-12-28 PROCEDURE — 85025 COMPLETE CBC W/AUTO DIFF WBC: CPT | Performed by: PATHOLOGY

## 2021-12-28 PROCEDURE — 36415 COLL VENOUS BLD VENIPUNCTURE: CPT | Performed by: INTERNAL MEDICINE

## 2021-12-28 PROCEDURE — 99214 OFFICE O/P EST MOD 30 MIN: CPT | Performed by: INTERNAL MEDICINE

## 2021-12-28 ASSESSMENT — PAIN SCALES - GENERAL: PAINLEVEL: MODERATE PAIN (5)

## 2021-12-28 NOTE — NURSING NOTE
"Oncology Rooming Note    December 28, 2021 12:39 PM   Abimael Martinez is a 31 year old male who presents for:    Chief Complaint   Patient presents with     Blood Draw     JIC labs drawn with  by rn.  vs taken     Oncology Clinic Visit     Pt is here for a rtn Splenomagaly     Initial Vitals: Blood Pressure (Abnormal) 129/92 (BP Location: Right arm, Patient Position: Sitting, Cuff Size: Adult Large)   Pulse 88   Respiration 16   Weight 126.8 kg (279 lb 8 oz)   Oxygen Saturation 97%   Body Mass Index 35.89 kg/m   Estimated body mass index is 35.89 kg/m  as calculated from the following:    Height as of 10/28/21: 1.88 m (6' 2\").    Weight as of this encounter: 126.8 kg (279 lb 8 oz). Body surface area is 2.57 meters squared.  Moderate Pain (5) Comment: Data Unavailable   No LMP for male patient.  Allergies reviewed: Yes  Medications reviewed: Yes    Medications: Medication refills not needed today.  Pharmacy name entered into EPIC:    WRITTEN PRESCRIPTION REQUESTED  [id number] [address] [facility] [address]  [place] [address] [address] [place] [address] [address] [facility]  [facility][id number] [place], [place] - [address] [facility] #[id number] [place], [place] - [address] AT [facility] [address] [place], [place] [address]    Clinical concerns: none       Amarilys Hummel MA            "

## 2021-12-28 NOTE — PROGRESS NOTES
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 During childhood, he reports frequent infections with flulike symptoms for usually 3-5 days which would self resolve. He also had multiple ear, throat, and sinus infections. He has had a history of splenomegaly at age 8-9 years which normalized after some time. He again developed some pains and splenomegaly after starting anxiety medications in the summer prior to starting college. In his 20s he developed testicular and pelvis pain and reports having  evaluation and eventually finding a protruded disk, for which he has had a diskectomy. He was offered a spinal fusion but has deferred that. His testicular and pelvis pain has now recurred and he says his spinal issues have also recurred causing the referred pain to the testicle and pelvis. He has been using medical cannabis for chronic pain.    He developed COVID-19 in 2020, with high-grade fevers for 7 days, nausea, weakness, a little cough, no shortness of breath. Treated supportively as an outpatient, then recovered, finally after 4 weeks. During the illness he had LUQ pressure sometimes radiating into the left shoulder region. Conitnued to have some LUQ pressure after recovery, and continues to have this and some RUQ/R lower chest pressure on a daily basis.     He  has had negative HIV, hepatitis B, and hepatitis C testing. KATHRYN, RF, anti-CCP, SSA/SSB, ANCA titers, ESR, CRP, C3/C4 are all normal. IgG slightly elevated at 1631, 1593 earlier., IgM, IgA, IgE normal. CT chest abdomen pelvis with no adenopathy and only a borderline enlarged spleen (12.9cm). Peripheral flow was normal with polytypic B cells, no T cell abnormalities. UA, chlamydia and gonorrhea were negative.     INTERVAL HISTORY:  He continues to have some intermittent pain in the spleen in LUQ radiating to shoulder.Since UGI showed duodenitis he took prilosec and pepcid with relief. Now taking PRN for GERD  Had back surgery on L4-5 and L5-S1 microdiscectomy and laminectomy.Recovered from surgery pretty well Now doing PT. No fever or chills.Working regularly No COVID No vaccination as he is worried about side effects. Has considerable anxiety. Asked for COVID spike ab test.  Has note some allergic sx    REVIEW OF SYSTEMS  See HPI    PAST MEDICAL HISTORY  Past Medical History:   Diagnosis Date     Acute right otitis media 1/8/2013     ADHD (attention deficit hyperactivity disorder), combined type      Anxiety      Depression      Drug abuse (H)      Dyslipidemia      Gonococcal urethritis 02/05/2016     Hypertension      Internal hemorrhoids     which bleed     Lumbosacral radiculopathy      Obese      OCD (obsessive compulsive disorder)      Other chronic pain     Low Back Pain     Uncomplicated asthma      Urethritis 5/20/2013     Problem list name updated by automated process. Provider to review     PAST SURGICAL HISTORY  Past Surgical History:   Procedure Laterality Date     BACK SURGERY  04/05/2018     COLONOSCOPY       ESOPHAGOSCOPY, GASTROSCOPY, DUODENOSCOPY (EGD), COMBINED N/A 8/16/2021    Procedure: ESOPHAGOGASTRODUODENOSCOPY, WITH BIOPSY AND POLYPECTOMY;  Surgeon: Phillip Joyner MD;  Location: [facility] OR     HEMILAMINECTOMY, DISCECTOMY LUMBAR TWO LEVELS, COMBINED Right 10/28/2021    Procedure: RIGHT  LUMBAR L4-5 MINIMALLY INVASIVE MICRODISCECTOMY;  Surgeon: Troy Wharton MD;  Location:  OR     HEMILAMINECTOMY, DISCECTOMY LUMBAR TWO LEVELS, COMBINED Left 10/28/2021    Procedure: LEFT LUMBAR L5-S1 MINIMALLY INVASIVE MICRODISCECTOMY;  Surgeon: Troy Wharton MD;  Location:  OR     SOCIAL HISTORY: Reviewed for relevant changes.  He was adopted from [facility].    FAMILY HISTORY: Reviewed No family Hx available due to adoption    MEDICATIONS: Reviewed    ALLERGIES  Allergies   Allergen Reactions     Cymbalta      Weight gain and fatigue     Duloxetine Other (See Comments) and Fatigue     enlarged spleen and weight gain     Paroxetine Other (See Comments), Fatigue and GI Disturbance     Weight gain, Spleen issues     Seasonal Allergies      Vicodin [Hydrocodone-Acetaminophen]      PHYSICAL EXAM  BP (!) 129/92 (BP Location: Right arm, Patient Position: Sitting, Cuff Size: Adult Large)   Pulse 88   Resp 16   Wt 126.8 kg (279 lb 8 oz)   SpO2 97%   BMI 35.89 kg/m     Wt Readings from Last 10 Encounters:   12/28/21 126.8 kg (279 lb 8 oz)   11/12/21 121.8 kg (268 lb 8 oz)   10/28/21 121.5 kg (267 lb 12.8 oz)   10/26/21 120.7 kg (266 lb)   10/13/21 120.9 kg (266 lb 9 oz)   09/27/21 117.7 kg (259 lb 6.4 oz)   08/16/21 117.9 kg (260 lb)   08/13/21 115.7 kg (255 lb)   08/10/21 115.7 kg (255 lb)   05/25/21 117.9 kg (260 lb)     Gen: well appearing male, No apparent distress  BP (!) 129/92 (BP Location: Right arm, Patient Position: Sitting, Cuff Size: Adult Large)   Pulse 88   Resp 16   Wt 126.8 kg (279 lb 8 oz)   SpO2 97%   BMI 35.89 kg/m    HEENT: NCAT, MMM  RESP: No increased WOB, equal bilateral chest rise  ABD: obese Sl tenderness in LUQ  EXT: moving all 4 extremities spontaneously  NEURO: AOx3 no focal neuro deficits  COR NSR No S3 S4 or M  BACK Non tender Scars healed well        LABS    Results for JOSE ROBERTO ROBISON (MRN [id number]) as of 12/28/2021 16:32   Ref. Range  12/28/2021 12:23   Sodium Latest Ref Range: 133 - 144 mmol/L 140   Potassium Latest Ref Range: 3.4 - 5.3 mmol/L 4.6   Chloride Latest Ref Range: 94 - 109 mmol/L 106   Carbon Dioxide Latest Ref Range: 20 - 32 mmol/L 28   Urea Nitrogen Latest Ref Range: 7 - 30 mg/dL 14   Creatinine Latest Ref Range: 0.66 - 1.25 mg/dL 1.01   GFR Estimate Latest Ref Range: >60 mL/min/1.73m2 >90   Calcium Latest Ref Range: 8.5 - 10.1 mg/dL 9.7   Anion Gap Latest Ref Range: 3 - 14 mmol/L 6   Albumin Latest Ref Range: 3.4 - 5.0 g/dL 3.8   Protein Total Latest Ref Range: 6.8 - 8.8 g/dL 8.5   Bilirubin Total Latest Ref Range: 0.2 - 1.3 mg/dL 0.5   Alkaline Phosphatase Latest Ref Range: 40 - 150 U/L 116   ALT Latest Ref Range: 0 - 70 U/L 61   AST Latest Ref Range: 0 - 45 U/L 32   CRP Inflammation Latest Ref Range: 0.0 - 8.0 mg/L <2.9   Glucose Latest Ref Range: 70 - 99 mg/dL 95   WBC Latest Ref Range: 4.0 - 11.0 10e3/uL 7.9   Hemoglobin Latest Ref Range: 13.3 - 17.7 g/dL 14.8   Hematocrit Latest Ref Range: 40.0 - 53.0 % 43.6   Platelet Count Latest Ref Range: 150 - 450 10e3/uL 279   RBC Count Latest Ref Range: 4.40 - 5.90 10e6/uL 5.38   MCV Latest Ref Range: 78 - 100 fL 81   MCH Latest Ref Range: 26.5 - 33.0 pg 27.5   MCHC Latest Ref Range: 31.5 - 36.5 g/dL 33.9   RDW Latest Ref Range: 10.0 - 15.0 % 12.7   % Neutrophils Latest Units: % 64   % Lymphocytes Latest Units: % 28   % Monocytes Latest Units: % 4   % Eosinophils Latest Units: % 4   % Basophils Latest Units: % 0   Absolute Basophils Latest Ref Range: 0.0 - 0.2 10e3/uL 0.0   Absolute Eosinophils Latest Ref Range: 0.0 - 0.7 10e3/uL 0.3   Absolute Immature Granulocytes Latest Ref Range: <=0.4 10e3/uL 0.0   Absolute Lymphocytes Latest Ref Range: 0.8 - 5.3 10e3/uL 2.2   Absolute Monocytes Latest Ref Range: 0.0 - 1.3 10e3/uL 0.4   % Immature Granulocytes Latest Units: % 0   Absolute Neutrophils Latest Ref Range: 1.6 - 8.3 10e3/uL 5.0   Absolute NRBCs Latest Units: 10e3/uL 0.0   NRBCs per 100  WBC Latest Ref Range: <1 /100 0       ASSESSMENT    1. Chronic intermittent LUQ/RUQ abdominal pressure, body aches  2. Eye symptoms (blurry, red)  3. Intermittent swelling in cervical, axillary, and inguinal areas without documented lymphadenopathy  4. Chronic duodenitis related to acid reflux  5. Seasonal allergies/urticaria/atopy  6. Mild asthma  7. History of splenomegaly  8. 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 S/p spine surgery L4-5,L5-S1  10.COVID infection Nov 2020  11. Mild hypertension        Patient has undergone extensive workup. Unlikely his symptoms are explained by IgG4 disease.  ESR/CRP, amylase or lipase.  No evidence of lymphoma. No evidence of immunodeficiency on lab testing, T cell subset tests largely within normal limits.  SPEP within normal limits, EBV/CMV PCR negative.  The patient underwent upper endoscopy on 8/16/2021, revealing mild chronic duodenitis without evidence of H.  Pylori, Giardia, or celiac disease.This has improved with prilosec and pepecid  Tolerated surgery well. Should lose weight and decrease salt in diet for BP Labs look ok today. He wanted another XCOVID Ab test Will my chart him with results    RTC in 6 mo      I spent a total of 20 minutes face to face with Abimael Martinez during today's office visit. Over 50% of this time was spent counseling the patient and coordinating the care regarding IGG4 and COVID  and 10 minutes preparing to see the patient and  care coordination   Milton Lowery M.D.  [phone]

## 2021-12-28 NOTE — LETTER
2021         RE: Abimael Martinez  [id number]  [facility] [place] [facility] [address]        Dear Colleague,    Thank you for referring your patient, Abimael Martinez, to the [facility] [place]. Please see a copy of my visit note below.          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 During childhood, he reports frequent infections with flulike symptoms for usually 3-5 days which would self resolve. He also had multiple ear, throat, and sinus infections. He has had a history of splenomegaly at age 8-9 years which normalized after some time. He again developed some pains and splenomegaly after starting anxiety medications in the summer prior to starting college. In his 20s he developed testicular and pelvis pain and reports having  evaluation and eventually finding a protruded disk, for which he has had a diskectomy. He was offered a spinal fusion but has deferred that. His testicular and pelvis pain has now recurred and he says his spinal issues have also recurred causing the referred pain to the testicle and pelvis. He has been using medical cannabis for chronic pain.    He developed COVID-19 in 2020, with high-grade fevers for 7 days, nausea, weakness, a little cough, no shortness of breath. Treated  supportively as an outpatient, then recovered, finally after 4 weeks. During the illness he had LUQ pressure sometimes radiating into the left shoulder region. Conitnued to have some LUQ pressure after recovery, and continues to have this and some RUQ/R lower chest pressure on a daily basis.     He has had negative HIV, hepatitis B, and hepatitis C testing. KATHRYN, RF, anti-CCP, SSA/SSB, ANCA titers, ESR, CRP, C3/C4 are all normal. IgG slightly elevated at 1631, 1593 earlier., IgM, IgA, IgE normal. CT chest abdomen pelvis with no adenopathy and only a borderline enlarged spleen (12.9cm). Peripheral flow was normal with polytypic B cells, no T cell abnormalities. UA, chlamydia and gonorrhea were negative.     INTERVAL HISTORY:  He continues to have some intermittent pain in the spleen in LUQ radiating to shoulder.Since UGI showed duodenitis he took prilosec and pepcid with relief. Now taking PRN for GERD  Had back surgery on L4-5 and L5-S1 microdiscectomy and laminectomy.Recovered from surgery pretty well Now doing PT. No fever or chills.Working regularly No COVID No vaccination as he is worried about side effects. Has considerable anxiety. Asked for COVID spike ab test.  Has note some allergic sx    REVIEW OF SYSTEMS  See HPI    PAST MEDICAL HISTORY  Past Medical History:   Diagnosis Date     Acute right otitis media 1/8/2013     ADHD (attention deficit hyperactivity disorder), combined type      Anxiety      Depression      Drug abuse (H)      Dyslipidemia      Gonococcal urethritis 02/05/2016     Hypertension      Internal hemorrhoids     which bleed     Lumbosacral radiculopathy      Obese      OCD (obsessive compulsive disorder)      Other chronic pain     Low Back Pain     Uncomplicated asthma      Urethritis 5/20/2013     Problem list name updated by automated process. Provider to review     PAST SURGICAL HISTORY  Past Surgical History:   Procedure Laterality Date     BACK SURGERY  04/05/2018     COLONOSCOPY        ESOPHAGOSCOPY, GASTROSCOPY, DUODENOSCOPY (EGD), COMBINED N/A 8/16/2021    Procedure: ESOPHAGOGASTRODUODENOSCOPY, WITH BIOPSY AND POLYPECTOMY;  Surgeon: Phillip Joyner MD;  Location: [facility] OR     HEMILAMINECTOMY, DISCECTOMY LUMBAR TWO LEVELS, COMBINED Right 10/28/2021    Procedure: RIGHT LUMBAR L4-5 MINIMALLY INVASIVE MICRODISCECTOMY;  Surgeon: Troy Wharton MD;  Location: [facility] OR     HEMILAMINECTOMY, DISCECTOMY LUMBAR TWO LEVELS, COMBINED Left 10/28/2021    Procedure: LEFT LUMBAR L5-S1 MINIMALLY INVASIVE MICRODISCECTOMY;  Surgeon: Troy Wharton MD;  Location:  OR     SOCIAL HISTORY: Reviewed for relevant changes.  He was adopted from [facility].    FAMILY HISTORY: Reviewed No family Hx available due to adoption    MEDICATIONS: Reviewed    ALLERGIES  Allergies   Allergen Reactions     Cymbalta      Weight gain and fatigue     Duloxetine Other (See Comments) and Fatigue     enlarged spleen and weight gain     Paroxetine Other (See Comments), Fatigue and GI Disturbance     Weight gain, Spleen issues     Seasonal Allergies      Vicodin [Hydrocodone-Acetaminophen]      PHYSICAL EXAM  BP (!) 129/92 (BP Location: Right arm, Patient Position: Sitting, Cuff Size: Adult Large)   Pulse 88   Resp 16   Wt 126.8 kg (279 lb 8 oz)   SpO2 97%   BMI 35.89 kg/m     Wt Readings from Last 10 Encounters:   12/28/21 126.8 kg (279 lb 8 oz)   11/12/21 121.8 kg (268 lb 8 oz)   10/28/21 121.5 kg (267 lb 12.8 oz)   10/26/21 120.7 kg (266 lb)   10/13/21 120.9 kg (266 lb 9 oz)   09/27/21 117.7 kg (259 lb 6.4 oz)   08/16/21 117.9 kg (260 lb)   08/13/21 115.7 kg (255 lb)   08/10/21 115.7 kg (255 lb)   05/25/21 117.9 kg (260 lb)     Gen: well appearing male, No apparent distress  BP (!) 129/92 (BP Location: Right arm, Patient Position: Sitting, Cuff Size: Adult Large)   Pulse 88   Resp 16   Wt 126.8 kg (279 lb 8 oz)   SpO2 97%   BMI 35.89 kg/m    HEENT: NCAT, MMM  RESP: No increased WOB, equal  bilateral chest rise  ABD: obese Sl tenderness in LUQ  EXT: moving all 4 extremities spontaneously  NEURO: AOx3 no focal neuro deficits  COR NSR No S3 S4 or M  BACK Non tender Scars healed well        LABS    Results for JOSE ROBERTO ROBISON (MRN [id number]) as of 12/28/2021 16:32   Ref. Range 12/28/2021 12:23   Sodium Latest Ref Range: 133 - 144 mmol/L 140   Potassium Latest Ref Range: 3.4 - 5.3 mmol/L 4.6   Chloride Latest Ref Range: 94 - 109 mmol/L 106   Carbon Dioxide Latest Ref Range: 20 - 32 mmol/L 28   Urea Nitrogen Latest Ref Range: 7 - 30 mg/dL 14   Creatinine Latest Ref Range: 0.66 - 1.25 mg/dL 1.01   GFR Estimate Latest Ref Range: >60 mL/min/1.73m2 >90   Calcium Latest Ref Range: 8.5 - 10.1 mg/dL 9.7   Anion Gap Latest Ref Range: 3 - 14 mmol/L 6   Albumin Latest Ref Range: 3.4 - 5.0 g/dL 3.8   Protein Total Latest Ref Range: 6.8 - 8.8 g/dL 8.5   Bilirubin Total Latest Ref Range: 0.2 - 1.3 mg/dL 0.5   Alkaline Phosphatase Latest Ref Range: 40 - 150 U/L 116   ALT Latest Ref Range: 0 - 70 U/L 61   AST Latest Ref Range: 0 - 45 U/L 32   CRP Inflammation Latest Ref Range: 0.0 - 8.0 mg/L <2.9   Glucose Latest Ref Range: 70 - 99 mg/dL 95   WBC Latest Ref Range: 4.0 - 11.0 10e3/uL 7.9   Hemoglobin Latest Ref Range: 13.3 - 17.7 g/dL 14.8   Hematocrit Latest Ref Range: 40.0 - 53.0 % 43.6   Platelet Count Latest Ref Range: 150 - 450 10e3/uL 279   RBC Count Latest Ref Range: 4.40 - 5.90 10e6/uL 5.38   MCV Latest Ref Range: 78 - 100 fL 81   MCH Latest Ref Range: 26.5 - 33.0 pg 27.5   MCHC Latest Ref Range: 31.5 - 36.5 g/dL 33.9   RDW Latest Ref Range: 10.0 - 15.0 % 12.7   % Neutrophils Latest Units: % 64   % Lymphocytes Latest Units: % 28   % Monocytes Latest Units: % 4   % Eosinophils Latest Units: % 4   % Basophils Latest Units: % 0   Absolute Basophils Latest Ref Range: 0.0 - 0.2 10e3/uL 0.0   Absolute Eosinophils Latest Ref Range: 0.0 - 0.7 10e3/uL 0.3   Absolute Immature Granulocytes Latest Ref Range: <=0.4  10e3/uL 0.0   Absolute Lymphocytes Latest Ref Range: 0.8 - 5.3 10e3/uL 2.2   Absolute Monocytes Latest Ref Range: 0.0 - 1.3 10e3/uL 0.4   % Immature Granulocytes Latest Units: % 0   Absolute Neutrophils Latest Ref Range: 1.6 - 8.3 10e3/uL 5.0   Absolute NRBCs Latest Units: 10e3/uL 0.0   NRBCs per 100 WBC Latest Ref Range: <1 /100 0       ASSESSMENT    1. Chronic intermittent LUQ/RUQ abdominal pressure, body aches  2. Eye symptoms (blurry, red)  3. Intermittent swelling in cervical, axillary, and inguinal areas without documented lymphadenopathy  4. Chronic duodenitis related to acid reflux  5. Seasonal allergies/urticaria/atopy  6. Mild asthma  7. History of splenomegaly  8. 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 S/p spine surgery L4-5,L5-S1  10.COVID infection Nov 2020  11. Mild hypertension        Patient has undergone extensive workup. Unlikely his symptoms are explained by IgG4 disease.  ESR/CRP, amylase or lipase.  No evidence of lymphoma. No evidence of immunodeficiency on lab testing, T cell subset tests largely within normal limits.  SPEP within normal limits, EBV/CMV PCR negative.  The patient underwent upper endoscopy on 8/16/2021, revealing mild chronic duodenitis without evidence of H.  Pylori, Giardia, or celiac disease.This has improved with prilosec and pepecid  Tolerated surgery well. Should lose weight and decrease salt in diet for BP Labs look ok today. He wanted another XCOVID Ab test Will my chart him with results    RTC in 6 mo      I spent a total of 20 minutes face to face with Abimael Martinez during today's office visit. Over 50% of this time was spent counseling the patient and coordinating the care regarding IGG4 and COVID  and 10 minutes preparing to see the patient and  care coordination   Milton Lowery M.D.  [phone]

## 2021-12-28 NOTE — PROGRESS NOTES
Spoke with Susy lab, regarding lab orders entered and JIC tubes drawn.  Reviewed need CBC/d/p, CMP, CRP, and immunoglobulin IgG subclasses.  Susy confirmed seeing orders and she will get lab orders added to specimen.

## 2021-12-28 NOTE — NURSING NOTE
Chief Complaint   Patient presents with     Blood Draw     JIC labs drawn with  by rn.  vs taken     Labs drawn with vpt by rn.  Pt tolerated well.  VS taken.  Pt checked in for next appt.    Jessica Harris RN

## 2021-12-29 ENCOUNTER — MYC REFILL (OUTPATIENT)
Dept: FAMILY MEDICINE | Facility: CLINIC | Age: 31
End: 2021-12-29

## 2021-12-29 DIAGNOSIS — F41.9 ANXIETY: ICD-10-CM

## 2021-12-29 DIAGNOSIS — F90.2 ATTENTION DEFICIT HYPERACTIVITY DISORDER (ADHD), COMBINED TYPE: ICD-10-CM

## 2021-12-29 LAB
SARS-COV-2 AB SERPL IA-ACNC: >250 U/ML
SARS-COV-2 AB SERPL-IMP: POSITIVE

## 2021-12-30 DIAGNOSIS — Z98.890 S/P LUMBAR MICRODISCECTOMY: ICD-10-CM

## 2021-12-30 RX ORDER — OXYCODONE AND ACETAMINOPHEN 10; 325 MG/1; MG/1
1 TABLET ORAL EVERY 6 HOURS PRN
Qty: 30 TABLET | Refills: 0 | Status: SHIPPED | OUTPATIENT
Start: 2021-12-30 | End: 2022-01-07

## 2021-12-30 NOTE — TELEPHONE ENCOUNTER
Patient calling for a refill of percocet.     DOS: 10/28/21  Procedure: right L5-S1 microdiscectomy  Surgeon: Dr. Wharton    Current symptom(s): low back pain, nerve pain to bilateral hips, pelvis, to upper thigh, knee, calves, ankle pain. Feels like it did pre-op. Left worse than right leg. Left more constant. Pain still improved since pre-op (improved only 30%). Pain has been the same quality since surgery. Urinary symptoms, improving (not resolved, but improved). No new symptoms, denies injuries.  Current pain management: percocet 2-3 tabs per day, ibuprofen 3 tabs per day.  PT, ice.    Has 3 tabs percocet left.    Last fill: 12/20/21 30#  Next visit: 1/7/22    Medication pended for your approval, if appropriate. Pharmacy verified.     Any patient questions or concerns:     Informed patient request will be forwarded to care team.

## 2021-12-31 LAB
IGG SERPL-MCNC: 1577 MG/DL (ref 610–1616)
IGG1 SER-MCNC: 841 MG/DL (ref 382–929)
IGG2 SER-MCNC: 414 MG/DL (ref 242–700)
IGG3 SER-MCNC: 93 MG/DL (ref 22–176)
IGG4 SER-MCNC: 248 MG/DL (ref 4–86)
SUBCLASSES, PERCENT: 101 %

## 2021-12-31 RX ORDER — ALPRAZOLAM 2 MG
2 TABLET ORAL 3 TIMES DAILY PRN
Qty: 30 TABLET | Refills: 0 | Status: SHIPPED | OUTPATIENT
Start: 2021-12-31 | End: 2022-01-30

## 2021-12-31 RX ORDER — DEXTROAMPHETAMINE SACCHARATE, AMPHETAMINE ASPARTATE, DEXTROAMPHETAMINE SULFATE AND AMPHETAMINE SULFATE 5; 5; 5; 5 MG/1; MG/1; MG/1; MG/1
20 TABLET ORAL 3 TIMES DAILY
Qty: 90 TABLET | Refills: 0 | Status: SHIPPED | OUTPATIENT
Start: 2021-12-31 | End: 2022-01-30

## 2022-01-04 ENCOUNTER — THERAPY VISIT (OUTPATIENT)
Dept: PHYSICAL THERAPY | Facility: CLINIC | Age: 32
End: 2022-01-04
Payer: COMMERCIAL

## 2022-01-04 DIAGNOSIS — M54.41 BILATERAL LOW BACK PAIN WITH RIGHT-SIDED SCIATICA: ICD-10-CM

## 2022-01-04 PROCEDURE — 97530 THERAPEUTIC ACTIVITIES: CPT | Mod: GP | Performed by: PHYSICAL THERAPIST

## 2022-01-04 PROCEDURE — 97110 THERAPEUTIC EXERCISES: CPT | Mod: GP | Performed by: PHYSICAL THERAPIST

## 2022-01-04 NOTE — PROGRESS NOTES
Subjective:  HPI  Physical Exam                    Objective:  System    Physical Exam      [place] Lumbar Evaluation      Movement Loss:  Flexion (Flex): mod and pain  Extension (EXT): mod  Side Glide R (SG R): mod  Side Mobile L (SG L): mod                                               ROS    Assessment/Plan:    SUBJECTIVE  Subjective changes as noted by pt:  Patient reports he was ill for 4-5 days, unable to do much activity.  Has since returned to exercises, and getting out to walk around.  Feels symptoms are about the same overall.     Current pain level: No change     Changes in function:  Yes (See Goal flowsheet attached for changes in current functional level)     Adverse reaction to treatment or activity:  None    OBJECTIVE  Changes in objective findings:  Yes, increased lumbar flexion AROM.        ASSESSMENT  Abimael continues to require intervention to meet STG and LTG's: PT  Patient is progressing as expected.  Response to therapy has shown an improvement in  ROM  and strength  Progress made towards STG/LTG?  Yes (See Goal flowsheet attached for updates on achievement of STG and LTG)    PLAN  Current treatment program is being advanced to more complex exercises.    PTA/ATC plan:  N/A    Please refer to the daily flowsheet for treatment today, total treatment time and time spent performing 1:1 timed codes.

## 2022-01-07 ENCOUNTER — OFFICE VISIT (OUTPATIENT)
Dept: NEUROSURGERY | Facility: CLINIC | Age: 32
End: 2022-01-07
Attending: PHYSICIAN ASSISTANT
Payer: COMMERCIAL

## 2022-01-07 VITALS
BODY MASS INDEX: 35.81 KG/M2 | HEART RATE: 76 BPM | DIASTOLIC BLOOD PRESSURE: 90 MMHG | WEIGHT: 279 LBS | OXYGEN SATURATION: 99 % | HEIGHT: 74 IN | SYSTOLIC BLOOD PRESSURE: 146 MMHG

## 2022-01-07 DIAGNOSIS — Z98.890 S/P LUMBAR MICRODISCECTOMY: ICD-10-CM

## 2022-01-07 PROCEDURE — G0463 HOSPITAL OUTPT CLINIC VISIT: HCPCS

## 2022-01-07 PROCEDURE — 99024 POSTOP FOLLOW-UP VISIT: CPT | Performed by: PHYSICIAN ASSISTANT

## 2022-01-07 RX ORDER — OXYCODONE AND ACETAMINOPHEN 10; 325 MG/1; MG/1
1 TABLET ORAL 3 TIMES DAILY PRN
Qty: 25 TABLET | Refills: 0 | Status: SHIPPED | OUTPATIENT
Start: 2022-01-07 | End: 2022-01-17

## 2022-01-07 RX ORDER — METHYLPREDNISOLONE 4 MG
TABLET, DOSE PACK ORAL
Qty: 21 TABLET | Refills: 0 | Status: SHIPPED | OUTPATIENT
Start: 2022-01-07 | End: 2022-06-13

## 2022-01-07 ASSESSMENT — MIFFLIN-ST. JEOR: SCORE: 2290.29

## 2022-01-07 ASSESSMENT — PAIN SCALES - GENERAL: PAINLEVEL: SEVERE PAIN (6)

## 2022-01-07 NOTE — LETTER
1/7/2022         RE: Abimael Martinez  [address]  [facility] [address]        Dear Colleague,    Thank you for referring your patient, Abimael Martinez, to the [facility] [place]. Please see a copy of my visit note below.    Neurosurgery  follow-up    Mr. Martinez is a 32-year-old male who is status post right L4-5 hemilaminectomy microdiscectomy, and left L5-S1 hemilaminectomy microdiscectomy.  Performed by Dr. Wharton on 10/28/2021.    Since surgery patient has had some improvement in symptoms but does continue to have pain in his legs primarily on the left side worse with standing and walking of some pain in his left foot.  He underwent a lumbar MRI at the beginning of December which demonstrated postoperative changes as well as some slight residual disc bulges at L5-S1 and L4-5.    He has been doing physical therapy with some mild improvement but some dizziness exacerbates his pain.  He continues to require pain medication at this time.        Exam     Alert and oriented no acute distress  Bilateral lower extremities appropriate strength  Gait is normal    Assessment    Status post L4-5 and L5-S1 microdiscectomy  Continued pain postoperatively, primarily left leg occasional left foot numbness      Plan    Continue with physical therapy  Chiropractic and acupuncture referral provided  Medrol Dosepak provided  Pain medication refill provided    Follow-up in 4 to 6 weeks if not improving      Again, thank you for allowing me to participate in the care of your patient.        Sincerely,        Shazia Dunbar PA-C

## 2022-01-07 NOTE — PROGRESS NOTES
Neurosurgery  follow-up    Mr. Martinez is a 32-year-old male who is status post right L4-5 hemilaminectomy microdiscectomy, and left L5-S1 hemilaminectomy microdiscectomy.  Performed by Dr. Wharton on 10/28/2021.    Since surgery patient has had some improvement in symptoms but does continue to have pain in his legs primarily on the left side worse with standing and walking of some pain in his left foot.  He underwent a lumbar MRI at the beginning of December which demonstrated postoperative changes as well as some slight residual disc bulges at L5-S1 and L4-5.    He has been doing physical therapy with some mild improvement but some dizziness exacerbates his pain.  He continues to require pain medication at this time.        Exam     Alert and oriented no acute distress  Bilateral lower extremities appropriate strength  Gait is normal    Assessment    Status post L4-5 and L5-S1 microdiscectomy  Continued pain postoperatively, primarily left leg occasional left foot numbness      Plan    Continue with physical therapy  Chiropractic and acupuncture referral provided  Medrol Dosepak provided  Pain medication refill provided    Follow-up in 4 to 6 weeks if not improving

## 2022-01-07 NOTE — NURSING NOTE
"Abimael Martinez is a 32 year old male who presents for:  Chief Complaint   Patient presents with     Follow Up     4 wk        Initial Vitals:  BP (!) 146/90   Pulse 76   Ht 6' 2\" (1.88 m)   Wt 279 lb (126.6 kg)   SpO2 99%   BMI 35.82 kg/m   Estimated body mass index is 35.82 kg/m  as calculated from the following:    Height as of this encounter: 6' 2\" (1.88 m).    Weight as of this encounter: 279 lb (126.6 kg).. Body surface area is 2.57 meters squared. BP completed using cuff size: regular    Nursing Comments: hypertension  Per patient BP is elevated because of anxiety due to office visit.      Marva Grullon    "

## 2022-01-12 ENCOUNTER — THERAPY VISIT (OUTPATIENT)
Dept: PHYSICAL THERAPY | Facility: CLINIC | Age: 32
End: 2022-01-12
Payer: COMMERCIAL

## 2022-01-12 DIAGNOSIS — M54.41 BILATERAL LOW BACK PAIN WITH RIGHT-SIDED SCIATICA: ICD-10-CM

## 2022-01-12 PROCEDURE — 97110 THERAPEUTIC EXERCISES: CPT | Mod: GP | Performed by: PHYSICAL THERAPIST

## 2022-01-12 NOTE — PROGRESS NOTES
Subjective:  HPI  Physical Exam                    Objective:  System    Physical Exam      Jose C Lumbar Evaluation      Movement Loss:  Flexion (Flex): mod  Extension (EXT): mod  Side Glide R (SG R): min  Side Topanga L (SG L): min                                               ROS    Assessment/Plan:    SUBJECTIVE  Subjective changes as noted by pt:  Patient reports pain levels about the same, is able to do more activity.     Current pain level: 4/10 Current Pain level: 4/10   Changes in function:  Yes (See Goal flowsheet attached for changes in current functional level)     Adverse reaction to treatment or activity:  None    OBJECTIVE  Changes in objective findings:  Yes, increased lumbar AROM.        ASSESSMENT  [employer] continues to require intervention to meet STG and LTG's: PT  Patient is progressing as expected.  Response to therapy has shown an improvement in  pain level and ROM   Progress made towards STG/LTG?  Yes (See Goal flowsheet attached for updates on achievement of STG and LTG)    PLAN  Current treatment program is being advanced to more complex exercises.    PTA/ATC plan:  N/A    Please refer to the daily flowsheet for treatment today, total treatment time and time spent performing 1:1 timed codes.

## 2022-01-15 ENCOUNTER — HEALTH MAINTENANCE LETTER (OUTPATIENT)
Age: 32
End: 2022-01-15

## 2022-01-17 DIAGNOSIS — Z98.890 S/P LUMBAR MICRODISCECTOMY: ICD-10-CM

## 2022-01-17 RX ORDER — OXYCODONE AND ACETAMINOPHEN 10; 325 MG/1; MG/1
1 TABLET ORAL 3 TIMES DAILY PRN
Qty: 25 TABLET | Refills: 0 | Status: SHIPPED | OUTPATIENT
Start: 2022-01-17 | End: 2022-02-01

## 2022-01-17 NOTE — TELEPHONE ENCOUNTER
Patient calling for a refill of percocet    DOS: 10/28/21  Procedure:  RIGHT LUMBAR L4-5 MINIMALLY INVASIVE MICRODISCECTOMY  Surgeon:Dr Wharton  Weeks Post Op: 11 weeks    Current symptom(s):   The patient is still having pain in his legs. Nerve pain in the pelvis radiating down to the feet. He has been doing PT which has helped lessen the tightness.  He is slowly returning to work.     Current pain management:  Percocet 2-3 tabs per day.   Ibuprofen 600 mg  2 x daily.   Ice    Last fill: 1/7/22 #25  Next visit: TBD    Medication pended for your approval, if appropriate. Pharmacy verified.     Any patient questions or concerns:     Informed patient request will be forwarded to care team.

## 2022-01-19 ENCOUNTER — THERAPY VISIT (OUTPATIENT)
Dept: PHYSICAL THERAPY | Facility: CLINIC | Age: 32
End: 2022-01-19
Payer: COMMERCIAL

## 2022-01-19 DIAGNOSIS — M54.41 BILATERAL LOW BACK PAIN WITH RIGHT-SIDED SCIATICA: ICD-10-CM

## 2022-01-19 PROCEDURE — 97530 THERAPEUTIC ACTIVITIES: CPT | Mod: GP | Performed by: PHYSICAL THERAPIST

## 2022-01-19 PROCEDURE — 97110 THERAPEUTIC EXERCISES: CPT | Mod: GP | Performed by: PHYSICAL THERAPIST

## 2022-01-21 ENCOUNTER — TELEPHONE (OUTPATIENT)
Dept: NEUROSURGERY | Facility: CLINIC | Age: 32
End: 2022-01-21
Payer: COMMERCIAL

## 2022-01-21 DIAGNOSIS — M62.838 MUSCLE SPASM: ICD-10-CM

## 2022-01-21 DIAGNOSIS — M51.16 RADICULOPATHY DUE TO LUMBAR INTERVERTEBRAL DISC DISORDER: ICD-10-CM

## 2022-01-21 DIAGNOSIS — Z98.890 S/P LUMBAR MICRODISCECTOMY: Primary | ICD-10-CM

## 2022-01-21 NOTE — TELEPHONE ENCOUNTER
Pt is asking if he can get a referral for [facility] in addition to the previous referral he got from Shazia. Please call him back to advise once this has been done at [phone]. Thank you~

## 2022-01-24 NOTE — TELEPHONE ENCOUNTER
Shazia Dunbar PA-C agreed to the pain management referral. Called the patient the referral has been faxed to [facility].     Faxed orders for pain Mangement January 24, 2022 to fax number  [phone]    Right Fax confirmed at 1430  PM

## 2022-01-24 NOTE — TELEPHONE ENCOUNTER
Last Visit:  1/7/22    DOS: 10/28/21   RIGHT LUMBAR L4-5 MINIMALLY INVASIVE MICRODISCECTOMY.LEFT LUMBAR L5-S1 MINIMALLY INVASIVE MICRODISCECTOMY with Dr Wharton    Next Visit:  TBD    Assessment:   The patient had called asking if he could have a referral  with [facility] in [place].  He had gone there  in the past.  No new symptoms since he was seen in the clinic.  He had not started the MDP because he has had side effects previously.   He plans on starting the MDP tomorrow when he has off from work.     Action needed from provider:   Please advise if a referral is appropriate.

## 2022-01-26 DIAGNOSIS — M51.16 RADICULOPATHY DUE TO LUMBAR INTERVERTEBRAL DISC DISORDER: ICD-10-CM

## 2022-01-26 DIAGNOSIS — Z98.890 S/P LUMBAR MICRODISCECTOMY: ICD-10-CM

## 2022-01-26 RX ORDER — OXYCODONE HYDROCHLORIDE 5 MG/1
5-10 TABLET ORAL EVERY 4 HOURS PRN
Qty: 20 TABLET | Refills: 0 | Status: SHIPPED | OUTPATIENT
Start: 2022-01-26 | End: 2022-02-10

## 2022-01-26 NOTE — TELEPHONE ENCOUNTER
Pt called back, he asked that he be called again but preferably after 1pm because he is going to be in another appointment until then. Thank you~

## 2022-01-26 NOTE — TELEPHONE ENCOUNTER
Patient calling for a refill of percocet     DOS: 10/28/21  Procedure:  RIGHT LUMBAR L4-5 MINIMALLY INVASIVE MICRODISCECTOMY  Surgeon:Dr Wharton  Weeks Post Op: 13 weeks     Current symptom(s):   Pain to low back, legs testicle, legs. No new pain, same as previous.    PT started 6 weeks post-op  chiro & acupuncture: started today   [facility]: has referral. no apt yet. Patient will call them today. [facility] to take over pain management now       Current pain management:  Percocet 2-3 tabs per day.   Ibuprofen 400-600 mg  2 x daily.   Tylenol 500 mg daily  Ice     Last fill: 1/17/22 #25  Next visit: TBD  Has enough percocet until tomorrow AM.     Medication pended for your approval, if appropriate. Pharmacy verified.      Any patient questions or concerns:      Informed patient request will be forwarded to care team.     Janeth Pool RN on 1/26/2022 at 1:33 PM

## 2022-01-28 ENCOUNTER — THERAPY VISIT (OUTPATIENT)
Dept: PHYSICAL THERAPY | Facility: CLINIC | Age: 32
End: 2022-01-28
Payer: COMMERCIAL

## 2022-01-28 DIAGNOSIS — M54.41 BILATERAL LOW BACK PAIN WITH RIGHT-SIDED SCIATICA: Primary | ICD-10-CM

## 2022-01-28 PROCEDURE — 97110 THERAPEUTIC EXERCISES: CPT | Mod: GP | Performed by: PHYSICAL THERAPIST

## 2022-01-28 PROCEDURE — 97530 THERAPEUTIC ACTIVITIES: CPT | Mod: GP | Performed by: PHYSICAL THERAPIST

## 2022-01-30 ENCOUNTER — MYC REFILL (OUTPATIENT)
Dept: FAMILY MEDICINE | Facility: CLINIC | Age: 32
End: 2022-01-30
Payer: COMMERCIAL

## 2022-01-30 DIAGNOSIS — F41.9 ANXIETY: ICD-10-CM

## 2022-01-30 DIAGNOSIS — F90.2 ATTENTION DEFICIT HYPERACTIVITY DISORDER (ADHD), COMBINED TYPE: ICD-10-CM

## 2022-01-31 RX ORDER — DEXTROAMPHETAMINE SACCHARATE, AMPHETAMINE ASPARTATE, DEXTROAMPHETAMINE SULFATE AND AMPHETAMINE SULFATE 5; 5; 5; 5 MG/1; MG/1; MG/1; MG/1
20 TABLET ORAL 3 TIMES DAILY
Qty: 90 TABLET | Refills: 0 | Status: SHIPPED | OUTPATIENT
Start: 2022-01-31 | End: 2022-02-28

## 2022-01-31 RX ORDER — ALPRAZOLAM 2 MG
2 TABLET ORAL 3 TIMES DAILY PRN
Qty: 30 TABLET | Refills: 0 | Status: SHIPPED | OUTPATIENT
Start: 2022-01-31 | End: 2022-02-28

## 2022-02-01 ENCOUNTER — VIRTUAL VISIT (OUTPATIENT)
Dept: NEUROSURGERY | Facility: CLINIC | Age: 32
End: 2022-02-01
Payer: COMMERCIAL

## 2022-02-01 VITALS — HEIGHT: 74 IN | BODY MASS INDEX: 35.81 KG/M2 | WEIGHT: 279 LBS

## 2022-02-01 DIAGNOSIS — Z98.890 S/P LUMBAR MICRODISCECTOMY: ICD-10-CM

## 2022-02-01 PROCEDURE — 99214 OFFICE O/P EST MOD 30 MIN: CPT | Mod: TEL | Performed by: PHYSICIAN ASSISTANT

## 2022-02-01 RX ORDER — OXYCODONE AND ACETAMINOPHEN 10; 325 MG/1; MG/1
1 TABLET ORAL 3 TIMES DAILY PRN
Qty: 30 TABLET | Refills: 0 | Status: SHIPPED | OUTPATIENT
Start: 2022-02-01 | End: 2022-10-06

## 2022-02-01 ASSESSMENT — MIFFLIN-ST. JEOR: SCORE: 2290.29

## 2022-02-01 ASSESSMENT — PAIN SCALES - GENERAL: PAINLEVEL: SEVERE PAIN (7)

## 2022-02-01 NOTE — LETTER
"    2/1/2022         RE: Abimael Martinez  [address]  [facility] [address]        Dear Colleague,    Thank you for referring your patient, Abimael Martinez, to the [facility]. Please see a copy of my visit note below.    Abimael Martinez is a 32 year old male who is being evaluated via a billable telephone visit.      Due to COVID-19 this visit has been performed over the phone verses face to face.     The patient has been notified of following:     \"This telephone visit will be conducted via a call between you and your physician/provider. We have found that certain health care needs can be provided without the need for a physical exam.  This service lets us provide the care you need with a short phone conversation.  If a prescription is necessary we can send it directly to your pharmacy.  If lab work is needed we can place an order for that and you can then stop by our lab to have the test done at a later time.    If during the course of the call the physician/provider feels a telephone visit is not appropriate, you will not be charged for this service.\"     Abimael Martinez complains of    Chief Complaint   Patient presents with     Back Pain     Discuss pain level        I have reviewed and updated the patient's Past Medical History, Social History, Family History and Medication List.    ALLERGIES  Cymbalta, Duloxetine, Paroxetine, Seasonal allergies, and Vicodin [hydrocodone-acetaminophen]    Additional provider notes:    Abimael Martinez is a 32 year old male who underwent right L4-5 hemilaminectomy and microdiscectomy and left L5-S1 hemilaminectomy microdiscectomy performed by Dr. Wharton in October 2021.  He continues to have pain.  Primarily in his pelvis and in his testicle also down his left leg.  He is already meeting with [facility] in the next few weeks.  He also reports some urinary dribbling issues.  He had met with urology in the past so " to follow-up as needed.    His last MRI did show small fluid-filled bulge on the left at L5-S1 possibly contacting the left S1 nerve.    Assessment    Status post lumbar surgery  Pelvic and testicular pain  Urinary dribbling      Plan:    I recommend the patient meet with urology again.  I provided him short-term refill of his pain medications until he can meet with [facility] to possibly consider steroid injection.  If his symptoms are not improving we can consider an EMG or possibly repeat MRI.  He will follow-up with us as needed if is not improving with [facility] and neurology.    Phone call duration: 10 minutes  Start Time 1110  End Time 1120    Shazia Dunbar PA-C    Patient provided verbal consent for the phone visit today.       Again, thank you for allowing me to participate in the care of your patient.        Sincerely,        Shazia Dunbar PA-C

## 2022-02-01 NOTE — PROGRESS NOTES
"Abimael Martinez is a 32 year old male who is being evaluated via a billable telephone visit.      Due to COVID-19 this visit has been performed over the phone verses face to face.     The patient has been notified of following:     \"This telephone visit will be conducted via a call between you and your physician/provider. We have found that certain health care needs can be provided without the need for a physical exam.  This service lets us provide the care you need with a short phone conversation.  If a prescription is necessary we can send it directly to your pharmacy.  If lab work is needed we can place an order for that and you can then stop by our lab to have the test done at a later time.    If during the course of the call the physician/provider feels a telephone visit is not appropriate, you will not be charged for this service.\"     Abimael Martinez complains of    Chief Complaint   Patient presents with     Back Pain     Discuss pain level        I have reviewed and updated the patient's Past Medical History, Social History, Family History and Medication List.    ALLERGIES  Cymbalta, Duloxetine, Paroxetine, Seasonal allergies, and Vicodin [hydrocodone-acetaminophen]    Additional provider notes:    Abimael Martinez is a 32 year old male who underwent right L4-5 hemilaminectomy and microdiscectomy and left L5-S1 hemilaminectomy microdiscectomy performed by Dr. Wharton in October 2021.  He continues to have pain.  Primarily in his pelvis and in his testicle also down his left leg.  He is already meeting with [facility] in the next few weeks.  He also reports some urinary dribbling issues.  He had met with urology in the past so to follow-up as needed.    His last MRI did show small fluid-filled bulge on the left at L5-S1 possibly contacting the left S1 nerve.    Assessment    Status post lumbar surgery  Pelvic and testicular pain  Urinary dribbling      Plan:    I recommend the patient meet with " urology again.  I provided him short-term refill of his pain medications until he can meet with [facility] to possibly consider steroid injection.  If his symptoms are not improving we can consider an EMG or possibly repeat MRI.  He will follow-up with us as needed if is not improving with [facility] and neurology.    Phone call duration: 10 minutes  Start Time 1110  End Time 1120    Shazia Dunbar PA-C    Patient provided verbal consent for the phone visit today.

## 2022-02-01 NOTE — NURSING NOTE
"Abimael Martinez is a 32 year old male who presents for:  Chief Complaint   Patient presents with     Back Pain     Discuss pain level         Initial Vitals:  Ht 6' 2\" (1.88 m)   Wt 279 lb (126.6 kg)   BMI 35.82 kg/m   Estimated body mass index is 35.82 kg/m  as calculated from the following:    Height as of this encounter: 6' 2\" (1.88 m).    Weight as of this encounter: 279 lb (126.6 kg).. Body surface area is 2.57 meters squared. BP completed using cuff size: regular  Severe Pain (7)    Buddy Bonilla MA    "

## 2022-02-02 ENCOUNTER — PRE VISIT (OUTPATIENT)
Dept: UROLOGY | Facility: CLINIC | Age: 32
End: 2022-02-02

## 2022-02-02 NOTE — TELEPHONE ENCOUNTER
MEDICAL RECORDS REQUEST   [facility]  Urology Clinic  [address] [place] [address]  PHONE: [phone]  Fax: [phone]        FUTURE VISIT INFORMATION                                                   Abimael Martinez, : 1990 scheduled for future visit at [facility]    APPOINTMENT INFORMATION:    Date: 2022    Provider:  Mary Jane Solo PA    Reason for Visit/Diagnosis: groin and testicle pain, urinary dribbling    REFERRAL INFORMATION:    Referring provider:  Shazia Dunbar PA-C    Specialty: N/A    Referring providers clinic:   NEUROSURGERY    Clinic contact number:  N/A    RECORDS REQUESTED FOR VISIT                                                     NOTES  STATUS/DETAILS   OFFICE NOTE from referring provider  yes, 2022, 2022, 2021, MORE IN epic - Shazia Dunbar PA-C in  NEUROSURGERY   OFFICE NOTE from other specialist  yes, 2021 -- Susan Bennett, PT  2021 -- Sadia Caballero NP [facility] in EP[facility]   DISCHARGE SUMMARY from hospital  no   DISCHARGE REPORT from the ER  yes, 2017 -- [facility]   OPERATIVE REPORT  no   MEDICATION LIST  yes   LABS     URINALYSIS (UA)  yes   URINE CYTOLOGY  no   IMAGING (IMAGES & REPORT)  yes, 2018 -- US SCROTUM -- [facility]  02/15/2021 -- CT   CHEST/ABD/PELVIS  2020 -- US ABD       PRE-VISIT CHECKLIST      Record collection complete yes   Appointment appropriately scheduled           (right time/right provider) Yes   Joint diagnostic appointment coordinated correctly          (ensure right order & amount of time) Yes   MyChart activation Yes   Questionnaire complete If no, please explain pending     Action 2022 JTV 12:45pm   Action Taken [facility] sent a request to [facility] for images to be pushed.      Action 2022 JTV 9:17am   Action Taken [facility] received and resolved images from [facility].

## 2022-02-04 ENCOUNTER — VIRTUAL VISIT (OUTPATIENT)
Dept: UROLOGY | Facility: CLINIC | Age: 32
End: 2022-02-04
Attending: PHYSICIAN ASSISTANT
Payer: COMMERCIAL

## 2022-02-04 ENCOUNTER — LAB (OUTPATIENT)
Dept: LAB | Facility: CLINIC | Age: 32
End: 2022-02-04
Payer: COMMERCIAL

## 2022-02-04 DIAGNOSIS — R10.2 PERINEAL PAIN: ICD-10-CM

## 2022-02-04 DIAGNOSIS — R39.15 URINARY URGENCY: Primary | ICD-10-CM

## 2022-02-04 DIAGNOSIS — R39.198 SLOWING OF URINARY STREAM: ICD-10-CM

## 2022-02-04 DIAGNOSIS — Z98.890 S/P LUMBAR MICRODISCECTOMY: ICD-10-CM

## 2022-02-04 DIAGNOSIS — R39.15 URINARY URGENCY: ICD-10-CM

## 2022-02-04 DIAGNOSIS — N41.0 ACUTE PROSTATITIS: ICD-10-CM

## 2022-02-04 LAB
ALBUMIN UR-MCNC: NEGATIVE MG/DL
APPEARANCE UR: CLEAR
BILIRUB UR QL STRIP: NEGATIVE
COLOR UR AUTO: NORMAL
GLUCOSE UR STRIP-MCNC: NEGATIVE MG/DL
HGB UR QL STRIP: NEGATIVE
KETONES UR STRIP-MCNC: NEGATIVE MG/DL
LEUKOCYTE ESTERASE UR QL STRIP: NEGATIVE
NITRATE UR QL: NEGATIVE
PH UR STRIP: 5.5 [PH] (ref 5–7)
RBC #/AREA URNS AUTO: NORMAL /HPF
SP GR UR STRIP: 1.01 (ref 1–1.03)
UROBILINOGEN UR STRIP-MCNC: NORMAL MG/DL
WBC #/AREA URNS AUTO: NORMAL /HPF

## 2022-02-04 PROCEDURE — 87086 URINE CULTURE/COLONY COUNT: CPT

## 2022-02-04 PROCEDURE — 99213 OFFICE O/P EST LOW 20 MIN: CPT | Mod: GT | Performed by: PHYSICIAN ASSISTANT

## 2022-02-04 PROCEDURE — 81001 URINALYSIS AUTO W/SCOPE: CPT

## 2022-02-04 PROCEDURE — 87591 N.GONORRHOEAE DNA AMP PROB: CPT

## 2022-02-04 PROCEDURE — 87491 CHLMYD TRACH DNA AMP PROBE: CPT

## 2022-02-04 RX ORDER — SULFAMETHOXAZOLE/TRIMETHOPRIM 800-160 MG
1 TABLET ORAL 2 TIMES DAILY
Qty: 28 TABLET | Refills: 0 | Status: SHIPPED | OUTPATIENT
Start: 2022-02-04 | End: 2022-02-18

## 2022-02-04 NOTE — NURSING NOTE
Chief Complaint   Patient presents with     Consult     Groin/testicular pain     Yue Delcid, CMA

## 2022-02-04 NOTE — PROGRESS NOTES
Chief Complaint:   Groin pain         History of Present Illness:   Abimael Martinez is a 32 year old male with a history of anxiety, depression, ADHD, hypertension, and dyslipidemia who presents for evaluation of groin and testicular pain.  Patient reports he had a microdiscectomy on 10/28/2021 and since that time he has been having urinary urgency, usually worse with sitting or laying.  He reports it is slowly getting better after surgery, but is continuing to persist.  He also reports some perineal tenderness, made worse with sitting or laying.  He also reports rare, very small urge incontinence.  He reports he is still voiding normally, but reports the urgency is consistent.  He feels he is fully emptying his bladder.  He reports his urinary stream may be a little bit slower than usual, and he is noticing a little bit of a split stream.  He denies any significant urinary frequency.  He also reports some rare dysuria.  He denies any gross hematuria.           Past Medical History:     Past Medical History:   Diagnosis Date     Acute right otitis media 1/8/2013     ADHD (attention deficit hyperactivity disorder), combined type      Anxiety      Depression      Drug abuse (H)      Dyslipidemia      Gonococcal urethritis 02/05/2016     Hypertension      Internal hemorrhoids     which bleed     Lumbosacral radiculopathy      Obese      OCD (obsessive compulsive disorder)      Other chronic pain     Low Back Pain     Uncomplicated asthma      Urethritis 5/20/2013     Problem list name updated by automated process. Provider to review            Past Surgical History:     Past Surgical History:   Procedure Laterality Date     BACK SURGERY  04/05/2018     COLONOSCOPY       ESOPHAGOSCOPY, GASTROSCOPY, DUODENOSCOPY (EGD), COMBINED N/A 8/16/2021    Procedure: ESOPHAGOGASTRODUODENOSCOPY, WITH BIOPSY AND POLYPECTOMY;  Surgeon: Phillip Joyner MD;  Location: [facility] OR     HEMILAMINECTOMY, DISCECTOMY LUMBAR TWO  LEVELS, COMBINED Right 10/28/2021    Procedure: RIGHT LUMBAR L4-5 MINIMALLY INVASIVE MICRODISCECTOMY;  Surgeon: Troy Wharton MD;  Location: [facility] OR     HEMILAMINECTOMY, DISCECTOMY LUMBAR TWO LEVELS, COMBINED Left 10/28/2021    Procedure: LEFT LUMBAR L5-S1 MINIMALLY INVASIVE MICRODISCECTOMY;  Surgeon: Troy Wharton MD;  Location: [facility] OR            Medications     Current Outpatient Medications   Medication     albuterol (PROAIR HFA/PROVENTIL HFA/VENTOLIN HFA) 108 (90 Base) MCG/ACT inhaler     ALPRAZolam (XANAX) 2 MG tablet     amphetamine-dextroamphetamine (ADDERALL) 20 MG tablet     azelastine (ASTELIN) 0.1 % nasal spray     cetirizine (ZYRTEC) 10 MG tablet     clotrimazole (LOTRIMIN) 1 % external cream     famotidine (PEPCID) 40 MG tablet     fluticasone (FLOVENT HFA) 220 MCG/ACT Inhaler     Hyoscyamine Sulfate 0.375 MG TBCR     ketoconazole (NIZORAL) 2 % external cream     ketoconazole (NIZORAL) 2 % external shampoo     mometasone (ELOCON) 0.1 % external ointment     montelukast (SINGULAIR) 10 MG tablet     omeprazole (PRILOSEC) 40 MG DR capsule     oxyCODONE (ROXICODONE) 5 MG tablet     oxyCODONE-acetaminophen (PERCOCET)  MG per tablet     pimecrolimus (ELIDEL) 1 % external cream     PROTOPIC 0.1 % external ointment     senna-docusate (SENOKOT-S/PERICOLACE) 8.6-50 MG tablet     sildenafil (REVATIO) 20 MG tablet     tacrolimus (PROTOPIC) 0.03 % external ointment     terbinafine (LAMISIL) 1 % external cream     terbinafine (LAMISIL) 1 % external cream     tiZANidine (ZANAFLEX) 4 MG tablet     methylPREDNISolone (MEDROL DOSEPAK) 4 MG tablet therapy pack     methylPREDNISolone (MEDROL DOSEPAK) 4 MG tablet therapy pack     No current facility-administered medications for this visit.            Family History:     Family History   Problem Relation Age of Onset     Unknown/Adopted Mother      Unknown/Adopted Father      Unknown/Adopted Maternal Grandmother      Unknown/Adopted Maternal  Grandfather      Unknown/Adopted Paternal Grandmother      Unknown/Adopted Paternal Grandfather      Unknown/Adopted Brother             Social History:     Social History     Socioeconomic History     Marital status: Single     Spouse name: Not on file     Number of children: Not on file     Years of education: Not on file     Highest education level: Not on file   Occupational History     Not on file   Tobacco Use     Smoking status: Former Smoker     Packs/day: 0.50     Years: 6.00     Pack years: 3.00     Types: Cigarettes     Quit date: 3/12/2018     Years since quitting: 3.9     Smokeless tobacco: Never Used     Tobacco comment: second hand smoke exposure   Vaping Use     Vaping Use: Never used   Substance and Sexual Activity     Alcohol use: Yes     Comment: once a week     Drug use: Yes     Types: Marijuana     Comment: pot once a month, ectasy  As of 11/7/2016 he only smokes pot occasionally     Sexual activity: Not Currently     Partners: Female     Birth control/protection: Abstinence, Pull-out method, Condom   Other Topics Concern     Parent/sibling w/ CABG, MI or angioplasty before 65F 55M? No   Social History Narrative     Not on file     Social Determinants of Health     Financial Resource Strain: Not on file   Food Insecurity: Not on file   Transportation Needs: Not on file   Physical Activity: Not on file   Stress: Not on file   Social Connections: Not on file   Intimate Partner Violence: Not At Risk     Fear of Current or Ex-Partner: No     Emotionally Abused: No     Physically Abused: No     Sexually Abused: No   Housing Stability: Not on file            Allergies:   Cymbalta, Duloxetine, Paroxetine, Seasonal allergies, and Vicodin [hydrocodone-acetaminophen]         Review of Systems:  From intake questionnaire   Negative 14 system review except as noted on HPI, nurse's note.         Physical Exam:   Patient is a 32 year old  male    General Appearance Adult: Alert, no acute distress,  oriented  Lungs: no respiratory distress, or pursed lip breathing  Heart: No obvious jugular venous distension present  Skin: no suspicious lesions or rashes  Neuro: Alert, oriented, speech and mentation normal  Further examination is deferred due to the nature of our visit.        Labs and Pathology:    I personally reviewed all applicable laboratory data and went over findings with patient  Significant for:    CBC RESULTS:  Recent Labs   Lab Test 12/28/21  1223 01/14/21  1345 10/25/20  1706 03/12/18  1145   WBC 7.9 6.0 6.6 6.0   HGB 14.8 15.1 15.7 15.2    289 265 237        BMP RESULTS:  Recent Labs   Lab Test 12/28/21  1223 01/14/21  1345 10/25/20  1706 03/19/18  1545 03/12/18  1145    139 137  --  138   POTASSIUM 4.6 4.0 3.7  --  4.0   CHLORIDE 106 106 103  --  106   CO2 28 31 29  --  25   ANIONGAP 6 2* 5  --  7   GLC 95 87 101*  --  93   BUN 14 10 15  --  15   CR 1.01 1.16 1.11 1.15 1.41*   GFRESTIMATED >90 84 89 76 60*   GFRESTBLACK  --  >90 >90 >90 73   LAUREN 9.7 9.6 9.5  --  9.5       UA RESULTS:   Recent Labs   Lab Test 04/24/21  1133 03/12/21  1706 12/23/20  1234 10/25/20  1715 09/08/20  1642   SG <=1.005 1.008 1.025   < > 1.020   URINEPH 5.0 6.0 6.0   < > 6.0   NITRITE Negative Negative Negative   < > Negative   RBCU O - 2 O - 2  --   --  O - 2   WBCU 0 - 5 0 - 5  --   --  0 - 5    < > = values in this interval not displayed.       PSA RESULTS  No results found for: PSA      Imaging:    I personally reviewed all applicable imaging and went over findings with patient.  Significant for:    Results for orders placed or performed during the hospital encounter of 10/28/21   XR Surgery ROBERTO L/T 5 Min Fluoro w Stills    Narrative    SURGERY C-ARM FLUOROSCOPY LESS THAN FIVE MINUTES WITH STILLS     10/28/2021 5:10 PM     COMPARISON: None.    HISTORY: Right L4-L5 and L5-S1 microdiscectomy.    NUMBER OF IMAGES ACQUIRED: 1    VIEWS: 1    FLUOROSCOPY TIME: .2 minutes.      Impression    IMPRESSION: Provided  intraoperative image demonstrates surgical device  directed at the L5-S1 interspace. Please see operative report for  further details.    JUVE DUNHAM MD         SYSTEM ID:  [id number]              Assessment and Plan:     Assessment: 32 year old male with symptoms of dysuria, perineal pain, slowed urinary stream, and urinary urgency for the past three months.  Suspect acute prostatitis given symptoms and acute nature of symptoms postop from his back surgery.  Due to the nature of our virtual visit, a prostate exam could not be completed today, though discussed with patient that it yamileth be reasonable to initiate empiric treatment for prostatitis given his symptoms, and he is in agreement with plan.  Will prescribe Bactrim DS BID x 14 days.  I would also like to recheck a urinalysis and urine culture, and we will also plan to obtain a GC/CT culture to rule out STI.  Patient advised to complete labs prior to initiating empiric antibiotic therapy.  He was advised if symptoms improve but do not fully resolve at the end of the 14 day course, it would be reasonable to extend antibiotic therapy to a full 28 day treatment for acute prostatitis.  Patient also advised that if symptoms do not improve on Bactrim, then would recommend follow up clinic visit for further evaluation and to perform physical exam with JESUSITA.      Plan:  - Schedule lab only appointment.  We will plan to check a urinalysis, urine culture, and gonorrhea/chlamydia culture.   - Start Bactrim DS twice daily for 14 days.  Plan to start this AFTER you leave the urine sample at the lab.  This is for treatment of presumed prostatitis.    - If symptoms improve but do not fully resolve at the end of the 14 day course, it would be reasonable to extend antibiotic therapy to a full 28 day treatment for acute prostatitis.    - If symptoms do not improve on Bactrim, then would recommend follow up clinic visit for further evaluation and to perform physical exam with JESUSITA.         JESSIE Gaines  Department of Urology

## 2022-02-04 NOTE — LETTER
2/4/2022       RE: Abimael Martinez  [address]  [facility] [address]     Dear Colleague,    Thank you for referring your patient, Abimael Martinez, to the [facility] [place] at [facility]. Please see a copy of my visit note below.    Abimael is a 32 year old who is being evaluated via a billable video visit.      How would you like to obtain your AVS? MyChart  If the video visit is dropped, the invitation should be resent by: Text to cell phone: [phone]  Will anyone else be joining your video visit? No      Video Start Time: 9:30 AM  Video-Visit Details    Type of service:  Video Visit    Video End Time:9:48 AM    Originating Location (pt. Location): Home    Distant Location (provider location):  [facility] [place]     Platform used for Video Visit: [employer]            Chief Complaint:   Groin pain         History of Present Illness:   Abimael Martinez is a 32 year old male with a history of anxiety, depression, ADHD, hypertension, and dyslipidemia who presents for evaluation of groin and testicular pain.  Patient reports he had a microdiscectomy on 10/28/2021 and since that time he has been having urinary urgency, usually worse with sitting or laying.  He reports it is slowly getting better after surgery, but is continuing to persist.  He also reports some perineal tenderness, made worse with sitting or laying.  He also reports rare, very small urge incontinence.  He reports he is still voiding normally, but reports the urgency is consistent.  He feels he is fully emptying his bladder.  He reports his urinary stream may be a little bit slower than usual, and he is noticing a little bit of a split stream.  He denies any significant urinary frequency.  He also reports some rare dysuria.  He denies any gross hematuria.           Past Medical History:     Past Medical History:   Diagnosis  Date     Acute right otitis media 1/8/2013     ADHD (attention deficit hyperactivity disorder), combined type      Anxiety      Depression      Drug abuse (H)      Dyslipidemia      Gonococcal urethritis 02/05/2016     Hypertension      Internal hemorrhoids     which bleed     Lumbosacral radiculopathy      Obese      OCD (obsessive compulsive disorder)      Other chronic pain     Low Back Pain     Uncomplicated asthma      Urethritis 5/20/2013     Problem list name updated by automated process. Provider to review            Past Surgical History:     Past Surgical History:   Procedure Laterality Date     BACK SURGERY  04/05/2018     COLONOSCOPY       ESOPHAGOSCOPY, GASTROSCOPY, DUODENOSCOPY (EGD), COMBINED N/A 8/16/2021    Procedure: ESOPHAGOGASTRODUODENOSCOPY, WITH BIOPSY AND POLYPECTOMY;  Surgeon: Phillip Joyner MD;  Location: [facility] OR     HEMILAMINECTOMY, DISCECTOMY LUMBAR TWO LEVELS, COMBINED Right 10/28/2021    Procedure: RIGHT LUMBAR L4-5 MINIMALLY INVASIVE MICRODISCECTOMY;  Surgeon: Troy Wharton MD;  Location: [facility] OR     HEMILAMINECTOMY, DISCECTOMY LUMBAR TWO LEVELS, COMBINED Left 10/28/2021    Procedure: LEFT LUMBAR L5-S1 MINIMALLY INVASIVE MICRODISCECTOMY;  Surgeon: Troy Wharton MD;  Location: [facility] OR            Medications     Current Outpatient Medications   Medication     albuterol (PROAIR HFA/PROVENTIL HFA/VENTOLIN HFA) 108 (90 Base) MCG/ACT inhaler     ALPRAZolam (XANAX) 2 MG tablet     amphetamine-dextroamphetamine (ADDERALL) 20 MG tablet     azelastine (ASTELIN) 0.1 % nasal spray     cetirizine (ZYRTEC) 10 MG tablet     clotrimazole (LOTRIMIN) 1 % external cream     famotidine (PEPCID) 40 MG tablet     fluticasone (FLOVENT HFA) 220 MCG/ACT Inhaler     Hyoscyamine Sulfate 0.375 MG TBCR     ketoconazole (NIZORAL) 2 % external cream     ketoconazole (NIZORAL) 2 % external shampoo     mometasone (ELOCON) 0.1 % external ointment     montelukast (SINGULAIR) 10 MG tablet      omeprazole (PRILOSEC) 40 MG DR capsule     oxyCODONE (ROXICODONE) 5 MG tablet     oxyCODONE-acetaminophen (PERCOCET)  MG per tablet     pimecrolimus (ELIDEL) 1 % external cream     PROTOPIC 0.1 % external ointment     senna-docusate (SENOKOT-S/PERICOLACE) 8.6-50 MG tablet     sildenafil (REVATIO) 20 MG tablet     tacrolimus (PROTOPIC) 0.03 % external ointment     terbinafine (LAMISIL) 1 % external cream     terbinafine (LAMISIL) 1 % external cream     tiZANidine (ZANAFLEX) 4 MG tablet     methylPREDNISolone (MEDROL DOSEPAK) 4 MG tablet therapy pack     methylPREDNISolone (MEDROL DOSEPAK) 4 MG tablet therapy pack     No current facility-administered medications for this visit.            Family History:     Family History   Problem Relation Age of Onset     Unknown/Adopted Mother      Unknown/Adopted Father      Unknown/Adopted Maternal Grandmother      Unknown/Adopted Maternal Grandfather      Unknown/Adopted Paternal Grandmother      Unknown/Adopted Paternal Grandfather      Unknown/Adopted Brother             Social History:     Social History     Socioeconomic History     Marital status: Single     Spouse name: Not on file     Number of children: Not on file     Years of education: Not on file     Highest education level: Not on file   Occupational History     Not on file   Tobacco Use     Smoking status: Former Smoker     Packs/day: 0.50     Years: 6.00     Pack years: 3.00     Types: Cigarettes     Quit date: 3/12/2018     Years since quitting: 3.9     Smokeless tobacco: Never Used     Tobacco comment: second hand smoke exposure   Vaping Use     Vaping Use: Never used   Substance and Sexual Activity     Alcohol use: Yes     Comment: once a week     Drug use: Yes     Types: Marijuana     Comment: pot once a month, ectasy  As of 11/7/2016 he only smokes pot occasionally     Sexual activity: Not Currently     Partners: Female     Birth control/protection: Abstinence, Pull-out method, Condom   Other  Topics Concern     Parent/sibling w/ CABG, MI or angioplasty before 65F 55M? No   Social History Narrative     Not on file     Social Determinants of Health     Financial Resource Strain: Not on file   Food Insecurity: Not on file   Transportation Needs: Not on file   Physical Activity: Not on file   Stress: Not on file   Social Connections: Not on file   Intimate Partner Violence: Not At Risk     Fear of Current or Ex-Partner: No     Emotionally Abused: No     Physically Abused: No     Sexually Abused: No   Housing Stability: Not on file            Allergies:   Cymbalta, Duloxetine, Paroxetine, Seasonal allergies, and Vicodin [hydrocodone-acetaminophen]         Review of Systems:  From intake questionnaire   Negative 14 system review except as noted on HPI, nurse's note.         Physical Exam:   Patient is a 32 year old  male    General Appearance Adult: Alert, no acute distress, oriented  Lungs: no respiratory distress, or pursed lip breathing  Heart: No obvious jugular venous distension present  Skin: no suspicious lesions or rashes  Neuro: Alert, oriented, speech and mentation normal  Further examination is deferred due to the nature of our visit.        Labs and Pathology:    I personally reviewed all applicable laboratory data and went over findings with patient  Significant for:    CBC RESULTS:  Recent Labs   Lab Test 12/28/21  1223 01/14/21  1345 10/25/20  1706 03/12/18  1145   WBC 7.9 6.0 6.6 6.0   HGB 14.8 15.1 15.7 15.2    289 265 237        BMP RESULTS:  Recent Labs   Lab Test 12/28/21  1223 01/14/21  1345 10/25/20  1706 03/19/18  1545 03/12/18  1145    139 137  --  138   POTASSIUM 4.6 4.0 3.7  --  4.0   CHLORIDE 106 106 103  --  106   CO2 28 31 29  --  25   ANIONGAP 6 2* 5  --  7   GLC 95 87 101*  --  93   BUN 14 10 15  --  15   CR 1.01 1.16 1.11 1.15 1.41*   GFRESTIMATED >90 84 89 76 60*   GFRESTBLACK  --  >90 >90 >90 73   LAUREN 9.7 9.6 9.5  --  9.5       UA RESULTS:   Recent Labs   Lab Test  04/24/21  1133 03/12/21  1706 12/23/20  1234 10/25/20  1715 09/08/20  1642   SG <=1.005 1.008 1.025   < > 1.020   URINEPH 5.0 6.0 6.0   < > 6.0   NITRITE Negative Negative Negative   < > Negative   RBCU O - 2 O - 2  --   --  O - 2   WBCU 0 - 5 0 - 5  --   --  0 - 5    < > = values in this interval not displayed.       PSA RESULTS  No results found for: PSA      Imaging:    I personally reviewed all applicable imaging and went over findings with patient.  Significant for:    Results for orders placed or performed during the hospital encounter of 10/28/21   XR Surgery ROBERTO L/T 5 Min Fluoro w Stills    Narrative    SURGERY C-ARM FLUOROSCOPY LESS THAN FIVE MINUTES WITH STILLS     10/28/2021 5:10 PM     COMPARISON: None.    HISTORY: Right L4-L5 and L5-S1 microdiscectomy.    NUMBER OF IMAGES ACQUIRED: 1    VIEWS: 1    FLUOROSCOPY TIME: .2 minutes.      Impression    IMPRESSION: Provided intraoperative image demonstrates surgical device  directed at the L5-S1 interspace. Please see operative report for  further details.    JUVE DUNHAM MD         SYSTEM ID:  [id number]              Assessment and Plan:     Assessment: 32 year old male with symptoms of dysuria, perineal pain, slowed urinary stream, and urinary urgency for the past three months.  Suspect acute prostatitis given symptoms and acute nature of symptoms postop from his back surgery.  Due to the nature of our virtual visit, a prostate exam could not be completed today, though discussed with patient that it yamileth be reasonable to initiate empiric treatment for prostatitis given his symptoms, and he is in agreement with plan.  Will prescribe Bactrim DS BID x 14 days.  I would also like to recheck a urinalysis and urine culture, and we will also plan to obtain a GC/CT culture to rule out STI.  Patient advised to complete labs prior to initiating empiric antibiotic therapy.  He was advised if symptoms improve but do not fully resolve at the end of the 14 day course, it  would be reasonable to extend antibiotic therapy to a full 28 day treatment for acute prostatitis.  Patient also advised that if symptoms do not improve on Bactrim, then would recommend follow up clinic visit for further evaluation and to perform physical exam with JESUSITA.      Plan:  - Schedule lab only appointment.  We will plan to check a urinalysis, urine culture, and gonorrhea/chlamydia culture.   - Start Bactrim DS twice daily for 14 days.  Plan to start this AFTER you leave the urine sample at the lab.  This is for treatment of presumed prostatitis.    - If symptoms improve but do not fully resolve at the end of the 14 day course, it would be reasonable to extend antibiotic therapy to a full 28 day treatment for acute prostatitis.    - If symptoms do not improve on Bactrim, then would recommend follow up clinic visit for further evaluation and to perform physical exam with JESUSITA.        JESSIE Gaines  Department of Urology

## 2022-02-04 NOTE — PROGRESS NOTES
Abimael is a 32 year old who is being evaluated via a billable video visit.      How would you like to obtain your AVS? MyChart  If the video visit is dropped, the invitation should be resent by: Text to cell phone: [phone]  Will anyone else be joining your video visit? No      Video Start Time: 9:30 AM  Video-Visit Details    Type of service:  Video Visit    Video End Time:9:48 AM    Originating Location (pt. Location): Home    Distant Location (provider location):  [facility] UROLOG[facility]     Platform used for Video Visit: [employer]

## 2022-02-04 NOTE — PATIENT INSTRUCTIONS
UROLOGY CLINIC VISIT PATIENT INSTRUCTIONS    - Schedule lab only appointment.  We will plan to check a urinalysis, urine culture, and gonorrhea/chlamydia culture.   - Start Bactrim DS twice daily for 14 days.  Plan to start this AFTER you leave the urine sample at the lab.  This is for treatment of presumed prostatitis.    - If symptoms improve but do not fully resolve at the end of the 14 day course, it would be reasonable to extend antibiotic therapy to a full 28 day treatment for acute prostatitis.    - If symptoms do not improve on Bactrim, then would recommend follow up clinic visit for further evaluation.     If you have any issues, questions or concerns in the meantime, do not hesitate to contact us at [phone] or via [employer].     It was a pleasure meeting with you today.  Thank you for allowing me and my team the privilege of caring for you today.  YOU are the reason we are here, and I truly hope we provided you with the excellent service you deserve.  Please let us know if there is anything else we can do for you so that we can be sure you are leaving completely satisfied with your care experience.    Mary Jane Solo PA-C  Department of Urology

## 2022-02-05 LAB
BACTERIA UR CULT: NO GROWTH
C TRACH DNA SPEC QL NAA+PROBE: NEGATIVE
N GONORRHOEA DNA SPEC QL NAA+PROBE: NEGATIVE

## 2022-02-10 DIAGNOSIS — Z98.890 S/P LUMBAR MICRODISCECTOMY: ICD-10-CM

## 2022-02-10 DIAGNOSIS — M51.16 RADICULOPATHY DUE TO LUMBAR INTERVERTEBRAL DISC DISORDER: ICD-10-CM

## 2022-02-10 RX ORDER — OXYCODONE HYDROCHLORIDE 5 MG/1
5-10 TABLET ORAL EVERY 4 HOURS PRN
Qty: 20 TABLET | Refills: 0 | Status: SHIPPED | OUTPATIENT
Start: 2022-02-10 | End: 2022-10-06

## 2022-02-10 NOTE — TELEPHONE ENCOUNTER
Patient calling for a refill of Percocet.         DOS: 10/28/21  Procedure: right L5-S1 microdiscectomy  Surgeon: Dr. Wharton  Current symptom(s): Still with pain similar to when he spoke with Provider at his 12 week followup. Primarily in his pelvis and in his testicle also down his left leg. As bad as 8/10. Varies day to day.     Current pain management: Percocet 1 tab 1-3 time a day. Also seeing PT, chiropractor and acupuncture.      Will see [facility] 2/18.    Last fill: 2/1/22  Next visit: [facility]    Medication pended for your approval, if appropriate. Pharmacy verified.     Any patient questions or concerns:     Informed patient request will be forwarded to care team.

## 2022-02-18 ENCOUNTER — TRANSFERRED RECORDS (OUTPATIENT)
Dept: HEALTH INFORMATION MANAGEMENT | Facility: CLINIC | Age: 32
End: 2022-02-18
Payer: COMMERCIAL

## 2022-02-28 ENCOUNTER — MYC REFILL (OUTPATIENT)
Dept: FAMILY MEDICINE | Facility: CLINIC | Age: 32
End: 2022-02-28
Payer: COMMERCIAL

## 2022-02-28 DIAGNOSIS — F90.2 ATTENTION DEFICIT HYPERACTIVITY DISORDER (ADHD), COMBINED TYPE: ICD-10-CM

## 2022-02-28 DIAGNOSIS — F41.9 ANXIETY: ICD-10-CM

## 2022-02-28 RX ORDER — ALPRAZOLAM 2 MG
2 TABLET ORAL 3 TIMES DAILY PRN
Qty: 30 TABLET | Refills: 0 | Status: SHIPPED | OUTPATIENT
Start: 2022-02-28 | End: 2022-03-29

## 2022-02-28 RX ORDER — DEXTROAMPHETAMINE SACCHARATE, AMPHETAMINE ASPARTATE, DEXTROAMPHETAMINE SULFATE AND AMPHETAMINE SULFATE 5; 5; 5; 5 MG/1; MG/1; MG/1; MG/1
20 TABLET ORAL 3 TIMES DAILY
Qty: 90 TABLET | Refills: 0 | Status: SHIPPED | OUTPATIENT
Start: 2022-02-28 | End: 2022-03-29

## 2022-03-18 ENCOUNTER — TRANSFERRED RECORDS (OUTPATIENT)
Dept: HEALTH INFORMATION MANAGEMENT | Facility: CLINIC | Age: 32
End: 2022-03-18
Payer: COMMERCIAL

## 2022-03-18 LAB — PHQ9 SCORE: 7

## 2022-03-21 ENCOUNTER — PRE VISIT (OUTPATIENT)
Dept: UROLOGY | Facility: CLINIC | Age: 32
End: 2022-03-21
Payer: COMMERCIAL

## 2022-03-21 NOTE — CONFIDENTIAL NOTE
Reason for visit: follow-up physical exam and JESUSITA     Relevant information: urinary urgency, perineal pain, slowing of urinary stream, acute prostatitis    Records/imaging/labs/orders: in epic    Pt called: n/a    At Rooming: standard

## 2022-03-24 ENCOUNTER — OFFICE VISIT (OUTPATIENT)
Dept: UROLOGY | Facility: CLINIC | Age: 32
End: 2022-03-24
Payer: COMMERCIAL

## 2022-03-24 VITALS
SYSTOLIC BLOOD PRESSURE: 161 MMHG | HEIGHT: 72 IN | DIASTOLIC BLOOD PRESSURE: 87 MMHG | HEART RATE: 80 BPM | BODY MASS INDEX: 37.93 KG/M2 | WEIGHT: 280 LBS

## 2022-03-24 DIAGNOSIS — R39.15 URINARY URGENCY: ICD-10-CM

## 2022-03-24 DIAGNOSIS — R10.2 PERINEAL PAIN: Primary | ICD-10-CM

## 2022-03-24 DIAGNOSIS — R30.0 DYSURIA: ICD-10-CM

## 2022-03-24 LAB
ALBUMIN UR-MCNC: NEGATIVE MG/DL
APPEARANCE UR: CLEAR
BILIRUB UR QL STRIP: NEGATIVE
COLOR UR AUTO: YELLOW
GLUCOSE UR STRIP-MCNC: NEGATIVE MG/DL
HGB UR QL STRIP: NEGATIVE
KETONES UR STRIP-MCNC: NEGATIVE MG/DL
LEUKOCYTE ESTERASE UR QL STRIP: NEGATIVE
MUCOUS THREADS #/AREA URNS LPF: PRESENT /LPF
NITRATE UR QL: NEGATIVE
PH UR STRIP: 7 [PH] (ref 5–7)
RBC URINE: 1 /HPF
SP GR UR STRIP: 1.01 (ref 1–1.03)
UROBILINOGEN UR STRIP-MCNC: NORMAL MG/DL
WBC URINE: <1 /HPF

## 2022-03-24 PROCEDURE — 87109 MYCOPLASMA: CPT | Mod: 90 | Performed by: PATHOLOGY

## 2022-03-24 PROCEDURE — 99213 OFFICE O/P EST LOW 20 MIN: CPT | Performed by: PHYSICIAN ASSISTANT

## 2022-03-24 PROCEDURE — 87086 URINE CULTURE/COLONY COUNT: CPT | Mod: 90 | Performed by: PATHOLOGY

## 2022-03-24 PROCEDURE — 99000 SPECIMEN HANDLING OFFICE-LAB: CPT | Performed by: PATHOLOGY

## 2022-03-24 PROCEDURE — 81001 URINALYSIS AUTO W/SCOPE: CPT | Performed by: PATHOLOGY

## 2022-03-24 PROCEDURE — 51798 US URINE CAPACITY MEASURE: CPT | Performed by: PHYSICIAN ASSISTANT

## 2022-03-24 RX ORDER — TAMSULOSIN HYDROCHLORIDE 0.4 MG/1
0.4 CAPSULE ORAL DAILY
Qty: 30 CAPSULE | Refills: 3 | Status: SHIPPED | OUTPATIENT
Start: 2022-03-24 | End: 2022-05-26

## 2022-03-24 RX ORDER — DOXYCYCLINE 100 MG/1
100 CAPSULE ORAL 2 TIMES DAILY
Qty: 28 CAPSULE | Refills: 1 | Status: SHIPPED | OUTPATIENT
Start: 2022-03-24 | End: 2022-04-07

## 2022-03-24 RX ORDER — TAMSULOSIN HYDROCHLORIDE 0.4 MG/1
0.4 CAPSULE ORAL DAILY
Qty: 30 CAPSULE | Refills: 3 | Status: SHIPPED | OUTPATIENT
Start: 2022-03-24 | End: 2022-03-24

## 2022-03-24 ASSESSMENT — PAIN SCALES - GENERAL: PAINLEVEL: NO PAIN (0)

## 2022-03-24 NOTE — PATIENT INSTRUCTIONS
UROLOGY CLINIC VISIT PATIENT INSTRUCTIONS    - Schedule lab visit for PSA.   - Start Flomax 0.4 mg once daily.  Do not take this medication within 4 hours of your sildenafil (Viagra) due to risk for low blood pressure.   - Trial doxycycline 100 mg twice daily x 14 days for prostatitis.   - Follow up in 2 months for recheck of symptoms.     If you have any issues, questions or concerns in the meantime, do not hesitate to contact us at [phone] or via [employer].     It was a pleasure meeting with you today.  Thank you for allowing me and my team the privilege of caring for you today.  YOU are the reason we are here, and I truly hope we provided you with the excellent service you deserve.  Please let us know if there is anything else we can do for you so that we can be sure you are leaving completely satisfied with your care experience.    Mary aJne Solo PA-C  Department of Urology

## 2022-03-24 NOTE — LETTER
3/24/2022       RE: Abimael Martinez  [address]  [facility] [address]     Dear Colleague,    Thank you for referring your patient, Abimael Martinez, to the [facility] [place] at [facility]. Please see a copy of my visit note below.          Chief Complaint:   Dysuria          History of Present Illness:   Abimael Martinez is a 32 year old male with a history of anxiety, depression, ADHD, hypertension, and dyslipidemia who presents for follow up of groin and testicular pain.  Patient s/p microdiscectomy on 10/28/2021 and since that time he has been having urinary urgency, usually worse with sitting or laying.  He reports it is slowly getting better after surgery, but is continuing to persist.  He also reports some perineal tenderness, made worse with sitting or laying.  He also reports rare, very small urge incontinence.  He reports he is still voiding normally, but reports the urgency is consistent.  He feels he is fully emptying his bladder.  He reports his urinary stream may be a little bit slower than usual, and he is noticing a little bit of a split stream.  He denies any significant urinary frequency.  He also reports some rare dysuria.  He denies any gross hematuria.  The patient was seen via virtual visit on 2/4/2022 at which time symptoms were felt most consistent with prostatitis and he was treated with a 14 day course of Bactrim DS.  Urinalysis from 2/4/2022 was normal and urine culture without growth, and GC/CT testing was negative for infection.     The patient presents today reporting consistent rectal and perineal pressure and burning.  He is also noticing this into the head of his penis.  He feels that his groin area is constantly tense.  He feels as though he has a hard time pushing his urine out and his urinary stream is slower and will drip out or split stream.  He also reports constant feeling of  urinary urgency, and rare episodes of small amounts of urge incontinence.  He feels that he is not fully emptying his bladder, and often has to double void.  He also admits to some mild post void dribbling.  He admits to nocturia once per night early in the morning.  He also reports his erections have not been as strong or powerful as they were previously.  No caffeine use.  Rare alcohol use.  He does admit to occasional constipation related to his medications (oxycodone) but is generally very regular.           Past Medical History:     Past Medical History:   Diagnosis Date     Acute right otitis media 1/8/2013     ADHD (attention deficit hyperactivity disorder), combined type      Anxiety      Depression      Drug abuse (H)      Dyslipidemia      Gonococcal urethritis 02/05/2016     Hypertension      Internal hemorrhoids     which bleed     Lumbosacral radiculopathy      Obese      OCD (obsessive compulsive disorder)      Other chronic pain     Low Back Pain     Uncomplicated asthma      Urethritis 5/20/2013     Problem list name updated by automated process. Provider to review            Past Surgical History:     Past Surgical History:   Procedure Laterality Date     BACK SURGERY  04/05/2018     COLONOSCOPY       ESOPHAGOSCOPY, GASTROSCOPY, DUODENOSCOPY (EGD), COMBINED N/A 8/16/2021    Procedure: ESOPHAGOGASTRODUODENOSCOPY, WITH BIOPSY AND POLYPECTOMY;  Surgeon: Phillip Joyner MD;  Location: [facility] OR     HEMILAMINECTOMY, DISCECTOMY LUMBAR TWO LEVELS, COMBINED Right 10/28/2021    Procedure: RIGHT LUMBAR L4-5 MINIMALLY INVASIVE MICRODISCECTOMY;  Surgeon: Troy Wharton MD;  Location:  OR     HEMILAMINECTOMY, DISCECTOMY LUMBAR TWO LEVELS, COMBINED Left 10/28/2021    Procedure: LEFT LUMBAR L5-S1 MINIMALLY INVASIVE MICRODISCECTOMY;  Surgeon: Troy Wharton MD;  Location:  OR            Medications     Current Outpatient Medications   Medication     albuterol (PROAIR HFA/PROVENTIL  HFA/VENTOLIN HFA) 108 (90 Base) MCG/ACT inhaler     ALPRAZolam (XANAX) 2 MG tablet     amphetamine-dextroamphetamine (ADDERALL) 20 MG tablet     azelastine (ASTELIN) 0.1 % nasal spray     cetirizine (ZYRTEC) 10 MG tablet     clotrimazole (LOTRIMIN) 1 % external cream     famotidine (PEPCID) 40 MG tablet     fluticasone (FLOVENT HFA) 220 MCG/ACT Inhaler     Hyoscyamine Sulfate 0.375 MG TBCR     ketoconazole (NIZORAL) 2 % external cream     ketoconazole (NIZORAL) 2 % external shampoo     methylPREDNISolone (MEDROL DOSEPAK) 4 MG tablet therapy pack     methylPREDNISolone (MEDROL DOSEPAK) 4 MG tablet therapy pack     mometasone (ELOCON) 0.1 % external ointment     montelukast (SINGULAIR) 10 MG tablet     omeprazole (PRILOSEC) 40 MG DR capsule     oxyCODONE (ROXICODONE) 5 MG tablet     oxyCODONE-acetaminophen (PERCOCET)  MG per tablet     pimecrolimus (ELIDEL) 1 % external cream     PROTOPIC 0.1 % external ointment     senna-docusate (SENOKOT-S/PERICOLACE) 8.6-50 MG tablet     sildenafil (REVATIO) 20 MG tablet     tacrolimus (PROTOPIC) 0.03 % external ointment     terbinafine (LAMISIL) 1 % external cream     terbinafine (LAMISIL) 1 % external cream     tiZANidine (ZANAFLEX) 4 MG tablet     No current facility-administered medications for this visit.            Family History:     Family History   Problem Relation Age of Onset     Unknown/Adopted Mother      Unknown/Adopted Father      Unknown/Adopted Maternal Grandmother      Unknown/Adopted Maternal Grandfather      Unknown/Adopted Paternal Grandmother      Unknown/Adopted Paternal Grandfather      Unknown/Adopted Brother             Social History:     Social History     Socioeconomic History     Marital status: Single     Spouse name: Not on file     Number of children: Not on file     Years of education: Not on file     Highest education level: Not on file   Occupational History     Not on file   Tobacco Use     Smoking status: Former Smoker     Packs/day: 0.50      Years: 6.00     Pack years: 3.00     Types: Cigarettes     Quit date: 3/12/2018     Years since quittin.0     Smokeless tobacco: Never Used     Tobacco comment: second hand smoke exposure   Vaping Use     Vaping Use: Never used   Substance and Sexual Activity     Alcohol use: Yes     Comment: once a week     Drug use: Yes     Types: Marijuana     Comment: pot once a month, ectasy  As of 2016 he only smokes pot occasionally     Sexual activity: Not Currently     Partners: Female     Birth control/protection: Abstinence, Pull-out method, Condom   Other Topics Concern     Parent/sibling w/ CABG, MI or angioplasty before 65F 55M? No   Social History Narrative     Not on file     Social Determinants of Health     Financial Resource Strain: Not on file   Food Insecurity: Not on file   Transportation Needs: Not on file   Physical Activity: Not on file   Stress: Not on file   Social Connections: Not on file   Intimate Partner Violence: Not At Risk     Fear of Current or Ex-Partner: No     Emotionally Abused: No     Physically Abused: No     Sexually Abused: No   Housing Stability: Not on file            Allergies:   Cymbalta, Duloxetine, Paroxetine, Seasonal allergies, and Vicodin [hydrocodone-acetaminophen]         Review of Systems:  From intake questionnaire   Negative 14 system review except as noted on HPI, nurse's note.         Physical Exam:   Patient is a 32 year old  male   Vitals: Blood pressure (!) 161/87, pulse 80, height 1.829 m (6'), weight 127 kg (280 lb).  General Appearance Adult: Alert, no acute distress, oriented  Lungs: no respiratory distress, or pursed lip breathing  Heart: No obvious jugular venous distension present  Abdomen: soft, nontender, no organomegaly or masses, Body mass index is 37.97 kg/m .  Musculoskeltal: extremities normal, no peripheral edema  Skin: no suspicious lesions or rashes  Neuro: Alert, oriented, speech and mentation normal  : JESUSITA anodular, symmetric, soft, mild  tenderness to palpation      Uroflow and Post-Void Residual by Bladder Scan   PVR: 40 ml       Labs and Pathology:    I personally reviewed all applicable laboratory data and went over findings with patient  Significant for:    CBC RESULTS:  Recent Labs   Lab Test 12/28/21  1223 01/14/21  1345 10/25/20  1706 03/12/18  1145   WBC 7.9 6.0 6.6 6.0   HGB 14.8 15.1 15.7 15.2    289 265 237        BMP RESULTS:  Recent Labs   Lab Test 12/28/21  1223 01/14/21  1345 10/25/20  1706 03/19/18  1545 03/12/18  1145    139 137  --  138   POTASSIUM 4.6 4.0 3.7  --  4.0   CHLORIDE 106 106 103  --  106   CO2 28 31 29  --  25   ANIONGAP 6 2* 5  --  7   GLC 95 87 101*  --  93   BUN 14 10 15  --  15   CR 1.01 1.16 1.11 1.15 1.41*   GFRESTIMATED >90 84 89 76 60*   GFRESTBLACK  --  >90 >90 >90 73   LAUREN 9.7 9.6 9.5  --  9.5       UA RESULTS:   Recent Labs   Lab Test 02/04/22  1615 04/24/21  1133 03/12/21  1706   SG 1.015 <=1.005 1.008   URINEPH 5.5 5.0 6.0   NITRITE Negative Negative Negative   RBCU 0-2 O - 2 O - 2   WBCU 0-5 0 - 5 0 - 5       PSA RESULTS  No results found for: PSA      Imaging:    I personally reviewed all applicable imaging and went over findings with patient.  Significant for:    Results for orders placed or performed during the hospital encounter of 10/28/21   XR Surgery ROBERTO L/T 5 Min Fluoro w Stills    Narrative    SURGERY C-ARM FLUOROSCOPY LESS THAN FIVE MINUTES WITH STILLS     10/28/2021 5:10 PM     COMPARISON: None.    HISTORY: Right L4-L5 and L5-S1 microdiscectomy.    NUMBER OF IMAGES ACQUIRED: 1    VIEWS: 1    FLUOROSCOPY TIME: .2 minutes.      Impression    IMPRESSION: Provided intraoperative image demonstrates surgical device  directed at the L5-S1 interspace. Please see operative report for  further details.    JUVE DUNHAM MD         SYSTEM ID:  [id number]              Assessment and Plan:     Assessment: 32 year old male with dysuria, perineal pain, slowed urinary stream, and urinary urgency for  the past four months.  Patient tried a 14 day course of Bactrim for presumed prostatitis with no change in symptoms.  Prostatic massage completed today and post-prostatic massage UA/UC obtained along with ureaplasma/mycoplasma culture to rule out atypical infection.  We discussed option for switching to alternative antibiotic therapy with doxycycline for change in coverage, and we also discussed trial of alpha blocker for symptom management.  At this time, we will plan to start tamsulosin 0.4 mg once daily, and doxycycline 100 mg BID x 14 days.  We will also plan to obtain a PSA to rule out risk for prostate cancer.  If no change in symptoms and urine studies negative for infection, could consider PFPT.      Plan:  - Schedule lab visit for PSA.   - Start Flomax 0.4 mg once daily.  Do not take this medication within 4 hours of your sildenafil (Viagra) due to risk for low blood pressure.   - Trial doxycycline 100 mg twice daily x 14 days for prostatitis.   - Follow up in 2 months for recheck of symptoms.       JESSIE Gaines  Department of Urology

## 2022-03-24 NOTE — PROGRESS NOTES
Chief Complaint:   Dysuria          History of Present Illness:   Abimael Martinez is a 32 year old male with a history of anxiety, depression, ADHD, hypertension, and dyslipidemia who presents for follow up of groin and testicular pain.  Patient s/p microdiscectomy on 10/28/2021 and since that time he has been having urinary urgency, usually worse with sitting or laying.  He reports it is slowly getting better after surgery, but is continuing to persist.  He also reports some perineal tenderness, made worse with sitting or laying.  He also reports rare, very small urge incontinence.  He reports he is still voiding normally, but reports the urgency is consistent.  He feels he is fully emptying his bladder.  He reports his urinary stream may be a little bit slower than usual, and he is noticing a little bit of a split stream.  He denies any significant urinary frequency.  He also reports some rare dysuria.  He denies any gross hematuria.  The patient was seen via virtual visit on 2/4/2022 at which time symptoms were felt most consistent with prostatitis and he was treated with a 14 day course of Bactrim DS.  Urinalysis from 2/4/2022 was normal and urine culture without growth, and GC/CT testing was negative for infection.     The patient presents today reporting consistent rectal and perineal pressure and burning.  He is also noticing this into the head of his penis.  He feels that his groin area is constantly tense.  He feels as though he has a hard time pushing his urine out and his urinary stream is slower and will drip out or split stream.  He also reports constant feeling of urinary urgency, and rare episodes of small amounts of urge incontinence.  He feels that he is not fully emptying his bladder, and often has to double void.  He also admits to some mild post void dribbling.  He admits to nocturia once per night early in the morning.  He also reports his erections have not been as strong or powerful  as they were previously.  No caffeine use.  Rare alcohol use.  He does admit to occasional constipation related to his medications (oxycodone) but is generally very regular.           Past Medical History:     Past Medical History:   Diagnosis Date     Acute right otitis media 1/8/2013     ADHD (attention deficit hyperactivity disorder), combined type      Anxiety      Depression      Drug abuse (H)      Dyslipidemia      Gonococcal urethritis 02/05/2016     Hypertension      Internal hemorrhoids     which bleed     Lumbosacral radiculopathy      Obese      OCD (obsessive compulsive disorder)      Other chronic pain     Low Back Pain     Uncomplicated asthma      Urethritis 5/20/2013     Problem list name updated by automated process. Provider to review            Past Surgical History:     Past Surgical History:   Procedure Laterality Date     BACK SURGERY  04/05/2018     COLONOSCOPY       ESOPHAGOSCOPY, GASTROSCOPY, DUODENOSCOPY (EGD), COMBINED N/A 8/16/2021    Procedure: ESOPHAGOGASTRODUODENOSCOPY, WITH BIOPSY AND POLYPECTOMY;  Surgeon: Phillip Joyner MD;  Location: [facility] OR     HEMILAMINECTOMY, DISCECTOMY LUMBAR TWO LEVELS, COMBINED Right 10/28/2021    Procedure: RIGHT LUMBAR L4-5 MINIMALLY INVASIVE MICRODISCECTOMY;  Surgeon: Troy Wharton MD;  Location: [facility] OR     HEMILAMINECTOMY, DISCECTOMY LUMBAR TWO LEVELS, COMBINED Left 10/28/2021    Procedure: LEFT LUMBAR L5-S1 MINIMALLY INVASIVE MICRODISCECTOMY;  Surgeon: Troy Wharton MD;  Location:  OR            Medications     Current Outpatient Medications   Medication     albuterol (PROAIR HFA/PROVENTIL HFA/VENTOLIN HFA) 108 (90 Base) MCG/ACT inhaler     ALPRAZolam (XANAX) 2 MG tablet     amphetamine-dextroamphetamine (ADDERALL) 20 MG tablet     azelastine (ASTELIN) 0.1 % nasal spray     cetirizine (ZYRTEC) 10 MG tablet     clotrimazole (LOTRIMIN) 1 % external cream     famotidine (PEPCID) 40 MG tablet     fluticasone (FLOVENT HFA)  220 MCG/ACT Inhaler     Hyoscyamine Sulfate 0.375 MG TBCR     ketoconazole (NIZORAL) 2 % external cream     ketoconazole (NIZORAL) 2 % external shampoo     methylPREDNISolone (MEDROL DOSEPAK) 4 MG tablet therapy pack     methylPREDNISolone (MEDROL DOSEPAK) 4 MG tablet therapy pack     mometasone (ELOCON) 0.1 % external ointment     montelukast (SINGULAIR) 10 MG tablet     omeprazole (PRILOSEC) 40 MG DR capsule     oxyCODONE (ROXICODONE) 5 MG tablet     oxyCODONE-acetaminophen (PERCOCET)  MG per tablet     pimecrolimus (ELIDEL) 1 % external cream     PROTOPIC 0.1 % external ointment     senna-docusate (SENOKOT-S/PERICOLACE) 8.6-50 MG tablet     sildenafil (REVATIO) 20 MG tablet     tacrolimus (PROTOPIC) 0.03 % external ointment     terbinafine (LAMISIL) 1 % external cream     terbinafine (LAMISIL) 1 % external cream     tiZANidine (ZANAFLEX) 4 MG tablet     No current facility-administered medications for this visit.            Family History:     Family History   Problem Relation Age of Onset     Unknown/Adopted Mother      Unknown/Adopted Father      Unknown/Adopted Maternal Grandmother      Unknown/Adopted Maternal Grandfather      Unknown/Adopted Paternal Grandmother      Unknown/Adopted Paternal Grandfather      Unknown/Adopted Brother             Social History:     Social History     Socioeconomic History     Marital status: Single     Spouse name: Not on file     Number of children: Not on file     Years of education: Not on file     Highest education level: Not on file   Occupational History     Not on file   Tobacco Use     Smoking status: Former Smoker     Packs/day: 0.50     Years: 6.00     Pack years: 3.00     Types: Cigarettes     Quit date: 3/12/2018     Years since quittin.0     Smokeless tobacco: Never Used     Tobacco comment: second hand smoke exposure   Vaping Use     Vaping Use: Never used   Substance and Sexual Activity     Alcohol use: Yes     Comment: once a week     Drug use: Yes      Types: Marijuana     Comment: pot once a month, ectasy  As of 11/7/2016 he only smokes pot occasionally     Sexual activity: Not Currently     Partners: Female     Birth control/protection: Abstinence, Pull-out method, Condom   Other Topics Concern     Parent/sibling w/ CABG, MI or angioplasty before 65F 55M? No   Social History Narrative     Not on file     Social Determinants of Health     Financial Resource Strain: Not on file   Food Insecurity: Not on file   Transportation Needs: Not on file   Physical Activity: Not on file   Stress: Not on file   Social Connections: Not on file   Intimate Partner Violence: Not At Risk     Fear of Current or Ex-Partner: No     Emotionally Abused: No     Physically Abused: No     Sexually Abused: No   Housing Stability: Not on file            Allergies:   Cymbalta, Duloxetine, Paroxetine, Seasonal allergies, and Vicodin [hydrocodone-acetaminophen]         Review of Systems:  From intake questionnaire   Negative 14 system review except as noted on HPI, nurse's note.         Physical Exam:   Patient is a 32 year old  male   Vitals: Blood pressure (!) 161/87, pulse 80, height 1.829 m (6'), weight 127 kg (280 lb).  General Appearance Adult: Alert, no acute distress, oriented  Lungs: no respiratory distress, or pursed lip breathing  Heart: No obvious jugular venous distension present  Abdomen: soft, nontender, no organomegaly or masses, Body mass index is 37.97 kg/m .  Musculoskeltal: extremities normal, no peripheral edema  Skin: no suspicious lesions or rashes  Neuro: Alert, oriented, speech and mentation normal  : JESUSITA anodular, symmetric, soft, mild tenderness to palpation      Uroflow and Post-Void Residual by Bladder Scan   PVR: 40 ml       Labs and Pathology:    I personally reviewed all applicable laboratory data and went over findings with patient  Significant for:    CBC RESULTS:  Recent Labs   Lab Test 12/28/21  1223 01/14/21  1345 10/25/20  1706 03/12/18  1145   WBC 7.9  6.0 6.6 6.0   HGB 14.8 15.1 15.7 15.2    289 265 237        BMP RESULTS:  Recent Labs   Lab Test 12/28/21  1223 01/14/21  1345 10/25/20  1706 03/19/18  1545 03/12/18  1145    139 137  --  138   POTASSIUM 4.6 4.0 3.7  --  4.0   CHLORIDE 106 106 103  --  106   CO2 28 31 29  --  25   ANIONGAP 6 2* 5  --  7   GLC 95 87 101*  --  93   BUN 14 10 15  --  15   CR 1.01 1.16 1.11 1.15 1.41*   GFRESTIMATED >90 84 89 76 60*   GFRESTBLACK  --  >90 >90 >90 73   LAUREN 9.7 9.6 9.5  --  9.5       UA RESULTS:   Recent Labs   Lab Test 02/04/22  1615 04/24/21  1133 03/12/21  1706   SG 1.015 <=1.005 1.008   URINEPH 5.5 5.0 6.0   NITRITE Negative Negative Negative   RBCU 0-2 O - 2 O - 2   WBCU 0-5 0 - 5 0 - 5       PSA RESULTS  No results found for: PSA      Imaging:    I personally reviewed all applicable imaging and went over findings with patient.  Significant for:    Results for orders placed or performed during the hospital encounter of 10/28/21   XR Surgery ROBERTO L/T 5 Min Fluoro w Stills    Narrative    SURGERY C-ARM FLUOROSCOPY LESS THAN FIVE MINUTES WITH STILLS     10/28/2021 5:10 PM     COMPARISON: None.    HISTORY: Right L4-L5 and L5-S1 microdiscectomy.    NUMBER OF IMAGES ACQUIRED: 1    VIEWS: 1    FLUOROSCOPY TIME: .2 minutes.      Impression    IMPRESSION: Provided intraoperative image demonstrates surgical device  directed at the L5-S1 interspace. Please see operative report for  further details.    JUVE DUNHAM MD         SYSTEM ID:  [id number]              Assessment and Plan:     Assessment: 32 year old male with dysuria, perineal pain, slowed urinary stream, and urinary urgency for the past four months.  Patient tried a 14 day course of Bactrim for presumed prostatitis with no change in symptoms.  Prostatic massage completed today and post-prostatic massage UA/UC obtained along with ureaplasma/mycoplasma culture to rule out atypical infection.  We discussed option for switching to alternative antibiotic  therapy with doxycycline for change in coverage, and we also discussed trial of alpha blocker for symptom management.  At this time, we will plan to start tamsulosin 0.4 mg once daily, and doxycycline 100 mg BID x 14 days.  We will also plan to obtain a PSA to rule out risk for prostate cancer.  If no change in symptoms and urine studies negative for infection, could consider PFPT.      Plan:  - Schedule lab visit for PSA.   - Start Flomax 0.4 mg once daily.  Do not take this medication within 4 hours of your sildenafil (Viagra) due to risk for low blood pressure.   - Trial doxycycline 100 mg twice daily x 14 days for prostatitis.   - Follow up in 2 months for recheck of symptoms.       JESSIE Gaines  Department of Urology

## 2022-03-24 NOTE — NURSING NOTE
Chief Complaint   Patient presents with     RECHECK     Urinary urgency/slow stream     Yue Delcid, [facility]

## 2022-03-26 LAB — BACTERIA UR CULT: NO GROWTH

## 2022-03-29 ENCOUNTER — MYC REFILL (OUTPATIENT)
Dept: FAMILY MEDICINE | Facility: CLINIC | Age: 32
End: 2022-03-29
Payer: COMMERCIAL

## 2022-03-29 DIAGNOSIS — F41.9 ANXIETY: ICD-10-CM

## 2022-03-29 DIAGNOSIS — F90.2 ATTENTION DEFICIT HYPERACTIVITY DISORDER (ADHD), COMBINED TYPE: ICD-10-CM

## 2022-03-29 PROBLEM — M54.41 BILATERAL LOW BACK PAIN WITH RIGHT-SIDED SCIATICA: Status: RESOLVED | Noted: 2021-12-09 | Resolved: 2022-03-29

## 2022-03-29 NOTE — PROGRESS NOTES
Discharge Note    Progress reporting period is from initial evaluation date (please see noted date below) to Jan 28, 2022.  No linked episodes      Abimael failed to follow up and current status is unknown.  Please see information below for last relevant information on current status.  Patient seen for 8 visits.    SUBJECTIVE  Subjective changes noted by patient:  Patient interested in exercises for pelvic floor.  Symptoms of numbness and tingling in testicle region and low back/buttock remain.  Patient has remained compliant with stretching exercises, some compliance with strength exercises.  .  Current pain level is No change.     Previous pain level was   .   Changes in function:  Yes (See Goal flowsheet attached for changes in current functional level)  Adverse reaction to treatment or activity: None    OBJECTIVE  Changes noted in objective findings:       ASSESSMENT/PLAN  Diagnosis: S/P L4-5, L5-S1 discectomy   Updated problem list and treatment plan:   Pain - HEP  Decreased ROM/flexibility - HEP  Decreased strength - HEP  STG/LTGs have been met or progress has been made towards goals:  Yes, please see goal flowsheet for most current information  Assessment of Progress: current status is unknown.    Last current status:     Self Management Plans:  HEP  I have re-evaluated this patient and find that the nature, scope, duration and intensity of the therapy is appropriate for the medical condition of the patient.  bAimael continues to require the following intervention to meet STG and LTG's:  HEP.    Recommendations:  Discharge with current home program.  Patient to follow up with MD as needed.    Please refer to the daily flowsheet for treatment today, total treatment time and time spent performing 1:1 timed codes.

## 2022-03-30 RX ORDER — DEXTROAMPHETAMINE SACCHARATE, AMPHETAMINE ASPARTATE, DEXTROAMPHETAMINE SULFATE AND AMPHETAMINE SULFATE 5; 5; 5; 5 MG/1; MG/1; MG/1; MG/1
20 TABLET ORAL 3 TIMES DAILY
Qty: 90 TABLET | Refills: 0 | Status: SHIPPED | OUTPATIENT
Start: 2022-03-30 | End: 2022-05-01

## 2022-03-30 RX ORDER — ALPRAZOLAM 2 MG
2 TABLET ORAL 3 TIMES DAILY PRN
Qty: 30 TABLET | Refills: 0 | Status: SHIPPED | OUTPATIENT
Start: 2022-03-30 | End: 2022-05-01

## 2022-04-01 LAB — BACTERIA UR CULT: NORMAL

## 2022-04-05 ENCOUNTER — TELEPHONE (OUTPATIENT)
Dept: NEUROSURGERY | Facility: CLINIC | Age: 32
End: 2022-04-05
Payer: COMMERCIAL

## 2022-04-05 NOTE — TELEPHONE ENCOUNTER
"Last Visit: 2/1/22 with Shazia Dunbar PA-C \"recommend the patient meet with urology again.   Patient saw urology virtual with no resolve in symptoms. Saw urology in office.     Next Visit: n/a    Name of Provider:  Dr Wharton    Assessment: Patient calls to report a period of pelvic numbness after sitting- when he stood up realized there was some incontinence.    Reviewed neurosurgery and urology notes. This is not a new symptom. Currently being treated by urology for pelvic sensation changes and occasional urinary incontinence.     Recommendation given: follow up with urology    Action needed from provider: na      "

## 2022-04-05 NOTE — TELEPHONE ENCOUNTER
Patient experienced a period of pelvic numbness after sitting- when he stood up realized there was some incontinence. Hoping to have a call today.

## 2022-04-12 ENCOUNTER — MYC MEDICAL ADVICE (OUTPATIENT)
Dept: FAMILY MEDICINE | Facility: CLINIC | Age: 32
End: 2022-04-12
Payer: COMMERCIAL

## 2022-04-12 DIAGNOSIS — K64.4 EXTERNAL HEMORRHOIDS: Primary | ICD-10-CM

## 2022-04-13 ENCOUNTER — PATIENT OUTREACH (OUTPATIENT)
Dept: UROLOGY | Facility: CLINIC | Age: 32
End: 2022-04-13
Payer: COMMERCIAL

## 2022-04-13 RX ORDER — LORATADINE 10 MG/1
TABLET ORAL
COMMUNITY
Start: 2022-04-03 | End: 2023-10-05

## 2022-04-13 NOTE — TELEPHONE ENCOUNTER
Reached out to pt on behalf of [facility] to discuss symptoms and recommended follow up with his neurosurgery team. Pt stated symptoms have improved slightly and has been in communication with his neurosurgery team. Additionally, pt states he is awaiting the call for pelvic floor physical therapy to get it scheduled. Writer answered all questions. Pt states he knows how to reach out with any questions or concerns. Will continue to follow as needed.

## 2022-04-15 ENCOUNTER — TRANSFERRED RECORDS (OUTPATIENT)
Dept: HEALTH INFORMATION MANAGEMENT | Facility: CLINIC | Age: 32
End: 2022-04-15

## 2022-04-15 ENCOUNTER — LAB (OUTPATIENT)
Dept: LAB | Facility: CLINIC | Age: 32
End: 2022-04-15
Payer: COMMERCIAL

## 2022-04-15 DIAGNOSIS — Z13.220 SCREENING FOR HYPERLIPIDEMIA: ICD-10-CM

## 2022-04-15 DIAGNOSIS — R10.2 PERINEAL PAIN: ICD-10-CM

## 2022-04-15 LAB
CHOLEST SERPL-MCNC: 241 MG/DL
FASTING STATUS PATIENT QL REPORTED: YES
HDLC SERPL-MCNC: 32 MG/DL
LDLC SERPL CALC-MCNC: 167 MG/DL
NONHDLC SERPL-MCNC: 209 MG/DL
PSA SERPL-MCNC: 0.28 UG/L (ref 0–4)
TRIGL SERPL-MCNC: 212 MG/DL

## 2022-04-15 PROCEDURE — 84153 ASSAY OF PSA TOTAL: CPT

## 2022-04-15 PROCEDURE — 80061 LIPID PANEL: CPT

## 2022-04-15 PROCEDURE — 36415 COLL VENOUS BLD VENIPUNCTURE: CPT

## 2022-04-15 RX ORDER — HYDROCORTISONE ACETATE 25 MG/1
25 SUPPOSITORY RECTAL 2 TIMES DAILY
Qty: 24 SUPPOSITORY | Refills: 0 | Status: SHIPPED | OUTPATIENT
Start: 2022-04-15 | End: 2023-04-13

## 2022-05-01 ENCOUNTER — MYC REFILL (OUTPATIENT)
Dept: FAMILY MEDICINE | Facility: CLINIC | Age: 32
End: 2022-05-01
Payer: COMMERCIAL

## 2022-05-01 DIAGNOSIS — F41.9 ANXIETY: ICD-10-CM

## 2022-05-01 DIAGNOSIS — F90.2 ATTENTION DEFICIT HYPERACTIVITY DISORDER (ADHD), COMBINED TYPE: ICD-10-CM

## 2022-05-02 RX ORDER — DEXTROAMPHETAMINE SACCHARATE, AMPHETAMINE ASPARTATE, DEXTROAMPHETAMINE SULFATE AND AMPHETAMINE SULFATE 5; 5; 5; 5 MG/1; MG/1; MG/1; MG/1
20 TABLET ORAL 3 TIMES DAILY
Qty: 90 TABLET | Refills: 0 | Status: SHIPPED | OUTPATIENT
Start: 2022-05-02 | End: 2022-05-29

## 2022-05-02 RX ORDER — ALPRAZOLAM 2 MG
2 TABLET ORAL 3 TIMES DAILY PRN
Qty: 30 TABLET | Refills: 0 | Status: SHIPPED | OUTPATIENT
Start: 2022-05-02 | End: 2022-05-29

## 2022-05-05 ENCOUNTER — LAB (OUTPATIENT)
Dept: LAB | Facility: CLINIC | Age: 32
End: 2022-05-05
Payer: COMMERCIAL

## 2022-05-05 DIAGNOSIS — Z20.822 CLOSE EXPOSURE TO 2019 NOVEL CORONAVIRUS: ICD-10-CM

## 2022-05-05 PROCEDURE — U0005 INFEC AGEN DETEC AMPLI PROBE: HCPCS

## 2022-05-05 PROCEDURE — U0003 INFECTIOUS AGENT DETECTION BY NUCLEIC ACID (DNA OR RNA); SEVERE ACUTE RESPIRATORY SYNDROME CORONAVIRUS 2 (SARS-COV-2) (CORONAVIRUS DISEASE [COVID-19]), AMPLIFIED PROBE TECHNIQUE, MAKING USE OF HIGH THROUGHPUT TECHNOLOGIES AS DESCRIBED BY CMS-2020-01-R: HCPCS

## 2022-05-06 LAB — SARS-COV-2 RNA RESP QL NAA+PROBE: NEGATIVE

## 2022-05-09 ENCOUNTER — PRE VISIT (OUTPATIENT)
Dept: UROLOGY | Facility: CLINIC | Age: 32
End: 2022-05-09
Payer: COMMERCIAL

## 2022-05-12 NOTE — TELEPHONE ENCOUNTER
[place] states he would like a refill on his Adderall.  Please call when ready to  at .    Thank you.   No

## 2022-05-17 NOTE — PROGRESS NOTES
Discharge Note    Progress reporting period is from last progress note on   to Jan 28, 2022.    Abimael failed to follow up and current status is unknown.  Please see information below for last relevant information on current status.  Patient seen for 8 visits.    SUBJECTIVE  Subjective changes noted by patient:  Patient interested in exercises for pelvic floor.  Symptoms of numbness and tingling in testicle region and low back/buttock remain.  Patient has remained compliant with stretching exercises, some compliance with strength exercises.  .  Current pain level is No change.     Previous pain level was   .   Changes in function:  Yes (See Goal flowsheet attached for changes in current functional level)  Adverse reaction to treatment or activity: None    OBJECTIVE  Changes noted in objective findings:       ASSESSMENT/PLAN  Diagnosis: S/P L4-5, L5-S1 discectomy   Updated problem list and treatment plan:   Pain - HEP  Decreased ROM/flexibility - HEP  STG/LTGs have been met or progress has been made towards goals:  Yes, please see goal flowsheet for most current information  Assessment of Progress: current status is unknown.    Last current status:     Self Management Plans:  HEP  I have re-evaluated this patient and find that the nature, scope, duration and intensity of the therapy is appropriate for the medical condition of the patient.  Abimael continues to require the following intervention to meet STG and LTG's:  HEP.    Recommendations:  Discharge with current home program.  Patient to follow up with MD as needed.    Please refer to the daily flowsheet for treatment today, total treatment time and time spent performing 1:1 timed codes.

## 2022-05-20 ENCOUNTER — TRANSFERRED RECORDS (OUTPATIENT)
Dept: HEALTH INFORMATION MANAGEMENT | Facility: CLINIC | Age: 32
End: 2022-05-20
Payer: COMMERCIAL

## 2022-05-20 LAB — PHQ9 SCORE: 9

## 2022-05-26 ENCOUNTER — VIRTUAL VISIT (OUTPATIENT)
Dept: UROLOGY | Facility: CLINIC | Age: 32
End: 2022-05-26

## 2022-05-26 DIAGNOSIS — R39.15 URINARY URGENCY: ICD-10-CM

## 2022-05-26 DIAGNOSIS — R39.198 SLOWING OF URINARY STREAM: ICD-10-CM

## 2022-05-26 DIAGNOSIS — Z98.890 S/P LUMBAR MICRODISCECTOMY: ICD-10-CM

## 2022-05-26 DIAGNOSIS — R30.0 DYSURIA: ICD-10-CM

## 2022-05-26 DIAGNOSIS — R10.2 PERINEAL PAIN: Primary | ICD-10-CM

## 2022-05-26 PROCEDURE — 99213 OFFICE O/P EST LOW 20 MIN: CPT | Mod: GT | Performed by: PHYSICIAN ASSISTANT

## 2022-05-26 NOTE — PROGRESS NOTES
Chief Complaint:   Follow up          History of Present Illness:   Abimael Martinez is a 32 year old male with a history of anxiety, depression, ADHD, hypertension, and dyslipidemia who presents for follow up of groin and testicular pain.  Patient s/p microdiscectomy on 10/28/2021 and since that time he has been having urinary urgency, usually worse with sitting or laying.  Patient was initially seen via virtual visit on 2/4/2022 at which time he reported symptoms were slowly getting better after surgery, but is continuing to persist.  He also reports some perineal tenderness, made worse with sitting or laying.  He also reports rare, very small urge incontinence.  He reports he is still voiding normally, but reports the urgency is consistent.  He feels he is fully emptying his bladder.  He reports his urinary stream may be a little bit slower than usual, and he is noticing a little bit of a split stream.  He denies any significant urinary frequency.  Symptoms were felt most consistent with prostatitis and he was treated with a 14 day course of Bactrim DS.  Urinalysis from 2/4/2022 was normal and urine culture without growth, and GC/CT testing was negative for infection.      The patient followed up in clinic on 3/24/2022 at which time he reported consistent rectal and perineal pressure and burning.  He is also noticing this into the head of his penis.  He feels that his groin area is constantly tense.  He feels as though he has a hard time pushing his urine out and his urinary stream is slower and will drip out or split stream.  He also reports constant feeling of urinary urgency, and rare episodes of small amounts of urge incontinence.  He feels that he is not fully emptying his bladder, and often has to double void.  He also admits to some mild post void dribbling.  He admits to nocturia once per night early in the morning.  He also reports his erections have not been as strong or powerful as they were  previously.  No caffeine use.  Rare alcohol use.  He does admit to occasional constipation related to his medications (oxycodone) but is generally very regular.  Prostatic massage was completed with UA/UC negative for infection and ureaplasma and mycoplasma culture also negative.  PVR on that date was low at 40 ml.  He was switched from Bactrim to doxycycline 100 mg BID for change in coverage for possible prostatitis, but in follow up medical message the patient reported no change in symptoms with medications.  He was subsequently referred to PFPT for possible pelvic floor dysfunction contributing.      Today the patient reports symptoms are getting slightly better.  He has not started pelvic floor physical therapy at this time.  He reports continued urinary urgency, but reports his incontinence is resolved.  He reports the urgency is less intense.  He reports the perineal discomfort is better with resting and worse with being active and worse with his low back pain.  He reports his urinary stream is slightly weaker but is not concerning to him.   He feels that he is fully emptying his bladder, but does report he voids and will often have to double void soon after urinating.  He admits to occasional dysuria but states this is long standing, which is worse with eating spicy foods.  He denies any hematuria.      He reports taking the course of doxycycline which was previously prescribed but no change in symptoms with the antibiotic course.  He also tried Flomax for a few weeks and felt that symptoms may have been slightly improved but he didn't notice an overall significant improvement with it so subsequently stopped it; he also noted some lightheadedness while on the Flomax.     He has been seeing his spine specialist and was recommended for low back PT and repeat MRI, and is scheduled for his first PT visit next month.           Past Medical History:     Past Medical History:   Diagnosis Date     Acute right otitis  media 1/8/2013     ADHD (attention deficit hyperactivity disorder), combined type      Anxiety      Depression      Drug abuse (H)      Dyslipidemia      Gonococcal urethritis 02/05/2016     Hypertension      Internal hemorrhoids     which bleed     Lumbosacral radiculopathy      Obese      OCD (obsessive compulsive disorder)      Other chronic pain     Low Back Pain     Uncomplicated asthma      Urethritis 5/20/2013     Problem list name updated by automated process. Provider to review            Past Surgical History:     Past Surgical History:   Procedure Laterality Date     BACK SURGERY  04/05/2018     COLONOSCOPY       ESOPHAGOSCOPY, GASTROSCOPY, DUODENOSCOPY (EGD), COMBINED N/A 8/16/2021    Procedure: ESOPHAGOGASTRODUODENOSCOPY, WITH BIOPSY AND POLYPECTOMY;  Surgeon: Phillip Joyner MD;  Location: [facility] OR     HEMILAMINECTOMY, DISCECTOMY LUMBAR TWO LEVELS, COMBINED Right 10/28/2021    Procedure: RIGHT LUMBAR L4-5 MINIMALLY INVASIVE MICRODISCECTOMY;  Surgeon: Troy Wharton MD;  Location: [facility] OR     HEMILAMINECTOMY, DISCECTOMY LUMBAR TWO LEVELS, COMBINED Left 10/28/2021    Procedure: LEFT LUMBAR L5-S1 MINIMALLY INVASIVE MICRODISCECTOMY;  Surgeon: Troy Wharton MD;  Location: [facility] OR            Medications     Current Outpatient Medications   Medication     albuterol (PROAIR HFA/PROVENTIL HFA/VENTOLIN HFA) 108 (90 Base) MCG/ACT inhaler     ALPRAZolam (XANAX) 2 MG tablet     amphetamine-dextroamphetamine (ADDERALL) 20 MG tablet     azelastine (ASTELIN) 0.1 % nasal spray     cetirizine (ZYRTEC) 10 MG tablet     clotrimazole (LOTRIMIN) 1 % external cream     famotidine (PEPCID) 40 MG tablet     fluticasone (FLOVENT HFA) 220 MCG/ACT Inhaler     hydrocortisone (ANUSOL-HC) 25 MG suppository     ketoconazole (NIZORAL) 2 % external cream     ketoconazole (NIZORAL) 2 % external shampoo     loratadine (CLARITIN) 10 MG tablet     mometasone (ELOCON) 0.1 % external ointment     montelukast  (SINGULAIR) 10 MG tablet     omeprazole (PRILOSEC) 40 MG DR capsule     oxyCODONE (ROXICODONE) 5 MG tablet     oxyCODONE-acetaminophen (PERCOCET)  MG per tablet     pimecrolimus (ELIDEL) 1 % external cream     PROTOPIC 0.1 % external ointment     senna-docusate (SENOKOT-S/PERICOLACE) 8.6-50 MG tablet     sildenafil (REVATIO) 20 MG tablet     tacrolimus (PROTOPIC) 0.03 % external ointment     terbinafine (LAMISIL) 1 % external cream     terbinafine (LAMISIL) 1 % external cream     tiZANidine (ZANAFLEX) 4 MG tablet     Hyoscyamine Sulfate 0.375 MG TBCR     methylPREDNISolone (MEDROL DOSEPAK) 4 MG tablet therapy pack     methylPREDNISolone (MEDROL DOSEPAK) 4 MG tablet therapy pack     tamsulosin (FLOMAX) 0.4 MG capsule     No current facility-administered medications for this visit.            Family History:     Family History   Problem Relation Age of Onset     Unknown/Adopted Mother      Unknown/Adopted Father      Unknown/Adopted Maternal Grandmother      Unknown/Adopted Maternal Grandfather      Unknown/Adopted Paternal Grandmother      Unknown/Adopted Paternal Grandfather      Unknown/Adopted Brother             Social History:     Social History     Socioeconomic History     Marital status: Single     Spouse name: Not on file     Number of children: Not on file     Years of education: Not on file     Highest education level: Not on file   Occupational History     Not on file   Tobacco Use     Smoking status: Former Smoker     Packs/day: 0.50     Years: 6.00     Pack years: 3.00     Types: Cigarettes     Quit date: 3/12/2018     Years since quittin.2     Smokeless tobacco: Never Used     Tobacco comment: second hand smoke exposure   Vaping Use     Vaping Use: Never used   Substance and Sexual Activity     Alcohol use: Yes     Comment: once a week     Drug use: Yes     Types: Marijuana     Comment: pot once a month, ectasy  As of 2016 he only smokes pot occasionally     Sexual activity: Not  Currently     Partners: Female     Birth control/protection: Abstinence, Pull-out method, Condom   Other Topics Concern     Parent/sibling w/ CABG, MI or angioplasty before 65F 55M? No   Social History Narrative     Not on file     Social Determinants of Health     Financial Resource Strain: Not on file   Food Insecurity: Not on file   Transportation Needs: Not on file   Physical Activity: Not on file   Stress: Not on file   Social Connections: Not on file   Intimate Partner Violence: Not At Risk     Fear of Current or Ex-Partner: No     Emotionally Abused: No     Physically Abused: No     Sexually Abused: No   Housing Stability: Not on file            Allergies:   Cymbalta, Duloxetine, Paroxetine, Seasonal allergies, and Vicodin [hydrocodone-acetaminophen]         Review of Systems:  From intake questionnaire   Negative 14 system review except as noted on HPI, nurse's note.         Physical Exam:   Patient is a 32 year old  male    General Appearance Adult: Alert, no acute distress, oriented  Lungs: no respiratory distress, or pursed lip breathing  Heart: No obvious jugular venous distension present  Skin: no suspicious lesions or rashes  Neuro: Alert, oriented, speech and mentation normal  Further examination is deferred due to the nature of our visit.        Labs and Pathology:    I personally reviewed all applicable laboratory data and went over findings with patient  Significant for:    CBC RESULTS:  Recent Labs   Lab Test 12/28/21  1223 01/14/21  1345 10/25/20  1706 03/12/18  1145   WBC 7.9 6.0 6.6 6.0   HGB 14.8 15.1 15.7 15.2    289 265 237        BMP RESULTS:  Recent Labs   Lab Test 12/28/21  1223 01/14/21  1345 10/25/20  1706 03/19/18  1545 03/12/18  1145    139 137  --  138   POTASSIUM 4.6 4.0 3.7  --  4.0   CHLORIDE 106 106 103  --  106   CO2 28 31 29  --  25   ANIONGAP 6 2* 5  --  7   GLC 95 87 101*  --  93   BUN 14 10 15  --  15   CR 1.01 1.16 1.11 1.15 1.41*   GFRESTIMATED >90 84 89 76  60*   GFRESTBLACK  --  >90 >90 >90 73   LAUREN 9.7 9.6 9.5  --  9.5       UA RESULTS:   Recent Labs   Lab Test 03/24/22  1650 02/04/22  1615 04/24/21  1133   SG 1.015 1.015 <=1.005   URINEPH 7.0 5.5 5.0   NITRITE Negative Negative Negative   RBCU 1 0-2 O - 2   WBCU <1 0-5 0 - 5       PSA RESULTS  PSA Tumor Marker   Date Value Ref Range Status   04/15/2022 0.28 0.00 - 4.00 ug/L Final         Imaging:    I personally reviewed all applicable imaging and went over findings with patient.  Significant for:    EXAMINATION: CT CHEST/ABDOMEN/PELVIS W CONTRAST, 2/15/2021 3:38 PM     TECHNIQUE: Helical CT images from the thoracic inlet through the  symphysis pubis were obtained with intravenous contrast. Contrast  dose: isovue 370 135mL     COMPARISON: Abdominal ultrasound 12/4/2020     HISTORY: Splenomegaly; Lymphadenopathy     FINDINGS:     Chest: Thyroid gland is unremarkable. No axillary, mediastinal, or  hilar adenopathy. Residual thymic tissue in the anterior mediastinum.  Heart size within normal limits. No pericardial effusion. Normal  caliber thoracic aorta     No evidence of acute airspace disease. No suspicious pulmonary  nodules. Calcified granuloma in the right upper lobe. No pleural  effusion or pneumothorax.     Abdomen and pelvis: The liver and gallbladder are unremarkable. Mild  splenomegaly measuring 12.9 cm in long axis. The adrenal glands,  pancreas, and kidneys are unremarkable. Urinary bladder and prostate  are unremarkable. No evidence of bowel obstruction. Normal caliber  appendix. Scattered colonic diverticula without evidence of acute  diverticulitis.     No suspicious mesenteric or retroperitoneal adenopathy. No free fluid  or free air.     Abdominal vasculature is patent and normal caliber.     Bones and soft tissues: Mild endplate degenerative changes of the  thoracic spine. No additional suspicious bony lesions. Scattered bone  islands in the bilateral femurs. Soft tissue is unremarkable.                                                                       IMPRESSION:   1. Borderline splenomegaly, similar to prior ultrasound 12/4/2020.  2. No suspicious adenopathy in the chest, abdomen, or pelvis.   3. Scattered colonic diverticula.         Assessment and Plan:     Assessment: 32 year old male with dysuria, perineal pain, slowed urinary stream, and urinary urgency for the past six months.  Patient has tried a 14 day course of Bactrim and 14 day course of doxycycline for presumed prostatitis with no change in symptoms.  Prostatic massage completed 3/24/2022 with UA/UC negative for infection and ureaplasma/mycoplasma culture also negative.  Patient has also tried an alpha blocker with Flomax with no change in symptoms and side effects of lightheadedness.  Patient was recommended for pelvic floor physical therapy, but has not started this at this time.  Discussed possible relation of symptoms to pelvic floor dysfunction and patient encouraged to trial PFPT, which he was in agreement with.  Previous antibiotic therapy for prostatitis was ineffective.  We discussed option for urine DNA testing to rule out low grade infection given negative urinalysis and culture in the past; will plan for [employer] urine testing and treatment with antibiotic therapy pending results of urine study.  If no change in symptoms with PFPT, could consider referral for UDS and cystoscopy at that time.  Patient in agreement with plan.      Plan:  - Schedule nurse visit for [employer] urine DNA testing.   - Schedule follow up with me in 2-3 months for recheck.   - Start pelvic floor physical therapy.    - Follow up with your spine specialist and spine physical therapy as planned.       JESSIE Gaines  Department of Urology

## 2022-05-26 NOTE — LETTER
5/26/2022       RE: Abimael Martinez  [address]  [facility] [address]     Dear Colleague,    Thank you for referring your patient, Abimael Martinez, to the [facility] UROLOGY CLINIC [facility]. Please see a copy of my visit note below.    Abimael is a 32 year old who is being evaluated via a billable video visit.      How would you like to obtain your AVS? MyChart  If the video visit is dropped, the invitation should be resent by: Text to cell phone: [phone]  Will anyone else be joining your video visit? No      Video Start Time: 10:33 AM  Video-Visit Details    Type of service:  Video Visit    Video End Time:11:00 AM    Originating Location (pt. Location): Home    Distant Location (provider location):  [facility] UROLOGY [facility]     Platform used for Video Visit: [employer]          Chief Complaint:   Follow up          History of Present Illness:   Abimael Martinez is a 32 year old male with a history of anxiety, depression, ADHD, hypertension, and dyslipidemia who presents for follow up of groin and testicular pain.  Patient s/p microdiscectomy on 10/28/2021 and since that time he has been having urinary urgency, usually worse with sitting or laying.  Patient was initially seen via virtual visit on 2/4/2022 at which time he reported symptoms were slowly getting better after surgery, but is continuing to persist.  He also reports some perineal tenderness, made worse with sitting or laying.  He also reports rare, very small urge incontinence.  He reports he is still voiding normally, but reports the urgency is consistent.  He feels he is fully emptying his bladder.  He reports his urinary stream may be a little bit slower than usual, and he is noticing a little bit of a split stream.  He denies any significant urinary frequency.  Symptoms were felt most consistent with prostatitis and he was treated  with a 14 day course of Bactrim DS.  Urinalysis from 2/4/2022 was normal and urine culture without growth, and GC/CT testing was negative for infection.      The patient followed up in clinic on 3/24/2022 at which time he reported consistent rectal and perineal pressure and burning.  He is also noticing this into the head of his penis.  He feels that his groin area is constantly tense.  He feels as though he has a hard time pushing his urine out and his urinary stream is slower and will drip out or split stream.  He also reports constant feeling of urinary urgency, and rare episodes of small amounts of urge incontinence.  He feels that he is not fully emptying his bladder, and often has to double void.  He also admits to some mild post void dribbling.  He admits to nocturia once per night early in the morning.  He also reports his erections have not been as strong or powerful as they were previously.  No caffeine use.  Rare alcohol use.  He does admit to occasional constipation related to his medications (oxycodone) but is generally very regular.  Prostatic massage was completed with UA/UC negative for infection and ureaplasma and mycoplasma culture also negative.  PVR on that date was low at 40 ml.  He was switched from Bactrim to doxycycline 100 mg BID for change in coverage for possible prostatitis, but in follow up medical message the patient reported no change in symptoms with medications.  He was subsequently referred to PFPT for possible pelvic floor dysfunction contributing.      Today the patient reports symptoms are getting slightly better.  He has not started pelvic floor physical therapy at this time.  He reports continued urinary urgency, but reports his incontinence is resolved.  He reports the urgency is less intense.  He reports the perineal discomfort is better with resting and worse with being active and worse with his low back pain.  He reports his urinary stream is slightly weaker but is not  concerning to him.   He feels that he is fully emptying his bladder, but does report he voids and will often have to double void soon after urinating.  He admits to occasional dysuria but states this is long standing, which is worse with eating spicy foods.  He denies any hematuria.      He reports taking the course of doxycycline which was previously prescribed but no change in symptoms with the antibiotic course.  He also tried Flomax for a few weeks and felt that symptoms may have been slightly improved but he didn't notice an overall significant improvement with it so subsequently stopped it; he also noted some lightheadedness while on the Flomax.     He has been seeing his spine specialist and was recommended for low back PT and repeat MRI, and is scheduled for his first PT visit next month.           Past Medical History:     Past Medical History:   Diagnosis Date     Acute right otitis media 1/8/2013     ADHD (attention deficit hyperactivity disorder), combined type      Anxiety      Depression      Drug abuse (H)      Dyslipidemia      Gonococcal urethritis 02/05/2016     Hypertension      Internal hemorrhoids     which bleed     Lumbosacral radiculopathy      Obese      OCD (obsessive compulsive disorder)      Other chronic pain     Low Back Pain     Uncomplicated asthma      Urethritis 5/20/2013     Problem list name updated by automated process. Provider to review            Past Surgical History:     Past Surgical History:   Procedure Laterality Date     BACK SURGERY  04/05/2018     COLONOSCOPY       ESOPHAGOSCOPY, GASTROSCOPY, DUODENOSCOPY (EGD), COMBINED N/A 8/16/2021    Procedure: ESOPHAGOGASTRODUODENOSCOPY, WITH BIOPSY AND POLYPECTOMY;  Surgeon: Phillip Joyner MD;  Location: [facility] OR     HEMILAMINECTOMY, DISCECTOMY LUMBAR TWO LEVELS, COMBINED Right 10/28/2021    Procedure: RIGHT LUMBAR L4-5 MINIMALLY INVASIVE MICRODISCECTOMY;  Surgeon: Troy Wharton MD;  Location:  OR      HEMILAMINECTOMY, DISCECTOMY LUMBAR TWO LEVELS, COMBINED Left 10/28/2021    Procedure: LEFT LUMBAR L5-S1 MINIMALLY INVASIVE MICRODISCECTOMY;  Surgeon: Troy Wharton MD;  Location:  OR            Medications     Current Outpatient Medications   Medication     albuterol (PROAIR HFA/PROVENTIL HFA/VENTOLIN HFA) 108 (90 Base) MCG/ACT inhaler     ALPRAZolam (XANAX) 2 MG tablet     amphetamine-dextroamphetamine (ADDERALL) 20 MG tablet     azelastine (ASTELIN) 0.1 % nasal spray     cetirizine (ZYRTEC) 10 MG tablet     clotrimazole (LOTRIMIN) 1 % external cream     famotidine (PEPCID) 40 MG tablet     fluticasone (FLOVENT HFA) 220 MCG/ACT Inhaler     hydrocortisone (ANUSOL-HC) 25 MG suppository     ketoconazole (NIZORAL) 2 % external cream     ketoconazole (NIZORAL) 2 % external shampoo     loratadine (CLARITIN) 10 MG tablet     mometasone (ELOCON) 0.1 % external ointment     montelukast (SINGULAIR) 10 MG tablet     omeprazole (PRILOSEC) 40 MG DR capsule     oxyCODONE (ROXICODONE) 5 MG tablet     oxyCODONE-acetaminophen (PERCOCET)  MG per tablet     pimecrolimus (ELIDEL) 1 % external cream     PROTOPIC 0.1 % external ointment     senna-docusate (SENOKOT-S/PERICOLACE) 8.6-50 MG tablet     sildenafil (REVATIO) 20 MG tablet     tacrolimus (PROTOPIC) 0.03 % external ointment     terbinafine (LAMISIL) 1 % external cream     terbinafine (LAMISIL) 1 % external cream     tiZANidine (ZANAFLEX) 4 MG tablet     Hyoscyamine Sulfate 0.375 MG TBCR     methylPREDNISolone (MEDROL DOSEPAK) 4 MG tablet therapy pack     methylPREDNISolone (MEDROL DOSEPAK) 4 MG tablet therapy pack     tamsulosin (FLOMAX) 0.4 MG capsule     No current facility-administered medications for this visit.            Family History:     Family History   Problem Relation Age of Onset     Unknown/Adopted Mother      Unknown/Adopted Father      Unknown/Adopted Maternal Grandmother      Unknown/Adopted Maternal Grandfather      Unknown/Adopted Paternal  Grandmother      Unknown/Adopted Paternal Grandfather      Unknown/Adopted Brother             Social History:     Social History     Socioeconomic History     Marital status: Single     Spouse name: Not on file     Number of children: Not on file     Years of education: Not on file     Highest education level: Not on file   Occupational History     Not on file   Tobacco Use     Smoking status: Former Smoker     Packs/day: 0.50     Years: 6.00     Pack years: 3.00     Types: Cigarettes     Quit date: 3/12/2018     Years since quittin.2     Smokeless tobacco: Never Used     Tobacco comment: second hand smoke exposure   Vaping Use     Vaping Use: Never used   Substance and Sexual Activity     Alcohol use: Yes     Comment: once a week     Drug use: Yes     Types: Marijuana     Comment: pot once a month, ectasy  As of 2016 he only smokes pot occasionally     Sexual activity: Not Currently     Partners: Female     Birth control/protection: Abstinence, Pull-out method, Condom   Other Topics Concern     Parent/sibling w/ CABG, MI or angioplasty before 65F 55M? No   Social History Narrative     Not on file     Social Determinants of Health     Financial Resource Strain: Not on file   Food Insecurity: Not on file   Transportation Needs: Not on file   Physical Activity: Not on file   Stress: Not on file   Social Connections: Not on file   Intimate Partner Violence: Not At Risk     Fear of Current or Ex-Partner: No     Emotionally Abused: No     Physically Abused: No     Sexually Abused: No   Housing Stability: Not on file            Allergies:   Cymbalta, Duloxetine, Paroxetine, Seasonal allergies, and Vicodin [hydrocodone-acetaminophen]         Review of Systems:  From intake questionnaire   Negative 14 system review except as noted on HPI, nurse's note.         Physical Exam:   Patient is a 32 year old  male    General Appearance Adult: Alert, no acute distress, oriented  Lungs: no respiratory distress, or pursed  lip breathing  Heart: No obvious jugular venous distension present  Skin: no suspicious lesions or rashes  Neuro: Alert, oriented, speech and mentation normal  Further examination is deferred due to the nature of our visit.        Labs and Pathology:    I personally reviewed all applicable laboratory data and went over findings with patient  Significant for:    CBC RESULTS:  Recent Labs   Lab Test 12/28/21  1223 01/14/21  1345 10/25/20  1706 03/12/18  1145   WBC 7.9 6.0 6.6 6.0   HGB 14.8 15.1 15.7 15.2    289 265 237        BMP RESULTS:  Recent Labs   Lab Test 12/28/21  1223 01/14/21  1345 10/25/20  1706 03/19/18  1545 03/12/18  1145    139 137  --  138   POTASSIUM 4.6 4.0 3.7  --  4.0   CHLORIDE 106 106 103  --  106   CO2 28 31 29  --  25   ANIONGAP 6 2* 5  --  7   GLC 95 87 101*  --  93   BUN 14 10 15  --  15   CR 1.01 1.16 1.11 1.15 1.41*   GFRESTIMATED >90 84 89 76 60*   GFRESTBLACK  --  >90 >90 >90 73   LAUREN 9.7 9.6 9.5  --  9.5       UA RESULTS:   Recent Labs   Lab Test 03/24/22  1650 02/04/22  1615 04/24/21  1133   SG 1.015 1.015 <=1.005   URINEPH 7.0 5.5 5.0   NITRITE Negative Negative Negative   RBCU 1 0-2 O - 2   WBCU <1 0-5 0 - 5       PSA RESULTS  PSA Tumor Marker   Date Value Ref Range Status   04/15/2022 0.28 0.00 - 4.00 ug/L Final         Imaging:    I personally reviewed all applicable imaging and went over findings with patient.  Significant for:    EXAMINATION: CT CHEST/ABDOMEN/PELVIS W CONTRAST, 2/15/2021 3:38 PM     TECHNIQUE: Helical CT images from the thoracic inlet through the  symphysis pubis were obtained with intravenous contrast. Contrast  dose: isovue 370 135mL     COMPARISON: Abdominal ultrasound 12/4/2020     HISTORY: Splenomegaly; Lymphadenopathy     FINDINGS:     Chest: Thyroid gland is unremarkable. No axillary, mediastinal, or  hilar adenopathy. Residual thymic tissue in the anterior mediastinum.  Heart size within normal limits. No pericardial effusion. Normal  caliber  thoracic aorta     No evidence of acute airspace disease. No suspicious pulmonary  nodules. Calcified granuloma in the right upper lobe. No pleural  effusion or pneumothorax.     Abdomen and pelvis: The liver and gallbladder are unremarkable. Mild  splenomegaly measuring 12.9 cm in long axis. The adrenal glands,  pancreas, and kidneys are unremarkable. Urinary bladder and prostate  are unremarkable. No evidence of bowel obstruction. Normal caliber  appendix. Scattered colonic diverticula without evidence of acute  diverticulitis.     No suspicious mesenteric or retroperitoneal adenopathy. No free fluid  or free air.     Abdominal vasculature is patent and normal caliber.     Bones and soft tissues: Mild endplate degenerative changes of the  thoracic spine. No additional suspicious bony lesions. Scattered bone  islands in the bilateral femurs. Soft tissue is unremarkable.                                                                      IMPRESSION:   1. Borderline splenomegaly, similar to prior ultrasound 12/4/2020.  2. No suspicious adenopathy in the chest, abdomen, or pelvis.   3. Scattered colonic diverticula.         Assessment and Plan:     Assessment: 32 year old male with dysuria, perineal pain, slowed urinary stream, and urinary urgency for the past six months.  Patient has tried a 14 day course of Bactrim and 14 day course of doxycycline for presumed prostatitis with no change in symptoms.  Prostatic massage completed 3/24/2022 with UA/UC negative for infection and ureaplasma/mycoplasma culture also negative.  Patient has also tried an alpha blocker with Flomax with no change in symptoms and side effects of lightheadedness.  Patient was recommended for pelvic floor physical therapy, but has not started this at this time.  Discussed possible relation of symptoms to pelvic floor dysfunction and patient encouraged to trial PFPT, which he was in agreement with.  Previous antibiotic therapy for prostatitis was  ineffective.  We discussed option for urine DNA testing to rule out low grade infection given negative urinalysis and culture in the past; will plan for MicroGenDX urine testing and treatment with antibiotic therapy pending results of urine study.  If no change in symptoms with PFPT, could consider referral for UDS and cystoscopy at that time.  Patient in agreement with plan.      Plan:  - Schedule nurse visit for MicroGenDX urine DNA testing.   - Schedule follow up with me in 2-3 months for recheck.   - Start pelvic floor physical therapy.    - Follow up with your spine specialist and spine physical therapy as planned.       JESSIE Gaines  Department of Urology

## 2022-05-26 NOTE — PROGRESS NOTES
Abimael is a 32 year old who is being evaluated via a billable video visit.      How would you like to obtain your AVS? MyChart  If the video visit is dropped, the invitation should be resent by: Text to cell phone: [phone]  Will anyone else be joining your video visit? No      Video Start Time: 10:33 AM  Video-Visit Details    Type of service:  Video Visit    Video End Time:11:00 AM    Originating Location (pt. Location): Home    Distant Location (provider location):  [facility] UROLOGY [facility]     Platform used for Video Visit: [employer]

## 2022-05-26 NOTE — PATIENT INSTRUCTIONS
UROLOGY CLINIC VISIT PATIENT INSTRUCTIONS    - Schedule nurse visit for MicroGenDX urine DNA testing.   - Schedule follow up with me in 2-3 months for recheck.   - Start pelvic floor physical therapy.  The referral is already in; see below for their scheduling number to get your first session started.   - Follow up with your spine specialist and spine physical therapy as planned.     If you have any issues, questions or concerns in the meantime, do not hesitate to contact us at [phone] or via [employer].     It was a pleasure meeting with you today.  Thank you for allowing me and my team the privilege of caring for you today.  YOU are the reason we are here, and I truly hope we provided you with the excellent service you deserve.  Please let us know if there is anything else we can do for you so that we can be sure you are leaving completely satisfied with your care experience.    Mary Jane Solo PA-C  Department of Urology     ---------------------------------------------------------------------------------------  Physical Therapy Referral    Please call [phone] to make an appointment     [place] [facility] - [email or web address]  [facility] - [email or web address]    Locations:    [facility]-Ortho PT [place]   [place] [facility]   [facility]  [place] [facility] [place]

## 2022-05-29 ENCOUNTER — MYC REFILL (OUTPATIENT)
Dept: FAMILY MEDICINE | Facility: CLINIC | Age: 32
End: 2022-05-29
Payer: COMMERCIAL

## 2022-05-29 DIAGNOSIS — F90.2 ATTENTION DEFICIT HYPERACTIVITY DISORDER (ADHD), COMBINED TYPE: ICD-10-CM

## 2022-05-29 DIAGNOSIS — F41.9 ANXIETY: ICD-10-CM

## 2022-05-31 RX ORDER — ALPRAZOLAM 2 MG
2 TABLET ORAL 3 TIMES DAILY PRN
Qty: 30 TABLET | Refills: 0 | Status: SHIPPED | OUTPATIENT
Start: 2022-05-31 | End: 2022-06-29

## 2022-05-31 RX ORDER — DEXTROAMPHETAMINE SACCHARATE, AMPHETAMINE ASPARTATE, DEXTROAMPHETAMINE SULFATE AND AMPHETAMINE SULFATE 5; 5; 5; 5 MG/1; MG/1; MG/1; MG/1
20 TABLET ORAL 3 TIMES DAILY
Qty: 90 TABLET | Refills: 0 | Status: SHIPPED | OUTPATIENT
Start: 2022-05-31 | End: 2022-06-29

## 2022-05-31 NOTE — TELEPHONE ENCOUNTER
Routing refill request to provider for review/approval because:  Drug not on the [facility] refill protocol     Sadia Bruno RN

## 2022-06-06 ENCOUNTER — TELEPHONE (OUTPATIENT)
Dept: FAMILY MEDICINE | Facility: CLINIC | Age: 32
End: 2022-06-06
Payer: COMMERCIAL

## 2022-06-06 ENCOUNTER — VIRTUAL VISIT (OUTPATIENT)
Dept: FAMILY MEDICINE | Facility: CLINIC | Age: 32
End: 2022-06-06

## 2022-06-06 ENCOUNTER — NURSE TRIAGE (OUTPATIENT)
Dept: NURSING | Facility: CLINIC | Age: 32
End: 2022-06-06
Payer: COMMERCIAL

## 2022-06-06 DIAGNOSIS — J01.01 ACUTE RECURRENT MAXILLARY SINUSITIS: Primary | ICD-10-CM

## 2022-06-06 PROCEDURE — 99214 OFFICE O/P EST MOD 30 MIN: CPT | Mod: GT | Performed by: FAMILY MEDICINE

## 2022-06-06 ASSESSMENT — PATIENT HEALTH QUESTIONNAIRE - PHQ9
SUM OF ALL RESPONSES TO PHQ QUESTIONS 1-9: 6
10. IF YOU CHECKED OFF ANY PROBLEMS, HOW DIFFICULT HAVE THESE PROBLEMS MADE IT FOR YOU TO DO YOUR WORK, TAKE CARE OF THINGS AT HOME, OR GET ALONG WITH OTHER PEOPLE: SOMEWHAT DIFFICULT
SUM OF ALL RESPONSES TO PHQ QUESTIONS 1-9: 6

## 2022-06-06 NOTE — PROGRESS NOTES
Abimael is a 32 year old who is being evaluated via a billable video visit.      How would you like to obtain your AVS? Melisaharammy  If the video visit is dropped, the invitation should be resent by: bharat waiting   Will anyone else be joining your video visit? No      Video Start Time: 4:43 PM    Assessment & Plan     (J01.01) Acute recurrent maxillary sinusitis  (primary encounter diagnosis)  Comment: high fever and sinusitis symptoms   Plan: amoxicillin-clavulanate (AUGMENTIN) 875-125 MG         tablet        Treat with augmenting                BMI:   Estimated body mass index is 37.97 kg/m  as calculated from the following:    Height as of 3/24/22: 1.829 m (6').    Weight as of 3/24/22: 127 kg (280 lb).   Weight management plan: Discussed healthy diet and exercise guidelines    See Patient Instructions    No follow-ups on file.    Starr Hendricks MD  [facility]   Abimael is a 32 year old who presents for the following health issues     History of Present Illness       Reason for visit:  Sickness/doctors note  Symptom onset:  1-3 days ago  Symptoms include:  Fever/body aches/chills/head,eye and ear pressure/light sensitivity/lungs feels somewhat heavy/overly anxious  Symptom intensity:  Severe  Symptom progression:  Worsening  Had these symptoms before:  Yes  Has tried/received treatment for these symptoms:  No  What makes it worse:  Exherting myself, standing/walking  What makes it better:  Ibuprofin    He eats 0-1 servings of fruits and vegetables daily.He consumes 0 sweetened beverage(s) daily.He exercises with enough effort to increase his heart rate 9 or less minutes per day.  He exercises with enough effort to increase his heart rate 3 or less days per week.   He is taking medications regularly.    Today's PHQ-9         PHQ-9 Total Score: 6    PHQ-9 Q9 Thoughts of better off dead/self-harm past 2 weeks :   Not at all    How difficult have these problems made it for  you to do your work, take care of things at home, or get along with other people: Somewhat difficult     SUBJECTIVE:  Abimael Martinez is a 32 year old male who presents with the following concerns;              Symptoms: cc Present Absent Comment   Fever/Chills  x  102.6, oral    Fatigue  x     Muscle Aches  x     Eye Irritation  x  Eye pressure, sensitive to light     Sneezing   x    Nasal Yassine/Drg  x  Congested    Sinus Pressure/Pain  x  Slight  Head, eyes, ears    Loss of smell  x  Taste was off first 1-2 days    Dental pain   x    Sore Throat   x    Swollen Glands  x     Ear Pain/Fullness  x  bilateral   Cough  x  With laying down   Wheeze   x    Chest Pain   x    Shortness of breath  x  With laying down, feeling hard to get a full breath    Rash   x    Other   x      Symptom duration:  Spine surgery 6 months ago- had a fall - sweats started post fall.  Fever started 2 days ago. Friday started to feel off     Sympom severity:  moderate.   Treatments tried:  loratadine x 1-2 days, ibuprofen, tylenol   Contacts:  none,      Home covid test x 2.  One Saturday and one Sunday both negative.      Review of Systems   Constitutional, HEENT, cardiovascular, pulmonary, gi and gu systems are negative, except as otherwise noted.      Objective           Vitals:  No vitals were obtained today due to virtual visit.    Physical Exam   GENERAL: Healthy, alert and no distress  EYES: Eyes grossly normal to inspection.  No discharge or erythema, or obvious scleral/conjunctival abnormalities.  RESP: No audible wheeze, cough, or visible cyanosis.  No visible retractions or increased work of breathing.    SKIN: Visible skin clear. No significant rash, abnormal pigmentation or lesions.  NEURO: Cranial nerves grossly intact.  Mentation and speech appropriate for age.  PSYCH: Mentation appears normal, affect normal/bright, judgement and insight intact, normal speech and appearance well-groomed.                Video-Visit  Details    Type of service:  Video Visit    Video End Time:4:58 PM    Originating Location (pt. Location): Home    Distant Location (provider [facility]  [facility]     Platform used for Video Visit: [employer]

## 2022-06-06 NOTE — TELEPHONE ENCOUNTER
Patient asking if V V is shy, as he has URI symptoms, but tested negative for COVID.    Patient advised V V is ok.    Lili Mccoy RN   [facility] Nurse Advisor      Additional Information    Information only question and nurse able to answer    Protocols used: INFORMATION ONLY CALL - NO TRIAGE-A-OH

## 2022-06-06 NOTE — LETTER
June 6, 2022      Abimael Martinez  [address]  [facility] [address]        To Whom It May Concern:    Abimael Martinez  was seen on 06/06/22.  Please excuse him until 06/09/2022 due to illness.        Sincerely,        Starr Hendricks MD

## 2022-06-13 ENCOUNTER — VIRTUAL VISIT (OUTPATIENT)
Dept: FAMILY MEDICINE | Facility: CLINIC | Age: 32
End: 2022-06-13
Payer: COMMERCIAL

## 2022-06-13 DIAGNOSIS — R16.1 SPLENOMEGALY: Primary | ICD-10-CM

## 2022-06-13 DIAGNOSIS — R74.8 ELEVATED LIVER ENZYMES: ICD-10-CM

## 2022-06-13 PROCEDURE — 99213 OFFICE O/P EST LOW 20 MIN: CPT | Mod: GT | Performed by: NURSE PRACTITIONER

## 2022-06-13 ASSESSMENT — ASTHMA QUESTIONNAIRES: ACT_TOTALSCORE: 21

## 2022-06-13 NOTE — LETTER
June 13, 2022      Abimael Martinez  [address]  [facility] [facility] [address]        To Whom It May Concern:    Abimael Martinez was seen in our clinic. He may work remotely from 6/13/22-6/17/22, plan to return to work 6/20/22.      Sincerely,        STFEFEN Garza CNP        Sincerely,        STEFFEN Garza CNP

## 2022-06-13 NOTE — PROGRESS NOTES
Abimael is a 32 year old who is being evaluated via a billable video visit.      How would you like to obtain your AVS? [email or web address]  If the video visit is dropped, the invitation should be resent by: Other e-mail: [email or web address]   Will anyone else be joining your video visit? No    Video Start Time: 1:24 PM     Assessment & Plan     Splenomegaly  No acute distress.  Significant improvement in symptoms since onset, will repeat labs for further evaluation.  Discussion on avoiding contact activity with splenomegaly, did see BMT and had work up in December.  - CBC with platelets; Future    Elevated liver enzymes  Per above.   - Comprehensive metabolic panel (BMP + Alb, Alk Phos, ALT, AST, Total. Bili, TP); Future      Return in about 1 week (around 6/20/2022) for Lab Work.    STEFFEN Garza CNP  [facility]   Abimael is a 32 year old who presents for the following health issues     HPI     ED/UC Followup:    Facility:  [facility] ED    Date of visit: 6/8/2022  Reason for visit: Fever  Current Status: Still having nausea, body aches, waking up wet from sweating. Last fever 6/11/022.  Patients states blood work was abnormal in the ED. Would like retesting      Review of Systems   Constitutional, HEENT, cardiovascular, pulmonary, gi and gu systems are negative, except as otherwise noted.      Objective           Vitals:  No vitals were obtained today due to virtual visit.    Physical Exam   GENERAL: Healthy, alert and no distress  EYES: Eyes grossly normal to inspection.  No discharge or erythema, or obvious scleral/conjunctival abnormalities.  RESP: No audible wheeze, cough, or visible cyanosis.  No visible retractions or increased work of breathing.    SKIN: Visible skin clear. No significant rash, abnormal pigmentation or lesions.  NEURO: Cranial nerves grossly intact.  Mentation and speech appropriate for age.  PSYCH: Mentation appears normal, affect normal/bright, judgement  and insight intact, normal speech and appearance well-groomed.      Video-Visit Details    Type of service:  Video Visit    Video End Time:1:41 PM    Originating Location (pt. Location): Home    Distant Location (provider location):  [facility]     Platform used for Video Visit: [employer]

## 2022-06-15 ENCOUNTER — MYC MEDICAL ADVICE (OUTPATIENT)
Dept: UROLOGY | Facility: CLINIC | Age: 32
End: 2022-06-15

## 2022-06-15 DIAGNOSIS — N48.89 PEARLY PENILE PAPULES: Primary | ICD-10-CM

## 2022-06-22 ENCOUNTER — MYC MEDICAL ADVICE (OUTPATIENT)
Dept: FAMILY MEDICINE | Facility: CLINIC | Age: 32
End: 2022-06-22

## 2022-06-22 DIAGNOSIS — E78.5 DYSLIPIDEMIA: Primary | ICD-10-CM

## 2022-06-22 DIAGNOSIS — E66.811 OBESITY, CLASS I, BMI 30-34.9: ICD-10-CM

## 2022-06-28 ENCOUNTER — PATIENT OUTREACH (OUTPATIENT)
Dept: ONCOLOGY | Facility: CLINIC | Age: 32
End: 2022-06-28

## 2022-06-28 ENCOUNTER — ONCOLOGY VISIT (OUTPATIENT)
Dept: TRANSPLANT | Facility: CLINIC | Age: 32
End: 2022-06-28
Attending: INTERNAL MEDICINE
Payer: COMMERCIAL

## 2022-06-28 ENCOUNTER — APPOINTMENT (OUTPATIENT)
Dept: LAB | Facility: CLINIC | Age: 32
End: 2022-06-28
Attending: INTERNAL MEDICINE
Payer: COMMERCIAL

## 2022-06-28 VITALS
WEIGHT: 280 LBS | RESPIRATION RATE: 18 BRPM | BODY MASS INDEX: 37.97 KG/M2 | OXYGEN SATURATION: 97 % | SYSTOLIC BLOOD PRESSURE: 144 MMHG | TEMPERATURE: 98 F | HEART RATE: 71 BPM | DIASTOLIC BLOOD PRESSURE: 97 MMHG

## 2022-06-28 DIAGNOSIS — R74.8 ELEVATED LIVER ENZYMES: ICD-10-CM

## 2022-06-28 DIAGNOSIS — R16.1 SPLENOMEGALY: ICD-10-CM

## 2022-06-28 DIAGNOSIS — K21.00 GASTROESOPHAGEAL REFLUX DISEASE WITH ESOPHAGITIS WITHOUT HEMORRHAGE: ICD-10-CM

## 2022-06-28 LAB
ALBUMIN SERPL-MCNC: 3.6 G/DL (ref 3.4–5)
ALP SERPL-CCNC: 108 U/L (ref 40–150)
ALT SERPL W P-5'-P-CCNC: 35 U/L (ref 0–70)
ANION GAP SERPL CALCULATED.3IONS-SCNC: 9 MMOL/L (ref 3–14)
AST SERPL W P-5'-P-CCNC: 22 U/L (ref 0–45)
BILIRUB SERPL-MCNC: 0.4 MG/DL (ref 0.2–1.3)
BUN SERPL-MCNC: 15 MG/DL (ref 7–30)
CALCIUM SERPL-MCNC: 9.4 MG/DL (ref 8.5–10.1)
CHLORIDE BLD-SCNC: 107 MMOL/L (ref 94–109)
CO2 SERPL-SCNC: 26 MMOL/L (ref 20–32)
CREAT SERPL-MCNC: 1.08 MG/DL (ref 0.66–1.25)
CRP SERPL-MCNC: <2.9 MG/L (ref 0–8)
ERYTHROCYTE [DISTWIDTH] IN BLOOD BY AUTOMATED COUNT: 12.8 % (ref 10–15)
FERRITIN SERPL-MCNC: 80 NG/ML (ref 26–388)
GFR SERPL CREATININE-BSD FRML MDRD: >90 ML/MIN/1.73M2
GLUCOSE BLD-MCNC: 109 MG/DL (ref 70–99)
HCT VFR BLD AUTO: 42.4 % (ref 40–53)
HGB BLD-MCNC: 14.3 G/DL (ref 13.3–17.7)
IRON SATN MFR SERPL: 13 % (ref 15–46)
IRON SERPL-MCNC: 49 UG/DL (ref 35–180)
MCH RBC QN AUTO: 27.1 PG (ref 26.5–33)
MCHC RBC AUTO-ENTMCNC: 33.7 G/DL (ref 31.5–36.5)
MCV RBC AUTO: 80 FL (ref 78–100)
PLATELET # BLD AUTO: 260 10E3/UL (ref 150–450)
POTASSIUM BLD-SCNC: 4.2 MMOL/L (ref 3.4–5.3)
PROT SERPL-MCNC: 8.2 G/DL (ref 6.8–8.8)
RBC # BLD AUTO: 5.28 10E6/UL (ref 4.4–5.9)
SODIUM SERPL-SCNC: 142 MMOL/L (ref 133–144)
TIBC SERPL-MCNC: 390 UG/DL (ref 240–430)
WBC # BLD AUTO: 4.8 10E3/UL (ref 4–11)

## 2022-06-28 PROCEDURE — 86769 SARS-COV-2 COVID-19 ANTIBODY: CPT | Performed by: INTERNAL MEDICINE

## 2022-06-28 PROCEDURE — 82374 ASSAY BLOOD CARBON DIOXIDE: CPT | Performed by: INTERNAL MEDICINE

## 2022-06-28 PROCEDURE — 82728 ASSAY OF FERRITIN: CPT | Performed by: INTERNAL MEDICINE

## 2022-06-28 PROCEDURE — 36415 COLL VENOUS BLD VENIPUNCTURE: CPT | Performed by: INTERNAL MEDICINE

## 2022-06-28 PROCEDURE — 82784 ASSAY IGA/IGD/IGG/IGM EACH: CPT | Performed by: INTERNAL MEDICINE

## 2022-06-28 PROCEDURE — 85027 COMPLETE CBC AUTOMATED: CPT | Performed by: INTERNAL MEDICINE

## 2022-06-28 PROCEDURE — 83550 IRON BINDING TEST: CPT | Performed by: INTERNAL MEDICINE

## 2022-06-28 PROCEDURE — 82435 ASSAY OF BLOOD CHLORIDE: CPT | Performed by: INTERNAL MEDICINE

## 2022-06-28 PROCEDURE — 86769 SARS-COV-2 COVID-19 ANTIBODY: CPT | Mod: 59 | Performed by: INTERNAL MEDICINE

## 2022-06-28 PROCEDURE — G0463 HOSPITAL OUTPT CLINIC VISIT: HCPCS

## 2022-06-28 PROCEDURE — 86140 C-REACTIVE PROTEIN: CPT | Performed by: INTERNAL MEDICINE

## 2022-06-28 PROCEDURE — 99214 OFFICE O/P EST MOD 30 MIN: CPT | Performed by: INTERNAL MEDICINE

## 2022-06-28 ASSESSMENT — PAIN SCALES - GENERAL: PAINLEVEL: MODERATE PAIN (4)

## 2022-06-28 NOTE — NURSING NOTE
Chief Complaint   Patient presents with     Blood Draw     VPt blood draw by lab RN. Vitals taken and appointment arrived     Adelina Salter RN

## 2022-06-28 NOTE — LETTER
2022         RE: Abimael Martinez  [address]  [facility] [address]        Dear Colleague,    Thank you for referring your patient, Abimael Martinez, to the [facility] [place]. Please see a copy of my visit note below.          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 During childhood, he reports frequent infections with flulike symptoms for usually 3-5 days which would self resolve. He also had multiple ear, throat, and sinus infections. He has had a history of splenomegaly at age 8-9 years which normalized after some time. He again developed some pains and splenomegaly after starting anxiety medications in the summer prior to starting college. In his 20s he developed testicular and pelvis pain and reports having  evaluation and eventually finding a protruded disk, for which he has had a diskectomy. He was offered a spinal fusion but has deferred that. His testicular and pelvis pain has now recurred and he says his spinal issues have also recurred causing the referred pain to the testicle and pelvis. He has been using medical cannabis for chronic pain.    He developed COVID-19 in 2020, with high-grade fevers for 7 days, nausea, weakness, a little cough, no shortness of breath. Treated  "supportively as an outpatient, then recovered, finally after 4 weeks. During the illness he had LUQ pressure sometimes radiating into the left shoulder region. Conitnued to have some LUQ pressure after recovery, and continues to have this and some RUQ/R lower chest pressure on a daily basis.     He has had negative HIV, hepatitis B, and hepatitis C testing. KATHRYN, RF, anti-CCP, SSA/SSB, ANCA titers, ESR, CRP, C3/C4 are all normal. IgG slightly elevated at 1631, 1593 earlier., IgM, IgA, IgE normal. CT chest abdomen pelvis with no adenopathy and only a borderline enlarged spleen (12.9cm). Peripheral flow was normal with polytypic B cells, no T cell abnormalities. UA, chlamydia and gonorrhea were negative.     INTERVAL HISTORY:  Royer developed fever to 103 around June 5 with 5 days of a fever, chills, body aches, cough, and nausea. Patient denies any sore throat. He states he had 1 loose stool daily, but no significant diarrhea. He denies any outright vomiting. He took 2 COVID tests at home which were reportedly negative. He denies any rash, and is not vaccinated against COVID-19. He also endorsed muscle stiffness and myalgias as well as pressure behind his eyes. He has been taking ibuprofen with some improvement of his symptoms, but states that his fever never \"completely resolved\". He was taking Tylenol. Patient denies any recent outdoor exposures or tick bites. Patient was started on Augmentin Monday night June 6  Seen in ED on June 8 .COVID Neg x 2 No obvious viral illness, mono spot neg and influenza A and b neg.WBC 3.7 with >50% PMN. Gradually the sx improved but still has lethargy and has taste and smell off Had laminectomy and discectomy at L4/5 and L5/S1in Oct 2021 and still have some achiness in legs and pain in pelvis and scrotum  Overall Just not feeling that well   Continues to have significant GERD and not relieved with famotidine    REVIEW OF SYSTEMS  12 pt ROS done and neg except in HPI    PAST MEDICAL " HISTORY  Past Medical History:   Diagnosis Date     Acute right otitis media 1/8/2013     ADHD (attention deficit hyperactivity disorder), combined type      Anxiety      Depression      Drug abuse (H)      Dyslipidemia      Gonococcal urethritis 02/05/2016     Hypertension      Internal hemorrhoids     which bleed     Lumbosacral radiculopathy      Obese      OCD (obsessive compulsive disorder)      Other chronic pain     Low Back Pain     Uncomplicated asthma      Urethritis 5/20/2013     Problem list name updated by automated process. Provider to review     PAST SURGICAL HISTORY  Past Surgical History:   Procedure Laterality Date     BACK SURGERY  04/05/2018     COLONOSCOPY       ESOPHAGOSCOPY, GASTROSCOPY, DUODENOSCOPY (EGD), COMBINED N/A 8/16/2021    Procedure: ESOPHAGOGASTRODUODENOSCOPY, WITH BIOPSY AND POLYPECTOMY;  Surgeon: Phillip Joyner MD;  Location: [facility] OR     HEMILAMINECTOMY, DISCECTOMY LUMBAR TWO LEVELS, COMBINED Right 10/28/2021    Procedure: RIGHT LUMBAR L4-5 MINIMALLY INVASIVE MICRODISCECTOMY;  Surgeon: Troy Wharton MD;  Location: [facility] OR     HEMILAMINECTOMY, DISCECTOMY LUMBAR TWO LEVELS, COMBINED Left 10/28/2021    Procedure: LEFT LUMBAR L5-S1 MINIMALLY INVASIVE MICRODISCECTOMY;  Surgeon: Troy Wharton MD;  Location:  OR     SOCIAL HISTORY: Reviewed for relevant changes.  He was adopted from [facility].    FAMILY HISTORY: Reviewed No family Hx available due to adoption    MEDICATIONS: Reviewed    ALLERGIES  Allergies   Allergen Reactions     Cymbalta      Weight gain and fatigue     Duloxetine Other (See Comments) and Fatigue     enlarged spleen and weight gain     Paroxetine Other (See Comments), Fatigue and GI Disturbance     Weight gain, Spleen issues     Seasonal Allergies      Vicodin [Hydrocodone-Acetaminophen]      PHYSICAL EXAM  BP (!) 144/97   Pulse 71   Temp 98  F (36.7  C) (Oral)   Resp 18   Wt 127 kg (280 lb)   SpO2 97%   BMI 37.97  kg/m     Wt Readings from Last 10 Encounters:   06/28/22 127 kg (280 lb)   03/24/22 127 kg (280 lb)   02/01/22 126.6 kg (279 lb)   01/07/22 126.6 kg (279 lb)   12/28/21 126.8 kg (279 lb 8 oz)   11/12/21 121.8 kg (268 lb 8 oz)   10/28/21 121.5 kg (267 lb 12.8 oz)   10/26/21 120.7 kg (266 lb)   10/13/21 120.9 kg (266 lb 9 oz)   09/27/21 117.7 kg (259 lb 6.4 oz)     Gen: well appearing male, No apparent distress  BP (!) 144/97   Pulse 71   Temp 98  F (36.7  C) (Oral)   Resp 18   Wt 127 kg (280 lb)   SpO2 97%   BMI 37.97 kg/m    HEENT: NCAT, MMM  RESP: No increased WOB, equal bilateral chest rise  ABD: obese Sl tenderness in LUQ  EXT: moving all 4 extremities spontaneously  NEURO: AOx3 no focal neuro deficits  COR NSR No S3 S4 or M  BACK Non tender Scars healed well  LYMPH No cervical axillary or inguinal nodes      LABS   Latest Reference Range & Units 06/28/22 11:03   Sodium 133 - 144 mmol/L 142   Potassium 3.4 - 5.3 mmol/L 4.2   Chloride 94 - 109 mmol/L 107   Carbon Dioxide 20 - 32 mmol/L 26   Urea Nitrogen 7 - 30 mg/dL 15   Creatinine 0.66 - 1.25 mg/dL 1.08   GFR Estimate >60 mL/min/1.73m2 >90   Calcium 8.5 - 10.1 mg/dL 9.4   Anion Gap 3 - 14 mmol/L 9   Albumin 3.4 - 5.0 g/dL 3.6   Protein Total 6.8 - 8.8 g/dL 8.2   Alkaline Phosphatase 40 - 150 U/L 108   ALT 0 - 70 U/L 35   AST 0 - 45 U/L 22   Bilirubin Total 0.2 - 1.3 mg/dL 0.4   CRP Inflammation 0.0 - 8.0 mg/L <2.9   Ferritin 26 - 388 ng/mL 80   Iron 35 - 180 ug/dL 49   Iron Binding Cap 240 - 430 ug/dL 390   Iron Saturation Index 15 - 46 % 13 (L)   Glucose 70 - 99 mg/dL 109 (H)   WBC 4.0 - 11.0 10e3/uL 4.8   Hemoglobin 13.3 - 17.7 g/dL 14.3   Hematocrit 40.0 - 53.0 % 42.4   Platelet Count 150 - 450 10e3/uL 260   RBC Count 4.40 - 5.90 10e6/uL 5.28   MCV 78 - 100 fL 80   MCH 26.5 - 33.0 pg 27.1   MCHC 31.5 - 36.5 g/dL 33.7   RDW 10.0 - 15.0 % 12.8   (L): Data is abnormally low  (H): Data is abnormally high  ASSESSMENT    1. Chronic intermittent LUQ/RUQ  abdominal pressure, body aches  2. Eye symptoms (blurry, red)  3. Intermittent swelling in cervical, axillary, and inguinal areas without documented lymphadenopathy  4. Chronic duodenitis related to acid reflux  5. Seasonal allergies/urticaria/atopy  6. Mild asthma  7. History of splenomegaly  8. 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 S/p spine surgery L4-5,L5-S1  10.COVID infection Nov 2020  11. Mild hypertension  12. Viral Syndrome  12.Microcytosis    I don't know what this fever and chills were due to.On exam today his spleen is  barely palpable despite 17cm on Abdominal ultrasound.Will repeat IgG4 level today  Patient has undergone extensive workup. Unlikely his symptoms are explained by IgG4 disease.  ESR/CRP normal   No evidence of lymphoma. No evidence of immunodeficiency on lab testing, T cell subset tests largely within normal limits.  SPEP within normal limits, EBV/CMV PCR negative.  The patient underwent upper endoscopy on 8/16/2021, revealing mild chronic duodenitis without evidence of H.  Pylori, Giardia, or celiac disease. I would prescribe omeprazole again 40mg/d  . Should lose weight and decrease salt in diet for BP Labs look ok today. He wanted another XCOVID Ab test Will my chart him with results    RTC in 3 mo      I spent a total of 30 minutes face to face with Abimael Martinez during today's office visit. Over 50% of this time was spent counseling the patient and coordinating the care regarding IGG4 and COVID  and 10 minutes preparing to see the patient and  care coordination         Again, thank you for allowing me to participate in the care of your patient.      Sincerely,    Milton Lowery MD

## 2022-06-28 NOTE — PROGRESS NOTES
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     Per chart review and discussion with the patient, he reports that he was adopted from [facility] and was treated for TB exposure. During childhood, he reports frequent infections with flulike symptoms for usually 3-5 days which would self resolve. He also had multiple ear, throat, and sinus infections. He has had a history of splenomegaly at age 8-9 years which normalized after some time. He again developed some pains and splenomegaly after starting anxiety medications in the summer prior to starting college. In his 20s he developed testicular and pelvis pain and reports having  evaluation and eventually finding a protruded disk, for which he has had a diskectomy. He was offered a spinal fusion but has deferred that. His testicular and pelvis pain has now recurred and he says his spinal issues have also recurred causing the referred pain to the testicle and pelvis. He has been using medical cannabis for chronic pain.    He developed COVID-19 in 2020, with high-grade fevers for 7 days, nausea, weakness, a little cough, no shortness of breath. Treated supportively as an outpatient, then recovered, finally after 4 weeks. During the illness he had LUQ pressure sometimes radiating into the left shoulder region. Conitnued to have some LUQ pressure after recovery, and continues to have this and some RUQ/R lower chest pressure on a daily basis.     He  "has had negative HIV, hepatitis B, and hepatitis C testing. KATHRYN, RF, anti-CCP, SSA/SSB, ANCA titers, ESR, CRP, C3/C4 are all normal. IgG slightly elevated at 1631, 1593 earlier., IgM, IgA, IgE normal. CT chest abdomen pelvis with no adenopathy and only a borderline enlarged spleen (12.9cm). Peripheral flow was normal with polytypic B cells, no T cell abnormalities. UA, chlamydia and gonorrhea were negative.     INTERVAL HISTORY:  Royer developed fever to 103 around June 5 with 5 days of a fever, chills, body aches, cough, and nausea. Patient denies any sore throat. He states he had 1 loose stool daily, but no significant diarrhea. He denies any outright vomiting. He took 2 COVID tests at home which were reportedly negative. He denies any rash, and is not vaccinated against COVID-19. He also endorsed muscle stiffness and myalgias as well as pressure behind his eyes. He has been taking ibuprofen with some improvement of his symptoms, but states that his fever never \"completely resolved\". He was taking Tylenol. Patient denies any recent outdoor exposures or tick bites. Patient was started on Augmentin Monday night June 6  Seen in ED on June 8 .COVID Neg x 2 No obvious viral illness, mono spot neg and influenza A and b neg.WBC 3.7 with >50% PMN. Gradually the sx improved but still has lethargy and has taste and smell off Had laminectomy and discectomy at L4/5 and L5/S1in Oct 2021 and still have some achiness in legs and pain in pelvis and scrotum  Overall Just not feeling that well   Continues to have significant GERD and not relieved with famotidine    REVIEW OF SYSTEMS  12 pt ROS done and neg except in HPI    PAST MEDICAL HISTORY  Past Medical History:   Diagnosis Date     Acute right otitis media 1/8/2013     ADHD (attention deficit hyperactivity disorder), combined type      Anxiety      Depression      Drug abuse (H)      Dyslipidemia      Gonococcal urethritis 02/05/2016     Hypertension      Internal hemorrhoids  "    which bleed     Lumbosacral radiculopathy      Obese      OCD (obsessive compulsive disorder)      Other chronic pain     Low Back Pain     Uncomplicated asthma      Urethritis 5/20/2013     Problem list name updated by automated process. Provider to review     PAST SURGICAL HISTORY  Past Surgical History:   Procedure Laterality Date     BACK SURGERY  04/05/2018     COLONOSCOPY       ESOPHAGOSCOPY, GASTROSCOPY, DUODENOSCOPY (EGD), COMBINED N/A 8/16/2021    Procedure: ESOPHAGOGASTRODUODENOSCOPY, WITH BIOPSY AND POLYPECTOMY;  Surgeon: Phillip Joyner MD;  Location: [facility] OR     HEMILAMINECTOMY, DISCECTOMY LUMBAR TWO LEVELS, COMBINED Right 10/28/2021    Procedure: RIGHT LUMBAR L4-5 MINIMALLY INVASIVE MICRODISCECTOMY;  Surgeon: Troy Wharton MD;  Location: [facility] OR     HEMILAMINECTOMY, DISCECTOMY LUMBAR TWO LEVELS, COMBINED Left 10/28/2021    Procedure: LEFT LUMBAR L5-S1 MINIMALLY INVASIVE MICRODISCECTOMY;  Surgeon: Troy Wharton MD;  Location:  OR     SOCIAL HISTORY: Reviewed for relevant changes.  He was adopted from [facility].    FAMILY HISTORY: Reviewed No family Hx available due to adoption    MEDICATIONS: Reviewed    ALLERGIES  Allergies   Allergen Reactions     Cymbalta      Weight gain and fatigue     Duloxetine Other (See Comments) and Fatigue     enlarged spleen and weight gain     Paroxetine Other (See Comments), Fatigue and GI Disturbance     Weight gain, Spleen issues     Seasonal Allergies      Vicodin [Hydrocodone-Acetaminophen]      PHYSICAL EXAM  BP (!) 144/97   Pulse 71   Temp 98  F (36.7  C) (Oral)   Resp 18   Wt 127 kg (280 lb)   SpO2 97%   BMI 37.97 kg/m     Wt Readings from Last 10 Encounters:   06/28/22 127 kg (280 lb)   03/24/22 127 kg (280 lb)   02/01/22 126.6 kg (279 lb)   01/07/22 126.6 kg (279 lb)   12/28/21 126.8 kg (279 lb 8 oz)   11/12/21 121.8 kg (268 lb 8 oz)   10/28/21 121.5 kg (267 lb 12.8 oz)   10/26/21 120.7 kg (266 lb)   10/13/21  120.9 kg (266 lb 9 oz)   09/27/21 117.7 kg (259 lb 6.4 oz)     Gen: well appearing male, No apparent distress  BP (!) 144/97   Pulse 71   Temp 98  F (36.7  C) (Oral)   Resp 18   Wt 127 kg (280 lb)   SpO2 97%   BMI 37.97 kg/m    HEENT: NCAT, MMM  RESP: No increased WOB, equal bilateral chest rise  ABD: obese Sl tenderness in LUQ  EXT: moving all 4 extremities spontaneously  NEURO: AOx3 no focal neuro deficits  COR NSR No S3 S4 or M  BACK Non tender Scars healed well  LYMPH No cervical axillary or inguinal nodes      LABS   Latest Reference Range & Units 06/28/22 11:03   Sodium 133 - 144 mmol/L 142   Potassium 3.4 - 5.3 mmol/L 4.2   Chloride 94 - 109 mmol/L 107   Carbon Dioxide 20 - 32 mmol/L 26   Urea Nitrogen 7 - 30 mg/dL 15   Creatinine 0.66 - 1.25 mg/dL 1.08   GFR Estimate >60 mL/min/1.73m2 >90   Calcium 8.5 - 10.1 mg/dL 9.4   Anion Gap 3 - 14 mmol/L 9   Albumin 3.4 - 5.0 g/dL 3.6   Protein Total 6.8 - 8.8 g/dL 8.2   Alkaline Phosphatase 40 - 150 U/L 108   ALT 0 - 70 U/L 35   AST 0 - 45 U/L 22   Bilirubin Total 0.2 - 1.3 mg/dL 0.4   CRP Inflammation 0.0 - 8.0 mg/L <2.9   Ferritin 26 - 388 ng/mL 80   Iron 35 - 180 ug/dL 49   Iron Binding Cap 240 - 430 ug/dL 390   Iron Saturation Index 15 - 46 % 13 (L)   Glucose 70 - 99 mg/dL 109 (H)   WBC 4.0 - 11.0 10e3/uL 4.8   Hemoglobin 13.3 - 17.7 g/dL 14.3   Hematocrit 40.0 - 53.0 % 42.4   Platelet Count 150 - 450 10e3/uL 260   RBC Count 4.40 - 5.90 10e6/uL 5.28   MCV 78 - 100 fL 80   MCH 26.5 - 33.0 pg 27.1   MCHC 31.5 - 36.5 g/dL 33.7   RDW 10.0 - 15.0 % 12.8   (L): Data is abnormally low  (H): Data is abnormally high  ASSESSMENT    1. Chronic intermittent LUQ/RUQ abdominal pressure, body aches  2. Eye symptoms (blurry, red)  3. Intermittent swelling in cervical, axillary, and inguinal areas without documented lymphadenopathy  4. Chronic duodenitis related to acid reflux  5. Seasonal allergies/urticaria/atopy  6. Mild asthma  7. History of splenomegaly  8. IgG4  subclass elevation (260), with borderline high IgG  9. S/p spine surgery L4-5,L5-S1  10.COVID infection Nov 2020  11. Mild hypertension  12. Viral Syndrome  12.Microcytosis    I don't know what this fever and chills were due to.On exam today his spleen is  barely palpable despite 17cm on Abdominal ultrasound.Will repeat IgG4 level today  Patient has undergone extensive workup. Unlikely his symptoms are explained by IgG4 disease.  ESR/CRP normal   No evidence of lymphoma. No evidence of immunodeficiency on lab testing, T cell subset tests largely within normal limits.  SPEP within normal limits, EBV/CMV PCR negative.  The patient underwent upper endoscopy on 8/16/2021, revealing mild chronic duodenitis without evidence of H.  Pylori, Giardia, or celiac disease. I would prescribe omeprazole again 40mg/d  . Should lose weight and decrease salt in diet for BP Labs look ok today. He wanted another XCOVID Ab test Will my chart him with results    RTC in 3 mo      I spent a total of 30 minutes face to face with Abimael Martinez during today's office visit. Over 50% of this time was spent counseling the patient and coordinating the care regarding IGG4 and COVID  and 10 minutes preparing to see the patient and  care coordination   Milton Lowery M.D.  [phone]    ADDENDUM  HAS COVID SPIKE AND NUCLEOCAPSID AB COULD HE HAVE LONG COVID? PT CALLED     Dx: Splenomegaly; Elevated liver enzymes     0 Result Notes    Component Ref Range & Units 1 d ago   (6/28/22) 1 d ago   (6/28/22) 6 mo ago   (12/28/21) 10 mo ago   (8/10/21)   SARS-CoV-2 Marlon Ab, Interp, S  Positive   Positive CM  Positive Abnormal  R, CM    Comment: Antibodies to the SARS-CoV-2 spike glycoprotein   detected. These results suggest recent or prior   SARS-CoV-2 infection and/or vaccination. Antibody   levels >/= 0.80 U/mL are considered positive by this   assay. No minimum antibody level or threshold has   been established to indicate long-term  protective   immunity against re-infection. Serologic results   should not be used to diagnose recent SARS-CoV-2   infection. False-positive results for IgG antibodies   may occur due to cross-reactivity from pre-existing   antibodies or other possible causes.   SARS-CoV-2 Marlon Ab, Quant, S U/mL >250   >250 CM  181 High  R, CM    Comment:     -------------------ADDITIONAL INFORMATION-------------------   Testing was performed using the Roche Elecsys Anti-SARS-   CoV-2 S Reagent assay from [employer], which has   received Emergency Use Authorization (EUA) by the U.S.   Food and Drug Administration.       Fact sheets for this Emergency Use Authorization (EUA)   assay can be found at the following links:   For Healthcare Providers:   https://www.fda.gov/media/583605/download   For Patients:   https://www.fda.gov/media/402817/download   Patient's Race  White  White  White  White    Patient's Ethnicity  Not   Not  CM  Not  CM  Not  CM    Comment:     Test Performed by:   [facility]   [address] [place] [address]   : Jamison Juan M.D. Ph.D.; CLIA# [id number]       Test Performed by:   [facility]   [address] [id number]   : Jamison Juan M.D. Ph.D.; CLIA# [id number]   Resulting Agency  [facility]              Specimen Collected: 06/28/22 12:03 PM Last Resulted: 06/29/22  1:46 PM         Lab Flowsheet      Order Details      View Encounter      Lab and Collection Details      Routing      Result History        CM=Additional comments  R=Reference range differs from displayed range          Result Care Coordination        Patient Communication     7/6/2022  1:48 PM Release Now   Not seen Back to Top              Contains abnormal data SARS-CoV-2 Nucleocapsid Total Ab  Order: [id number]   Status: Final result     Visible to patient: Yes  (seen)     Dx: Splenomegaly; Elevated liver enzymes     0 Result Notes         important suggestion  Newer results are available. Click to view them now.     Component Ref Range & Units 1 d ago 6 mo ago 10 mo ago   SARS-CoV-2 Nucleocapsid Total Ab, S Negative Positive Abnormal       Comment: SARS-CoV-2 antibodies detected. Results suggest recent   or prior SARS-CoV-2 infection. Correlation with   epidemiologic risk factors and other clinical and   laboratory findings is recommended. Serologic results   should not be used to diagnose recent SARS-CoV-2 infection.   Protective immunity cannot be inferred based on these   results alone. False positive results may occur due to   cross reactivity from pre-existing antibodies or   other possible causes.       -------------------ADDITIONAL INFORMATION-------------------   Testing was performed using the Roche Elecsys   Anti-SARS-CoV-2 Reagent assay from [employer],   which has received Emergency Use Authorization(EUA)   by the U.S. Food and Drug Administration.       Fact sheets for this Emergency Use Authorization (EUA)   assay can be found at the following links:       For Healthcare Providers:   https://www.fda.gov/media/511213/download   For Patients:   https://www.fda.gov/media/403465/download   Patient's Race  White  White  White    Patient's Ethnicity  Not   Not  CM  Not  CM    Comment:     Test Performed by:   [address] [address]   : Jamison Juan M.D. Ph.D.; CLIA# [id number]   Resulting Agency  [facility]          ADDENDUM  IGG4 STILL ELEVATED NOT SURE WHAT THIS MEANS   : Final result     Visible to patient: No (scheduled for 7/1/2022 11:47 AM)     Dx: Splenomegaly; Elevated liver enzymes     0 Result Notes    Component Ref Range & Units 3 d ago   (6/28/22) 6 mo ago   (12/28/21) 10 mo ago   (8/10/21) 1 yr ago   (4/14/21) 1 yr ago   (4/14/21) 1 yr ago    (1/14/21)   Immunoglobulin G 610 - 1,616 mg/dL 1,614  1,577 R  1,626 High  R  1,658 High   1,631 High   1,593    IgG1 382 - 929 mg/dL 833  841  891  863      IgG2 242 - 700 mg/dL 412  414  447  407      IgG3 22 - 176 mg/dL 93  93  85  90      IgG4 4 - 86 mg/dL 270 High   248 High   268 High   260 High       Subclasses Percent % 100  101  104

## 2022-06-28 NOTE — NURSING NOTE
Oncology Rooming Note    June 28, 2022 11:11 AM   Abimael Martinez is a 32 year old male who presents for:    Chief Complaint   Patient presents with     Blood Draw     VPt blood draw by lab RN. Vitals taken and appointment arrived     Oncology Clinic Visit     Pt is here for a rtn for     Initial Vitals: Blood Pressure (Abnormal) 144/97   Pulse 71   Temperature 98  F (36.7  C) (Oral)   Respiration 18   Weight 127 kg (280 lb)   Oxygen Saturation 97%   Body Mass Index 37.97 kg/m   Estimated body mass index is 37.97 kg/m  as calculated from the following:    Height as of 3/24/22: 1.829 m (6').    Weight as of this encounter: 127 kg (280 lb). Body surface area is 2.54 meters squared.  Moderate Pain (4) Comment: LUQ of abodmen and L shoulder   No LMP for male patient.  Allergies reviewed: Yes  Medications reviewed: Yes    Medications: Medication refills not needed today.  Pharmacy name entered into EPIC:    WRITTEN PRESCRIPTION REQUESTED  [facility][id number] [address] [facility] [address]  [place] [address] [address] [place] [address] [facility]  [id number] [place], [place] - [address] [facility][id number] [place], [place] - [address] AT [facility] [address] [place], [place] - [address] [facility] [address]    Clinical concerns: none       Amarilys Hummel MA

## 2022-06-28 NOTE — PROGRESS NOTES
[facility]: Cancer Care Short Note                                                                                          Completed chart audit to assign Oncology Care Coordination enrollment status.  Also briefly saw pt in exam room post visit with Dr. Sebastián Lowery.  Pt having labs drawn at the time.      Mercedes Alcantara, RN, OCN, RN Care Coordinator, [facility]      .

## 2022-06-29 ENCOUNTER — MYC REFILL (OUTPATIENT)
Dept: FAMILY MEDICINE | Facility: CLINIC | Age: 32
End: 2022-06-29

## 2022-06-29 DIAGNOSIS — F41.9 ANXIETY: ICD-10-CM

## 2022-06-29 DIAGNOSIS — F90.2 ATTENTION DEFICIT HYPERACTIVITY DISORDER (ADHD), COMBINED TYPE: ICD-10-CM

## 2022-06-29 LAB
SARS-COV-2 AB SERPL IA-ACNC: >250 U/ML
SARS-COV-2 AB SERPL QL IA: POSITIVE
SARS-COV-2 AB SERPL-IMP: POSITIVE

## 2022-06-30 RX ORDER — ALPRAZOLAM 2 MG
2 TABLET ORAL 3 TIMES DAILY PRN
Qty: 30 TABLET | Refills: 0 | Status: SHIPPED | OUTPATIENT
Start: 2022-06-30 | End: 2022-07-28

## 2022-06-30 RX ORDER — DEXTROAMPHETAMINE SACCHARATE, AMPHETAMINE ASPARTATE, DEXTROAMPHETAMINE SULFATE AND AMPHETAMINE SULFATE 5; 5; 5; 5 MG/1; MG/1; MG/1; MG/1
20 TABLET ORAL 3 TIMES DAILY
Qty: 90 TABLET | Refills: 0 | Status: SHIPPED | OUTPATIENT
Start: 2022-06-30 | End: 2022-07-28

## 2022-07-01 LAB
IGG SERPL-MCNC: 1614 MG/DL (ref 610–1616)
IGG1 SER-MCNC: 833 MG/DL (ref 382–929)
IGG2 SER-MCNC: 412 MG/DL (ref 242–700)
IGG3 SER-MCNC: 93 MG/DL (ref 22–176)
IGG4 SER-MCNC: 270 MG/DL (ref 4–86)
SUBCLASSES, PERCENT: 100 %

## 2022-07-12 ENCOUNTER — MYC MEDICAL ADVICE (OUTPATIENT)
Dept: UROLOGY | Facility: CLINIC | Age: 32
End: 2022-07-12

## 2022-07-13 NOTE — TELEPHONE ENCOUNTER
Zeus Sarah MD  You 59 minutes ago (9:48 AM)     SHELYL    I would do this in the clinic.   I imagine it would be covered.   He should touch base with Jamie Gardner, the financial liaison.     I am not sure what the code would be- maybe circ revision.  Might be an unlisted code     Fernando LUEVANO

## 2022-07-25 ENCOUNTER — HOSPITAL ENCOUNTER (OUTPATIENT)
Dept: NUTRITION | Facility: CLINIC | Age: 32
Discharge: HOME | End: 2022-07-25
Attending: FAMILY MEDICINE | Admitting: FAMILY MEDICINE
Payer: COMMERCIAL

## 2022-07-25 DIAGNOSIS — E66.811 OBESITY, CLASS I, BMI 30-34.9: ICD-10-CM

## 2022-07-25 DIAGNOSIS — E78.5 DYSLIPIDEMIA: ICD-10-CM

## 2022-07-25 PROCEDURE — 97802 MEDICAL NUTRITION INDIV IN: CPT | Mod: GT,95 | Performed by: DIETITIAN, REGISTERED

## 2022-07-25 NOTE — PROGRESS NOTES
"Video-Visit Details    Type of service:  Video Visit    Video Start Time (time video started): 4:32    Video End Time (time video stopped): 5:20    Originating Location (pt. Location): Home    Distant Location (provider location):  [facility] NUTRITION SERVICES     Mode of Communication:  Video Conference via [facility]    OUTPATIENT NUTRITION ASSESSMENT (VIDEO VISIT DUE TO COVID-19)  REASON FOR ASSESSMENT  Abimael Martinez referred by Dr. Lora for MNT related to   Dyslipidemia [E78.5]  - Primary       Obesity, Class I, BMI 30-34.9 [E66.9]         Patient accompanied by self      ASSESSMENT   Nutrition History:  - Information obtained from patient.  - Patient is on a regular diet at home.   Patient states had back surgery and has gained weight due to ordering food and not preparing his own meals.  Patient would like to address ways to lower cholesterol levels and improve reflux.  Patient states he enjoys cooking during the pandemic and would like to look into new recipes and meal kit options.      Patient states limit beef as will get hiccups   No restrictions from back surgery   PT currently   No running and jogging; spine strengthening   Bed 12:00-1:00 AM  Up at 7:30-7:50 AM     Diet Recall:  Breakfast: water   Lunch: 1:00 1/2 chicken chipula   Dinner: burger and nieto; chicken sandwich + nachos (7:00-8:00)  Snack: chips or chocolate   Beverages: water; beer 1-2 per week; no coffee   Dining out: daily-  [employer] or chipotle     Exercise: walking 2-3 miles per day ([employer]) 0560-4535 steps     NUTRITION FOCUSED PHYSICAL ASSESSMENT (NFPA) FOR DIAGNOSING MALNUTRITION  Yes    Observed:   No nutrition-related physical findings observed    Obtained from Chart/Interdisciplinary Team:  None noted     LABS  Labs reviewed    MEDICATIONS  Medications reviewed    ANTHROPOMETRICS   Height: 6'0\"  Weight: 280 lbs   BMI (kg/m2): 37.9 kg/m2  Weight Status:  Obesity Grade II BMI " 35-39.9  %IBW: 157%  Weight History:   Wt Readings from Last 10 Encounters:   06/28/22 127 kg (280 lb)   03/24/22 127 kg (280 lb)   02/01/22 126.6 kg (279 lb)   01/07/22 126.6 kg (279 lb)   12/28/21 126.8 kg (279 lb 8 oz)   11/12/21 121.8 kg (268 lb 8 oz)   10/28/21 121.5 kg (267 lb 12.8 oz)   10/26/21 120.7 kg (266 lb)   10/13/21 120.9 kg (266 lb 9 oz)   09/27/21 117.7 kg (259 lb 6.4 oz)     ASSESSED NUTRITION NEEDS  Estimated Energy Needs: 5449-2244 kcals/day (14-17 Kcal/Kg)  Justification:  (obese)  Estimated Protein Needs: 80-97 grams protein/day (1-1.2 g pro/Kg)  Justification:  (preservation of lean body mass)  Estimated Fluid Needs: 8273-4879 mL/day (30-35 mL/kg)    ASSESSED MALNUTRITION STATUS  % Weight Loss:  None noted  % Intake:  No decreased intake noted  Subcutaneous Fat Loss:  None observed  Loss of Muscle Mass:  None observed  Fluid Retention:  None noted    Malnutrition Diagnosis:  Patient does not meet two of the above criteria necessary for diagnosing malnutrition    DIAGNOSIS   Nutrition Diagnosis: Obese, class II related to excess energy intake as evidenced by current BI of 37.9 kg/m2       INTERVENTIONS   Nutrition Prescription   Recommend modified calorie intake for weight loss; heart healthy diet for lipid reduction     IMPLEMENTATION   Assessed learning needs and learning preference  Teaching Method(s) used: Booklet / Handout  Explanation  Diet Education:  Provided education on heart healthy-Mediterranean based, carbohydrate counting, weight loss diet   Nutrition Education (Content):   a)  Discussed portion sizes, label reading, carbohydrate counting, types of fat, low sodium guidelines, exercise and behavior modifications.  Instructed patient on label reading using label from home.  Discussed MyPlate with 1/2 plate vegetables, 1/4 plate protein and 1/4 plate grains.  Encouraged patient to aim for 45-60 grams carbohydrates per meal and 15-30 grams carbohydrates at snacks for weight loss and  to lower blood glucose levels.  Recommend 2-3 T olive oil per day and 1 oz walnuts, almonds and hazelnuts to reduce LDL levels.  Encouraged gradual diet and behavior changes for long term weight loss success.  Supported patient with the challenge of diet changes and weight loss.   b)  Provided Heart Healthy Diet Guidelines; Academy of Nutrition and Dietetics Count Your Carbs booklet, low sodium recipes and seasoning your foods without salt handout   Nutrition Education (Application):   a)  Discussed current eating plans and recommended ways to monounsaturated fats into diet.    b)  Patient verbalizes understanding of diet by stating will focus on Mediterranean diet  Expected patient engagement: good    GOALS (Patient determined)  Focus on Mediterranean diet by preparing new recipes   Aim for 1/2 plate vegetables, 1/4 plate grains and 1/4 plate protein     FOLLOW UP/MONITORING   Progress towards goals will be monitored and evaluated per protocol and Practice Guidelines  Patient to call if desires follow up appointment or if has further questions  RD name and number provided    Time Spent with Patient  48 minutes    Ethan Sevilla, RD, LD  [facility] Outpatient Dietitian  [phone] (office phone)

## 2022-07-28 ENCOUNTER — MYC REFILL (OUTPATIENT)
Dept: FAMILY MEDICINE | Facility: CLINIC | Age: 32
End: 2022-07-28

## 2022-07-28 DIAGNOSIS — F90.2 ATTENTION DEFICIT HYPERACTIVITY DISORDER (ADHD), COMBINED TYPE: ICD-10-CM

## 2022-07-28 DIAGNOSIS — F41.9 ANXIETY: ICD-10-CM

## 2022-07-29 RX ORDER — DEXTROAMPHETAMINE SACCHARATE, AMPHETAMINE ASPARTATE, DEXTROAMPHETAMINE SULFATE AND AMPHETAMINE SULFATE 5; 5; 5; 5 MG/1; MG/1; MG/1; MG/1
20 TABLET ORAL 3 TIMES DAILY
Qty: 90 TABLET | Refills: 0 | Status: SHIPPED | OUTPATIENT
Start: 2022-07-29 | End: 2022-08-29

## 2022-07-29 RX ORDER — ALPRAZOLAM 2 MG
2 TABLET ORAL 3 TIMES DAILY PRN
Qty: 30 TABLET | Refills: 0 | Status: SHIPPED | OUTPATIENT
Start: 2022-07-29 | End: 2022-08-29

## 2022-08-13 NOTE — TELEPHONE ENCOUNTER
Please call the patient and make an appointment(s) for anxiety and then you can refill the medication one time.  Jamison Lora       Attending Attestation (For Attendings USE Only)...

## 2022-08-14 NOTE — TELEPHONE ENCOUNTER
Use Mucinex DM instead, available over the counter because by the time PA is approved  Patient will not need prescription.     Karen Sam PAC    2.23

## 2022-08-18 ENCOUNTER — THERAPY VISIT (OUTPATIENT)
Dept: PHYSICAL THERAPY | Facility: CLINIC | Age: 32
End: 2022-08-18
Attending: PHYSICIAN ASSISTANT
Payer: COMMERCIAL

## 2022-08-18 ENCOUNTER — MYC MEDICAL ADVICE (OUTPATIENT)
Dept: FAMILY MEDICINE | Facility: CLINIC | Age: 32
End: 2022-08-18

## 2022-08-18 DIAGNOSIS — R32 URINARY INCONTINENCE: ICD-10-CM

## 2022-08-18 DIAGNOSIS — R10.2 PELVIC PAIN IN MALE: ICD-10-CM

## 2022-08-18 DIAGNOSIS — M62.89 PELVIC FLOOR DYSFUNCTION: ICD-10-CM

## 2022-08-18 DIAGNOSIS — N50.819 TESTICULAR PAIN: ICD-10-CM

## 2022-08-18 PROCEDURE — 97535 SELF CARE MNGMENT TRAINING: CPT | Mod: GP | Performed by: PHYSICAL THERAPIST

## 2022-08-18 PROCEDURE — 97110 THERAPEUTIC EXERCISES: CPT | Mod: GP | Performed by: PHYSICAL THERAPIST

## 2022-08-18 PROCEDURE — 97161 PT EVAL LOW COMPLEX 20 MIN: CPT | Mod: GP | Performed by: PHYSICAL THERAPIST

## 2022-08-18 NOTE — PROGRESS NOTES
Physical Therapy Initial Evaluation  Subjective:  The history is provided by the patient. No  was used.   Patient Health History  Abimael Martinez being seen for perineal/groin pain, PF dysfunction .     Date of Onset: 4/12/22 MD order for PT.   Problem occurred: reports history of B groin/testicle pain and LBP for past 5 years. He had 2 lumbar discectomies in 2017, 10/29/21. Some improvement in R LE pain since most recent surgery however continued LBP.(currently having PT) Currently, he c/o B testicle/groin pain,bladder urgency with occasional urinary incontinence and at times difficulty having a full erection.     Pain is reported as 3/10 on pain scale.  General health as reported by patient is good.  Pertinent medical history includes: none.   Red flags:  None as reported by patient.  Medical allergies: other. Other medical allergies details: cymbalta paxil vicodin .   Surgeries include:  Orthopedic surgery and other (2 spine surgeries 2017, 2021(disectomies).    Current medications:  Pain medication.    Current occupation  .   Primary job tasks include:  Computer work.                  Therapist Generated HPI Evaluation         Type of problem:  Incontinence and pelvic dysfunction (pelvic pain).    This is a chronic condition.  Condition occurred with:  Insidious onset.  Where condition occurred: for unknown reasons.  Patient reports pain:  Groin, other, lower lumbar spine and pubic (B testicle).  Pain is described as aching and shooting and is constant.  Pain is the same all the time.  Since onset symptoms are unchanged.  Symptoms are exacerbated by other and sitting (bending/lifting, worse often when LBP is bad)  and relieved by nothing.    Previous treatment includes physical therapy and other (for LB, lumbar epidural in 7/2022).   Restrictions due to condition include:  Working in normal job without restrictions.  Barriers include:  None as reported by patient.                 "        Objective:  System         Lumbar/SI Evaluation  ROM:    AROM Lumbar:   Flexion:        Hands to mid lower legs, increased LBP   Ext:                    Mod loss increased LBP   Side Bend:        Left:     Right:   Rotation:           Left:     Right:   Side Glide:        Left:     Right:         Strength: slouched sitting posture  Lumbar Myotomes:  normal                                                    Pelvic Dysfunction Evaluation:    Bladder/Pelvic Problems:    Storage Problem:  Urgency and mixed incontinence  Emptying Problem:  Incomplete emptying        Flexibility:    Tightness present at:Hamstrings and Piriformis      Pelvic Clock Exam:  Pelvic clock exam: PT / mm tension R>L obturator internus.  Ischiocavernosis pain:  +/-  Bulbocavernosis pain:  +/-  Transverse Perineal:  +/-  Levator ANI:  +  Perineal Body:  +/-      External Assessment:  External assessment pelvic: strong PFMC observed in supine, 5\" hold, repeateable, fair relaxation phase with no substitution.  Skin Condition:  Normal      Tissue Symmetry:  Normal    Muscle Contraction/Perineal Mobility:  Elevation and urogential triangle descent    Additional History:        Caffeine Consumption:  None          Hip Evaluation  HIP AROM:  AROM:    Left Hip:     Normal    Right Hip:   Normal                  Hip PROM:  Hip PROM:  Left Hip:    Normal  Right Hip:  Normal                              Hip Palpation:    Left hip tenderness present at:   Adductors  Right hip tenderness present at:  Adductors             General     ROS    Assessment/Plan:    Patient is a 32 year old male with lumbar and pelvic complaints.    Patient has the following significant findings with corresponding treatment plan.                Diagnosis 1:  Perineal/groin pain, PFM dysfunction  Pain -  manual therapy, self management, education and home program  Decreased ROM/flexibility - therapeutic exercise and home program  Impaired muscle performance - biofeedback, " neuro re-education and home program  Decreased function - therapeutic activities and home program  Impaired posture - neuro re-education and home program    Therapy Evaluation Codes:   1) History comprised of:   Personal factors that impact the plan of care:      None.    Comorbidity factors that impact the plan of care are:      None.     Medications impacting care: None.  2) Examination of Body Systems comprised of:   Body structures and functions that impact the plan of care:      Lumbar spine and Pelvis.   Activity limitations that impact the plan of care are:      Sitting, Standing, Walking, [place], Urgency and Urinary incontinence.  3) Clinical presentation characteristics are:   Stable/Uncomplicated.  4) Decision-Making    Low complexity using standardized patient assessment instrument and/or measureable assessment of functional outcome.  Cumulative Therapy Evaluation is: Low complexity.    Previous and current functional limitations:  (See Goal Flow Sheet for this information)    Short term and Long term goals: (See Goal Flow Sheet for this information)     Communication ability:  Patient appears to be able to clearly communicate and understand verbal and written communication and follow directions correctly.  Treatment Explanation - The following has been discussed with the patient:   RX ordered/plan of care  Anticipated outcomes  Possible risks and side effects  This patient would benefit from PT intervention to resume normal activities.   Rehab potential is good.    Frequency:  1 X week, once daily  Duration:  for 4 weeks then 2 x month  For 2 months  Discharge Plan:  Achieve all LTG.  Independent in home treatment program.  Reach maximal therapeutic benefit.    Please refer to the daily flowsheet for treatment today, total treatment time and time spent performing 1:1 timed codes.

## 2022-08-19 ENCOUNTER — TELEPHONE (OUTPATIENT)
Dept: UROLOGY | Facility: CLINIC | Age: 32
End: 2022-08-19

## 2022-08-19 ENCOUNTER — MYC MEDICAL ADVICE (OUTPATIENT)
Dept: UROLOGY | Facility: CLINIC | Age: 32
End: 2022-08-19

## 2022-08-19 NOTE — TELEPHONE ENCOUNTER
Called patient back and rescheduled him to 10:15 on the same day. Patient was wondering if it is possible at all to reschedule him to anytime before 10 am within the next 1-2 weeks. If there is no availible date or time, he is okay with keeping his 10:15 appointment on 8/31. His preference was early morning appointments due to scheduling conflict with his job. Patient is okay with a MyChart message.     Chrissy Swenson  In-Clinic Visit Facilitator

## 2022-08-19 NOTE — TELEPHONE ENCOUNTER
Called patient and LVM informing him that due to changes in the providers schedule, we need to push back his appointment time a little later in the morning (at 10:15 am) still on the same day. Provided the [phone] number to call back.     Chrissy Swenson  In-Clinic Visit Facilitator

## 2022-08-29 ENCOUNTER — VIRTUAL VISIT (OUTPATIENT)
Dept: FAMILY MEDICINE | Facility: CLINIC | Age: 32
End: 2022-08-29
Payer: COMMERCIAL

## 2022-08-29 DIAGNOSIS — M25.511 CHRONIC RIGHT SHOULDER PAIN: Primary | ICD-10-CM

## 2022-08-29 DIAGNOSIS — Z79.899 MEDICATION MANAGEMENT: ICD-10-CM

## 2022-08-29 DIAGNOSIS — G89.29 CHRONIC RIGHT SHOULDER PAIN: Primary | ICD-10-CM

## 2022-08-29 DIAGNOSIS — F90.2 ATTENTION DEFICIT HYPERACTIVITY DISORDER (ADHD), COMBINED TYPE: ICD-10-CM

## 2022-08-29 DIAGNOSIS — F41.9 ANXIETY: ICD-10-CM

## 2022-08-29 PROCEDURE — 96127 BRIEF EMOTIONAL/BEHAV ASSMT: CPT | Mod: 95 | Performed by: FAMILY MEDICINE

## 2022-08-29 PROCEDURE — 99213 OFFICE O/P EST LOW 20 MIN: CPT | Mod: 95 | Performed by: FAMILY MEDICINE

## 2022-08-29 RX ORDER — ALPRAZOLAM 2 MG
2 TABLET ORAL 3 TIMES DAILY PRN
Qty: 30 TABLET | Refills: 0 | Status: SHIPPED | OUTPATIENT
Start: 2022-08-29

## 2022-08-29 RX ORDER — DEXTROAMPHETAMINE SACCHARATE, AMPHETAMINE ASPARTATE, DEXTROAMPHETAMINE SULFATE AND AMPHETAMINE SULFATE 5; 5; 5; 5 MG/1; MG/1; MG/1; MG/1
20 TABLET ORAL 3 TIMES DAILY
Qty: 90 TABLET | Refills: 0 | Status: SHIPPED | OUTPATIENT
Start: 2022-08-29 | End: 2022-09-28

## 2022-08-29 ASSESSMENT — PATIENT HEALTH QUESTIONNAIRE - PHQ9
10. IF YOU CHECKED OFF ANY PROBLEMS, HOW DIFFICULT HAVE THESE PROBLEMS MADE IT FOR YOU TO DO YOUR WORK, TAKE CARE OF THINGS AT HOME, OR GET ALONG WITH OTHER PEOPLE: SOMEWHAT DIFFICULT
SUM OF ALL RESPONSES TO PHQ QUESTIONS 1-9: 5

## 2022-08-29 NOTE — PROGRESS NOTES
SUBJECTIVE:  Abimael Martinez is a 32 year old male who presents for a follow up evaluation of anxiety.  He is continuing to use the Xanax as needed.     He is seeing a spine care provider and has been getting a prescription from them for oxycodone 10 mg.  Most days he just takes 1 of those at bedtime.  He reports that he will not take the Xanax if he has taken the oxycodone.     He is concerned about establishing care with a new provider I will be leaving the clinic.  He now lives in [place].    He has been undergoing physical therapy for his right shoulder pain.  He does not feel like it has really been beneficial for him.               Mild Anxiety =  5  Moderate= 10  Severe=  15    AYALA-7 SCORE 12/18/2019 2/11/2020 11/30/2020   Total Score - - -   Total Score 21 16 13           Last PHQ-9 score on record= 10          OBJECTIVE:  General: the patient had a calm and pleasant affect during the visit today.    ASSESSMENT:  Anxiety Disorder (AYALA) which has improved.      PLAN:   Patient I discussed the compounding effects of a narcotic and benzodiazepine regarding sedation.   Did recommend that he not take 2 tablets with an 8 hours of each other.   Although I am not prescribing his narcotics I did give him a prescription for Narcan today.    Regarding his shoulder I am going to have him see orthopedics in [place].      Answers for HPI/ROS submitted by the patient on 8/29/2022  If you checked off any problems, how difficult have these problems made it for you to do your work, take care of things at home, or get along with other people?: Somewhat difficult  PHQ9 TOTAL SCORE: 5  What is the reason for your visit today? : Medication discussion, new doctor referral, shoulder/arm pain  How many servings of fruits and vegetables do you eat daily?: 0-1  On average, how many sweetened beverages do you drink each day (Examples: soda, juice, sweet tea, etc.  Do NOT count diet or artificially sweetened beverages)?:  0  How many minutes a day do you exercise enough to make your heart beat faster?: 10 to 19  How many days a week do you exercise enough to make your heart beat faster?: 3 or less  How many days per week do you miss taking your medication?: 0

## 2022-08-31 ENCOUNTER — MYC MEDICAL ADVICE (OUTPATIENT)
Dept: UROLOGY | Facility: CLINIC | Age: 32
End: 2022-08-31

## 2022-08-31 ENCOUNTER — OFFICE VISIT (OUTPATIENT)
Dept: UROLOGY | Facility: CLINIC | Age: 32
End: 2022-08-31

## 2022-08-31 DIAGNOSIS — T81.89XD SUTURE SINUS, SUBSEQUENT ENCOUNTER: Primary | ICD-10-CM

## 2022-08-31 PROCEDURE — 11420 EXC H-F-NK-SP B9+MARG 0.5/<: CPT | Performed by: UROLOGY

## 2022-08-31 PROCEDURE — 11420 EXC H-F-NK-SP B9+MARG 0.5/<: CPT | Mod: 59 | Performed by: UROLOGY

## 2022-08-31 NOTE — PATIENT INSTRUCTIONS
When should I call my doctor?    A fever over 100F (38C) for more than a day. (Before you call the doctor, check your temperature under your tongue)  Chills  Pain in the back or belly area (abdomen)  Severe pain, burning or other problems while passing urine  Pain that gets worse after two days        Your provider may advise the following ways to care for yourself after the procedure:    Put an ice bag or package of frozen peas covered by a thin towel over the scrotum after the procedure. Leave the ice on the area for 30 minutes and then take it off for 30 minutes. Do this off and on for at least 24 hours.        Take a pain reliever, such as Acetaminophen (Tylenol) or Ibuprofen (Advil), for any pain after the procedure. Your provider may prescribe a stronger pain medicine if it is needed.      Ejaculation should be avoided for one week to allow the area to heal.      Call [phone] with questions. For concerns or questions after hours or on weekends, please page the Urology Resident on-call: [phone].

## 2022-08-31 NOTE — PROGRESS NOTES
Procedure Note    Pre-operative diagnosis: Four painful suture tracts on penis   Post-operative diagnosis same   Procedure: Lysis of four suture tracts on penis   Surgeon: Zeus Sarah MD   Assistants(s): none   Estimated blood loss: None    Specimens: None   Findings: 2 ventral and 2 dorsal suture tracts from previous circumcision.  Each was 2-3 mm in size.        Procedure- each of the 4 tract areas was numbed with 0.1cc lidocaine 1% plain, after betadine prep.    A scissor tip was passed through each of the four tracts and the tracts incised. (4 separate sites addressed).  Any nubs of tissue were trimmed flush.  There was no bleeding.  Recommended dressing with bacitracin ointment and gauze as needed.  Shower OK tomorrow. Wait a week for intercourse.  OTC analgesics recommended   PRN follow-up with urology.          I was present and scrubbed for the entire procedure.  Zeus Sarah MD  Urology Staff

## 2022-08-31 NOTE — PROGRESS NOTES
Abimael Martinez's goals for this visit include:   Chief Complaint   Patient presents with     RECHECK     Procedure consult remove suture tracks OK per Dr. Sarah       He requests these members of his care team be copied on today's visit information:         PCP: Jamison Lora    Referring Provider:  Referred Self, MD  No address on file    There were no vitals taken for this visit.    Do you need any medication refills at today's visit?     Shadia Leach LPN on 8/31/2022 at 10:36 AM

## 2022-09-08 ENCOUNTER — THERAPY VISIT (OUTPATIENT)
Dept: PHYSICAL THERAPY | Facility: CLINIC | Age: 32
End: 2022-09-08
Payer: COMMERCIAL

## 2022-09-08 DIAGNOSIS — M62.89 PELVIC FLOOR DYSFUNCTION: ICD-10-CM

## 2022-09-08 DIAGNOSIS — N50.819 TESTICULAR PAIN: ICD-10-CM

## 2022-09-08 DIAGNOSIS — R10.2 PELVIC PAIN IN MALE: Primary | ICD-10-CM

## 2022-09-08 DIAGNOSIS — R32 URINARY INCONTINENCE: ICD-10-CM

## 2022-09-08 PROCEDURE — 97140 MANUAL THERAPY 1/> REGIONS: CPT | Mod: GP | Performed by: PHYSICAL THERAPIST

## 2022-09-08 PROCEDURE — 97110 THERAPEUTIC EXERCISES: CPT | Mod: GP | Performed by: PHYSICAL THERAPIST

## 2022-09-09 NOTE — PROGRESS NOTES
[facility] Dermatology Note  Encounter Date: Sep 14, 2022  Office Visit        Dermatology Problem List:  1. Dermatitis, groin area. Suspected irritant versus contact dermatitis.  -has seasonal allergies with asthma per Dr. Harris  - path testing with delayed reaction to black tattoo  - vaseline; elidel  - prior tx: triam 0.025% ointment BID, protopic 0.1% ointment BID PRN  2. Allergic contact dermatitis  - s/p punch biopsy 11/19/19  - patch test negative  - triam 0.1% ointment BID  3.COVID-19 Nov-2020  4. Pearly penile papules - referral from Dr. Lisa and Dr. Sarah (Urology) s/p suture tract removal  5. Tinea corporis   - Past tx: terbinafine    - Current tx: ketoconazole 2% cream and shampoo    ____________________________________________    Assessment & Plan:    # skin colored papules X3, distal penis, median, not pearly papules. Pt has pain with intercourse and after at papules. These could be normal glands, it is bilateral.  Reviewed the option of monitoring or biopsy, will send message to Dr. Sarah. This could also be a pain related syndrome, note patient has pain issues noted in chart Reviewed with patient  -consider topical steroid after intercourse, such as triamcinolone ointment twice daily for 1 day-2 days to prevent inflammation  - also consider removal of 1 lesion, thought this could cause numbness, pain, and will leave scar, could make pain worse. This could also be  A normal deisi gland, reviewed with patient  -doxycycline for 48 hours after intercourse anti inflammatory, this is off label. Stop for headaches, changes in vision or diarrhea   -consider neurology referral if this is moving in a pain related       #nevus, right arm  -lesion is new so recheck with Dr. Narayanan at follow up. Nursing reviewed.     Procedures Performed:   NA    Follow-up: if Dr. Sarah wants biopsy and patient does also then schedule with Dr. Narayanan or Tyrel for biopsy    Staff and Scribe:     I,  Tanya Lundberg, thiago serving as a scribe to document services personally performed by Chelita Arvizu MD based on data collection and the provider's statements to me.       Provider Disclosure:   The documentation recorded by the scribe accurately reflects the services I personally performed and the decisions made by me. Yaima Sanchez RN was present for the exam and the entire visit.     Chelita Arvizu MD    Department of Dermatology  [facility]: Phone: [phone], Fax:[phone]  [facility]: Phone: [phone], Fax: [phone]      ____________________________________________    CC: Derm Problem (Abimael is here today as a referral to discuss laser removal for penile papules. Abimael describes these as painful.)    HPI:  Mr. Abimael Martinez is a(n) 32 year old male who presents today as a return patient for evaluation of lesions on the phallus has had since ten years. Feels painful and irritated skin. Dr. Cedillo saw in 2019.  Pain is at urethral opening and also on the bumps. Dr. Cedillo suggested possible biopsy. Penile dermatitis was noted in 2020. Did have patch testing outside and he reports this was all negative.     Patient is otherwise feeling well, without additional skin concerns.    Labs Reviewed:  NA    Physical Exam:  Vitals: There were no vitals taken for this visit.  SKIN:   Papules distal, near frenulum. Yaima Sanchez RN present for entire exam  Fleshy papule right arm dome shaped and regular  - No other lesions of concern on areas examined.     Medications:  Current Outpatient Medications   Medication     albuterol (PROAIR HFA/PROVENTIL HFA/VENTOLIN HFA) 108 (90 Base) MCG/ACT inhaler     ALPRAZolam (XANAX) 2 MG tablet     amphetamine-dextroamphetamine (ADDERALL) 20 MG tablet     azelastine (ASTELIN) 0.1 % nasal spray     cetirizine (ZYRTEC) 10 MG tablet     famotidine  (PEPCID) 40 MG tablet     fluticasone (FLOVENT HFA) 220 MCG/ACT Inhaler     hydrocortisone (ANUSOL-HC) 25 MG suppository     Hyoscyamine Sulfate 0.375 MG TBCR     ketoconazole (NIZORAL) 2 % external shampoo     loratadine (CLARITIN) 10 MG tablet     mometasone (ELOCON) 0.1 % external ointment     montelukast (SINGULAIR) 10 MG tablet     naloxone (NARCAN) 4 MG/0.1ML nasal spray     omeprazole (PRILOSEC) 20 MG DR capsule     oxyCODONE (ROXICODONE) 5 MG tablet     oxyCODONE-acetaminophen (PERCOCET)  MG per tablet     pimecrolimus (ELIDEL) 1 % external cream     PROTOPIC 0.1 % external ointment     sildenafil (REVATIO) 20 MG tablet     tacrolimus (PROTOPIC) 0.03 % external ointment     tiZANidine (ZANAFLEX) 4 MG tablet     No current facility-administered medications for this visit.      Past Medical History:   Patient Active Problem List   Diagnosis     Anxiety     CARDIOVASCULAR SCREENING; LDL GOAL LESS THAN 160     High risk sexual behavior     Low back pain     Essential hypertension     Internal hemorrhoids which bleed     Obesity, Class I, BMI 30-34.9     Attention deficit hyperactivity disorder (ADHD), combined type     AYALA (generalized anxiety disorder)     OCD (obsessive compulsive disorder)     Drug-induced erectile dysfunction     Mild persistent asthma without complication     Lumbosacral radiculopathy     Dyslipidemia     Elevated serum creatinine     Former smoker     Morbid obesity (H)     Pelvic pain in male     Testicular pain     Pelvic floor dysfunction     Urinary incontinence     Past Medical History:   Diagnosis Date     Acute right otitis media 1/8/2013     ADHD (attention deficit hyperactivity disorder), combined type      Anxiety      Depression      Drug abuse (H)      Dyslipidemia      Gonococcal urethritis 02/05/2016     Hypertension      Internal hemorrhoids     which bleed     Lumbosacral radiculopathy      Obese      OCD (obsessive compulsive disorder)      Other chronic pain     Low  Back Pain     Uncomplicated asthma      Urethritis 5/20/2013     Problem list name updated by automated process. Provider to review        CC Zeus Sarah MD  [address]  [place] [address] on close of this encounter.

## 2022-09-14 ENCOUNTER — OFFICE VISIT (OUTPATIENT)
Dept: DERMATOLOGY | Facility: CLINIC | Age: 32
End: 2022-09-14
Attending: UROLOGY
Payer: COMMERCIAL

## 2022-09-14 DIAGNOSIS — R23.8 PAPULE: Primary | ICD-10-CM

## 2022-09-14 PROCEDURE — 99214 OFFICE O/P EST MOD 30 MIN: CPT | Performed by: DERMATOLOGY

## 2022-09-14 RX ORDER — DOXYCYCLINE 100 MG/1
100 CAPSULE ORAL 2 TIMES DAILY
Qty: 20 CAPSULE | Refills: 0 | Status: SHIPPED | OUTPATIENT
Start: 2022-09-14 | End: 2022-10-06

## 2022-09-14 RX ORDER — TRIAMCINOLONE ACETONIDE 1 MG/G
OINTMENT TOPICAL
Qty: 30 G | Refills: 0 | Status: SHIPPED | OUTPATIENT
Start: 2022-09-14

## 2022-09-14 NOTE — PATIENT INSTRUCTIONS
[facility] Dermatology Visit    Thank you for allowing us to participate in your care. Your findings, instructions and follow-up plan are as follows:     Consult with Dr. Sarah and Dr. Narayanan  Have Dr. Narayanan recheck the right arm at the visit    When should I call my doctor?  If you are worsening or not improving, please, contact us or seek urgent care as noted below.     Who should I call with questions (adults)?  [facility] (adult and pediatric): [phone]  [facility] (adult): [phone]  For urgent needs outside of business hours call the [facility] at [phone] and ask for the dermatology resident on call  If this is a medical emergency and you are unable to reach an ER, Call [phone]    Who should I call with questions (pediatric)?  [facility]- Pediatric Dermatology  Dr. Madison Sierra, Dr. Gertrudis Wild, Dr. Pari Lezama, Amarilys Huynh, PA  Dr. Rosa Retana, Dr. Daniela Faye & Dr. Davon Avendano  Non Urgent  Nurse Triage Line; [phone]- Barbara and Kenisha RN Care Coordinators   [place] (/Complex ) [phone]    If you need a prescription refill, please contact your pharmacy. Refills are approved or denied by our physicians during normal business hours, Monday through Fridays  Per office policy, refills will not be granted if you have not been seen within the past year (or sooner depending on your child's condition).    Scheduling Information:  Pediatric Appointment Scheduling and Call Center [phone]  Radiology Scheduling- [phone]  Sedation Unit Scheduling- [phone]  [place] Scheduling- General [phone]; Pediatric Dermatology [phone]  Main  Services: [phone]  Japanese: [phone]  Sri Lankan: [phone]  Hmong/Indonesian/Santos: [phone]  Preadmission Nursing Department Fax Number: [phone] (fax all  pre-operative paperwork to this number)    For urgent matters arising during evenings, weekends, or holidays that cannot wait for normal business hours please call [phone] and ask for the dermatology resident on call to be paged.

## 2022-09-14 NOTE — LETTER
9/14/2022       RE: Abimael Martinez  [address]  [facility] [address]     Dear Colleague,    Thank you for referring your patient, Abimael Martinez, to the [facility] [place] at [facility]. Please see a copy of my visit note below.    [facility] Note  Encounter Date: Sep 14, 2022  Office Visit        Dermatology Problem List:  1. Dermatitis, groin area. Suspected irritant versus contact dermatitis.  -has seasonal allergies with asthma per Dr. Harris  - path testing with delayed reaction to black tattoo  - vaseline; elidel  - prior tx: triam 0.025% ointment BID, protopic 0.1% ointment BID PRN  2. Allergic contact dermatitis  - s/p punch biopsy 11/19/19  - patch test negative  - triam 0.1% ointment BID  3.COVID-19 Nov-2020  4. Pearly penile papules - referral from Dr. Lisa and Dr. Sarah (Urology) s/p suture tract removal  5. Tinea corporis   - Past tx: terbinafine    - Current tx: ketoconazole 2% cream and shampoo    ____________________________________________    Assessment & Plan:    # skin colored papules X3, distal penis, median, not pearly papules. Pt has pain with intercourse and after at papules. These could be normal glands, it is bilateral.  Reviewed the option of monitoring or biopsy, will send message to Dr. Sarah. This could also be a pain related syndrome, note patient has pain issues noted in chart Reviewed with patient  -consider topical steroid after intercourse, such as triamcinolone ointment twice daily for 1 day-2 days to prevent inflammation  - also consider removal of 1 lesion, thought this could cause numbness, pain, and will leave scar, could make pain worse. This could also be  A normal deisi gland, reviewed with patient  -doxycycline for 48 hours after intercourse anti inflammatory, this is off label. Stop for headaches, changes in vision or diarrhea    -consider neurology referral if this is moving in a pain related       #nevus, right arm  -lesion is new so recheck with Dr. Narayanan at follow up. Nursing reviewed.     Procedures Performed:   NA    Follow-up: if Dr. Sarah wants biopsy and patient does also then schedule with Dr. Narayanan or Tyrel for biopsy    Staff and Scribe:     I, Tanya Lundberg, am serving as a scribe to document services personally performed by Chelita Arvizu MD based on data collection and the provider's statements to me.       Provider Disclosure:   The documentation recorded by the scribe accurately reflects the services I personally performed and the decisions made by me.    Chelita Arvizu MD    Department of Dermatology  [facility]: Phone: [phone], Fax:[phone]  [facility]: Phone: [phone], Fax: [phone]      ____________________________________________    CC: Derm Problem (Abimael is here today as a referral to discuss laser removal for penile papules. Abimael describes these as painful.)    HPI:  Mr. Abimael Martinez is a(n) 32 year old male who presents today as a return patient for evaluation of lesions on the phallus has had since ten years. Feels painful and irritated skin. Dr. Cedillo saw in 2019.  Pain is at urethral opening and also on the bumps. Dr. Cedillo suggested possible biopsy. Penile dermatitis was noted in 2020. Did have patch testing outside and he reports this was all negative.     Patient is otherwise feeling well, without additional skin concerns.    Labs Reviewed:  NA    Physical Exam:  Vitals: There were no vitals taken for this visit.  SKIN:   Papules distal, near frenulum  Fleshy papule right arm dome shaped and regular  - No other lesions of concern on areas examined.     Medications:  Current Outpatient Medications   Medication     albuterol (PROAIR HFA/PROVENTIL  HFA/VENTOLIN HFA) 108 (90 Base) MCG/ACT inhaler     ALPRAZolam (XANAX) 2 MG tablet     amphetamine-dextroamphetamine (ADDERALL) 20 MG tablet     azelastine (ASTELIN) 0.1 % nasal spray     cetirizine (ZYRTEC) 10 MG tablet     famotidine (PEPCID) 40 MG tablet     fluticasone (FLOVENT HFA) 220 MCG/ACT Inhaler     hydrocortisone (ANUSOL-HC) 25 MG suppository     Hyoscyamine Sulfate 0.375 MG TBCR     ketoconazole (NIZORAL) 2 % external shampoo     loratadine (CLARITIN) 10 MG tablet     mometasone (ELOCON) 0.1 % external ointment     montelukast (SINGULAIR) 10 MG tablet     naloxone (NARCAN) 4 MG/0.1ML nasal spray     omeprazole (PRILOSEC) 20 MG DR capsule     oxyCODONE (ROXICODONE) 5 MG tablet     oxyCODONE-acetaminophen (PERCOCET)  MG per tablet     pimecrolimus (ELIDEL) 1 % external cream     PROTOPIC 0.1 % external ointment     sildenafil (REVATIO) 20 MG tablet     tacrolimus (PROTOPIC) 0.03 % external ointment     tiZANidine (ZANAFLEX) 4 MG tablet     No current facility-administered medications for this visit.      Past Medical History:   Patient Active Problem List   Diagnosis     Anxiety     CARDIOVASCULAR SCREENING; LDL GOAL LESS THAN 160     High risk sexual behavior     Low back pain     Essential hypertension     Internal hemorrhoids which bleed     Obesity, Class I, BMI 30-34.9     Attention deficit hyperactivity disorder (ADHD), combined type     AYALA (generalized anxiety disorder)     OCD (obsessive compulsive disorder)     Drug-induced erectile dysfunction     Mild persistent asthma without complication     Lumbosacral radiculopathy     Dyslipidemia     Elevated serum creatinine     Former smoker     Morbid obesity (H)     Pelvic pain in male     Testicular pain     Pelvic floor dysfunction     Urinary incontinence     Past Medical History:   Diagnosis Date     Acute right otitis media 1/8/2013     ADHD (attention deficit hyperactivity disorder), combined type      Anxiety      Depression      Drug  abuse (H)      Dyslipidemia      Gonococcal urethritis 02/05/2016     Hypertension      Internal hemorrhoids     which bleed     Lumbosacral radiculopathy      Obese      OCD (obsessive compulsive disorder)      Other chronic pain     Low Back Pain     Uncomplicated asthma      Urethritis 5/20/2013     Problem list name updated by automated process. Provider to review        CC Zeus Sarah MD  [address] [facility] [address] [facility] [address]  [place] [address] on close of this encounter.

## 2022-09-14 NOTE — NURSING NOTE
Dermatology Rooming Note    Abimael Martinez's goals for this visit include:   Chief Complaint   Patient presents with     Derm Problem     Abimael is here today as a referral to discuss laser removal for penile papules. Abmiael describes these as painful.

## 2022-09-16 NOTE — TELEPHONE ENCOUNTER
DIAGNOSIS: Chronic right shoulder pain    APPOINTMENT DATE: 09/19/2022   NOTES STATUS DETAILS   OFFICE NOTE from referring provider Internal 08/29/2022 Dr Lora [facility]    OFFICE NOTE from other specialist N/A     DISCHARGE SUMMARY from hospital N/A    DISCHARGE REPORT from the ER N/A    OPERATIVE REPORT N/A    EMG report N/A    MEDICATION LIST N/A    MRI N/A    DEXA (osteoporosis/bone health) N/A    CT SCAN N/A    XRAYS (IMAGES & REPORTS) N/A      Per patient, shoulder and elbow pain present for 1-2 years. Feels it started when he had his back surgery in 2021 and started sleeping on RT side. States his neck has been hurting the last few months. No known injuries. Elbow will pop and crack to straighten, feels likes its dislocated at times to get it straight. Has had PT for his spine, received a few exercises to do at home, nothing is working.   Mirella Tovar on 9/16/2022 at 3:15 PM

## 2022-09-19 ENCOUNTER — PRE VISIT (OUTPATIENT)
Dept: ORTHOPEDICS | Facility: CLINIC | Age: 32
End: 2022-09-19

## 2022-09-26 ENCOUNTER — MYC MEDICAL ADVICE (OUTPATIENT)
Dept: DERMATOLOGY | Facility: CLINIC | Age: 32
End: 2022-09-26

## 2022-09-26 DIAGNOSIS — R21 RASH: Primary | ICD-10-CM

## 2022-09-27 ENCOUNTER — MYC REFILL (OUTPATIENT)
Dept: FAMILY MEDICINE | Facility: CLINIC | Age: 32
End: 2022-09-27

## 2022-09-27 DIAGNOSIS — F41.9 ANXIETY: ICD-10-CM

## 2022-09-27 DIAGNOSIS — F90.2 ATTENTION DEFICIT HYPERACTIVITY DISORDER (ADHD), COMBINED TYPE: ICD-10-CM

## 2022-09-28 RX ORDER — DEXTROAMPHETAMINE SACCHARATE, AMPHETAMINE ASPARTATE, DEXTROAMPHETAMINE SULFATE AND AMPHETAMINE SULFATE 5; 5; 5; 5 MG/1; MG/1; MG/1; MG/1
20 TABLET ORAL 3 TIMES DAILY
Qty: 90 TABLET | Refills: 0 | OUTPATIENT
Start: 2022-09-28

## 2022-09-28 RX ORDER — DEXTROAMPHETAMINE SACCHARATE, AMPHETAMINE ASPARTATE, DEXTROAMPHETAMINE SULFATE AND AMPHETAMINE SULFATE 5; 5; 5; 5 MG/1; MG/1; MG/1; MG/1
20 TABLET ORAL 2 TIMES DAILY
Qty: 60 TABLET | Refills: 0 | Status: SHIPPED | OUTPATIENT
Start: 2022-09-28

## 2022-09-28 RX ORDER — ALPRAZOLAM 2 MG
2 TABLET ORAL 3 TIMES DAILY PRN
Qty: 30 TABLET | Refills: 0 | Status: CANCELLED | OUTPATIENT
Start: 2022-09-28

## 2022-09-28 RX ORDER — ALPRAZOLAM 2 MG
2 TABLET ORAL 3 TIMES DAILY PRN
Qty: 30 TABLET | Refills: 0 | OUTPATIENT
Start: 2022-09-28

## 2022-09-28 NOTE — TELEPHONE ENCOUNTER
Patient was told to pick a new PCP and sschedule an appt to get refills on Rx's. He did pick Dr. Doyle and is scheduled for an upcoming appt in Oct. Patient is requesting refills - ALPRAZolam (XANAX) 2 MG tablet  amphetamine-dextroamphetamine (ADDERALL) 20 MG tablet.    Avelina Vigil, Patient Representative - [facility]

## 2022-09-28 NOTE — TELEPHONE ENCOUNTER
Routing refill request to provider for review/approval because:  Drug not on the [facility] refill protocol       Next 5 appointments (look out 90 days)    Oct 28, 2022  3:00 PM  (Arrive by 2:40 PM)  Provider Visit with Rodríguez Doyle MD  [facility] ([facility] ) [address] [facility] [address] [phone]        Gisela Cullen, ADRYN, RN

## 2022-10-06 ENCOUNTER — VIRTUAL VISIT (OUTPATIENT)
Dept: FAMILY MEDICINE | Facility: CLINIC | Age: 32
End: 2022-10-06
Payer: COMMERCIAL

## 2022-10-06 ENCOUNTER — THERAPY VISIT (OUTPATIENT)
Dept: PHYSICAL THERAPY | Facility: CLINIC | Age: 32
End: 2022-10-06
Payer: COMMERCIAL

## 2022-10-06 DIAGNOSIS — N50.819 PAIN IN TESTICLE, UNSPECIFIED LATERALITY: ICD-10-CM

## 2022-10-06 DIAGNOSIS — F41.9 ANXIETY: Primary | ICD-10-CM

## 2022-10-06 DIAGNOSIS — M62.89 PELVIC FLOOR DYSFUNCTION: ICD-10-CM

## 2022-10-06 DIAGNOSIS — R10.2 PELVIC PAIN IN MALE: Primary | ICD-10-CM

## 2022-10-06 PROCEDURE — 97110 THERAPEUTIC EXERCISES: CPT | Mod: GP | Performed by: PHYSICAL THERAPIST

## 2022-10-06 PROCEDURE — 97140 MANUAL THERAPY 1/> REGIONS: CPT | Mod: GP | Performed by: PHYSICAL THERAPIST

## 2022-10-06 PROCEDURE — 99213 OFFICE O/P EST LOW 20 MIN: CPT | Mod: 95 | Performed by: FAMILY MEDICINE

## 2022-10-06 ASSESSMENT — ANXIETY QUESTIONNAIRES
8. IF YOU CHECKED OFF ANY PROBLEMS, HOW DIFFICULT HAVE THESE MADE IT FOR YOU TO DO YOUR WORK, TAKE CARE OF THINGS AT HOME, OR GET ALONG WITH OTHER PEOPLE?: NOT DIFFICULT AT ALL
4. TROUBLE RELAXING: NEARLY EVERY DAY
1. FEELING NERVOUS, ANXIOUS, OR ON EDGE: NEARLY EVERY DAY
IF YOU CHECKED OFF ANY PROBLEMS ON THIS QUESTIONNAIRE, HOW DIFFICULT HAVE THESE PROBLEMS MADE IT FOR YOU TO DO YOUR WORK, TAKE CARE OF THINGS AT HOME, OR GET ALONG WITH OTHER PEOPLE: NOT DIFFICULT AT ALL
5. BEING SO RESTLESS THAT IT IS HARD TO SIT STILL: NEARLY EVERY DAY
7. FEELING AFRAID AS IF SOMETHING AWFUL MIGHT HAPPEN: MORE THAN HALF THE DAYS
6. BECOMING EASILY ANNOYED OR IRRITABLE: SEVERAL DAYS
3. WORRYING TOO MUCH ABOUT DIFFERENT THINGS: NEARLY EVERY DAY
GAD7 TOTAL SCORE: 18
GAD7 TOTAL SCORE: 18
2. NOT BEING ABLE TO STOP OR CONTROL WORRYING: NEARLY EVERY DAY
GAD7 TOTAL SCORE: 18
7. FEELING AFRAID AS IF SOMETHING AWFUL MIGHT HAPPEN: MORE THAN HALF THE DAYS

## 2022-10-06 NOTE — PROGRESS NOTES
Abimael is a 32 year old who is being evaluated via a billable video visit.      How would you like to obtain your AVS? MyChart  If the video visit is dropped, the invitation should be resent by: Text to cell phone: [phone]  Will anyone else be joining your video visit? No          Assessment & Plan   Abimael Martinez is a very pleasant 32 year old male who presents today for follow-up he reports that he has been struggling with anxiety and panic symptoms since childhood.  He used to see Dr Lora for medication refill.  Abimael reports that he has tried different anxiety medication and  had side effects from  most of them.    currently taking Xanax 2 mg 3 times daily.    - Effexor : the best one tried , still have feleign of anixet , was having inflamamroty bowel symptoms  Stopped taking it.  - Cymbalta:had allergic reaction , was feeling sick and lightheaded    - Paxil: worse , had adverse reaction   - Zoloft : made depression worse   Anxiety   I had a discussion with the patient about his condition , diagnosis treatment options.  Discussed with him that I do not recommend Xanax for long-term management of anxiety. I offered weaning off Xanax and starting alternative like SRRI or referral to psychiatry for further evaluation and management   Patient would  like to see psychiatry for medication management   Patient verbalized understanding and agreed on the plan of care. All questions answered.     - Adult Mental Health  Referral; Future      PDMP Review       Value Time User    State PDMP site checked  Yes 10/6/2022  4:10 PM Jamari Merlos MD                       No follow-ups on file.    Jamari Merlos MD  [facility]   Abimael is a 32 year old, presenting for the following health issues:  No chief complaint on file.      History of Present Illness       Mental Health Follow-up:  Patient presents to follow-up on Anxiety.    Patient's anxiety since last  visit has been:  Bad  The patient is having other symptoms associated with anxiety.  Any significant life events: health concerns  Patient is feeling anxious or having panic attacks.  Patient has no concerns about alcohol or drug use.    Reason for visit:  Prescriptions    He eats 0-1 servings of fruits and vegetables daily.He consumes 0 sweetened beverage(s) daily.He exercises with enough effort to increase his heart rate 20 to 29 minutes per day.  He exercises with enough effort to increase his heart rate 3 or less days per week.   He is taking medications regularly.  Today's AYALA-7 Score: 18    Abimael Martinez is a 32 year old male who presents today for follow-up he reports that he has been struggling with anxiety and panic symptoms since childhood.  He used to see Dr Lora for medication refill.  Abimael reports that he has tried different anxiety medication and  had side effects from  most of them.    currently taking Xanax 2 mg 3 times daily.    - Effexor : the best one tried , still have feleign of anixet , was having inflamamroty bowel symptoms  Stopped taking it.  - Cymbalta:had allergic reaction , was feeling sick and lightheaded    - Paxil: worse , had adverse reaction   - Zoloft : made depression worse     Review of Systems   Constitutional, HEENT, cardiovascular, pulmonary, gi and gu systems are negative, except as otherwise noted.      Objective           Vitals:  No vitals were obtained today due to virtual visit.    Physical Exam   GENERAL: Healthy, alert and no distress  EYES: Eyes grossly normal to inspection.  No discharge or erythema, or obvious scleral/conjunctival abnormalities.  RESP: No audible wheeze, cough, or visible cyanosis.  No visible retractions or increased work of breathing.    SKIN: Visible skin clear. No significant rash, abnormal pigmentation or lesions.  NEURO: Cranial nerves grossly intact.  Mentation and speech appropriate for age.  PSYCH: Mentation appears normal,  affect normal/bright, judgement and insight intact, normal speech and appearance well-groomed.                Video-Visit Details    Video Start Time: 3:57 PM    Type of service:  Video Visit    Video End Time:4:38 PM    Originating Location (pt. Location): Other his car     Distant [facility] ([facility]     Platform used for Video Visit: Danilo

## 2022-10-12 RX ORDER — TACROLIMUS 0.1 %
OINTMENT (GRAM) TOPICAL
Qty: 60 G | Refills: 3 | Status: SHIPPED | OUTPATIENT
Start: 2022-10-12

## 2022-10-19 ENCOUNTER — OFFICE VISIT (OUTPATIENT)
Dept: DERMATOLOGY | Facility: CLINIC | Age: 32
End: 2022-10-19
Attending: DERMATOLOGY
Payer: COMMERCIAL

## 2022-10-19 DIAGNOSIS — D22.9 MULTIPLE BENIGN NEVI: ICD-10-CM

## 2022-10-19 DIAGNOSIS — D48.9 NEOPLASM OF UNCERTAIN BEHAVIOR: Primary | ICD-10-CM

## 2022-10-19 PROCEDURE — 99212 OFFICE O/P EST SF 10 MIN: CPT | Mod: 25 | Performed by: DERMATOLOGY

## 2022-10-19 PROCEDURE — 88305 TISSUE EXAM BY PATHOLOGIST: CPT | Performed by: DERMATOLOGY

## 2022-10-19 PROCEDURE — 54100 BIOPSY OF PENIS: CPT | Performed by: DERMATOLOGY

## 2022-10-19 RX ORDER — MUPIROCIN 20 MG/G
OINTMENT TOPICAL
Qty: 22 G | Refills: 0 | Status: SHIPPED | OUTPATIENT
Start: 2022-10-19 | End: 2022-12-08

## 2022-10-19 NOTE — PATIENT INSTRUCTIONS

## 2022-10-19 NOTE — PROGRESS NOTES
[facility] Dermatology Note  Encounter Date: Oct 19, 2022  Office Visit     Dermatology Problem List:  0. NUB - ventral penis, s/p bx 10/19/22     1. Dermatitis, groin area. Suspected irritant versus contact dermatitis.  - has seasonal allergies with asthma per Dr. Harris  - path testing with delayed reaction to black tattoo  - vaseline; elidel  - prior tx: triamamcinolone 0.025% ointment BID, protopic 0.1% ointment BID PRN  2. Allergic contact dermatitis  - s/p punch biopsy 11/19/19  - patch test negative  - triamcinolone 0.1% ointment BID  3. COVID-19 Nov-2020  4. 3-4x 1 mm pale, firm papules on the ventral penis  - s/p bx 10/19/22  5. Tinea corporis   - Past tx: terbinafine    - Current tx: ketoconazole 2% cream and shampoo     ____________________________________________     Assessment & Plan:     # neoplasm of uncertain behavior of skin, ventral penis   3-4x 1 mm pale, firm papules on the ventral penis. The differential diagnosis includes apocrine gland vs ruptured follicle vs pearly penile papule vs scar tissue. Patient notes pain and irritation during and after erection. Previously tried triamcinolone, discontinued due to irritation. Has been using Protopic with temporary improvement noted. Reviewed treatment options including surgical intervention, Hyfrecator, monitoring. Biopsy is warranted for further diagnosis, patient is anxious but agreeable about biopsy today.   - See punch biopsy procedure note below.   - Photograph obtained.  - Recommended regular use of Bacitracin twice daily in the biopsy site.     # Nevus - right arm. Reviewed the benign nature was discussed. No further intervention required at this time.        Procedures Performed:   - Punch biopsy procedure note, location(s): ventral penis. After discussion of benefits and risks including but not limited to bleeding, infection, scar, incomplete removal, recurrence, and non-diagnostic biopsy, written consent and  photographs were obtained. The area was cleaned with isopropyl alcohol. 0.5mL of 1% lidocaine with epinephrine was injected to obtain adequate anesthesia and a 2 mm punch biopsy was performed at site(s). 5-0 Fast absorbing gut sutures were utilized to approximate the epidermal edges. White petrolatum ointment and a bandage was applied to the wound. Explicit verbal and written wound care instructions were provided. The patient left the dermatology clinic in good condition.        Follow-up: pending path results    Staff and Scribe:     Scribe Disclosure:   I, Steven Rees, am serving as a scribe to document services personally performed by this physician, Dr. Castillo Narayanan, based on data collection and the provider's statements to me.       Provider Disclosure:   The documentation recorded by the scribe accurately reflects the services I personally performed and the decisions made by me.  I personally performed the procedures today.    Castillo Narayanan DO    Department of Dermatology  [facility]: Phone: [phone], Fax:[phone]  [facility]: Phone: [phone], Fax: [phone]    ____________________________________________    CC: Derm Problem (Recheck mole on right wrist and 2nd opinion on penile papules.  Discuss all treatment options.)    HPI:  Mr. Abimael Martinez is a(n) 32 year old male who presents today as a return patient for a spot check.    Last seen 9/14/22 by Dr. Arvizu for a concerning skin lesion. At that time, patient was instructed he could use doxycycline for 48 hours after intercourse for anti-inflammatory effects, explained this is off-label use. Reviewed treatment options including topical steroids, neurology referral for pain, and surgical removal.    Today, he would like a mole on the right wrist evaluated. It appeared a few months ago. He would also like a second  "opinion about some spots on his penis. He notes a previous dermatologist said they were glands, and get irritated and painful during sexual activity, and feels like \"stones\" under the skin. He remembers it may have been present when he was younger, has noticed it more recently. He notes that he had suture tracts previously removed in the past. He notes he has been using Protopic on his skin generally and on his penis. Denies any itchiness on elbows or body, notes some scalp itchiness.     Patient is otherwise feeling well, without additional skin concerns.    Labs Reviewed:  N/A    Physical Exam:  Vitals: There were no vitals taken for this visit.  SKIN: Focused examination of right wrist, groin was performed.  - Ventral penis: 3-4 1 mm pale firm papules.  - Regular brown pigmented macule on the right wrist.    - No other lesions of concern on areas examined.     Medications:  Current Outpatient Medications   Medication     albuterol (PROAIR HFA/PROVENTIL HFA/VENTOLIN HFA) 108 (90 Base) MCG/ACT inhaler     ALPRAZolam (XANAX) 2 MG tablet     amphetamine-dextroamphetamine (ADDERALL) 20 MG tablet     azelastine (ASTELIN) 0.1 % nasal spray     cetirizine (ZYRTEC) 10 MG tablet     famotidine (PEPCID) 40 MG tablet     fluticasone (FLOVENT HFA) 220 MCG/ACT Inhaler     hydrocortisone (ANUSOL-HC) 25 MG suppository     Hyoscyamine Sulfate 0.375 MG TBCR     ketoconazole (NIZORAL) 2 % external shampoo     loratadine (CLARITIN) 10 MG tablet     mometasone (ELOCON) 0.1 % external ointment     montelukast (SINGULAIR) 10 MG tablet     naloxone (NARCAN) 4 MG/0.1ML nasal spray     omeprazole (PRILOSEC) 20 MG DR capsule     pimecrolimus (ELIDEL) 1 % external cream     PROTOPIC 0.1 % external ointment     sildenafil (REVATIO) 20 MG tablet     tacrolimus (PROTOPIC) 0.03 % external ointment     tiZANidine (ZANAFLEX) 4 MG tablet     triamcinolone (KENALOG) 0.1 % external ointment     No current facility-administered medications for this " visit.      Past Medical History:   Patient Active Problem List   Diagnosis     Anxiety     CARDIOVASCULAR SCREENING; LDL GOAL LESS THAN 160     High risk sexual behavior     Low back pain     Essential hypertension     Internal hemorrhoids which bleed     Obesity, Class I, BMI 30-34.9     Attention deficit hyperactivity disorder (ADHD), combined type     AYALA (generalized anxiety disorder)     OCD (obsessive compulsive disorder)     Drug-induced erectile dysfunction     Mild persistent asthma without complication     Lumbosacral radiculopathy     Dyslipidemia     Elevated serum creatinine     Former smoker     Morbid obesity (H)     Pelvic pain in male     Testicular pain     Pelvic floor dysfunction     Urinary incontinence     Past Medical History:   Diagnosis Date     Acute right otitis media 1/8/2013     ADHD (attention deficit hyperactivity disorder), combined type      Anxiety      Depression      Drug abuse (H)      Dyslipidemia      Gonococcal urethritis 02/05/2016     Hypertension      Internal hemorrhoids     which bleed     Lumbosacral radiculopathy      Obese      OCD (obsessive compulsive disorder)      Other chronic pain     Low Back Pain     Uncomplicated asthma      Urethritis 5/20/2013     Problem list name updated by automated process. Provider to review        CC Chelita Arvizu MD  [address] [facility] [address]  [place] [address] on close of this encounter.

## 2022-10-19 NOTE — LETTER
10/19/2022         RE: Abimael Martinez  [address]  [facility] [address]        Dear Colleague,    Thank you for referring your patient, Abimael Martinez, to the [facility]. Please see a copy of my visit note below.    [facility] Dermatology Note  Encounter Date: Oct 19, 2022  Office Visit     Dermatology Problem List:  0. NUB - ventral penis, s/p bx 10/19/22     1. Dermatitis, groin area. Suspected irritant versus contact dermatitis.  - has seasonal allergies with asthma per Dr. Harris  - path testing with delayed reaction to black tattoo  - vaseline; elidel  - prior tx: triamamcinolone 0.025% ointment BID, protopic 0.1% ointment BID PRN  2. Allergic contact dermatitis  - s/p punch biopsy 11/19/19  - patch test negative  - triamcinolone 0.1% ointment BID  3. COVID-19 Nov-2020  4. 3-4x 1 mm pale, firm papules on the ventral penis  - s/p bx 10/19/22  5. Tinea corporis   - Past tx: terbinafine    - Current tx: ketoconazole 2% cream and shampoo     ____________________________________________     Assessment & Plan:     # neoplasm of uncertain behavior of skin, ventral penis   3-4x 1 mm pale, firm papules on the ventral penis. The differential diagnosis includes apocrine gland vs ruptured follicle vs pearly penile papule vs scar tissue. Patient notes pain and irritation during and after erection. Previously tried triamcinolone, discontinued due to irritation. Has been using Protopic with temporary improvement noted. Reviewed treatment options including surgical intervention, Hyfrecator, monitoring. Biopsy is warranted for further diagnosis, patient is anxious but agreeable about biopsy today.   - See punch biopsy procedure note below.   - Photograph obtained.  - Recommended regular use of Bacitracin twice daily in the biopsy site.     # Nevus - right arm. Reviewed the benign nature was discussed. No further intervention required at this time.         Procedures Performed:   - Punch biopsy procedure note, location(s): ventral penis. After discussion of benefits and risks including but not limited to bleeding, infection, scar, incomplete removal, recurrence, and non-diagnostic biopsy, written consent and photographs were obtained. The area was cleaned with isopropyl alcohol. 0.5mL of 1% lidocaine with epinephrine was injected to obtain adequate anesthesia and a 2 mm punch biopsy was performed at site(s). 5-0 Fast absorbing gut sutures were utilized to approximate the epidermal edges. White petrolatum ointment and a bandage was applied to the wound. Explicit verbal and written wound care instructions were provided. The patient left the dermatology clinic in good condition.        Follow-up: pending path results    Staff and Scribe:     Scribe Disclosure:   I, Steven Rees, am serving as a scribe to document services personally performed by this physician, Dr. Castillo Narayanan, based on data collection and the provider's statements to me.       Provider Disclosure:   The documentation recorded by the scribe accurately reflects the services I personally performed and the decisions made by me.  I personally performed the procedures today.    Castillo Narayanan DO    Department of Dermatology  [facility]: Phone: [phone], Fax:[phone]  [facility]: Phone: [phone], Fax: [phone]    ____________________________________________    CC: Derm Problem (Recheck mole on right wrist and 2nd opinion on penile papules.  Discuss all treatment options.)    HPI:  Mr. Abimael Martinez is a(n) 32 year old male who presents today as a return patient for a spot check.    Last seen 9/14/22 by Dr. Arvizu for a concerning skin lesion. At that time, patient was instructed he could use doxycycline for 48 hours after intercourse for anti-inflammatory  "effects, explained this is off-label use. Reviewed treatment options including topical steroids, neurology referral for pain, and surgical removal.    Today, he would like a mole on the right wrist evaluated. It appeared a few months ago. He would also like a second opinion about some spots on his penis. He notes a previous dermatologist said they were glands, and get irritated and painful during sexual activity, and feels like \"stones\" under the skin. He remembers it may have been present when he was younger, has noticed it more recently. He notes that he had suture tracts previously removed in the past. He notes he has been using Protopic on his skin generally and on his penis. Denies any itchiness on elbows or body, notes some scalp itchiness.     Patient is otherwise feeling well, without additional skin concerns.    Labs Reviewed:  N/A    Physical Exam:  Vitals: There were no vitals taken for this visit.  SKIN: Focused examination of right wrist, groin was performed.  - Ventral penis: 3-4 1 mm pale firm papules.  - Regular brown pigmented macule on the right wrist.    - No other lesions of concern on areas examined.     Medications:  Current Outpatient Medications   Medication     albuterol (PROAIR HFA/PROVENTIL HFA/VENTOLIN HFA) 108 (90 Base) MCG/ACT inhaler     ALPRAZolam (XANAX) 2 MG tablet     amphetamine-dextroamphetamine (ADDERALL) 20 MG tablet     azelastine (ASTELIN) 0.1 % nasal spray     cetirizine (ZYRTEC) 10 MG tablet     famotidine (PEPCID) 40 MG tablet     fluticasone (FLOVENT HFA) 220 MCG/ACT Inhaler     hydrocortisone (ANUSOL-HC) 25 MG suppository     Hyoscyamine Sulfate 0.375 MG TBCR     ketoconazole (NIZORAL) 2 % external shampoo     loratadine (CLARITIN) 10 MG tablet     mometasone (ELOCON) 0.1 % external ointment     montelukast (SINGULAIR) 10 MG tablet     naloxone (NARCAN) 4 MG/0.1ML nasal spray     omeprazole (PRILOSEC) 20 MG DR capsule     pimecrolimus (ELIDEL) 1 % external cream     " PROTOPIC 0.1 % external ointment     sildenafil (REVATIO) 20 MG tablet     tacrolimus (PROTOPIC) 0.03 % external ointment     tiZANidine (ZANAFLEX) 4 MG tablet     triamcinolone (KENALOG) 0.1 % external ointment     No current facility-administered medications for this visit.      Past Medical History:   Patient Active Problem List   Diagnosis     Anxiety     CARDIOVASCULAR SCREENING; LDL GOAL LESS THAN 160     High risk sexual behavior     Low back pain     Essential hypertension     Internal hemorrhoids which bleed     Obesity, Class I, BMI 30-34.9     Attention deficit hyperactivity disorder (ADHD), combined type     AYALA (generalized anxiety disorder)     OCD (obsessive compulsive disorder)     Drug-induced erectile dysfunction     Mild persistent asthma without complication     Lumbosacral radiculopathy     Dyslipidemia     Elevated serum creatinine     Former smoker     Morbid obesity (H)     Pelvic pain in male     Testicular pain     Pelvic floor dysfunction     Urinary incontinence     Past Medical History:   Diagnosis Date     Acute right otitis media 1/8/2013     ADHD (attention deficit hyperactivity disorder), combined type      Anxiety      Depression      Drug abuse (H)      Dyslipidemia      Gonococcal urethritis 02/05/2016     Hypertension      Internal hemorrhoids     which bleed     Lumbosacral radiculopathy      Obese      OCD (obsessive compulsive disorder)      Other chronic pain     Low Back Pain     Uncomplicated asthma      Urethritis 5/20/2013     Problem list name updated by automated process. Provider to review        CC Chelita Arvizu MD  [address] [address] on close of this encounter.      Again, thank you for allowing me to participate in the care of your patient.        Sincerely,        Castillo Narayanan MD

## 2022-10-19 NOTE — NURSING NOTE
Abimael Martinez's goals for this visit include:   Chief Complaint   Patient presents with     Derm Problem     Recheck mole on right wrist and 2nd opinion on penile papules.  Discuss all treatment options.       He requests these members of his care team be copied on today's visit information: n/a    PCP: Jamison Lora (Inactive)    Referring Provider:  Chelita Arvizu MD  [address] [place] [address] [address]  [place] [address]    There were no vitals taken for this visit.    Do you need any medication refills at today's visit? No  Lisa Low RN

## 2022-10-26 LAB
PATH REPORT.COMMENTS IMP SPEC: NORMAL
PATH REPORT.COMMENTS IMP SPEC: NORMAL
PATH REPORT.FINAL DX SPEC: NORMAL
PATH REPORT.GROSS SPEC: NORMAL
PATH REPORT.MICROSCOPIC SPEC OTHER STN: NORMAL
PATH REPORT.RELEVANT HX SPEC: NORMAL

## 2022-12-08 ENCOUNTER — OFFICE VISIT (OUTPATIENT)
Dept: DERMATOLOGY | Facility: CLINIC | Age: 32
End: 2022-12-08
Payer: COMMERCIAL

## 2022-12-08 DIAGNOSIS — N48.89 PEARLY PENILE PAPULES: Primary | ICD-10-CM

## 2022-12-08 DIAGNOSIS — D22.61: ICD-10-CM

## 2022-12-08 DIAGNOSIS — D48.9 NEOPLASM OF UNCERTAIN BEHAVIOR: ICD-10-CM

## 2022-12-08 PROCEDURE — 11104 PUNCH BX SKIN SINGLE LESION: CPT | Performed by: DERMATOLOGY

## 2022-12-08 PROCEDURE — 17110 DESTRUCTION B9 LES UP TO 14: CPT | Mod: XS | Performed by: DERMATOLOGY

## 2022-12-08 PROCEDURE — 88305 TISSUE EXAM BY PATHOLOGIST: CPT | Performed by: DERMATOLOGY

## 2022-12-08 RX ORDER — MUPIROCIN 20 MG/G
OINTMENT TOPICAL
Qty: 22 G | Refills: 0 | Status: SHIPPED | OUTPATIENT
Start: 2022-12-08 | End: 2023-10-05

## 2022-12-08 RX ORDER — MORPHINE SULFATE 15 MG/1
15 TABLET, FILM COATED, EXTENDED RELEASE ORAL EVERY 12 HOURS
COMMUNITY
End: 2023-10-05

## 2022-12-08 NOTE — PROGRESS NOTES
[facility] Dermatology Note  Encounter Date: Dec 8, 2022  Office Visit     Dermatology Problem List:  0. Inflamed nevus, R wrist s/p punch 12/8/22    1. Dermatitis, groin area. Suspected irritant versus contact dermatitis.  - has seasonal allergies with asthma per Dr. Harris  - path testing with delayed reaction to black tattoo  - vaseline; elidel  - prior tx: triamamcinolone 0.025% ointment BID, protopic 0.1% ointment BID PRN  2. Allergic contact dermatitis  - s/p punch biopsy 11/19/19  - patch test negative  - triamcinolone 0.1% ointment BID  3. COVID-19 Nov-2020  4. 3-4x 1 mm pale, firm papules on the ventral penis  - s/p bx 10/19/22  5. Tinea corporis   - Past tx: terbinafine    - Current tx: ketoconazole 2% cream and shampoo    6. 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         Hyfrecation 12/8/22       ____________________________________________     Assessment & Plan:     # Irritated Angiofibromas, ventral penis x4  Treatment options reviewed including punch excision, hyfrecation, and observation.     Ultimately he would prefer Hyfrecation.   After discussion of benefits and risks including but not limited to bleeding, infection, scar, incomplete destruction, recurrence, and unchanged or worsening symptoms, verbal consent was obtained. The area was cleaned with isopropyl alcohol. 0.5mL of 1% lidocaine with epinephrine was injected to obtain adequate anesthesia of each spot and hyfrecation was performed on level 13. Each site was cleansed with sterile saline and petroleum jelly was applied. Wound care reviewed.       # Irritated Nevus - right wrist. Reviewed the benign nature was discussed. Patient notes there is more pain associated with touching it. He would prefer to remove it to alleviate symptoms.     Punch biopsy:  After discussion of benefits and risks including but not limited to bleeding/bruising, pain/swelling, infection, scar, incomplete removal, nerve damage/numbness,  recurrence, and non-diagnostic biopsy, verbal consent and photographs were obtained. Time-out was performed. The area was cleaned with isopropyl alcohol. 0.5mL of 1% lidocaine with epinephrine was injected to obtain adequate anesthesia of the lesion. A 5 mm punch biopsy was performed.  4-0 monocryl and 5-0 fast absorbing gut sutures were utilized to approximate the epidermal edges.  White petroleum jelly/VaselineTM and a bandage was applied to the wound.  Explicit verbal and written wound care instructions were provided.  The patient left the Dermatology Clinic in good condition.     Follow-up: pending path results    Provider Disclosure:   I personally performed the destruction procedures today. The fellow performed the punch biopsy. I was present for all procedures.     Castillo Narayanan DO    Department of Dermatology  [facility]: Phone: [phone], Fax:[phone]  [facility]: Phone: [phone], Fax: [phone]    ____________________________________________    CC: Procedure (Hyfercation of angiofibroma)    HPI:  Mr. Abimael Martinez is a(n) 32 year old male who presents today as a return patient for a spot check.    Last seen 10/19/22 by me. Punch biopsy done to confirm diagnosis of symptomatic papules on ventral penis. The mole on his right wrist appeared benign and he was reassured.     Today, he notes penile pain symptoms are largely unchanged since the punch biopsy. There is still some lingering discomfort on the right side adjacent to where the punch biopsy was done. He is unable to say if discomfort is from scar tissue or residual angiofibroma.     The mole on his wrist seems to be more painful when touched. He is compelled to manipulate it as well.     Patient is otherwise feeling well, without additional skin concerns.    Labs Reviewed:  N/A    Physical Exam:  Vitals: There  were no vitals taken for this visit.  SKIN: Focused examination of right wrist, groin was performed.  - Ventral penis: 3-4 1 mm pale firm papules.  - Regular brown pigmented macule on the right wrist.    - No other lesions of concern on areas examined.     Medications:  Current Outpatient Medications   Medication     albuterol (PROAIR HFA/PROVENTIL HFA/VENTOLIN HFA) 108 (90 Base) MCG/ACT inhaler     ALPRAZolam (XANAX) 2 MG tablet     amphetamine-dextroamphetamine (ADDERALL) 20 MG tablet     azelastine (ASTELIN) 0.1 % nasal spray     cetirizine (ZYRTEC) 10 MG tablet     famotidine (PEPCID) 40 MG tablet     fluticasone (FLOVENT HFA) 220 MCG/ACT Inhaler     hydrocortisone (ANUSOL-HC) 25 MG suppository     Hyoscyamine Sulfate 0.375 MG TBCR     ketoconazole (NIZORAL) 2 % external shampoo     loratadine (CLARITIN) 10 MG tablet     mometasone (ELOCON) 0.1 % external ointment     montelukast (SINGULAIR) 10 MG tablet     morphine (MS CONTIN) 15 MG CR tablet     mupirocin (BACTROBAN) 2 % external ointment     naloxone (NARCAN) 4 MG/0.1ML nasal spray     omeprazole (PRILOSEC) 20 MG DR capsule     pimecrolimus (ELIDEL) 1 % external cream     PROTOPIC 0.1 % external ointment     sildenafil (REVATIO) 20 MG tablet     tacrolimus (PROTOPIC) 0.03 % external ointment     tiZANidine (ZANAFLEX) 4 MG tablet     triamcinolone (KENALOG) 0.1 % external ointment     No current facility-administered medications for this visit.      Past Medical History:   Patient Active Problem List   Diagnosis     Anxiety     CARDIOVASCULAR SCREENING; LDL GOAL LESS THAN 160     High risk sexual behavior     Low back pain     Essential hypertension     Internal hemorrhoids which bleed     Obesity, Class I, BMI 30-34.9     Attention deficit hyperactivity disorder (ADHD), combined type     AYALA (generalized anxiety disorder)     OCD (obsessive compulsive disorder)     Drug-induced erectile dysfunction     Mild persistent asthma without complication      Lumbosacral radiculopathy     Dyslipidemia     Elevated serum creatinine     Former smoker     Morbid obesity (H)     Pelvic pain in male     Testicular pain     Pelvic floor dysfunction     Urinary incontinence     Past Medical History:   Diagnosis Date     Acute right otitis media 1/8/2013     ADHD (attention deficit hyperactivity disorder), combined type      Anxiety      Depression      Drug abuse (H)      Dyslipidemia      Gonococcal urethritis 02/05/2016     Hypertension      Internal hemorrhoids     which bleed     Lumbosacral radiculopathy      Obese      OCD (obsessive compulsive disorder)      Other chronic pain     Low Back Pain     Uncomplicated asthma      Urethritis 5/20/2013     Problem list name updated by automated process. Provider to review        CC Chelita Arvizu MD  [address] [facility] [address]  [place] [address] on close of this encounter.

## 2022-12-08 NOTE — PATIENT INSTRUCTIONS
Wound Care After a Biopsy    What is a skin biopsy?  A skin biopsy allows the doctor to examine a very small piece of tissue under the microscope to determine the diagnosis and the best treatment for the skin condition. A local anesthetic (numbing medicine)  is injected with a very small needle into the skin area to be tested. A small piece of skin is taken from the area. Sometimes a suture (stitch) is used.     What are the risks of a skin biopsy?  I will experience scar, bleeding, swelling, pain, crusting and redness. I may experience incomplete removal or recurrence. Risks of this procedure are excessive bleeding, bruising, infection, nerve damage, numbness, thick (hypertrophic or keloidal) scar and non-diagnostic biopsy.    How should I care for my wound for the first 24 hours?  Keep the wound dry and covered for 24 hours  If it bleeds, hold direct pressure on the area for 15 minutes. If bleeding does not stop then go to the emergency room  Avoid strenuous exercise the first 1-2 days or as your doctor instructs you    How should I care for the wound after 24 hours?  After 24 hours, remove the bandage  You may bathe or shower as normal  If you had a scalp biopsy, you can shampoo as usual and can use shower water to clean the biopsy site daily  Clean the wound twice a day with gentle soap and water  Do not scrub, be gentle  Apply white petroleum/Vaseline after cleaning the wound with a cotton swab or a clean finger, and keep the site covered with a Bandaid /bandage. Bandages are not necessary with a scalp biopsy  If you are unable to cover the site with a Bandaid /bandage, re-apply ointment 2-3 times a day to keep the site moist. Moisture will help with healing  Avoid strenuous activity for first 1-2 days  Avoid lakes, rivers, pools, and oceans until the stitches are removed or the site is healed    How do I clean my wound?  Wash hands thoroughly with soap or use hand  before all wound care  Clean the  wound with gentle soap and water  Apply white petroleum/Vaseline  to wound after it is clean  Replace the Bandaid /bandage to keep the wound covered for the first few days or as instructed by your doctor  If you had a scalp biopsy, warm shower water to the area on a daily basis should suffice    What should I use to clean my wound?   Cotton-tipped applicators (Qtips )  White petroleum jelly (Vaseline ). Use a clean new container and use Q-tips to apply.  Bandaids   as needed  Gentle soap     How should I care for my wound long term?  Do not get your wound dirty  Keep up with wound care for one week or until the area is healed.  A small scab will form and fall off by itself when the area is completely healed. The area will be red and will become pink in color as it heals. Sun protection is very important for how your scar will turn out. Sunscreen with an SPF 30 or greater is recommended once the area is healed.  Stitches will dissolve.   You should have some soreness but it should be mild and slowly go away over several days. Talk to your doctor about using tylenol for pain,    When should I call my doctor?  If you have increased:   Pain or swelling  Pus or drainage (clear or slightly yellow drainage is ok)  Temperature over 100F  Spreading redness or warmth around wound    When will I hear about my results?  The biopsy results can take 2 weeks to come back.  Your results will automatically release to [employer] before your provider has even reviewed them.  The clinic will call you with the results, send you a [employer] message, or have you schedule a follow-up clinic or phone time to discuss the results.  Contact our clinics if you do not hear from us in 2 weeks.    Who should I call with questions?  [facility]: [phone]  [facility]: [phone]  For urgent needs outside of business hours call the [facility] at [phone] and ask for the  dermatology resident on call

## 2022-12-08 NOTE — LETTER
12/8/2022         RE: Abimael Martinez  [address]  [facility] [address]        Dear Colleague,    Thank you for referring your patient, Abimael Martinez, to the [facility]. Please see a copy of my visit note below.    [facility] Dermatology Note  Encounter Date: Dec 8, 2022  Office Visit     Dermatology Problem List:  0. Inflamed nevus, R wrist s/p punch 12/8/22    1. Dermatitis, groin area. Suspected irritant versus contact dermatitis.  - has seasonal allergies with asthma per Dr. Harris  - path testing with delayed reaction to black tattoo  - vaseline; elidel  - prior tx: triamamcinolone 0.025% ointment BID, protopic 0.1% ointment BID PRN  2. Allergic contact dermatitis  - s/p punch biopsy 11/19/19  - patch test negative  - triamcinolone 0.1% ointment BID  3. COVID-19 Nov-2020  4. 3-4x 1 mm pale, firm papules on the ventral penis  - s/p bx 10/19/22  5. Tinea corporis   - Past tx: terbinafine    - Current tx: ketoconazole 2% cream and shampoo    6. 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         Hyfrecation 12/8/22       ____________________________________________     Assessment & Plan:     # Irritated Angiofibromas, ventral penis x4  Treatment options reviewed including punch excision, hyfrecation, and observation.     Ultimately he would prefer Hyfrecation.   After discussion of benefits and risks including but not limited to bleeding, infection, scar, incomplete destruction, recurrence, and unchanged or worsening symptoms, verbal consent was obtained. The area was cleaned with isopropyl alcohol. 0.5mL of 1% lidocaine with epinephrine was injected to obtain adequate anesthesia of each spot and hyfrecation was performed on level 13. Each site was cleansed with sterile saline and petroleum jelly was applied. Wound care reviewed.       # Irritated Nevus - right wrist. Reviewed the benign nature was discussed. Patient notes there is  Return to the emergency department with worsening symptoms, uncontrolled pain, inability to tolerate oral liquids, fever greater than 101° F not controlled by Tylenol or as needed with emergent concerns.     more pain associated with touching it. He would prefer to remove it to alleviate symptoms.     Punch biopsy:  After discussion of benefits and risks including but not limited to bleeding/bruising, pain/swelling, infection, scar, incomplete removal, nerve damage/numbness, recurrence, and non-diagnostic biopsy, verbal consent and photographs were obtained. Time-out was performed. The area was cleaned with isopropyl alcohol. 0.5mL of 1% lidocaine with epinephrine was injected to obtain adequate anesthesia of the lesion. A 5 mm punch biopsy was performed.  4-0 monocryl and 5-0 fast absorbing gut sutures were utilized to approximate the epidermal edges.  White petroleum jelly/VaselineTM and a bandage was applied to the wound.  Explicit verbal and written wound care instructions were provided.  The patient left the Dermatology Clinic in good condition.     Follow-up: pending path results    Provider Disclosure:   I personally performed the destruction procedures today. The fellow performed the punch biopsy. I was present for all procedures.     Catsillo Narayanan DO    Department of Dermatology  [facility]: Phone: [phone], Fax:[phone]  [facility]: Phone: [phone], Fax: [phone]    ____________________________________________    CC: Procedure (Hyfercation of angiofibroma)    HPI:  Mr. Abimael Martinez is a(n) 32 year old male who presents today as a return patient for a spot check.    Last seen 10/19/22 by me. Punch biopsy done to confirm diagnosis of symptomatic papules on ventral penis. The mole on his right wrist appeared benign and he was reassured.     Today, he notes penile pain symptoms are largely unchanged since the punch biopsy. There is still some lingering discomfort on the right side adjacent to where the punch biopsy was done. He is unable to say if discomfort is from  scar tissue or residual angiofibroma.     The mole on his wrist seems to be more painful when touched. He is compelled to manipulate it as well.     Patient is otherwise feeling well, without additional skin concerns.    Labs Reviewed:  N/A    Physical Exam:  Vitals: There were no vitals taken for this visit.  SKIN: Focused examination of right wrist, groin was performed.  - Ventral penis: 3-4 1 mm pale firm papules.  - Regular brown pigmented macule on the right wrist.    - No other lesions of concern on areas examined.     Medications:  Current Outpatient Medications   Medication     albuterol (PROAIR HFA/PROVENTIL HFA/VENTOLIN HFA) 108 (90 Base) MCG/ACT inhaler     ALPRAZolam (XANAX) 2 MG tablet     amphetamine-dextroamphetamine (ADDERALL) 20 MG tablet     azelastine (ASTELIN) 0.1 % nasal spray     cetirizine (ZYRTEC) 10 MG tablet     famotidine (PEPCID) 40 MG tablet     fluticasone (FLOVENT HFA) 220 MCG/ACT Inhaler     hydrocortisone (ANUSOL-HC) 25 MG suppository     Hyoscyamine Sulfate 0.375 MG TBCR     ketoconazole (NIZORAL) 2 % external shampoo     loratadine (CLARITIN) 10 MG tablet     mometasone (ELOCON) 0.1 % external ointment     montelukast (SINGULAIR) 10 MG tablet     morphine (MS CONTIN) 15 MG CR tablet     mupirocin (BACTROBAN) 2 % external ointment     naloxone (NARCAN) 4 MG/0.1ML nasal spray     omeprazole (PRILOSEC) 20 MG DR capsule     pimecrolimus (ELIDEL) 1 % external cream     PROTOPIC 0.1 % external ointment     sildenafil (REVATIO) 20 MG tablet     tacrolimus (PROTOPIC) 0.03 % external ointment     tiZANidine (ZANAFLEX) 4 MG tablet     triamcinolone (KENALOG) 0.1 % external ointment     No current facility-administered medications for this visit.      Past Medical History:   Patient Active Problem List   Diagnosis     Anxiety     CARDIOVASCULAR SCREENING; LDL GOAL LESS THAN 160     High risk sexual behavior     Low back pain     Essential hypertension     Internal hemorrhoids which bleed      Obesity, Class I, BMI 30-34.9     Attention deficit hyperactivity disorder (ADHD), combined type     AYALA (generalized anxiety disorder)     OCD (obsessive compulsive disorder)     Drug-induced erectile dysfunction     Mild persistent asthma without complication     Lumbosacral radiculopathy     Dyslipidemia     Elevated serum creatinine     Former smoker     Morbid obesity (H)     Pelvic pain in male     Testicular pain     Pelvic floor dysfunction     Urinary incontinence     Past Medical History:   Diagnosis Date     Acute right otitis media 1/8/2013     ADHD (attention deficit hyperactivity disorder), combined type      Anxiety      Depression      Drug abuse (H)      Dyslipidemia      Gonococcal urethritis 02/05/2016     Hypertension      Internal hemorrhoids     which bleed     Lumbosacral radiculopathy      Obese      OCD (obsessive compulsive disorder)      Other chronic pain     Low Back Pain     Uncomplicated asthma      Urethritis 5/20/2013     Problem list name updated by automated process. Provider to review        CC Chelita Arvizu MD  [address] [place] [id number] on close of this encounter.      Again, thank you for allowing me to participate in the care of your patient.        Sincerely,        Castillo Narayanan MD

## 2022-12-08 NOTE — NURSING NOTE
Abimael Martinez's goals for this visit include:   Chief Complaint   Patient presents with     Procedure     Hyfercation of angiofibroma       He requests these members of his care team be copied on today's visit information: n/a    PCP: Jamison Lora    Referring Provider:  No referring provider defined for this encounter.    There were no vitals taken for this visit.    Do you need any medication refills at today's visit? No  Lisa Low RN

## 2022-12-15 PROBLEM — N50.819 TESTICULAR PAIN: Status: RESOLVED | Noted: 2022-08-18 | Resolved: 2022-12-15

## 2022-12-15 PROBLEM — M62.89 PELVIC FLOOR DYSFUNCTION: Status: RESOLVED | Noted: 2022-08-18 | Resolved: 2022-12-15

## 2022-12-15 PROBLEM — R32 URINARY INCONTINENCE: Status: RESOLVED | Noted: 2022-08-18 | Resolved: 2022-12-15

## 2022-12-15 PROBLEM — R10.2 PELVIC PAIN IN MALE: Status: RESOLVED | Noted: 2022-08-18 | Resolved: 2022-12-15

## 2022-12-15 NOTE — PROGRESS NOTES
Subjective:  HPI  Physical Exam                    Objective:  System    Physical Exam    General     ROS    Assessment/Plan:    DISCHARGE REPORT    Progress reporting period is from 8/18/22 to 10/6/22     SUBJECTIVE  Subjective: Abimael reports being more active overall with ADL's, with minimal effect on the pelvic pain. He feels slightly less pelvic pain overall. He is doing the kegels on daily basis and notices that he has tension/tightness when doing the kegels, trying to focus on relaxing the muscles. min change in urinary hesitation, occasional small drips of urine.   Current Pain level: 1/10            ;   ,     Patient has failed to return to therapy so current objective findings are unknown.  The subjective and objective information are from the last SOAP note on this patient.    OBJECTIVE  Objective: PT/ mm tension with palpation to LA B- no worse after MT. Instruct in PF/ABD with roll outs and hip FLX stretch. Review hamstring and piriformis st with verbal cues for technique. Advise to continue gentle PF stretching/massage daily.      ASSESSMENT/PLAN  Updated problem list and treatment plan: Diagnosis 1: perineal/B groin pain, PFM dysfunction   STG/LTGs have been met or progress has been made towards goals:    Assessment of Progress: The patient has not returned to therapy. Current status is unknown.  Self Management Plans:  Patient has been instructed in a home treatment program.  Patient  has been instructed in self management of symptoms.    Abimael continues to require the following intervention to meet STG and LTG's: PT  The patient failed to complete scheduled/ordered appointments so current information is unknown.  We will discharge this patient from PT.    Recommendations:  Discharge from PT.    Please refer to the daily flowsheet for treatment today, total treatment time and time spent performing 1:1 timed codes.

## 2023-01-06 ENCOUNTER — TELEPHONE (OUTPATIENT)
Dept: NEUROSURGERY | Facility: CLINIC | Age: 33
End: 2023-01-06

## 2023-01-06 DIAGNOSIS — Z98.890 S/P LUMBAR MICRODISCECTOMY: Primary | ICD-10-CM

## 2023-01-06 NOTE — TELEPHONE ENCOUNTER
Called patient to provide update on below recommendations from Shazia Dunbar PA-C.     Patient verbalized understanding, call transferred to scheduling team to coordinate.

## 2023-01-06 NOTE — TELEPHONE ENCOUNTER
Spoke with the patient, recommend a lumbar MRI without contrast.  (Patient does not want to contrast).  He would like this to be done at the [facility] in [place].  Please fax order to them.  Thanks

## 2023-01-06 NOTE — TELEPHONE ENCOUNTER
Patient is at ED waiting to be seen.  Extreme right foot pain.  ED recommend patient call clinic as the triage line is very long where patient is currently waiting.    Patient had surgery 1 year ago, and now has severe pain in right foot, making it very difficult to ambulate.  Pain meds are no longer really working.  Patient requesting to be seen asap at clinic.

## 2023-01-06 NOTE — TELEPHONE ENCOUNTER
"Patient s/p L4-5 hemilaminectomy and microdiscectomy and left L5-S1 hemilaminectomy microdiscectomy in October 2021 with Dr. Wharton.     Patient last saw Shazia Dunbar PA-C on 2/1/22, at that time Shazia recommended \"If his symptoms are not improving we can consider an EMG or possibly repeat MRI.  He will follow-up with us as needed if is not improving with I spine and neurology\".     Patient calls today to report extreme right foot pain.    Patient follows with dinesh for pain management.    Patient presented to [facility] urgent care \"couple days ago\" for this right foot pain. They did an XR of his right foot and stated \"it looks good\".     Patient presented to ED today for continued extreme right foot pain. ED gave patient MDP and recommended he follow up with his surgeon.    Patient reports pain is primarily located in his right foot, he reports minimal pain in his right leg. He states when he steps on his right foot he feels \"electrical shocks\" and is very painful. He states this is a new symptom that he has not had before.     He is wondering how to proceed and if he needs an MRI or other testing.     Will route to Shazia Dunbar PA-C for review.   "

## 2023-01-06 NOTE — TELEPHONE ENCOUNTER
Patient called requesting to speak with Shazia and wanting a call back to discuss his symptoms and advise on what to do. Please call patient thank you ~

## 2023-01-07 ENCOUNTER — MYC MEDICAL ADVICE (OUTPATIENT)
Dept: NEUROSURGERY | Facility: CLINIC | Age: 33
End: 2023-01-07

## 2023-01-09 ENCOUNTER — DOCUMENTATION ONLY (OUTPATIENT)
Dept: NEUROLOGY | Facility: CLINIC | Age: 33
End: 2023-01-09

## 2023-01-09 ENCOUNTER — ANCILLARY PROCEDURE (OUTPATIENT)
Dept: MRI IMAGING | Facility: CLINIC | Age: 33
End: 2023-01-09
Attending: PHYSICIAN ASSISTANT
Payer: COMMERCIAL

## 2023-01-09 DIAGNOSIS — Z98.890 S/P LUMBAR MICRODISCECTOMY: ICD-10-CM

## 2023-01-09 PROCEDURE — 72148 MRI LUMBAR SPINE W/O DYE: CPT | Mod: GC | Performed by: STUDENT IN AN ORGANIZED HEALTH CARE EDUCATION/TRAINING PROGRAM

## 2023-01-09 NOTE — PROGRESS NOTES
Faxed Form January 9, 2023 to fax number [phone], Ryan Birmingham.    Right Fax confirmed at 12:14 PM    Marva Grullon

## 2023-01-10 ENCOUNTER — OFFICE VISIT (OUTPATIENT)
Dept: NEUROSURGERY | Facility: CLINIC | Age: 33
End: 2023-01-10
Payer: COMMERCIAL

## 2023-01-10 VITALS
OXYGEN SATURATION: 95 % | DIASTOLIC BLOOD PRESSURE: 87 MMHG | HEART RATE: 73 BPM | WEIGHT: 280 LBS | SYSTOLIC BLOOD PRESSURE: 121 MMHG | BODY MASS INDEX: 37.93 KG/M2 | HEIGHT: 72 IN

## 2023-01-10 DIAGNOSIS — M54.16 LUMBAR RADICULOPATHY: Primary | ICD-10-CM

## 2023-01-10 PROCEDURE — 99214 OFFICE O/P EST MOD 30 MIN: CPT | Performed by: PHYSICIAN ASSISTANT

## 2023-01-10 ASSESSMENT — PAIN SCALES - GENERAL: PAINLEVEL: SEVERE PAIN (7)

## 2023-01-10 NOTE — NURSING NOTE
Abimael Martinez is a 33 year old male who presents for:  Chief Complaint   Patient presents with     RECHECK     Clinic follow up S/p L4-5 hemilaminectomy/ Microdiscectomy extreme R foot pain/ not mobile         Initial Vitals:  /87   Pulse 73   Ht 6' (1.829 m)   Wt 280 lb (127 kg)   SpO2 95%   BMI 37.97 kg/m   Estimated body mass index is 37.97 kg/m  as calculated from the following:    Height as of this encounter: 6' (1.829 m).    Weight as of this encounter: 280 lb (127 kg).. Body surface area is 2.54 meters squared. BP completed using cuff size: large  Severe Pain (7)        Gianna Craven

## 2023-01-10 NOTE — PROGRESS NOTES
Neurosurgery Clinic  Neurosurgery followup:    HPI: Abimael is status post right L4-5 minimally invasive discectomy and a left L5-S1 minimally invasive discectomy with Dr. Wharton on October 28, 2021.  He states he has continued to have some bilateral dysesthesias in his lower extremities.  He describes pain and numbness that radiates into the lateral aspect of his left foot, as well as medially into the great toe.  He also has some similar sensations into the great toe on his right foot.  Lately, his right leg has been more of a problem for him, but he does have symptoms in both legs.  He did have a new MRI last night.  He declined to receive any contrast.      Exam:  Constitutional:  Alert, well nourished, NAD.  HEENT: Normocephalic, atraumatic.   Pulm:  Without shortness of breath   CV:  No pitting edema of BLE.      Neurological:  Awake  Alert  Oriented x 3  Motor exam:        IP Q DF PF EHL  R   5  5   5   5    5  L   5  5   5   5    5     Reflexes are 2+ in the patellar and Achilles. There is no clonus. Downgoing Babinski.      Able to spontaneously move L/E bilaterally  Sensation intact throughout all L/E dermatomes       Imaging: MRI was reviewed with the patient.  He does have multilevel disc degeneration which is most pronounced at L4-5 and L5-S1.    A/P:    16-month status post right L4-5 discectomy and left L5-S1 discectomy.  He does have a new MRI due to his bilateral lower extremity paresthesias.  He does have some possible contact of the descending L5 nerve on the right, but has also had multiple epidural injections through [facility], and none of these have provided him with any symptomatic improvement.  We discussed obtaining an EMG for further evaluation.  He did wish to proceed with that option.        Jose GRULLON [facility]  [address]  [address] [place] [address]    Tel [phone]  Pager [phone]      The use of  Dragon/[employer] dictation services may have been used to construct the content in this note; any grammatical or spelling errors are non-intentional. Please contact the author of this note directly if you are in need of any clarification.

## 2023-01-10 NOTE — LETTER
1/10/2023         RE: Abimael Martinez  [address]  [facility] [address]        Dear Colleague,    Thank you for referring your patient, Abimael Martinez, to the [facility] NEUROLOGY [facility]INICS [facility]. Please see a copy of my visit note below.    Neurosurgery Clinic  Neurosurgery followup:    HPI: Abimael is status post right L4-5 minimally invasive discectomy and a left L5-S1 minimally invasive discectomy with Dr. Wharton on October 28, 2021.  He states he has continued to have some bilateral dysesthesias in his lower extremities.  He describes pain and numbness that radiates into the lateral aspect of his left foot, as well as medially into the great toe.  He also has some similar sensations into the great toe on his right foot.  Lately, his right leg has been more of a problem for him, but he does have symptoms in both legs.  He did have a new MRI last night.  He declined to receive any contrast.      Exam:  Constitutional:  Alert, well nourished, NAD.  HEENT: Normocephalic, atraumatic.   Pulm:  Without shortness of breath   CV:  No pitting edema of BLE.      Neurological:  Awake  Alert  Oriented x 3  Motor exam:        IP Q DF PF EHL  R   5  5   5   5    5  L   5  5   5   5    5     Reflexes are 2+ in the patellar and Achilles. There is no clonus. Downgoing Babinski.      Able to spontaneously move L/E bilaterally  Sensation intact throughout all L/E dermatomes       Imaging: MRI was reviewed with the patient.  He does have multilevel disc degeneration which is most pronounced at L4-5 and L5-S1.    A/P:    16-month status post right L4-5 discectomy and left L5-S1 discectomy.  He does have a new MRI due to his bilateral lower extremity paresthesias.  He does have some possible contact of the descending L5 nerve on the right, but has also had multiple epidural injections through [facility], and none of these have provided him with any symptomatic improvement.  We discussed obtaining  an EMG for further evaluation.  He did wish to proceed with that option.        Jose Monaco PA-C  [facility] [place] [address]    Tel [phone]  Pager [phone]      The use of Dragon/Meetapp dictation services may have been used to construct the content in this note; any grammatical or spelling errors are non-intentional. Please contact the author of this note directly if you are in need of any clarification.        Again, thank you for allowing me to participate in the care of your patient.        Sincerely,        Jose Monaco PA-C

## 2023-01-11 ENCOUNTER — VIRTUAL VISIT (OUTPATIENT)
Dept: FAMILY MEDICINE | Facility: CLINIC | Age: 33
End: 2023-01-11
Payer: COMMERCIAL

## 2023-01-11 DIAGNOSIS — Z13.1 SCREENING FOR DIABETES MELLITUS: ICD-10-CM

## 2023-01-11 DIAGNOSIS — R20.2 PARESTHESIAS: Primary | ICD-10-CM

## 2023-01-11 PROCEDURE — 99213 OFFICE O/P EST LOW 20 MIN: CPT | Mod: TEL | Performed by: PREVENTIVE MEDICINE

## 2023-01-11 ASSESSMENT — ASTHMA QUESTIONNAIRES
QUESTION_3 LAST FOUR WEEKS HOW OFTEN DID YOUR ASTHMA SYMPTOMS (WHEEZING, COUGHING, SHORTNESS OF BREATH, CHEST TIGHTNESS OR PAIN) WAKE YOU UP AT NIGHT OR EARLIER THAN USUAL IN THE MORNING: NOT AT ALL
ACT_TOTALSCORE: 21
QUESTION_5 LAST FOUR WEEKS HOW WOULD YOU RATE YOUR ASTHMA CONTROL: WELL CONTROLLED
QUESTION_2 LAST FOUR WEEKS HOW OFTEN HAVE YOU HAD SHORTNESS OF BREATH: ONCE OR TWICE A WEEK
QUESTION_1 LAST FOUR WEEKS HOW MUCH OF THE TIME DID YOUR ASTHMA KEEP YOU FROM GETTING AS MUCH DONE AT WORK, SCHOOL OR AT HOME: A LITTLE OF THE TIME
QUESTION_4 LAST FOUR WEEKS HOW OFTEN HAVE YOU USED YOUR RESCUE INHALER OR NEBULIZER MEDICATION (SUCH AS ALBUTEROL): ONCE A WEEK OR LESS
ACT_TOTALSCORE: 21

## 2023-01-11 NOTE — PROGRESS NOTES
Abimael is a 33 year old who is being evaluated via a billable telephone visit.      What phone number would you like to be contacted at? [phone]  How would you like to obtain your AVS? Johnie    Distant Location (provider location):  On-site    Assessment & Plan     Paresthesias  -await labs, patient can schedule a lab only visit   -discussed that the symptoms he has described are more consistent with Hypoglycemia versus Hyperglycemia. Encouraged a consistent meal and snack schedule, avoid skipping meals and stay well hydrated.   - CBC with platelets  - Vitamin B12  - Vitamin D Deficiency  - TSH with free T4 reflex  - Comprehensive metabolic panel  - Hemoglobin A1c    Screening for diabetes mellitus  - Hemoglobin A1c      Ordering of each unique test  16 minutes spent on the date of the encounter doing chart review, history and exam, documentation and further activities per the note       BMI:   Estimated body mass index is 37.97 kg/m  as calculated from the following:    Height as of 1/10/23: 1.829 m (6').    Weight as of 1/10/23: 127 kg (280 lb).       Return in about 1 week (around 1/18/2023) for labs.    Tg Mathias MD MPH    [facility]    Tammy Varghese is a 33 year old presenting for the following health issues:  No chief complaint on file.      HPI     Has noticed that throughout the day he feels that he when he does not eat, he gets jittery and lethargic and is wondering if this is diabetes and would like to get tested for diabetes.   Vision gets blurred too at this time   Spoke to his aunt who is a nurse and she felt if he should be tested for diabetes  Has not been able to eat consistent meals as is busy with work. I tried to clarify why is going the entire day without eating and it seems is due to a busy schedule.   Has had some tingling in his foot as well  Has had spine surgery and attributes the paresthesias to this.   No polyuria  No polydipsia  No  Nocturia  No recurrent infections     Review of Systems   Constitutional, HEENT, cardiovascular, pulmonary, gi and gu systems are negative, except as otherwise noted.      Objective           Vitals:  No vitals were obtained today due to virtual visit.    Physical Exam   healthy, alert and no distress  PSYCH: Alert and oriented times 3; coherent speech, normal   rate and volume, able to articulate logical thoughts, able   to abstract reason, no tangential thoughts, no hallucinations   or delusions  His affect is normal  RESP: No cough, no audible wheezing, able to talk in full sentences  Remainder of exam unable to be completed due to telephone visits    No results found. However, due to the size of the patient record, not all encounters were searched. Please check Results Review for a complete set of results.          Phone call duration: 6 minutes

## 2023-01-13 ENCOUNTER — LAB (OUTPATIENT)
Dept: LAB | Facility: CLINIC | Age: 33
End: 2023-01-13
Payer: COMMERCIAL

## 2023-01-13 DIAGNOSIS — R20.2 PARESTHESIAS: ICD-10-CM

## 2023-01-13 DIAGNOSIS — Z13.1 SCREENING FOR DIABETES MELLITUS: ICD-10-CM

## 2023-01-13 LAB
ALBUMIN SERPL-MCNC: 4 G/DL (ref 3.4–5)
ALP SERPL-CCNC: 96 U/L (ref 40–150)
ALT SERPL W P-5'-P-CCNC: 33 U/L (ref 0–70)
ANION GAP SERPL CALCULATED.3IONS-SCNC: 4 MMOL/L (ref 3–14)
AST SERPL W P-5'-P-CCNC: 14 U/L (ref 0–45)
BILIRUB SERPL-MCNC: 0.5 MG/DL (ref 0.2–1.3)
BUN SERPL-MCNC: 18 MG/DL (ref 7–30)
CALCIUM SERPL-MCNC: 9.5 MG/DL (ref 8.5–10.1)
CHLORIDE BLD-SCNC: 107 MMOL/L (ref 94–109)
CO2 SERPL-SCNC: 30 MMOL/L (ref 20–32)
CREAT SERPL-MCNC: 1.24 MG/DL (ref 0.66–1.25)
ERYTHROCYTE [DISTWIDTH] IN BLOOD BY AUTOMATED COUNT: 12.8 % (ref 10–15)
GFR SERPL CREATININE-BSD FRML MDRD: 79 ML/MIN/1.73M2
GLUCOSE BLD-MCNC: 78 MG/DL (ref 70–99)
HBA1C MFR BLD: 5.1 % (ref 0–5.6)
HCT VFR BLD AUTO: 40.2 % (ref 40–53)
HGB BLD-MCNC: 13.4 G/DL (ref 13.3–17.7)
MCH RBC QN AUTO: 27.5 PG (ref 26.5–33)
MCHC RBC AUTO-ENTMCNC: 33.3 G/DL (ref 31.5–36.5)
MCV RBC AUTO: 82 FL (ref 78–100)
PLATELET # BLD AUTO: 273 10E3/UL (ref 150–450)
POTASSIUM BLD-SCNC: 4 MMOL/L (ref 3.4–5.3)
PROT SERPL-MCNC: 8.5 G/DL (ref 6.8–8.8)
RBC # BLD AUTO: 4.88 10E6/UL (ref 4.4–5.9)
SODIUM SERPL-SCNC: 141 MMOL/L (ref 133–144)
TSH SERPL DL<=0.005 MIU/L-ACNC: 1.35 MU/L (ref 0.4–4)
VIT B12 SERPL-MCNC: 389 PG/ML (ref 232–1245)
WBC # BLD AUTO: 7 10E3/UL (ref 4–11)

## 2023-01-13 PROCEDURE — 36415 COLL VENOUS BLD VENIPUNCTURE: CPT

## 2023-01-13 PROCEDURE — 85027 COMPLETE CBC AUTOMATED: CPT

## 2023-01-13 PROCEDURE — 83036 HEMOGLOBIN GLYCOSYLATED A1C: CPT

## 2023-01-13 PROCEDURE — 80053 COMPREHEN METABOLIC PANEL: CPT

## 2023-01-13 PROCEDURE — 82607 VITAMIN B-12: CPT

## 2023-01-13 PROCEDURE — 84443 ASSAY THYROID STIM HORMONE: CPT

## 2023-01-13 PROCEDURE — 82306 VITAMIN D 25 HYDROXY: CPT

## 2023-01-13 NOTE — RESULT ENCOUNTER NOTE
[facility],     Thyroid function, electrolytes, glucose, kidney function and liver function tests are normal.  Three month glucose number is normal, you do not have diabetes or pre diabetes.  Basic blood count is not showing anemia or infection.   Other labs are pending.    Please do not hesitate to call us at [phone] if you have any questions or concerns.    Thank you,    Tg Mathais MD MPH

## 2023-01-16 LAB — DEPRECATED CALCIDIOL+CALCIFEROL SERPL-MC: 18 UG/L (ref 20–75)

## 2023-01-16 NOTE — RESULT ENCOUNTER NOTE
Abimael,     Vitamin B 12 levels are a little on the low side. Levels below 400 can cause fatigue and numbness. Please take over the counter Vitamin B 12 in a dose of 1000 MCG daily for 3 months.     Please do not hesitate to call us at [phone] if you have any questions or concerns.    Thank you,    Tg Mathias MD MPH

## 2023-01-17 NOTE — RESULT ENCOUNTER NOTE
Abimael,     Vitamin D levels are a little low and this can contribute to fatigue. Please take over the counter Vitamin D 3 in a dose of 5000 units daily for 3 months at which time labs can be rechecked.     Please do not hesitate to call us at [phone] if you have any questions or concerns.    Thank you,    Tg Mathias MD MPH

## 2023-02-06 NOTE — CONFIDENTIAL NOTE
Reason for visit: follow-up     Relevant information: perineal pain, dysuria, urinary urgency    Records/imaging/labs/orders: in epic    Pt called: n/a    At Rooming: pvr if in-person    Feliciabharathi Tran  5/9/2022  9:30 AM   Admission

## 2023-03-12 NOTE — TELEPHONE ENCOUNTER
Goal Outcome Evaluation:  Plan of Care Reviewed With: patient, family           Outcome Evaluation: Patient is an 89 yo female seen for PT Re-evaluated after undergoing L ORIF distal humerus fx, POD #1. L arm bandaged and sling in room for OOB. She currently presents with LUE pain, impaired balance, and impaired functional mobility. Today she required Mod A for bed mobility, Min/Mod A for STS with R HHA, and Min/Mod A gait x 30 ft with R HHA. She is unsteady with standing and ambulation due to moderate posterior lean. Cont with acute PT and recommend SNF after discharge from hospital.   Printed DME for the blood pressure monitor to [facility], [phone], right fax confirmed at 1:39 pm today, 12/2/2020.  [facility]  2nd Floor  Primary Care

## 2023-03-31 ENCOUNTER — VIRTUAL VISIT (OUTPATIENT)
Dept: FAMILY MEDICINE | Facility: CLINIC | Age: 33
End: 2023-03-31
Payer: COMMERCIAL

## 2023-03-31 DIAGNOSIS — R19.7 DIARRHEA OF PRESUMED INFECTIOUS ORIGIN: Primary | ICD-10-CM

## 2023-03-31 PROCEDURE — 99214 OFFICE O/P EST MOD 30 MIN: CPT | Mod: 93 | Performed by: PHYSICIAN ASSISTANT

## 2023-03-31 NOTE — PROGRESS NOTES
Abimael is a 33 year old who is being evaluated via a billable telephone visit.      What phone number would you like to be contacted at? [phone]  How would you like to obtain your AVS? Melis[facility]  Distant Location (provider location):  On-site    Assessment & Plan   Problem List Items Addressed This Visit    None  Visit Diagnoses     Diarrhea of presumed infectious origin    -  Primary    Relevant Orders    C. difficile Toxin B PCR with reflex to C. difficile Antigen and Toxins A/B EIA    Enteric Bacteria and Virus Panel by JO Stool    Comprehensive metabolic panel (BMP + Alb, Alk Phos, ALT, AST, Total. Bili, TP) (Completed)    CBC with platelets (Completed)    Symptomatic COVID-19 Virus (Coronavirus) by PCR (Completed)    Influenza A & B Antigen (Completed)         Diarrhea and abdominal cramping for 2 weeks from unknown etiology.  COVID and influenza swabs are negative.  Comprehensive metabolic panel and CBC were reassuring.  No signs of GI bleed, hepatitis or other worrisome process.  C. difficile and enteric pathogen panel pending stool collection.  I recommended a face-to-face visit if his symptoms do not improve with over-the-counter medication such as fiber supplement, Pepto-Bismol, probiotics or if his abdominal pain or other symptoms worsen/change.    DDx and Dx discussed with and explained to the pt to their satisfaction.  All questions were answered at this time. Pt expressed understanding of and agreement with this dx, tx, and plan. No further workup warranted and standard medication warnings given. I have given the patient a list of pertinent indications for re-evaluation. Will go to the Emergency Department if symptoms worsen or new concerning symptoms arise. Patient left the call in no apparent distress.    Ordering of each unique test  18 minutes spent by me on the date of the encounter doing chart review, history and exam, documentation and further activities per the note     BMI:   Estimated  body mass index is 37.97 kg/m  as calculated from the following:    Height as of 1/10/23: 1.829 m (6').    Weight as of 1/10/23: 127 kg (280 lb).     See Patient Instructions    JESSIE Manuel [facility] ABRAHAM Varghese is a 33 year old, presenting for the following health issues:  Ent Problem    Additional Questions 3/31/2023   Roomed by Yue DURAN CMA   Accompanied by no one     Patient Reported Additional Medications 3/31/2023   Patient reports taking the following new medications None     HPI     Nausea, vomiting, diarrhea, and body aches, felt hot beginning two weeks ago. Felt improved earlier this week, but after he eats he has to diarrhea. Tired. Was sick 2 weeks ago. No recent antibiotic use. Associated rhinorrhea. No bloody or black stools. Abd crampng resolves with BMs. Has tried imodium and famotidine. Has been sexually active over the last couple of weeks too, but no sexual intercourse.    Review of Systems   Constitutional, HEENT, cardiovascular, pulmonary, gi and gu systems are negative, except as otherwise noted.      Objective           Vitals:  No vitals were obtained today due to virtual visit.    Physical Exam   healthy, alert and no distress  PSYCH: Alert and oriented times 3; coherent speech, normal   rate and volume, able to articulate logical thoughts, able   to abstract reason, no tangential thoughts, no hallucinations   or delusions  His affect is normal and pleasant  RESP: No cough, no audible wheezing, able to talk in full sentences  Remainder of exam unable to be completed due to telephone visits    Phone call duration: 16 minutes

## 2023-04-01 ENCOUNTER — NURSE TRIAGE (OUTPATIENT)
Dept: NURSING | Facility: CLINIC | Age: 33
End: 2023-04-01

## 2023-04-01 ENCOUNTER — LAB (OUTPATIENT)
Dept: LAB | Facility: CLINIC | Age: 33
End: 2023-04-01
Payer: COMMERCIAL

## 2023-04-01 DIAGNOSIS — R19.7 DIARRHEA OF PRESUMED INFECTIOUS ORIGIN: ICD-10-CM

## 2023-04-01 LAB
ERYTHROCYTE [DISTWIDTH] IN BLOOD BY AUTOMATED COUNT: 12.4 % (ref 10–15)
FLUAV AG SPEC QL IA: NEGATIVE
FLUBV AG SPEC QL IA: NEGATIVE
HCT VFR BLD AUTO: 43.7 % (ref 40–53)
HGB BLD-MCNC: 14.9 G/DL (ref 13.3–17.7)
MCH RBC QN AUTO: 27.5 PG (ref 26.5–33)
MCHC RBC AUTO-ENTMCNC: 34.1 G/DL (ref 31.5–36.5)
MCV RBC AUTO: 81 FL (ref 78–100)
PLATELET # BLD AUTO: 275 10E3/UL (ref 150–450)
RBC # BLD AUTO: 5.42 10E6/UL (ref 4.4–5.9)
WBC # BLD AUTO: 5.5 10E3/UL (ref 4–11)

## 2023-04-01 PROCEDURE — 85027 COMPLETE CBC AUTOMATED: CPT

## 2023-04-01 PROCEDURE — U0003 INFECTIOUS AGENT DETECTION BY NUCLEIC ACID (DNA OR RNA); SEVERE ACUTE RESPIRATORY SYNDROME CORONAVIRUS 2 (SARS-COV-2) (CORONAVIRUS DISEASE [COVID-19]), AMPLIFIED PROBE TECHNIQUE, MAKING USE OF HIGH THROUGHPUT TECHNOLOGIES AS DESCRIBED BY CMS-2020-01-R: HCPCS

## 2023-04-01 PROCEDURE — 36415 COLL VENOUS BLD VENIPUNCTURE: CPT

## 2023-04-01 PROCEDURE — 87804 INFLUENZA ASSAY W/OPTIC: CPT

## 2023-04-01 PROCEDURE — 80053 COMPREHEN METABOLIC PANEL: CPT

## 2023-04-01 PROCEDURE — U0005 INFEC AGEN DETEC AMPLI PROBE: HCPCS

## 2023-04-01 NOTE — TELEPHONE ENCOUNTER
Nurse Triage SBAR    Situation:   -Appointment request    Background:   -Patient calling, It is okay to leave a detailed message at this number.     Assessment:   -did VV yesterday (vomiting and diarrhea, body aches, nausea, abdominal cramping, after going to the bathroom has to nap due to fatigue)  -started a few weeks ago, got better and then restated last weeks  -symptoms have not changed sinve his VV  -he needs to get labs done, was told that he needs to do a home covid test before coming in to be tested by     Recommendation:   -warm transferred to scheduling, they were able to make an appointment later today for labs    HERSON YOON RN on 4/1/2023 at 11:41 AM           Reason for Disposition    Health Information question, no triage required and triager able to answer question    Protocols used: INFORMATION ONLY CALL - NO TRIAGE-A-

## 2023-04-02 LAB — SARS-COV-2 RNA RESP QL NAA+PROBE: NEGATIVE

## 2023-04-03 LAB
ALBUMIN SERPL-MCNC: 3.9 G/DL (ref 3.4–5)
ALP SERPL-CCNC: 106 U/L (ref 40–150)
ALT SERPL W P-5'-P-CCNC: 31 U/L (ref 0–70)
ANION GAP SERPL CALCULATED.3IONS-SCNC: 3 MMOL/L (ref 3–14)
AST SERPL W P-5'-P-CCNC: 18 U/L (ref 0–45)
BILIRUB SERPL-MCNC: 0.8 MG/DL (ref 0.2–1.3)
BUN SERPL-MCNC: 13 MG/DL (ref 7–30)
CALCIUM SERPL-MCNC: 9.3 MG/DL (ref 8.5–10.1)
CHLORIDE BLD-SCNC: 105 MMOL/L (ref 94–109)
CO2 SERPL-SCNC: 30 MMOL/L (ref 20–32)
CREAT SERPL-MCNC: 1.31 MG/DL (ref 0.66–1.25)
GFR SERPL CREATININE-BSD FRML MDRD: 74 ML/MIN/1.73M2
GLUCOSE BLD-MCNC: 110 MG/DL (ref 70–99)
POTASSIUM BLD-SCNC: 4.1 MMOL/L (ref 3.4–5.3)
PROT SERPL-MCNC: 8.2 G/DL (ref 6.8–8.8)
SODIUM SERPL-SCNC: 138 MMOL/L (ref 133–144)

## 2023-04-13 ENCOUNTER — OFFICE VISIT (OUTPATIENT)
Dept: DERMATOLOGY | Facility: CLINIC | Age: 33
End: 2023-04-13
Payer: COMMERCIAL

## 2023-04-13 DIAGNOSIS — N48.89 PEARLY PENILE PAPULES: Primary | ICD-10-CM

## 2023-04-13 PROCEDURE — 17110 DESTRUCTION B9 LES UP TO 14: CPT | Mod: GC | Performed by: DERMATOLOGY

## 2023-04-13 PROCEDURE — 99212 OFFICE O/P EST SF 10 MIN: CPT | Mod: 25 | Performed by: DERMATOLOGY

## 2023-04-13 RX ORDER — LIDOCAINE 40 MG/G
CREAM TOPICAL 2 TIMES DAILY PRN
Qty: 15 G | Refills: 1 | Status: SHIPPED | OUTPATIENT
Start: 2023-04-13 | End: 2024-09-20

## 2023-04-13 ASSESSMENT — PAIN SCALES - GENERAL: PAINLEVEL: SEVERE PAIN (6)

## 2023-04-13 NOTE — LETTER
4/13/2023         RE: Abimael Martinez  [address]  [place] [facility] [place] [address]        Dear Colleague,    Thank you for referring your patient, Abimael Martinez, to the [facility]. Please see a copy of my visit note below.    [facility] Dermatology Note  Encounter Date: Apr 13, 2023  Office Visit     Dermatology Problem List:  1. Dermatitis, groin area. Suspected irritant versus contact dermatitis.  - has seasonal allergies with asthma per Dr. Harris  - path testing with delayed reaction to black tattoo  - vaseline; elidel  - prior tx: triamamcinolone 0.025% ointment BID, protopic 0.1% ointment BID PRN  2. Allergic contact dermatitis  - s/p punch biopsy 11/19/19  - patch test negative  - triamcinolone 0.1% ointment BID  3. COVID-19 Nov-2020  4. 3-4x 1 mm pale, firm papules on the ventral penis  - s/p bx 10/19/22  5. Tinea corporis   - Past tx: terbinafine    - Current tx: ketoconazole 2% cream and shampoo  6. 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         Hyfrecation 12/8/22  7. Dermatofibroma, R wrist  - s/p punch 12/8/22     ____________________________________________     Assessment & Plan:     # Irritated Angiofibromas, ventral penis x7-8  Treatment options reviewed including hyfrecation vs observation.      Ultimately he would prefer Hyfrecation.   After discussion of benefits and risks including but not limited to bleeding, infection, scar, incomplete destruction, recurrence, and unchanged or worsening symptoms, verbal consent was obtained. The area was cleaned with isopropyl alcohol. 1 mL of 1% lidocaine with epinephrine was injected to obtain adequate anesthesia of each spot and hyfrecation was performed on level 13. Each site was cleansed with sterile saline and petroleum jelly was applied. Wound care reviewed.      Will send lidocaine 4% cream for patient to use PRN for pain while healing.     # Right palm flat wart    Discussed  options to monitor vs cryotherapy. Patient elects to monitor for now.    Follow-up: PRN    Staff and Resident:    I, Stefan De Leon MD, discussed and evaluated the patient with Dr. Narayanan.    Staff Physician Comments:   I saw and evaluated the patient with the resident and I agree with the assessment and plan. I personally performed the entire procedure and examination.    Castillo Narayanan DO    Department of Dermatology  [facility]: Phone: [phone], Fax:[phone]  [facility]: Phone: [phone], Fax: [phone]       ____________________________________________     CC: Procedure (Hyfercation of angiofibroma)     HPI:  Mr. Abimael Martinez is a(n) 33 year old male who presents today as a return patient for a spot check.      Today, he notes penile pain symptoms (pain and sting) have returned over the last few months. Will have constant pain. Says feels similar to before and that the hyfrecation helped well so is interested in pursuing that.     Patient is otherwise feeling well, without additional skin concerns.     Labs Reviewed:  N/A     Physical Exam:  Vitals: There were no vitals taken for this visit.  SKIN: Focused examination of penis and right palm  - Ventral penis: 7-8 1 mm pale firm papules.  - Right palm with flat flesh colored papule  - No other lesions of concern on areas examined.     Medications:  Current Outpatient Medications   Medication     albuterol (PROAIR HFA/PROVENTIL HFA/VENTOLIN HFA) 108 (90 Base) MCG/ACT inhaler     ALPRAZolam (XANAX) 2 MG tablet     amphetamine-dextroamphetamine (ADDERALL) 20 MG tablet     azelastine (ASTELIN) 0.1 % nasal spray     cetirizine (ZYRTEC) 10 MG tablet     famotidine (PEPCID) 40 MG tablet     fluticasone (FLOVENT HFA) 220 MCG/ACT Inhaler     Hyoscyamine Sulfate 0.375 MG TBCR     lidocaine (LMX4) 4 % external cream     loratadine  (CLARITIN) 10 MG tablet     mometasone (ELOCON) 0.1 % external ointment     montelukast (SINGULAIR) 10 MG tablet     mupirocin (BACTROBAN) 2 % external ointment     omeprazole (PRILOSEC) 20 MG DR capsule     pimecrolimus (ELIDEL) 1 % external cream     PROTOPIC 0.1 % external ointment     sildenafil (REVATIO) 20 MG tablet     tacrolimus (PROTOPIC) 0.03 % external ointment     tiZANidine (ZANAFLEX) 4 MG tablet     triamcinolone (KENALOG) 0.1 % external ointment     hydrocortisone (ANUSOL-HC) 25 MG suppository     ketoconazole (NIZORAL) 2 % external shampoo     morphine (MS CONTIN) 15 MG CR tablet     naloxone (NARCAN) 4 MG/0.1ML nasal spray     No current facility-administered medications for this visit.      Past Medical History:   Patient Active Problem List   Diagnosis     Anxiety     CARDIOVASCULAR SCREENING; LDL GOAL LESS THAN 160     High risk sexual behavior     Low back pain     Essential hypertension     Internal hemorrhoids which bleed     Obesity, Class I, BMI 30-34.9     Attention deficit hyperactivity disorder (ADHD), combined type     AYALA (generalized anxiety disorder)     OCD (obsessive compulsive disorder)     Drug-induced erectile dysfunction     Mild persistent asthma without complication     Lumbosacral radiculopathy     Dyslipidemia     Elevated serum creatinine     Former smoker     Morbid obesity (H)     Past Medical History:   Diagnosis Date     Acute right otitis media 1/8/2013     ADHD (attention deficit hyperactivity disorder), combined type      Anxiety      Depression      Drug abuse (H)      Dyslipidemia      Gonococcal urethritis 02/05/2016     Hypertension      Internal hemorrhoids     which bleed     Lumbosacral radiculopathy      Obese      OCD (obsessive compulsive disorder)      Other chronic pain     Low Back Pain     Uncomplicated asthma      Urethritis 5/20/2013     Problem list name updated by automated process. Provider to review         Again, thank you for allowing me to  participate in the care of your patient.        Sincerely,        Castillo Narayanan MD

## 2023-04-13 NOTE — NURSING NOTE
Abimael Martinez's chief complaint for this visit includes:  Chief Complaint   Patient presents with     Derm Problem     Penile papules     PCP: No Ref-Primary, Physician    Referring Provider:  No referring provider defined for this encounter.    There were no vitals taken for this visit.  Severe Pain (6)        Allergies   Allergen Reactions     Cymbalta      Weight gain and fatigue     Duloxetine Other (See Comments) and Fatigue     enlarged spleen and weight gain     Paroxetine Other (See Comments), Fatigue and GI Disturbance     Weight gain, Spleen issues     Seasonal Allergies      Vicodin [Hydrocodone-Acetaminophen]          Do you need any medication refills at today's visit? No

## 2023-04-13 NOTE — PROGRESS NOTES
[facility] Dermatology Note  Encounter Date: Apr 13, 2023  Office Visit     Dermatology Problem List:  1. Dermatitis, groin area. Suspected irritant versus contact dermatitis.  - has seasonal allergies with asthma per Dr. Harris  - path testing with delayed reaction to black tattoo  - vaseline; elidel  - prior tx: triamamcinolone 0.025% ointment BID, protopic 0.1% ointment BID PRN  2. Allergic contact dermatitis  - s/p punch biopsy 11/19/19  - patch test negative  - triamcinolone 0.1% ointment BID  3. COVID-19 Nov-2020  4. 3-4x 1 mm pale, firm papules on the ventral penis  - s/p bx 10/19/22  5. Tinea corporis   - Past tx: terbinafine    - Current tx: ketoconazole 2% cream and shampoo  6. 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         Hyfrecation 12/8/22  7. Dermatofibroma, R wrist  - s/p punch 12/8/22     ____________________________________________     Assessment & Plan:     # Irritated Angiofibromas, ventral penis x7-8  Treatment options reviewed including hyfrecation vs observation.      Ultimately he would prefer Hyfrecation.   After discussion of benefits and risks including but not limited to bleeding, infection, scar, incomplete destruction, recurrence, and unchanged or worsening symptoms, verbal consent was obtained. The area was cleaned with isopropyl alcohol. 1 mL of 1% lidocaine with epinephrine was injected to obtain adequate anesthesia of each spot and hyfrecation was performed on level 13. Each site was cleansed with sterile saline and petroleum jelly was applied. Wound care reviewed.      Will send lidocaine 4% cream for patient to use PRN for pain while healing.     # Right palm flat wart    Discussed options to monitor vs cryotherapy. Patient elects to monitor for now.    Follow-up: PRN    Staff and Resident:    IStefan MD, discussed and evaluated the patient with Dr. Narayanan.    Staff Physician Comments:   I saw and evaluated the patient with the resident and  I agree with the assessment and plan. I personally performed the entire procedure and examination.    Castillo Narayanan DO    Department of Dermatology  [facility]: Phone: [phone], Fax:[phone]  [facility]: Phone: [phone], Fax: [phone]       ____________________________________________     CC: Procedure (Hyfercation of angiofibroma)     HPI:  Mr. Abimael Martinez is a(n) 33 year old male who presents today as a return patient for a spot check.      Today, he notes penile pain symptoms (pain and sting) have returned over the last few months. Will have constant pain. Says feels similar to before and that the hyfrecation helped well so is interested in pursuing that.     Patient is otherwise feeling well, without additional skin concerns.     Labs Reviewed:  N/A     Physical Exam:  Vitals: There were no vitals taken for this visit.  SKIN: Focused examination of penis and right palm  - Ventral penis: 7-8 1 mm pale firm papules.  - Right palm with flat flesh colored papule  - No other lesions of concern on areas examined.     Medications:  Current Outpatient Medications   Medication     albuterol (PROAIR HFA/PROVENTIL HFA/VENTOLIN HFA) 108 (90 Base) MCG/ACT inhaler     ALPRAZolam (XANAX) 2 MG tablet     amphetamine-dextroamphetamine (ADDERALL) 20 MG tablet     azelastine (ASTELIN) 0.1 % nasal spray     cetirizine (ZYRTEC) 10 MG tablet     famotidine (PEPCID) 40 MG tablet     fluticasone (FLOVENT HFA) 220 MCG/ACT Inhaler     Hyoscyamine Sulfate 0.375 MG TBCR     lidocaine (LMX4) 4 % external cream     loratadine (CLARITIN) 10 MG tablet     mometasone (ELOCON) 0.1 % external ointment     montelukast (SINGULAIR) 10 MG tablet     mupirocin (BACTROBAN) 2 % external ointment     omeprazole (PRILOSEC) 20 MG DR capsule     pimecrolimus (ELIDEL) 1 % external cream     PROTOPIC 0.1 %  external ointment     sildenafil (REVATIO) 20 MG tablet     tacrolimus (PROTOPIC) 0.03 % external ointment     tiZANidine (ZANAFLEX) 4 MG tablet     triamcinolone (KENALOG) 0.1 % external ointment     hydrocortisone (ANUSOL-HC) 25 MG suppository     ketoconazole (NIZORAL) 2 % external shampoo     morphine (MS CONTIN) 15 MG CR tablet     naloxone (NARCAN) 4 MG/0.1ML nasal spray     No current facility-administered medications for this visit.      Past Medical History:   Patient Active Problem List   Diagnosis     Anxiety     CARDIOVASCULAR SCREENING; LDL GOAL LESS THAN 160     High risk sexual behavior     Low back pain     Essential hypertension     Internal hemorrhoids which bleed     Obesity, Class I, BMI 30-34.9     Attention deficit hyperactivity disorder (ADHD), combined type     AYALA (generalized anxiety disorder)     OCD (obsessive compulsive disorder)     Drug-induced erectile dysfunction     Mild persistent asthma without complication     Lumbosacral radiculopathy     Dyslipidemia     Elevated serum creatinine     Former smoker     Morbid obesity (H)     Past Medical History:   Diagnosis Date     Acute right otitis media 1/8/2013     ADHD (attention deficit hyperactivity disorder), combined type      Anxiety      Depression      Drug abuse (H)      Dyslipidemia      Gonococcal urethritis 02/05/2016     Hypertension      Internal hemorrhoids     which bleed     Lumbosacral radiculopathy      Obese      OCD (obsessive compulsive disorder)      Other chronic pain     Low Back Pain     Uncomplicated asthma      Urethritis 5/20/2013     Problem list name updated by automated process. Provider to review

## 2023-04-22 ENCOUNTER — HEALTH MAINTENANCE LETTER (OUTPATIENT)
Age: 33
End: 2023-04-22

## 2023-04-25 ENCOUNTER — MYC MEDICAL ADVICE (OUTPATIENT)
Dept: DERMATOLOGY | Facility: CLINIC | Age: 33
End: 2023-04-25
Payer: COMMERCIAL

## 2023-05-11 ENCOUNTER — MYC MEDICAL ADVICE (OUTPATIENT)
Dept: DERMATOLOGY | Facility: CLINIC | Age: 33
End: 2023-05-11
Payer: COMMERCIAL

## 2023-05-16 ENCOUNTER — OFFICE VISIT (OUTPATIENT)
Dept: DERMATOLOGY | Facility: CLINIC | Age: 33
End: 2023-05-16
Payer: COMMERCIAL

## 2023-05-16 DIAGNOSIS — I83.813 VARICOSE VEINS OF BOTH LOWER EXTREMITIES WITH PAIN: ICD-10-CM

## 2023-05-16 DIAGNOSIS — L30.9 DERMATITIS: Primary | ICD-10-CM

## 2023-05-16 DIAGNOSIS — D22.9 MULTIPLE BENIGN NEVI: ICD-10-CM

## 2023-05-16 DIAGNOSIS — N48.89 PEARLY PENILE PAPULES: ICD-10-CM

## 2023-05-16 PROCEDURE — 99213 OFFICE O/P EST LOW 20 MIN: CPT | Mod: GC | Performed by: DERMATOLOGY

## 2023-05-16 NOTE — PROGRESS NOTES
[facility] Dermatology Note  Encounter Date: May 16, 2023  Office Visit     Dermatology Problem List:  1. Dermatitis, groin area. Suspected irritant versus contact dermatitis.  - has seasonal allergies with asthma per Dr. Harris  - path testing with delayed reaction to black tattoo  - vaseline; elidel  - prior tx: triamamcinolone 0.025% ointment BID, protopic 0.1% ointment BID PRN  2. Allergic contact dermatitis  - s/p punch biopsy 11/19/19  - patch test negative  - triamcinolone 0.1% ointment BID  3. COVID-19 Nov-2020  4. 3-4x 1 mm pale, firm papules on the ventral penis  - s/p bx 10/19/22  5. Tinea corporis   - Past tx: terbinafine    - Current tx: ketoconazole 2% cream and shampoo  6. 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         Hyfrecation 12/8/22  -Current Tx: Pramoxine PRN   7. Dermatofibroma, R wrist  - s/p punch 12/8/22  8. Right Lateral Thigh Varicose veins   -Vascular Surgery referral placed 5/16/23     ____________________________________________     Assessment & Plan:     # Well healed treated Angiofibromas (pearly penile papules), s/p hyfrecation  -Still endorses having slight pain at the areas treated.   -Recommend using Sarna with Pramoxine PRN   -Lidocaine 4% cream denied by insurance   -Counseled that if nerve pain is involved, can take a while to normalize after treatment   Treatment options reviewed including hyfrecation vs observation.     #Dilated Torturous Veins on the Right Lateral Thigh   -Counseled the patient about the overall benign nature of the lesion   -Discussed Sclerotherapy vs. PDL   -Vascular surgery referral placed 5/16/23    # Benign appearing nevi, R Dorsal Wrist   - Dermoscopically benign hyperpigmented macule   - Counseled the patient about the benign nature of the lesions and still within the age to expect slight changes in overall nevi     Follow-up: PRN    Staff and Resident:     Emma Crowe MD  Dermatology Resident, PGY-2    Staff  Physician Comments:   I saw and evaluated the patient with the resident and I agree with the assessment and plan. I was present for the examination.    Castillo Narayanan DO    Department of Dermatology  [facility]: Phone: [phone], Fax:[phone]  [facility]: Phone: [phone], Fax: [phone]    ____________________________________________     CC: Follow up s/p Hyfrecation      HPI:  Mr. Abimael Martinez is a(n) 33 year old male who presents today as a return patient for penile check after hyfrecation       -Today, he notes penile pain symptoms (pain and sting) have returned since last being seen.   -Note irritation with sexual activity and in going in water and noted irritation after going in the hot tub.   -Notes the pain has been more stagnant, and has states that the scabs came off in the shower recently and increased more tender   -States that the pain is noticeable and states that the areas of scab are more noticeable when having an erection.      Patient is otherwise feeling well, without additional skin concerns.     Labs Reviewed:  N/A     Physical Exam:  Vitals: There were no vitals taken for this visit.  SKIN: Focused examination of penis  -Well healing scars on ventral shaft of the penis   -No signs of active inflammation or infection   -2.5cm patch of bluish compressible vessels on right lateral thigh   - No other lesions of concern on areas examined.     Medications:  Current Outpatient Medications   Medication     albuterol (PROAIR HFA/PROVENTIL HFA/VENTOLIN HFA) 108 (90 Base) MCG/ACT inhaler     ALPRAZolam (XANAX) 2 MG tablet     amphetamine-dextroamphetamine (ADDERALL) 20 MG tablet     azelastine (ASTELIN) 0.1 % nasal spray     cetirizine (ZYRTEC) 10 MG tablet     famotidine (PEPCID) 40 MG tablet     fluticasone (FLOVENT HFA) 220 MCG/ACT Inhaler     Hyoscyamine  Sulfate 0.375 MG TBCR     lidocaine (LMX4) 4 % external cream     loratadine (CLARITIN) 10 MG tablet     mometasone (ELOCON) 0.1 % external ointment     montelukast (SINGULAIR) 10 MG tablet     mupirocin (BACTROBAN) 2 % external ointment     naloxone (NARCAN) 4 MG/0.1ML nasal spray     omeprazole (PRILOSEC) 20 MG DR capsule     pimecrolimus (ELIDEL) 1 % external cream     PROTOPIC 0.1 % external ointment     sildenafil (REVATIO) 20 MG tablet     tacrolimus (PROTOPIC) 0.03 % external ointment     tiZANidine (ZANAFLEX) 4 MG tablet     triamcinolone (KENALOG) 0.1 % external ointment     morphine (MS CONTIN) 15 MG CR tablet     No current facility-administered medications for this visit.      Past Medical History:   Patient Active Problem List   Diagnosis     Anxiety     CARDIOVASCULAR SCREENING; LDL GOAL LESS THAN 160     High risk sexual behavior     Low back pain     Essential hypertension     Internal hemorrhoids which bleed     Obesity, Class I, BMI 30-34.9     Attention deficit hyperactivity disorder (ADHD), combined type     AYALA (generalized anxiety disorder)     OCD (obsessive compulsive disorder)     Drug-induced erectile dysfunction     Mild persistent asthma without complication     Lumbosacral radiculopathy     Dyslipidemia     Elevated serum creatinine     Former smoker     Morbid obesity (H)     Past Medical History:   Diagnosis Date     Acute right otitis media 1/8/2013     ADHD (attention deficit hyperactivity disorder), combined type      Anxiety      Depression      Drug abuse (H)      Dyslipidemia      Gonococcal urethritis 02/05/2016     Hypertension      Internal hemorrhoids     which bleed     Lumbosacral radiculopathy      Obese      OCD (obsessive compulsive disorder)      Other chronic pain     Low Back Pain     Uncomplicated asthma      Urethritis 5/20/2013     Problem list name updated by automated process. Provider to review

## 2023-05-16 NOTE — LETTER
5/16/2023       RE: Abimael Martinez  [address]  [facility] [address]     Dear Colleague,    Thank you for referring your patient, Abimael Martinez, to the [facility] DERMATOLOGIC SURGERY CLINIC [place] at [facility]. Please see a copy of my visit note below.    [facility] Dermatology Note  Encounter Date: May 16, 2023  Office Visit     Dermatology Problem List:  1. Dermatitis, groin area. Suspected irritant versus contact dermatitis.  - has seasonal allergies with asthma per Dr. Harris  - path testing with delayed reaction to black tattoo  - vaseline; elidel  - prior tx: triamamcinolone 0.025% ointment BID, protopic 0.1% ointment BID PRN  2. Allergic contact dermatitis  - s/p punch biopsy 11/19/19  - patch test negative  - triamcinolone 0.1% ointment BID  3. COVID-19 Nov-2020  4. 3-4x 1 mm pale, firm papules on the ventral penis  - s/p bx 10/19/22  5. Tinea corporis   - Past tx: terbinafine    - Current tx: ketoconazole 2% cream and shampoo  6. 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         Hyfrecation 12/8/22  -Current Tx: Pramoxine PRN   7. Dermatofibroma, R wrist  - s/p punch 12/8/22  8. Right Lateral Thigh Varicose veins   -Vascular Surgery referral placed 5/16/23     ____________________________________________     Assessment & Plan:     # Well healed treated Angiofibromas (pearly penile papules), s/p hyfrecation  -Still endorses having slight pain at the areas treated.   -Recommend using Sarna with Pramoxine PRN   -Lidocaine 4% cream denied by insurance   -Counseled that if nerve pain is involved, can take a while to normalize after treatment   Treatment options reviewed including hyfrecation vs observation.     #Dilated Torturous Veins on the Right Lateral Thigh   -Counseled the patient about the overall benign nature of the lesion   -Discussed Sclerotherapy vs. PDL   -Vascular surgery referral  placed 5/16/23    # Benign appearing nevi, R Dorsal Wrist   - Dermoscopically benign hyperpigmented macule   - Counseled the patient about the benign nature of the lesions and still within the age to expect slight changes in overall nevi     Follow-up: PRN    Staff and Resident:     Emma Croew MD  Dermatology Resident, PGY-2    Staff Physician Comments:   I saw and evaluated the patient with the resident and I agree with the assessment and plan. I was present for the examination.    Castillo Narayanan DO    Department of Dermatology  [facility]: Phone: [phone], Fax:[phone]  [facility]: Phone: [phone], Fax: [phone]    ____________________________________________     CC: Follow up s/p Hyfrecation      HPI:  Mr. Abimael Martinez is a(n) 33 year old male who presents today as a return patient for penile check after hyfrecation       -Today, he notes penile pain symptoms (pain and sting) have returned since last being seen.   -Note irritation with sexual activity and in going in water and noted irritation after going in the hot tub.   -Notes the pain has been more stagnant, and has states that the scabs came off in the shower recently and increased more tender   -States that the pain is noticeable and states that the areas of scab are more noticeable when having an erection.      Patient is otherwise feeling well, without additional skin concerns.     Labs Reviewed:  N/A     Physical Exam:  Vitals: There were no vitals taken for this visit.  SKIN: Focused examination of penis  -Well healing scars on ventral shaft of the penis   -No signs of active inflammation or infection   -2.5cm patch of bluish compressible vessels on right lateral thigh   - No other lesions of concern on areas examined.     Medications:  Current Outpatient Medications   Medication    albuterol (PROAIR  HFA/PROVENTIL HFA/VENTOLIN HFA) 108 (90 Base) MCG/ACT inhaler    ALPRAZolam (XANAX) 2 MG tablet    amphetamine-dextroamphetamine (ADDERALL) 20 MG tablet    azelastine (ASTELIN) 0.1 % nasal spray    cetirizine (ZYRTEC) 10 MG tablet    famotidine (PEPCID) 40 MG tablet    fluticasone (FLOVENT HFA) 220 MCG/ACT Inhaler    Hyoscyamine Sulfate 0.375 MG TBCR    lidocaine (LMX4) 4 % external cream    loratadine (CLARITIN) 10 MG tablet    mometasone (ELOCON) 0.1 % external ointment    montelukast (SINGULAIR) 10 MG tablet    mupirocin (BACTROBAN) 2 % external ointment    naloxone (NARCAN) 4 MG/0.1ML nasal spray    omeprazole (PRILOSEC) 20 MG DR capsule    pimecrolimus (ELIDEL) 1 % external cream    PROTOPIC 0.1 % external ointment    sildenafil (REVATIO) 20 MG tablet    tacrolimus (PROTOPIC) 0.03 % external ointment    tiZANidine (ZANAFLEX) 4 MG tablet    triamcinolone (KENALOG) 0.1 % external ointment    morphine (MS CONTIN) 15 MG CR tablet     No current facility-administered medications for this visit.      Past Medical History:   Patient Active Problem List   Diagnosis    Anxiety    CARDIOVASCULAR SCREENING; LDL GOAL LESS THAN 160    High risk sexual behavior    Low back pain    Essential hypertension    Internal hemorrhoids which bleed    Obesity, Class I, BMI 30-34.9    Attention deficit hyperactivity disorder (ADHD), combined type    AYALA (generalized anxiety disorder)    OCD (obsessive compulsive disorder)    Drug-induced erectile dysfunction    Mild persistent asthma without complication    Lumbosacral radiculopathy    Dyslipidemia    Elevated serum creatinine    Former smoker    Morbid obesity (H)     Past Medical History:   Diagnosis Date    Acute right otitis media 1/8/2013    ADHD (attention deficit hyperactivity disorder), combined type     Anxiety     Depression     Drug abuse (H)     Dyslipidemia     Gonococcal urethritis 02/05/2016    Hypertension     Internal hemorrhoids     which bleed    Lumbosacral  radiculopathy     Obese     OCD (obsessive compulsive disorder)     Other chronic pain     Low Back Pain    Uncomplicated asthma     Urethritis 5/20/2013     Problem list name updated by automated process. Provider to review

## 2023-05-16 NOTE — NURSING NOTE
Chief Complaint   Patient presents with     Derm Problem     Here for a wound check, couple areas are scabbed still      Magy WILSON, RN-BSN  Dermatology Surgery  [phone]

## 2023-05-17 ENCOUNTER — OFFICE VISIT (OUTPATIENT)
Dept: FAMILY MEDICINE | Facility: CLINIC | Age: 33
End: 2023-05-17
Payer: COMMERCIAL

## 2023-05-17 VITALS
WEIGHT: 283 LBS | TEMPERATURE: 97.8 F | HEIGHT: 72 IN | DIASTOLIC BLOOD PRESSURE: 82 MMHG | OXYGEN SATURATION: 100 % | HEART RATE: 72 BPM | BODY MASS INDEX: 38.33 KG/M2 | SYSTOLIC BLOOD PRESSURE: 137 MMHG

## 2023-05-17 DIAGNOSIS — I88.9 LYMPHADENITIS: ICD-10-CM

## 2023-05-17 DIAGNOSIS — H92.03 OTALGIA, BILATERAL: Primary | ICD-10-CM

## 2023-05-17 PROCEDURE — 99214 OFFICE O/P EST MOD 30 MIN: CPT | Performed by: FAMILY MEDICINE

## 2023-05-17 RX ORDER — PREDNISONE 20 MG/1
40 TABLET ORAL DAILY
Qty: 10 TABLET | Refills: 0 | Status: SHIPPED | OUTPATIENT
Start: 2023-05-17 | End: 2023-05-22

## 2023-05-17 ASSESSMENT — PAIN SCALES - GENERAL: PAINLEVEL: EXTREME PAIN (8)

## 2023-05-17 NOTE — PROGRESS NOTES
Assessment & Plan     Otalgia, bilateral  Suspect allergic sinusitis causing pain - trial of prednisone and see ENT  - predniSONE (DELTASONE) 20 MG tablet; Take 2 tablets (40 mg) by mouth daily for 5 days  - Adult ENT  Referral; Future    Lymphadenitis  As above  - predniSONE (DELTASONE) 20 MG tablet; Take 2 tablets (40 mg) by mouth daily for 5 days  - Adult ENT  Referral; Future             BMI:   Estimated body mass index is 38.38 kg/m  as calculated from the following:    Height as of this encounter: 1.829 m (6').    Weight as of this encounter: 128.4 kg (283 lb).   Weight management plan: Discussed healthy diet and exercise guidelines    The uses and side effects, including black box warnings as appropriate, were discussed in detail.  All patient questions were answered.  The patient was instructed to call immediately if any side effects developed.     Berta Chan MD  [facility] HospitalJENNA Varghese is a 33 year old, presenting for the following health issues:  Otalgia        5/17/2023     2:19 PM   Additional Questions   Roomed by nam KRAMER     Concern - bilateral ear pain Left > Right  Onset: started 1.5 week ago  Description: feel like the start of ear infection hurts to turn head or feel pressure or shooting pain which goes done jaw  Intensity: severe  Progression of Symptoms:  worsening  Accompanying Signs & Symptoms: jaw pain along with ear pain  Previous history of similar problem: yes past couple years   Precipitating factors:        Worsened by: sensitive to eating, chewing all from the ear pain   Alleviating factors:        Improved by:   Therapies tried and outcome: dianna  pot, hot compress, clartian, otc meds for allergies     Has seen ENT in the past and surgery recommended but was not done.  Currently on zyrtec and maybe flonase      Review of Systems   Constitutional, HEENT, cardiovascular, pulmonary, gi and gu systems are  negative, except as otherwise noted.      Objective    /82   Pulse 72   Temp 97.8  F (36.6  C) (Oral)   Ht 1.829 m (6')   Wt 128.4 kg (283 lb)   SpO2 100%   BMI 38.38 kg/m    Body mass index is 38.38 kg/m .  Physical Exam   GENERAL: healthy, alert and no distress  EYES: Eyes grossly normal to inspection, PERRL and conjunctivae and sclerae normal  HENT: normal cephalic/atraumatic, ear canals and TM's normal, nasal mucosa edematous , oropharynx clear and oral mucous membranes moist  NECK: cervical adenopathy left, no asymmetry, masses, or scars and thyroid normal to palpation  RESP: lungs clear to auscultation - no rales, rhonchi or wheezes  CV: regular rate and rhythm, normal S1 S2, no S3 or S4, no murmur, click or rub, no peripheral edema and peripheral pulses strong  PSYCH: mentation appears normal, affect normal/bright

## 2023-05-18 ENCOUNTER — OFFICE VISIT (OUTPATIENT)
Dept: AUDIOLOGY | Facility: CLINIC | Age: 33
End: 2023-05-18
Payer: COMMERCIAL

## 2023-05-18 ENCOUNTER — TELEPHONE (OUTPATIENT)
Dept: VASCULAR SURGERY | Facility: CLINIC | Age: 33
End: 2023-05-18

## 2023-05-18 DIAGNOSIS — Z01.10 ENCOUNTER FOR HEARING TEST: Primary | ICD-10-CM

## 2023-05-18 DIAGNOSIS — H93.13 TINNITUS OF BOTH EARS: ICD-10-CM

## 2023-05-18 DIAGNOSIS — H92.03 EAR PAIN, BILATERAL: Primary | ICD-10-CM

## 2023-05-18 DIAGNOSIS — I83.891 SYMPTOMATIC VARICOSE VEINS OF RIGHT LOWER EXTREMITY: Primary | ICD-10-CM

## 2023-05-18 DIAGNOSIS — Z01.10 EXAMINATION OF EARS AND HEARING: ICD-10-CM

## 2023-05-18 PROCEDURE — 92550 TYMPANOMETRY & REFLEX THRESH: CPT | Performed by: AUDIOLOGIST

## 2023-05-18 PROCEDURE — 92557 COMPREHENSIVE HEARING TEST: CPT | Performed by: AUDIOLOGIST

## 2023-05-18 NOTE — TELEPHONE ENCOUNTER
Date: 5/18/2023    Time of Call: 3:52 PM     Diagnosis:  Symptomatic Varicose Veins of the RLE     [ VORB ] Ordering provider: Anup Anton PA-C  Order: US Venous Competency, Right     Order received by: Mgay Cabrera LPN     Follow-up/additional notes:

## 2023-05-18 NOTE — PROGRESS NOTES
AUDIOLOGY REPORT     SUMMARY: Audiology visit completed. See audiogram for results.       RECOMMENDATIONS: Follow-up with ENT.     Melissa Sabillon M.A.  Audiology Doctoral Student  [place] #[id number]     I was present with the patient for the entire Audiology appointment including all procedures/testing performed by the AuD student, and agree with the student s assessment and plan as documented.      Doris Walker, Lupe  Audiologist  [place] License  #[id number]

## 2023-05-18 NOTE — TELEPHONE ENCOUNTER
FUTURE VISIT INFORMATION:      FUTURE VISIT INFORMATION:    Date: 5/23/23    Time: 9 AM    Location: [facility]  REFERRAL INFORMATION:    Referring provider: Berta Veliz MD    Referring providers clinic: [facility]    Reason for visit/diagnosis:  per patient pressure per referal Otalgia. confirmed [facility]    RECORDS REQUESTED FROM:       Clinic name Comments Records Status Imaging Status   [facility] 5/17/23 OV with Berta Veliz MD [facility] Imaging MR brain 12/9/19 CE req 5/18/23  PACS   [facility] [place] [facility] [place] 9/4/19 OV note Rivera Benitez MD  8/1/19 audiogram    CT sinus 8/27/19 Epic Pacs      May 18, 2023 at 7:48 AM - req [facility] to push image -Jennie  May 22, 2023 at 7:54 AM - Images were done with [facility]. Called [facility] - [facility] and [facility] for images pushed to . -Jennie  May 24, 2023 at 8:14 AM - 2nd attempt called to [facility] and [facility] -Jennie  *UPDATE 8:25 AM - Images resolved in pacs- Jennie

## 2023-05-18 NOTE — TELEPHONE ENCOUNTER
Called and spoke with Pt to triage varicose veins of both lower extremities with pain to determine appropriate scheduling and necessity for imaging.     Dx: Varicose veins of both lower extremities with pain  Referring provider: Castillo Narayanan MD     Imaging:  None.     SXS:    Do you see bulging veins? If YES, where and how long have they been present? YES - Pt reports buling veins on his RLE thigh and ankle area. The area by the ankle has been there a few years. He states he noticed the thigh vein a couple months ago.     Any sxs associated with bulging veins? YES - In the past few months he has begun to experience pain and achiness at the vein sites.    Any LE edema? YES - Pt reports minimal intermittent BLE edema in the foot and ankle.     If YES to either of the above sxs, is there anything that improves or worsens sxs? No    Any LE skin discoloration? No    Any present/hx of slow healing LE wounds? No    Any LE hair loss? No    Hx of DVT or other episodes of clotting? No    Hx surgery/trama to legs or pelvis? YES - Pt has had 3 lower back surgeries and several SI injections. He also had a RLE ankle injury about 5 years ago.     Any prior vein evaluations, treatments or imaging? If YES, where and when was this completed? No          Compression stockings:  Never attempted.      Noted the above would be discussed with clinic team and Pt would be contacted with scheduling and imaging recommendations. Pt agreeable to MG location. Pt verbalized understanding, agreed to current plan and denied any further questions.      Magy Cabrera LPN

## 2023-05-23 ENCOUNTER — PRE VISIT (OUTPATIENT)
Dept: OTOLARYNGOLOGY | Facility: CLINIC | Age: 33
End: 2023-05-23

## 2023-05-23 ENCOUNTER — OFFICE VISIT (OUTPATIENT)
Dept: OTOLARYNGOLOGY | Facility: CLINIC | Age: 33
End: 2023-05-23
Payer: COMMERCIAL

## 2023-05-23 VITALS
SYSTOLIC BLOOD PRESSURE: 120 MMHG | DIASTOLIC BLOOD PRESSURE: 79 MMHG | HEIGHT: 72 IN | HEART RATE: 75 BPM | BODY MASS INDEX: 38.33 KG/M2 | OXYGEN SATURATION: 100 % | WEIGHT: 283 LBS | TEMPERATURE: 98 F

## 2023-05-23 DIAGNOSIS — H61.23 EXCESSIVE CERUMEN IN BOTH EAR CANALS: ICD-10-CM

## 2023-05-23 DIAGNOSIS — I88.9 LYMPHADENITIS: ICD-10-CM

## 2023-05-23 DIAGNOSIS — M26.609 TMJ (TEMPOROMANDIBULAR JOINT SYNDROME): Primary | ICD-10-CM

## 2023-05-23 DIAGNOSIS — H92.03 OTALGIA, BILATERAL: ICD-10-CM

## 2023-05-23 PROCEDURE — 92504 EAR MICROSCOPY EXAMINATION: CPT | Performed by: OTOLARYNGOLOGY

## 2023-05-23 PROCEDURE — 99203 OFFICE O/P NEW LOW 30 MIN: CPT | Mod: 25 | Performed by: OTOLARYNGOLOGY

## 2023-05-23 RX ORDER — FLUTICASONE PROPIONATE 50 MCG
2 SPRAY, SUSPENSION (ML) NASAL DAILY
Qty: 11.1 ML | Refills: 6 | Status: SHIPPED | OUTPATIENT
Start: 2023-05-23 | End: 2023-06-22

## 2023-05-23 RX ORDER — PREDNISONE 5 MG/1
TABLET ORAL
Qty: 122 TABLET | Refills: 0 | Status: SHIPPED | OUTPATIENT
Start: 2023-05-23 | End: 2023-06-10

## 2023-05-23 ASSESSMENT — PAIN SCALES - GENERAL: PAINLEVEL: MODERATE PAIN (4)

## 2023-05-23 NOTE — LETTER
5/23/2023       RE: Abimael Martinez  [address]  [place] [facility] [address]     Dear Colleague,    Thank you for referring your patient, Abimael Martinez, to the [facility] EAR NOSE AND THROAT CLINIC [place] at [facility]. Please see a copy of my visit note below.    Dear Dr. Armendariz Ref-Primary, Physician:    I had the pleasure of meeting Abimael Martinez in consultation today at the [facility] at your request.    CHIEF COMPLAINT: Ear pain    HISTORY OF PRESENT ILLNESS: Patient is a 33-year-old in today for consultation with ear pain referred from his family physician.  He states he will have pain in the jaw on teeth and into the ear.  He recently was placed on steroids which seem to help the pain but just finished those and the pain has returned.  Seems little worse on the left side but is involved.  His jaws to click and pop when the opens, also again grinds his teeth.  Left seems little worse than the right.  Has had no drainage.  He feels his hearing is equal and symmetrical, does not notice any hearing issues.  No tinnitus or dizziness.  Again he recently was placed on prednisone 20 mg once a day for 5 days and it helped tremendously with the pain.  He has been off it now for a few days and starting to notice pain again.  He denies any dysphagia, hoarseness, facial paresthesias.      ALLERGIES:    Allergies   Allergen Reactions    Duloxetine Other (See Comments) and Fatigue     enlarged spleen and weight gain    Duloxetine Hcl      Weight gain and fatigue    Paroxetine Other (See Comments), Fatigue and GI Disturbance     Weight gain, Spleen issues    Seasonal Allergies     Vicodin [Hydrocodone-Acetaminophen]        HABITS: Social History    Substance and Sexual Activity      Alcohol use: Yes        Comment: once a week     History   Smoking Status    Former    Packs/day: 0.50    Years: 6.00     Types: Cigarettes    Quit date: 3/12/2018   Smokeless Tobacco    Never         PAST MEDICAL HISTORY: Please see today's intake form (for the remainder of the PMH) which I reviewed and signed.  Past Medical History:   Diagnosis Date    Acute right otitis media 2013    ADHD (attention deficit hyperactivity disorder), combined type     Anxiety     Depression     Drug abuse (H)     Dyslipidemia     Gonococcal urethritis 2016    Hypertension     Internal hemorrhoids     which bleed    Lumbosacral radiculopathy     Obese     OCD (obsessive compulsive disorder)     Other chronic pain     Low Back Pain    Uncomplicated asthma     Urethritis 2013     Problem list name updated by automated process. Provider to review       FAMILY HISTORY/SOCIAL HISTORY:   Family History   Problem Relation Age of Onset    Unknown/Adopted Mother     Unknown/Adopted Father     Unknown/Adopted Maternal Grandmother     Unknown/Adopted Maternal Grandfather     Unknown/Adopted Paternal Grandmother     Unknown/Adopted Paternal Grandfather     Unknown/Adopted Brother       Social History     Socioeconomic History    Marital status: Single     Spouse name: Not on file    Number of children: Not on file    Years of education: Not on file    Highest education level: Not on file   Occupational History    Not on file   Tobacco Use    Smoking status: Former     Packs/day: 0.50     Years: 6.00     Pack years: 3.00     Types: Cigarettes     Quit date: 3/12/2018     Years since quittin.2    Smokeless tobacco: Never    Tobacco comments:     second hand smoke exposure   Vaping Use    Vaping status: Never Used   Substance and Sexual Activity    Alcohol use: Yes     Comment: once a week    Drug use: Yes     Types: Marijuana     Comment: pot once a month, ectasy  As of 2016 he only smokes pot occasionally    Sexual activity: Not Currently     Partners: Female     Birth control/protection: Abstinence, Pull-out method, Condom   Other Topics  Concern    Parent/sibling w/ CABG, MI or angioplasty before 65F 55M? No   Social History Narrative    Not on file     Social Determinants of Health     Financial Resource Strain: Not on file   Food Insecurity: Not on file   Transportation Needs: Not on file   Physical Activity: Not on file   Stress: Not on file   Social Connections: Not on file   Intimate Partner Violence: Not At Risk (8/10/2021)    Humiliation, Afraid, Rape, and Kick questionnaire     Fear of Current or Ex-Partner: No     Emotionally Abused: No     Physically Abused: No     Sexually Abused: No   Housing Stability: Not on file       REVIEW OF SYSTEMS: Patient Supplied Answers to Review of Systems      5/22/2023     9:47 AM    ENT ROS   Constitutional Appetite change    Unexplained fatigue    Problems with sleep    Unexplained fever or night sweats   Neurology Dizzy spells    Headache   Psychology Frequently feeling anxious   Ears, Nose, Throat Hearing loss    Ear pain    Nasal congestion or drainage    Sore throat    Trouble swallowing   Cardiopulmonary Breathing problems   Gastrointestinal/Genitourinary Heartburn/indigestion    Constipation   Musculoskeletal Swollen joints    Back pain    Neck pain   Allergy/Immunology Allergies or hay fever    Skin changes   Hematologic Lymph node swelling   Endocrine Thirst            The remainder of the 10 point ROS is negative    PHYSICIAL EXAMINATION:  Constitutional: The patient was well-groomed and in no acute distress.   Skin: Warm and pink.  Psychiatric: The patient's affect was calm, cooperative, and appropriate.   Respiratory: Breathing comfortably without stridor or exertion of accessory muscles.  Eyes: Pupils were equal and reactive. Extraocular movement intact.   Head: Normocephalic and atraumatic. No lesions or scars.  Ears: Patient placed under the microscope for microscopic evaluation and cleaning of cerumen which was obscuring full visualization and complete assessment of both TMs. Under high  power magnification, the right ear was examined and cleaned of cerumen using curet, alligator forceps, and suction.  After cleaning, TM is fully visualized and has normal position with normal middle ear aeration. The left ear was then cleaned and inspected using microscope, instruments and similar techniques. After cleaning of cerumen, the TM has normal position with normal aeration to middle ear.  Nose: Sinuses were nontender. Anterior rhinoscopy revealed midline septum and absence of purulence or polyps.  Oral Cavity: Normal tongue, floor of mouth, buccal mucosa, and palate. No lesions or masses on inspection or palpation. No abnormal lymph tissue in the oropharynx.   Neck: The parotid is soft without masses. Supple with normal laryngeal and tracheal landmarks.   Lymphatic: There is no palpable lymphadenopathy or other masses in the neck.   Neurologic: Alert and oriented x 3. Cranial nerves III-XI within normal limits. Voice quality normal.  Cerebellar Function Tests:  Grossly normal  TMJ palpation: Palpation of the jaw joint with opening closing does show significant pain, crepitation noted as well.    Audiogram: Audiogram performed shows normal hearing in both ears through all frequencies excellent discrimination 100% in each ear.  He has normal type A tympanograms bilaterally.      IMPRESSION AND PLAN:   Bilateral ear pain: I feel this is secondary to TMJ and not the ear self this normal exam today of the both ears.  Also again very tender on palpation.  He does grind his teeth at night.  Did try a bite block 1 time from his dentist but could not tolerate the bite block.  We discussed going to TMJ clinic to see an oral surgeon.  We will help him set up appointment for that.  In the meantime I am going to go ahead and give him a burst of steroids, prednisone 30 mg twice a day for 5 days with a daily taper.  Again I think this is all secondary to TMJ and we discussed soft diet, heat to the joint, and  anti-inflammatories on a regular basis.  TMJ: Feel cause for the ear pain as above.  Treatment as above.  Excessive cerumen: Cleaned today with instruments microscope as above.  No further treatment needed, monitor.  Nasal stuffiness: Suspect from allergies, I did order given Flonase to use on a regular basis and discussed the need to use that regularly.    Thank you very much for the opportunity to participate in the care of your patient.    Rick L Nissen MD

## 2023-05-23 NOTE — PROGRESS NOTES
Dear Dr. Armendariz Ref-Primary, Physician:    I had the pleasure of meeting Abimael Martinez in consultation today at the [facility] at your request.    CHIEF COMPLAINT: Ear pain    HISTORY OF PRESENT ILLNESS: Patient is a 33-year-old in today for consultation with ear pain referred from his family physician.  He states he will have pain in the jaw on teeth and into the ear.  He recently was placed on steroids which seem to help the pain but just finished those and the pain has returned.  Seems little worse on the left side but is involved.  His jaws to click and pop when the opens, also again grinds his teeth.  Left seems little worse than the right.  Has had no drainage.  He feels his hearing is equal and symmetrical, does not notice any hearing issues.  No tinnitus or dizziness.  Again he recently was placed on prednisone 20 mg once a day for 5 days and it helped tremendously with the pain.  He has been off it now for a few days and starting to notice pain again.  He denies any dysphagia, hoarseness, facial paresthesias.      ALLERGIES:    Allergies   Allergen Reactions     Duloxetine Other (See Comments) and Fatigue     enlarged spleen and weight gain     Duloxetine Hcl      Weight gain and fatigue     Paroxetine Other (See Comments), Fatigue and GI Disturbance     Weight gain, Spleen issues     Seasonal Allergies      Vicodin [Hydrocodone-Acetaminophen]        HABITS: Social History    Substance and Sexual Activity      Alcohol use: Yes        Comment: once a week     History   Smoking Status     Former     Packs/day: 0.50     Years: 6.00     Types: Cigarettes     Quit date: 3/12/2018   Smokeless Tobacco     Never         PAST MEDICAL HISTORY: Please see today's intake form (for the remainder of the PMH) which I reviewed and signed.  Past Medical History:   Diagnosis Date     Acute right otitis media 1/8/2013     ADHD (attention deficit hyperactivity disorder), combined type       Anxiety      Depression      Drug abuse (H)      Dyslipidemia      Gonococcal urethritis 2016     Hypertension      Internal hemorrhoids     which bleed     Lumbosacral radiculopathy      Obese      OCD (obsessive compulsive disorder)      Other chronic pain     Low Back Pain     Uncomplicated asthma      Urethritis 2013     Problem list name updated by automated process. Provider to review       FAMILY HISTORY/SOCIAL HISTORY:   Family History   Problem Relation Age of Onset     Unknown/Adopted Mother      Unknown/Adopted Father      Unknown/Adopted Maternal Grandmother      Unknown/Adopted Maternal Grandfather      Unknown/Adopted Paternal Grandmother      Unknown/Adopted Paternal Grandfather      Unknown/Adopted Brother       Social History     Socioeconomic History     Marital status: Single     Spouse name: Not on file     Number of children: Not on file     Years of education: Not on file     Highest education level: Not on file   Occupational History     Not on file   Tobacco Use     Smoking status: Former     Packs/day: 0.50     Years: 6.00     Pack years: 3.00     Types: Cigarettes     Quit date: 3/12/2018     Years since quittin.2     Smokeless tobacco: Never     Tobacco comments:     second hand smoke exposure   Vaping Use     Vaping status: Never Used   Substance and Sexual Activity     Alcohol use: Yes     Comment: once a week     Drug use: Yes     Types: Marijuana     Comment: pot once a month, ectasy  As of 2016 he only smokes pot occasionally     Sexual activity: Not Currently     Partners: Female     Birth control/protection: Abstinence, Pull-out method, Condom   Other Topics Concern     Parent/sibling w/ CABG, MI or angioplasty before 65F 55M? No   Social History Narrative     Not on file     Social Determinants of Health     Financial Resource Strain: Not on file   Food Insecurity: Not on file   Transportation Needs: Not on file   Physical Activity: Not on file   Stress: Not on  file   Social Connections: Not on file   Intimate Partner Violence: Not At Risk (8/10/2021)    Humiliation, Afraid, Rape, and Kick questionnaire      Fear of Current or Ex-Partner: No      Emotionally Abused: No      Physically Abused: No      Sexually Abused: No   Housing Stability: Not on file       REVIEW OF SYSTEMS: Patient Supplied Answers to Review of Systems      5/22/2023     9:47 AM    ENT ROS   Constitutional Appetite change    Unexplained fatigue    Problems with sleep    Unexplained fever or night sweats   Neurology Dizzy spells    Headache   Psychology Frequently feeling anxious   Ears, Nose, Throat Hearing loss    Ear pain    Nasal congestion or drainage    Sore throat    Trouble swallowing   Cardiopulmonary Breathing problems   Gastrointestinal/Genitourinary Heartburn/indigestion    Constipation   Musculoskeletal Swollen joints    Back pain    Neck pain   Allergy/Immunology Allergies or hay fever    Skin changes   Hematologic Lymph node swelling   Endocrine Thirst            The remainder of the 10 point ROS is negative    PHYSICIAL EXAMINATION:  Constitutional: The patient was well-groomed and in no acute distress.   Skin: Warm and pink.  Psychiatric: The patient's affect was calm, cooperative, and appropriate.   Respiratory: Breathing comfortably without stridor or exertion of accessory muscles.  Eyes: Pupils were equal and reactive. Extraocular movement intact.   Head: Normocephalic and atraumatic. No lesions or scars.  Ears: Patient placed under the microscope for microscopic evaluation and cleaning of cerumen which was obscuring full visualization and complete assessment of both TMs. Under high power magnification, the right ear was examined and cleaned of cerumen using curet, alligator forceps, and suction.  After cleaning, TM is fully visualized and has normal position with normal middle ear aeration. The left ear was then cleaned and inspected using microscope, instruments and similar  techniques. After cleaning of cerumen, the TM has normal position with normal aeration to middle ear.  Nose: Sinuses were nontender. Anterior rhinoscopy revealed midline septum and absence of purulence or polyps.  Oral Cavity: Normal tongue, floor of mouth, buccal mucosa, and palate. No lesions or masses on inspection or palpation. No abnormal lymph tissue in the oropharynx.   Neck: The parotid is soft without masses. Supple with normal laryngeal and tracheal landmarks.   Lymphatic: There is no palpable lymphadenopathy or other masses in the neck.   Neurologic: Alert and oriented x 3. Cranial nerves III-XI within normal limits. Voice quality normal.  Cerebellar Function Tests:  Grossly normal  TMJ palpation: Palpation of the jaw joint with opening closing does show significant pain, crepitation noted as well.    Audiogram: Audiogram performed shows normal hearing in both ears through all frequencies excellent discrimination 100% in each ear.  He has normal type A tympanograms bilaterally.      IMPRESSION AND PLAN:   1. Bilateral ear pain: I feel this is secondary to TMJ and not the ear self this normal exam today of the both ears.  Also again very tender on palpation.  He does grind his teeth at night.  Did try a bite block 1 time from his dentist but could not tolerate the bite block.  We discussed going to TMJ clinic to see an oral surgeon.  We will help him set up appointment for that.  In the meantime I am going to go ahead and give him a burst of steroids, prednisone 30 mg twice a day for 5 days with a daily taper.  Again I think this is all secondary to TMJ and we discussed soft diet, heat to the joint, and anti-inflammatories on a regular basis.  2. TMJ: Feel cause for the ear pain as above.  Treatment as above.  3. Excessive cerumen: Cleaned today with instruments microscope as above.  No further treatment needed, monitor.  4. Nasal stuffiness: Suspect from allergies, I did order given Flonase to use on a  regular basis and discussed the need to use that regularly.    Thank you very much for the opportunity to participate in the care of your patient.    Rick L Nissen MD

## 2023-05-23 NOTE — PATIENT INSTRUCTIONS
1. You were seen in the ENT Clinic today by Dr. Nissen.  If you have any questions or concerns after your appointment, please call   - Option 1: ENT Clinic: [phone]   - Option 2: Maira (Dr. Nissen's Nurse): [phone]                   Genoveva (Dr. Nissen's Nurse): [phone]      2.   Plan to return to clinic as needed    3. Prednisone taper as directed    4.Flonase as directed    5. TMJ clinic- [phone]- they will contact you for an appt      How to Contact Us:  Send a "[employer]" message to your provider. Our team will respond to you via "[employer]". Occasionally, we will need to call you to get further information.  For urgent matters (Monday-Friday), call the ENT Clinic: [phone] and speak with a call center team member - they will route your call appropriately.   If you'd like to speak directly with a nurse, please find our contact information below. We do our best to check voicemail frequently throughout the day, and will work to call you back within 1-2 days. For urgent matters, please use the general clinic phone numbers listed above.       Maira LAINEZ LPN  [facility] - Otolaryngology

## 2023-06-05 ENCOUNTER — OFFICE VISIT (OUTPATIENT)
Dept: VASCULAR SURGERY | Facility: CLINIC | Age: 33
End: 2023-06-05
Payer: COMMERCIAL

## 2023-06-05 ENCOUNTER — ANCILLARY PROCEDURE (OUTPATIENT)
Dept: ULTRASOUND IMAGING | Facility: CLINIC | Age: 33
End: 2023-06-05
Attending: PHYSICIAN ASSISTANT
Payer: COMMERCIAL

## 2023-06-05 VITALS — HEART RATE: 69 BPM | SYSTOLIC BLOOD PRESSURE: 149 MMHG | DIASTOLIC BLOOD PRESSURE: 94 MMHG | OXYGEN SATURATION: 100 %

## 2023-06-05 DIAGNOSIS — I83.891 SYMPTOMATIC VARICOSE VEINS OF RIGHT LOWER EXTREMITY: ICD-10-CM

## 2023-06-05 DIAGNOSIS — I83.813 VARICOSE VEINS OF BOTH LOWER EXTREMITIES WITH PAIN: ICD-10-CM

## 2023-06-05 PROCEDURE — 93971 EXTREMITY STUDY: CPT | Mod: RT | Performed by: RADIOLOGY

## 2023-06-05 PROCEDURE — 99203 OFFICE O/P NEW LOW 30 MIN: CPT | Performed by: PHYSICIAN ASSISTANT

## 2023-06-05 NOTE — NURSING NOTE
Chief Complaint   Patient presents with     New Patient     Vericose veins of both lower extremities with pain            Vitals were taken and medications reconciled.     Rufino Garcia, Visit Facilitator    3:04 PM

## 2023-06-05 NOTE — PROGRESS NOTES
VASCULAR AND INTERVENTIONAL RADIOLOGY CLINIC NOTE  23     Referring Provider: Castillo Narayanan MD    Impression:  RLE symptomatic varicose veins.      US results reviewed with patient (see below report)    Plan:  -3 month trial of medical grade compression  -elevate legs at least 1 hour/day  -encouraged walking 20 minutes per day  -ongoing symptoms warrant return visit with Interventional Radiologist to discuss treatment options.  [place] Referral placed.  Patient will cancel if symptomatically better.      History of Present Illness:  Abimael Martinez is a 33 year old English speaking male with history of back surgery (Right L4-L5, left L5-S1 microdiscectomy, smoking (now quit), RLE ankle injury 5 years ago presents with almost constant RLE (right lateral/posterior thigh and ankle pain and achiness) worsening over several months.     Noticed bulging veins in the ankle for several years and noticed right thigh vein a couple of months ago.  Pain is 7-8/10 in severity, worse when standing for long periods of times.  This has prevented him from exercising as much. Also has achiness on left thigh but is not concerned about it and wants to focus on right leg symptoms.      Denies trying compression stockings.       Occupation:  Denies standing for prolonged times for his profession.      Denies any swelling, history of DVT/PE, rest pain, fever, chest pain or SOB, wounds in the legs or feet    Family history:  Patient is adopted and unaware of prior family history.    Pertinent Imagin23 US VENOUS COMPETENCY Right:  ULTRASOUND VENOUS COMPETENCY RIGHT 2023 3:01 PM     CLINICAL HISTORY: Areas of bulging veins on thigh and ankle.;  Symptomatic varicose veins of right lower extremity.     COMPARISON: None     REFERRING PROVIDER: CHRIS TINOCO     Technique: B-mode (grayscale) and Duplex Doppler ultrasound of the  right lower extremity veins (superficial and deep), including  incompetency reflux  time and compression for thrombus. Additional  Duplex doppler assessment of the PTA and SOHA at the ankle.      Deep incompetency exam performed in reverse Trendelenburg.     Superficial incompetency exam performed upright, non-weight bearing  with distal compression using rapid inflation/deflation cuffs.     Venous Competency Diagnostic Criteria Adopted 11/29/11.     Venous competency criteria defining abnormal reflux duration:  Femoral - popliteal reflux > 1.0 sec.  Deep femoral, deep calf veins, and superficial vein reflux > 0.5 sec.   vein reflux > 0.35 sec.     Supporting document: J Vasc Surg 2003; 38:793-8. Definition of reflux  in lower extremity veins.     Findings:      Right ankle:   Posterior Tibial artery waveform: triphasic  Dorsalis Pedis artery waveform: triphasic     Right Lower Extremity:      Duplex Evaluation for Deep Vein Thrombus (which includes CFV, FV,  popliteal vein, and posterior tibial veins): Negative.     Common femoral vein: Incompetent with Valsalva above the  saphenofemoral junction, competent below.     Deep femoral vein: Competent     Femoral vein:      proximal thigh: Competent      mid thigh: Competent      distal thigh: Competent     Popliteal vein: Competent     Posterior tibial veins at the ankle: Competent        Superficial Venous Incompetency Study:        Right Lower Extremity:      Duplex Evaluation for Superficial Vein Thrombus (which includes GSV,  SSV, AASV, and PASV): Negative.      Anterior accessory saphenous (AASV): Not seen     Posterior accessory saphenous (PASV): Competent     Great saphenous vein (GSV)      sapheno-femoral junction: Incompetent      prox thigh: Incompetent      mid thigh: Competent      dist thigh: Competent       knee: Competent      prox calf: Competent      mid calf: Competent      ankle: Competent     Vein of Giacomini (V of G):      distal thigh: Competent      mid thigh: Not seen      prox thigh: Not seen     Small saphenous  vein:      popliteal fossa: Competent, thick-walled      prox calf: Competent      mid calf: Competent     Diameters of superficial veins:     AASV: Diameter Not seen      SFJ: Diameter* 5.4 mm  GSV prox thigh*: Diameter 0.7 mm   GSV knee*: Diameter 3.7 mm     SSV SPJ*: Diameter 2.3 mm     Incompetent telangectasia of the right lateral/posterolateral thigh  (2.5 mm, incompetent), which appears contiguous with varicosities of  the right posterolateral calf (3.5 mm, incompetent) arising from the  SSV.     Incompetent branches of the GSV in the mid to distal right thigh  measuring 2.5 mm and 3 mm.     Incompetent  approximately 8 cm superior to the medial  malleolus measuring 2.2 x 18.7 mm.                                                                     IMPRESSION:  RIGHT LEG:       A. No superficial or deep venous thrombosis demonstrated.       B. Deep venous incompetence in the common femoral vein above the  saphenofemoral junction with Valsalva.       C. Superficial venous incompetence in the GSV at the  saphenofemoral junction and proximal thigh.       D. Incompetent telangectasia of the right lateral/posterolateral  thigh, which appears contiguous with varicosities of the right  posterolateral calf arising from the SSV.       E. Incompetent  approximately 8 cm superior to the  medial malleolus.        F. Incompetent branches of the GSV in the mid to distal right  thigh.       Past Medial History  Past Medical History:   Diagnosis Date     Acute right otitis media 1/8/2013     ADHD (attention deficit hyperactivity disorder), combined type      Anxiety      Depression      Drug abuse (H)      Dyslipidemia      Gonococcal urethritis 02/05/2016     Hypertension      Internal hemorrhoids     which bleed     Lumbosacral radiculopathy      Obese      OCD (obsessive compulsive disorder)      Other chronic pain     Low Back Pain     Uncomplicated asthma      Urethritis 5/20/2013     Problem list  name updated by automated process. Provider to review       Medications  Current Outpatient Medications   Medication     albuterol (PROAIR HFA/PROVENTIL HFA/VENTOLIN HFA) 108 (90 Base) MCG/ACT inhaler     ALPRAZolam (XANAX) 2 MG tablet     amphetamine-dextroamphetamine (ADDERALL) 20 MG tablet     azelastine (ASTELIN) 0.1 % nasal spray     cetirizine (ZYRTEC) 10 MG tablet     famotidine (PEPCID) 40 MG tablet     fluticasone (FLONASE) 50 MCG/ACT nasal spray     fluticasone (FLOVENT HFA) 220 MCG/ACT Inhaler     Hyoscyamine Sulfate 0.375 MG TBCR     lidocaine (LMX4) 4 % external cream     loratadine (CLARITIN) 10 MG tablet     mometasone (ELOCON) 0.1 % external ointment     montelukast (SINGULAIR) 10 MG tablet     morphine (MS CONTIN) 15 MG CR tablet     mupirocin (BACTROBAN) 2 % external ointment     naloxone (NARCAN) 4 MG/0.1ML nasal spray     omeprazole (PRILOSEC) 20 MG DR capsule     pimecrolimus (ELIDEL) 1 % external cream     pramoxine-HC (PRAMOSONE) 1-1 % external lotion     predniSONE (DELTASONE) 5 MG tablet     PROTOPIC 0.1 % external ointment     sildenafil (REVATIO) 20 MG tablet     tacrolimus (PROTOPIC) 0.03 % external ointment     tiZANidine (ZANAFLEX) 4 MG tablet     triamcinolone (KENALOG) 0.1 % external ointment     No current facility-administered medications for this visit.       Allergies  Allergies   Allergen Reactions     Duloxetine Other (See Comments) and Fatigue     enlarged spleen and weight gain     Duloxetine Hcl      Weight gain and fatigue     Paroxetine Other (See Comments), Fatigue and GI Disturbance     Weight gain, Spleen issues     Seasonal Allergies      Vicodin [Hydrocodone-Acetaminophen]      Past Surgical History  Past Surgical History:   Procedure Laterality Date     BACK SURGERY  04/05/2018     COLONOSCOPY       ESOPHAGOSCOPY, GASTROSCOPY, DUODENOSCOPY (EGD), COMBINED N/A 8/16/2021    Procedure: ESOPHAGOGASTRODUODENOSCOPY, WITH BIOPSY AND POLYPECTOMY;  Surgeon: Phillip Joyner  MD Katherine;  Location: [facility] OR     HEMILAMINECTOMY, DISCECTOMY LUMBAR TWO LEVELS, COMBINED Right 10/28/2021    Procedure: RIGHT LUMBAR L4-5 MINIMALLY INVASIVE MICRODISCECTOMY;  Surgeon: Troy Wharton MD;  Location: [facility] OR     HEMILAMINECTOMY, DISCECTOMY LUMBAR TWO LEVELS, COMBINED Left 10/28/2021    Procedure: LEFT LUMBAR L5-S1 MINIMALLY INVASIVE MICRODISCECTOMY;  Surgeon: Troy Wharton MD;  Location:  OR     Social History  Social History     Tobacco Use     Smoking status: Former     Packs/day: 0.50     Years: 6.00     Pack years: 3.00     Types: Cigarettes     Quit date: 3/12/2018     Years since quittin.2     Smokeless tobacco: Never     Tobacco comments:     second hand smoke exposure   Vaping Use     Vaping status: Never Used   Substance Use Topics     Alcohol use: Yes     Comment: once a week     Drug use: Yes     Types: Marijuana     Comment: pot once a month, ectasy  As of 2016 he only smokes pot occasionally     Physical Examination  BP (!) 149/94 (BP Location: Left arm, Patient Position: Sitting, Cuff Size: Adult Large)   Pulse 69   SpO2 100%   General:  alert and oriented in NAD  Lower Extremity Exam: warm, non tender to palpation bilaterally. Trace edema.  Distal pulses palpable.  No wounds appreciated.  Scattered telangectasia (spider veins), and reticular veins lateral right and posterior thigh.                      Lindy Mcgarry PA-C  Interventional Radiology  Pager: [phone]

## 2023-06-12 ENCOUNTER — TELEPHONE (OUTPATIENT)
Dept: VASCULAR SURGERY | Facility: CLINIC | Age: 33
End: 2023-06-12

## 2023-06-12 NOTE — TELEPHONE ENCOUNTER
6-12-23 Talked w/pt & told me I will call as soon as we get their October schedules since Jessica and Quang are booked in Sept. already; Pt had US 6-5-23 at [facility]  sm

## 2023-06-19 NOTE — TELEPHONE ENCOUNTER
6-19-23 I called & talked to Abimael and offered any of our doctors (per Ekaterina) for an appt earlier than Oct., and he wants to stay w/Quang/Jessica ais he is doing the 3 month conservative TX currently and is due to return in Oct.  sm

## 2023-06-27 ENCOUNTER — OFFICE VISIT (OUTPATIENT)
Dept: OPTOMETRY | Facility: CLINIC | Age: 33
End: 2023-06-27
Payer: COMMERCIAL

## 2023-06-27 DIAGNOSIS — H52.221 REGULAR ASTIGMATISM OF RIGHT EYE: Primary | ICD-10-CM

## 2023-06-27 PROCEDURE — 92015 DETERMINE REFRACTIVE STATE: CPT | Performed by: OPTOMETRIST

## 2023-06-27 PROCEDURE — 92004 COMPRE OPH EXAM NEW PT 1/>: CPT | Performed by: OPTOMETRIST

## 2023-06-27 ASSESSMENT — CONF VISUAL FIELD
OD_SUPERIOR_TEMPORAL_RESTRICTION: 0
OS_SUPERIOR_NASAL_RESTRICTION: 0
OS_INFERIOR_TEMPORAL_RESTRICTION: 0
OD_INFERIOR_TEMPORAL_RESTRICTION: 0
OS_NORMAL: 1
OD_NORMAL: 1
OS_SUPERIOR_TEMPORAL_RESTRICTION: 0
OD_SUPERIOR_NASAL_RESTRICTION: 0
OD_INFERIOR_NASAL_RESTRICTION: 0
OS_INFERIOR_NASAL_RESTRICTION: 0

## 2023-06-27 ASSESSMENT — CUP TO DISC RATIO
OD_RATIO: 0.2
OS_RATIO: 0.2

## 2023-06-27 ASSESSMENT — REFRACTION_MANIFEST
OS_SPHERE: +0.25
OD_AXIS: 011
OD_CYLINDER: +0.50
OD_CYLINDER: +0.50
METHOD_AUTOREFRACTION: 1
OD_SPHERE: PLANO
OS_SPHERE: PLANO
OS_CYLINDER: SPHERE
OD_AXIS: 010
OD_SPHERE: -0.25

## 2023-06-27 ASSESSMENT — KERATOMETRY
OS_K2POWER_DIOPTERS: 43.25
OD_K2POWER_DIOPTERS: 43.25
OD_K1POWER_DIOPTERS: 43.00
OS_AXISANGLE_DEGREES: 90
OS_K1POWER_DIOPTERS: 43.25
OD_AXISANGLE2_DEGREES: 151
OD_AXISANGLE_DEGREES: 61
OS_AXISANGLE2_DEGREES: 180

## 2023-06-27 ASSESSMENT — VISUAL ACUITY
OD_SC: 20/20
METHOD: SNELLEN - LINEAR
OS_SC: 20/20

## 2023-06-27 ASSESSMENT — TONOMETRY
OD_IOP_MMHG: 15
IOP_METHOD: APPLANATION
OS_IOP_MMHG: 15

## 2023-06-27 ASSESSMENT — EXTERNAL EXAM - LEFT EYE: OS_EXAM: NORMAL

## 2023-06-27 ASSESSMENT — EXTERNAL EXAM - RIGHT EYE: OD_EXAM: NORMAL

## 2023-06-27 ASSESSMENT — SLIT LAMP EXAM - LIDS
COMMENTS: NORMAL
COMMENTS: NORMAL

## 2023-06-27 NOTE — PATIENT INSTRUCTIONS
Astigmatism results from curvature differential in the cornea and crystalline lens which can cause a distorted image, as light rays are prevented from meeting at a common focus.    No Eyeglass prescription given.    The affects of the dilating drops last for 4- 6 hours.  You will be more sensitive to light and vision will be blurry up close.  Do not drive if you do not feel comfortable.  Mydriatic sunglasses were given if needed.    Recommend annual eye exams.    Fariha Armando O.D.  [address] [address] [phone]

## 2023-06-27 NOTE — PROGRESS NOTES
Chief Complaint   Patient presents with     Annual Eye Exam         Last Eye Exam: unknown  Dilated Previously: No, side effects of dilation explained today    What are you currently using to see?  does not use glasses or contacts       Distance Vision Acuity: Noticed gradual change in right eye    Near Vision Acuity: Satisfied with vision while reading and using computer unaided    Eye Comfort: good  Do you use eye drops? : No    Denelle Candis - Optometric Assistant          Medical, surgical and family histories reviewed and updated 6/27/2023.       OBJECTIVE: See Ophthalmology exam    ASSESSMENT:    ICD-10-CM    1. Regular astigmatism of right eye - Right Eye  H52.221           PLAN:     Patient Instructions   Astigmatism results from curvature differential in the cornea and crystalline lens which can cause a distorted image, as light rays are prevented from meeting at a common focus.    No Eyeglass prescription given.    The affects of the dilating drops last for 4- 6 hours.  You will be more sensitive to light and vision will be blurry up close.  Do not drive if you do not feel comfortable.  Mydriatic sunglasses were given if needed.    Recommend annual eye exams.    Fariha Armando O.D.  [address] [address]    [phone]

## 2023-06-27 NOTE — LETTER
6/27/2023         RE: Abimael Martinez  [address]  [place] [address]        Dear Colleague,    Thank you for referring your patient, Abimael Martinez, to the [facility]. Please see a copy of my visit note below.    Chief Complaint   Patient presents with     Annual Eye Exam         Last Eye Exam: unknown  Dilated Previously: No, side effects of dilation explained today    What are you currently using to see?  does not use glasses or contacts       Distance Vision Acuity: Noticed gradual change in right eye    Near Vision Acuity: Satisfied with vision while reading and using computer unaided    Eye Comfort: good  Do you use eye drops? : No    Denelle Candis - Optometric Assistant          Medical, surgical and family histories reviewed and updated 6/27/2023.       OBJECTIVE: See Ophthalmology exam    ASSESSMENT:    ICD-10-CM    1. Regular astigmatism of right eye - Right Eye  H52.221           PLAN:     Patient Instructions   Astigmatism results from curvature differential in the cornea and crystalline lens which can cause a distorted image, as light rays are prevented from meeting at a common focus.    No Eyeglass prescription given.    The affects of the dilating drops last for 4- 6 hours.  You will be more sensitive to light and vision will be blurry up close.  Do not drive if you do not feel comfortable.  Mydriatic sunglasses were given if needed.    Recommend annual eye exams.    Fariha Armando O.D.  [facility]   [address] [place] [address]    [phone]           Again, thank you for allowing me to participate in the care of your patient.        Sincerely,        Fariha Armando, OD

## 2023-07-19 ENCOUNTER — MYC MEDICAL ADVICE (OUTPATIENT)
Dept: DERMATOLOGY | Facility: CLINIC | Age: 33
End: 2023-07-19
Payer: COMMERCIAL

## 2023-07-25 ENCOUNTER — VIRTUAL VISIT (OUTPATIENT)
Dept: FAMILY MEDICINE | Facility: CLINIC | Age: 33
End: 2023-07-25
Payer: COMMERCIAL

## 2023-07-25 DIAGNOSIS — H53.9 VISION CHANGES: ICD-10-CM

## 2023-07-25 DIAGNOSIS — H57.89 IRRITATION OF LEFT EYE: Primary | ICD-10-CM

## 2023-07-25 PROCEDURE — 99213 OFFICE O/P EST LOW 20 MIN: CPT | Mod: VID | Performed by: PHYSICIAN ASSISTANT

## 2023-07-25 RX ORDER — POLYMYXIN B SULFATE AND TRIMETHOPRIM 1; 10000 MG/ML; [USP'U]/ML
1-2 SOLUTION OPHTHALMIC 4 TIMES DAILY
Qty: 3 ML | Refills: 0 | Status: SHIPPED | OUTPATIENT
Start: 2023-07-25 | End: 2023-07-26

## 2023-07-25 ASSESSMENT — ENCOUNTER SYMPTOMS
BLURRED VISION: 1
EYE REDNESS: 1
EYE PAIN: 1

## 2023-07-25 ASSESSMENT — ASTHMA QUESTIONNAIRES: ACT_TOTALSCORE: 23

## 2023-07-25 NOTE — PATIENT INSTRUCTIONS
Your eye symptoms may be caused by pinkeye, otherwise known as bacterial conjunctivitis.  Please see attached handout for additional information.  For treatment you have been prescribed Polytrim eyedrops.  As you have a cloudy appearance to your vision, I would like you to follow-up with an eye doctor for reevaluation and further management.  I have placed a referral as requested and the scheduling team should reach out to you to set up your appointment.  Please reach out with questions or concerns.  If you develop any severe symptoms such as sensitivity to lights, severe eye pain, or sudden vision changes, please see an eye doctor immediately or follow-up in the emergency department.  [facility] and the [facility] emergency departments have eye doctors on-call.

## 2023-07-25 NOTE — PROGRESS NOTES
Abimael is a 33 year old who is being evaluated via a billable video visit.      How would you like to obtain your AVS? MyChart  If the video visit is dropped, the invitation should be resent by: Text to cell phone: [phone]  Will anyone else be joining your video visit? No          Assessment & Plan     Irritation of left eye  Vision changes  Patient is a 33-year-old male who presents to video visit due to left eye burning, irritation, and a cloudy appearance to vision on the left.  Patient notes history of cloudy vision in the past within the right eye and did see an eye doctor for this without clear cause.  Patient does not wear contacts or glasses.  Low suspicion for iritis, ulceration, or foreign body as patient does not have photophobia, tearing, eye pain.  Symptoms are concerning for possible bacterial conjunctivitis.  Patient prescribed Polytrim eyedrops for treatment.  Given cloudy vision, recommended follow-up with optometry for reevaluation.  Patient requests referral and referral placed.  Return precautions and urgent follow-up precautions provided.    - Adult Eye  Referral; Future  - trimethoprim-polymyxin b (POLYTRIM) 84333-8.1 UNIT/ML-% ophthalmic solution; Place 1-2 drops Into the left eye 4 times daily for 7 days     See Patient Instructions    Lucero Garcia PA-C  [facility] ABRAHAM Varghese is a 33 year old, presenting for the following health issues:  Eye Problem (L eye 07/23/23)        7/25/2023     4:25 PM   Additional Questions   Roomed by Dev GONCALVES   Accompanied by NA         7/25/2023     4:25 PM   Patient Reported Additional Medications   Patient reports taking the following new medications NA     Eye Problem   This is a new problem. The current episode started 2 days ago. The problem has been gradually improving. There is a problem in the left eye. The pain is moderate. There is No history of trauma to the eye. There is No known exposure to pink  eye. Associated symptoms include blurred vision, eye redness and itching.   History of Present Illness       Reason for visit:  Eye issues    He eats 0-1 servings of fruits and vegetables daily.He consumes 0 sweetened beverage(s) daily.He exercises with enough effort to increase his heart rate 30 to 60 minutes per day.  He exercises with enough effort to increase his heart rate 4 days per week.   He is taking medications regularly.       Eye(s) Problem  Onset/Duration: 2 days  Description: Burning, cloudy vision  Pain: YES-irritation/burning, but not specifically pain  Redness: YES  Accompanying Signs & Symptoms:  Discharge/mattering: No  Swelling: YES-improved   Visual changes: YES- Cloudy  Fever: No  Nasal Congestion: YES  Bothered by bright lights: No  History:  Trauma: No  Foreign body exposure: No  Wearing contacts: No  Precipitating or alleviating factors: None  Therapies tried and outcome: Eye saline, made eyes worse with burning sensation      Patient notes a few days ago he woke up with left eye burning. He used OTC eye flush in eye last night which caused significant burning pain. He flushed it with water which helped. Burning has improved significant this morning, but patient notes a cloudy appearance in the left eye that does not improve with blinking. No photophobia. Patient does not wear contacts. No crusting.       Review of Systems   Eyes:  Positive for blurred vision, pain and redness.   Skin:  Positive for itching.            Objective           Vitals:  No vitals were obtained today due to virtual visit.    Physical Exam   GENERAL: Healthy, alert and no distress  EYES: Eyes grossly normal to inspection.  No discharge or erythema, or obvious scleral/conjunctival abnormalities.  RESP: No audible wheeze, cough, or visible cyanosis.  No visible retractions or increased work of breathing.    SKIN: Visible skin clear. No significant rash, abnormal pigmentation or lesions.  NEURO: Cranial nerves grossly  intact.  Mentation and speech appropriate for age.  PSYCH: Mentation appears normal, affect normal/bright, judgement and insight intact, normal speech and appearance well-groomed.          Video-Visit Details    Type of service:  Video Visit   Video Start Time: 5:20 PM  Video End Time:5:30 PM    Originating Location (pt. Location): Home  Distant Location (provider location):  On-site  Platform used for Video Visit: Ary

## 2023-07-26 ENCOUNTER — MYC MEDICAL ADVICE (OUTPATIENT)
Dept: FAMILY MEDICINE | Facility: CLINIC | Age: 33
End: 2023-07-26
Payer: COMMERCIAL

## 2023-07-26 DIAGNOSIS — H57.89 IRRITATION OF LEFT EYE: ICD-10-CM

## 2023-07-26 DIAGNOSIS — H57.89 IRRITATION OF RIGHT EYE: Primary | ICD-10-CM

## 2023-07-26 RX ORDER — POLYMYXIN B SULFATE AND TRIMETHOPRIM 1; 10000 MG/ML; [USP'U]/ML
1-2 SOLUTION OPHTHALMIC 4 TIMES DAILY
Qty: 3 ML | Refills: 0 | Status: SHIPPED | OUTPATIENT
Start: 2023-07-26 | End: 2023-10-05

## 2023-09-26 NOTE — TELEPHONE ENCOUNTER
Pt was scheduled with RENÉ for today, 9-26-23 as he decided not to wait with IR but pt tested positive for Covid and CX; he will call us back to reschedule.

## 2023-10-05 ENCOUNTER — NURSE TRIAGE (OUTPATIENT)
Dept: NURSING | Facility: CLINIC | Age: 33
End: 2023-10-05

## 2023-10-05 ENCOUNTER — VIRTUAL VISIT (OUTPATIENT)
Dept: FAMILY MEDICINE | Facility: CLINIC | Age: 33
End: 2023-10-05
Payer: COMMERCIAL

## 2023-10-05 DIAGNOSIS — J45.20 INTERMITTENT ASTHMA, UNCOMPLICATED: ICD-10-CM

## 2023-10-05 DIAGNOSIS — R06.2 WHEEZING: ICD-10-CM

## 2023-10-05 DIAGNOSIS — K21.00 GASTROESOPHAGEAL REFLUX DISEASE WITH ESOPHAGITIS WITHOUT HEMORRHAGE: ICD-10-CM

## 2023-10-05 DIAGNOSIS — R16.1 SPLENOMEGALY: Primary | ICD-10-CM

## 2023-10-05 DIAGNOSIS — J45.30 MILD PERSISTENT ASTHMA WITHOUT COMPLICATION: ICD-10-CM

## 2023-10-05 PROBLEM — E66.01 MORBID OBESITY (H): Status: RESOLVED | Noted: 2021-05-06 | Resolved: 2023-10-05

## 2023-10-05 PROCEDURE — 99214 OFFICE O/P EST MOD 30 MIN: CPT | Mod: VID | Performed by: NURSE PRACTITIONER

## 2023-10-05 RX ORDER — FLUTICASONE PROPIONATE 220 UG/1
2 AEROSOL, METERED RESPIRATORY (INHALATION) 2 TIMES DAILY
Qty: 12 G | Refills: 1 | Status: SHIPPED | OUTPATIENT
Start: 2023-10-05

## 2023-10-05 RX ORDER — ALBUTEROL SULFATE 90 UG/1
2 AEROSOL, METERED RESPIRATORY (INHALATION) EVERY 4 HOURS PRN
Qty: 18 G | Refills: 1 | Status: SHIPPED | OUTPATIENT
Start: 2023-10-05 | End: 2024-09-25

## 2023-10-05 ASSESSMENT — ASTHMA QUESTIONNAIRES: ACT_TOTALSCORE: 21

## 2023-10-05 NOTE — LETTER
My Asthma Action Plan    Name: Abimael Martinez   YOB: 1990  Date: 10/5/2023   My doctor: STEFFEN Iqbal CNP   My clinic: [facility]        My Rescue Medicine:   Albuterol inhaler (Proair/Ventolin/Proventil HFA)  2-4 puffs EVERY 4 HOURS as needed. Use a spacer if recommended by your provider.  Take Flovent with asthma flares My Asthma Severity:   Intermittent / Exercise Induced  Know your asthma triggers: upper respiratory infections  upper respiratory infections          GREEN ZONE   Good Control  I feel good  No cough or wheeze  Can work, sleep and play without asthma symptoms       Take your asthma control medicine every day.     If exercise triggers your asthma, take your rescue medication  15 minutes before exercise or sports, and  During exercise if you have asthma symptoms  Spacer to use with inhaler: If you have a spacer, make sure to use it with your inhaler             YELLOW ZONE Getting Worse  I have ANY of these:  I do not feel good  Cough or wheeze  Chest feels tight  Wake up at night   Keep taking your Green Zone medications  Start taking your rescue medicine:  every 20 minutes for up to 1 hour. Then every 4 hours for 24-48 hours.  If you stay in the Yellow Zone for more than 12-24 hours, contact your doctor.  If you do not return to the Green Zone in 12-24 hours or you get worse, start taking your oral steroid medicine if prescribed by your provider.           RED ZONE Medical Alert - Get Help  I have ANY of these:  I feel awful  Medicine is not helping  Breathing getting harder  Trouble walking or talking  Nose opens wide to breathe       Take your rescue medicine NOW  If your provider has prescribed an oral steroid medicine, start taking it NOW  Call your doctor NOW  If you are still in the Red Zone after 20 minutes and you have not reached your doctor:  Take your rescue medicine again and  Call 911 or go to the emergency room right away    See your  regular doctor within 2 weeks of an Emergency Room or Urgent Care visit for follow-up treatment.          Annual Reminders:  Meet with Asthma Educator,  Flu Shot in the Fall, consider Pneumonia Vaccination for patients with asthma (aged 19 and older).    Pharmacy: [facility] [address] [place] [address] [id number] [facility] [place]    Electronically signed by STEFFEN Iqbal CNP   Date: 10/05/23                    Asthma Triggers  How To Control Things That Make Your Asthma Worse    Triggers are things that make your asthma worse.  Look at the list below to help you find your triggers and   what you can do about them. You can help prevent asthma flare-ups by staying away from your triggers.      Trigger                                                          What you can do   Cigarette Smoke  Tobacco smoke can make asthma worse. Do not allow smoking in your home, car or around you.  Be sure no one smokes at a child s day care or school.  If you smoke, ask your health care provider for ways to help you quit.  Ask family members to quit too.  Ask your health care provider for a referral to Quit Plan to help you quit smoking, or call 3-229-060-PLAN.     Colds, Flu, Bronchitis  These are common triggers of asthma. Wash your hands often.  Don t touch your eyes, nose or mouth.  Get a flu shot every year.     Dust Mites  These are tiny bugs that live in cloth or carpet. They are too small to see. Wash sheets and blankets in hot water every week.   Encase pillows and mattress in dust mite proof covers.  Avoid having carpet if you can. If you have carpet, vacuum weekly.   Use a dust mask and HEPA vacuum.   Pollen and Outdoor Mold  Some people are allergic to trees, grass, or weed pollen, or molds. Try to keep your windows closed.  Limit time out doors when pollen count is high.   Ask you health care provider about taking medicine during allergy season.     Animal Dander  Some people are allergic to skin flakes, urine or saliva from  pets with fur or feathers. Keep pets with fur or feathers out of your home.    If you can t keep the pet outdoors, then keep the pet out of your bedroom.  Keep the bedroom door closed.  Keep pets off cloth furniture and away from stuffed toys.     Mice, Rats, and Cockroaches  Some people are allergic to the waste from these pests.   Cover food and garbage.  Clean up spills and food crumbs.  Store grease in the refrigerator.   Keep food out of the bedroom.   Indoor Mold  This can be a trigger if your home has high moisture. Fix leaking faucets, pipes, or other sources of water.   Clean moldy surfaces.  Dehumidify basement if it is damp and smelly.   Smoke, Strong Odors, and Sprays  These can reduce air quality. Stay away from strong odors and sprays, such as perfume, powder, hair spray, paints, smoke incense, paint, cleaning products, candles and new carpet.   Exercise or Sports  Some people with asthma have this trigger. Be active!  Ask your doctor about taking medicine before sports or exercise to prevent symptoms.    Warm up for 5-10 minutes before and after sports or exercise.     Other Triggers of Asthma  Cold air:  Cover your nose and mouth with a scarf.  Sometimes laughing or crying can be a trigger.  Some medicines and food can trigger asthma.

## 2023-10-05 NOTE — PROGRESS NOTES
Abimael is a 33 year old who is being evaluated via a billable video visit.      How would you like to obtain your AVS? MyChart  If the video visit is dropped, the invitation should be resent by: Text to cell phone: [phone]  Will anyone else be joining your video visit? No          Assessment & Plan     Splenomegaly  HX, has been enlarged, re-imaging, follow-up with Dr. Lowery advised. If counts are relatively normal, and size unchanged, not recommending treatment.   - CBC with Platelets & Differential; Future  - US Abdomen Limited; Future    Intermittent asthma, uncomplicated  Stable, meds refilled. AAP updated.   - albuterol (PROAIR HFA/PROVENTIL HFA/VENTOLIN HFA) 108 (90 Base) MCG/ACT inhaler; Inhale 2 puffs into the lungs every 4 hours as needed for wheezing    Wheezing  As above.   - albuterol (PROAIR HFA/PROVENTIL HFA/VENTOLIN HFA) 108 (90 Base) MCG/ACT inhaler; Inhale 2 puffs into the lungs every 4 hours as needed for wheezing    Gastroesophageal reflux disease with esophagitis without hemorrhage  Reflux esophagitis, recurrent. Wants to re-start PPI with Famotidine. Had EGD in 2022.   Repeat if not getting better.   - omeprazole (PRILOSEC) 20 MG DR capsule; Take 2 capsules (40 mg) by mouth daily    Mild persistent asthma without complication  Stable, refilled meds.   - fluticasone (FLOVENT HFA) 220 MCG/ACT inhaler; Inhale 2 puffs into the lungs 2 times daily Please give patient spacer    Obesity resolved.   Advised in office BP check and PE with ultrasound and labs.   Return to clinic with any new or worsening symptoms, and as needed.          MEDICATIONS:        - Continue other medications without change    STEFFEN Iqbal CNP  [facility] PHYLLIS Varghese is a 33 year old, presenting for the following health issues:  No chief complaint on file.        7/25/2023     4:25 PM   Additional Questions   Roomed by Dev GONCALVES   Accompanied by LEATHA       History of Present  Illness       Reason for visit:  Back pain/enlarged spleen    He eats 2-3 servings of fruits and vegetables daily.He consumes 0 sweetened beverage(s) daily.He exercises with enough effort to increase his heart rate 30 to 60 minutes per day.  He exercises with enough effort to increase his heart rate 5 days per week.   He is taking medications regularly.     Hx of splenomegaly- last year with Covid. Had negative eval/workup through hematology/oncology. Was not having pain with prev. Spleen enlargement, but had some referred pain.      Recent Covid 2 weeks ago. Now feeling symptoms again, LUQ and low back pain.   Taking pain meds due to spleen pain.   Feels more inflamed today.       Not a smoker.   Seeing nutritionist, is exercising due to LBP  Is exercising.   High BP in the past.   Reports 131/80- at [facility].     Works in [employer].   Anxiety- prn Xanax from outside provider, has been calm- gets worse with illness.             Asthma- stable, needs refilled.     GERD- HX of EGD, due for PPI use, but did not fill, now having breakthrough symptoms. Wants refill of PPI to go with famotidine.   Has had many healthy changes.       Review of Systems   Constitutional, HEENT, cardiovascular, pulmonary, gi and gu systems are negative, except as otherwise noted.      Objective           Vitals:  No vitals were obtained today due to virtual visit.    Physical Exam   GENERAL: Healthy, alert and no distress  EYES: Eyes grossly normal to inspection.  No discharge or erythema, or obvious scleral/conjunctival abnormalities.  RESP: No audible wheeze, cough, or visible cyanosis.  No visible retractions or increased work of breathing.    SKIN: Visible skin clear. No significant rash, abnormal pigmentation or lesions.  NEURO: Cranial nerves grossly intact.  Mentation and speech appropriate for age.  PSYCH: Mentation appears normal, affect normal/bright, judgement and insight intact, normal speech and  appearance well-groomed.                Video-Visit Details    Type of service:  Video Visit   Video Start Time: 4:17 PM  Video End Time:4:38 PM    Originating Location (pt. Location): Home    Distant Location (provider location):  Off-site  Platform used for Video Visit: Ary

## 2023-10-08 ENCOUNTER — MYC MEDICAL ADVICE (OUTPATIENT)
Dept: FAMILY MEDICINE | Facility: CLINIC | Age: 33
End: 2023-10-08
Payer: COMMERCIAL

## 2023-10-30 ENCOUNTER — OFFICE VISIT (OUTPATIENT)
Dept: OPHTHALMOLOGY | Facility: CLINIC | Age: 33
End: 2023-10-30
Payer: COMMERCIAL

## 2023-10-30 ENCOUNTER — OFFICE VISIT (OUTPATIENT)
Dept: VASCULAR SURGERY | Facility: CLINIC | Age: 33
End: 2023-10-30
Payer: COMMERCIAL

## 2023-10-30 DIAGNOSIS — H04.123 DRY EYES: ICD-10-CM

## 2023-10-30 DIAGNOSIS — I83.811 VARICOSE VEINS OF RIGHT LOWER EXTREMITY WITH PAIN: Primary | ICD-10-CM

## 2023-10-30 DIAGNOSIS — H53.8 BLURRED VISION: Primary | ICD-10-CM

## 2023-10-30 PROCEDURE — 92002 INTRM OPH EXAM NEW PATIENT: CPT | Performed by: OPHTHALMOLOGY

## 2023-10-30 PROCEDURE — 99204 OFFICE O/P NEW MOD 45 MIN: CPT | Performed by: SURGERY

## 2023-10-30 ASSESSMENT — TONOMETRY
IOP_METHOD: ICARE
OS_IOP_MMHG: 16
OD_IOP_MMHG: 16

## 2023-10-30 ASSESSMENT — CONF VISUAL FIELD
OD_INFERIOR_NASAL_RESTRICTION: 0
OD_SUPERIOR_NASAL_RESTRICTION: 0
OS_SUPERIOR_TEMPORAL_RESTRICTION: 0
OD_NORMAL: 1
OD_SUPERIOR_TEMPORAL_RESTRICTION: 0
OD_INFERIOR_TEMPORAL_RESTRICTION: 0
OS_INFERIOR_TEMPORAL_RESTRICTION: 0
OS_SUPERIOR_NASAL_RESTRICTION: 0
OS_INFERIOR_NASAL_RESTRICTION: 0
OS_NORMAL: 1

## 2023-10-30 ASSESSMENT — VISUAL ACUITY
OS_SC: 20/25
METHOD: SNELLEN - LINEAR
OD_SC: 20/25
OD_SC+: -1

## 2023-10-30 ASSESSMENT — SLIT LAMP EXAM - LIDS
COMMENTS: NORMAL
COMMENTS: NORMAL

## 2023-10-30 ASSESSMENT — CUP TO DISC RATIO
OS_RATIO: 0.3
OD_RATIO: 0.3

## 2023-10-30 ASSESSMENT — REFRACTION_MANIFEST
OD_AXIS: 009
OD_SPHERE: -0.50
OS_CYLINDER: SPHERE
OS_SPHERE: -0.50
OD_CYLINDER: +0.50

## 2023-10-30 ASSESSMENT — EXTERNAL EXAM - RIGHT EYE: OD_EXAM: NORMAL

## 2023-10-30 ASSESSMENT — EXTERNAL EXAM - LEFT EYE: OS_EXAM: NORMAL

## 2023-10-30 NOTE — NURSING NOTE
Patient Reported symptoms:    Right leg   Heaviness None of the time   Achiness None of the time   Swelling None of the time   Throbbing A little of the time   Itching Some of the time   Appearance Slightly noticeable   Impact on work/activities Symptoms but full able to participate    Left Leg   Heaviness None of the time   Achiness None of the time   Swelling None of the time   Throbbing A little of the time   Itching Some of the time   Appearance Slightly noticeable   Impact on work/activities Symptoms but full able to participate

## 2023-10-30 NOTE — PATIENT INSTRUCTIONS
Discontinue Clear Eyes and Visine.   Use artificial tears 3-4 times daily both eyes. (Refresh Tears, Systane Ultra/Balance, or Theratears)     Return visit if symptoms worsen or fail to improve.     Allen Baugh M.D.  [phone]

## 2023-10-30 NOTE — PROGRESS NOTES
Current Eye Medications:  clear eye and visine as needed - last used 1 - 2 months ago.      Subjective: Here for evaluation of  blurry vision. Patient complains of intermittent blurry vision and random flashes of light in right eye. No eye pain or discomfort. Patient had laser hair removal appointment where he felt that laser possibly hit right eye in the corner 6 months ago.   Symptoms have worsened over the past few months.      Objective:  See Ophthalmology Exam.       Assessment:  Mostly normal anterior segment exam in patient with blurred vision.  Topical vasoconstrictor overuse.  No obvious damage from laser hair removal.  Dry eyes.      Plan:  Discontinue Clear Eyes and Visine.   Use artificial tears 3-4 times daily both eyes. (Refresh Tears, Systane Ultra/Balance, or Theratears)     Return visit if symptoms worsen or fail to improve.     Allen Baugh M.D.  [phone]

## 2023-10-30 NOTE — PATIENT INSTRUCTIONS
Sclerotherapy: Pre-Treatment Instructions    Recommended Sessions:  ______ treatment sessions    Pricing: Full session - $407                 *Payment is due at the time of visit following the treatment    Time Required per Treatment Session - About 45 minutes  Please come in 15 minutes before your scheduled appointment.  30 min.  Sclerotherapy treatments last approximately 30 minutes.  5 min.    A staff member will wipe your legs off with warm water and dry them with a wash cloth.                 Then you can put your compression hose on, get dressed and check out.  10 min.  After your treatment, you will be asked to walk around for 10 minutes before you get in your car.    Medications  Five days before your appointment, discontinue aspirin (Bufferin, Anacin, etc.) and Ibuprofen (Motrin, Advil, Aleve, etc.) to reduce bruising. Resume these medications the day following the treatment.    Leg Preparation  Do not shave your legs or apply any oil, lotion or powder the night before or the day of your treatment.    Clothing  Shorts:  Bring a pair of loose, comfortable shorts to wear during your treatment (or you can choose to wear ours). Shoes: Bring comfortable shoes to accommodate the compression hose after your treatment. Do not wear flip-flops or thong-style sandals unless you have open-toe compression hose.    Photographs  Photos will be taken before each treatment. This helps monitor your progress.    Injections  The physician will inject your veins with the sclerotherapy solution chosen to meet your specific needs.    Compression Hose  Please bring your compression hose if you have them. They may also be reserved for you at our clinic. Compression hose must be worn immediately after each sclerotherapy treatment.  The hose must be compression level 20-30, and they must be worn for 24 hours straight after your treatment.  If you have never worn compression hose before, a staff member will teach you how to put  them on.             You cannot have a treatment without compression hose.               They are critical to the success of your treatment!    You may purchase your compression hose from us. We will measure you and have the hose available when you come for your treatment.    Cancellation and Rescheduling  If you need to cancel or reschedule your sclerotherapy treatment, please give our office at least 24 hour notice.    Sclerotherapy: Basic Information    What is sclerotherapy?  Sclerotherapy is a treatment for  spider  veins.  Spider  veins are small veins just under your skin that can look red, blue or purple. Most  spider  veins are only a cosmetic problem.  Spider  veins are not useful and treating them will not affect your circulation.    How does sclerotherapy work?  1.  Injections: A very small needle is used to inject a solution into the  spider  veins. The solution irritates the cells that line the vein walls. This causes the veins to collapse. The vein walls to stick together and they can no longer carry blood. Different solutions are used based on the size of the veins.  2.  Compression:  The spider veins are kept collapsed by wearing compression stockings. Your body will break down and absorb the treated veins. You wear the compression hose for 24 hours after the treatment and then for 4 more days during your waking hours only.    How does the body heal after sclerotherapy?  The process is similar to how your body heals after a bad bruise. It takes 4-6 weeks or more for the healing to be complete. When the healing is complete, the vein is no longer visible. It may take more than one treatment.    How do I get the best results?  It is important to follow the post-sclerotherapy instructions. The best results require time and patience. The injection sites will continue to heal and fade for months after a treatment. Please discuss your expectations with your doctor to keep them realistic. Your doctor will  do everything possible to meet or exceed your expectations.    How many treatments are needed?  After your initial exam, your doctor will give you an estimate of the number of treatments that may be required. It depends upon the size, type, and quantity of your  spider  veins and on the doctor's assessment, your history and expectations. You may end up needing fewer or more treatments.    How soon can I have another treatment?  Additional treatments are scheduled every 4-6 weeks to allow time for the body to respond to the previous treatment.    Common Side Effects:  Itching  The areas that were injected may itch. This is usually mild and lasts less than a day. Do not use lotions or creams on your legs until the injection sites have healed over.    Pain  It is common to have some tenderness at the injection sites. Injection of the solution can be uncomfortable, but is usually well tolerated by most patients. The tenderness is temporary, lasting 24 hours at most. Tylenol or Ibuprofen can be used, if needed, following the product directions.    Bruising  This may occur at the injections sites. Bruising may be minimized by avoiding Aspirin and Ibuprofen products for five days before each treatment session.    Hyperpigmentation  A light brown discoloration of the skin may develop along the veins in the areas injected. Approximately 20-30% of patients treated note the discoloration (which is lighter and less obvious than the veins that are being treated). The hyperpigmentation usually fades in a couple of weeks, but may take several months to a year to totally resolve. There is a 1% chance of hyperpigmentation continuing after one year.    Trapped blood  A small amount of blood may become trapped and hardened in the veins. This may feel like a knot or cord and it may look dark blue or bruised. This is a common occurrence. You may need to return before your next treatment so this area can be drained to remove the trapped  blood. This will reduce the hyperpigmentation that can occur. The chance of this occurring can be decreased with proper use of compression hose after your treatment.    Matting  Matting is the formation of new, fine  spider  veins in the area injected.  It occurs in approximately 10% of patients injected. The exact reason for this is unknown. If untreated, the matting usually resolves in 3 to 12 months, but very rarely, it can be permanent. If the matting does not fade, it can be re-injected.    Rare Side Effects:  Ulceration at injection sites  Very rarely, a small ulcer will occur at the site where a vein is injected. An ulcer can take 4 to 6 weeks to completely heal. A small scar may result.    Allergic reactions  There is a very rare incidence of an allergic reaction to the solution injected. You will be observed for such reaction and will be treated appropriately should it occur. Please inform us of any allergy history.    Pulmonary embolus/Deep vein thrombosis  This is a blood clot which moves to the lungs/a blood clot in the deep vein system. There is an extraordinarily low incidence of this complication.      SCLEROTHERAPY AFTER CARE  Immediately:  After treatment, walk for 10-15 minutes before getting in your car.  If your trip home is more than 1 hour, stop and walk around for 5-10 minutes. Avoid sitting or standing for extended periods.   First 24 hours: Wear your compression continuously, even while in bed. After the 24 hours, you may shower if you want to. Put your hose back on, unless you are going to bed. You should NOT wear compression to bed after the first 24 hours. You may fly the next day, but wear your compression.   For 5 days: Wear the compression hose for waking hours only. You may continue to wear them longer than 5 days if you prefer.   For days 5-7: Walking is encouraged, as it promotes efficient circulation in your veins. You may do activities that raise your heart rate, but do NOT run,  jog, do high impact aerobics, or weight lifting. After 7 days, no activity restrictions.  Shaving: Wait a few days to shave or apply lotion.   Bathing: Do NOT take hot baths or sit in a hot tub for 7-10 days.    For 1 year: Wear SPF 30 sunscreen on your legs when in the sun. This is very important! It helps prevent darkening of the skin at the injection sites.   Medications: You may resume your usual medications, including aspirin or ibuprofen.    Common Things to Expect       Compression must be worn for the first 24 hours and then during the day for 5-7 days.    If larger veins are treated with ultrasound-guided sclerotherapy, you will have redness, firmness, tenderness, and swelling.  This firmness and tenderness may take 3-6 months to resolve. Ibuprofen and compression hose will aid in this process.    You will have bruising that can last up to 3 weeks. Most fading of the veins will occur between 3 and 6 weeks after treatment.    You may notice brown discoloration (hyperpigmentation) at the treatment site.  This should fade with time, but will take 3 months to 1 year to fully heal.     Some treated veins may look darker because of trapped blood within the vein. This trapped blood can be removed at a minimum of 1 month following treatment. Larger veins are more likely to develop trapped blood.    It is very important for you to use at least SPF 30 sunscreen in order to help prevent the discoloration of your skin.    Migraines rarely occur following sclerotherapy, but are more likely in patients with a history of migraines.  Treat as you would any other migraine.     Pre-Procedure Instructions:                                        VNUS Closure   You are having a VNUS Closure. One or more of your veins will be closed with radiofrequency heating.    Insurance  Precertification and/or referral authorization may be required by your insurance company.  We will call your insurance company to verify benefits for the  medically necessary part of your procedure.    Your Current Medications and Allergies  Are you on blood thinner medications? (Aspirin, Plavix, Coumadin, Eliquis, Xarelto) Please discuss this with your surgeon.  Are you sensitive to latex or adhesives used for fake fingernails? Please let us know!     Driving Escort and   Please arrange to have a trusted adult (18 years old or older) drive you to and from the clinic.  For your safety, we recommend you have a trusted adult to stay with you until the next morning.    Your Health  If you have a change in your health before the procedure, contact our office immediately.  (For example: cold symptoms, cough, urinary tract infection, fever, flu symptoms.)  A pre-procedure physical is not required.    Note  It is sometimes necessary to adjust the procedure schedule due to emergencies. We greatly appreciate your flexibility and understanding in this matter.  ____________________________________________________________________________________  Check List:  The Morning of Your Procedure  ___1.  Please do not apply anything on your leg(s) or shave the day of your procedure.  ___2.  You may take your normal medications the day of your procedure.  ___3.  It is recommended you eat a light breakfast or lunch on the day of your procedure.  ___4.  Wear comfortable loose-fitting clothing and wide-fitting shoes (i.e. tennis shoes, slip-ons).  ___5.  Please arrive at our clinic at the specified time given by the nurse.  ___6.  You will sign an affirmation of informed consent.  ___7.  Bring your pre-procedure sedation medication (lorazepam and clonidine) with you to the clinic.              One hour before your procedure, you will be instructed to take these medications. The lorazepam              (Ativan) lowers anxiety and sedates you; the clonidine makes the lorazepam more effective. Everyone's              body processes these medications differently. Therefore, reactions to  these medications vary. Some              people stay awake and some people sleep through the whole procedure. You may not remember              everything about the procedure or the day. You do not want to make any big decisions for the rest of the              day.         The Day of Your Procedure:                  VNUS Closure  In the Exam Room  A nurse will bring you back to an exam room with your family member or friend. This is when your informed consent will be signed, and you will take your pre-procedure medications.  You will be asked to remove everything from the waist down, including undergarments. You will then put on a hospital gown or shorts and blue booties.  Your surgeon will come in to answer any questions and joe the appropriate leg(s) with a marker.  You will be taken to the restroom to empty your bladder before going into the procedure room.    In the Procedure Room  You will be escorted to the procedure room. You will lie on a procedure table covered with a sheet or blanket.  A nurse will put a blood pressure cuff on your arm and a pulse/oxygen monitor on a finger. Your vital signs will be monitored every 15 minutes.  Your gown will be pulled up slightly and the groin exposed for a short period of time. The surgeon's assistant will clean your foot, leg, and groin with an antibacterial solution. We will get you covered up as quickly as possible!  Sterile towels and blue drapes will be used to cover you and the table. You will be asked to keep your hands under the blue drapes during the procedure.    The Procedure  The surgeon will visualize your veins with an ultrasound machine. He or she will then numb your skin and access the vein. A catheter is passed up the vein and positioned with ultrasound guidance. The table will then be tipped head down.  Once the catheter is in the correct position, medication will be injected to numb your leg. You will feel some needle sticks and may feel discomfort  as the medication goes in. Once this is done, you should not experience significant discomfort. But if you do, please let us know and more numbing medication can be injected. As the catheter sends out heat, the vein closes off and the catheter is withdrawn.    Post-Procedure  Once the procedure is done, your leg will be washed with warm water and dried. You will have one or more small bandages covering your incisions. Your compression hose will be put on or an ACE wrap from your toes to groin. If the ACE wrap feels too tight or binds, please elevate your leg and loosen it.  You will be offered something to drink and a light snack.  You will rest with your leg(s) elevated for approximately 30 minutes. Your friend or family member may join you.   For your safety, you will be accompanied to your car by a staff member.      Post-Procedure Instructions:                          VNUS Closure    Post-Op Day Zero - The Day of Your Procedure:  1. Medication for Pain Control and Inflammation Control   - The numbing medication injected during your procedure will last for several hours. The pre-procedure    tablets may make you very sleepy and you might not remember everything from the procedure or from    the day. This will usually wear off by the next day.   - Ibuprofen:  If tolerated, take ibuprofen (e.g., Advil) to reduce inflammation whether or not you have    pain. For three days, take two tablets (200mg each) with every meal and at bedtime with a snack. If    you are not able to take Ibuprofen, Tylenol is another option.   - You may resume taking any medications you were taking before your procedure.  2. Activity   You may be up as tolerated when the sedation wears off. Elevate if possible when not walking.  3. Bandages   - You will have one or more small bandages covering the incision site(s) where we accessed the               vein(s). Keep your bandages on and dry for 48 hours. Compression hose should be worn  continuously               over the bandages for the first 24 hours.  4. Incisions   - Bleeding: You may see some incision sites that are oozing through the bandages. This is not unusual    and can be managed with Rest, Ice, Compression and Elevation (RICE). Apply ice and firm pressure    directly to the site that is bleeding and rest with your leg(s) elevated above your heart for 20-30               minutes.    Post-Op Day One:  1. Medication   - Ibuprofen:  Continue the same as the Day of Your Procedure.  2. Activity   - Walk as tolerated. Resume your normal daily walking activities. If it hurts, stop. We encourage you to               walk. Elevate if possible when not walking.  3. Bandages and Compression   - After 24 hours, you may remove your compression hose to take a shower. Please keep your               bandage(s) on and intact. You may want to cover your bandage(s) to ensure it remains dry during your               shower. Reapply your compression hose after your shower and wear during waking hours only.  4. Driving   - You may resume driving when you can do so safely.    Post-Op Day Two:   1. Medication  - Ibuprofen:  Continue the same as the Day of Your Procedure.  2.  Activity   - Walk as tolerated. Elevate if possible when not walking.  3. Bandages and Compression  - You may remove your bandage after 48 hours. Continue wearing your compression hose during waking hours only for a total of seven days following your procedure.  4. Incisions   - Your leg(s) may be bruised at or near incision site(s) and possibly have numb spots. This is normal.   5. Call Us If:   - You see any areas on your leg that are red and angry in appearance.   - You notice any drainage that is milky or cloudy in appearance or that has a foul odor.   - You run a temperature of 100.5 or greater.      Post-Op Day Three:  You will have a follow up appointment 2-4 days post-procedure. At this appointment, you will have an ultrasound and  we will check your incisions.        The Two Weeks Following Your Procedure  1.  Skin Care              - Do not use any lotions, creams, or powders on your incision(s) for 14 days or until the incisions have               healed.              - Do not soak in a bathtub, hot tub or go swimming for 14 days or until your incisions have healed.  2.  Medications   - You may use ibuprofen or acetaminophen (e.g., Tylenol) as needed for pain or discomfort.  3.  Activity   - Do not lift over 25 pounds. After about two weeks you may resume exercise such as aerobics,               running, tennis or weightlifting. Use your common sense and ease back into your exercise routine              slowly.   - You may feel a cord-like tightness along the inside of your leg. Gentle stretching can be helpful.  4. Compression Hose   - Your doctor may instruct you to wear compression for longer than seven days; please    follow your doctor's instructions. As a comfort measure, you may choose to wear compression for    longer than required.  5.  Travel   - Do not fly in an airplane for 14 days after your procedure.  If you have a long car trip planned within    two to three weeks following your procedure, stop and walk for a few minutes every two hours.    Periodic ankle pumps during the ride may be helpful.    Six Week Appointment  - At your six-week appointment, you will see your surgeon for an exam and evaluation. This office visit               will be scheduled when you return for post-op day three return appointment.     Return to Work  1.  If you work outside the home, you may return to work in a few days depending on the extent of your    procedure, how you tolerate it, and the type of work you perform.  2.  Paperwork: If your employer requires paperwork or you would like a letter written to your employer, please    let us know. We will complete disability type forms at no charge. Please allow five business days for    forms to be  completed.

## 2023-10-30 NOTE — LETTER
10/30/2023         RE: Abimael Martinez  [address]  [place] [address]        Dear Colleague,    Thank you for referring your patient, Abimael Martinez, to the [facility] VEIN [facility]IN[facility] [place]. Please see a copy of my visit note below.    After Visit Follow Up  Per Dr. ZHENG, patient was recommended to have 1  sessions at $400 of cosmetic sclerotherapy UNDER US GUIDED    THEN 1 @ $250 OF REGULAR SCLERO..    Reviewed with and gave to patient our vein clinic sclerotherapy packet of information which includes basic sclerotherapy information, pre-treatment instructions and post-treatment instructions.    Patient in agreement with plan and had no further questions.    Cristina Anne MA on 10/30/2023 at 9:34 AM      October 30, 2023    Vein Procedure Recommendation    Spoke with patient in clinic.    Dr. Zheng has recommended patient to have the following vein procedure(s):     1. Right leg VNUS closure GSV    Patient Pre-op Questions:  Preferred Pharmacy: [facility]  Anticoagulant/ASA: No  Artificial Joint or Heart Valve:  No  Open ulcer:  No  Sedation nausea: No      Patient is recommended to wear Thigh High compression hose following his procedure. Discussed compression hose. Explained to patient if insurance doesn't cover the compression hose there are a couple different options to getting them and to call the clinic to let us know if they need help.    Handed patient written procedure instructions to review on his own (see After Visit Summary).    Next steps:    Insurance Submission  Informed patient this process could take up to 14 business days, but once approved, the patient will be contacted by our surgery scheduler to schedule the above procedure. Gave patient our surgery scheduler's information.    Patient is in agreement with all of the above and has no further questions at this time.    Cristina Anne MA  [facility]  Vein  Clinic     VEINSOLUTIONS CONSULTATION    HPI:    Abimael Martinez is a pleasant 33 year old male who presents with complaints of right greater than left lower extremity varicose veins with pain.  Pain is described as an ache, tiredness, heaviness, cramping and itching, especially over his lateral right leg.  The pain is worse after being on his feet for long periods of time and improves with elevation of the legs.  Compression hose which she has worn for more than 3 months have not significantly improved his pain.    The symptoms interfere with his actives of daily living because after he has been on his feet for long periods of time, the legs become painful and itchy, interfering with his ability to be on his feet for extended periods of time and especially with vigorous exercise.  He uses ibuprofen and Tylenol on an as-needed basis for his pain.    He has no history of deep vein thrombosis, superficial thrombophlebitis or hemorrhage.  He does not admit to significant swelling.    His family history is unknown as he was adopted.    PAST MEDICAL HISTORY:   Past Medical History:   Diagnosis Date     Acute right otitis media 1/8/2013     ADHD (attention deficit hyperactivity disorder), combined type      Anxiety      Depression      Drug abuse (H)      Dyslipidemia      Gonococcal urethritis 02/05/2016     Hypertension      Internal hemorrhoids     which bleed     Lumbosacral radiculopathy      Obese      OCD (obsessive compulsive disorder)      Other chronic pain     Low Back Pain     Uncomplicated asthma      Urethritis 5/20/2013     Problem list name updated by automated process. Provider to review       PAST SURGICAL HISTORY:   Past Surgical History:   Procedure Laterality Date     BACK SURGERY  04/05/2018     COLONOSCOPY       ESOPHAGOSCOPY, GASTROSCOPY, DUODENOSCOPY (EGD), COMBINED N/A 8/16/2021    Procedure: ESOPHAGOGASTRODUODENOSCOPY, WITH BIOPSY AND POLYPECTOMY;  Surgeon: Phillip Joyner MD;   Location: [facility] OR     HEMILAMINECTOMY, DISCECTOMY LUMBAR TWO LEVELS, COMBINED Right 10/28/2021    Procedure: RIGHT LUMBAR L4-5 MINIMALLY INVASIVE MICRODISCECTOMY;  Surgeon: Troy Wharton MD;  Location: [facility] OR     HEMILAMINECTOMY, DISCECTOMY LUMBAR TWO LEVELS, COMBINED Left 10/28/2021    Procedure: LEFT LUMBAR L5-S1 MINIMALLY INVASIVE MICRODISCECTOMY;  Surgeon: Troy Wharton MD;  Location: [facility] OR       FAMILY HISTORY:   Family History   Problem Relation Age of Onset     Unknown/Adopted Mother      Unknown/Adopted Father      Unknown/Adopted Maternal Grandmother      Unknown/Adopted Maternal Grandfather      Unknown/Adopted Paternal Grandmother      Unknown/Adopted Paternal Grandfather      Unknown/Adopted Brother        SOCIAL HISTORY:   Social History     Tobacco Use     Smoking status: Former     Packs/day: 0.50     Years: 6.00     Additional pack years: 0.00     Total pack years: 3.00     Types: Cigarettes     Quit date: 3/12/2018     Years since quittin.6     Passive exposure: Never     Smokeless tobacco: Never     Tobacco comments:     second hand smoke exposure   Substance Use Topics     Alcohol use: Yes     Comment: once a week       REVIEW OF SYSTEMS: Review Of Systems  Skin:Itching  Eyes: negative  Ears/Nose/Throat: Sinus infection  Respiratory: Wheezing  Cardiovascular: Racing heart  Gastrointestinal: negative  Genitourinary: negative  Musculoskeletal: Back pain, leg pain  Neurologic: negative  Psychiatric: Anxiety, panic attacks  Hematologic/Lymphatic/Immunologic: negative  Endocrine: negative      Vital signs:  There were no vitals taken for this visit.    Current Outpatient Medications   Medication Sig Dispense Refill     albuterol (PROAIR HFA/PROVENTIL HFA/VENTOLIN HFA) 108 (90 Base) MCG/ACT inhaler Inhale 2 puffs into the lungs every 4 hours as needed for wheezing 18 g 1     ALPRAZolam (XANAX) 2 MG tablet Take 1 tablet (2 mg) by mouth 3 times daily as needed for anxiety 30  tablet 0     amphetamine-dextroamphetamine (ADDERALL) 20 MG tablet Take 1 tablet (20 mg) by mouth 2 times daily 60 tablet 0     cetirizine (ZYRTEC) 10 MG tablet Take 1 tablet (10 mg) by mouth daily as needed for allergies 90 tablet 3     famotidine (PEPCID) 40 MG tablet TAKE 1 TABLET(40 MG) BY MOUTH AT BEDTIME 90 tablet 3     fluticasone (FLOVENT HFA) 220 MCG/ACT inhaler Inhale 2 puffs into the lungs 2 times daily Please give patient spacer 12 g 1     lidocaine (LMX4) 4 % external cream Apply topically 2 times daily as needed for mild pain 15 g 1     mometasone (ELOCON) 0.1 % external ointment If necessary use 3-4 days in a row or 2xweekly on eczematous lesions 45 g 2     montelukast (SINGULAIR) 10 MG tablet Take 1 tablet (10 mg) by mouth At Bedtime 90 tablet 0     omeprazole (PRILOSEC) 20 MG DR capsule Take 2 capsules (40 mg) by mouth daily 60 capsule 0     pimecrolimus (ELIDEL) 1 % external cream Apply topically 2 times daily 30 g 11     pramoxine-HC (PRAMOSONE) 1-1 % external lotion Place rectally 3 times daily 118 mL 1     PROTOPIC 0.1 % external ointment APPLY TWICE DAILY AS NEEDED FOR RASH ON PENIS. Strength: 0.1 % 60 g 3     tacrolimus (PROTOPIC) 0.03 % external ointment Apply topically 2 times daily Put on skin lesions 2x daily 60 g 1     tiZANidine (ZANAFLEX) 4 MG tablet Take 1 tablet (4 mg) by mouth 3 times daily as needed for muscle spasms 30 tablet 0     triamcinolone (KENALOG) 0.1 % external ointment Apply twice daily for 2 days after intercourse 30 g 0       PHYSICAL EXAM:  General: Pleasant, NAD.   HEENT: Normocephalic, atraumatic, external ears and nose normal.   Respiratory: Normal respiratory effort.   Cardiovascular: Pulse is regular.   Musculoskeletal: Gait and station normal.  The joints of his fingers and toes without deformity.  There is no cyanosis of his nailbeds.   EXTREMITIES: Right lower extremity: 3-4 mm varicosities over the mid medial right thigh coursing posteriorly and distally down  the distal posterior right thigh.  There is a cluster of telangiectasias on the lateral right thigh, also and area of pain and pruritus for him    No venous stasis changes or edema.    Left lower extremity: No significant visible varicose vein.  There are scattered telangiectasias.  No significant edema or stasis changes.    PULSES: R/L (3=normal pulse, 0=no palpable pulse) dorsalis pedis: 3/3; posterior tibial: 3/2.      Neurologic: Grossly normal  Psychiatric: Mood, affect, judgment and insight are normal     Venous Duplex Ultrasound:   ULTRASOUND VENOUS COMPETENCY RIGHT 6/5/2023 3:01 PM     CLINICAL HISTORY: Areas of bulging veins on thigh and ankle.;  Symptomatic varicose veins of right lower extremity.     COMPARISON: None     REFERRING PROVIDER: CHRIS TINOCO     Technique: B-mode (grayscale) and Duplex Doppler ultrasound of the  right lower extremity veins (superficial and deep), including  incompetency reflux time and compression for thrombus. Additional  Duplex doppler assessment of the PTA and SOHA at the ankle.      Deep incompetency exam performed in reverse Trendelenburg.     Superficial incompetency exam performed upright, non-weight bearing  with distal compression using rapid inflation/deflation cuffs.     Venous Competency Diagnostic Criteria Adopted 11/29/11.     Venous competency criteria defining abnormal reflux duration:  Femoral - popliteal reflux > 1.0 sec.  Deep femoral, deep calf veins, and superficial vein reflux > 0.5 sec.   vein reflux > 0.35 sec.     Supporting document: J Vasc Surg 2003; 38:793-8. Definition of reflux  in lower extremity veins.     Findings:      Right ankle:   Posterior Tibial artery waveform: triphasic  Dorsalis Pedis artery waveform: triphasic     Right Lower Extremity:      Duplex Evaluation for Deep Vein Thrombus (which includes CFV, FV,  popliteal vein, and posterior tibial veins): Negative.     Common femoral vein: Incompetent with Valsalva above  the  saphenofemoral junction, competent below.     Deep femoral vein: Competent     Femoral vein:      proximal thigh: Competent      mid thigh: Competent      distal thigh: Competent     Popliteal vein: Competent     Posterior tibial veins at the ankle: Competent        Superficial Venous Incompetency Study:        Right Lower Extremity:      Duplex Evaluation for Superficial Vein Thrombus (which includes GSV,  SSV, AASV, and PASV): Negative.      Anterior accessory saphenous (AASV): Not seen     Posterior accessory saphenous (PASV): Competent     Great saphenous vein (GSV)      sapheno-femoral junction: Incompetent      prox thigh: Incompetent      mid thigh: Competent      dist thigh: Competent       knee: Competent      prox calf: Competent      mid calf: Competent      ankle: Competent     Vein of Giacomini (V of G):      distal thigh: Competent      mid thigh: Not seen      prox thigh: Not seen     Small saphenous vein:      popliteal fossa: Competent, thick-walled      prox calf: Competent      mid calf: Competent     Diameters of superficial veins:     AASV: Diameter Not seen      SFJ: Diameter* 5.4 mm  GSV prox thigh*: Diameter 0.7 mm   GSV knee*: Diameter 3.7 mm     SSV SPJ*: Diameter 2.3 mm     Incompetent telangectasia of the right lateral/posterolateral thigh  (2.5 mm, incompetent), which appears contiguous with varicosities of  the right posterolateral calf (3.5 mm, incompetent) arising from the  SSV.     Incompetent branches of the GSV in the mid to distal right thigh  measuring 2.5 mm and 3 mm.     Incompetent  approximately 8 cm superior to the medial  malleolus measuring 2.2 x 18.7 mm.                                                                      IMPRESSION:  RIGHT LEG:       A. No superficial or deep venous thrombosis demonstrated.       B. Deep venous incompetence in the common femoral vein above the  saphenofemoral junction with Valsalva.       C. Superficial venous incompetence  "in the GSV at the  saphenofemoral junction and proximal thigh.       D. Incompetent telangectasia of the right lateral/posterolateral  thigh, which appears contiguous with varicosities of the right  posterolateral calf arising from the SSV.       E. Incompetent  approximately 8 cm superior to the  medial malleolus.        F. Incompetent branches of the GSV in the mid to distal right  thigh.           Reference: \"Duplex Ultrasound in the Diagnosis of Lower-Extremity Deep  Venous Thrombosis\"- Claudia Baltazar MD, S; Wilfrid Roger MD  (Circulation. 2014;129:917-921. http://circ.ahajournals.org)     I have personally reviewed the examination and initial interpretation  and I agree with the findings.     BLESSING AGUIRRE MD         SYSTEM ID:  [id number]    ASSESSMENT:  Right leg pain with varicose veins and telangiectasias.  We discussed the lower extremity vein anatomy, the pathophysiology of venous insufficiency and the option of continued conservative management with small risk of superficial thrombophlebitis, bleeding and progression of the disease process.  The ultrasound was discussed utilizing a leg vein drawing.    Right great saphenous vein is incompetent, measures 4.7 mm in diameter in the proximal thigh with reflux time greater than 500 ms.  There is a source of incompetent varicosities measuring up to 3.1 mm in diameter with reflux time of 1.7 seconds.    If he chooses treatment, he would be a candidate for endovenous ablation of his right great saphenous vein in the office setting.  Details of procedure clear risk of bleeding, infection, nerve injury, deep vein thrombosis were all discussed.  Hopefully, the varicosities of the right medial thigh will decompress following ablation.    He has pain on the lateral right thigh in the area of telangiectasias.  I was quite clear that treating the telangiectasias would not be covered by insurance and would be his responsibility.  Details of sclerotherapy " including risks of allergic reaction, ulceration, hyperpigmentation and deep vein thrombosis were discussed.  He understands that more than 1 session may be necessary.  He voiced understanding of our discussion and his questions were answered.    PLAN:  1.  Right lower extremity great saphenous vein radiofrequency ablation  2.  Self-pay sclerotherapy right lateral thigh telangiectasias     Ronaldo Desai MD    Dictated using Dragon voice recognition software which may result in transcription errors          VEIN CLINIC LEG DRAWING:                Again, thank you for allowing me to participate in the care of your patient.        Sincerely,        Ronaldo Desai MD

## 2023-10-30 NOTE — PROGRESS NOTES
October 30, 2023    Vein Procedure Recommendation    Spoke with patient in clinic.    Dr. Desai has recommended patient to have the following vein procedure(s):     1. Right leg VNUS closure GSV    Patient Pre-op Questions:  Preferred Pharmacy: [facility]  Anticoagulant/ASA: No  Artificial Joint or Heart Valve:  No  Open ulcer:  No  Sedation nausea: No      Patient is recommended to wear Thigh High compression hose following his procedure. Discussed compression hose. Explained to patient if insurance doesn't cover the compression hose there are a couple different options to getting them and to call the clinic to let us know if they need help.    Handed patient written procedure instructions to review on his own (see After Visit Summary).    Next steps:    Insurance Submission  Informed patient this process could take up to 14 business days, but once approved, the patient will be contacted by our surgery scheduler to schedule the above procedure. Gave patient our surgery scheduler's information.    Patient is in agreement with all of the above and has no further questions at this time.    Cristina Anne MA  [facility]  Vein Clinic

## 2023-10-30 NOTE — LETTER
10/30/2023         RE: Abimael Martinez  [address]  [place] [address]        Dear Colleague,    Thank you for referring your patient, Abimael Martinez, to the [facility]. Please see a copy of my visit note below.     Current Eye Medications:  clear eye and visine as needed - last used 1 - 2 months ago.      Subjective: Here for evaluation of  blurry vision. Patient complains of intermittent blurry vision and random flashes of light in right eye. No eye pain or discomfort. Patient had laser hair removal appointment where he felt that laser possibly hit right eye in the corner 6 months ago.   Symptoms have worsened over the past few months.      Objective:  See Ophthalmology Exam.       Assessment:  Mostly normal anterior segment exam in patient with blurred vision.  Topical vasoconstrictor overuse.  No obvious damage from laser hair removal.  Dry eyes.      Plan:  Discontinue Clear Eyes and Visine.   Use artificial tears 3-4 times daily both eyes. (Refresh Tears, Systane Ultra/Balance, or Theratears)     Return visit if symptoms worsen or fail to improve.     Allen Baugh M.D.  [phone]         Again, thank you for allowing me to participate in the care of your patient.        Sincerely,        Allen Baugh MD

## 2023-10-30 NOTE — PROGRESS NOTES
After Visit Follow Up  Per Dr. ZHENG, patient was recommended to have 1  sessions at $400 of cosmetic sclerotherapy UNDER US GUIDED    THEN 1 @ $250 OF REGULAR SCLERO..    Reviewed with and gave to patient our vein clinic sclerotherapy packet of information which includes basic sclerotherapy information, pre-treatment instructions and post-treatment instructions.    Patient in agreement with plan and had no further questions.    Cristina Anne MA on 10/30/2023 at 9:34 AM

## 2023-10-30 NOTE — PROGRESS NOTES
VE[facility]UTIONS CONSULTATION    HPI:    Abimael Martinez is a pleasant 33 year old male who presents with complaints of right greater than left lower extremity varicose veins with pain.  Pain is described as an ache, tiredness, heaviness, cramping and itching, especially over his lateral right leg.  The pain is worse after being on his feet for long periods of time and improves with elevation of the legs.  Compression hose which she has worn for more than 3 months have not significantly improved his pain.    The symptoms interfere with his actives of daily living because after he has been on his feet for long periods of time, the legs become painful and itchy, interfering with his ability to be on his feet for extended periods of time and especially with vigorous exercise.  He uses ibuprofen and Tylenol on an as-needed basis for his pain.    He has no history of deep vein thrombosis, superficial thrombophlebitis or hemorrhage.  He does not admit to significant swelling.    His family history is unknown as he was adopted.    PAST MEDICAL HISTORY:   Past Medical History:   Diagnosis Date    Acute right otitis media 1/8/2013    ADHD (attention deficit hyperactivity disorder), combined type     Anxiety     Depression     Drug abuse (H)     Dyslipidemia     Gonococcal urethritis 02/05/2016    Hypertension     Internal hemorrhoids     which bleed    Lumbosacral radiculopathy     Obese     OCD (obsessive compulsive disorder)     Other chronic pain     Low Back Pain    Uncomplicated asthma     Urethritis 5/20/2013     Problem list name updated by automated process. Provider to review       PAST SURGICAL HISTORY:   Past Surgical History:   Procedure Laterality Date    BACK SURGERY  04/05/2018    COLONOSCOPY      ESOPHAGOSCOPY, GASTROSCOPY, DUODENOSCOPY (EGD), COMBINED N/A 8/16/2021    Procedure: ESOPHAGOGASTRODUODENOSCOPY, WITH BIOPSY AND POLYPECTOMY;  Surgeon: Phillip Joyner MD;  [place] [facility] [place]     HEMILAMINECTOMY, DISCECTOMY LUMBAR TWO LEVELS, COMBINED Right 10/28/2021    Procedure: RIGHT LUMBAR L4-5 MINIMALLY INVASIVE MICRODISCECTOMY;  Surgeon: Troy Wharton MD;  Location: [facility] OR    HEMILAMINECTOMY, DISCECTOMY LUMBAR TWO LEVELS, COMBINED Left 10/28/2021    Procedure: LEFT LUMBAR L5-S1 MINIMALLY INVASIVE MICRODISCECTOMY;  Surgeon: Troy Wharton MD;  Location: [facility] OR       FAMILY HISTORY:   Family History   Problem Relation Age of Onset    Unknown/Adopted Mother     Unknown/Adopted Father     Unknown/Adopted Maternal Grandmother     Unknown/Adopted Maternal Grandfather     Unknown/Adopted Paternal Grandmother     Unknown/Adopted Paternal Grandfather     Unknown/Adopted Brother        SOCIAL HISTORY:   Social History     Tobacco Use    Smoking status: Former     Packs/day: 0.50     Years: 6.00     Additional pack years: 0.00     Total pack years: 3.00     Types: Cigarettes     Quit date: 3/12/2018     Years since quittin.6     Passive exposure: Never    Smokeless tobacco: Never    Tobacco comments:     second hand smoke exposure   Substance Use Topics    Alcohol use: Yes     Comment: once a week       REVIEW OF SYSTEMS: Review Of Systems  Skin:Itching  Eyes: negative  Ears/Nose/Throat: Sinus infection  Respiratory: Wheezing  Cardiovascular: Racing heart  Gastrointestinal: negative  Genitourinary: negative  Musculoskeletal: Back pain, leg pain  Neurologic: negative  Psychiatric: Anxiety, panic attacks  Hematologic/Lymphatic/Immunologic: negative  Endocrine: negative      Vital signs:  There were no vitals taken for this visit.    Current Outpatient Medications   Medication Sig Dispense Refill    albuterol (PROAIR HFA/PROVENTIL HFA/VENTOLIN HFA) 108 (90 Base) MCG/ACT inhaler Inhale 2 puffs into the lungs every 4 hours as needed for wheezing 18 g 1    ALPRAZolam (XANAX) 2 MG tablet Take 1 tablet (2 mg) by mouth 3 times daily as needed for anxiety 30 tablet 0     amphetamine-dextroamphetamine (ADDERALL) 20 MG tablet Take 1 tablet (20 mg) by mouth 2 times daily 60 tablet 0    cetirizine (ZYRTEC) 10 MG tablet Take 1 tablet (10 mg) by mouth daily as needed for allergies 90 tablet 3    famotidine (PEPCID) 40 MG tablet TAKE 1 TABLET(40 MG) BY MOUTH AT BEDTIME 90 tablet 3    fluticasone (FLOVENT HFA) 220 MCG/ACT inhaler Inhale 2 puffs into the lungs 2 times daily Please give patient spacer 12 g 1    lidocaine (LMX4) 4 % external cream Apply topically 2 times daily as needed for mild pain 15 g 1    mometasone (ELOCON) 0.1 % external ointment If necessary use 3-4 days in a row or 2xweekly on eczematous lesions 45 g 2    montelukast (SINGULAIR) 10 MG tablet Take 1 tablet (10 mg) by mouth At Bedtime 90 tablet 0    omeprazole (PRILOSEC) 20 MG DR capsule Take 2 capsules (40 mg) by mouth daily 60 capsule 0    pimecrolimus (ELIDEL) 1 % external cream Apply topically 2 times daily 30 g 11    pramoxine-HC (PRAMOSONE) 1-1 % external lotion Place rectally 3 times daily 118 mL 1    PROTOPIC 0.1 % external ointment APPLY TWICE DAILY AS NEEDED FOR RASH ON PENIS. Strength: 0.1 % 60 g 3    tacrolimus (PROTOPIC) 0.03 % external ointment Apply topically 2 times daily Put on skin lesions 2x daily 60 g 1    tiZANidine (ZANAFLEX) 4 MG tablet Take 1 tablet (4 mg) by mouth 3 times daily as needed for muscle spasms 30 tablet 0    triamcinolone (KENALOG) 0.1 % external ointment Apply twice daily for 2 days after intercourse 30 g 0       PHYSICAL EXAM:  General: Pleasant, NAD.   HEENT: Normocephalic, atraumatic, external ears and nose normal.   Respiratory: Normal respiratory effort.   Cardiovascular: Pulse is regular.   Musculoskeletal: Gait and station normal.  The joints of his fingers and toes without deformity.  There is no cyanosis of his nailbeds.   EXTREMITIES: Right lower extremity: 3-4 mm varicosities over the mid medial right thigh coursing posteriorly and distally down the distal posterior right  thigh.  There is a cluster of telangiectasias on the lateral right thigh, also and area of pain and pruritus for him    No venous stasis changes or edema.    Left lower extremity: No significant visible varicose vein.  There are scattered telangiectasias.  No significant edema or stasis changes.    PULSES: R/L (3=normal pulse, 0=no palpable pulse) dorsalis pedis: 3/3; posterior tibial: 3/2.      Neurologic: Grossly normal  Psychiatric: Mood, affect, judgment and insight are normal     Venous Duplex Ultrasound:   ULTRASOUND VENOUS COMPETENCY RIGHT 6/5/2023 3:01 PM     CLINICAL HISTORY: Areas of bulging veins on thigh and ankle.;  Symptomatic varicose veins of right lower extremity.     COMPARISON: None     REFERRING PROVIDER: CHRIS TINOCO     Technique: B-mode (grayscale) and Duplex Doppler ultrasound of the  right lower extremity veins (superficial and deep), including  incompetency reflux time and compression for thrombus. Additional  Duplex doppler assessment of the PTA and SOHA at the ankle.      Deep incompetency exam performed in reverse Trendelenburg.     Superficial incompetency exam performed upright, non-weight bearing  with distal compression using rapid inflation/deflation cuffs.     Venous Competency Diagnostic Criteria Adopted 11/29/11.     Venous competency criteria defining abnormal reflux duration:  Femoral - popliteal reflux > 1.0 sec.  Deep femoral, deep calf veins, and superficial vein reflux > 0.5 sec.   vein reflux > 0.35 sec.     Supporting document: J Vasc Surg 2003; 38:793-8. Definition of reflux  in lower extremity veins.     Findings:      Right ankle:   Posterior Tibial artery waveform: triphasic  Dorsalis Pedis artery waveform: triphasic     Right Lower Extremity:      Duplex Evaluation for Deep Vein Thrombus (which includes CFV, FV,  popliteal vein, and posterior tibial veins): Negative.     Common femoral vein: Incompetent with Valsalva above the  saphenofemoral junction,  competent below.     Deep femoral vein: Competent     Femoral vein:      proximal thigh: Competent      mid thigh: Competent      distal thigh: Competent     Popliteal vein: Competent     Posterior tibial veins at the ankle: Competent        Superficial Venous Incompetency Study:        Right Lower Extremity:      Duplex Evaluation for Superficial Vein Thrombus (which includes GSV,  SSV, AASV, and PASV): Negative.      Anterior accessory saphenous (AASV): Not seen     Posterior accessory saphenous (PASV): Competent     Great saphenous vein (GSV)      sapheno-femoral junction: Incompetent      prox thigh: Incompetent      mid thigh: Competent      dist thigh: Competent       knee: Competent      prox calf: Competent      mid calf: Competent      ankle: Competent     Vein of Giacomini (V of G):      distal thigh: Competent      mid thigh: Not seen      prox thigh: Not seen     Small saphenous vein:      popliteal fossa: Competent, thick-walled      prox calf: Competent      mid calf: Competent     Diameters of superficial veins:     AASV: Diameter Not seen      SFJ: Diameter* 5.4 mm  GSV prox thigh*: Diameter 0.7 mm   GSV knee*: Diameter 3.7 mm     SSV SPJ*: Diameter 2.3 mm     Incompetent telangectasia of the right lateral/posterolateral thigh  (2.5 mm, incompetent), which appears contiguous with varicosities of  the right posterolateral calf (3.5 mm, incompetent) arising from the  SSV.     Incompetent branches of the GSV in the mid to distal right thigh  measuring 2.5 mm and 3 mm.     Incompetent  approximately 8 cm superior to the medial  malleolus measuring 2.2 x 18.7 mm.                                                                      IMPRESSION:  RIGHT LEG:       A. No superficial or deep venous thrombosis demonstrated.       B. Deep venous incompetence in the common femoral vein above the  saphenofemoral junction with Valsalva.       C. Superficial venous incompetence in the GSV at  "the  saphenofemoral junction and proximal thigh.       D. Incompetent telangectasia of the right lateral/posterolateral  thigh, which appears contiguous with varicosities of the right  posterolateral calf arising from the SSV.       E. Incompetent  approximately 8 cm superior to the  medial malleolus.        F. Incompetent branches of the GSV in the mid to distal right  thigh.           Reference: \"Duplex Ultrasound in the Diagnosis of Lower-Extremity Deep  Venous Thrombosis\"- Claudia Baltazar MD, S; Wilfrid Roger MD  (Circulation. 2014;129:917-921. http://circ.ahajournals.org)     I have personally reviewed the examination and initial interpretation  and I agree with the findings.     BLESSING AGUIRRE MD         SYSTEM ID:  [id number]    ASSESSMENT:  Right leg pain with varicose veins and telangiectasias.  We discussed the lower extremity vein anatomy, the pathophysiology of venous insufficiency and the option of continued conservative management with small risk of superficial thrombophlebitis, bleeding and progression of the disease process.  The ultrasound was discussed utilizing a leg vein drawing.    Right great saphenous vein is incompetent, measures 4.7 mm in diameter in the proximal thigh with reflux time greater than 500 ms.  There is a source of incompetent varicosities measuring up to 3.1 mm in diameter with reflux time of 1.7 seconds.    If he chooses treatment, he would be a candidate for endovenous ablation of his right great saphenous vein in the office setting.  Details of procedure clear risk of bleeding, infection, nerve injury, deep vein thrombosis were all discussed.  Hopefully, the varicosities of the right medial thigh will decompress following ablation.    He has pain on the lateral right thigh in the area of telangiectasias.  I was quite clear that treating the telangiectasias would not be covered by insurance and would be his responsibility.  Details of sclerotherapy including " risks of allergic reaction, ulceration, hyperpigmentation and deep vein thrombosis were discussed.  He understands that more than 1 session may be necessary.  He voiced understanding of our discussion and his questions were answered.    PLAN:  1.  Right lower extremity great saphenous vein radiofrequency ablation  2.  Self-pay sclerotherapy right lateral thigh telangiectasias     Ronaldo Desai MD    Dictated using Dragon voice recognition software which may result in transcription errors          [facility] LEG DRAWING:

## 2023-10-31 ENCOUNTER — MYC MEDICAL ADVICE (OUTPATIENT)
Dept: DERMATOLOGY | Facility: CLINIC | Age: 33
End: 2023-10-31
Payer: COMMERCIAL

## 2023-11-02 ENCOUNTER — MYC MEDICAL ADVICE (OUTPATIENT)
Dept: FAMILY MEDICINE | Facility: CLINIC | Age: 33
End: 2023-11-02
Payer: COMMERCIAL

## 2023-11-02 NOTE — LETTER
[facility]  [address], [address]  [place] [address]  Phone: [phone]  Fax: [phone]  November 9, 2023      Abimael Martinez  [address]  [place] [address]      Dear Abimael,    We care about your health and have reviewed your health plan including your medical conditions, medications, and lab results.  Based on this review, it is recommended that you follow up regarding the following health topic(s):  -Asthma  -Wellness (Physical) Visit   -Immunizations:  Health Maintenance Due   Topic Date Due    COVID-19 Vaccine (1) Never done    Pneumococcal Vaccine: Pediatrics (0 to 5 Years) and At-Risk Patients (6 to 64 Years) (1 - PCV) Never done    HEPATITIS B IMMUNIZATION (2 of 3 - 19+ 3-dose series) 03/17/2015    INFLUENZA VACCINE (1) 09/01/2023       We recommend you take the following action(s):  -schedule a WELLNESS (Physical) APPOINTMENT.  We will perform the following labs: Lipids (fasting cholesterol - nothing to eat except water and/or meds for 8-10 hours).   -Complete and return the attached ASTHMA CONTROL TEST.  If your total score is 19 or less or you have been to the ER or urgent care for your asthma, then please schedule an asthma followup appointment.     Please call us at the [facility] [phone] (or use [employer]) to address the above recommendations.     Thank you for trusting [facility] and we appreciate the opportunity to serve you.  We look forward to supporting your healthcare needs in the future.    Healthy Regards,    Your Health Care Team  [facility]

## 2023-11-02 NOTE — TELEPHONE ENCOUNTER
Patient Quality Outreach    Patient is due for the following:   Asthma  -  ACT needed  Physical Preventive Adult Physical      Topic Date Due    COVID-19 Vaccine (1) Never done    Pneumococcal Vaccine (1 - PCV) Never done    Hepatitis B Vaccine (2 of 3 - 19+ 3-dose series) 03/17/2015    Flu Vaccine (1) 09/01/2023       Next Steps:   Schedule a Adult Preventative  Patient was assigned appropriate questionnaire to complete    Type of outreach:    Sent [employer] message.  Send letter if not completed in 1 week    Questions for provider review:    None           Arina Shannon, [facility]  Chart routed to Care Team.

## 2023-11-09 NOTE — TELEPHONE ENCOUNTER
Patient Quality Outreach    Patient is due for the following:   Asthma  -  ACT needed  Physical Preventive Adult Physical      Topic Date Due    COVID-19 Vaccine (1) Never done    Pneumococcal Vaccine (1 - PCV) Never done    Hepatitis B Vaccine (2 of 3 - 19+ 3-dose series) 03/17/2015    Flu Vaccine (1) 09/01/2023       Next Steps:   Schedule a Adult Preventative  Completed and return mailed ACT    Type of outreach:    Sent letter.    Next Steps:  Reach out within 90 days via [employer].    Max number of attempts reached: Yes. Will try again in 90 days if patient still on fail list.    Questions for provider review:    None           Arina Shannon, CMA

## 2023-11-22 ENCOUNTER — MYC MEDICAL ADVICE (OUTPATIENT)
Dept: VASCULAR SURGERY | Facility: CLINIC | Age: 33
End: 2023-11-22

## 2023-11-22 ENCOUNTER — OFFICE VISIT (OUTPATIENT)
Dept: DERMATOLOGY | Facility: CLINIC | Age: 33
End: 2023-11-22
Payer: COMMERCIAL

## 2023-11-22 DIAGNOSIS — B07.8 FLAT WART: ICD-10-CM

## 2023-11-22 DIAGNOSIS — L91.0 HYPERTROPHIC SCAR OF SKIN: Primary | ICD-10-CM

## 2023-11-22 DIAGNOSIS — N48.89 PEARLY PENILE PAPULES: ICD-10-CM

## 2023-11-22 PROCEDURE — 11900 INJECT SKIN LESIONS </W 7: CPT | Mod: 59 | Performed by: DERMATOLOGY

## 2023-11-22 PROCEDURE — 17110 DESTRUCTION B9 LES UP TO 14: CPT | Mod: 59 | Performed by: DERMATOLOGY

## 2023-11-22 PROCEDURE — 54100 BIOPSY OF PENIS: CPT | Performed by: DERMATOLOGY

## 2023-11-22 PROCEDURE — 88305 TISSUE EXAM BY PATHOLOGIST: CPT | Performed by: DERMATOLOGY

## 2023-11-22 RX ORDER — LIDOCAINE 40 MG/G
CREAM TOPICAL ONCE
Status: DISCONTINUED | OUTPATIENT
Start: 2023-11-22 | End: 2023-11-28

## 2023-11-22 NOTE — PROGRESS NOTES
[facility] Dermatology Note  Encounter Date: Nov 22, 2023  Office Visit     Dermatology Problem List:  1. Dermatitis, groin area. Suspected irritant versus contact dermatitis.  - has seasonal allergies with asthma per Dr. Harris  - path testing with delayed reaction to black tattoo  - vaseline; elidel  - prior tx: triamamcinolone 0.025% ointment BID, protopic 0.1% ointment BID PRN  2. Allergic contact dermatitis  - s/p punch biopsy 11/19/19  - patch test negative  - triamcinolone 0.1% ointment BID  3. COVID-19 Nov-2020  4. 3-4x 1 mm pale, firm papules on the ventral penis  - s/p bx 10/19/22  5. Tinea corporis   - Past tx: terbinafine    - Current tx: ketoconazole 2% cream and shampoo  6. 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         Hyfrecation 12/8/22  -Current Tx: Pramoxine PRN   7. Dermatofibroma, R wrist  - s/p punch 12/8/22  8. Right Lateral Thigh Varicose veins   -Vascular Surgery referral placed 5/16/23    ____________________________________________    Assessment & Plan:     # Healed treated Angiofibromas (pearly penile papules), s/p hyfrecation  - Still endorses having slight pain at the areas treated. Recommmend against further hyfrecation.   - Topical Lidocaine prescribed for discomfort.   - Intralesional Kenalog as below.     # Flat warts, bilateral palms.   Treated with curette and LN2 as below.     # Pearly penile papule, symptomatic, R ventral corona.   Punch removal with less lingering pain symptoms.   Punch excision as below.     Procedures Performed:   - Punch biopsy procedure note, location(s): R ventral corona. After discussion of benefits and risks including but not limited to bleeding, infection, scar, incomplete removal, recurrence, and non-diagnostic biopsy, written consent and photographs were obtained. The area was cleaned with isopropyl alcohol. 0.5mL of 1% lidocaine with epinephrine was injected to obtain adequate anesthesia and a 2 mm punch biopsy was  performed at site(s). 5-0 Prolene sutures were utilized to approximate the epidermal edges. White petrolatum ointment and a bandage was applied to the wound. Explicit verbal and written wound care instructions were provided. The patient left the dermatology clinic in good condition.     - Cryotherapy procedure note, location(s): R palm x2, L palm x1. After verbal consent and discussion of risks and benefits including, but not limited to, dyspigmentation/scar, blister, and pain, 3 lesion(s) was(were) treated with 1-2 mm freeze border for 1-2 cycles with liquid nitrogen. Post cryotherapy instructions were provided.    - Kenalog intralesional injection procedure note: After verbal consent and discussion of risks including but not limited to atrophy, pain, and bruising, time out was performed, the patient underwent positioning and the area was prepped with isopropyl alcohol, 0.1 total cc of Kenalog 5 mg/cc was injected into 1 sites on the L ventral corona.  The patient tolerated the procedure well and left the Dermatology clinic in good condition.         Follow-up: PRN    Staff and Scribe:     Scribe Disclosure:   I, Susy Vargas, am serving as a scribe; to document services personally performed by Dr. Castillo Narayanan - -based on data collection and the provider's statements to me.     Provider Disclosure:   The documentation recorded by the scribe accurately reflects the services I personally performed and the decisions made by me. I personally performed the procedures today.    Castillo Narayanan DO    Department of Dermatology  [facility]: Phone: [phone], Fax:[phone]  [facility]: Phone: [phone], Fax: [phone]      ____________________________________________    CC: pearly penile papules    HPI:  Mr. Abimael Martinez is a(n) 33 year old male who presents today as a return  patient for penile check after hyfrecation. Patient was last seen on 5/16/23 for penile check. He notes penile pain symptoms unchanged since last being seen. Notes irritation with sexual activity. States that the pain is worse when having an erection.     Patient is otherwise feeling well, without additional skin concerns.     Labs Reviewed:  N/A    Physical Exam:  Vitals: There were no vitals taken for this visit.  SKIN: Focused examination of penis was performed.  - R ventral corona, 2mm pearly whitish papule  - L ventral corona, poorly defined tan macule  - R palm 3mm verrucous papules x2, L palm 3mm verrucous papule x1    - No other lesions of concern on areas examined.     Medications:  Current Outpatient Medications   Medication    albuterol (PROAIR HFA/PROVENTIL HFA/VENTOLIN HFA) 108 (90 Base) MCG/ACT inhaler    ALPRAZolam (XANAX) 2 MG tablet    amphetamine-dextroamphetamine (ADDERALL) 20 MG tablet    cetirizine (ZYRTEC) 10 MG tablet    famotidine (PEPCID) 40 MG tablet    fluticasone (FLOVENT HFA) 220 MCG/ACT inhaler    lidocaine (LMX4) 4 % external cream    mometasone (ELOCON) 0.1 % external ointment    montelukast (SINGULAIR) 10 MG tablet    omeprazole (PRILOSEC) 20 MG DR capsule    pimecrolimus (ELIDEL) 1 % external cream    pramoxine-HC (PRAMOSONE) 1-1 % external lotion    PROTOPIC 0.1 % external ointment    tacrolimus (PROTOPIC) 0.03 % external ointment    tiZANidine (ZANAFLEX) 4 MG tablet    triamcinolone (KENALOG) 0.1 % external ointment     Current Facility-Administered Medications   Medication    lidocaine (LMX4) cream      Past Medical History:   Patient Active Problem List   Diagnosis    Anxiety    CARDIOVASCULAR SCREENING; LDL GOAL LESS THAN 160    High risk sexual behavior    Low back pain    Essential hypertension    Internal hemorrhoids which bleed    Obesity, Class I, BMI 30-34.9    Attention deficit hyperactivity disorder (ADHD), combined type    AYALA (generalized anxiety disorder)    OCD  (obsessive compulsive disorder)    Drug-induced erectile dysfunction    Mild persistent asthma without complication    Lumbosacral radiculopathy    Dyslipidemia    Elevated serum creatinine    Former smoker     Past Medical History:   Diagnosis Date    Acute right otitis media 1/8/2013    ADHD (attention deficit hyperactivity disorder), combined type     Anxiety     Depression     Drug abuse (H)     Dyslipidemia     Gonococcal urethritis 02/05/2016    Hypertension     Internal hemorrhoids     which bleed    Lumbosacral radiculopathy     Obese     OCD (obsessive compulsive disorder)     Other chronic pain     Low Back Pain    Uncomplicated asthma     Urethritis 5/20/2013     Problem list name updated by automated process. Provider to review

## 2023-11-22 NOTE — TELEPHONE ENCOUNTER
Called and spoke to pt in regards to his MyChart message. Had a discussion with pt and informed him he can decide what he'd like to do.    Pt will call back to schedule his procedure.    Caro Marr RN  [facility]  [facility]

## 2023-11-22 NOTE — LETTER
11/22/2023         RE: Abimael Martinez  [address]  [place] [address]        Dear Colleague,    Thank you for referring your patient, Abimael Martinez, to the [facility]. Please see a copy of my visit note below.    [facility] Dermatology Note  Encounter Date: Nov 22, 2023  Office Visit     Dermatology Problem List:  1. Dermatitis, groin area. Suspected irritant versus contact dermatitis.  - has seasonal allergies with asthma per Dr. Harris  - path testing with delayed reaction to black tattoo  - vaseline; elidel  - prior tx: triamamcinolone 0.025% ointment BID, protopic 0.1% ointment BID PRN  2. Allergic contact dermatitis  - s/p punch biopsy 11/19/19  - patch test negative  - triamcinolone 0.1% ointment BID  3. COVID-19 Nov-2020  4. 3-4x 1 mm pale, firm papules on the ventral penis  - s/p bx 10/19/22  5. Tinea corporis   - Past tx: terbinafine    - Current tx: ketoconazole 2% cream and shampoo  6. 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         Hyfrecation 12/8/22  -Current Tx: Pramoxine PRN   7. Dermatofibroma, R wrist  - s/p punch 12/8/22  8. Right Lateral Thigh Varicose veins   -Vascular Surgery referral placed 5/16/23    ____________________________________________    Assessment & Plan:     # Healed treated Angiofibromas (pearly penile papules), s/p hyfrecation  - Still endorses having slight pain at the areas treated. Recommmend against further hyfrecation.   - Topical Lidocaine prescribed for discomfort.   - Intralesional Kenalog as below.     # Flat warts, bilateral palms.   Treated with curette and LN2 as below.     # Pearly penile papule, symptomatic, R ventral corona.   Punch removal with less lingering pain symptoms.   Punch excision as below.     Procedures Performed:   - Punch biopsy procedure note, location(s): R ventral corona. After discussion of benefits and risks including but not limited to bleeding,  infection, scar, incomplete removal, recurrence, and non-diagnostic biopsy, written consent and photographs were obtained. The area was cleaned with isopropyl alcohol. 0.5mL of 1% lidocaine with epinephrine was injected to obtain adequate anesthesia and a 2 mm punch biopsy was performed at site(s). 5-0 Prolene sutures were utilized to approximate the epidermal edges. White petrolatum ointment and a bandage was applied to the wound. Explicit verbal and written wound care instructions were provided. The patient left the dermatology clinic in good condition.     - Cryotherapy procedure note, location(s): R palm x2, L palm x1. After verbal consent and discussion of risks and benefits including, but not limited to, dyspigmentation/scar, blister, and pain, 3 lesion(s) was(were) treated with 1-2 mm freeze border for 1-2 cycles with liquid nitrogen. Post cryotherapy instructions were provided.    - Kenalog intralesional injection procedure note: After verbal consent and discussion of risks including but not limited to atrophy, pain, and bruising, time out was performed, the patient underwent positioning and the area was prepped with isopropyl alcohol, 0.1 total cc of Kenalog 5 mg/cc was injected into 1 sites on the L ventral corona.  The patient tolerated the procedure well and left the Dermatology clinic in good condition.         Follow-up: PRN    Staff and Scribe:     Scribe Disclosure:   I, Susy Vargas, am serving as a scribe; to document services personally performed by Dr. Castillo Narayanan - -based on data collection and the provider's statements to me.     Provider Disclosure:   The documentation recorded by the scribe accurately reflects the services I personally performed and the decisions made by me. I personally performed the procedures today.    Castillo Narayanan DO    Department of Dermatology  [facility]: Phone: [phone],  Fax:[phone]  [facility]: Phone: [phone], Fax: [phone]      ____________________________________________    CC: pearly penile papules    HPI:  Mr. Abimael Martinez is a(n) 33 year old male who presents today as a return patient for penile check after hyfrecation. Patient was last seen on 5/16/23 for penile check. He notes penile pain symptoms unchanged since last being seen. Notes irritation with sexual activity. States that the pain is worse when having an erection.     Patient is otherwise feeling well, without additional skin concerns.     Labs Reviewed:  N/A    Physical Exam:  Vitals: There were no vitals taken for this visit.  SKIN: Focused examination of penis was performed.  - R ventral corona, 2mm pearly whitish papule  - L ventral corona, poorly defined tan macule  - R palm 3mm verrucous papules x2, L palm 3mm verrucous papule x1    - No other lesions of concern on areas examined.     Medications:  Current Outpatient Medications   Medication     albuterol (PROAIR HFA/PROVENTIL HFA/VENTOLIN HFA) 108 (90 Base) MCG/ACT inhaler     ALPRAZolam (XANAX) 2 MG tablet     amphetamine-dextroamphetamine (ADDERALL) 20 MG tablet     cetirizine (ZYRTEC) 10 MG tablet     famotidine (PEPCID) 40 MG tablet     fluticasone (FLOVENT HFA) 220 MCG/ACT inhaler     lidocaine (LMX4) 4 % external cream     mometasone (ELOCON) 0.1 % external ointment     montelukast (SINGULAIR) 10 MG tablet     omeprazole (PRILOSEC) 20 MG DR capsule     pimecrolimus (ELIDEL) 1 % external cream     pramoxine-HC (PRAMOSONE) 1-1 % external lotion     PROTOPIC 0.1 % external ointment     tacrolimus (PROTOPIC) 0.03 % external ointment     tiZANidine (ZANAFLEX) 4 MG tablet     triamcinolone (KENALOG) 0.1 % external ointment     Current Facility-Administered Medications   Medication     lidocaine (LMX4) cream      Past Medical History:   Patient Active Problem List   Diagnosis     Anxiety      CARDIOVASCULAR SCREENING; LDL GOAL LESS THAN 160     High risk sexual behavior     Low back pain     Essential hypertension     Internal hemorrhoids which bleed     Obesity, Class I, BMI 30-34.9     Attention deficit hyperactivity disorder (ADHD), combined type     AYALA (generalized anxiety disorder)     OCD (obsessive compulsive disorder)     Drug-induced erectile dysfunction     Mild persistent asthma without complication     Lumbosacral radiculopathy     Dyslipidemia     Elevated serum creatinine     Former smoker     Past Medical History:   Diagnosis Date     Acute right otitis media 1/8/2013     ADHD (attention deficit hyperactivity disorder), combined type      Anxiety      Depression      Drug abuse (H)      Dyslipidemia      Gonococcal urethritis 02/05/2016     Hypertension      Internal hemorrhoids     which bleed     Lumbosacral radiculopathy      Obese      OCD (obsessive compulsive disorder)      Other chronic pain     Low Back Pain     Uncomplicated asthma      Urethritis 5/20/2013     Problem list name updated by automated process. Provider to review         Again, thank you for allowing me to participate in the care of your patient.        Sincerely,        Castillo Narayanan MD

## 2023-11-22 NOTE — NURSING NOTE
Drug Administration Record    Drug Name: Kenalog  Dose: 0.2mL of triamcinolone 5mg/mL  Route administered: intralesional  NDC #: Kenalog-10 (1223-0782-65)  Amount of waste(mL): 0  Reason for waste: n/a    LOT #: [id number]  SITE: penis  EXPIRATION DATE: 12/2025    Lisa Low RN

## 2023-11-22 NOTE — PATIENT INSTRUCTIONS
Wound Care After a Biopsy    What is a skin biopsy?  A skin biopsy allows the doctor to examine a very small piece of tissue under the microscope to determine the diagnosis and the best treatment for the skin condition. A local anesthetic (numbing medicine) is injected with a very small needle into the skin area to be tested. A small piece of skin is taken from the area. Sometimes a suture (stitch) is used.     What are the risks of a skin biopsy?  I will experience scar, bleeding, swelling, pain, crusting and redness. I may experience incomplete removal or recurrence. Risks of this procedure are excessive bleeding, bruising, infection, nerve damage, numbness, thick (hypertrophic or keloidal) scar and non-diagnostic biopsy.    How should I care for my wound for the first 24 hours?  Keep the wound dry and covered for 24 hours  If it bleeds, hold direct pressure on the area for 15 minutes. If bleeding does not stop, call us or go to the emergency room  Avoid strenuous exercise the first 1-2 days or as your doctor instructs you    How should I care for the wound after 24 hours?  After 24 hours, remove the bandage  You may bathe or shower as normal  If you had a scalp biopsy, you can shampoo as usual and can use shower water to clean the biopsy site daily  Clean the wound once a day with gentle soap and water  Do not scrub, be gentle  Apply white petroleum/Vaseline after cleaning the wound with a cotton swab or a clean finger, and keep the site covered with a Bandaid /bandage. Bandages are not necessary with a scalp biopsy  If you are unable to cover the site with a Bandaid /bandage, re-apply ointment 2-3 times a day to keep the site moist. Moisture will help with healing  Avoid strenuous activity for first 1-2 days  Avoid lakes, rivers, pools, and oceans until the stitches are removed or the site is healed    How do I clean my wound?  Wash hands thoroughly with soap or use hand  before all wound care  Clean  the wound with gentle soap and water  Apply white petroleum/Vaseline  to wound after it is clean  Replace the Bandaid /bandage to keep the wound covered for the first few days or as instructed by your doctor  If you had a scalp biopsy, warm shower water to the area on a daily basis should suffice    What should I use to clean my wound?   Cotton-tipped applicators (Qtips )  White petroleum jelly (Vaseline ). Use a clean new container and use Q-tips to apply.  Bandaids  as needed  Gentle soap     How should I care for my wound long term?  Do not get your wound dirty  Keep up with wound care for one week or until the area is healed.  Stitch will dissolve in about 7-10 days  A small scab will form and fall off by itself when the area is completely healed. The area will be red and will become pink in color as it heals. Sun protection is very important for how your scar will turn out. Sunscreen with an SPF 30 or greater is recommended once the area is healed.  You should have some soreness but it should be mild and slowly go away over several days. Talk to your doctor about using tylenol for pain,    When should I call my doctor?  If you have increased:   Pain or swelling  Pus or drainage (clear or slightly yellow drainage is ok)  Temperature over 100F  Spreading redness or warmth around wound    When will I hear about my results?  The biopsy results can take 2 weeks to come back.  Your results will automatically release to "[employer] before your provider has even reviewed them.  The clinic will call you with the results, send you a "[employer] message, or have you schedule a follow-up clinic or phone time to discuss the results.  Contact our clinics if you do not hear from us in 2 weeks.    Who should I call with questions?  [facility]: [phone]  [facility]: [phone]  For urgent needs outside of business hours call the [facility] at [phone] and  ask for the dermatology resident on call

## 2023-11-23 ENCOUNTER — MYC MEDICAL ADVICE (OUTPATIENT)
Dept: DERMATOLOGY | Facility: CLINIC | Age: 33
End: 2023-11-23
Payer: COMMERCIAL

## 2023-11-28 RX ORDER — LIDOCAINE 50 MG/G
OINTMENT TOPICAL
Qty: 30 G | Refills: 3 | Status: SHIPPED | OUTPATIENT
Start: 2023-11-28 | End: 2024-09-20

## 2023-11-28 NOTE — TELEPHONE ENCOUNTER
I spoke with the patient by phone. He reports some worsening of pain in his back after a steroid injection there. It's possible the trauma of a needle and medication stretching scar tissue can lead to some self limited worsening pain.     The lidocaine prescription was not available when he went to pick it up. It revised the order and re-submitted it. He will try using the lidocaine ointment in the meantime.

## 2023-12-04 ENCOUNTER — OFFICE VISIT (OUTPATIENT)
Dept: DERMATOLOGY | Facility: CLINIC | Age: 33
End: 2023-12-04
Payer: COMMERCIAL

## 2023-12-04 DIAGNOSIS — R21 RASH: Primary | ICD-10-CM

## 2023-12-04 DIAGNOSIS — B07.8 FLAT WART: ICD-10-CM

## 2023-12-04 PROCEDURE — 17110 DESTRUCTION B9 LES UP TO 14: CPT | Performed by: DERMATOLOGY

## 2023-12-04 RX ORDER — HYDROCORTISONE 2.5 %
CREAM (GRAM) TOPICAL DAILY
Qty: 30 G | Refills: 0 | Status: SHIPPED | OUTPATIENT
Start: 2023-12-04 | End: 2024-09-20

## 2023-12-04 ASSESSMENT — PAIN SCALES - GENERAL: PAINLEVEL: NO PAIN (0)

## 2023-12-04 NOTE — NURSING NOTE
Abimael Martinez's chief complaint for this visit includes:  Chief Complaint   Patient presents with    VERRUCA VULGARIS     Warts on bilateral hands - cryo / recheck scar     PCP: No Ref-Primary, Physician    Referring Provider:  No referring provider defined for this encounter.    There were no vitals taken for this visit.  No Pain (0)        Allergies   Allergen Reactions    Duloxetine Other (See Comments) and Fatigue     enlarged spleen and weight gain    Duloxetine Hcl      Weight gain and fatigue    Paroxetine Other (See Comments), Fatigue and GI Disturbance     Weight gain, Spleen issues    Seasonal Allergies     Vicodin [Hydrocodone-Acetaminophen]          Do you need any medication refills at today's visit? No    Lizzeth Harrison, [facility]

## 2023-12-04 NOTE — LETTER
12/4/2023         RE: Abimael Martinez  [address]  [place] [address]        Dear Colleague,    Thank you for referring your patient, Abimael Martinez, to the [facility]. Please see a copy of my visit note below.    [facility] Dermatology Note  Encounter Date: Dec 4, 2023  Office Visit     Dermatology Problem List:  1. Dermatitis, groin area. Suspected irritant versus contact dermatitis.  - has seasonal allergies with asthma per Dr. Harris  - path testing with delayed reaction to black tattoo  - vaseline; elidel  - prior tx: triamamcinolone 0.025% ointment BID, protopic 0.1% ointment BID PRN  2. Allergic contact dermatitis  - s/p punch biopsy 11/19/19  - patch test negative  - triamcinolone 0.1% ointment BID  3. COVID-19 Nov-2020  4. 3-4x 1 mm pale, firm papules on the ventral penis  - s/p bx 10/19/22  5. Tinea corporis   - Past tx: terbinafine    - Current tx: ketoconazole 2% cream and shampoo  6. 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         Hyfrecation 12/8/22  -Current Tx: Pramoxine PRN   7. Dermatofibroma, R wrist  - s/p punch 12/8/22  8. Right Lateral Thigh Varicose veins   -Vascular Surgery referral placed 5/16/23    ____________________________________________    Assessment & Plan:     # Flat warts, bilateral palms  - Treated 6 lesions with curette and cryotherapy in office today.    #Prior injection site, L ventral corona  - Still endorses having slight pain at the areas treated.   - Discussed using Hydrocortisone cream once a day for 1 week, in addition to the Lidocaine gel, on the area to help with discomfort.     #Prior punch biopsy site, R ventral corona  - Well-healed, no additional treatment needed at this time.    Procedures Performed:   - Cryotherapy procedure note, location(s): bilateral palms. After verbal consent and discussion of risks and benefits including, but not limited to, dyspigmentation/scar,  blister, and pain, 6 lesion(s) was(were) treated with 1-2 mm freeze border for 1-2 cycles with liquid nitrogen. Post cryotherapy instructions were provided.    Follow-up: prn for new or changing lesions    Staff and Scribe:     Scribe Disclosure:   I, Susy Vargas, am serving as a scribe; to document services personally performed by Dr. Castillo Narayanan - -based on data collection and the provider's statements to me.     Provider Disclosure:   The documentation recorded by the scribe accurately reflects the services I personally performed and the decisions made by me. I personally performed the procedures today.      Castillo Narayanan DO    Department of Dermatology  [facility]: Phone: [phone], Fax:[phone]  [facility]: Phone: [phone], Fax: [phone]    ____________________________________________    CC: VERRUCA VULGARIS (Warts on bilateral hands - cryo / recheck scar)    HPI:  Mr. Abimael Martinez is a(n) 33 year old male who presents today as a return patient for cryo treatment of warts on bilateral hands and a recheck of a scar on the left ventral corona.     Last seen on 11/22/23 where he had 2 lesions on the right palm and 1 on the left palm treated with curette and cryotherapy, 1 punch biopsy of the right ventral corona and 1 Kenalog injection in the left ventral corona. On 11/28 he reached out to the office due to worsening pain in his back after getting a steroid injection there. He was advised that it's possible the trauma of a needle and medication stretching scar tissue can lead to self limited worsening pain.     Today, he reports he's still having pain at the prior injection site on the left ventral corona and inquires what can be done to lessen this. He would like reassurance from the doctor that the biopsy site on the right ventral corona has healed before resuming  use of the hot tub. States that this helps with his back pain and is much needed.     Patient is otherwise feeling well, without additional skin concerns.     Labs Reviewed:  N/A    Physical Exam:  Vitals: There were no vitals taken for this visit.  SKIN: Focused examination of the bilateral hands and penis was performed.  - There are 3 verrucous papules approximately 3-4 mm in size scattered on each palm.  - Glands, penis and frenulum punch biopsy site well-healed. No atrophic scar palpable.   - No other lesions of concern on areas examined.     Medications:  Current Outpatient Medications   Medication     albuterol (PROAIR HFA/PROVENTIL HFA/VENTOLIN HFA) 108 (90 Base) MCG/ACT inhaler     ALPRAZolam (XANAX) 2 MG tablet     amphetamine-dextroamphetamine (ADDERALL) 20 MG tablet     cetirizine (ZYRTEC) 10 MG tablet     famotidine (PEPCID) 40 MG tablet     fluticasone (FLOVENT HFA) 220 MCG/ACT inhaler     mometasone (ELOCON) 0.1 % external ointment     montelukast (SINGULAIR) 10 MG tablet     omeprazole (PRILOSEC) 20 MG DR capsule     pimecrolimus (ELIDEL) 1 % external cream     PROTOPIC 0.1 % external ointment     tacrolimus (PROTOPIC) 0.03 % external ointment     triamcinolone (KENALOG) 0.1 % external ointment     lidocaine (LMX4) 4 % external cream     lidocaine (XYLOCAINE) 5 % external ointment     pramoxine-HC (PRAMOSONE) 1-1 % external lotion     tiZANidine (ZANAFLEX) 4 MG tablet     No current facility-administered medications for this visit.      Past Medical History:   Patient Active Problem List   Diagnosis     Anxiety     CARDIOVASCULAR SCREENING; LDL GOAL LESS THAN 160     High risk sexual behavior     Low back pain     Essential hypertension     Internal hemorrhoids which bleed     Obesity, Class I, BMI 30-34.9     Attention deficit hyperactivity disorder (ADHD), combined type     AYALA (generalized anxiety disorder)     OCD (obsessive compulsive disorder)     Drug-induced erectile dysfunction     Mild  persistent asthma without complication     Lumbosacral radiculopathy     Dyslipidemia     Elevated serum creatinine     Former smoker     Past Medical History:   Diagnosis Date     Acute right otitis media 1/8/2013     ADHD (attention deficit hyperactivity disorder), combined type      Anxiety      Depression      Drug abuse (H)      Dyslipidemia      Gonococcal urethritis 02/05/2016     Hypertension      Internal hemorrhoids     which bleed     Lumbosacral radiculopathy      Obese      OCD (obsessive compulsive disorder)      Other chronic pain     Low Back Pain     Uncomplicated asthma      Urethritis 5/20/2013     Problem list name updated by automated process. Provider to review       CC No referring provider defined for this encounter. on close of this encounter.      Again, thank you for allowing me to participate in the care of your patient.        Sincerely,        Castillo Narayanan MD

## 2023-12-04 NOTE — PROGRESS NOTES
[facility] Dermatology Note  Encounter Date: Dec 4, 2023  Office Visit     Dermatology Problem List:  1. Dermatitis, groin area. Suspected irritant versus contact dermatitis.  - has seasonal allergies with asthma per Dr. Harris  - path testing with delayed reaction to black tattoo  - vaseline; elidel  - prior tx: triamamcinolone 0.025% ointment BID, protopic 0.1% ointment BID PRN  2. Allergic contact dermatitis  - s/p punch biopsy 11/19/19  - patch test negative  - triamcinolone 0.1% ointment BID  3. COVID-19 Nov-2020  4. 3-4x 1 mm pale, firm papules on the ventral penis  - s/p bx 10/19/22  5. Tinea corporis   - Past tx: terbinafine    - Current tx: ketoconazole 2% cream and shampoo  6. 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         Hyfrecation 12/8/22  -Current Tx: Pramoxine PRN   7. Dermatofibroma, R wrist  - s/p punch 12/8/22  8. Right Lateral Thigh Varicose veins   -Vascular Surgery referral placed 5/16/23    ____________________________________________    Assessment & Plan:     # Flat warts, bilateral palms  - Treated 6 lesions with curette and cryotherapy in office today.    #Prior injection site, L ventral corona  - Still endorses having slight pain at the areas treated.   - Discussed using Hydrocortisone cream once a day for 1 week, in addition to the Lidocaine gel, on the area to help with discomfort.     #Prior punch biopsy site, R ventral corona  - Well-healed, no additional treatment needed at this time.    Procedures Performed:   - Cryotherapy procedure note, location(s): bilateral palms. After verbal consent and discussion of risks and benefits including, but not limited to, dyspigmentation/scar, blister, and pain, 6 lesion(s) was(were) treated with 1-2 mm freeze border for 1-2 cycles with liquid nitrogen. Post cryotherapy instructions were provided.    Follow-up: prn for new or changing lesions    Staff and Scribe:     Scribe Disclosure:   Susy ALONZO am serving  as a scribe; to document services personally performed by Dr. Castillo Narayanan - -based on data collection and the provider's statements to me.     Provider Disclosure:   The documentation recorded by the scribe accurately reflects the services I personally performed and the decisions made by me. I personally performed the procedures today.      Castillo Narayanan DO    Department of Dermatology  [facility] [place]: Phone: [phone], Fax:[phone]  [facility]: Phone: [phone], Fax: [phone]    ____________________________________________    CC: VERRUCA VULGARIS (Warts on bilateral hands - cryo / recheck scar)    HPI:  Mr. Abimael Martinez is a(n) 33 year old male who presents today as a return patient for cryo treatment of warts on bilateral hands and a recheck of a scar on the left ventral corona.     Last seen on 11/22/23 where he had 2 lesions on the right palm and 1 on the left palm treated with curette and cryotherapy, 1 punch biopsy of the right ventral corona and 1 Kenalog injection in the left ventral corona. On 11/28 he reached out to the office due to worsening pain in his back after getting a steroid injection there. He was advised that it's possible the trauma of a needle and medication stretching scar tissue can lead to self limited worsening pain.     Today, he reports he's still having pain at the prior injection site on the left ventral corona and inquires what can be done to lessen this. He would like reassurance from the doctor that the biopsy site on the right ventral corona has healed before resuming use of the hot tub. States that this helps with his back pain and is much needed.     Patient is otherwise feeling well, without additional skin concerns.     Labs Reviewed:  N/A    Physical Exam:  Vitals: There were no vitals taken for this visit.  SKIN: Focused examination of  the bilateral hands and penis was performed.  - There are 3 verrucous papules approximately 3-4 mm in size scattered on each palm.  - Glands, penis and frenulum punch biopsy site well-healed. No atrophic scar palpable.   - No other lesions of concern on areas examined.     Medications:  Current Outpatient Medications   Medication    albuterol (PROAIR HFA/PROVENTIL HFA/VENTOLIN HFA) 108 (90 Base) MCG/ACT inhaler    ALPRAZolam (XANAX) 2 MG tablet    amphetamine-dextroamphetamine (ADDERALL) 20 MG tablet    cetirizine (ZYRTEC) 10 MG tablet    famotidine (PEPCID) 40 MG tablet    fluticasone (FLOVENT HFA) 220 MCG/ACT inhaler    mometasone (ELOCON) 0.1 % external ointment    montelukast (SINGULAIR) 10 MG tablet    omeprazole (PRILOSEC) 20 MG DR capsule    pimecrolimus (ELIDEL) 1 % external cream    PROTOPIC 0.1 % external ointment    tacrolimus (PROTOPIC) 0.03 % external ointment    triamcinolone (KENALOG) 0.1 % external ointment    lidocaine (LMX4) 4 % external cream    lidocaine (XYLOCAINE) 5 % external ointment    pramoxine-HC (PRAMOSONE) 1-1 % external lotion    tiZANidine (ZANAFLEX) 4 MG tablet     No current facility-administered medications for this visit.      Past Medical History:   Patient Active Problem List   Diagnosis    Anxiety    CARDIOVASCULAR SCREENING; LDL GOAL LESS THAN 160    High risk sexual behavior    Low back pain    Essential hypertension    Internal hemorrhoids which bleed    Obesity, Class I, BMI 30-34.9    Attention deficit hyperactivity disorder (ADHD), combined type    AYALA (generalized anxiety disorder)    OCD (obsessive compulsive disorder)    Drug-induced erectile dysfunction    Mild persistent asthma without complication    Lumbosacral radiculopathy    Dyslipidemia    Elevated serum creatinine    Former smoker     Past Medical History:   Diagnosis Date    Acute right otitis media 1/8/2013    ADHD (attention deficit hyperactivity disorder), combined type     Anxiety     Depression     Drug  abuse (H)     Dyslipidemia     Gonococcal urethritis 02/05/2016    Hypertension     Internal hemorrhoids     which bleed    Lumbosacral radiculopathy     Obese     OCD (obsessive compulsive disorder)     Other chronic pain     Low Back Pain    Uncomplicated asthma     Urethritis 5/20/2013     Problem list name updated by automated process. Provider to review       CC No referring provider defined for this encounter. on close of this encounter.

## 2024-01-19 ENCOUNTER — LAB REQUISITION (OUTPATIENT)
Dept: LAB | Facility: CLINIC | Age: 34
End: 2024-01-19
Payer: COMMERCIAL

## 2024-01-19 DIAGNOSIS — Z00.00 ENCOUNTER FOR GENERAL ADULT MEDICAL EXAMINATION WITHOUT ABNORMAL FINDINGS: ICD-10-CM

## 2024-01-19 DIAGNOSIS — Z11.3 ENCOUNTER FOR SCREENING FOR INFECTIONS WITH A PREDOMINANTLY SEXUAL MODE OF TRANSMISSION: ICD-10-CM

## 2024-01-19 LAB — T PALLIDUM AB SER QL: NONREACTIVE

## 2024-01-19 PROCEDURE — 80061 LIPID PANEL: CPT | Performed by: FAMILY MEDICINE

## 2024-01-19 PROCEDURE — 80053 COMPREHEN METABOLIC PANEL: CPT | Performed by: FAMILY MEDICINE

## 2024-01-19 PROCEDURE — 87389 HIV-1 AG W/HIV-1&-2 AB AG IA: CPT | Performed by: FAMILY MEDICINE

## 2024-01-19 PROCEDURE — 86780 TREPONEMA PALLIDUM: CPT | Performed by: FAMILY MEDICINE

## 2024-01-19 PROCEDURE — 87340 HEPATITIS B SURFACE AG IA: CPT | Performed by: FAMILY MEDICINE

## 2024-01-19 PROCEDURE — 87491 CHLMYD TRACH DNA AMP PROBE: CPT | Performed by: FAMILY MEDICINE

## 2024-01-20 LAB
ALBUMIN SERPL BCG-MCNC: 4.4 G/DL (ref 3.5–5.2)
ALP SERPL-CCNC: 80 U/L (ref 40–150)
ALT SERPL W P-5'-P-CCNC: 16 U/L (ref 0–70)
ANION GAP SERPL CALCULATED.3IONS-SCNC: 15 MMOL/L (ref 7–15)
AST SERPL W P-5'-P-CCNC: 20 U/L (ref 0–45)
BILIRUB SERPL-MCNC: 0.7 MG/DL
BUN SERPL-MCNC: 16.8 MG/DL (ref 6–20)
C TRACH DNA SPEC QL NAA+PROBE: NEGATIVE
CALCIUM SERPL-MCNC: 9.8 MG/DL (ref 8.6–10)
CHLORIDE SERPL-SCNC: 102 MMOL/L (ref 98–107)
CHOLEST SERPL-MCNC: 232 MG/DL
CREAT SERPL-MCNC: 1.2 MG/DL (ref 0.67–1.17)
DEPRECATED HCO3 PLAS-SCNC: 22 MMOL/L (ref 22–29)
EGFRCR SERPLBLD CKD-EPI 2021: 82 ML/MIN/1.73M2
FASTING STATUS PATIENT QL REPORTED: ABNORMAL
GLUCOSE SERPL-MCNC: 99 MG/DL (ref 70–99)
HBV SURFACE AG SERPL QL IA: NONREACTIVE
HDLC SERPL-MCNC: 43 MG/DL
HIV 1+2 AB+HIV1 P24 AG SERPL QL IA: NONREACTIVE
LDLC SERPL CALC-MCNC: 172 MG/DL
NONHDLC SERPL-MCNC: 189 MG/DL
POTASSIUM SERPL-SCNC: 4.4 MMOL/L (ref 3.4–5.3)
PROT SERPL-MCNC: 7.6 G/DL (ref 6.4–8.3)
SODIUM SERPL-SCNC: 139 MMOL/L (ref 135–145)
TRIGL SERPL-MCNC: 86 MG/DL

## 2024-09-20 ENCOUNTER — OFFICE VISIT (OUTPATIENT)
Dept: FAMILY MEDICINE | Facility: CLINIC | Age: 34
End: 2024-09-20
Payer: COMMERCIAL

## 2024-09-20 VITALS
HEART RATE: 85 BPM | HEIGHT: 72 IN | RESPIRATION RATE: 16 BRPM | BODY MASS INDEX: 31.15 KG/M2 | OXYGEN SATURATION: 98 % | DIASTOLIC BLOOD PRESSURE: 84 MMHG | WEIGHT: 230 LBS | SYSTOLIC BLOOD PRESSURE: 133 MMHG | TEMPERATURE: 97.5 F

## 2024-09-20 DIAGNOSIS — Z01.818 PREOP GENERAL PHYSICAL EXAM: Primary | ICD-10-CM

## 2024-09-20 DIAGNOSIS — F41.1 GAD (GENERALIZED ANXIETY DISORDER): ICD-10-CM

## 2024-09-20 DIAGNOSIS — K64.8 INTERNAL HEMORRHOIDS: ICD-10-CM

## 2024-09-20 DIAGNOSIS — F90.2 ATTENTION DEFICIT HYPERACTIVITY DISORDER (ADHD), COMBINED TYPE: ICD-10-CM

## 2024-09-20 DIAGNOSIS — I10 ESSENTIAL HYPERTENSION: ICD-10-CM

## 2024-09-20 PROCEDURE — 99214 OFFICE O/P EST MOD 30 MIN: CPT

## 2024-09-20 ASSESSMENT — ASTHMA QUESTIONNAIRES
QUESTION_2 LAST FOUR WEEKS HOW OFTEN HAVE YOU HAD SHORTNESS OF BREATH: NOT AT ALL
QUESTION_5 LAST FOUR WEEKS HOW WOULD YOU RATE YOUR ASTHMA CONTROL: WELL CONTROLLED
QUESTION_4 LAST FOUR WEEKS HOW OFTEN HAVE YOU USED YOUR RESCUE INHALER OR NEBULIZER MEDICATION (SUCH AS ALBUTEROL): NOT AT ALL
QUESTION_1 LAST FOUR WEEKS HOW MUCH OF THE TIME DID YOUR ASTHMA KEEP YOU FROM GETTING AS MUCH DONE AT WORK, SCHOOL OR AT HOME: NONE OF THE TIME
ACT_TOTALSCORE: 24
QUESTION_3 LAST FOUR WEEKS HOW OFTEN DID YOUR ASTHMA SYMPTOMS (WHEEZING, COUGHING, SHORTNESS OF BREATH, CHEST TIGHTNESS OR PAIN) WAKE YOU UP AT NIGHT OR EARLIER THAN USUAL IN THE MORNING: NOT AT ALL
ACT_TOTALSCORE: 24

## 2024-09-20 ASSESSMENT — PAIN SCALES - GENERAL: PAINLEVEL: SEVERE PAIN (6)

## 2024-09-20 NOTE — PATIENT INSTRUCTIONS
At [facility], we strive to deliver an exceptional experience to you, every time we see you. If you receive a survey, please let us know what we are doing well and/or what we could improve upon, as we do value your feedback.  If you have MyChart, you can expect to receive results automatically within 24 hours of their completion.  Your provider will send a note interpreting your results as well.   If you do not have MyChart, you should receive your results in about a week by mail.    Your care team:                            Family Medicine Internal Medicine   MD Sanjay García, MD Berta Perez, MD Kishore Holland, MD Karen Gallegos, PAJoshuaC    Jorge Zhong, MD Pediatrics   Tg Mathias, MD Jennifer Brody, MD Melva Elkins, APRN CNP Susie Fry APRN CNP   Vernon Núñez, MD Ninoska Randle, MD Suzanna Parker, CNP     Collin Milligan, CNP Same-Day Provider (No follow-up visits)   Tamara Acuna APRASHLEY, DNP Ami Rosas, STEFFEN Driver, FNP, BC CHEO BaezC     Clinic hours: Monday - Thursday 7 am-6 pm; Fridays 7 am-5 pm.   Urgent care: Monday - Friday 10 am- 8 pm; Saturday and Sunday 9 am-5 pm.    Clinic: [phone]       [facility]: Monday - Thursday 8 am - 7 pm; Friday 8 am - 6 pm  [facility]: [phone]    How to Take Your Medication Before Surgery  Preoperative Medication Instructions   Antiplatelet or Anticoagulation Medication Instructions   - Patient is on no antiplatelet or anticoagulation medications.    Additional Medication Instructions  Take all scheduled medications on the day of surgery EXCEPT for modifications listed below:   - Benzodiazepines: Continue without modification.   - Psychostimulants: Hold the day of surgery   - cannabis, marijuana: DO NOT TAKE night before surgery.   - cetirizine and first generation antihistamines: DO NOT TAKE on  day of surgery.   - LABA, inhaled corticosteroid, long-acting anticholinergics: Continue without modification.   - rescue Inhaler: Continue PRN. Bring to hospital on the day of surgery.   - Topicals: DO NOT TAKE day of surgery.       Patient Education   Preparing for Your Surgery  Getting started  A nurse will call you to review your health history and instructions. They will give you an arrival time based on your scheduled surgery time. Please be ready to share:  Your doctor's clinic name and phone number  Your medical, surgical, and anesthesia history  A list of allergies and sensitivities  A list of medicines, including herbal treatments and over-the-counter drugs  Whether the patient has a legal guardian (ask how to send us the papers in advance)  Please tell us if you're pregnant--or if there's any chance you might be pregnant. Some surgeries may injure a fetus (unborn baby), so they require a pregnancy test. Surgeries that are safe for a fetus don't always need a test, and you can choose whether to have one.   If you have a child who's having surgery, please ask for a copy of Preparing for Your Child's Surgery.    Preparing for surgery  Within 10 to 30 days of surgery: Have a pre-op exam (sometimes called an H&P, or History and Physical). This can be done at a clinic or pre-operative center.  If you're having a , you may not need this exam. Talk to your care team.  At your pre-op exam, talk to your care team about all medicines you take. If you need to stop any medicines before surgery, ask when to start taking them again.  We do this for your safety. Many medicines can make you bleed too much during surgery. Some change how well surgery (anesthesia) drugs work.  Call your insurance company to let them know you're having surgery. (If you don't have insurance, call [phone].)  Call your clinic if there's any change in your health. This includes signs of a cold or flu (sore throat, runny nose,  cough, rash, fever). It also includes a scrape or scratch near the surgery site.  If you have questions on the day of surgery, call your hospital or surgery center.  Eating and drinking guidelines  For your safety: Unless your surgeon tells you otherwise, follow the guidelines below.  Eat and drink as usual until 8 hours before you arrive for surgery. After that, no food or milk.  Drink clear liquids until 2 hours before you arrive. These are liquids you can see through, like water, Gatorade, and Propel Water. They also include plain black coffee and tea (no cream or milk), candy, and breath mints. You can spit out gum when you arrive.  If you drink alcohol: Stop drinking it the night before surgery.  If your care team tells you to take medicine on the morning of surgery, it's okay to take it with a sip of water.  Preventing infection  Shower or bathe the night before and morning of your surgery. Follow the instructions your clinic gave you. (If no instructions, use regular soap.)  Don't shave or clip hair near your surgery site. We'll remove the hair if needed.  Don't smoke or vape the morning of surgery. You may chew nicotine gum up to 2 hours before surgery. A nicotine patch is okay.  Note: Some surgeries require you to completely quit smoking and nicotine. Check with your surgeon.  Your care team will make every effort to keep you safe from infection. We will:  Clean our hands often with soap and water (or an alcohol-based hand rub).  Clean the skin at your surgery site with a special soap that kills germs.  Give you a special gown to keep you warm. (Cold raises the risk of infection.)  Wear special hair covers, masks, gowns and gloves during surgery.  Give antibiotic medicine, if prescribed. Not all surgeries need antibiotics.  What to bring on the day of surgery  Photo ID and insurance card  Copy of your health care directive, if you have one  Glasses and hearing aids (bring cases)  You can't wear contacts  during surgery  Inhaler and eye drops, if you use them (tell us about these when you arrive)  CPAP machine or breathing device, if you use them  A few personal items, if spending the night  If you have . . .  A pacemaker, ICD (cardiac defibrillator) or other implant: Bring the ID card.  An implanted stimulator: Bring the remote control.  A legal guardian: Bring a copy of the certified (court-stamped) guardianship papers.  Please remove any jewelry, including body piercings. Leave jewelry and other valuables at home.  If you're going home the day of surgery  You must have a responsible adult drive you home. They should stay with you overnight as well.  If you don't have someone to stay with you, and you aren't safe to go home alone, we may keep you overnight. Insurance often won't pay for this.  After surgery  If it's hard to control your pain or you need more pain medicine, please call your surgeon's office.  Questions?   If you have any questions for your care team, list them here: _________________________________________________________________________________________________________________________________________________________________________ ____________________________________ ____________________________________ ____________________________________  For informational purposes only. Not to replace the advice of your health care provider. Copyright   2003, 2019 [facility]. All rights reserved. Clinically reviewed by Nilsa Mauricio MD. ZEALER [id number] - REV 12/22.

## 2024-09-20 NOTE — PROGRESS NOTES
Preoperative Evaluation  [address] [address]  Phone: [phone]  Primary Provider: Jamison Lora MD  Pre-op Performing Provider: STEFFEN Gan CNP  Sep 20, 2024         9/20/2024   Surgical Information   What procedure is being done? hemmorhoidectomy   Facility or Hospital where procedure/surgery will be performed: [facility]   Who is doing the procedure / surgery? dr babatunde ga   Date of surgery / procedure: 9/26/24   Time of surgery / procedure: 12pm   Where do you plan to recover after surgery? at home with family      Fax number for surgical facility: [phone] and [phone]    Assessment & Plan     The proposed surgical procedure is considered LOW risk.    Preop general physical exam  - Preoperative instructions and medication hold times reviewed with patient.  - Reviewed medical history and risk factors.  - Cleared for planned procedure.    Internal hemorrhoids which bleed  - Cleared for planned procedure.    Essential hypertension  - Noted. BP in goal range today. Not on medication therapy.     AYALA (generalized anxiety disorder)  - Noted. Uses alprazolam PRN.    Attention deficit hyperactivity disorder (ADHD), combined type  - Noted. Hold stimulant the day of surgery.      - No identified additional risk factors other than previously addressed    Antiplatelet or Anticoagulation Medication Instructions   - Patient is on no antiplatelet or anticoagulation medications.    Additional Medication Instructions  Take all scheduled medications on the day of surgery EXCEPT for modifications listed below:   - Benzodiazepines: Continue without modification.   - Psychostimulants: Hold the day of surgery   - cannabis, marijuana: DO NOT TAKE night before surgery.   - cetirizine and first generation antihistamines: DO NOT TAKE on day of surgery.   - LABA, inhaled corticosteroid, long-acting  anticholinergics: Continue without modification.   - rescue Inhaler: Continue PRN. Bring to hospital on the day of surgery.   - Topicals: DO NOT TAKE day of surgery.    Recommendation  Approval given to proceed with proposed procedure, without further diagnostic evaluation.    Tammy Varghese is a 34 year old, presenting for the following:  Pre-Op Exam    HPI related to upcoming procedure: Longstanding history of hemorrhoids, worsening over the past few years. History of multiple banding surgeries. Currently has bleeding multiple times per week, at times lots of blood.         9/20/2024   Pre-Op Questionnaire   Have you ever had a heart attack or stroke? No   Have you ever had surgery on your heart or blood vessels, such as a stent placement, a coronary artery bypass, or surgery on an artery in your head, neck, heart, or legs? No   Do you have chest pain with activity? No   Do you have a history of heart failure? No   Do you currently have a cold, bronchitis or symptoms of other infection? No   Do you have a cough, shortness of breath, or wheezing? No   Do you or anyone in your family have previous history of blood clots?  No personal history, family history unknown   Do you or does anyone in your family have a serious bleeding problem such as prolonged bleeding following surgeries or cuts? No personal history, family history unknown   Have you ever had problems with anemia or been told to take iron pills? No   Have you had any abnormal blood loss such as black, tarry or bloody stools? No   Have you ever had a blood transfusion? No   Are you willing to have a blood transfusion if it is medically needed before, during, or after your surgery? Yes   Have you or any of your relatives ever had problems with anesthesia? No   Do you have sleep apnea, excessive snoring or daytime drowsiness? No   Do you have any artifical heart valves or other implanted medical devices like a pacemaker, defibrillator, or continuous  glucose monitor? No   Do you have artificial joints? No   Are you allergic to latex? No      Health Care Directive  Patient does not have a Health Care Directive or Living Will: Discussed advance care planning with patient; however, patient declined at this time.    Preoperative Review of    reviewed - controlled substances reflected in medication list.      Patient Active Problem List    Diagnosis Date Noted    Former smoker 03/12/2021     Priority: Medium    Lumbosacral radiculopathy 03/12/2018     Priority: Medium    Dyslipidemia 03/12/2018     Priority: Medium    Elevated serum creatinine 03/12/2018     Priority: Medium    Mild persistent asthma without complication 07/31/2017     Priority: Medium    Drug-induced erectile dysfunction 11/07/2016     Priority: Medium    OCD (obsessive compulsive disorder) 09/16/2016     Priority: Medium    AYALA (generalized anxiety disorder) 12/04/2015     Priority: Medium     Patient is followed by MICHAEL OROPEZA for ongoing prescription of benzodiazepines.  All refills should be approved by this provider, or covering partner.    Medication(s): alprazolam 2 mg .   Maximum quantity per month: 36  Clinic visit frequency required: Q 6  months     Controlled substance agreement on file: No  Benzodiazepine use reviewed by psychiatry:  No    Last [facility] website verification:  none   [email or web address]          Attention deficit hyperactivity disorder (ADHD), combined type 10/14/2015     Priority: Medium     Patient is followed by MICHAEL OROPEZA for ongoing prescription of stimulants.  All refills should be approved by this provider, or covering partner.    Medication(s): adderall 20 mg.   Maximum quantity per month: 90  Clinic visit frequency required: Q 6  months     Controlled substance agreement on file: 07/31/17    Neuropsych evaluation for ADD completed:  Yes, completed 7-2008 at [facility], on file and diagnosis confirmed    Last [facility] website verification:   3/14/16, no concerns   [email or web address]          Obesity, Class I, BMI 30-34.9 02/20/2015     Priority: Medium    Internal hemorrhoids which bleed 10/02/2014     Priority: Medium    Essential hypertension 08/17/2014     Priority: Medium    Low back pain 04/07/2013     Priority: Medium    High risk sexual behavior 11/06/2011     Priority: Medium    CARDIOVASCULAR SCREENING; LDL GOAL LESS THAN 160 05/24/2011     Priority: Medium    Anxiety      Priority: Medium      Past Medical History:   Diagnosis Date    Acute right otitis media 1/8/2013    ADHD (attention deficit hyperactivity disorder), combined type     Anxiety     Depression     Drug abuse (H)     Dyslipidemia     Gonococcal urethritis 02/05/2016    Hypertension     Internal hemorrhoids     which bleed    Lumbosacral radiculopathy     Obese     OCD (obsessive compulsive disorder)     Other chronic pain     Low Back Pain    Uncomplicated asthma     Urethritis 5/20/2013     Problem list name updated by automated process. Provider to review     Past Surgical History:   Procedure Laterality Date    BACK SURGERY  04/05/2018    COLONOSCOPY      ESOPHAGOSCOPY, GASTROSCOPY, DUODENOSCOPY (EGD), COMBINED N/A 8/16/2021    Procedure: ESOPHAGOGASTRODUODENOSCOPY, WITH BIOPSY AND POLYPECTOMY;  Surgeon: Phillip Joyner MD;  Location: [facility]    HEMILAMINECTOMY, DISCECTOMY LUMBAR TWO LEVELS, COMBINED Right 10/28/2021    Procedure: RIGHT LUMBAR L4-5 MINIMALLY INVASIVE MICRODISCECTOMY;  Surgeon: Troy Wharton MD;  Location:  OR    HEMILAMINECTOMY, DISCECTOMY LUMBAR TWO LEVELS, COMBINED Left 10/28/2021    Procedure: LEFT LUMBAR L5-S1 MINIMALLY INVASIVE MICRODISCECTOMY;  Surgeon: Troy Wharton MD;  Location:  OR     Current Outpatient Medications   Medication Sig Dispense Refill    albuterol (PROAIR HFA/PROVENTIL HFA/VENTOLIN HFA) 108 (90 Base) MCG/ACT inhaler Inhale 2 puffs into the lungs every 4 hours as needed for wheezing 18 g 1     amphetamine-dextroamphetamine (ADDERALL) 20 MG tablet Take 1 tablet (20 mg) by mouth 2 times daily 60 tablet 0    cetirizine (ZYRTEC) 10 MG tablet Take 1 tablet (10 mg) by mouth daily as needed for allergies 90 tablet 3    famotidine (PEPCID) 40 MG tablet TAKE 1 TABLET(40 MG) BY MOUTH AT BEDTIME 90 tablet 3    fluticasone (FLOVENT HFA) 220 MCG/ACT inhaler Inhale 2 puffs into the lungs 2 times daily Please give patient spacer 12 g 1    PROTOPIC 0.1 % external ointment APPLY TWICE DAILY AS NEEDED FOR RASH ON PENIS. Strength: 0.1 % 60 g 3    tacrolimus (PROTOPIC) 0.03 % external ointment Apply topically 2 times daily Put on skin lesions 2x daily 60 g 1    tiZANidine (ZANAFLEX) 4 MG tablet Take 1 tablet (4 mg) by mouth 3 times daily as needed for muscle spasms 30 tablet 0    triamcinolone (KENALOG) 0.1 % external ointment Apply twice daily for 2 days after intercourse 30 g 0    ALPRAZolam (XANAX) 2 MG tablet Take 1 tablet (2 mg) by mouth 3 times daily as needed for anxiety 30 tablet 0    pimecrolimus (ELIDEL) 1 % external cream Apply topically 2 times daily 30 g 11       Allergies   Allergen Reactions    Duloxetine Other (See Comments) and Fatigue     enlarged spleen and weight gain    Duloxetine Hcl      Weight gain and fatigue    Paroxetine Other (See Comments), Fatigue and GI Disturbance     Weight gain, Spleen issues    Seasonal Allergies     Vicodin [Hydrocodone-Acetaminophen]       Social History     Tobacco Use    Smoking status: Former     Current packs/day: 0.00     Average packs/day: 0.5 packs/day for 6.0 years (3.0 ttl pk-yrs)     Types: Cigarettes     Start date: 3/12/2012     Quit date: 3/12/2018     Years since quittin.5     Passive exposure: Never    Smokeless tobacco: Never    Tobacco comments:     second hand smoke exposure   Substance Use Topics    Alcohol use: Yes     Comment: once a week     History   Drug Use    Types: Marijuana     Comment: pot once a month, ectasy  As of 2016 he only  smokes pot occasionally       Review of Systems  CONSTITUTIONAL: NEGATIVE for fever, chills, change in weight  INTEGUMENTARY/SKIN: NEGATIVE for worrisome rashes, moles or lesions  EYES: NEGATIVE for vision changes or irritation  ENT/MOUTH: NEGATIVE for ear, mouth and throat problems  RESP: NEGATIVE for significant cough or SOB  CV: NEGATIVE for chest pain, palpitations or peripheral edema  GI: POSITIVE for hemorrhoids and hematochezia   male :positive for and frequency  MUSCULOSKELETAL: NEGATIVE for significant arthralgias or myalgia  NEURO: NEGATIVE for weakness, dizziness or paresthesias  HEME: NEGATIVE for bleeding problems  PSYCHIATRIC: NEGATIVE for changes in mood or affect    Objective    /84 (BP Location: Left arm, Patient Position: Chair, Cuff Size: Adult Large)   Pulse 85   Temp 97.5  F (36.4  C) (Temporal)   Resp 16   Ht 1.829 m (6')   Wt 104.3 kg (230 lb)   SpO2 98%   BMI 31.19 kg/m     Estimated body mass index is 31.19 kg/m  as calculated from the following:    Height as of this encounter: 1.829 m (6').    Weight as of this encounter: 104.3 kg (230 lb).    Physical Exam  GENERAL: alert and no distress  EYES: Eyes grossly normal to inspection, PERRL and conjunctivae and sclerae normal  HENT: ear canals and TM's normal, nose and mouth without ulcers or lesions  NECK: no adenopathy, no asymmetry, masses, or scars  RESP: lungs clear to auscultation - no rales, rhonchi or wheezes  CV: regular rate and rhythm, normal S1 S2, no S3 or S4, no murmur, click or rub, no peripheral edema  ABDOMEN: soft, nontender, no hepatosplenomegaly, no masses and bowel sounds normal  MS: no gross musculoskeletal defects noted, no edema  SKIN: no suspicious lesions or rashes  NEURO: Normal strength and tone, mentation intact and speech normal  PSYCH: mentation appears normal, affect normal/bright    Recent Labs   Lab Test 01/19/24  1035      POTASSIUM 4.4   CR 1.20*        Diagnostics  No labs were ordered  during this visit.   No EKG required, no history of coronary heart disease, significant arrhythmia, peripheral arterial disease or other structural heart disease.    Revised Cardiac Risk Index (RCRI)  The patient has the following serious cardiovascular risks for perioperative complications:   - No serious cardiac risks = 0 points     RCRI Interpretation: 0 points: Class I (very low risk - 0.4% complication rate)       Signed Electronically by: STEFFEN Gan CNP  A copy of this evaluation report is provided to the requesting physician.

## 2024-09-20 NOTE — PROGRESS NOTES
Printed pre op visit notes and faxed to [facility], [phone], right fax confirmed at 1:38 pm today, 9/20/2024.  And [facility], [phone], right fax confirmed at 1:44 pm today,   Placed in writer's copy basket.  [facility]   Primary Care

## 2024-09-25 DIAGNOSIS — J45.20 INTERMITTENT ASTHMA, UNCOMPLICATED: ICD-10-CM

## 2024-09-25 DIAGNOSIS — R06.2 WHEEZING: ICD-10-CM

## 2024-09-25 RX ORDER — ALBUTEROL SULFATE 90 UG/1
AEROSOL, METERED RESPIRATORY (INHALATION)
Qty: 18 G | Refills: 0 | Status: SHIPPED | OUTPATIENT
Start: 2024-09-25

## 2024-09-26 PROCEDURE — 88304 TISSUE EXAM BY PATHOLOGIST: CPT | Mod: TC,ORL | Performed by: COLON & RECTAL SURGERY

## 2024-09-27 ENCOUNTER — LAB REQUISITION (OUTPATIENT)
Dept: LAB | Facility: CLINIC | Age: 34
End: 2024-09-27
Payer: COMMERCIAL

## 2024-09-30 LAB
PATH REPORT.COMMENTS IMP SPEC: NORMAL
PATH REPORT.COMMENTS IMP SPEC: NORMAL
PATH REPORT.FINAL DX SPEC: NORMAL
PATH REPORT.GROSS SPEC: NORMAL
PATH REPORT.MICROSCOPIC SPEC OTHER STN: NORMAL
PATH REPORT.RELEVANT HX SPEC: NORMAL
PHOTO IMAGE: NORMAL

## 2024-09-30 PROCEDURE — 88304 TISSUE EXAM BY PATHOLOGIST: CPT | Mod: 26 | Performed by: PATHOLOGY

## 2024-11-08 ENCOUNTER — OFFICE VISIT (OUTPATIENT)
Dept: UROLOGY | Facility: CLINIC | Age: 34
End: 2024-11-08
Payer: COMMERCIAL

## 2024-11-08 VITALS
BODY MASS INDEX: 30.2 KG/M2 | HEIGHT: 72 IN | DIASTOLIC BLOOD PRESSURE: 94 MMHG | OXYGEN SATURATION: 100 % | SYSTOLIC BLOOD PRESSURE: 135 MMHG | WEIGHT: 223 LBS | HEART RATE: 63 BPM

## 2024-11-08 DIAGNOSIS — R33.9 URINARY RETENTION: Primary | ICD-10-CM

## 2024-11-08 LAB — RESIDUAL VOLUME (RV) (EXTERNAL): NORMAL

## 2024-11-08 PROCEDURE — 99204 OFFICE O/P NEW MOD 45 MIN: CPT | Mod: 25 | Performed by: PHYSICIAN ASSISTANT

## 2024-11-08 PROCEDURE — 81003 URINALYSIS AUTO W/O SCOPE: CPT | Mod: QW | Performed by: PHYSICIAN ASSISTANT

## 2024-11-08 PROCEDURE — 51798 US URINE CAPACITY MEASURE: CPT | Performed by: PHYSICIAN ASSISTANT

## 2024-11-08 ASSESSMENT — PAIN SCALES - GENERAL: PAINLEVEL_OUTOF10: NO PAIN (0)

## 2024-11-08 NOTE — NURSING NOTE
Chief Complaint   Patient presents with    Urinary Retention    Pvr is 31ml done with bladder scan   Urgency. to urinate 2-3 times a night to urinate too   Post dribble   Stop and starting and also  pushing   Bedgil Hutchins CMA

## 2024-11-08 NOTE — PATIENT INSTRUCTIONS
Please see one of the dedicated pelvic floor physical therapists ([facility] [phone]). James in [address] [place] in [place]    Please be aware that coverage of these services is subject to the terms and limitations of your health insurance plan.  Call member services at your health plan with any benefit or coverage questions.      Please bring the following to your appointment:    *Your personal calendar for scheduling future appointments  *Comfortable clothing  ___________________________________________        CYSTOSCOPY    What is a Cystoscopy?  This is a procedure done to check for problems inside the bladder/prostate.  Problems may include polyps (growths), tumors, inflammation (swelling and redness), obstruction and other concerns.    The Urologist inserts a thin tube (called a cystoscope) into the bladder.  The tube is about the size of a pencil.  We will give you numbing medicine to reduce the pain or discomfort you may feel.    The Urologist will be able to see inside the bladder by filling the bladder with water.  The water makes it easier to see any problems that may be present. You will have a sense to need to urinate and this is normal.       How should I get ready for the exam?  Nothing to do to prepare. You may eat normally the day of the exam. There is no sedation, so you may drive yourself to and from if you can drive.       Please tell your doctor if:  You have a history of urinary tract infections.  You know that you have a tumor in your bladder.  You have bleeding problems.  You have any allergies.  You are or may be pregnant.      What happens after the exam?  You may go back to your normal diet and activity as you feel ready.    For the next two days after the exam, you may notice:  Some blood in your urine.  Some burning when you urinate (use the toilet).  An urge to urinate more often.  Bladder spasms.    These are normal after the  procedure. They should go away on their own after a day or two.      You can help to relieve the above listed symptoms by:  Drinking 6 to 8 large glasses of water each day (includes drinks at meals).  This will help clear the urine.  Take warm baths to relieve pain and bladder spasms.  Do not add anything to the bath water.  You may take Tylenol (acetaminophen) per label instructions for discomfort.     ___________________________________

## 2024-11-08 NOTE — LETTER
11/8/2024       RE: Abimael Martinez  [address]  [place] [address]     Dear Colleague,    Thank you for referring your patient, Abimael Martinez, to the [facility] at [facility]. Please see a copy of my visit note below.    CC: LUTS    HPI:  Abimael Martinez is a pleasant 34 year old male who presents in consultation via self referral for evaluation of the above.  Noting more urgency, straining to urinate, slow stream, intermittent stream, nocturia x 1.     Bowels regular. Clenches his teeth (has botox in his jaw).   Cr 1.2 in Jan 2024. Hx of spine surgery and back pain. Also with hx of ADHD, anxiety, drug abuse (narcs), depression, OCD.   Hx of gonococcal urethritis.     Past Medical History:   Diagnosis Date     Acute right otitis media 1/8/2013     ADHD (attention deficit hyperactivity disorder), combined type      Anxiety      Depression      Drug abuse (H)      Dyslipidemia      Gonococcal urethritis 02/05/2016     Hypertension      Internal hemorrhoids     which bleed     Lumbosacral radiculopathy      Obese      OCD (obsessive compulsive disorder)      Other chronic pain     Low Back Pain     Uncomplicated asthma      Urethritis 5/20/2013     Problem list name updated by automated process. Provider to review       Past Surgical History:   Procedure Laterality Date     BACK SURGERY  04/05/2018     COLONOSCOPY       ESOPHAGOSCOPY, GASTROSCOPY, DUODENOSCOPY (EGD), COMBINED N/A 8/16/2021    Procedure: ESOPHAGOGASTRODUODENOSCOPY, WITH BIOPSY AND POLYPECTOMY;  Surgeon: Phillip Joyner MD;  Location: [facility] OR     HEMILAMINECTOMY, DISCECTOMY LUMBAR TWO LEVELS, COMBINED Right 10/28/2021    Procedure: RIGHT LUMBAR L4-5 MINIMALLY INVASIVE MICRODISCECTOMY;  Surgeon: Troy Wharton MD;  Location: [facility] OR     HEMILAMINECTOMY, DISCECTOMY LUMBAR TWO LEVELS, COMBINED Left 10/28/2021    Procedure:  Memo Lew is a 71 year old male with history of essential hypertension, sarcoidosis, and crohn's disease who presented to ED with left elbow open fracture following a mechanical fall. He emergently went surgery for I&D, elbow ORIF, and ulnar nerve decompression performed by Dr. Clark. Surgery without acute complication. Hospital medicine has been consulted for admission. Patient is awake, but groggy. Complains of left arm heaviness postoperatively. Currently on supplemental oxygen. Oxygen saturation is stable.      LEFT LUMBAR L5-S1 MINIMALLY INVASIVE MICRODISCECTOMY;  Surgeon: Troy Wharton MD;  Location:  OR       Social History     Socioeconomic History     Marital status: Single     Spouse name: Not on file     Number of children: Not on file     Years of education: Not on file     Highest education level: Not on file   Occupational History     Not on file   Tobacco Use     Smoking status: Former     Current packs/day: 0.00     Average packs/day: 0.5 packs/day for 6.0 years (3.0 ttl pk-yrs)     Types: Cigarettes     Start date: 3/12/2012     Quit date: 3/12/2018     Years since quittin.6     Passive exposure: Never     Smokeless tobacco: Never     Tobacco comments:     second hand smoke exposure   Vaping Use     Vaping status: Some Days     Substances: THC, CBD     Devices: Disposable, Pre-filled or refillable cartridge   Substance and Sexual Activity     Alcohol use: Yes     Comment: once a week     Drug use: Yes     Types: Marijuana     Comment: pot once a month, ectasy  As of 2016 he only smokes pot occasionally     Sexual activity: Not Currently     Partners: Female     Birth control/protection: Abstinence, Pull-out method, Condom   Other Topics Concern     Parent/sibling w/ CABG, MI or angioplasty before 65F 55M? No   Social History Narrative     Not on file     Social Drivers of Health     Financial Resource Strain: Low Risk  (10/5/2023)    Financial Resource Strain      Within the past 12 months, have you or your family members you live with been unable to get utilities (heat, electricity) when it was really needed?: No   Food Insecurity: Low Risk  (10/5/2023)    Food Insecurity      Within the past 12 months, did you worry that your food would run out before you got money to buy more?: No      Within the past 12 months, did the food you bought just not last and you didn t have money to get more?: No   Transportation Needs: Low Risk  (10/5/2023)    Transportation Needs      Within the past 12  months, has lack of transportation kept you from medical appointments, getting your medicines, non-medical meetings or appointments, work, or from getting things that you need?: No   Physical Activity: Not on file   Stress: Not on file   Social Connections: Unknown (1/1/2022)    Received from [facility]    Social Connections      Frequency of Communication with Friends and Family: Not on file   Interpersonal Safety: Low Risk  (9/20/2024)    Interpersonal Safety      Do you feel physically and emotionally safe where you currently live?: Yes      Within the past 12 months, have you been hit, slapped, kicked or otherwise physically hurt by someone?: No      Within the past 12 months, have you been humiliated or emotionally abused in other ways by your partner or ex-partner?: No   Housing Stability: Low Risk  (10/5/2023)    Housing Stability      Do you have housing? : Yes      Are you worried about losing your housing?: No       Family History   Problem Relation Age of Onset     Unknown/Adopted Mother      Unknown/Adopted Father      Unknown/Adopted Maternal Grandmother      Unknown/Adopted Maternal Grandfather      Unknown/Adopted Paternal Grandmother      Unknown/Adopted Paternal Grandfather      Unknown/Adopted Brother        Allergies   Allergen Reactions     Duloxetine Other (See Comments) and Fatigue     enlarged spleen and weight gain     Duloxetine Hcl      Weight gain and fatigue     Paroxetine Other (See Comments), Fatigue and GI Disturbance     Weight gain, Spleen issues     Seasonal Allergies      Vicodin [Hydrocodone-Acetaminophen]        Current Outpatient Medications   Medication Sig Dispense Refill     ALPRAZolam (XANAX) 2 MG tablet Take 1 tablet (2 mg) by mouth 3 times daily as needed for anxiety 30 tablet 0     amphetamine-dextroamphetamine (ADDERALL) 20 MG tablet Take 1 tablet (20 mg) by mouth 2 times daily 60 tablet 0      cetirizine (ZYRTEC) 10 MG tablet Take 1 tablet (10 mg) by mouth daily as needed for allergies 90 tablet 3     famotidine (PEPCID) 40 MG tablet TAKE 1 TABLET(40 MG) BY MOUTH AT BEDTIME 90 tablet 3     fluticasone (FLOVENT HFA) 220 MCG/ACT inhaler Inhale 2 puffs into the lungs 2 times daily Please give patient spacer 12 g 1     pimecrolimus (ELIDEL) 1 % external cream Apply topically 2 times daily 30 g 11     PROTOPIC 0.1 % external ointment APPLY TWICE DAILY AS NEEDED FOR RASH ON PENIS. Strength: 0.1 % 60 g 3     tacrolimus (PROTOPIC) 0.03 % external ointment Apply topically 2 times daily Put on skin lesions 2x daily 60 g 1     tiZANidine (ZANAFLEX) 4 MG tablet Take 1 tablet (4 mg) by mouth 3 times daily as needed for muscle spasms 30 tablet 0     triamcinolone (KENALOG) 0.1 % external ointment Apply twice daily for 2 days after intercourse 30 g 0     VENTOLIN  (90 Base) MCG/ACT inhaler INHALE ONE PUFF BY MOUTH EVERY FOUR HOURS AS NEEDED 18 g 0     No current facility-administered medications for this visit.         PEx:   There were no vitals taken for this visit.    PSYCH: NAD  EYES: EOMI  MOUTH: MMM  NEURO: AAO x3    Urine: neg  PVR 33cc      A/P: Abimael TAVERAS Michelle is a 34 year old male with hx of gonococcal urethritis with LUTS.   -PFPT eval  -Cysto to eval for urethral stricture with Dr. Main.   -Consider UDS if not improved.     Jessica Hung PA-C  [facility]        30 minutes spent on the date of the encounter doing chart review, review of outside records, review of test results, interpretation of tests, patient visit and documentation                 Again, thank you for allowing me to participate in the care of your patient.      Sincerely,    Jessica Hung PA-C, MIGUEL

## 2024-11-08 NOTE — PROGRESS NOTES
CC: LUTS    HPI:  Abimael Martinez is a pleasant 34 year old male who presents in consultation via self referral for evaluation of the above.  Noting more urgency, straining to urinate, slow stream, intermittent stream, nocturia x 1.     Bowels regular. Clenches his teeth (has botox in his jaw).   Cr 1.2 in Jan 2024. Hx of spine surgery and back pain. Also with hx of ADHD, anxiety, drug abuse (narcs), depression, OCD.   Hx of gonococcal urethritis.     Past Medical History:   Diagnosis Date    Acute right otitis media 1/8/2013    ADHD (attention deficit hyperactivity disorder), combined type     Anxiety     Depression     Drug abuse (H)     Dyslipidemia     Gonococcal urethritis 02/05/2016    Hypertension     Internal hemorrhoids     which bleed    Lumbosacral radiculopathy     Obese     OCD (obsessive compulsive disorder)     Other chronic pain     Low Back Pain    Uncomplicated asthma     Urethritis 5/20/2013     Problem list name updated by automated process. Provider to review       Past Surgical History:   Procedure Laterality Date    BACK SURGERY  04/05/2018    COLONOSCOPY      ESOPHAGOSCOPY, GASTROSCOPY, DUODENOSCOPY (EGD), COMBINED N/A 8/16/2021    Procedure: ESOPHAGOGASTRODUODENOSCOPY, WITH BIOPSY AND POLYPECTOMY;  Surgeon: Phillip Joyner MD;  Location: [facility] OR    HEMILAMINECTOMY, DISCECTOMY LUMBAR TWO LEVELS, COMBINED Right 10/28/2021    Procedure: RIGHT LUMBAR L4-5 MINIMALLY INVASIVE MICRODISCECTOMY;  Surgeon: Troy Wharton MD;  Location: [facility] OR    HEMILAMINECTOMY, DISCECTOMY LUMBAR TWO LEVELS, COMBINED Left 10/28/2021    Procedure: LEFT LUMBAR L5-S1 MINIMALLY INVASIVE MICRODISCECTOMY;  Surgeon: Troy Wharton MD;  Location:  OR       Social History     Socioeconomic History    Marital status: Single     Spouse name: Not on file    Number of children: Not on file    Years of education: Not on file    Highest education level: Not on file   Occupational History    Not  on file   Tobacco Use    Smoking status: Former     Current packs/day: 0.00     Average packs/day: 0.5 packs/day for 6.0 years (3.0 ttl pk-yrs)     Types: Cigarettes     Start date: 3/12/2012     Quit date: 3/12/2018     Years since quittin.6     Passive exposure: Never    Smokeless tobacco: Never    Tobacco comments:     second hand smoke exposure   Vaping Use    Vaping status: Some Days    Substances: THC, CBD    Devices: Disposable, Pre-filled or refillable cartridge   Substance and Sexual Activity    Alcohol use: Yes     Comment: once a week    Drug use: Yes     Types: Marijuana     Comment: pot once a month, ectasy  As of 2016 he only smokes pot occasionally    Sexual activity: Not Currently     Partners: Female     Birth control/protection: Abstinence, Pull-out method, Condom   Other Topics Concern    Parent/sibling w/ CABG, MI or angioplasty before 65F 55M? No   Social History Narrative    Not on file     Social Drivers of Health     Financial Resource Strain: Low Risk  (10/5/2023)    Financial Resource Strain     Within the past 12 months, have you or your family members you live with been unable to get utilities (heat, electricity) when it was really needed?: No   Food Insecurity: Low Risk  (10/5/2023)    Food Insecurity     Within the past 12 months, did you worry that your food would run out before you got money to buy more?: No     Within the past 12 months, did the food you bought just not last and you didn t have money to get more?: No   Transportation Needs: Low Risk  (10/5/2023)    Transportation Needs     Within the past 12 months, has lack of transportation kept you from medical appointments, getting your medicines, non-medical meetings or appointments, work, or from getting things that you need?: No   Physical Activity: Not on file   Stress: Not on file   Social Connections: Unknown (2022)    [facility]    Social Connections     Frequency of Communication with Friends and Family: Not on file   Interpersonal Safety: Low Risk  (9/20/2024)    Interpersonal Safety     Do you feel physically and emotionally safe where you currently live?: Yes     Within the past 12 months, have you been hit, slapped, kicked or otherwise physically hurt by someone?: No     Within the past 12 months, have you been humiliated or emotionally abused in other ways by your partner or ex-partner?: No   Housing Stability: Low Risk  (10/5/2023)    Housing Stability     Do you have housing? : Yes     Are you worried about losing your housing?: No       Family History   Problem Relation Age of Onset    Unknown/Adopted Mother     Unknown/Adopted Father     Unknown/Adopted Maternal Grandmother     Unknown/Adopted Maternal Grandfather     Unknown/Adopted Paternal Grandmother     Unknown/Adopted Paternal Grandfather     Unknown/Adopted Brother        Allergies   Allergen Reactions    Duloxetine Other (See Comments) and Fatigue     enlarged spleen and weight gain    Duloxetine Hcl      Weight gain and fatigue    Paroxetine Other (See Comments), Fatigue and GI Disturbance     Weight gain, Spleen issues    Seasonal Allergies     Vicodin [Hydrocodone-Acetaminophen]        Current Outpatient Medications   Medication Sig Dispense Refill    ALPRAZolam (XANAX) 2 MG tablet Take 1 tablet (2 mg) by mouth 3 times daily as needed for anxiety 30 tablet 0    amphetamine-dextroamphetamine (ADDERALL) 20 MG tablet Take 1 tablet (20 mg) by mouth 2 times daily 60 tablet 0    cetirizine (ZYRTEC) 10 MG tablet Take 1 tablet (10 mg) by mouth daily as needed for allergies 90 tablet 3    famotidine (PEPCID) 40 MG tablet TAKE 1 TABLET(40 MG) BY MOUTH AT BEDTIME 90 tablet 3    fluticasone (FLOVENT HFA) 220 MCG/ACT inhaler Inhale 2 puffs into the lungs 2 times daily Please give patient spacer 12 g 1    pimecrolimus (ELIDEL) 1 % external cream Apply topically 2 times  daily 30 g 11    PROTOPIC 0.1 % external ointment APPLY TWICE DAILY AS NEEDED FOR RASH ON PENIS. Strength: 0.1 % 60 g 3    tacrolimus (PROTOPIC) 0.03 % external ointment Apply topically 2 times daily Put on skin lesions 2x daily 60 g 1    tiZANidine (ZANAFLEX) 4 MG tablet Take 1 tablet (4 mg) by mouth 3 times daily as needed for muscle spasms 30 tablet 0    triamcinolone (KENALOG) 0.1 % external ointment Apply twice daily for 2 days after intercourse 30 g 0    VENTOLIN  (90 Base) MCG/ACT inhaler INHALE ONE PUFF BY MOUTH EVERY FOUR HOURS AS NEEDED 18 g 0     No current facility-administered medications for this visit.         PEx:   There were no vitals taken for this visit.    PSYCH: NAD  EYES: EOMI  MOUTH: MMM  NEURO: AAO x3    Urine: neg  PVR 33cc      A/P: Abimael Martinez is a 34 year old male with hx of gonococcal urethritis with LUTS.   -PFPT eval  -Cysto to eval for urethral stricture with Dr. Main.   -Consider UDS if not improved.     Jessica Hung PA-C  [facility]        30 minutes spent on the date of the encounter doing chart review, review of outside records, review of test results, interpretation of tests, patient visit and documentation

## 2024-11-11 LAB
ALBUMIN UR-MCNC: NEGATIVE MG/DL
APPEARANCE UR: CLEAR
BILIRUB UR QL STRIP: NEGATIVE
COLOR UR AUTO: YELLOW
GLUCOSE UR STRIP-MCNC: NEGATIVE MG/DL
HGB UR QL STRIP: NEGATIVE
KETONES UR STRIP-MCNC: NEGATIVE MG/DL
LEUKOCYTE ESTERASE UR QL STRIP: NEGATIVE
NITRATE UR QL: NEGATIVE
PH UR STRIP: 5.5 [PH] (ref 5–7)
SP GR UR STRIP: 1.01 (ref 1–1.03)
UROBILINOGEN UR STRIP-ACNC: 0.2 E.U./DL

## 2025-02-03 NOTE — TELEPHONE ENCOUNTER
Last Visit: 11/12/21    Next Visit: 12/3/21- 6 wk follow-up with Shazia Dunbar PA-C    Name of Provider:  Shazia Dunbar PA-C    Assessment: status post right L4-5 hemilaminectomy microdiscectomy and left L5-S1 hemilaminectomy microdiscectomy.  Issues with urinating. Continues to feel like he has urinary urgency (this is constant), feels like urine will drip out sensation, has had  slight wet spot in underwear every few days or so and the urgency feeling is daily. He denies any painful urination, fowl odor, or dark urine. He states he is well hydrated. Last 5 days urinary issue has ramped up. Denies any bowel concerns.     On black Friday went out shopping did khurram amount of walking. Reports left sided back pain that radiates down left leg to left knee became very painful and same pain as before surgery . He is maintaining his post op restrictions and did not do anything to exacerbate his pain. Reports Low sided right back pain that radiates to right hip but said left side is worse.  Patient feels like he may have re-herniated on the left and wants a new MRI. Feels like L4-5 on the left should have been done rather than L5-S1.     Recommendation given: Message routed to Shazia Dunbar PA-C to review and advise.                Yes from

## 2025-02-13 ENCOUNTER — OFFICE VISIT (OUTPATIENT)
Dept: FAMILY MEDICINE | Facility: CLINIC | Age: 35
End: 2025-02-13
Payer: COMMERCIAL

## 2025-02-13 VITALS
TEMPERATURE: 97.9 F | SYSTOLIC BLOOD PRESSURE: 129 MMHG | RESPIRATION RATE: 8 BRPM | HEART RATE: 76 BPM | WEIGHT: 226 LBS | HEIGHT: 72 IN | DIASTOLIC BLOOD PRESSURE: 89 MMHG | OXYGEN SATURATION: 98 % | BODY MASS INDEX: 30.61 KG/M2

## 2025-02-13 DIAGNOSIS — Z11.3 ROUTINE SCREENING FOR STI (SEXUALLY TRANSMITTED INFECTION): ICD-10-CM

## 2025-02-13 DIAGNOSIS — R30.0 DYSURIA: Primary | ICD-10-CM

## 2025-02-13 LAB
ALBUMIN UR-MCNC: NEGATIVE MG/DL
APPEARANCE UR: CLEAR
BACTERIA #/AREA URNS HPF: NORMAL /HPF
BILIRUB UR QL STRIP: NEGATIVE
C TRACH DNA SPEC QL PROBE+SIG AMP: NEGATIVE
C TRACH DNA SPEC QL PROBE+SIG AMP: NEGATIVE
COLOR UR AUTO: YELLOW
GLUCOSE UR STRIP-MCNC: NEGATIVE MG/DL
HGB UR QL STRIP: NEGATIVE
KETONES UR STRIP-MCNC: NEGATIVE MG/DL
LEUKOCYTE ESTERASE UR QL STRIP: NEGATIVE
N GONORRHOEA DNA SPEC QL NAA+PROBE: NEGATIVE
N GONORRHOEA DNA SPEC QL NAA+PROBE: NEGATIVE
NITRATE UR QL: NEGATIVE
PH UR STRIP: 6 [PH] (ref 5–7)
RBC #/AREA URNS AUTO: NORMAL /HPF
SP GR UR STRIP: 1.02 (ref 1–1.03)
SPECIMEN TYPE: NORMAL
SPECIMEN TYPE: NORMAL
T VAGINALIS DNA SPEC QL NAA+PROBE: NOT DETECTED
UROBILINOGEN UR STRIP-ACNC: 0.2 E.U./DL
WBC #/AREA URNS AUTO: NORMAL /HPF

## 2025-02-13 ASSESSMENT — PAIN SCALES - GENERAL: PAINLEVEL_OUTOF10: MILD PAIN (3)

## 2025-02-13 NOTE — PROGRESS NOTES
"  Assessment & Plan     Dysuria  - Patient reports pinching penile pain upon awakening today, some dysuria. Had intercourse with new partner last week. STI screening panel ordered, will rule out UTI.  - Trichomonas vaginalis by PCR (first-voided urine)  - Mycoplasma genitalium, Transcription Mediated Amplification (first-voided urine)  - Chlamydia & Gonorrhea by PCR, [facility] - Clinic Collect  - UA with Microscopic reflex to Culture - lab collect  - Mycoplasma genitalium, Transcription Mediated Amplification (first-voided urine)  - UA with Microscopic reflex to Culture - lab collect    Routine screening for STI (sexually transmitted infection)  - STI panel ordered. Consider repeat HIV test in 6 weeks.  - Trichomonas vaginalis by PCR (first-voided urine)  - Mycoplasma genitalium, Transcription Mediated Amplification (first-voided urine)  - Chlamydia & Gonorrhea by PCR, [facility] - Clinic Collect  - Chlamydia & Gonorrhea by PCR, [facility] - Clinic Collect  - HIV Antigen Antibody Combo  - Hepatitis C antibody  - Treponema Abs w Reflex to RPR and Titer    BMI  Estimated body mass index is 30.48 kg/m  as calculated from the following:    Height as of this encounter: 1.834 m (6' 0.21\").    Weight as of this encounter: 102.5 kg (226 lb).     Follow up with PCP due for physical and HTN.     Subjective   Orthodoxy is a 35 year old, presenting for the following health issues:  STD        2/13/2025     9:56 AM   Additional Questions   Roomed by eliz   Accompanied by self         2/13/2025     9:56 AM   Patient Reported Additional Medications   Patient reports taking the following new medications no     History of Present Illness       Reason for visit:  Std testing  Symptom onset:  1-3 days ago   He is taking medications regularly.     Std testing requested. Encounter with new partners one week ago, now with some dysuria and \"pinching\" penile pain. Woke up overnight with night sweats, sore throat. No fevers.     Review of " "Systems  Constitutional, HEENT, cardiovascular, pulmonary, gi and gu systems are negative, except as otherwise noted.      Objective    /89 (BP Location: Left arm, Patient Position: Sitting, Cuff Size: Adult Large)   Pulse 76   Temp 97.9  F (36.6  C) (Oral)   Resp (!) 8   Ht 1.834 m (6' 0.21\")   Wt 102.5 kg (226 lb)   SpO2 98%   BMI 30.48 kg/m    Body mass index is 30.48 kg/m .    Physical Exam   GENERAL: alert and no distress  NECK: no adenopathy, no asymmetry, masses, or scars  RESP: Normal work of breathing, no cough or audible wheezing  MS: no gross musculoskeletal defects noted, no edema  SKIN: no suspicious lesions or rashes  PSYCH: mentation appears normal, affect normal/bright    Signed Electronically by: STEFFEN Gan CNP    "

## 2025-02-13 NOTE — PATIENT INSTRUCTIONS
At [facility], we strive to deliver an exceptional experience to you, every time we see you. If you receive a survey, please let us know what we are doing well and/or what we could improve upon, as we do value your feedback.  If you have MyChart, you can expect to receive results automatically within 24 hours of their completion.  Your provider will send a note interpreting your results as well.   If you do not have MyChart, you should receive your results in about a week by mail.    Your care team:                            Family Medicine Internal Medicine   MD Sanjay García, MD Berta Perez, MD Kishore Holland, MD Karen Gallegos, PAJoshuaC    Jorge Zhong, MD Pediatrics   Tg Mathias, MD Jennifer Brody, MD Melva Elkins, APRN CNP Susie Fry APRN CNP   MD Ninoska Torres, MD Suzanna Parker, CNP     Collin Milligan, CNP Same-Day Provider (No follow-up visits)   STEFFEN Hill, DNP Ami Rosas, STEFFEN Driver, FNP, BC CHEO BaezC     Clinic hours: Monday - Thursday 7 am-6 pm; Fridays 7 am-5 pm.   Urgent care: Monday - Friday 10 am- 8 pm; Saturday and Sunday 9 am-5 pm.    Clinic: [phone]       [facility]: Monday - Thursday 8 am - 7 pm; Friday 8 am - 6 pm  [facility]: [phone]

## 2025-03-08 ENCOUNTER — HEALTH MAINTENANCE LETTER (OUTPATIENT)
Age: 35
End: 2025-03-08

## 2025-03-18 ENCOUNTER — LAB REQUISITION (OUTPATIENT)
Dept: LAB | Facility: CLINIC | Age: 35
End: 2025-03-18
Payer: COMMERCIAL

## 2025-03-18 DIAGNOSIS — Z00.00 ENCOUNTER FOR GENERAL ADULT MEDICAL EXAMINATION WITHOUT ABNORMAL FINDINGS: ICD-10-CM

## 2025-03-18 DIAGNOSIS — Z72.51 HIGH RISK HETEROSEXUAL BEHAVIOR: ICD-10-CM

## 2025-03-18 PROCEDURE — 80053 COMPREHEN METABOLIC PANEL: CPT | Mod: ORL | Performed by: FAMILY MEDICINE

## 2025-03-18 PROCEDURE — 80061 LIPID PANEL: CPT | Mod: ORL | Performed by: FAMILY MEDICINE

## 2025-03-18 PROCEDURE — 87389 HIV-1 AG W/HIV-1&-2 AB AG IA: CPT | Mod: ORL | Performed by: FAMILY MEDICINE

## 2025-03-19 LAB
CHOLEST SERPL-MCNC: 248 MG/DL
FASTING STATUS PATIENT QL REPORTED: ABNORMAL
HDLC SERPL-MCNC: 44 MG/DL
HIV 1+2 AB+HIV1 P24 AG SERPL QL IA: NONREACTIVE
LDLC SERPL CALC-MCNC: 166 MG/DL
NONHDLC SERPL-MCNC: 204 MG/DL
TRIGL SERPL-MCNC: 192 MG/DL

## 2025-03-21 LAB
ALBUMIN SERPL BCG-MCNC: 4.7 G/DL (ref 3.5–5.2)
ALP SERPL-CCNC: 66 U/L (ref 40–150)
ALT SERPL W P-5'-P-CCNC: 20 U/L (ref 0–70)
ANION GAP SERPL CALCULATED.3IONS-SCNC: 13 MMOL/L (ref 7–15)
AST SERPL W P-5'-P-CCNC: 19 U/L (ref 0–45)
BILIRUB SERPL-MCNC: 0.8 MG/DL
BUN SERPL-MCNC: 14 MG/DL (ref 6–20)
CALCIUM SERPL-MCNC: 10 MG/DL (ref 8.8–10.4)
CHLORIDE SERPL-SCNC: 102 MMOL/L (ref 98–107)
CREAT SERPL-MCNC: 1.16 MG/DL (ref 0.67–1.17)
EGFRCR SERPLBLD CKD-EPI 2021: 85 ML/MIN/1.73M2
FASTING STATUS PATIENT QL REPORTED: ABNORMAL
GLUCOSE SERPL-MCNC: 85 MG/DL (ref 70–99)
HCO3 SERPL-SCNC: 26 MMOL/L (ref 22–29)
POTASSIUM SERPL-SCNC: 4.1 MMOL/L (ref 3.4–5.3)
PROT SERPL-MCNC: 8.5 G/DL (ref 6.4–8.3)
SODIUM SERPL-SCNC: 141 MMOL/L (ref 135–145)

## 2025-05-20 NOTE — TELEPHONE ENCOUNTER
----- Message from Noel Nunez MD sent at 5/20/2025  9:38 AM CDT -----  Results have been discussed with patient.    Referral ordered for GYN oncology to eval and treat.   ----- Message -----  From: Lab, Background User  Sent: 5/15/2025   3:06 PM CDT  To: Noel Nunez MD     Patient is followed by MICHAEL OROPEZA for ongoing prescription of benzodiazepines.  All refills should be approved by this provider, or covering partner.      Last office visit 11/7/16  Last refill 5/17/17 #30      Medication(s): alprazolam 2 mg .    Controlled substance agreement on file: No  Benzodiazepine use reviewed by psychiatry:  No  Last [facility] website verification:  3/8/17    Overdue for an office visit

## 2025-07-10 ENCOUNTER — OFFICE VISIT (OUTPATIENT)
Dept: FAMILY MEDICINE | Facility: CLINIC | Age: 35
End: 2025-07-10
Payer: COMMERCIAL

## 2025-07-10 ENCOUNTER — NURSE TRIAGE (OUTPATIENT)
Dept: FAMILY MEDICINE | Facility: CLINIC | Age: 35
End: 2025-07-10

## 2025-07-10 VITALS
SYSTOLIC BLOOD PRESSURE: 129 MMHG | WEIGHT: 217.8 LBS | DIASTOLIC BLOOD PRESSURE: 87 MMHG | HEART RATE: 63 BPM | TEMPERATURE: 97.6 F | HEIGHT: 72 IN | BODY MASS INDEX: 29.5 KG/M2 | RESPIRATION RATE: 18 BRPM | OXYGEN SATURATION: 99 %

## 2025-07-10 DIAGNOSIS — M54.6 ACUTE LEFT-SIDED THORACIC BACK PAIN: Primary | ICD-10-CM

## 2025-07-10 DIAGNOSIS — R16.1 SPLENOMEGALY: ICD-10-CM

## 2025-07-10 LAB
ALBUMIN UR-MCNC: NEGATIVE MG/DL
APPEARANCE UR: CLEAR
BASOPHILS # BLD AUTO: 0 10E3/UL (ref 0–0.2)
BASOPHILS NFR BLD AUTO: 1 %
BILIRUB UR QL STRIP: NEGATIVE
COLOR UR AUTO: YELLOW
EOSINOPHIL # BLD AUTO: 0.3 10E3/UL (ref 0–0.7)
EOSINOPHIL NFR BLD AUTO: 6 %
ERYTHROCYTE [DISTWIDTH] IN BLOOD BY AUTOMATED COUNT: 12.2 % (ref 10–15)
GLUCOSE UR STRIP-MCNC: NEGATIVE MG/DL
HCT VFR BLD AUTO: 47 % (ref 40–53)
HGB BLD-MCNC: 15.6 G/DL (ref 13.3–17.7)
HGB UR QL STRIP: NEGATIVE
IMM GRANULOCYTES # BLD: 0 10E3/UL
IMM GRANULOCYTES NFR BLD: 0 %
KETONES UR STRIP-MCNC: NEGATIVE MG/DL
LEUKOCYTE ESTERASE UR QL STRIP: NEGATIVE
LYMPHOCYTES # BLD AUTO: 2.1 10E3/UL (ref 0.8–5.3)
LYMPHOCYTES NFR BLD AUTO: 41 %
MCH RBC QN AUTO: 28.1 PG (ref 26.5–33)
MCHC RBC AUTO-ENTMCNC: 33.2 G/DL (ref 31.5–36.5)
MCV RBC AUTO: 85 FL (ref 78–100)
MONOCYTES # BLD AUTO: 0.3 10E3/UL (ref 0–1.3)
MONOCYTES NFR BLD AUTO: 5 %
NEUTROPHILS # BLD AUTO: 2.4 10E3/UL (ref 1.6–8.3)
NEUTROPHILS NFR BLD AUTO: 47 %
NITRATE UR QL: NEGATIVE
PH UR STRIP: 6.5 [PH] (ref 5–7)
PLATELET # BLD AUTO: 251 10E3/UL (ref 150–450)
RBC # BLD AUTO: 5.55 10E6/UL (ref 4.4–5.9)
SP GR UR STRIP: 1.01 (ref 1–1.03)
UROBILINOGEN UR STRIP-ACNC: 0.2 E.U./DL
WBC # BLD AUTO: 5.1 10E3/UL (ref 4–11)

## 2025-07-10 ASSESSMENT — ASTHMA QUESTIONNAIRES
ACT_TOTALSCORE: 23
QUESTION_2 LAST FOUR WEEKS HOW OFTEN HAVE YOU HAD SHORTNESS OF BREATH: ONCE OR TWICE A WEEK
QUESTION_5 LAST FOUR WEEKS HOW WOULD YOU RATE YOUR ASTHMA CONTROL: WELL CONTROLLED
QUESTION_4 LAST FOUR WEEKS HOW OFTEN HAVE YOU USED YOUR RESCUE INHALER OR NEBULIZER MEDICATION (SUCH AS ALBUTEROL): NOT AT ALL
QUESTION_1 LAST FOUR WEEKS HOW MUCH OF THE TIME DID YOUR ASTHMA KEEP YOU FROM GETTING AS MUCH DONE AT WORK, SCHOOL OR AT HOME: NONE OF THE TIME
QUESTION_3 LAST FOUR WEEKS HOW OFTEN DID YOUR ASTHMA SYMPTOMS (WHEEZING, COUGHING, SHORTNESS OF BREATH, CHEST TIGHTNESS OR PAIN) WAKE YOU UP AT NIGHT OR EARLIER THAN USUAL IN THE MORNING: NOT AT ALL

## 2025-07-10 NOTE — TELEPHONE ENCOUNTER
Back pain that started today  Upper back left side  Rates pain at rest a 5-6/10  Dull pain   With any movement pain 8-9/10  Stabby sharp pain  When moves or breathes deep gets intense pain  Pain radiates into the left shoulder and under the rib cage  History of splenomegaly  With spleen pain tends to improve with time but this is not  Has been getting worse as the day goes on   With the severe and progressing pain advised that patient be seen today   Reviewed that they will likely want imaging (CT scan) which is best addressed in the ER  Patient would like to avoid the ER  Reviewed that they could go to  but based on assessment may advise ER.   Patient will plan to go to  now    Patient to call and ask to speak to triage nurse if develops any new/worsening symptoms. Patient verbalized understanding and agrees with plan.    Reason for Disposition   Patient sounds very sick or weak to the triager    Additional Information   Negative: Passed out (e.g., fainted, lost consciousness, blacked out and was not responding)   Negative: Shock suspected (e.g., cold/pale/clammy skin, too weak to stand, low BP, rapid pulse)   Negative: Sounds like a life-threatening emergency to the triager   Negative: Major injury to the back (e.g., MVA, fall > 10 feet or 3 meters, penetrating injury, etc.)   Negative: Pain in the upper back over the ribs (rib cage) that radiates (travels) into the chest   Negative: Pain in the upper back over the ribs (rib cage) and worsened by coughing (or clearly increases with breathing)   Negative: Back pain during pregnancy   Negative: SEVERE back pain of sudden onset and age > 60 years   Negative: SEVERE abdominal pain (e.g., excruciating)   Negative: Abdominal pain and age > 60 years   Negative: Unable to urinate (or only a few drops) and bladder feels very full   Negative: Loss of bladder or bowel control (urine or bowel incontinence; wetting self, leaking stool) of new-onset   Negative: Numbness  (loss of sensation) in groin or rectal area   Negative: Pain radiates into groin, scrotum   Negative: Blood in urine (red, pink, or tea-colored)   Negative: Vomiting and pain over lower ribs of back (i.e., flank - kidney area)   Negative: Weakness of a leg or foot (e.g., unable to bear weight, dragging foot)    Protocols used: Back Pain-A-[place]    Livia Salinas RN  [facility]

## 2025-07-10 NOTE — LETTER
7/10/2025    Yazidismtere Martinze   1990        To Whom it May Concern;    Please excuse Yazidism G Martinez from work 7/10/25-25 due to an illness.   Sincerely,        Janeth Vazquez PA-C

## 2025-07-10 NOTE — PROGRESS NOTES
"  Assessment & Plan     Acute left-sided thoracic back pain  Discussed likely MSK injury to patient. Can continue over the counter treatments and muscle relaxer he has at home.   Differential included PE-no risk factors, kidney stone, normal urine, spleen complications, negative CBC. Normal vitals.   Patient instructed to go to ED if symptoms do not improve or worsen.   - UA Macroscopic with reflex to Microscopic and Culture; Future  - CBC with Platelets & Differential; Future  - UA Macroscopic with reflex to Microscopic and Culture  - CBC with Platelets & Differential    Splenomegaly  Normal CBC    BMI  Estimated body mass index is 29.82 kg/m  as calculated from the following:    Height as of this encounter: 1.82 m (5' 11.65\").    Weight as of this encounter: 98.8 kg (217 lb 12.8 oz).           Tammy Varghese is a 35 year old, presenting for the following health issues:  Back Pain (Upper left side of his back )        7/10/2025     2:11 PM   Additional Questions   Roomed by Lianna HAYES     History of Present Illness       Reason for visit:  Pain in upper back near spleen. history of enlarged spleen.    He eats 2-3 servings of fruits and vegetables daily.He consumes 0 sweetened beverage(s) daily.He exercises with enough effort to increase his heart rate 30 to 60 minutes per day.  He exercises with enough effort to increase his heart rate 6 days per week.   He is taking medications regularly.          Concern - Back pain upper left side   Onset: 3 AM this morning  Description: back pain, has a history of enlarged spleen   Intensity: moderate, severe  Progression of Symptoms:  worsening  Accompanying Signs & Symptoms: no  Previous history of similar problem: yes history of enlarged spleen   Precipitating factors:        Worsened by: activity  Alleviating factors:        Improved by: sitting still sometimes other times it always hurts   Therapies tried and outcome:  none     Increased spleen included in pt " "provided HPI because pain is similar to when splenomegaly was discovered. Nurse line asked him to go to ED.  Deep breathe causes pain. Tylenol and ibuprofen did not help the pain. Couldn't work today. Leaning forward makes it worse as does bending neck forward. Has not taken his muscle relaxer because it can be sedating.No recent travel or long flights. No traumatic incidents. No bowel or urinary symptoms. Pt believes it is his spleen    ROS: 10 point ROS neg other than the symptoms noted above in the HPI.        Objective    /87 (BP Location: Left arm, Patient Position: Sitting, Cuff Size: Adult Large)   Pulse 63   Temp 97.6  F (36.4  C) (Temporal)   Resp 18   Ht 1.82 m (5' 11.65\")   Wt 98.8 kg (217 lb 12.8 oz)   SpO2 99%   BMI 29.82 kg/m    Body mass index is 29.82 kg/m .  Physical Exam   GENERAL: alert and no distress  RESP: lungs clear to auscultation - no rales, rhonchi or wheezes  CV: regular rate and rhythm, normal S1 S2, no S3 or S4, no murmur, click or rub, no peripheral edema  ABDOMEN: soft, nontender, no hepatomegaly, palpable spleen with deep palpation and bowel sounds normal, negative CVA tenderness.   MS: no gross musculoskeletal defects noted, no edema-Negative empty can, negative lift off, negative ken-pain radiates to the back when spleen was palpated on physical exam. Pain with flexion of the neck, pain with palpation of hte left thoracic paraspinal muscles.   SKIN: no suspicious lesions or rashes  NEURO: Normal strength and tone, mentation intact and speech normal, CN II-Xii grossly intact.   PSYCH: mentation appears normal, affect normal/bright        Results for orders placed or performed in visit on 07/10/25   UA Macroscopic with reflex to Microscopic and Culture     Status: Normal    Specimen: Urine, Midstream   Result Value Ref Range    Color Urine Yellow Colorless, Straw, Light Yellow, Yellow    Appearance Urine Clear Clear    Glucose Urine Negative Negative mg/dL    " Bilirubin Urine Negative Negative    Ketones Urine Negative Negative mg/dL    Specific Gravity Urine 1.015 1.003 - 1.035    Blood Urine Negative Negative    pH Urine 6.5 5.0 - 7.0    Protein Albumin Urine Negative Negative mg/dL    Urobilinogen Urine 0.2 0.2, 1.0 E.U./dL    Nitrite Urine Negative Negative    Leukocyte Esterase Urine Negative Negative    Narrative    Microscopic not indicated   CBC with platelets and differential     Status: None   Result Value Ref Range    WBC Count 5.1 4.0 - 11.0 10e3/uL    RBC Count 5.55 4.40 - 5.90 10e6/uL    Hemoglobin 15.6 13.3 - 17.7 g/dL    Hematocrit 47.0 40.0 - 53.0 %    MCV 85 78 - 100 fL    MCH 28.1 26.5 - 33.0 pg    MCHC 33.2 31.5 - 36.5 g/dL    RDW 12.2 10.0 - 15.0 %    Platelet Count 251 150 - 450 10e3/uL    % Neutrophils 47 %    % Lymphocytes 41 %    % Monocytes 5 %    % Eosinophils 6 %    % Basophils 1 %    % Immature Granulocytes 0 %    Absolute Neutrophils 2.4 1.6 - 8.3 10e3/uL    Absolute Lymphocytes 2.1 0.8 - 5.3 10e3/uL    Absolute Monocytes 0.3 0.0 - 1.3 10e3/uL    Absolute Eosinophils 0.3 0.0 - 0.7 10e3/uL    Absolute Basophils 0.0 0.0 - 0.2 10e3/uL    Absolute Immature Granulocytes 0.0 <=0.4 10e3/uL   CBC with Platelets & Differential     Status: None    Narrative    The following orders were created for panel order CBC with Platelets & Differential.  Procedure                               Abnormality         Status                     ---------                               -----------         ------                     CBC with platelets and ...[[id number]]                      Final result                 Please view results for these tests on the individual orders.         Signed Electronically by: Janeth Vazquez PA-C    The student ERIK Patiño acted as a scribe and the encounter documented above was completely performed by myself and the documentation reflects the work I have performed today.   Janeth Vazquez PA-C   The longitudinal plan of care  for the diagnosis(es)/condition(s) as documented were addressed during this visit. Due to the added complexity in care, I will continue to support Abimael in the subsequent management and with ongoing continuity of care.

## (undated) DEVICE — GLOVE PROTEXIS BLUE W/NEU-THERA 8.5  2D73EB85

## (undated) DEVICE — SUCTION MANIFOLD NEPTUNE 2 SYS 1 PORT 702-025-000

## (undated) DEVICE — ENDO BITE BLOCK ADULT OMNI-BLOC

## (undated) DEVICE — BLADE CLIPPER SGL USE 9680

## (undated) DEVICE — MANIFOLD NEPTUNE 4 PORT 700-20

## (undated) DEVICE — SOL WATER IRRIG 500ML BOTTLE 2F7113

## (undated) DEVICE — ENDO FORCEP BX CAPTURA PRO SPIKE G50696

## (undated) DEVICE — DRAPE IOBAN INCISE 23X17" 6650EZ

## (undated) DEVICE — SUCTION CATH AIRLIFE TRI-FLO W/CONTROL PORT 14FR  T60C

## (undated) DEVICE — DRAPE SHEET REV FOLD 3/4 9349

## (undated) DEVICE — GOWN XXL 9575

## (undated) DEVICE — TOOL DISSECT MIDAS MR8 12CM TELESC MATCH 2.5 MR8-T12MH25

## (undated) DEVICE — SU VICRYL 4-0 PS-2 18" UND J496H

## (undated) DEVICE — SOL WATER IRRIG 1000ML BOTTLE 2F7114

## (undated) DEVICE — BLADE KNIFE SURG 15 371115

## (undated) DEVICE — LINEN TOWEL PACK X5 5464

## (undated) DEVICE — GLOVE EXAM NITRILE LG PF LATEX FREE 5064

## (undated) DEVICE — GLOVE PROTEXIS W/NEU-THERA 8.5  2D73TE85

## (undated) DEVICE — POSITIONER PT PRONESAFE HEAD REST W/DERMAPROX INSERT 40599

## (undated) DEVICE — SPECIMEN CONTAINER 3OZ W/FORMALIN 59901

## (undated) DEVICE — TUBING SUCTION 12"X1/4" N612

## (undated) DEVICE — DRAPE MICROSCOPE LEICA 54X150" AR8033650

## (undated) DEVICE — GLOVE PROTEXIS BLUE W/NEU-THERA 8.0  2D73EB80

## (undated) DEVICE — SU VICRYL 3-0 RB-1 18" J713D

## (undated) DEVICE — CUP AND LID 2PK 2OZ STERILE  SSK9006A

## (undated) DEVICE — ESU GROUND PAD UNIVERSAL W/O CORD

## (undated) DEVICE — SYR 30ML SLIP TIP W/O NDL 302833

## (undated) DEVICE — KIT ENDO TURNOVER/PROCEDURE CARRY-ON 101822

## (undated) DEVICE — GOWN IMPERVIOUS 2XL BLUE

## (undated) DEVICE — ESU ELEC BLADE 6" COATED E1450-6

## (undated) DEVICE — PACK SPINE SM CUSTOM SNE15SSFSK

## (undated) DEVICE — SPONGE SURGIFOAM 12 1972

## (undated) DEVICE — DRAPE COVER C-ARM SEAMLESS SNAP-KAP 03-KP26 LATEX FREE

## (undated) RX ORDER — GLYCOPYRROLATE 0.2 MG/ML
INJECTION, SOLUTION INTRAMUSCULAR; INTRAVENOUS
Status: DISPENSED
Start: 2021-10-28

## (undated) RX ORDER — NEOSTIGMINE METHYLSULFATE 1 MG/ML
VIAL (ML) INJECTION
Status: DISPENSED
Start: 2021-10-28

## (undated) RX ORDER — HYDROMORPHONE HYDROCHLORIDE 1 MG/ML
INJECTION, SOLUTION INTRAMUSCULAR; INTRAVENOUS; SUBCUTANEOUS
Status: DISPENSED
Start: 2021-10-28

## (undated) RX ORDER — FENTANYL CITRATE 50 UG/ML
INJECTION, SOLUTION INTRAMUSCULAR; INTRAVENOUS
Status: DISPENSED
Start: 2021-10-28

## (undated) RX ORDER — PROPOFOL 10 MG/ML
INJECTION, EMULSION INTRAVENOUS
Status: DISPENSED
Start: 2021-10-28

## (undated) RX ORDER — ONDANSETRON 2 MG/ML
INJECTION INTRAMUSCULAR; INTRAVENOUS
Status: DISPENSED
Start: 2021-10-28

## (undated) RX ORDER — HYDROMORPHONE HCL IN WATER/PF 6 MG/30 ML
PATIENT CONTROLLED ANALGESIA SYRINGE INTRAVENOUS
Status: DISPENSED
Start: 2021-10-28

## (undated) RX ORDER — CEFAZOLIN SODIUM IN 0.9 % NACL 3 G/100 ML
INTRAVENOUS SOLUTION, PIGGYBACK (ML) INTRAVENOUS
Status: DISPENSED
Start: 2021-10-28

## (undated) RX ORDER — LIDOCAINE HYDROCHLORIDE 20 MG/ML
INJECTION, SOLUTION EPIDURAL; INFILTRATION; INTRACAUDAL; PERINEURAL
Status: DISPENSED
Start: 2021-10-28

## (undated) RX ORDER — OXYCODONE HYDROCHLORIDE 5 MG/1
TABLET ORAL
Status: DISPENSED
Start: 2021-10-28

## (undated) RX ORDER — DEXAMETHASONE SODIUM PHOSPHATE 4 MG/ML
INJECTION, SOLUTION INTRA-ARTICULAR; INTRALESIONAL; INTRAMUSCULAR; INTRAVENOUS; SOFT TISSUE
Status: DISPENSED
Start: 2021-10-28

## (undated) RX ORDER — METHOCARBAMOL 750 MG/1
TABLET, FILM COATED ORAL
Status: DISPENSED
Start: 2021-10-28

## (undated) RX ORDER — FENTANYL CITRATE 50 UG/ML
INJECTION, SOLUTION INTRAMUSCULAR; INTRAVENOUS
Status: DISPENSED
Start: 2021-08-16